# Patient Record
Sex: FEMALE | Race: BLACK OR AFRICAN AMERICAN | NOT HISPANIC OR LATINO | Employment: OTHER | URBAN - METROPOLITAN AREA
[De-identification: names, ages, dates, MRNs, and addresses within clinical notes are randomized per-mention and may not be internally consistent; named-entity substitution may affect disease eponyms.]

---

## 2017-01-09 ENCOUNTER — TELEPHONE (OUTPATIENT)
Dept: OBSTETRICS AND GYNECOLOGY | Facility: CLINIC | Age: 27
End: 2017-01-09

## 2017-01-09 NOTE — TELEPHONE ENCOUNTER
GAUTAM for Pt to call back. It looks like she was scheduled for tomorrow but it was canceled since her last visit. Apparently another appointment wasn't made. Risk assessment was already done. New ob appt made. KRISTOPHER Bhatia

## 2017-01-09 NOTE — TELEPHONE ENCOUNTER
----- Message from Kanwal Turcios sent at 1/9/2017  9:26 AM CST -----  Contact: pt   Pt very up set because she because she has a high risk pregnancy,,,pt wants to know when her next appt,,, pt wants to talk with someone,, please call pt back [phone]

## 2017-01-10 NOTE — TELEPHONE ENCOUNTER
----- Message from Breanne Soliman sent at 1/10/2017  1:34 PM CST -----  Contact: Pt  Pt states she is returning nurse call, please contact pt at [phone]

## 2017-01-17 ENCOUNTER — PROCEDURE VISIT (OUTPATIENT)
Dept: OBSTETRICS AND GYNECOLOGY | Facility: CLINIC | Age: 27
End: 2017-01-17
Payer: MEDICAID

## 2017-01-17 ENCOUNTER — INITIAL PRENATAL (OUTPATIENT)
Dept: OBSTETRICS AND GYNECOLOGY | Facility: CLINIC | Age: 27
End: 2017-01-17
Payer: MEDICAID

## 2017-01-17 VITALS
BODY MASS INDEX: 40.85 KG/M2 | DIASTOLIC BLOOD PRESSURE: 82 MMHG | SYSTOLIC BLOOD PRESSURE: 118 MMHG | WEIGHT: 253.06 LBS

## 2017-01-17 DIAGNOSIS — O36.80X1 PREGNANCY WITH UNCERTAIN FETAL VIABILITY, FETUS 1: ICD-10-CM

## 2017-01-17 DIAGNOSIS — N18.6 ESRF (END STAGE RENAL FAILURE): ICD-10-CM

## 2017-01-17 DIAGNOSIS — N05.1 FSGS (FOCAL SEGMENTAL GLOMERULOSCLEROSIS): Primary | ICD-10-CM

## 2017-01-17 PROCEDURE — 76801 OB US < 14 WKS SINGLE FETUS: CPT | Mod: PBBFAC,PN | Performed by: OBSTETRICS & GYNECOLOGY

## 2017-01-17 PROCEDURE — 76801 OB US < 14 WKS SINGLE FETUS: CPT | Mod: 26,S$PBB,, | Performed by: OBSTETRICS & GYNECOLOGY

## 2017-01-17 PROCEDURE — 99999 PR PBB SHADOW E&M-EST. PATIENT-LVL III: CPT | Mod: PBBFAC,,, | Performed by: OBSTETRICS & GYNECOLOGY

## 2017-01-17 PROCEDURE — 99213 OFFICE O/P EST LOW 20 MIN: CPT | Mod: TH,S$PBB,, | Performed by: OBSTETRICS & GYNECOLOGY

## 2017-01-17 RX ORDER — ALBUTEROL SULFATE 90 UG/1
2 AEROSOL, METERED RESPIRATORY (INHALATION)
COMMUNITY
Start: 2016-02-19 | End: 2018-06-29

## 2017-01-17 NOTE — PROGRESS NOTES
Dating sono today  Pt very ambivalent about continuing pregnacy  No records from nephrologist  Per pt, nephology planning 5x/w dialysis  +fht  Reviewed prenatal labs  cmp not done; will get records from dr dailey (nephrology)

## 2017-01-23 ENCOUNTER — TELEPHONE (OUTPATIENT)
Dept: OBSTETRICS AND GYNECOLOGY | Facility: CLINIC | Age: 27
End: 2017-01-23

## 2017-01-23 NOTE — TELEPHONE ENCOUNTER
----- Message from Shahida Vargas sent at 1/23/2017 11:32 AM CST -----  Contact: [facility] [place] [place] [facility]   Call caller regarding what the doctor need from the dialysis clinic on pt.   [phone]

## 2017-01-23 NOTE — TELEPHONE ENCOUNTER
They are sending records and plan of care recommendations for patient to our fax number.  Fax number given.

## 2017-02-01 ENCOUNTER — OFFICE VISIT (OUTPATIENT)
Dept: OBSTETRICS AND GYNECOLOGY | Facility: CLINIC | Age: 27
End: 2017-02-01
Payer: MEDICARE

## 2017-02-01 DIAGNOSIS — O36.80X1 PREGNANCY WITH UNCERTAIN FETAL VIABILITY, FETUS 1: ICD-10-CM

## 2017-02-01 DIAGNOSIS — N18.6 ESRF (END STAGE RENAL FAILURE): ICD-10-CM

## 2017-02-01 DIAGNOSIS — N05.1 FSGS (FOCAL SEGMENTAL GLOMERULOSCLEROSIS): ICD-10-CM

## 2017-02-01 PROBLEM — N19: Status: ACTIVE | Noted: 2017-02-01

## 2017-02-01 PROBLEM — O26.831: Status: ACTIVE | Noted: 2017-02-01

## 2017-02-01 PROCEDURE — 99211 OFF/OP EST MAY X REQ PHY/QHP: CPT | Mod: PBBFAC,PO,25

## 2017-02-01 PROCEDURE — 99204 OFFICE O/P NEW MOD 45 MIN: CPT | Mod: 25,S$PBB,, | Performed by: OBSTETRICS & GYNECOLOGY

## 2017-02-01 PROCEDURE — 76801 OB US < 14 WKS SINGLE FETUS: CPT | Mod: 26,S$PBB,, | Performed by: OBSTETRICS & GYNECOLOGY

## 2017-02-01 PROCEDURE — 76801 OB US < 14 WKS SINGLE FETUS: CPT | Mod: PBBFAC,PO | Performed by: OBSTETRICS & GYNECOLOGY

## 2017-02-01 PROCEDURE — 99999 PR PBB SHADOW E&M-EST. PATIENT-LVL I: CPT | Mod: PBBFAC,,,
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
## 2017-02-09 ENCOUNTER — TELEPHONE (OUTPATIENT)
Dept: OBSTETRICS AND GYNECOLOGY | Facility: CLINIC | Age: 27
End: 2017-02-09

## 2017-02-09 NOTE — TELEPHONE ENCOUNTER
----- Message from Breanne Soliman sent at 2/9/2017  1:26 PM CST -----  Contact: Pt  Pt request call from nurse regarding her apt on 02-16-17, pt declined to give any further details so I do not know if I could assist her further or not, please contact pt at [phone]

## 2017-02-13 ENCOUNTER — TELEPHONE (OUTPATIENT)
Dept: OBSTETRICS AND GYNECOLOGY | Facility: CLINIC | Age: 27
End: 2017-02-13

## 2017-02-13 NOTE — TELEPHONE ENCOUNTER
A letter has been created the Pt has been advised. She is asking for an  clinic that will accept her insurance to pay. I informed her that we can not give out that information and that she has to find one if that is her decision. She states she will come in as scheduled on the  to discuss it with Dr. Higgins because she feels we can do more since this is making her health worse. KRISTOPHER Bhatia

## 2017-02-13 NOTE — TELEPHONE ENCOUNTER
----- Message from Virginia Higgins MD sent at 2017  9:04 AM CST -----   Please write; pt is under our care at [facility].  She is followed by nephrology.  She has a renal condition that will exacerbate during the pregnancy  ----- Message -----     From: FILIBERTO Mosqueda MD     Sent: 2/10/2017  11:13 AM       To: Virginia Higgins MD    I would only write a letter stating that she has a renal condition that will probably exacerbate with the pregnancy...  Do not state that the  is medically necessary.  That should suffice   Juan Francisco  ----- Message -----     From: Virginia Higgins MD     Sent: 2/10/2017   9:15 AM       To: FILIBERTO Mosqueda MD     Really do not feel comfortable writing this letter--what are your feelings.  I agree with her right to choose.  Do we have a standard letter for this issue  ----- Message -----     From: Josephine Bhatia MA     Sent: 2017   2:47 PM       To: Virginia Higgins MD    This Pt states her kidney doctor, Dr. Cardenas, and our [facility] provider states she is at a greater health risk if she continues with this pregnancy. She states her medicare is willing to pay for an  if you write a letter stating you approve of it. Can you be of assistance with this matter?

## 2017-11-02 ENCOUNTER — TELEPHONE (OUTPATIENT)
Dept: TRANSPLANT | Facility: CLINIC | Age: 27
End: 2017-11-02

## 2017-11-02 NOTE — TELEPHONE ENCOUNTER
----- Message from Chanda Kelley sent at 11/2/2017  2:03 PM CDT -----  Contact: Monse with [facility],     Ritesh would like to discuss pts BMI     Contact number [phone] ask for Monse Stewart

## 2017-11-13 ENCOUNTER — TELEPHONE (OUTPATIENT)
Dept: TRANSPLANT | Facility: CLINIC | Age: 27
End: 2017-11-13

## 2018-03-29 ENCOUNTER — TELEPHONE (OUTPATIENT)
Dept: OBSTETRICS AND GYNECOLOGY | Facility: CLINIC | Age: 28
End: 2018-03-29

## 2018-03-29 NOTE — TELEPHONE ENCOUNTER
----- Message from Josephine Bhatia MA sent at 3/28/2018  4:26 PM CDT -----  Contact: pt      ----- Message -----  From: Bell Daly  Sent: 3/28/2018  10:56 AM  To: Ronaldo [facility] Staff    The pt states she maybe pregnant and is on dialysis and want to be worked in this week, the pt can be reached at [phone]///[id number]

## 2018-04-05 ENCOUNTER — LAB VISIT (OUTPATIENT)
Dept: LAB | Facility: HOSPITAL | Age: 28
End: 2018-04-05
Attending: OBSTETRICS & GYNECOLOGY
Payer: MEDICARE

## 2018-04-05 ENCOUNTER — OFFICE VISIT (OUTPATIENT)
Dept: OBSTETRICS AND GYNECOLOGY | Facility: CLINIC | Age: 28
End: 2018-04-05
Payer: MEDICARE

## 2018-04-05 VITALS
DIASTOLIC BLOOD PRESSURE: 91 MMHG | HEIGHT: 66 IN | BODY MASS INDEX: 40.74 KG/M2 | SYSTOLIC BLOOD PRESSURE: 128 MMHG | WEIGHT: 253.5 LBS

## 2018-04-05 DIAGNOSIS — Z11.3 SCREENING FOR GONORRHEA: ICD-10-CM

## 2018-04-05 DIAGNOSIS — N19 RENAL FAILURE AFFECTING PREGNANCY IN FIRST TRIMESTER: ICD-10-CM

## 2018-04-05 DIAGNOSIS — O09.91 SUPERVISION OF HIGH RISK PREGNANCY IN FIRST TRIMESTER: ICD-10-CM

## 2018-04-05 DIAGNOSIS — O26.831 RENAL FAILURE AFFECTING PREGNANCY IN FIRST TRIMESTER: ICD-10-CM

## 2018-04-05 DIAGNOSIS — Z12.4 SCREENING FOR CERVICAL CANCER: ICD-10-CM

## 2018-04-05 DIAGNOSIS — Z32.01 POSITIVE PREGNANCY TEST: ICD-10-CM

## 2018-04-05 DIAGNOSIS — Z32.01 POSITIVE PREGNANCY TEST: Primary | ICD-10-CM

## 2018-04-05 DIAGNOSIS — N76.0 BACTERIAL VAGINOSIS: ICD-10-CM

## 2018-04-05 DIAGNOSIS — B96.89 BACTERIAL VAGINOSIS: ICD-10-CM

## 2018-04-05 DIAGNOSIS — N18.6 END STAGE RENAL DISEASE: ICD-10-CM

## 2018-04-05 DIAGNOSIS — N94.89 OTHER SPECIFIED CONDITIONS ASSOCIATED WITH FEMALE GENITAL ORGANS AND MENSTRUAL CYCLE: ICD-10-CM

## 2018-04-05 LAB
ABO + RH BLD: NORMAL
ANION GAP SERPL CALC-SCNC: 8 MMOL/L
BASOPHILS # BLD AUTO: 0.05 K/UL
BASOPHILS NFR BLD: 0.5 %
BLD GP AB SCN CELLS X3 SERPL QL: NORMAL
BUN SERPL-MCNC: 25 MG/DL
CALCIUM SERPL-MCNC: 9.7 MG/DL
CHLORIDE SERPL-SCNC: 106 MMOL/L
CO2 SERPL-SCNC: 24 MMOL/L
CREAT SERPL-MCNC: 5.9 MG/DL
DIFFERENTIAL METHOD: ABNORMAL
EOSINOPHIL # BLD AUTO: 0 K/UL
EOSINOPHIL NFR BLD: 0.4 %
ERYTHROCYTE [DISTWIDTH] IN BLOOD BY AUTOMATED COUNT: 13 %
EST. GFR  (AFRICAN AMERICAN): 10.3 ML/MIN/1.73 M^2
EST. GFR  (NON AFRICAN AMERICAN): 8.9 ML/MIN/1.73 M^2
GLUCOSE SERPL-MCNC: 69 MG/DL
HCT VFR BLD AUTO: 30.7 %
HGB BLD-MCNC: 9.9 G/DL
IMM GRANULOCYTES # BLD AUTO: 0.03 K/UL
IMM GRANULOCYTES NFR BLD AUTO: 0.3 %
LYMPHOCYTES # BLD AUTO: 1.8 K/UL
LYMPHOCYTES NFR BLD: 19.1 %
MCH RBC QN AUTO: 31 PG
MCHC RBC AUTO-ENTMCNC: 32.2 G/DL
MCV RBC AUTO: 96 FL
MONOCYTES # BLD AUTO: 0.7 K/UL
MONOCYTES NFR BLD: 7 %
NEUTROPHILS # BLD AUTO: 6.8 K/UL
NEUTROPHILS NFR BLD: 72.7 %
NRBC BLD-RTO: 0 /100 WBC
PLATELET # BLD AUTO: 167 K/UL
PMV BLD AUTO: 11.6 FL
POTASSIUM SERPL-SCNC: 4.3 MMOL/L
RBC # BLD AUTO: 3.19 M/UL
SODIUM SERPL-SCNC: 138 MMOL/L
WBC # BLD AUTO: 9.4 K/UL

## 2018-04-05 PROCEDURE — 88175 CYTOPATH C/V AUTO FLUID REDO: CPT

## 2018-04-05 PROCEDURE — 99213 OFFICE O/P EST LOW 20 MIN: CPT | Mod: S$PBB,,, | Performed by: OBSTETRICS & GYNECOLOGY

## 2018-04-05 PROCEDURE — 87510 GARDNER VAG DNA DIR PROBE: CPT

## 2018-04-05 PROCEDURE — 86592 SYPHILIS TEST NON-TREP QUAL: CPT

## 2018-04-05 PROCEDURE — 99213 OFFICE O/P EST LOW 20 MIN: CPT | Mod: PBBFAC,PN,25 | Performed by: OBSTETRICS & GYNECOLOGY

## 2018-04-05 PROCEDURE — 85025 COMPLETE CBC W/AUTO DIFF WBC: CPT

## 2018-04-05 PROCEDURE — 80048 BASIC METABOLIC PNL TOTAL CA: CPT

## 2018-04-05 PROCEDURE — 87491 CHLMYD TRACH DNA AMP PROBE: CPT

## 2018-04-05 PROCEDURE — 87340 HEPATITIS B SURFACE AG IA: CPT

## 2018-04-05 PROCEDURE — 86901 BLOOD TYPING SEROLOGIC RH(D): CPT

## 2018-04-05 PROCEDURE — 87086 URINE CULTURE/COLONY COUNT: CPT

## 2018-04-05 PROCEDURE — 83020 HEMOGLOBIN ELECTROPHORESIS: CPT

## 2018-04-05 PROCEDURE — 87480 CANDIDA DNA DIR PROBE: CPT

## 2018-04-05 PROCEDURE — 86703 HIV-1/HIV-2 1 RESULT ANTBDY: CPT

## 2018-04-05 PROCEDURE — 86762 RUBELLA ANTIBODY: CPT

## 2018-04-05 PROCEDURE — 99999 PR PBB SHADOW E&M-EST. PATIENT-LVL III: CPT | Mod: PBBFAC,,, | Performed by: OBSTETRICS & GYNECOLOGY

## 2018-04-05 RX ORDER — PREDNISONE 20 MG/1
TABLET ORAL
COMMUNITY
Start: 2017-05-30 | End: 2018-04-05

## 2018-04-05 RX ORDER — METOPROLOL SUCCINATE 25 MG/1
50 TABLET, EXTENDED RELEASE ORAL NIGHTLY
COMMUNITY
Start: 2018-04-01 | End: 2019-11-12

## 2018-04-05 RX ORDER — BUSPIRONE HYDROCHLORIDE 5 MG/1
TABLET ORAL
COMMUNITY
Start: 2018-03-03 | End: 2018-06-29

## 2018-04-05 RX ORDER — PANTOPRAZOLE SODIUM 40 MG/1
40 TABLET, DELAYED RELEASE ORAL NIGHTLY
COMMUNITY
Start: 2018-04-01 | End: 2019-01-21 | Stop reason: SDUPTHER

## 2018-04-05 NOTE — PROGRESS NOTES
Subjective:       Patient ID: Lisseth Schwartz is a 27 y.o. female.    Chief Complaint:  Initial Prenatal Visit      History of Present Illness  HPI  Missed Menses/ Possible Pregnancy  Patient complains of amenorrhea. She believes she could be pregnant. Patient is ambivalent about pregnancy. but plans to continue pregnancy; Sexual Activity: single partner, contraception: condoms. Current symptoms also include: morning sickness, nausea and positive home pregnancy test, breast tenderness; reports cramping but denies vaginal bleeding;  Last period was normal.     Patient's last menstrual period was 2018 (approximate).     Denies use of prenatal vitamin    Dialysis patient, 3x/wk        GYN & OB History  Patient's last menstrual period was 2018 (approximate).   Date of Last Pap: No result found    OB History    Para Term  AB Living   5 3 3   1 3   SAB TAB Ectopic Multiple Live Births     1            # Outcome Date GA Lbr Erik/2nd Weight Sex Delivery Anes PTL Lv   5             4 TAB            3 Term    4.99 kg (11 lb)  Vag-Spont      2 Term    4.082 kg (9 lb)  Vag-Spont      1 Term    3.175 kg (7 lb)  Vag-Spont             Review of Systems  Review of Systems   Constitutional: Negative for activity change, appetite change, chills, diaphoresis, fatigue, fever and unexpected weight change.   HENT: Negative for mouth sores and tinnitus.    Eyes: Negative for discharge and visual disturbance.   Respiratory: Negative for cough, shortness of breath and wheezing.    Cardiovascular: Negative for chest pain, palpitations and leg swelling.   Gastrointestinal: Negative for abdominal pain, bloating, blood in stool, constipation, diarrhea, nausea and vomiting.   Endocrine: Negative for diabetes, hair loss, hot flashes, hyperthyroidism and hypothyroidism.   Genitourinary: Negative for decreased libido, dyspareunia, dysuria, flank pain, frequency, genital sores, hematuria, menorrhagia, menstrual  problem (+upt), pelvic pain, urgency, vaginal bleeding, vaginal discharge, vaginal pain, dysmenorrhea, urinary incontinence, postcoital bleeding, postmenopausal bleeding and vaginal odor.   Musculoskeletal: Negative for back pain and myalgias.   Skin:  Negative for rash, no acne and hair changes.   Neurological: Negative for seizures, syncope, numbness and headaches.   Hematological: Negative for adenopathy. Does not bruise/bleed easily.   Psychiatric/Behavioral: Negative for depression and sleep disturbance. The patient is not nervous/anxious.    Breast: Negative for breast mass, breast pain, nipple discharge and skin changes          Objective:    Physical Exam:   Constitutional: She appears well-developed.     Eyes: Conjunctivae and EOM are normal. Pupils are equal, round, and reactive to light.    Neck: Normal range of motion. Neck supple.     Pulmonary/Chest: Effort normal. Right breast exhibits no mass, no nipple discharge, no skin change, no tenderness and presence. Left breast exhibits no mass, no nipple discharge, no skin change, no tenderness and presence. Breasts are symmetrical.        Abdominal: Soft.     Genitourinary: Vagina normal. Pelvic exam was performed with patient supine. Uterus is enlarged. Cervix is normal. Right adnexum displays no mass. Left adnexum displays no mass. Additional cervical findings: pap smear done       Uterus Size: 8 cm   Musculoskeletal: Normal range of motion.       Neurological: She is alert.    Skin: Skin is warm.    Psychiatric: She has a normal mood and affect.          Assessment:     Encounter Diagnoses   Name Primary?    Positive pregnancy test Yes    Screening for gonorrhea     Bacterial vaginosis     Screening for cervical cancer     Renal failure affecting pregnancy in first trimester     Supervision of high risk pregnancy in first trimester      End stage renal disease      Other specified conditions associated with female genital organs and menstrual  cycle                 Plan:      Risk assessment  Ob sono ordered--dating  Ob labs ordered, including early glucola  Needs f/u with MFM  rx sent for folic acid   new ob appt in 2 wks  Continue metoprolol

## 2018-04-06 LAB
BACTERIA UR CULT: NO GROWTH
C TRACH DNA SPEC QL NAA+PROBE: NOT DETECTED
CANDIDA RRNA VAG QL PROBE: NEGATIVE
G VAGINALIS RRNA GENITAL QL PROBE: POSITIVE
HBV SURFACE AG SERPL QL IA: NEGATIVE
HGB A2 MFR BLD HPLC: 3.5 %
HGB FRACT BLD ELPH-IMP: ABNORMAL
HGB FRACT BLD ELPH-IMP: NORMAL
HIV 1+2 AB+HIV1 P24 AG SERPL QL IA: NEGATIVE
N GONORRHOEA DNA SPEC QL NAA+PROBE: NOT DETECTED
RPR SER QL: NORMAL
RUBV IGG SER-ACNC: 26.3 IU/ML
RUBV IGG SER-IMP: REACTIVE
T VAGINALIS RRNA GENITAL QL PROBE: NEGATIVE

## 2018-04-07 ENCOUNTER — TELEPHONE (OUTPATIENT)
Dept: OBSTETRICS AND GYNECOLOGY | Facility: HOSPITAL | Age: 28
End: 2018-04-07

## 2018-04-07 DIAGNOSIS — N76.0 BACTERIAL VAGINOSIS: Primary | ICD-10-CM

## 2018-04-07 DIAGNOSIS — B96.89 BACTERIAL VAGINOSIS: Primary | ICD-10-CM

## 2018-04-07 RX ORDER — METRONIDAZOLE 7.5 MG/G
1 GEL VAGINAL DAILY
Qty: 5 APPLICATOR | Refills: 0 | Status: SHIPPED | OUTPATIENT
Start: 2018-04-07 | End: 2018-04-12

## 2018-04-09 ENCOUNTER — TELEPHONE (OUTPATIENT)
Dept: OBSTETRICS AND GYNECOLOGY | Facility: CLINIC | Age: 28
End: 2018-04-09

## 2018-04-09 ENCOUNTER — LAB VISIT (OUTPATIENT)
Dept: LAB | Facility: HOSPITAL | Age: 28
End: 2018-04-09
Attending: OBSTETRICS & GYNECOLOGY
Payer: MEDICARE

## 2018-04-09 ENCOUNTER — PROCEDURE VISIT (OUTPATIENT)
Dept: OBSTETRICS AND GYNECOLOGY | Facility: CLINIC | Age: 28
End: 2018-04-09
Payer: MEDICARE

## 2018-04-09 DIAGNOSIS — O26.831 RENAL FAILURE AFFECTING PREGNANCY IN FIRST TRIMESTER: ICD-10-CM

## 2018-04-09 DIAGNOSIS — Z32.01 POSITIVE PREGNANCY TEST: ICD-10-CM

## 2018-04-09 DIAGNOSIS — N19 RENAL FAILURE AFFECTING PREGNANCY IN FIRST TRIMESTER: ICD-10-CM

## 2018-04-09 DIAGNOSIS — O26.831 RENAL FAILURE AFFECTING PREGNANCY IN FIRST TRIMESTER: Primary | ICD-10-CM

## 2018-04-09 DIAGNOSIS — O09.91 HIGH-RISK PREGNANCY IN FIRST TRIMESTER: ICD-10-CM

## 2018-04-09 DIAGNOSIS — N18.6 END STAGE RENAL DISEASE: ICD-10-CM

## 2018-04-09 DIAGNOSIS — N05.1 FSGS (FOCAL SEGMENTAL GLOMERULOSCLEROSIS): ICD-10-CM

## 2018-04-09 DIAGNOSIS — N19 RENAL FAILURE AFFECTING PREGNANCY IN FIRST TRIMESTER: Primary | ICD-10-CM

## 2018-04-09 LAB — GLUCOSE SERPL-MCNC: 183 MG/DL

## 2018-04-09 PROCEDURE — 76801 OB US < 14 WKS SINGLE FETUS: CPT | Mod: PBBFAC,PN | Performed by: OBSTETRICS & GYNECOLOGY

## 2018-04-09 PROCEDURE — 36415 COLL VENOUS BLD VENIPUNCTURE: CPT | Mod: PO

## 2018-04-09 PROCEDURE — 82950 GLUCOSE TEST: CPT

## 2018-04-09 PROCEDURE — 76801 OB US < 14 WKS SINGLE FETUS: CPT | Mod: 26,S$PBB,, | Performed by: OBSTETRICS & GYNECOLOGY

## 2018-04-09 NOTE — TELEPHONE ENCOUNTER
Spoke to the pt. Informed her that her vaginal culture is positive for bacterial vaginosis.  This is overgrowth of your normal bacteria.  rx sent for  vaginal gel for treatment   Pt. Acknowledged understanding. navid Michelle

## 2018-04-10 DIAGNOSIS — O99.810 ABNORMAL GLUCOSE AFFECTING PREGNANCY: Primary | ICD-10-CM

## 2018-04-16 ENCOUNTER — OFFICE VISIT (OUTPATIENT)
Dept: OBSTETRICS AND GYNECOLOGY | Facility: CLINIC | Age: 28
End: 2018-04-16
Payer: MEDICARE

## 2018-04-16 DIAGNOSIS — O26.831 RENAL FAILURE AFFECTING PREGNANCY IN FIRST TRIMESTER: ICD-10-CM

## 2018-04-16 DIAGNOSIS — O09.91 HIGH-RISK PREGNANCY IN FIRST TRIMESTER: ICD-10-CM

## 2018-04-16 DIAGNOSIS — N19 RENAL FAILURE AFFECTING PREGNANCY IN FIRST TRIMESTER: ICD-10-CM

## 2018-04-16 DIAGNOSIS — N05.1 FSGS (FOCAL SEGMENTAL GLOMERULOSCLEROSIS): ICD-10-CM

## 2018-04-16 PROCEDURE — 76801 OB US < 14 WKS SINGLE FETUS: CPT | Mod: 26,S$PBB,, | Performed by: OBSTETRICS & GYNECOLOGY

## 2018-04-16 PROCEDURE — 99213 OFFICE O/P EST LOW 20 MIN: CPT | Mod: S$PBB,25,, | Performed by: OBSTETRICS & GYNECOLOGY

## 2018-04-16 PROCEDURE — 76801 OB US < 14 WKS SINGLE FETUS: CPT | Mod: PBBFAC | Performed by: OBSTETRICS & GYNECOLOGY

## 2018-04-16 NOTE — PROGRESS NOTES
Lisseth Cesar is a 27y.o. female  presents for risk assessment. LMP unsure.     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 ()  Patient is currently on HD five times/week.    She is currently on Metoprolol QHS    Serum Cr 5.9 this month; H&H 9.9 and 30.7; Glucose wnl;      Counseling  The patient is referred to [facility] because of her multiple medical problems. Foremost amongst these is the fact that she is now on dialysis  The outcome of pregnancy amongst dialysis patients is poor. Delivery of a viable infant occurs in 50-70% of these women. The viable  pregnancies frequently deliver prematurely due to preeclampsia and IUGR. The average gestational age of delivery in dialysis patients is 30  weeks.    More intensive dialysis with the blood urea nitrogen (BUN) being maintained at under 50 mg/dL or even under 45 mg/dL. In clinical practice, this  is usually achieved by increasing the frequency of dialysis (eg, increase to four to six sessions per week) or switching to long nightly dialysis  Ameliorating the uremic milieu can avoid polyhydramnios, help control hypertension, increase birth weight and gestational age, and improve  maternal nutrition. Careful uterine and fetal monitoring during hemodialysis, such as assessment of the fetal heart rate (particularly during the  last portion of a session), combined with measures aimed at preventing dialysis-induced hypotension should be performed. Maternal  hemodynamic instability may compromise the uteroplacental circulation and may be associated with the induction of uterine contractions. The  normal tendency to increase filtration to pull off fluid when swelling is evident should be tempered in pregnant patients due to the impact it  could have on placental  perfusion.    Pregnant women often require higher doses of erythropoietin to maintain an adequate red cell mass (hemoglobin of 10 to 11 g/dL) since the  physiologic changes and demands of pregnancy may result in worsening of anemia. In addition, metabolic acidosis and hypocalcemia should  be corrected.    Patients should be followed weekly from the early 2nd trimester and  testing should begin at viability.    I spoke with the patient and her partner at length. ESRD on dialysis is certainly an indication for termination of pregnancy but it remains the  patient's choice.      Impression  =========  Chronic Hypertension with ESRD on Dialysis  Single viable intrauterine pregnancy consistent with 10w5d  Embryo grossly WNL with normal cardiac activity.  Uterus, cervix and adnexae as noted above.  No fluid seen in cul-de-sac.    Recommendation  ==============  The patient is considering termination of pregnancy which MFM would support  Please re-consult if the clinical circumstances change.

## 2018-04-17 ENCOUNTER — LAB VISIT (OUTPATIENT)
Dept: LAB | Facility: HOSPITAL | Age: 28
End: 2018-04-17
Attending: OBSTETRICS & GYNECOLOGY
Payer: MEDICARE

## 2018-04-17 DIAGNOSIS — O99.810 ABNORMAL GLUCOSE AFFECTING PREGNANCY: ICD-10-CM

## 2018-04-17 LAB
GLUCOSE SERPL-MCNC: 101 MG/DL
GLUCOSE SERPL-MCNC: 105 MG/DL
GLUCOSE SERPL-MCNC: 133 MG/DL
GLUCOSE SERPL-MCNC: 71 MG/DL

## 2018-04-17 PROCEDURE — 82951 GLUCOSE TOLERANCE TEST (GTT): CPT

## 2018-04-17 PROCEDURE — 82952 GTT-ADDED SAMPLES: CPT

## 2018-04-17 PROCEDURE — 36415 COLL VENOUS BLD VENIPUNCTURE: CPT | Mod: PO

## 2018-04-26 ENCOUNTER — INITIAL PRENATAL (OUTPATIENT)
Dept: OBSTETRICS AND GYNECOLOGY | Facility: CLINIC | Age: 28
End: 2018-04-26
Payer: MEDICARE

## 2018-04-26 VITALS — SYSTOLIC BLOOD PRESSURE: 148 MMHG | WEIGHT: 255 LBS | BODY MASS INDEX: 41.16 KG/M2 | DIASTOLIC BLOOD PRESSURE: 78 MMHG

## 2018-04-26 DIAGNOSIS — N19 RENAL FAILURE AFFECTING PREGNANCY IN FIRST TRIMESTER: ICD-10-CM

## 2018-04-26 DIAGNOSIS — O26.831 RENAL FAILURE AFFECTING PREGNANCY IN FIRST TRIMESTER: ICD-10-CM

## 2018-04-26 DIAGNOSIS — N05.1 FSGS (FOCAL SEGMENTAL GLOMERULOSCLEROSIS): Primary | ICD-10-CM

## 2018-04-26 DIAGNOSIS — O09.90 SUPERVISION OF HIGH RISK PREGNANCY, ANTEPARTUM: ICD-10-CM

## 2018-04-26 PROCEDURE — 99999 PR PBB SHADOW E&M-EST. PATIENT-LVL II: CPT | Mod: PBBFAC,,, | Performed by: ADVANCED PRACTICE MIDWIFE

## 2018-04-26 PROCEDURE — 99213 OFFICE O/P EST LOW 20 MIN: CPT | Mod: S$PBB,,, | Performed by: ADVANCED PRACTICE MIDWIFE

## 2018-04-26 PROCEDURE — 99212 OFFICE O/P EST SF 10 MIN: CPT | Mod: PBBFAC,PN | Performed by: ADVANCED PRACTICE MIDWIFE

## 2018-04-26 RX ORDER — METOCLOPRAMIDE 10 MG/1
10 TABLET ORAL
Qty: 30 TABLET | Refills: 3 | Status: SHIPPED | OUTPATIENT
Start: 2018-04-26 | End: 2018-06-29

## 2018-04-26 NOTE — PROGRESS NOTES
NOB s=d oriented to practice  Patient requesting [id number]  See MFM consult  Many questions Patient concerned about pregnancy, dialysis and effect on pregnancy  reglan for nausea

## 2018-05-01 ENCOUNTER — TELEPHONE (OUTPATIENT)
Dept: OBSTETRICS AND GYNECOLOGY | Facility: CLINIC | Age: 28
End: 2018-05-01

## 2018-05-01 NOTE — TELEPHONE ENCOUNTER
----- Message from Cosme Jacinto sent at 5/1/2018  3:51 PM CDT -----  [facility] ( [facility] ) is requesting a diagnoses code .          Please call Elsi ( "[employer] lab [phone] opt 2 opt 2

## 2018-05-06 ENCOUNTER — TELEPHONE (OUTPATIENT)
Dept: OBSTETRICS AND GYNECOLOGY | Facility: HOSPITAL | Age: 28
End: 2018-05-06

## 2018-05-07 ENCOUNTER — TELEPHONE (OUTPATIENT)
Dept: OBSTETRICS AND GYNECOLOGY | Facility: CLINIC | Age: 28
End: 2018-05-07

## 2018-05-07 NOTE — TELEPHONE ENCOUNTER
----- Message from Kandi Sexton MD sent at 4/11/2018  1:13 PM CDT -----  Regarding: hi risk  MD Kandi Salinas MD  Caller: Unspecified (Yesterday,  6:11 PM)         Please Add to high risk list     Patient on dialysis   Will be seeing [facility]

## 2018-05-24 ENCOUNTER — ROUTINE PRENATAL (OUTPATIENT)
Dept: OBSTETRICS AND GYNECOLOGY | Facility: CLINIC | Age: 28
End: 2018-05-24
Payer: MEDICARE

## 2018-05-24 VITALS
DIASTOLIC BLOOD PRESSURE: 79 MMHG | WEIGHT: 252.88 LBS | BODY MASS INDEX: 40.81 KG/M2 | SYSTOLIC BLOOD PRESSURE: 132 MMHG

## 2018-05-24 DIAGNOSIS — O09.92 SUPERVISION OF HIGH RISK PREGNANCY IN SECOND TRIMESTER: Primary | ICD-10-CM

## 2018-05-24 DIAGNOSIS — O26.832: ICD-10-CM

## 2018-05-24 DIAGNOSIS — N05.1 FSGS (FOCAL SEGMENTAL GLOMERULOSCLEROSIS): ICD-10-CM

## 2018-05-24 DIAGNOSIS — N19: ICD-10-CM

## 2018-05-24 PROCEDURE — 99999 PR PBB SHADOW E&M-EST. PATIENT-LVL II: CPT | Mod: PBBFAC,,, | Performed by: ADVANCED PRACTICE MIDWIFE

## 2018-05-24 PROCEDURE — 99212 OFFICE O/P EST SF 10 MIN: CPT | Mod: PBBFAC,PN | Performed by: ADVANCED PRACTICE MIDWIFE

## 2018-05-24 PROCEDURE — 0502F SUBSEQUENT PRENATAL CARE: CPT | Mod: S$PBB,,, | Performed by: ADVANCED PRACTICE MIDWIFE

## 2018-05-24 RX ORDER — PROMETHAZINE HYDROCHLORIDE 25 MG/1
25 TABLET ORAL EVERY 4 HOURS
Qty: 30 TABLET | Refills: 1 | Status: SHIPPED | OUTPATIENT
Start: 2018-05-24 | End: 2018-08-01

## 2018-05-24 NOTE — PROGRESS NOTES
Dialysis going well BP stable  Reports some nausea and cramping  Anatomy scan next visit  Declines quad screen  Coffective counseling sheet Fall In Love discussed with mother. Reinforced immediate skin to skin, the magic first hour, importance of the first feeding and delaying routine procedures. Encouraged mother to download Coffective mobile irene if she has not already done so. Mother verbalizes understanding.

## 2018-05-31 ENCOUNTER — ROUTINE PRENATAL (OUTPATIENT)
Dept: OBSTETRICS AND GYNECOLOGY | Facility: CLINIC | Age: 28
End: 2018-05-31
Payer: MEDICARE

## 2018-05-31 VITALS — WEIGHT: 253.5 LBS | BODY MASS INDEX: 40.92 KG/M2 | DIASTOLIC BLOOD PRESSURE: 84 MMHG | SYSTOLIC BLOOD PRESSURE: 122 MMHG

## 2018-05-31 DIAGNOSIS — O09.92 SUPERVISION OF HIGH RISK PREGNANCY IN SECOND TRIMESTER: Primary | ICD-10-CM

## 2018-05-31 DIAGNOSIS — O26.832: ICD-10-CM

## 2018-05-31 DIAGNOSIS — N05.1 FSGS (FOCAL SEGMENTAL GLOMERULOSCLEROSIS): ICD-10-CM

## 2018-05-31 DIAGNOSIS — N19: ICD-10-CM

## 2018-05-31 PROCEDURE — 0502F SUBSEQUENT PRENATAL CARE: CPT | Mod: S$PBB,,, | Performed by: OBSTETRICS & GYNECOLOGY

## 2018-05-31 PROCEDURE — 99212 OFFICE O/P EST SF 10 MIN: CPT | Mod: PBBFAC,PN | Performed by: OBSTETRICS & GYNECOLOGY

## 2018-05-31 PROCEDURE — 99999 PR PBB SHADOW E&M-EST. PATIENT-LVL II: CPT | Mod: PBBFAC,,, | Performed by: OBSTETRICS & GYNECOLOGY

## 2018-05-31 NOTE — PROGRESS NOTES
Admits to not going to dialysis as scheduled some weeks; usually makes it 3-4 times/wk  Stressed impt with compliance  Denies shortness of breath  Phenergan helping with nausea  Taking metoprolol as directed  Continue weekly visits  mfm appt at 24w (anatomy scan)

## 2018-06-07 ENCOUNTER — ROUTINE PRENATAL (OUTPATIENT)
Dept: OBSTETRICS AND GYNECOLOGY | Facility: CLINIC | Age: 28
End: 2018-06-07
Payer: MEDICARE

## 2018-06-07 VITALS
WEIGHT: 257.94 LBS | SYSTOLIC BLOOD PRESSURE: 142 MMHG | BODY MASS INDEX: 41.63 KG/M2 | DIASTOLIC BLOOD PRESSURE: 74 MMHG

## 2018-06-07 DIAGNOSIS — O26.832: Primary | ICD-10-CM

## 2018-06-07 DIAGNOSIS — N19: Primary | ICD-10-CM

## 2018-06-07 DIAGNOSIS — O09.92 SUPERVISION OF HIGH RISK PREGNANCY IN SECOND TRIMESTER: ICD-10-CM

## 2018-06-07 DIAGNOSIS — D50.9 IRON DEFICIENCY ANEMIA, UNSPECIFIED IRON DEFICIENCY ANEMIA TYPE: ICD-10-CM

## 2018-06-07 PROCEDURE — 99999 PR PBB SHADOW E&M-EST. PATIENT-LVL III: CPT | Mod: PBBFAC,,, | Performed by: ADVANCED PRACTICE MIDWIFE

## 2018-06-07 PROCEDURE — 0502F SUBSEQUENT PRENATAL CARE: CPT | Mod: S$PBB,,, | Performed by: ADVANCED PRACTICE MIDWIFE

## 2018-06-07 PROCEDURE — 99213 OFFICE O/P EST LOW 20 MIN: CPT | Mod: PBBFAC,PN | Performed by: ADVANCED PRACTICE MIDWIFE

## 2018-06-07 NOTE — PROGRESS NOTES
Reports going to dialysis appointments  Reports having some financial stressors  Having transportation issues  Anatomy in 2 weeks  Does not want to see M  Coffective counseling sheet Fall In Love discussed with mother. Reinforced immediate skin to skin, the magic first hour, importance of the first feeding and delaying routine procedures. Encouraged mother to download Coffective mobile irene if she has not already done so. Mother verbalizes understanding.    JAY MELVIN

## 2018-06-11 ENCOUNTER — TELEPHONE (OUTPATIENT)
Dept: OBSTETRICS AND GYNECOLOGY | Facility: CLINIC | Age: 28
End: 2018-06-11

## 2018-06-11 NOTE — TELEPHONE ENCOUNTER
Per mfm ; pt should be seen weekly;   Please offer appt for this week    Per shasha Bhatia; appt sched for 6/12/18--3514

## 2018-06-11 NOTE — TELEPHONE ENCOUNTER
----- Message from Monica Piña sent at 6/11/2018 12:47 PM CDT -----  Contact: patient  Patient called and stated she is at Dialysis and her pressure has been running high. She already takes Metoprolol 25 mg and they are concerned and wants to know what the doctor can advise them to give her that is safe for the baby. She can be contacted at [phone].    Thanks,  Monica

## 2018-06-12 ENCOUNTER — PROCEDURE VISIT (OUTPATIENT)
Dept: OBSTETRICS AND GYNECOLOGY | Facility: CLINIC | Age: 28
End: 2018-06-12
Payer: MEDICARE

## 2018-06-12 ENCOUNTER — ROUTINE PRENATAL (OUTPATIENT)
Dept: OBSTETRICS AND GYNECOLOGY | Facility: CLINIC | Age: 28
End: 2018-06-12
Payer: MEDICARE

## 2018-06-12 VITALS — SYSTOLIC BLOOD PRESSURE: 136 MMHG | BODY MASS INDEX: 41.08 KG/M2 | DIASTOLIC BLOOD PRESSURE: 78 MMHG | WEIGHT: 254.5 LBS

## 2018-06-12 DIAGNOSIS — O26.832 RENAL FAILURE AFFECTING PREGNANCY IN SECOND TRIMESTER: ICD-10-CM

## 2018-06-12 DIAGNOSIS — N19 RENAL FAILURE AFFECTING PREGNANCY IN SECOND TRIMESTER: ICD-10-CM

## 2018-06-12 DIAGNOSIS — O09.92 HIGH-RISK PREGNANCY IN SECOND TRIMESTER: Primary | ICD-10-CM

## 2018-06-12 DIAGNOSIS — N05.1 FSGS (FOCAL SEGMENTAL GLOMERULOSCLEROSIS): ICD-10-CM

## 2018-06-12 DIAGNOSIS — Z36.3 ENCOUNTER FOR ANTENATAL SCREENING FOR MALFORMATION USING ULTRASOUND: ICD-10-CM

## 2018-06-12 DIAGNOSIS — O09.92 SUPERVISION OF HIGH RISK PREGNANCY IN SECOND TRIMESTER: ICD-10-CM

## 2018-06-12 PROCEDURE — 0502F SUBSEQUENT PRENATAL CARE: CPT | Mod: S$PBB,,, | Performed by: ADVANCED PRACTICE MIDWIFE

## 2018-06-12 PROCEDURE — 99213 OFFICE O/P EST LOW 20 MIN: CPT | Mod: PBBFAC,PN | Performed by: ADVANCED PRACTICE MIDWIFE

## 2018-06-12 PROCEDURE — 76805 OB US >/= 14 WKS SNGL FETUS: CPT | Mod: PBBFAC,PN | Performed by: OBSTETRICS & GYNECOLOGY

## 2018-06-12 PROCEDURE — 99999 PR PBB SHADOW E&M-EST. PATIENT-LVL III: CPT | Mod: PBBFAC,,, | Performed by: ADVANCED PRACTICE MIDWIFE

## 2018-06-12 PROCEDURE — 76805 OB US >/= 14 WKS SNGL FETUS: CPT | Mod: 26,S$PBB,, | Performed by: OBSTETRICS & GYNECOLOGY

## 2018-06-12 NOTE — PROGRESS NOTES
Pt is very nervous about possible pregnancy outcome.  Reviewed MFM note from 4/5.  Advised BP here has been stable and that today there was no need to introduce a second BP medication.  Pt reports round ligament pain.  Declined SVE. Comfort measures discussed.  Continues to struggle with transportation issues.    Anatomy US today sub-op heart.  Scheduled with MFM at 24 weeks for completion of anatomy and F/U for recommendations going forward.  Advised patient weekly visits to begin and fetal testing at 24 weeks.  Encouraged to keep up with all visits as close observation needed.  VM

## 2018-06-19 ENCOUNTER — TELEPHONE (OUTPATIENT)
Dept: OBSTETRICS AND GYNECOLOGY | Facility: CLINIC | Age: 28
End: 2018-06-19

## 2018-06-19 NOTE — TELEPHONE ENCOUNTER
----- Message from Liana Goel sent at 6/19/2018  3:47 PM CDT -----  Contact: self/[phone]  Would like to consult with nurse regarding making appt, please call back at [phone]. Thanks/ar

## 2018-06-20 ENCOUNTER — ROUTINE PRENATAL (OUTPATIENT)
Dept: OBSTETRICS AND GYNECOLOGY | Facility: CLINIC | Age: 28
End: 2018-06-20
Payer: MEDICARE

## 2018-06-20 VITALS
DIASTOLIC BLOOD PRESSURE: 66 MMHG | SYSTOLIC BLOOD PRESSURE: 124 MMHG | BODY MASS INDEX: 41.28 KG/M2 | WEIGHT: 255.75 LBS

## 2018-06-20 DIAGNOSIS — O26.832 RENAL FAILURE AFFECTING PREGNANCY IN SECOND TRIMESTER: Primary | ICD-10-CM

## 2018-06-20 DIAGNOSIS — N19 RENAL FAILURE AFFECTING PREGNANCY IN SECOND TRIMESTER: Primary | ICD-10-CM

## 2018-06-20 PROCEDURE — 99999 PR PBB SHADOW E&M-EST. PATIENT-LVL II: CPT | Mod: PBBFAC,,, | Performed by: ADVANCED PRACTICE MIDWIFE

## 2018-06-20 PROCEDURE — 99212 OFFICE O/P EST SF 10 MIN: CPT | Mod: PBBFAC,PN | Performed by: ADVANCED PRACTICE MIDWIFE

## 2018-06-20 PROCEDURE — 0502F SUBSEQUENT PRENATAL CARE: CPT | Mod: S$PBB,,, | Performed by: ADVANCED PRACTICE MIDWIFE

## 2018-06-20 NOTE — PROGRESS NOTES
Pt is very stressed out.   has no car at this time and is depending on other family members to get her to appointments.  States she is getting anxious about possibility of having to increase number of times per week she attends dialysis.  States her BP goes up every time she has dialysis and wants to know if we can up her BP Rx.  Patient signed release of records from the dialysis center she attends.  I once again discussed how important it is for her to follow medical advice and attend sessions recommended due to her high risk status.  She is scheduled to see MFM at the 24 week ant to complete anatomy scan and make recommendations.  Offered patient referral for counseling and she declined.   doesn't like meeting new people all the time. VM

## 2018-06-29 ENCOUNTER — ROUTINE PRENATAL (OUTPATIENT)
Dept: OBSTETRICS AND GYNECOLOGY | Facility: CLINIC | Age: 28
End: 2018-06-29
Payer: MEDICARE

## 2018-06-29 VITALS — SYSTOLIC BLOOD PRESSURE: 124 MMHG | WEIGHT: 253.5 LBS | BODY MASS INDEX: 40.92 KG/M2 | DIASTOLIC BLOOD PRESSURE: 78 MMHG

## 2018-06-29 DIAGNOSIS — N19 RENAL FAILURE AFFECTING PREGNANCY IN SECOND TRIMESTER: Primary | ICD-10-CM

## 2018-06-29 DIAGNOSIS — O26.832 RENAL FAILURE AFFECTING PREGNANCY IN SECOND TRIMESTER: Primary | ICD-10-CM

## 2018-06-29 PROCEDURE — 99999 PR PBB SHADOW E&M-EST. PATIENT-LVL II: CPT | Mod: PBBFAC,,, | Performed by: ADVANCED PRACTICE MIDWIFE

## 2018-06-29 PROCEDURE — 99212 OFFICE O/P EST SF 10 MIN: CPT | Mod: PBBFAC,PN | Performed by: ADVANCED PRACTICE MIDWIFE

## 2018-06-29 PROCEDURE — 0502F SUBSEQUENT PRENATAL CARE: CPT | Mod: TH,S$PBB,, | Performed by: ADVANCED PRACTICE MIDWIFE

## 2018-06-29 RX ORDER — HYDROXYZINE PAMOATE 50 MG/1
50 CAPSULE ORAL EVERY 8 HOURS PRN
Qty: 30 CAPSULE | Refills: 0 | Status: SHIPPED | OUTPATIENT
Start: 2018-06-29 | End: 2018-07-04

## 2018-06-29 NOTE — PROGRESS NOTES
Pt C/O pelvic pressure, cervix LTC.  S/S PTL discussed.  Vistaril sent to pharmacy for anxiety.  Pt encouraged to keep all appointments as scheduled and to keep hydrated.  RTC 1 week. VM

## 2018-07-10 ENCOUNTER — ROUTINE PRENATAL (OUTPATIENT)
Dept: OBSTETRICS AND GYNECOLOGY | Facility: CLINIC | Age: 28
End: 2018-07-10
Payer: MEDICARE

## 2018-07-10 VITALS
DIASTOLIC BLOOD PRESSURE: 68 MMHG | BODY MASS INDEX: 41.63 KG/M2 | WEIGHT: 257.94 LBS | SYSTOLIC BLOOD PRESSURE: 128 MMHG

## 2018-07-10 DIAGNOSIS — O09.92 SUPERVISION OF HIGH RISK PREGNANCY IN SECOND TRIMESTER: ICD-10-CM

## 2018-07-10 DIAGNOSIS — N19 RENAL FAILURE AFFECTING PREGNANCY IN SECOND TRIMESTER: Primary | ICD-10-CM

## 2018-07-10 DIAGNOSIS — O26.832 RENAL FAILURE AFFECTING PREGNANCY IN SECOND TRIMESTER: Primary | ICD-10-CM

## 2018-07-10 PROCEDURE — 99212 OFFICE O/P EST SF 10 MIN: CPT | Mod: PBBFAC,PN | Performed by: ADVANCED PRACTICE MIDWIFE

## 2018-07-10 PROCEDURE — 99999 PR PBB SHADOW E&M-EST. PATIENT-LVL II: CPT | Mod: PBBFAC,,, | Performed by: ADVANCED PRACTICE MIDWIFE

## 2018-07-10 PROCEDURE — 0502F SUBSEQUENT PRENATAL CARE: CPT | Mod: S$PBB,,, | Performed by: ADVANCED PRACTICE MIDWIFE

## 2018-07-10 NOTE — PROGRESS NOTES
Patient very cheerful this am.  Reports good FM, reports pressure but no cramping, cervix remains closed. S/S of PTL reviewed.  Advised to keep MFM appointment on 7/16 and to bring her list of questions with her.  Pt states has been started on Nepro liquid supplement on her dialysis days.  To return to clinic here in 4 weeks with T3 labs and BPP.  MAHESH

## 2018-07-12 ENCOUNTER — TELEPHONE (OUTPATIENT)
Dept: OBSTETRICS AND GYNECOLOGY | Facility: CLINIC | Age: 28
End: 2018-07-12

## 2018-07-12 NOTE — TELEPHONE ENCOUNTER
Pt c/o heart rate being elevated at 104. No other symptoms or concerns. Just wanted to know if this was normal and should she be alarmed. She has been advised that during pregnancy women can experience higher heart rates than normal; along with no other symptoms that she should be fine. Pt verbalizes understanding and she will contact us if she starts to have any issues. DS

## 2018-07-16 ENCOUNTER — OFFICE VISIT (OUTPATIENT)
Dept: OBSTETRICS AND GYNECOLOGY | Facility: CLINIC | Age: 28
End: 2018-07-16
Payer: MEDICARE

## 2018-07-16 DIAGNOSIS — O26.832 RENAL FAILURE AFFECTING PREGNANCY IN SECOND TRIMESTER: ICD-10-CM

## 2018-07-16 DIAGNOSIS — O99.212 MATERNAL OBESITY, ANTEPARTUM, SECOND TRIMESTER: ICD-10-CM

## 2018-07-16 DIAGNOSIS — N19 RENAL FAILURE AFFECTING PREGNANCY IN SECOND TRIMESTER: ICD-10-CM

## 2018-07-16 DIAGNOSIS — N05.1 FSGS (FOCAL SEGMENTAL GLOMERULOSCLEROSIS): ICD-10-CM

## 2018-07-16 DIAGNOSIS — O09.92 HIGH-RISK PREGNANCY IN SECOND TRIMESTER: Primary | ICD-10-CM

## 2018-07-16 DIAGNOSIS — O09.92 HIGH-RISK PREGNANCY IN SECOND TRIMESTER: ICD-10-CM

## 2018-07-16 PROCEDURE — 99211 OFF/OP EST MAY X REQ PHY/QHP: CPT | Mod: PBBFAC,25

## 2018-07-16 PROCEDURE — 99999 PR PBB SHADOW E&M-EST. PATIENT-LVL I: CPT | Mod: PBBFAC,,,

## 2018-07-16 PROCEDURE — 76811 OB US DETAILED SNGL FETUS: CPT | Mod: 26,S$PBB,, | Performed by: OBSTETRICS & GYNECOLOGY

## 2018-07-16 PROCEDURE — 76811 OB US DETAILED SNGL FETUS: CPT | Mod: PBBFAC | Performed by: OBSTETRICS & GYNECOLOGY

## 2018-07-16 PROCEDURE — 99214 OFFICE O/P EST MOD 30 MIN: CPT | Mod: S$PBB,25,, | Performed by: OBSTETRICS & GYNECOLOGY

## 2018-07-16 NOTE — PROGRESS NOTES
Indication  ========    Renal Failure, Target Anatomy Survey.    Maternal Assessment  =================    BP syst 116 mmHg  BP diast 76 mmHg    Method  ======    Transabdominal ultrasound examination. View: Good view.    Pregnancy  =========    Valladares pregnancy. Number of fetuses: 1.    Dating  ======    LMP on: 1/21/2018  GA by LMP 25 w + 1 d  KP by LMP: 10/28/2018  Ultrasound examination on: 7/16/2018  GA by U/S based upon: AC, BPD, Femur, HC  GA by U/S 23 w + 4 d  KP by U/S: 11/8/2018  Assigned: Dating performed on 04/9/2018, based on ultrasound (CRL)  Assigned GA 23 w + 5 d  Assigned KP: 11/7/2018    General Evaluation  ==============    Cardiac activity: present.  bpm.  Fetal movements: visualized.  Presentation: cephalic.  Placenta: Posterior.  Umbilical cord: 3 vessel cord.  Amniotic fluid: normal amount .    Fetal Biometry  ============    Fetal Biometry  BPD 53.8 mm 22w 3d Hadlock  OFD 77.0 mm 25w 2d Roger  .3 mm 23w 1d Hadlock  .3 mm 24w 0d Hadlock  Femur 44.3 mm 24w 4d Hadlock  Cerebellum tr 26.6 mm 25w 0d Gottlieb   g 51% Edward  Calculated by: Hadlock (BPD-HC-AC-FL)  EFW (lb) 1 lb  EFW (oz) 7 oz  Cephalic index 0.70  HC / AC 1.10  FL / BPD 0.82  FL / AC 0.23   bpm    Fetal Anatomy  ===========    Cranium: appears normal  Lateral ventricles: normal  Choroid plexus: normal  Midline falx: normal  Cavum septi pellucidi: normal  Cerebellum: normal  Cisterna magna: normal  Lips: normal  Profile: normal  Nose: normal  4-chamber view: normal  RVOT: normal  LVOT: normal  Situs: normal  Aortic arch: visualized  Ductal arch: suboptimal  SVC: suboptimal  IVC: suboptimal  3-vessel view: visualized  3-vessel-trachea view: suboptimal  Cardiac axis: normal  Cardiac size: normal  Cardiac rhythm: normal  Cardiac function: normal  Rt lung: normal  Lt lung: normal  Diaphragm: normal  Cord  insertion: normal  Stomach: normal  Kidneys: normal  Bladder: normal  Genitals: normal  Cervical spine: normal  Thoracic spine: normal  Lumbar spine: normal  Sacral spine: normal  Arms: normal  Legs: normal  Rt hand: normal  Rt foot: normal  Lt foot: normal  Gender: male  Wants to know gender: yes    Consultation  ==========    Follow-up consultation for ESRD  Doing well. Compliant with dialysis. Normotensive on metoprolol 25 mg XR.  Reviewed ultrasound findings-normal anatomy, AGA.  Reviewed plans for remainder of pregnancy. Uncertain what gestational age we will reach. At high risk for developing preeclampsia. If any  concerns for this, the patient should be evaluated and managed as an inpatient. Preeclampsia carries the risk of worsening her underlying renal  disease. If delivery is required < 34 weeks, a course of  steroids for fetal lung maturity is indicated.  She mentioned that nephrologist wants her delivered at 30 weeks. We discussed the complications of prematurity at periviability, 28 weeks,  and 30-34 weeks. Will need to balance risks of expectant management v. risks of early intervention with delivery. I suspect she will develop  preeclampsia at some point prior to 34 weeks.  Delivery logistics discussed. Discussed will likely be able to go back to HD 3x/week once delivered. Recommend primary OB ensure can have  dialysis while admitted for labor and delivery. Consultation with [facility] before planned delivery is recommended.  No contraindication to vaginal delivery if remains AGA and vertex.  Recommend effective contraception.  May need blood transfusion-no recent CBC for review. If Hg < 7-8, would recommend transfusion peripartum.  Prior consultation recommended for BPPs to start at 24 weeks. Likely to have high false positive rate. Would scan weekly to check AFV and  fetal movement, but can defer full outpatient testing until ~ 28 weeks.  Pregnancy at risk for IUGR, stillbirth,  prematurity, , and preeclampsia.  Time  I overall spent approximately 25 minutes in face to face time with the patient and her family, greater than 50% of which was in counseling and  care coordination.    Impression  =========    A detailed fetal anatomic ultrasound examination was performed for the following high risk indication: maternal BMI > 35. No fetal structural  malformations are identified; however, fetal imaging is incomplete today. A follow-up study will be scheduled to complete the fetal anatomic  survey. Fetal size today is consistent with established gestational age.    Recommendation  ==============    Follow-up weekly with primary OB providers for blood pressure and fetal assessment. False positive rate of BPP at 24 weeks likely to be high.  Would be reassured with active fetal movement and normal amniotic fluid assessments. If no episodes of sustained breathing in an otherwise  active fetus, would not necessarily proceed with NST testing unless other maternal-fetal complications arise.  Full  testing should start at 28 weeks (will need to discuss with patient if schedule will permit twice weekly) given ESRD and risks of  stillbirth.  Repeat MFM consultation and fetal growth assessment in 3 weeks.  Delivery timing to be re-discussed at each MFM visit.  Please check the patient's CBC. If hemoglobin is < 7-8, will need transfusion of prbc prior to delivery.  Recommend primary OB discuss effective contraception if not already addressed.  Please obtain records from the patient's nephrologist. I would advise confirming that the delivery hospital will be able to perform dialysis  postpartum. Consultation with [facility] before planned delivery is recommended.

## 2018-07-24 ENCOUNTER — DOCUMENTATION ONLY (OUTPATIENT)
Dept: NEPHROLOGY | Facility: CLINIC | Age: 28
End: 2018-07-24

## 2018-07-24 ENCOUNTER — ROUTINE PRENATAL (OUTPATIENT)
Dept: OBSTETRICS AND GYNECOLOGY | Facility: CLINIC | Age: 28
End: 2018-07-24
Payer: MEDICARE

## 2018-07-24 ENCOUNTER — PROCEDURE VISIT (OUTPATIENT)
Dept: OBSTETRICS AND GYNECOLOGY | Facility: CLINIC | Age: 28
End: 2018-07-24
Payer: MEDICARE

## 2018-07-24 VITALS
SYSTOLIC BLOOD PRESSURE: 136 MMHG | DIASTOLIC BLOOD PRESSURE: 68 MMHG | BODY MASS INDEX: 41.28 KG/M2 | WEIGHT: 255.75 LBS

## 2018-07-24 DIAGNOSIS — O26.832 RENAL FAILURE AFFECTING PREGNANCY IN SECOND TRIMESTER: ICD-10-CM

## 2018-07-24 DIAGNOSIS — N19 RENAL FAILURE AFFECTING PREGNANCY IN SECOND TRIMESTER: Primary | ICD-10-CM

## 2018-07-24 DIAGNOSIS — O09.92 HIGH-RISK PREGNANCY IN SECOND TRIMESTER: ICD-10-CM

## 2018-07-24 DIAGNOSIS — N19 RENAL FAILURE AFFECTING PREGNANCY IN SECOND TRIMESTER: ICD-10-CM

## 2018-07-24 DIAGNOSIS — N05.1 FSGS (FOCAL SEGMENTAL GLOMERULOSCLEROSIS): ICD-10-CM

## 2018-07-24 DIAGNOSIS — O26.832 RENAL FAILURE AFFECTING PREGNANCY IN SECOND TRIMESTER: Primary | ICD-10-CM

## 2018-07-24 PROCEDURE — 76816 OB US FOLLOW-UP PER FETUS: CPT | Mod: PBBFAC,PN | Performed by: OBSTETRICS & GYNECOLOGY

## 2018-07-24 PROCEDURE — 76819 FETAL BIOPHYS PROFIL W/O NST: CPT | Mod: PBBFAC,PN | Performed by: OBSTETRICS & GYNECOLOGY

## 2018-07-24 PROCEDURE — 99212 OFFICE O/P EST SF 10 MIN: CPT | Mod: PBBFAC,PN | Performed by: ADVANCED PRACTICE MIDWIFE

## 2018-07-24 PROCEDURE — 76819 FETAL BIOPHYS PROFIL W/O NST: CPT | Mod: 26,S$PBB,, | Performed by: OBSTETRICS & GYNECOLOGY

## 2018-07-24 PROCEDURE — 76816 OB US FOLLOW-UP PER FETUS: CPT | Mod: 26,S$PBB,, | Performed by: OBSTETRICS & GYNECOLOGY

## 2018-07-24 PROCEDURE — 0502F SUBSEQUENT PRENATAL CARE: CPT | Mod: S$PBB,,, | Performed by: ADVANCED PRACTICE MIDWIFE

## 2018-07-24 PROCEDURE — 99999 PR PBB SHADOW E&M-EST. PATIENT-LVL II: CPT | Mod: PBBFAC,,, | Performed by: ADVANCED PRACTICE MIDWIFE

## 2018-07-24 NOTE — PROGRESS NOTES
MFM referral from 7/24 reviewed in detail.  Records of most recent labs from [facility] scanned into chart.  Ambulatory referral to Dr Andres Hoyt sent.  Limited BPP today reassuring. RTC 1 week with us.  Has F/U visit with MFM scheduled for 8/6/18.  Patient advised on warning S/S to report to ER or L&D for, verbalized understanding. VM

## 2018-07-24 NOTE — LETTER
July 24, 2018      [facility]  [address]  [place] [address]  Phone: [phone]       Patient: Lisseth Schwartz   YOB: 1990  Date of Visit: 07/24/2018    To Whom It May Concern:    Kassandra Schwartz  was at [facility] on 07/24/2018.  She is ok to receive oxygen per nasal cannula during dialysis as needed.      Sincerely,    Hannah Marrero CNM

## 2018-07-29 ENCOUNTER — TELEPHONE (OUTPATIENT)
Dept: OBSTETRICS AND GYNECOLOGY | Facility: HOSPITAL | Age: 28
End: 2018-07-29

## 2018-07-30 NOTE — TELEPHONE ENCOUNTER
"Pt called Labor and Delivery with complaints of period like cramps that were causing her to have to "lean over on the bed," and clear watery discharge. Reports good fetal movement. States that she is on dialysis with this pregnancy, and is not sure is her symptoms are normal. Would like to know if she should come in or if she can wait until her office visit . Advised pt that with her cramping and leaking of fluid she should come in to Labor and Delivery to be evaluated. Pt states that she lives in [place] and will have to get someone to drive her and doesn't want to make an unnecessary trip. Educated pt on risks of  labor and premature ROM. States that she does not want to come in at this time. Advised pt to continue monitoring leaking vaginal fluid and cramping and to come to labor and delivery if symptoms persist or worsen. Pt verbalized understanding.  "

## 2018-08-01 ENCOUNTER — ROUTINE PRENATAL (OUTPATIENT)
Dept: OBSTETRICS AND GYNECOLOGY | Facility: CLINIC | Age: 28
End: 2018-08-01
Payer: MEDICARE

## 2018-08-01 ENCOUNTER — TELEPHONE (OUTPATIENT)
Dept: NEPHROLOGY | Facility: CLINIC | Age: 28
End: 2018-08-01

## 2018-08-01 ENCOUNTER — PROCEDURE VISIT (OUTPATIENT)
Dept: OBSTETRICS AND GYNECOLOGY | Facility: CLINIC | Age: 28
End: 2018-08-01
Payer: MEDICARE

## 2018-08-01 VITALS — DIASTOLIC BLOOD PRESSURE: 84 MMHG | WEIGHT: 260 LBS | SYSTOLIC BLOOD PRESSURE: 140 MMHG | BODY MASS INDEX: 41.97 KG/M2

## 2018-08-01 DIAGNOSIS — N19 RENAL FAILURE AFFECTING PREGNANCY IN SECOND TRIMESTER: ICD-10-CM

## 2018-08-01 DIAGNOSIS — N05.1 FSGS (FOCAL SEGMENTAL GLOMERULOSCLEROSIS): ICD-10-CM

## 2018-08-01 DIAGNOSIS — O09.92 HIGH-RISK PREGNANCY IN SECOND TRIMESTER: ICD-10-CM

## 2018-08-01 DIAGNOSIS — O09.92 HIGH-RISK PREGNANCY IN SECOND TRIMESTER: Primary | ICD-10-CM

## 2018-08-01 DIAGNOSIS — O26.832 RENAL FAILURE AFFECTING PREGNANCY IN SECOND TRIMESTER: ICD-10-CM

## 2018-08-01 DIAGNOSIS — N89.8 VAGINAL DISCHARGE: ICD-10-CM

## 2018-08-01 LAB — FIBRONECTIN FETAL SPEC QL: NEGATIVE

## 2018-08-01 PROCEDURE — 82731 ASSAY OF FETAL FIBRONECTIN: CPT

## 2018-08-01 PROCEDURE — 87660 TRICHOMONAS VAGIN DIR PROBE: CPT

## 2018-08-01 PROCEDURE — 99213 OFFICE O/P EST LOW 20 MIN: CPT | Mod: PBBFAC,PN | Performed by: OBSTETRICS & GYNECOLOGY

## 2018-08-01 PROCEDURE — 76819 FETAL BIOPHYS PROFIL W/O NST: CPT | Mod: 26,S$PBB,, | Performed by: OBSTETRICS & GYNECOLOGY

## 2018-08-01 PROCEDURE — 76819 FETAL BIOPHYS PROFIL W/O NST: CPT | Mod: PBBFAC,PN | Performed by: OBSTETRICS & GYNECOLOGY

## 2018-08-01 PROCEDURE — 99999 PR PBB SHADOW E&M-EST. PATIENT-LVL III: CPT | Mod: PBBFAC,,, | Performed by: OBSTETRICS & GYNECOLOGY

## 2018-08-01 PROCEDURE — 0502F SUBSEQUENT PRENATAL CARE: CPT | Mod: S$PBB,,, | Performed by: OBSTETRICS & GYNECOLOGY

## 2018-08-01 NOTE — TELEPHONE ENCOUNTER
Incoming call from Dr. Higgins who would like this patient scheduled in the clinic. She is currently a dialysis pt under the care of Dr. Lira at [facility]. She is 26 weeks pregnant they would like her to establish care with a [facility] in the clinic for when she deliver at [facility]. I tried to explain the process and we are not able to schedule dialysis patients in the clinic. Can someone please send a message to Dr. Virginia Higgins if you can see this patient.  Thank you

## 2018-08-02 ENCOUNTER — TELEPHONE (OUTPATIENT)
Dept: OBSTETRICS AND GYNECOLOGY | Facility: CLINIC | Age: 28
End: 2018-08-02

## 2018-08-02 LAB
CANDIDA RRNA VAG QL PROBE: NEGATIVE
G VAGINALIS RRNA GENITAL QL PROBE: NEGATIVE
T VAGINALIS RRNA GENITAL QL PROBE: NEGATIVE

## 2018-08-02 NOTE — TELEPHONE ENCOUNTER
Tried calling the pt. Several times but her voicemail is nt set-up. Ssmith,lpn    Please advise the fetal fibronectin and vaginal cultures of discharge were negative

## 2018-08-06 ENCOUNTER — OFFICE VISIT (OUTPATIENT)
Dept: OBSTETRICS AND GYNECOLOGY | Facility: CLINIC | Age: 28
End: 2018-08-06
Payer: MEDICARE

## 2018-08-06 DIAGNOSIS — N19 RENAL FAILURE AFFECTING PREGNANCY IN SECOND TRIMESTER: ICD-10-CM

## 2018-08-06 DIAGNOSIS — N05.1 FSGS (FOCAL SEGMENTAL GLOMERULOSCLEROSIS): ICD-10-CM

## 2018-08-06 DIAGNOSIS — O09.92 HIGH-RISK PREGNANCY IN SECOND TRIMESTER: ICD-10-CM

## 2018-08-06 DIAGNOSIS — O26.832 RENAL FAILURE AFFECTING PREGNANCY IN SECOND TRIMESTER: ICD-10-CM

## 2018-08-06 PROCEDURE — 76816 OB US FOLLOW-UP PER FETUS: CPT | Mod: PBBFAC | Performed by: OBSTETRICS & GYNECOLOGY

## 2018-08-06 PROCEDURE — 99213 OFFICE O/P EST LOW 20 MIN: CPT | Mod: 25,S$PBB,, | Performed by: OBSTETRICS & GYNECOLOGY

## 2018-08-06 PROCEDURE — 76816 OB US FOLLOW-UP PER FETUS: CPT | Mod: 26,S$PBB,, | Performed by: OBSTETRICS & GYNECOLOGY

## 2018-08-06 NOTE — PROGRESS NOTES
Indication  ========    Suboptimal views, Renal failure .    Method  ======    Transabdominal ultrasound examination.    Pregnancy  =========    Valladares pregnancy. Number of fetuses: 1.    Dating  ======    LMP on: 1/21/2018  GA by LMP 28 w + 1 d  PK by LMP: 10/28/2018  Ultrasound examination on: 8/6/2018  GA by U/S based upon: AC, BPD, Femur, HC  GA by U/S 26 w + 5 d  KP by U/S: 11/7/2018  Assigned: Dating performed on 04/9/2018, based on ultrasound (CRL)  Assigned GA 26 w + 5 d  Assigned KP: 11/7/2018    General Evaluation  ==============    Cardiac activity: present.  bpm.  Fetal movements: visualized.  Presentation: cephalic.  Placenta: Posterior .  Umbilical cord: 3 vessel cord.  Amniotic fluid: normal amount .    Fetal Biometry  ============    Fetal Biometry  BPD 67.8 mm 27w 2d Hadlock  OFD 85.3 mm 27w 4d Roger  .1 mm 26w 4d Hadlock  .3 mm 26w 5d Hadlock  Femur 48.4 mm 26w 2d Hadlock  Cerebellum tr 30.3 mm 27w 4d Gottlieb   g 42% Edward  Calculated by: Hadlock (BPD-HC-AC-FL)  EFW (lb) 2 lb  EFW (oz) 1 oz  Cephalic index 0.79  HC / AC 1.10  FL / BPD 0.71  FL / AC 0.22   bpm    Fetal Anatomy  ===========    Cranium: appears normal  Lateral ventricles: normal  Midline falx: normal  Cavum septi pellucidi: normal  Cerebellum: normal  Cisterna magna: normal  Lips: documented previously  Nose: documented previously  4-chamber view: normal  RVOT: documented previously  LVOT: documented previously  Stomach: normal  Kidneys: normal  Bladder: normal  Cervical spine: documented previously  Thoracic spine: documented previously  Lumbar spine: documented previously  Sacral spine: documented previously  Wants to know gender: yes    Consultation  ==========    /81, not tachycardic. O2 sat 98%  Reviewed ultrasound findings and plans for delivery by 34 weeks.  Patient is concerned regarding pelvic pressure. Discussed maternity belt and precautions for PTL evaluation. At risk for  prematurity due to  ESRD and co-morbidities.  Getting HD 5x/week. Is transitioning to [facility] and will be able to get dialysis in hospital.  Desires BTL.  C/o feeling winded. Discussed pregnancy changes but agree that with her co-morbidities, additional evaluation is recommended. I recommend  primary OB evaluate her heart with an EKG/echocardiogram and lower extremity Dopplers. The patient plans to contact her cardiologist to see  if she can be seen within a week. Her vital signs were stable today but strict cardiac precautions were provided if she were to experience an  episode, she was advised to go to the hospital.  Dr. Higgins and Zainab Marrero messaged regarding the above recommendations.  Time  I overall spent approximately 15 minutes in face to face time with the patient and her family, greater than 50% of which was in counseling and  care coordination.    Impression  =========    Valladares, living IUP in vertex presentation.  Fetal size is AGA at the 42nd percentile.  Normal AFV.  Normal repeat limited anatomy survey.    Recommendation  ==============    Recommend patient see her cardiologist within the week; if this cannot be accomplished, recommend primary OB order EKG and  echocardiogram to evaluate palpitations.  Recommend primary OB check TSH with next lab draw due to maternal symptoms.  Recommend primary OB schedule the patient for lower extremity Dopplers within the week given complaints.  Patient should have a follow-up fetal growth ultrasound in approximately 3 weeks to monitor the fetal growth.  Recommend continued weekly visits for test of fetal well-being. Can increase to twice weekly fetal testing at 28-30 weeks.  Recommend close surveillance for preeclampsia. If concerned at any point and need [facility] assistance, please call the [facility]  to speak to the MFM on call.  If remains undelivered, please schedule the patient with [facility] for a return visit in 5 weeks for repeat  assessment and counseling.

## 2018-08-13 DIAGNOSIS — O09.812 HIGH RISK PREGNANCY DUE TO ASSISTED REPRODUCTIVE TECHNOLOGY IN SECOND TRIMESTER: Primary | ICD-10-CM

## 2018-08-14 ENCOUNTER — ROUTINE PRENATAL (OUTPATIENT)
Dept: OBSTETRICS AND GYNECOLOGY | Facility: CLINIC | Age: 28
End: 2018-08-14
Payer: MEDICARE

## 2018-08-14 ENCOUNTER — LAB VISIT (OUTPATIENT)
Dept: LAB | Facility: HOSPITAL | Age: 28
End: 2018-08-14
Attending: ADVANCED PRACTICE MIDWIFE
Payer: MEDICARE

## 2018-08-14 ENCOUNTER — TELEPHONE (OUTPATIENT)
Dept: OBSTETRICS AND GYNECOLOGY | Facility: CLINIC | Age: 28
End: 2018-08-14

## 2018-08-14 VITALS
DIASTOLIC BLOOD PRESSURE: 86 MMHG | WEIGHT: 259.69 LBS | BODY MASS INDEX: 41.92 KG/M2 | SYSTOLIC BLOOD PRESSURE: 134 MMHG

## 2018-08-14 DIAGNOSIS — R06.02 SHORTNESS OF BREATH: ICD-10-CM

## 2018-08-14 DIAGNOSIS — N19 RENAL FAILURE AFFECTING PREGNANCY IN SECOND TRIMESTER: ICD-10-CM

## 2018-08-14 DIAGNOSIS — O26.832 RENAL FAILURE AFFECTING PREGNANCY IN SECOND TRIMESTER: Primary | ICD-10-CM

## 2018-08-14 DIAGNOSIS — O26.832 RENAL FAILURE AFFECTING PREGNANCY IN SECOND TRIMESTER: ICD-10-CM

## 2018-08-14 DIAGNOSIS — N19 RENAL FAILURE AFFECTING PREGNANCY IN SECOND TRIMESTER: Primary | ICD-10-CM

## 2018-08-14 DIAGNOSIS — N05.1 FSGS (FOCAL SEGMENTAL GLOMERULOSCLEROSIS): ICD-10-CM

## 2018-08-14 DIAGNOSIS — O09.92 SUPERVISION OF HIGH RISK PREGNANCY IN SECOND TRIMESTER: ICD-10-CM

## 2018-08-14 LAB
BASOPHILS # BLD AUTO: 0.05 K/UL
BASOPHILS NFR BLD: 0.5 %
DIFFERENTIAL METHOD: ABNORMAL
EOSINOPHIL # BLD AUTO: 0.1 K/UL
EOSINOPHIL NFR BLD: 0.5 %
ERYTHROCYTE [DISTWIDTH] IN BLOOD BY AUTOMATED COUNT: 15.5 %
GLUCOSE SERPL-MCNC: 166 MG/DL
HCT VFR BLD AUTO: 31.7 %
HGB BLD-MCNC: 9.7 G/DL
IMM GRANULOCYTES # BLD AUTO: 0.04 K/UL
IMM GRANULOCYTES NFR BLD AUTO: 0.4 %
LYMPHOCYTES # BLD AUTO: 1.4 K/UL
LYMPHOCYTES NFR BLD: 13.4 %
MCH RBC QN AUTO: 30.3 PG
MCHC RBC AUTO-ENTMCNC: 30.6 G/DL
MCV RBC AUTO: 99 FL
MONOCYTES # BLD AUTO: 0.5 K/UL
MONOCYTES NFR BLD: 4.3 %
NEUTROPHILS # BLD AUTO: 8.4 K/UL
NEUTROPHILS NFR BLD: 80.9 %
NRBC BLD-RTO: 0 /100 WBC
PLATELET # BLD AUTO: 197 K/UL
PMV BLD AUTO: 11 FL
RBC # BLD AUTO: 3.2 M/UL
WBC # BLD AUTO: 10.37 K/UL

## 2018-08-14 PROCEDURE — 99213 OFFICE O/P EST LOW 20 MIN: CPT | Mod: PBBFAC,PN | Performed by: ADVANCED PRACTICE MIDWIFE

## 2018-08-14 PROCEDURE — 82950 GLUCOSE TEST: CPT

## 2018-08-14 PROCEDURE — 99999 PR PBB SHADOW E&M-EST. PATIENT-LVL III: CPT | Mod: PBBFAC,,, | Performed by: ADVANCED PRACTICE MIDWIFE

## 2018-08-14 PROCEDURE — 86592 SYPHILIS TEST NON-TREP QUAL: CPT

## 2018-08-14 PROCEDURE — 85025 COMPLETE CBC W/AUTO DIFF WBC: CPT

## 2018-08-14 PROCEDURE — 36415 COLL VENOUS BLD VENIPUNCTURE: CPT | Mod: PO

## 2018-08-14 PROCEDURE — 86703 HIV-1/HIV-2 1 RESULT ANTBDY: CPT

## 2018-08-14 PROCEDURE — 0502F SUBSEQUENT PRENATAL CARE: CPT | Mod: S$PBB,,, | Performed by: ADVANCED PRACTICE MIDWIFE

## 2018-08-14 NOTE — PROGRESS NOTES
Pt reports feeling cramps.  FFN discarder cervix closed.  Pt advised to go to L&D for eval of cramps but stated no ride.  Advised of PTL S/S and to remain on pelvic rest.   Verbalized understanding.  Missed US appointment yesterday stated was at dialysis yesterday and has no ride to get to [facility] today.  Reports good FM.  Pt given AVS which has all appointment times for Doppler studies lower ext. , cardiac echo, and EKG verbalized importance of being compliant with treatment.  Pt states she really like her new Kidney doctor and feels he treats her very well.   RTC Wednesday with US and visit with Dr Higgins.  VM

## 2018-08-15 LAB
HIV 1+2 AB+HIV1 P24 AG SERPL QL IA: NEGATIVE
RPR SER QL: NORMAL

## 2018-08-15 NOTE — TELEPHONE ENCOUNTER
----- Message from Virginia Higgins MD sent at 8/14/2018  7:08 PM CDT -----  Orders placed for ekg, please chase

## 2018-08-15 NOTE — TELEPHONE ENCOUNTER
Pt has been scheduled and made aware. She states she is getting transportation together for that day and will not miss these appointments. JANE

## 2018-08-20 ENCOUNTER — CLINICAL SUPPORT (OUTPATIENT)
Dept: CARDIOLOGY | Facility: CLINIC | Age: 28
End: 2018-08-20
Payer: MEDICARE

## 2018-08-20 ENCOUNTER — TELEPHONE (OUTPATIENT)
Dept: OBSTETRICS AND GYNECOLOGY | Facility: CLINIC | Age: 28
End: 2018-08-20

## 2018-08-20 ENCOUNTER — HOSPITAL ENCOUNTER (OUTPATIENT)
Dept: RADIOLOGY | Facility: HOSPITAL | Age: 28
Discharge: HOME | End: 2018-08-20
Attending: ADVANCED PRACTICE MIDWIFE
Payer: MEDICARE

## 2018-08-20 ENCOUNTER — CLINICAL SUPPORT (OUTPATIENT)
Dept: CARDIOLOGY | Facility: CLINIC | Age: 28
End: 2018-08-20
Attending: ADVANCED PRACTICE MIDWIFE
Payer: MEDICARE

## 2018-08-20 DIAGNOSIS — I51.7 CARDIOMEGALY: ICD-10-CM

## 2018-08-20 DIAGNOSIS — O09.812 HIGH RISK PREGNANCY DUE TO ASSISTED REPRODUCTIVE TECHNOLOGY IN SECOND TRIMESTER: ICD-10-CM

## 2018-08-20 DIAGNOSIS — R06.02 SHORTNESS OF BREATH: ICD-10-CM

## 2018-08-20 DIAGNOSIS — N05.1 FSGS (FOCAL SEGMENTAL GLOMERULOSCLEROSIS): ICD-10-CM

## 2018-08-20 DIAGNOSIS — N19 RENAL FAILURE AFFECTING PREGNANCY IN SECOND TRIMESTER: ICD-10-CM

## 2018-08-20 DIAGNOSIS — O99.810 ABNORMAL GLUCOSE AFFECTING PREGNANCY: Primary | ICD-10-CM

## 2018-08-20 DIAGNOSIS — O26.832 RENAL FAILURE AFFECTING PREGNANCY IN SECOND TRIMESTER: ICD-10-CM

## 2018-08-20 LAB
DIASTOLIC DYSFUNCTION: NO
ESTIMATED PA SYSTOLIC PRESSURE: 28.81
RETIRED EF AND QEF - SEE NOTES: 60 (ref 55–65)

## 2018-08-20 PROCEDURE — 93010 ELECTROCARDIOGRAM REPORT: CPT | Mod: S$PBB,,, | Performed by: NUCLEAR MEDICINE

## 2018-08-20 PROCEDURE — 93306 TTE W/DOPPLER COMPLETE: CPT | Mod: PO

## 2018-08-20 PROCEDURE — 93925 LOWER EXTREMITY STUDY: CPT | Mod: 26,,, | Performed by: RADIOLOGY

## 2018-08-20 PROCEDURE — 93005 ELECTROCARDIOGRAM TRACING: CPT | Mod: PBBFAC,PO | Performed by: NUCLEAR MEDICINE

## 2018-08-20 PROCEDURE — 93306 TTE W/DOPPLER COMPLETE: CPT | Mod: 26,,, | Performed by: NUCLEAR MEDICINE

## 2018-08-20 PROCEDURE — 93925 LOWER EXTREMITY STUDY: CPT | Mod: TC,PO

## 2018-08-20 NOTE — TELEPHONE ENCOUNTER
Unable to leave a vm because it is not set-up. Ssnavid marley    Patient needs to be scheduled for 3 hr GTT.  I will put order in.  Thank You

## 2018-08-20 NOTE — TELEPHONE ENCOUNTER
----- Message from Jody Bhatia LPN sent at 8/20/2018  2:25 PM CDT -----      ----- Message -----  From: Hannah Marrero CNM  Sent: 8/20/2018   9:45 AM  To: Jody Bhatia LPN    Patient needs to be scheduled for 3 hr GTT.  I will put order in.  Thank You

## 2018-08-20 NOTE — TELEPHONE ENCOUNTER
"Pt declined her 3 hour gtt. She states "man I'm not doing that". I informed Pt that it is important that we follow through after an abnormal 1 hour. Pt will think about it and contact us. Pt voiced understanding. DS  "

## 2018-08-22 ENCOUNTER — PROCEDURE VISIT (OUTPATIENT)
Dept: OBSTETRICS AND GYNECOLOGY | Facility: CLINIC | Age: 28
End: 2018-08-22
Payer: MEDICARE

## 2018-08-22 ENCOUNTER — ROUTINE PRENATAL (OUTPATIENT)
Dept: OBSTETRICS AND GYNECOLOGY | Facility: CLINIC | Age: 28
End: 2018-08-22
Payer: MEDICARE

## 2018-08-22 ENCOUNTER — LAB VISIT (OUTPATIENT)
Dept: LAB | Facility: HOSPITAL | Age: 28
End: 2018-08-22
Attending: ADVANCED PRACTICE MIDWIFE
Payer: MEDICARE

## 2018-08-22 VITALS
BODY MASS INDEX: 41.21 KG/M2 | WEIGHT: 255.31 LBS | SYSTOLIC BLOOD PRESSURE: 104 MMHG | DIASTOLIC BLOOD PRESSURE: 70 MMHG

## 2018-08-22 DIAGNOSIS — O26.832 RENAL FAILURE AFFECTING PREGNANCY IN SECOND TRIMESTER: ICD-10-CM

## 2018-08-22 DIAGNOSIS — O26.23 HIGH RISK PREGNANCY DUE TO RECURRENT PREGNANCY LOSS IN THIRD TRIMESTER: ICD-10-CM

## 2018-08-22 DIAGNOSIS — O26.833 RENAL FAILURE AFFECTING PREGNANCY IN THIRD TRIMESTER: ICD-10-CM

## 2018-08-22 DIAGNOSIS — O09.93 HIGH-RISK PREGNANCY IN THIRD TRIMESTER: Primary | ICD-10-CM

## 2018-08-22 DIAGNOSIS — O09.92 HIGH-RISK PREGNANCY IN SECOND TRIMESTER: ICD-10-CM

## 2018-08-22 DIAGNOSIS — N19 RENAL FAILURE AFFECTING PREGNANCY IN THIRD TRIMESTER: ICD-10-CM

## 2018-08-22 DIAGNOSIS — N05.1 FSGS (FOCAL SEGMENTAL GLOMERULOSCLEROSIS): ICD-10-CM

## 2018-08-22 DIAGNOSIS — N19 RENAL FAILURE AFFECTING PREGNANCY IN SECOND TRIMESTER: ICD-10-CM

## 2018-08-22 DIAGNOSIS — Z30.8 ENCOUNTER FOR TUBAL LIGATION COUNSELING: ICD-10-CM

## 2018-08-22 DIAGNOSIS — O99.810 ABNORMAL GLUCOSE AFFECTING PREGNANCY: ICD-10-CM

## 2018-08-22 LAB
GLUCOSE SERPL-MCNC: 144 MG/DL
GLUCOSE SERPL-MCNC: 161 MG/DL
GLUCOSE SERPL-MCNC: 58 MG/DL
GLUCOSE SERPL-MCNC: 98 MG/DL

## 2018-08-22 PROCEDURE — 76819 FETAL BIOPHYS PROFIL W/O NST: CPT | Mod: PBBFAC,PN | Performed by: OBSTETRICS & GYNECOLOGY

## 2018-08-22 PROCEDURE — 99213 OFFICE O/P EST LOW 20 MIN: CPT | Mod: PBBFAC,PN | Performed by: OBSTETRICS & GYNECOLOGY

## 2018-08-22 PROCEDURE — 36415 COLL VENOUS BLD VENIPUNCTURE: CPT | Mod: PO

## 2018-08-22 PROCEDURE — 76819 FETAL BIOPHYS PROFIL W/O NST: CPT | Mod: 26,S$PBB,, | Performed by: OBSTETRICS & GYNECOLOGY

## 2018-08-22 PROCEDURE — 82951 GLUCOSE TOLERANCE TEST (GTT): CPT

## 2018-08-22 PROCEDURE — 99999 PR PBB SHADOW E&M-EST. PATIENT-LVL III: CPT | Mod: PBBFAC,,, | Performed by: OBSTETRICS & GYNECOLOGY

## 2018-08-22 PROCEDURE — 99213 OFFICE O/P EST LOW 20 MIN: CPT | Mod: 25,S$PBB,, | Performed by: OBSTETRICS & GYNECOLOGY

## 2018-08-22 PROCEDURE — 82952 GTT-ADDED SAMPLES: CPT

## 2018-08-22 NOTE — PROGRESS NOTES
+fetal movement, no srom ,no vaginal bleeding  Reviewed kick counts/ptl  Still with occasional pelvic cramping  C/o diarrhea; advised BRAT diet; pepto santo Blank tl  Reviewed laparoscopic tubal ligation procedure in detail--with use of either fallope rings or vicryl loop endo ties.  Reviewed risks including but not limited to infection, bleeding, damage to bowel/bladder, cva;htn. Pt aware procedure is permanent and cannot be reversed.  Pt aware risk of failure 3-5/1000; slightly increased risk for pregnancy in tube; pt aware needs pregnancy test if skips menses.  may see a change in menses--heavier, irregular,  All questions answered to the best of my ability.  Alternatives reviewed --condoms, ocp, mirena, depo, nuva ring, nexplanon, abstinence.  Consents signed and witnessed  3 hr gtt in process  Has met DR LEBLANC --nephrologist from [facility]  Continue weekly bpp; aware of mfm appt 9/17/18

## 2018-08-29 ENCOUNTER — ROUTINE PRENATAL (OUTPATIENT)
Dept: OBSTETRICS AND GYNECOLOGY | Facility: CLINIC | Age: 28
End: 2018-08-29
Payer: MEDICARE

## 2018-08-29 ENCOUNTER — PROCEDURE VISIT (OUTPATIENT)
Dept: OBSTETRICS AND GYNECOLOGY | Facility: CLINIC | Age: 28
End: 2018-08-29
Payer: MEDICARE

## 2018-08-29 VITALS
BODY MASS INDEX: 41.28 KG/M2 | DIASTOLIC BLOOD PRESSURE: 86 MMHG | SYSTOLIC BLOOD PRESSURE: 138 MMHG | WEIGHT: 255.75 LBS

## 2018-08-29 DIAGNOSIS — N19 RENAL FAILURE, UNSPECIFIED CHRONICITY: Primary | ICD-10-CM

## 2018-08-29 DIAGNOSIS — O26.832 RENAL FAILURE AFFECTING PREGNANCY IN SECOND TRIMESTER: ICD-10-CM

## 2018-08-29 DIAGNOSIS — N05.1 FSGS (FOCAL SEGMENTAL GLOMERULOSCLEROSIS): ICD-10-CM

## 2018-08-29 DIAGNOSIS — O09.92 HIGH-RISK PREGNANCY IN SECOND TRIMESTER: ICD-10-CM

## 2018-08-29 DIAGNOSIS — N19 RENAL FAILURE AFFECTING PREGNANCY IN SECOND TRIMESTER: ICD-10-CM

## 2018-08-29 DIAGNOSIS — O09.93 HIGH-RISK PREGNANCY IN THIRD TRIMESTER: ICD-10-CM

## 2018-08-29 PROCEDURE — 76819 FETAL BIOPHYS PROFIL W/O NST: CPT | Mod: 26,S$PBB,, | Performed by: OBSTETRICS & GYNECOLOGY

## 2018-08-29 PROCEDURE — 76819 FETAL BIOPHYS PROFIL W/O NST: CPT | Mod: PBBFAC,PN | Performed by: OBSTETRICS & GYNECOLOGY

## 2018-08-29 PROCEDURE — 99212 OFFICE O/P EST SF 10 MIN: CPT | Mod: PBBFAC,PN,25 | Performed by: ADVANCED PRACTICE MIDWIFE

## 2018-08-29 PROCEDURE — 0502F SUBSEQUENT PRENATAL CARE: CPT | Mod: S$PBB,,, | Performed by: ADVANCED PRACTICE MIDWIFE

## 2018-08-29 PROCEDURE — 76816 OB US FOLLOW-UP PER FETUS: CPT | Mod: 26,S$PBB,, | Performed by: OBSTETRICS & GYNECOLOGY

## 2018-08-29 PROCEDURE — 99999 PR PBB SHADOW E&M-EST. PATIENT-LVL II: CPT | Mod: PBBFAC,,, | Performed by: ADVANCED PRACTICE MIDWIFE

## 2018-08-29 PROCEDURE — 76816 OB US FOLLOW-UP PER FETUS: CPT | Mod: PBBFAC,PN | Performed by: OBSTETRICS & GYNECOLOGY

## 2018-08-29 NOTE — PROGRESS NOTES
Reports good FM.  Dialysis 5 d per wk.  Advised 3 hr GTT normal.  Advised BTL 4-6 wks post delivery.  Wants to know induction date, advised MFM will recommend timing of delivery on 9/17 if there are no fetal or maternal changes warranting delivery prior to the 17th.  FKC's discussed and advised to keep up good work of making appts and attending dialysis as recommended.  RTC 1 week with BPP.

## 2018-09-05 ENCOUNTER — ROUTINE PRENATAL (OUTPATIENT)
Dept: OBSTETRICS AND GYNECOLOGY | Facility: CLINIC | Age: 28
End: 2018-09-05
Payer: MEDICARE

## 2018-09-05 ENCOUNTER — HOSPITAL ENCOUNTER (OUTPATIENT)
Facility: HOSPITAL | Age: 28
Discharge: HOME | End: 2018-09-05
Attending: OBSTETRICS & GYNECOLOGY | Admitting: OBSTETRICS & GYNECOLOGY
Payer: MEDICARE

## 2018-09-05 ENCOUNTER — PROCEDURE VISIT (OUTPATIENT)
Dept: OBSTETRICS AND GYNECOLOGY | Facility: CLINIC | Age: 28
End: 2018-09-05
Payer: MEDICARE

## 2018-09-05 VITALS
BODY MASS INDEX: 42.34 KG/M2 | SYSTOLIC BLOOD PRESSURE: 154 MMHG | WEIGHT: 262.38 LBS | DIASTOLIC BLOOD PRESSURE: 98 MMHG

## 2018-09-05 VITALS
SYSTOLIC BLOOD PRESSURE: 143 MMHG | WEIGHT: 262.38 LBS | RESPIRATION RATE: 18 BRPM | OXYGEN SATURATION: 100 % | DIASTOLIC BLOOD PRESSURE: 77 MMHG | HEIGHT: 66 IN | TEMPERATURE: 99 F | BODY MASS INDEX: 42.17 KG/M2 | HEART RATE: 85 BPM

## 2018-09-05 DIAGNOSIS — O09.93 HIGH-RISK PREGNANCY IN THIRD TRIMESTER: ICD-10-CM

## 2018-09-05 DIAGNOSIS — O26.832 RENAL FAILURE AFFECTING PREGNANCY IN SECOND TRIMESTER: Primary | ICD-10-CM

## 2018-09-05 DIAGNOSIS — R03.0 ELEVATED BLOOD PRESSURE READING: Primary | ICD-10-CM

## 2018-09-05 DIAGNOSIS — N05.1 FSGS (FOCAL SEGMENTAL GLOMERULOSCLEROSIS): ICD-10-CM

## 2018-09-05 DIAGNOSIS — N19 RENAL FAILURE AFFECTING PREGNANCY IN THIRD TRIMESTER: ICD-10-CM

## 2018-09-05 DIAGNOSIS — O26.832 RENAL FAILURE AFFECTING PREGNANCY IN SECOND TRIMESTER: ICD-10-CM

## 2018-09-05 DIAGNOSIS — O26.833 RENAL FAILURE AFFECTING PREGNANCY IN THIRD TRIMESTER: ICD-10-CM

## 2018-09-05 DIAGNOSIS — O09.92 HIGH-RISK PREGNANCY IN SECOND TRIMESTER: ICD-10-CM

## 2018-09-05 DIAGNOSIS — N19 RENAL FAILURE AFFECTING PREGNANCY IN SECOND TRIMESTER: Primary | ICD-10-CM

## 2018-09-05 DIAGNOSIS — R03.0 SINGLE EPISODE OF ELEVATED BLOOD PRESSURE: ICD-10-CM

## 2018-09-05 DIAGNOSIS — N19 RENAL FAILURE AFFECTING PREGNANCY IN SECOND TRIMESTER: ICD-10-CM

## 2018-09-05 PROBLEM — O13.3 PREGNANCY INDUCED HYPERTENSION, THIRD TRIMESTER: Status: ACTIVE | Noted: 2018-09-05

## 2018-09-05 LAB
ALBUMIN SERPL BCP-MCNC: 2.7 G/DL
ALP SERPL-CCNC: 64 U/L
ALT SERPL W/O P-5'-P-CCNC: 9 U/L
ANION GAP SERPL CALC-SCNC: 13 MMOL/L
AST SERPL-CCNC: 9 U/L
BASOPHILS # BLD AUTO: 0.02 K/UL
BASOPHILS NFR BLD: 0.2 %
BILIRUB SERPL-MCNC: 0.4 MG/DL
BUN SERPL-MCNC: 25 MG/DL
CALCIUM SERPL-MCNC: 9.1 MG/DL
CHLORIDE SERPL-SCNC: 104 MMOL/L
CO2 SERPL-SCNC: 24 MMOL/L
CREAT SERPL-MCNC: 7.1 MG/DL
CREAT UR-MCNC: 76.1 MG/DL
DIFFERENTIAL METHOD: ABNORMAL
EOSINOPHIL # BLD AUTO: 0.1 K/UL
EOSINOPHIL NFR BLD: 1.6 %
ERYTHROCYTE [DISTWIDTH] IN BLOOD BY AUTOMATED COUNT: 15.1 %
EST. GFR  (AFRICAN AMERICAN): 8 ML/MIN/1.73 M^2
EST. GFR  (NON AFRICAN AMERICAN): 7 ML/MIN/1.73 M^2
GLUCOSE SERPL-MCNC: 69 MG/DL
HCT VFR BLD AUTO: 31.4 %
HGB BLD-MCNC: 10.3 G/DL
LYMPHOCYTES # BLD AUTO: 1.7 K/UL
LYMPHOCYTES NFR BLD: 18.9 %
MCH RBC QN AUTO: 30.4 PG
MCHC RBC AUTO-ENTMCNC: 32.8 G/DL
MCV RBC AUTO: 93 FL
MONOCYTES # BLD AUTO: 0.4 K/UL
MONOCYTES NFR BLD: 4.9 %
NEUTROPHILS # BLD AUTO: 6.5 K/UL
NEUTROPHILS NFR BLD: 74.4 %
PLATELET # BLD AUTO: 146 K/UL
PMV BLD AUTO: 10.6 FL
POTASSIUM SERPL-SCNC: 3.5 MMOL/L
PROT SERPL-MCNC: 6.7 G/DL
PROT UR-MCNC: 611 MG/DL
PROT/CREAT UR: 8.03 MG/G{CREAT}
RBC # BLD AUTO: 3.39 M/UL
SODIUM SERPL-SCNC: 141 MMOL/L
WBC # BLD AUTO: 8.72 K/UL

## 2018-09-05 PROCEDURE — 80053 COMPREHEN METABOLIC PANEL: CPT

## 2018-09-05 PROCEDURE — 0502F SUBSEQUENT PRENATAL CARE: CPT | Mod: ,,, | Performed by: OBSTETRICS & GYNECOLOGY

## 2018-09-05 PROCEDURE — 0502F SUBSEQUENT PRENATAL CARE: CPT | Mod: ,,, | Performed by: ADVANCED PRACTICE MIDWIFE

## 2018-09-05 PROCEDURE — 99999 PR PBB SHADOW E&M-EST. PATIENT-LVL II: CPT | Mod: PBBFAC,,, | Performed by: ADVANCED PRACTICE MIDWIFE

## 2018-09-05 PROCEDURE — 85025 COMPLETE CBC W/AUTO DIFF WBC: CPT

## 2018-09-05 PROCEDURE — 99211 OFF/OP EST MAY X REQ PHY/QHP: CPT | Mod: 25,27

## 2018-09-05 PROCEDURE — 76819 FETAL BIOPHYS PROFIL W/O NST: CPT | Mod: 26,S$PBB,, | Performed by: OBSTETRICS & GYNECOLOGY

## 2018-09-05 PROCEDURE — 84156 ASSAY OF PROTEIN URINE: CPT

## 2018-09-05 PROCEDURE — 99212 OFFICE O/P EST SF 10 MIN: CPT | Mod: PBBFAC,PN,25 | Performed by: ADVANCED PRACTICE MIDWIFE

## 2018-09-05 PROCEDURE — 59025 FETAL NON-STRESS TEST: CPT | Mod: 26,,, | Performed by: ADVANCED PRACTICE MIDWIFE

## 2018-09-05 PROCEDURE — 59025 FETAL NON-STRESS TEST: CPT

## 2018-09-05 PROCEDURE — G0378 HOSPITAL OBSERVATION PER HR: HCPCS

## 2018-09-05 PROCEDURE — 76819 FETAL BIOPHYS PROFIL W/O NST: CPT | Mod: PBBFAC,PN | Performed by: OBSTETRICS & GYNECOLOGY

## 2018-09-05 RX ORDER — ONDANSETRON 8 MG/1
8 TABLET, ORALLY DISINTEGRATING ORAL EVERY 8 HOURS PRN
Status: DISCONTINUED | OUTPATIENT
Start: 2018-09-05 | End: 2018-09-05 | Stop reason: HOSPADM

## 2018-09-05 RX ORDER — ACETAMINOPHEN 500 MG
500 TABLET ORAL EVERY 6 HOURS PRN
Status: DISCONTINUED | OUTPATIENT
Start: 2018-09-05 | End: 2018-09-05 | Stop reason: HOSPADM

## 2018-09-05 NOTE — SUBJECTIVE & OBJECTIVE
"Obstetric HPI:  Patient sent from clinic with elevated b/p 158/98 , sob, dizziness, and 7 #weight gain in 1 week, denies contractions, active fetal movement, No vaginal bleeding , No loss of fluid. Pt has not had dialysis today d/t "multiple appts", also was seen at [facility] today to evaluate her shunt that is "swollen" with recommendations per pt that it is okay to continue its use until delivery.     This pregnancy has been complicated by FSGS, on dialysis 5 days a week.     Obstetric History       T3      L3     SAB0   TAB2   Ectopic0   Multiple0   Live Births3       # Outcome Date GA Lbr Erik/2nd Weight Sex Delivery Anes PTL Lv   6 Current            5 TAB 2017           4 Term 09   4.082 kg (9 lb) F Vag-Spont   SATNAM   3 Term 09/15/08   4.99 kg (11 lb) M Vag-Spont   SATNAM   2 TAB 2007           1 Term 07   3.175 kg (7 lb) M Vag-Spont   SATNAM        Past Medical History:   Diagnosis Date    Anxiety     Disorder of kidney and ureter     Dialysis 5x week    Hypertension      Past Surgical History:   Procedure Laterality Date    Arm surgery      x 2    cath in chest      RENAL BIOPSY      tumor removed      from face       PTA Medications   Medication Sig    metoprolol succinate (TOPROL-XL) 25 MG 24 hr tablet     pantoprazole (PROTONIX) 40 MG tablet     prenatal vit-iron fum-folic ac 65 mg iron- 1 mg Tab Take 1 tablet by mouth once daily. Any generic may be used       Review of patient's allergies indicates:   Allergen Reactions    Lisinopril Other (See Comments)     Severe cough    Furosemide Other (See Comments)     Almost gave her right sided heartfailure        Family History     None        Tobacco Use    Smoking status: Former Smoker    Smokeless tobacco: Never Used   Substance and Sexual Activity    Alcohol use: No    Drug use: No    Sexual activity: Yes     Partners: Male     Review of Systems   Eyes: Negative for visual disturbance.   Respiratory: Positive for shortness " of breath.    Gastrointestinal: Negative for nausea and vomiting.   Genitourinary: Negative for vaginal bleeding.   Neurological: Negative for headaches.   All other systems reviewed and are negative.     Objective:     Vital Signs (Most Recent):  Temp: 98.7 °F (37.1 °C) (09/05/18 1545)  Resp: 20 (09/05/18 1545)  BP: (!) 157/88 (09/05/18 1600) Vital Signs (24h Range):  Temp:  [98.7 °F (37.1 °C)] 98.7 °F (37.1 °C)  Resp:  [20] 20  BP: (152-178)/(88-99) 157/88     Weight: 119 kg (262 lb 5.6 oz)  Body mass index is 42.34 kg/m².    FHT: 130Cat 1 (reassuring)  TOCO: No ctx     Physical Exam:   Constitutional: She is oriented to person, place, and time. She appears well-developed and well-nourished.    HENT:   Head: Normocephalic.    Eyes: Pupils are equal, round, and reactive to light.    Neck: Normal range of motion.    Cardiovascular: Exam reveals edema.    + ble    Pulmonary/Chest: Effort normal and breath sounds normal.   o2 sat 96% ra         Abdominal: Soft.     Genitourinary: Vagina normal and uterus normal.           Musculoskeletal: Normal range of motion.       Neurological: She is alert and oriented to person, place, and time. She has normal reflexes.    Skin: Skin is warm and dry.    Psychiatric: She has a normal mood and affect. Her behavior is normal. Judgment and thought content normal.       Cervix: Deferred   Dilation:    Effacement:    Station:   Presentation:      Significant Labs:  Lab Results   Component Value Date    GROUPTR B POS 04/05/2018    HEPBSAG Negative 04/05/2018       I have personallly reviewed all pertinent lab results from the last 24 hours.   Spoke and discussed pt history and assessments today with Dr. Suarez, aware of BP and has recommended to do a pre e workup and serial B/Ps.

## 2018-09-05 NOTE — DISCHARGE INSTRUCTIONS
Discharge Instructions    Diet:  · Eat from the five basic food groups  · Fruits and proteins are good choices  · Limit fast foods and added salt/sugar  · Moderate carbonated and caffeine drinks    Hydration:  · Drink at least 8 large glasses of water a day    Kick Counts:  · After a meal, rest on your side and note the baby's movements until you have 8-10 movements in a 2 hour counting period.    · If you do not feel your baby move 8-10 times within 2 hours or you sense a change in the type or character of the baby's movement, you should come in to the hospital at once.  · Remember; your baby can sleep for 20-40 minutes at a time.      When to notify your provider:   · Vaginal bleeding like a period;  You may spot if we examined your cervix.  · If your water breaks, come to the birth center.  Note time, color and odor.  · Abdominal tenderness or pain that does not go away  · Contractions every 3 to 5 minutes for 1 to 2 hours.  True contractions move from front to back, are regular; usually get longer, stronger and closer together and do not stop if you change your position or activity.  · Any burning, urgency or frequency in relation to emptying your bladder.  · Any temperature greater than 100.4 degrees, chills, flu-like symptoms      Return To the Hospital for further Evaluation:  · Headache not relieved by tylenol   · Blurry vision, double vision, seeing spots, or flashing lights  · Feeling faint or passing out  · Right epigastric pain  · Difficulty breathing  · Swelling in hands, face, or feet  · Any of these symptoms accompanied by nausea/vomiting  · Gaining more than 5 pounds in one week  · Seizures  These symptoms could be an indication of elevated blood pressure.       If you have any questions that need to be answered immediately please call the Labor & Delivery Unit at [phone] and ask to speak to a nurse.

## 2018-09-05 NOTE — DISCHARGE SUMMARY
[facility] -   Obstetrics  Discharge Summary      Patient Name: Lisseth Schwartz  MRN: [id number]  Admission Date: 9/5/2018  Hospital Length of Stay: 0 days  Discharge Date and Time:  09/05/2018 6:51 PM  Attending Physician: No att. providers found   Discharging Provider: Remigio Moreno MD  Primary Care Provider: Primary Doctor No    HPI: Presents from clinic with elevated B/P, 7 # weight gain in 1 week, sob and dizziness today    * No surgery found *     Hospital Course:   PRE e workup, NST, Serial B/P's as well as reviewed with Dr Suarez         Final Active Diagnoses:    Diagnosis Date Noted POA    PRINCIPAL PROBLEM:  Single episode of elevated blood pressure [R03.0] 09/05/2018 Unknown    Pregnancy induced hypertension, third trimester [O13.3] 09/05/2018 Yes      Problems Resolved During this Admission:        Labs: All labs within the past 24 hours have been reviewed    Feeding Method: N/A    Immunizations     None          This patient has no babies on file.  Pending Diagnostic Studies:     None          Discharged Condition: stable    Disposition: Home or Self Care    Follow Up:  Follow-up Information     Virginia Higgins MD In 2 days.    Specialty:  Obstetrics and Gynecology  Contact information:  [address] [place] [address] [phone]                 Patient Instructions:      Diet renal     Other restrictions (specify):   Order Comments: Light activity     Notify your health care provider if you experience any of the following:  increased confusion or weakness     Notify your health care provider if you experience any of the following:  persistent dizziness, light-headedness, or visual disturbances     Notify your health care provider if you experience any of the following:  worsening rash     Notify your health care provider if you experience any of the following:  severe persistent headache     Notify your health care provider if you experience any of the following:  difficulty  breathing or increased cough     Notify your health care provider if you experience any of the following:  severe uncontrolled pain     Notify your health care provider if you experience any of the following:  persistent nausea and vomiting or diarrhea     Notify your health care provider if you experience any of the following:  temperature >100.4     Medications:  Discharge Medication List as of 9/5/2018  6:39 PM      CONTINUE these medications which have NOT CHANGED    Details   metoprolol succinate (TOPROL-XL) 25 MG 24 hr tablet Starting Sun 4/1/2018, Historical Med      pantoprazole (PROTONIX) 40 MG tablet Starting Sun 4/1/2018, Historical Med      prenatal vit-iron fum-folic ac 65 mg iron- 1 mg Tab Take 1 tablet by mouth once daily. Any generic may be used, Starting Thu 5/24/2018, Until Fri 5/24/2019, Normal             Remigio Moreno MD  Obstetrics  [facility] -

## 2018-09-05 NOTE — ASSESSMENT & PLAN NOTE
Arrival BP was 178/98, however, all other BP have been in mild range (143-157/70-88).  Review of records reveals prior visits with similar BP elevations.  Labs are stable with expected elevations in BUN/Cr and P:C ratio.  1+ edema.  Given overall picture, must consider possibility of pre-eclampsia, however, it is difficult to monitor this due to ESRD.  Pt is stable for discharge with follow up in clinic in 2 days for another BP check.  Advised pt to make appointment tomorrow for dialysis.  Pt was counseled on pre-eclampsia, including warning signs and indications for reporting back to L+D.  Pt voiced understanding.

## 2018-09-05 NOTE — PROGRESS NOTES
Pt seen and CNM note reviewed.  Pt was sent from clinic secondary to elevated BP there (152/99 and 154/98).  Pt with known history of ESRD on dialysis (pt was not able to have dialysis today).  Pt denies headaches, vision changes, RUQ pain, or vaginal bleeding.  Arrival BP was 178/98, however, all other BP have been in mild range (143-157/70-88).  Review of records reveals prior visits with similar BP elevations.  Fetal status reassuring with NST Cat 1 and BPP earlier today 8/8.  Labs are stable with expected elevations in BUN/Cr and P:C ratio.  1+ edema.  Given overall picture, must consider possibility of pre-eclampsia, however, it is difficult to monitor this due to ESRD.  Pt is stable for discharge with follow up in clinic in 2 days for another BP check.  Advised pt to make appointment tomorrow for dialysis.  Pt was counseled on pre-eclampsia, including warning signs and indications for reporting back to L+D.  Pt voiced understanding.

## 2018-09-05 NOTE — H&P
"[facility] -   Obstetrics  History & Physical    Patient Name: Lisseth Schwartz  MRN: [id number]  Admission Date: 2018  Primary Care Provider: Primary Doctor No    Subjective:     Principal Problem:Single episode of elevated blood pressure    History of Present Illness:  Presents from clinic with elevated B/P, 7 # weight gain in 1 week, sob and dizziness today    Obstetric HPI:  Patient sent from clinic with elevated b/p 158/98 , sob, dizziness, and 7 #weight gain in 1 week, denies contractions, active fetal movement, No vaginal bleeding , No loss of fluid. Pt has not had dialysis today d/t "multiple appts", also was seen at [facility] today to evaluate her shunt that is "swollen" with recommendations per pt that it is okay to continue its use until delivery.     This pregnancy has been complicated by FSGS, on dialysis 5 days a week.     Obstetric History       T3      L3     SAB0   TAB2   Ectopic0   Multiple0   Live Births3       # Outcome Date GA Lbr Erik/2nd Weight Sex Delivery Anes PTL Lv   6 Current            5 TAB            4 Term 09   4.082 kg (9 lb) F Vag-Spont   SATNAM   3 Term 09/15/08   4.99 kg (11 lb) M Vag-Spont   SATNAM   2 TAB 2007           1 Term 07   3.175 kg (7 lb) M Vag-Spont   SATNAM        Past Medical History:   Diagnosis Date    Anxiety     Disorder of kidney and ureter     Dialysis 5x week    Hypertension      Past Surgical History:   Procedure Laterality Date    Arm surgery      x 2    cath in chest      RENAL BIOPSY      tumor removed      from face       PTA Medications   Medication Sig    metoprolol succinate (TOPROL-XL) 25 MG 24 hr tablet     pantoprazole (PROTONIX) 40 MG tablet     prenatal vit-iron fum-folic ac 65 mg iron- 1 mg Tab Take 1 tablet by mouth once daily. Any generic may be used       Review of patient's allergies indicates:   Allergen Reactions    Lisinopril Other (See Comments)     Severe cough    Furosemide Other (See " Comments)     Almost gave her right sided heartfailure        Family History     None        Tobacco Use    Smoking status: Former Smoker    Smokeless tobacco: Never Used   Substance and Sexual Activity    Alcohol use: No    Drug use: No    Sexual activity: Yes     Partners: Male     Review of Systems   Eyes: Negative for visual disturbance.   Respiratory: Positive for shortness of breath.    Gastrointestinal: Negative for nausea and vomiting.   Genitourinary: Negative for vaginal bleeding.   Neurological: Negative for headaches.   All other systems reviewed and are negative.     Objective:     Vital Signs (Most Recent):  Temp: 98.7 °F (37.1 °C) (09/05/18 1545)  Resp: 20 (09/05/18 1545)  BP: (!) 157/88 (09/05/18 1600) Vital Signs (24h Range):  Temp:  [98.7 °F (37.1 °C)] 98.7 °F (37.1 °C)  Resp:  [20] 20  BP: (152-178)/(88-99) 157/88     Weight: 119 kg (262 lb 5.6 oz)  Body mass index is 42.34 kg/m².    FHT: 130Cat 1 (reassuring)  TOCO: No ctx     Physical Exam:   Constitutional: She is oriented to person, place, and time. She appears well-developed and well-nourished.    HENT:   Head: Normocephalic.    Eyes: Pupils are equal, round, and reactive to light.    Neck: Normal range of motion.    Cardiovascular: Exam reveals edema.    + ble    Pulmonary/Chest: Effort normal and breath sounds normal.   o2 sat 96% ra         Abdominal: Soft.     Genitourinary: Vagina normal and uterus normal.           Musculoskeletal: Normal range of motion.       Neurological: She is alert and oriented to person, place, and time. She has normal reflexes.    Skin: Skin is warm and dry.    Psychiatric: She has a normal mood and affect. Her behavior is normal. Judgment and thought content normal.       Cervix: Deferred   Dilation:    Effacement:    Station:   Presentation:      Significant Labs:  Lab Results   Component Value Date    GROUPTRH B POS 04/05/2018    HEPBSAG Negative 04/05/2018       I have personallly reviewed all pertinent  lab results from the last 24 hours.   Spoke and discussed pt history and assessments today with Dr. Suarez, aware of BP and has recommended to do a pre e workup and serial B/Ps.     Assessment/Plan:     27 y.o. female  at 31w0d for:    * Single episode of elevated blood pressure    Pre e workup, serial B/Ps, NST            India Brown, BEATRICE  Obstetrics  [facility]

## 2018-09-05 NOTE — PROGRESS NOTES
Reports good FM, BPP 8 of 8, VTX, AFI17.4   C/O SOB and dizziness.  BP elevated and 7# weight gain in 1 week.  Patient saw vascular ant DAHLIA today due to swelling right anterior shoulder area R/T her shunt.   States she was told OK to use the shunt until baby delivered and that they would do surgery then.  Sent to [facility] for BP series and labs.

## 2018-09-06 ENCOUNTER — TELEPHONE (OUTPATIENT)
Dept: OBSTETRICS AND GYNECOLOGY | Facility: CLINIC | Age: 28
End: 2018-09-06

## 2018-09-06 NOTE — TELEPHONE ENCOUNTER
Spoke with pt and offered her appointment with gus today or tomorrow at [facility] location pt stated she was not coming to [place] and would just wait until her appointment. Advised pt to call us if she needs anything  Pt verbalized understanding.

## 2018-09-06 NOTE — TELEPHONE ENCOUNTER
----- Message from Kary Jackson sent at 9/6/2018  1:11 PM CDT -----  pls work pt in before 9/11, had 9/5 ER visit for mild preeclampsia...[phone] (available after 12:30p)

## 2018-09-06 NOTE — PROCEDURES
"Lisseth Schwartz is a 27 y.o. female patient.    Temp: 98.7 °F (37.1 °C) (09/05/18 1545)  Pulse: 85 (09/05/18 1805)  Resp: 18 (09/05/18 1805)  BP: (!) 143/77 (09/05/18 1805)  SpO2: 100 % (09/05/18 1715)  Weight: 119 kg (262 lb 5.6 oz) (09/05/18 1545)  Height: 5' 6" (167.6 cm) (09/05/18 1545)       Obtain Fetal nonstress test (NST)  Date/Time: 9/5/2018 7:04 PM  Performed by: India Brown CNM  Authorized by: India Brown CNM     Nonstress Test:     Variability:  6-25 BPM    Decelerations:  Variable (variable noted as pt was sitting up in bed spontaneous, cat 1 remained throughout )    Accelerations:  15 bpm    Acoustic Stimulator: No      Uterine Irritability: No      Contractions:  Not present  Biophysical Profile:     Nonstress Test Interpretation: reactive      Overall Impression:  Reassuring  Post-procedure:     Patient tolerance:  Patient tolerated the procedure well with no immediate complications        India Brown  9/5/2018  "

## 2018-09-11 ENCOUNTER — PROCEDURE VISIT (OUTPATIENT)
Dept: OBSTETRICS AND GYNECOLOGY | Facility: CLINIC | Age: 28
End: 2018-09-11
Payer: MEDICARE

## 2018-09-11 ENCOUNTER — ROUTINE PRENATAL (OUTPATIENT)
Dept: OBSTETRICS AND GYNECOLOGY | Facility: CLINIC | Age: 28
End: 2018-09-11
Payer: MEDICARE

## 2018-09-11 VITALS — BODY MASS INDEX: 42.13 KG/M2 | WEIGHT: 261 LBS | SYSTOLIC BLOOD PRESSURE: 160 MMHG | DIASTOLIC BLOOD PRESSURE: 88 MMHG

## 2018-09-11 DIAGNOSIS — N05.1 FSGS (FOCAL SEGMENTAL GLOMERULOSCLEROSIS): ICD-10-CM

## 2018-09-11 DIAGNOSIS — O26.832 RENAL FAILURE AFFECTING PREGNANCY IN SECOND TRIMESTER: ICD-10-CM

## 2018-09-11 DIAGNOSIS — N19 RENAL FAILURE AFFECTING PREGNANCY IN SECOND TRIMESTER: ICD-10-CM

## 2018-09-11 DIAGNOSIS — Z3A.31 PREGNANCY WITH 31 COMPLETED WEEKS GESTATION: ICD-10-CM

## 2018-09-11 DIAGNOSIS — O09.92 HIGH-RISK PREGNANCY IN SECOND TRIMESTER: ICD-10-CM

## 2018-09-11 DIAGNOSIS — O09.93 SUPERVISION OF HIGH RISK PREGNANCY IN THIRD TRIMESTER: Primary | ICD-10-CM

## 2018-09-11 DIAGNOSIS — O13.3 PREGNANCY INDUCED HYPERTENSION, THIRD TRIMESTER: ICD-10-CM

## 2018-09-11 PROCEDURE — 76819 FETAL BIOPHYS PROFIL W/O NST: CPT | Mod: PBBFAC,PO | Performed by: OBSTETRICS & GYNECOLOGY

## 2018-09-11 PROCEDURE — 99212 OFFICE O/P EST SF 10 MIN: CPT | Mod: PBBFAC,PO | Performed by: ADVANCED PRACTICE MIDWIFE

## 2018-09-11 PROCEDURE — 76819 FETAL BIOPHYS PROFIL W/O NST: CPT | Mod: 26,S$PBB,, | Performed by: OBSTETRICS & GYNECOLOGY

## 2018-09-11 PROCEDURE — 99999 PR PBB SHADOW E&M-EST. PATIENT-LVL II: CPT | Mod: PBBFAC,,, | Performed by: ADVANCED PRACTICE MIDWIFE

## 2018-09-11 PROCEDURE — 99213 OFFICE O/P EST LOW 20 MIN: CPT | Mod: S$PBB,,, | Performed by: ADVANCED PRACTICE MIDWIFE

## 2018-09-11 RX ORDER — BETAMETHASONE SODIUM PHOSPHATE AND BETAMETHASONE ACETATE 3; 3 MG/ML; MG/ML
12 INJECTION, SUSPENSION INTRA-ARTICULAR; INTRALESIONAL; INTRAMUSCULAR; SOFT TISSUE
Status: DISCONTINUED | OUTPATIENT
Start: 2018-09-11 | End: 2018-09-11

## 2018-09-11 NOTE — PROGRESS NOTES
Doing ok but concerned about the timing of delivery. Reports that she is feeling chest pain with dialysis. Also having weakness (fell yesterday), headaches, metallic taste in her mouth and pain in her eyes with the headache.   Missed dialysis today because appointment were changed for her ultrasound at the last minute (midwife out sick). Has dialysis tomorrow.   Good fetal movement, BPP 8/8 with MVP 3.6  Discussed case with Dr. Higgins, recommends to continue dialysis and see MFM on Monday 9/17.

## 2018-09-17 ENCOUNTER — ANESTHESIA EVENT (OUTPATIENT)
Dept: OBSTETRICS AND GYNECOLOGY | Facility: HOSPITAL | Age: 28
End: 2018-09-17
Payer: MEDICARE

## 2018-09-17 ENCOUNTER — OFFICE VISIT (OUTPATIENT)
Dept: OBSTETRICS AND GYNECOLOGY | Facility: CLINIC | Age: 28
End: 2018-09-17
Payer: MEDICARE

## 2018-09-17 ENCOUNTER — ANESTHESIA (OUTPATIENT)
Dept: OBSTETRICS AND GYNECOLOGY | Facility: HOSPITAL | Age: 28
End: 2018-09-17
Payer: MEDICARE

## 2018-09-17 ENCOUNTER — HOSPITAL ENCOUNTER (INPATIENT)
Facility: HOSPITAL | Age: 28
LOS: 4 days | Discharge: HOME | End: 2018-09-21
Attending: OBSTETRICS & GYNECOLOGY | Admitting: OBSTETRICS & GYNECOLOGY
Payer: MEDICARE

## 2018-09-17 ENCOUNTER — TELEPHONE (OUTPATIENT)
Dept: OBSTETRICS AND GYNECOLOGY | Facility: HOSPITAL | Age: 28
End: 2018-09-17

## 2018-09-17 DIAGNOSIS — O14.10 SEVERE PREECLAMPSIA: ICD-10-CM

## 2018-09-17 DIAGNOSIS — O26.833 RENAL FAILURE AFFECTING PREGNANCY IN THIRD TRIMESTER: Primary | ICD-10-CM

## 2018-09-17 DIAGNOSIS — O26.833 RENAL FAILURE AFFECTING PREGNANCY IN THIRD TRIMESTER: ICD-10-CM

## 2018-09-17 DIAGNOSIS — Z99.2 END STAGE RENAL DISEASE ON DIALYSIS: ICD-10-CM

## 2018-09-17 DIAGNOSIS — N19 RENAL FAILURE AFFECTING PREGNANCY IN THIRD TRIMESTER: Primary | ICD-10-CM

## 2018-09-17 DIAGNOSIS — N19 RENAL FAILURE AFFECTING PREGNANCY IN THIRD TRIMESTER: ICD-10-CM

## 2018-09-17 DIAGNOSIS — N18.6 END STAGE RENAL DISEASE ON DIALYSIS: ICD-10-CM

## 2018-09-17 LAB
ABO + RH BLD: NORMAL
ALBUMIN SERPL BCP-MCNC: 2.8 G/DL
ALP SERPL-CCNC: 80 U/L
ALT SERPL W/O P-5'-P-CCNC: 13 U/L
ANION GAP SERPL CALC-SCNC: 17 MMOL/L
AST SERPL-CCNC: 10 U/L
BASOPHILS # BLD AUTO: 0.03 K/UL
BASOPHILS NFR BLD: 0.4 %
BILIRUB SERPL-MCNC: 0.5 MG/DL
BLD GP AB SCN CELLS X3 SERPL QL: NORMAL
BUN SERPL-MCNC: 39 MG/DL
CALCIUM SERPL-MCNC: 9.6 MG/DL
CHLORIDE SERPL-SCNC: 103 MMOL/L
CO2 SERPL-SCNC: 19 MMOL/L
CREAT SERPL-MCNC: 9.6 MG/DL
DIFFERENTIAL METHOD: ABNORMAL
EOSINOPHIL # BLD AUTO: 0.1 K/UL
EOSINOPHIL NFR BLD: 1.3 %
ERYTHROCYTE [DISTWIDTH] IN BLOOD BY AUTOMATED COUNT: 15.9 %
EST. GFR  (AFRICAN AMERICAN): 6 ML/MIN/1.73 M^2
EST. GFR  (NON AFRICAN AMERICAN): 5 ML/MIN/1.73 M^2
GLUCOSE SERPL-MCNC: 72 MG/DL
HCT VFR BLD AUTO: 32.9 %
HGB BLD-MCNC: 10.8 G/DL
LYMPHOCYTES # BLD AUTO: 1.4 K/UL
LYMPHOCYTES NFR BLD: 17.8 %
MAGNESIUM SERPL-MCNC: 1.9 MG/DL
MCH RBC QN AUTO: 30.4 PG
MCHC RBC AUTO-ENTMCNC: 32.8 G/DL
MCV RBC AUTO: 93 FL
MONOCYTES # BLD AUTO: 0.4 K/UL
MONOCYTES NFR BLD: 5.2 %
NEUTROPHILS # BLD AUTO: 6 K/UL
NEUTROPHILS NFR BLD: 75.3 %
PHOSPHATE SERPL-MCNC: 6.4 MG/DL
PLATELET # BLD AUTO: 172 K/UL
PMV BLD AUTO: 10 FL
POTASSIUM SERPL-SCNC: 3.7 MMOL/L
PROT SERPL-MCNC: 7.4 G/DL
RBC # BLD AUTO: 3.55 M/UL
SODIUM SERPL-SCNC: 139 MMOL/L
WBC # BLD AUTO: 7.94 K/UL

## 2018-09-17 PROCEDURE — 80074 ACUTE HEPATITIS PANEL: CPT

## 2018-09-17 PROCEDURE — 99213 OFFICE O/P EST LOW 20 MIN: CPT | Mod: S$PBB,25,, | Performed by: OBSTETRICS & GYNECOLOGY

## 2018-09-17 PROCEDURE — 11000001 HC ACUTE MED/SURG PRIVATE ROOM

## 2018-09-17 PROCEDURE — 72100003 HC LABOR CARE, EA. ADDL. 8 HRS

## 2018-09-17 PROCEDURE — 80100016 HC MAINTENANCE HEMODIALYSIS

## 2018-09-17 PROCEDURE — 76816 OB US FOLLOW-UP PER FETUS: CPT | Mod: 26,S$PBB,, | Performed by: OBSTETRICS & GYNECOLOGY

## 2018-09-17 PROCEDURE — 85025 COMPLETE CBC W/AUTO DIFF WBC: CPT

## 2018-09-17 PROCEDURE — 5A1D70Z PERFORMANCE OF URINARY FILTRATION, INTERMITTENT, LESS THAN 6 HOURS PER DAY: ICD-10-PCS | Performed by: INTERNAL MEDICINE

## 2018-09-17 PROCEDURE — 76819 FETAL BIOPHYS PROFIL W/O NST: CPT | Mod: 26,S$PBB,, | Performed by: OBSTETRICS & GYNECOLOGY

## 2018-09-17 PROCEDURE — 63600175 PHARM REV CODE 636 W HCPCS: Performed by: OBSTETRICS & GYNECOLOGY

## 2018-09-17 PROCEDURE — 86901 BLOOD TYPING SEROLOGIC RH(D): CPT

## 2018-09-17 PROCEDURE — 90935 HEMODIALYSIS ONE EVALUATION: CPT | Mod: ,,, | Performed by: INTERNAL MEDICINE

## 2018-09-17 PROCEDURE — 99223 1ST HOSP IP/OBS HIGH 75: CPT | Mod: AI,,, | Performed by: OBSTETRICS & GYNECOLOGY

## 2018-09-17 PROCEDURE — 83735 ASSAY OF MAGNESIUM: CPT

## 2018-09-17 PROCEDURE — 72100002 HC LABOR CARE, 1ST 8 HOURS

## 2018-09-17 PROCEDURE — 99222 1ST HOSP IP/OBS MODERATE 55: CPT | Mod: 25,,, | Performed by: INTERNAL MEDICINE

## 2018-09-17 PROCEDURE — 25000003 PHARM REV CODE 250: Performed by: OBSTETRICS & GYNECOLOGY

## 2018-09-17 PROCEDURE — 84100 ASSAY OF PHOSPHORUS: CPT

## 2018-09-17 PROCEDURE — 36415 COLL VENOUS BLD VENIPUNCTURE: CPT

## 2018-09-17 PROCEDURE — 63600175 PHARM REV CODE 636 W HCPCS: Performed by: INTERNAL MEDICINE

## 2018-09-17 PROCEDURE — 76816 OB US FOLLOW-UP PER FETUS: CPT | Mod: PBBFAC | Performed by: OBSTETRICS & GYNECOLOGY

## 2018-09-17 PROCEDURE — 86706 HEP B SURFACE ANTIBODY: CPT

## 2018-09-17 PROCEDURE — 76819 FETAL BIOPHYS PROFIL W/O NST: CPT | Mod: PBBFAC | Performed by: OBSTETRICS & GYNECOLOGY

## 2018-09-17 PROCEDURE — 80053 COMPREHEN METABOLIC PANEL: CPT

## 2018-09-17 RX ORDER — SODIUM CHLORIDE, SODIUM LACTATE, POTASSIUM CHLORIDE, CALCIUM CHLORIDE 600; 310; 30; 20 MG/100ML; MG/100ML; MG/100ML; MG/100ML
INJECTION, SOLUTION INTRAVENOUS CONTINUOUS
Status: DISCONTINUED | OUTPATIENT
Start: 2018-09-17 | End: 2018-09-19

## 2018-09-17 RX ORDER — SODIUM CHLORIDE 9 MG/ML
INJECTION, SOLUTION INTRAVENOUS
Status: DISCONTINUED | OUTPATIENT
Start: 2018-09-17 | End: 2018-09-19

## 2018-09-17 RX ORDER — ONDANSETRON 8 MG/1
8 TABLET, ORALLY DISINTEGRATING ORAL EVERY 8 HOURS PRN
Status: DISCONTINUED | OUTPATIENT
Start: 2018-09-17 | End: 2018-09-19

## 2018-09-17 RX ORDER — TERBUTALINE SULFATE 1 MG/ML
0.25 INJECTION SUBCUTANEOUS
Status: DISCONTINUED | OUTPATIENT
Start: 2018-09-17 | End: 2018-09-19

## 2018-09-17 RX ORDER — CYCLOBENZAPRINE HCL 10 MG
10 TABLET ORAL 3 TIMES DAILY PRN
Status: DISCONTINUED | OUTPATIENT
Start: 2018-09-17 | End: 2018-09-19

## 2018-09-17 RX ORDER — MISOPROSTOL 100 MCG
25 TABLET ORAL EVERY 4 HOURS
Status: ACTIVE | OUTPATIENT
Start: 2018-09-17 | End: 2018-09-18

## 2018-09-17 RX ORDER — BETAMETHASONE SODIUM PHOSPHATE AND BETAMETHASONE ACETATE 3; 3 MG/ML; MG/ML
12 INJECTION, SUSPENSION INTRA-ARTICULAR; INTRALESIONAL; INTRAMUSCULAR; SOFT TISSUE EVERY 24 HOURS
Status: COMPLETED | OUTPATIENT
Start: 2018-09-17 | End: 2018-09-18

## 2018-09-17 RX ADMIN — Medication 25 MCG: at 11:09

## 2018-09-17 RX ADMIN — CYCLOBENZAPRINE HYDROCHLORIDE 10 MG: 10 TABLET, FILM COATED ORAL at 09:09

## 2018-09-17 RX ADMIN — BETAMETHASONE SODIUM PHOSPHATE AND BETAMETHASONE ACETATE 12 MG: 3; 3 INJECTION, SUSPENSION INTRA-ARTICULAR; INTRALESIONAL; INTRAMUSCULAR at 12:09

## 2018-09-17 RX ADMIN — ERYTHROPOIETIN 10000 UNITS: 10000 INJECTION, SOLUTION INTRAVENOUS; SUBCUTANEOUS at 02:09

## 2018-09-17 NOTE — CONSULTS
[facility] -   Nephrology  Consult Note        Patient Name: Lisseth Schwartz  MRN: [id number]  Admission Date: 9/17/2018  Hospital Length of Stay: 0 days  Attending Provider: Kandi Sexton MD   Primary Care Physician: Kandi Sexton MD  Principal Problem:<principal problem not specified>    Consults  Subjective:     HPI: Patient is a 27-year-old female with ESRD on hemodialysis under care of Dr. Andres Lambert in the [facility].  Normally she dialyzes on Monday Wednesday and Friday.  During pregnancy she has been dialyzing 5-6 days per week.  Patient is being admitted today for elective induction and labor.  Case discussed in detail with Dr. Sexton and the patient's family along with the patient.  Patient will be requiring daily dialysis starting today.  Patient will be induced at midnight tonight.  Will check magnesium and laboratory data as discussed with Dr. Sexton in detail.  Will give erythropoietin for anemia.  Patient seen and examined on the bedside and then later on re-examine at the time of dialysis on the bedside as well    Patient is 32 weeks and 5 days pregnant at this time.  Rest of the HPI please refer to the history and physical by Dr. Sexton    Past Medical History:   Diagnosis Date    Anxiety     Disorder of kidney and ureter     Dialysis 5x week    Hypertension        Past Surgical History:   Procedure Laterality Date    Arm surgery      x 2    cath in chest      RENAL BIOPSY      tumor removed      from face       Review of patient's allergies indicates:   Allergen Reactions    Lisinopril Other (See Comments)     Severe cough    Furosemide Other (See Comments)     Almost gave her right sided heartfailure     Current Facility-Administered Medications   Medication Frequency    0.9%  NaCl infusion PRN    betamethasone acetate-betamethasone sodium phosphate injection 12 mg Daily    lactated ringers bolus 1,000 mL Once    lactated ringers infusion  Continuous    miSOPROStol split tablet 25 mcg Q4H    ondansetron disintegrating tablet 8 mg Q8H PRN    promethazine (PHENERGAN) 12.5 mg in dextrose 5 % 50 mL IVPB Q6H PRN    terbutaline injection 0.25 mg PRN     Family History     None        Tobacco Use    Smoking status: Former Smoker    Smokeless tobacco: Never Used   Substance and Sexual Activity    Alcohol use: No    Drug use: No    Sexual activity: Yes     Partners: Male     Review of Systems   Constitutional: Negative for activity change, appetite change, chills, diaphoresis, fatigue, fever and unexpected weight change.   HENT: Negative for congestion, dental problem, drooling, postnasal drip, rhinorrhea and voice change.    Eyes: Negative for discharge.   Respiratory: Negative for apnea, cough, choking, chest tightness, shortness of breath, wheezing and stridor.    Cardiovascular: Negative for chest pain, palpitations and leg swelling.   Gastrointestinal: Negative for abdominal distention, blood in stool, constipation, diarrhea, nausea, rectal pain and vomiting.   Endocrine: Negative for cold intolerance, heat intolerance, polydipsia and polyuria.   Genitourinary: Negative for decreased urine volume, difficulty urinating, dysuria, enuresis, flank pain, frequency, hematuria and urgency.   Musculoskeletal: Negative for arthralgias, back pain, gait problem and joint swelling.   Skin: Negative for rash.   Allergic/Immunologic: Negative for food allergies and immunocompromised state.   Neurological: Negative for dizziness, tremors, syncope, numbness and headaches.   Hematological: Does not bruise/bleed easily.   Psychiatric/Behavioral: Negative for agitation, behavioral problems and self-injury. The patient is not nervous/anxious and is not hyperactive.    All other systems reviewed and are negative.    Objective:     Vital Signs (Most Recent):  Temp: 98.6 °F (37 °C) (09/17/18 1045)  Pulse: 84 (09/17/18 1700)  Resp: 18 (09/17/18 1045)  BP: (!) 142/63  (09/17/18 1700) Vital Signs (24h Range):  Temp:  [98.6 °F (37 °C)] 98.6 °F (37 °C)  Pulse:  [81-97] 84  Resp:  [18] 18  BP: (129-149)/(58-93) 142/63     Weight: 116 kg (255 lb 11.7 oz) (09/17/18 1100)  Body mass index is 41.28 kg/m².  Body surface area is 2.32 meters squared.    No intake/output data recorded.    Physical Exam   Constitutional: She is oriented to person, place, and time. No distress.   HENT:   Head: Normocephalic and atraumatic.   Nose: Nose normal.   Eyes: Conjunctivae and EOM are normal. Pupils are equal, round, and reactive to light.   Neck: Normal range of motion. No JVD present. No tracheal deviation present. No thyromegaly present.   Cardiovascular: Normal rate, regular rhythm, normal heart sounds and intact distal pulses. Exam reveals no gallop and no friction rub.   No murmur heard.  Pulmonary/Chest: Effort normal and breath sounds normal. No respiratory distress. She has no wheezes. She has no rales. She exhibits no tenderness.   Abdominal: Soft. Bowel sounds are normal. She exhibits no distension and no mass. There is no tenderness. No hernia.   Uterus findings discussed with the patient.  Patient &  fetal heart sounds being monitored   Musculoskeletal: Normal range of motion. She exhibits no edema, tenderness or deformity.   Neurological: She is alert and oriented to person, place, and time. She has normal reflexes. She displays normal reflexes. No cranial nerve deficit. She exhibits normal muscle tone. Coordination normal.   Skin: Skin is warm. She is not diaphoretic. No erythema. There is pallor.   Psychiatric: She has a normal mood and affect. Her behavior is normal. Judgment and thought content normal.   Nursing note and vitals reviewed.        Significant Labs:  All labs within the past 24 hours have been reviewed.    Significant Imaging:  Labs: Reviewed    Assessment/Plan:     End stage renal disease on dialysis    Patient has ESRD and is pregnant at 32 weeks and 5 days.  Patient  will be induced tonight as per Maternal-Fetal management recommendations.  Case discussed in detail with Dr. Sexton.  Will provide dialysis this evening in order to prepare her for labor tomorrow.  Will daily assess for dialysis needs.  Will also give her erythropoietin with dialysis for anemia.  Her potassium was 3.7 will use 4 potassium bath with dialysis.  Patient will be getting IV magnesium bolus and we will check magnesium q.8 hours.            Thank you for your consult.     Sebastian Gaitan MD   Nephrology  [facility]

## 2018-09-17 NOTE — PLAN OF CARE
Problem: Patient Care Overview  Goal: Plan of Care Review  Outcome: Ongoing (interventions implemented as appropriate)  Pt sent over from clinic for induction d/t pre eclampsia. Dialyzed today. VSS, FHT's reassuring. Plan for cytotec induction at 0000.

## 2018-09-17 NOTE — PROGRESS NOTES
Indication  ========    Renal Failure, Target Anatomy Survey.    History  ======    Risk Factors  Details: maternal renal failure    Method  ======    Transabdominal ultrasound examination. View: Sufficient.    Pregnancy  =========    Valladares pregnancy. Number of fetuses: 1.    Dating  ======    LMP on: 1/21/2018  GA by LMP 34 w + 1 d  KP by LMP: 10/28/2018  Ultrasound examination on: 9/17/2018  GA by U/S based upon: AC, BPD, Femur, HC  GA by U/S 32 w + 2 d  KP by U/S: 11/10/2018  Assigned: Dating performed on 04/9/2018, based on ultrasound ([facility])  Assigned GA 32 w + 5 d  Assigned KP: 11/7/2018    General Evaluation  ==============    Cardiac activity: present.  bpm.  Fetal movements: visualized.  Presentation: cephalic.  Placenta: posterior.  Amniotic fluid: MVP 4.4 cm. BRICE 12.3 cm. Q1 2.7 cm, Q2 2.1 cm, Q3 4.4 cm, Q4 3.1 cm.    Fetal Biometry  ============    Fetal Biometry  BPD 81.2 mm 32w 4d Hadlock  .7 mm 35w 2d Roger  .9 mm 33w 1d Hadlock  .5 mm 30w 6d Hadlock  Femur 63.4 mm 32w 5d Hadlock  EFW 1,846 g 17% Edward  Calculated by: Hadlock (BPD-HC-AC-FL)  EFW (lb) 4 lb  EFW (oz) 1 oz  Cephalic index 0.76  HC / AC 1.12  FL / BPD 0.78  FL / AC 0.24  MVP 4.4 cm  BRICE 12.3 cm   bpm    Fetal Anatomy  ===========    4-chamber view: suboptimal  Stomach: normal  Kidneys: normal  Bladder: normal  Wants to know gender: yes    Consultation  ==========    F/u visit-  ESRD on HD  Feeling poorly today. CP, increasing SOB. Headaches. Is having issues at HD with fistula as well.  Recent prenatal labs and blood pressures reviewed. With BP lability increasing (severe range on 9/11) and current symptoms (plt 146K  recently), most likely diagnosis is preeclampsia with severe features.  Advise delivery. Would admit patient, give betamethasone, perform continuous fetal monitoring, close observation of blood pressures (treat as  needed), and start induction of labor this evening/early am unless  needed to start immediately for maternal-fetal compromise.  If patient requires , would recommend VTE prophylaxis while admitted postpartum.    Impression  =========    Valladares, living IUP in vertex presentation.  EFW plots at the 17th percentile, AC measures 2 weeks behind.  AFV is normal by MVP but subjectively appears low.  BPP 8/8.    Recommendation  ==============    Recommend delivery.    The approximate physician face to face time was 15 minutes. The majority of time (greater than 50%) was spent on counseling of the patient or coordination of care.

## 2018-09-17 NOTE — PLAN OF CARE
Patient received hd as ordered. Net removal 1867mls. No access issues. Tolerated well. Dr. Gaitan visited during hd. Adm.epogen with hd as ordered. Tx. Ended 25 minutes early kidney clotted. Dr. Gaitan notified.

## 2018-09-17 NOTE — HPI
Patient is a 27-year-old female with ESRD on hemodialysis under care of Dr. Andres Hoyt in the [facility].  Normally she dialyzes on Monday Wednesday and Friday.  During pregnancy she has been dialyzing 5-6 days per week.  Patient is being admitted today for elective induction and labor.  Case discussed in detail with Dr. Sexton and the patient's family along with the patient.  Patient will be requiring daily dialysis starting today.  Patient will be induced at midnight tonight.  Will check magnesium and laboratory data as discussed with Dr. Sexton in detail.  Will give erythropoietin for anemia.  Patient seen and examined on the bedside and then later on re-examine at the time of dialysis on the bedside as well    Patient is 32 weeks and 5 days pregnant at this time.  Rest of the HPI please refer to the history and physical by Dr. Sexton

## 2018-09-17 NOTE — SUBJECTIVE & OBJECTIVE
Past Medical History:   Diagnosis Date    Anxiety     Disorder of kidney and ureter     Dialysis 5x week    Hypertension        Past Surgical History:   Procedure Laterality Date    Arm surgery      x 2    cath in chest      RENAL BIOPSY      tumor removed      from face       Review of patient's allergies indicates:   Allergen Reactions    Lisinopril Other (See Comments)     Severe cough    Furosemide Other (See Comments)     Almost gave her right sided heartfailure     Current Facility-Administered Medications   Medication Frequency    0.9%  NaCl infusion PRN    betamethasone acetate-betamethasone sodium phosphate injection 12 mg Daily    lactated ringers bolus 1,000 mL Once    lactated ringers infusion Continuous    miSOPROStol split tablet 25 mcg Q4H    ondansetron disintegrating tablet 8 mg Q8H PRN    promethazine (PHENERGAN) 12.5 mg in dextrose 5 % 50 mL IVPB Q6H PRN    terbutaline injection 0.25 mg PRN     Family History     None        Tobacco Use    Smoking status: Former Smoker    Smokeless tobacco: Never Used   Substance and Sexual Activity    Alcohol use: No    Drug use: No    Sexual activity: Yes     Partners: Male     Review of Systems   Constitutional: Negative for activity change, appetite change, chills, diaphoresis, fatigue, fever and unexpected weight change.   HENT: Negative for congestion, dental problem, drooling, postnasal drip, rhinorrhea and voice change.    Eyes: Negative for discharge.   Respiratory: Negative for apnea, cough, choking, chest tightness, shortness of breath, wheezing and stridor.    Cardiovascular: Negative for chest pain, palpitations and leg swelling.   Gastrointestinal: Negative for abdominal distention, blood in stool, constipation, diarrhea, nausea, rectal pain and vomiting.   Endocrine: Negative for cold intolerance, heat intolerance, polydipsia and polyuria.   Genitourinary: Negative for decreased urine volume, difficulty urinating, dysuria,  enuresis, flank pain, frequency, hematuria and urgency.   Musculoskeletal: Negative for arthralgias, back pain, gait problem and joint swelling.   Skin: Negative for rash.   Allergic/Immunologic: Negative for food allergies and immunocompromised state.   Neurological: Negative for dizziness, tremors, syncope, numbness and headaches.   Hematological: Does not bruise/bleed easily.   Psychiatric/Behavioral: Negative for agitation, behavioral problems and self-injury. The patient is not nervous/anxious and is not hyperactive.    All other systems reviewed and are negative.    Objective:     Vital Signs (Most Recent):  Temp: 98.6 °F (37 °C) (09/17/18 1045)  Pulse: 84 (09/17/18 1700)  Resp: 18 (09/17/18 1045)  BP: (!) 142/63 (09/17/18 1700) Vital Signs (24h Range):  Temp:  [98.6 °F (37 °C)] 98.6 °F (37 °C)  Pulse:  [81-97] 84  Resp:  [18] 18  BP: (129-149)/(58-93) 142/63     Weight: 116 kg (255 lb 11.7 oz) (09/17/18 1100)  Body mass index is 41.28 kg/m².  Body surface area is 2.32 meters squared.    No intake/output data recorded.    Physical Exam   Constitutional: She is oriented to person, place, and time. No distress.   HENT:   Head: Normocephalic and atraumatic.   Nose: Nose normal.   Eyes: Conjunctivae and EOM are normal. Pupils are equal, round, and reactive to light.   Neck: Normal range of motion. No JVD present. No tracheal deviation present. No thyromegaly present.   Cardiovascular: Normal rate, regular rhythm, normal heart sounds and intact distal pulses. Exam reveals no gallop and no friction rub.   No murmur heard.  Pulmonary/Chest: Effort normal and breath sounds normal. No respiratory distress. She has no wheezes. She has no rales. She exhibits no tenderness.   Abdominal: Soft. Bowel sounds are normal. She exhibits no distension and no mass. There is no tenderness. No hernia.   Uterus findings discussed with the patient.  Patient &  fetal heart sounds being monitored   Musculoskeletal: Normal range of  motion. She exhibits no edema, tenderness or deformity.   Neurological: She is alert and oriented to person, place, and time. She has normal reflexes. She displays normal reflexes. No cranial nerve deficit. She exhibits normal muscle tone. Coordination normal.   Skin: Skin is warm. She is not diaphoretic. No erythema. There is pallor.   Psychiatric: She has a normal mood and affect. Her behavior is normal. Judgment and thought content normal.   Nursing note and vitals reviewed.        Significant Labs:  All labs within the past 24 hours have been reviewed.    Significant Imaging:  Labs: Reviewed

## 2018-09-17 NOTE — NURSING
"Discussed feeding choice with mother.  Reviewed benefits of breastfeeding and risks of formula feeding. Patient given "What to Expect in the First 48 Hours" handout. Mother states her intention is Breast and bottle feeding.    When asked why she desired to do both, pt stated "I want to make sure my baby is happy when I am away from him at dialysis."    Coffective counseling sheet Fall in Love discussed with mother. Reinforced immediate skin to skin, the magic first hour, importance of the first feeding and delaying routine procedures. Encouraged mother to download Coffective mobile irene if she has not already done so. Mother verbalies understanding.    Formula Feeding Handout given and reviewed. Discussed proper hand washing, expiration time of formula, position of nipple and bottle while feeding, baby led feeding and fullness cues. Patient verbalized understanding and verbalized appropriate recall.      "

## 2018-09-17 NOTE — PROCEDURES
"Lisseth Schwartz is a 27 y.o. female patient.    Temp: 98.6 °F (37 °C) (09/17/18 1045)  Pulse: 84 (09/17/18 1700)  Resp: 18 (09/17/18 1045)  BP: (!) 142/63 (09/17/18 1700)  Weight: 116 kg (255 lb 11.7 oz) (09/17/18 1100)  Height: 5' 6" (167.6 cm) (09/17/18 1100)       Prepare patient for dialysis  Date/Time: 9/17/2018 5:36 PM  Performed by: Sebastian Gaitan MD  Authorized by: Sebastian Gaitan MD     Hemodialysis inpatient If "per protocol" is selected for one or more ingredients (K+, Ca++, Na+, Bicarb) for the dialysate bath solution, select the hyperlink for the protocol instructions.  Date/Time: 9/17/2018 5:36 PM  Performed by: Sebastian Gaitan MD  Authorized by: Sebastian Gaitan MD       Patient Seen on HD ; HD is for ESRD ; HD orders written and reviewed with HD nurse and nursing staff ;   Access is functioning well ; UF Goal is 3.3 litres as tolerated ; Will Give Epogen for anemia ; F-180 dialyzer ;  DFR is 500 ; patient is tolerating HD well ; next HD will be scheduled based on daily rounding and patient's clinical situation     Patient will have induction for labor tonight.  Will reassess the situation in the morning for repeat dialysis on a daily basis    Parameters for Hypotension outlined in orders and communications with nurses       Sebastian Gaitan  9/17/2018  "

## 2018-09-17 NOTE — ASSESSMENT & PLAN NOTE
Patient has ESRD and is pregnant at 32 weeks and 5 days.  Patient will be induced tonight as per Maternal-Fetal management recommendations.  Case discussed in detail with Dr. Sexton.  Will provide dialysis this evening in order to prepare her for labor tomorrow.  Will daily assess for dialysis needs.  Will also give her erythropoietin with dialysis for anemia.  Her potassium was 3.7 will use 4 potassium bath with dialysis.  Patient will be getting IV magnesium bolus and we will check magnesium q.8 hours.

## 2018-09-18 LAB
ALBUMIN SERPL BCP-MCNC: 2.4 G/DL
ANION GAP SERPL CALC-SCNC: 12 MMOL/L
APTT BLDCRRT: 27.8 SEC
BASOPHILS # BLD AUTO: 0.01 K/UL
BASOPHILS NFR BLD: 0.1 %
BUN SERPL-MCNC: 17 MG/DL
CALCIUM SERPL-MCNC: 8.9 MG/DL
CHLORIDE SERPL-SCNC: 107 MMOL/L
CO2 SERPL-SCNC: 20 MMOL/L
CREAT SERPL-MCNC: 4.9 MG/DL
DIFFERENTIAL METHOD: ABNORMAL
EOSINOPHIL # BLD AUTO: 0 K/UL
EOSINOPHIL NFR BLD: 0.1 %
ERYTHROCYTE [DISTWIDTH] IN BLOOD BY AUTOMATED COUNT: 15.7 %
EST. GFR  (AFRICAN AMERICAN): 13 ML/MIN/1.73 M^2
EST. GFR  (NON AFRICAN AMERICAN): 11 ML/MIN/1.73 M^2
GLUCOSE SERPL-MCNC: 97 MG/DL
HAV IGM SERPL QL IA: NEGATIVE
HBV CORE IGM SERPL QL IA: NEGATIVE
HBV SURFACE AG SERPL QL IA: NEGATIVE
HCT VFR BLD AUTO: 31.2 %
HCV AB SERPL QL IA: NEGATIVE
HGB BLD-MCNC: 10.2 G/DL
INR PPP: 1
LYMPHOCYTES # BLD AUTO: 1.4 K/UL
LYMPHOCYTES NFR BLD: 11.7 %
MCH RBC QN AUTO: 30.2 PG
MCHC RBC AUTO-ENTMCNC: 32.7 G/DL
MCV RBC AUTO: 92 FL
MONOCYTES # BLD AUTO: 0.8 K/UL
MONOCYTES NFR BLD: 6.5 %
NEUTROPHILS # BLD AUTO: 9.7 K/UL
NEUTROPHILS NFR BLD: 81.6 %
PHOSPHATE SERPL-MCNC: 2.5 MG/DL
PLATELET # BLD AUTO: 141 K/UL
PMV BLD AUTO: 9.6 FL
POTASSIUM SERPL-SCNC: 3.6 MMOL/L
PROTHROMBIN TIME: 10 SEC
RBC # BLD AUTO: 3.38 M/UL
SODIUM SERPL-SCNC: 139 MMOL/L
WBC # BLD AUTO: 11.91 K/UL

## 2018-09-18 PROCEDURE — 36415 COLL VENOUS BLD VENIPUNCTURE: CPT

## 2018-09-18 PROCEDURE — 72100003 HC LABOR CARE, EA. ADDL. 8 HRS

## 2018-09-18 PROCEDURE — 63600175 PHARM REV CODE 636 W HCPCS: Performed by: INTERNAL MEDICINE

## 2018-09-18 PROCEDURE — C1726 CATH, BAL DIL, NON-VASCULAR: HCPCS

## 2018-09-18 PROCEDURE — 80069 RENAL FUNCTION PANEL: CPT

## 2018-09-18 PROCEDURE — 80100016 HC MAINTENANCE HEMODIALYSIS

## 2018-09-18 PROCEDURE — 25000003 PHARM REV CODE 250: Performed by: OBSTETRICS & GYNECOLOGY

## 2018-09-18 PROCEDURE — 87081 CULTURE SCREEN ONLY: CPT

## 2018-09-18 PROCEDURE — 5A1D70Z PERFORMANCE OF URINARY FILTRATION, INTERMITTENT, LESS THAN 6 HOURS PER DAY: ICD-10-PCS | Performed by: INTERNAL MEDICINE

## 2018-09-18 PROCEDURE — 25000003 PHARM REV CODE 250: Performed by: MIDWIFE

## 2018-09-18 PROCEDURE — 11000001 HC ACUTE MED/SURG PRIVATE ROOM

## 2018-09-18 PROCEDURE — 85730 THROMBOPLASTIN TIME PARTIAL: CPT

## 2018-09-18 PROCEDURE — 85610 PROTHROMBIN TIME: CPT

## 2018-09-18 PROCEDURE — 63600175 PHARM REV CODE 636 W HCPCS: Performed by: OBSTETRICS & GYNECOLOGY

## 2018-09-18 PROCEDURE — 90935 HEMODIALYSIS ONE EVALUATION: CPT | Mod: ,,, | Performed by: INTERNAL MEDICINE

## 2018-09-18 PROCEDURE — 85025 COMPLETE CBC W/AUTO DIFF WBC: CPT

## 2018-09-18 RX ORDER — MISOPROSTOL 100 MCG
25 TABLET ORAL EVERY 4 HOURS PRN
Status: DISCONTINUED | OUTPATIENT
Start: 2018-09-18 | End: 2018-09-19

## 2018-09-18 RX ORDER — OXYCODONE AND ACETAMINOPHEN 10; 325 MG/1; MG/1
1 TABLET ORAL EVERY 6 HOURS PRN
Status: DISCONTINUED | OUTPATIENT
Start: 2018-09-18 | End: 2018-09-19

## 2018-09-18 RX ORDER — PANTOPRAZOLE SODIUM 40 MG/1
40 TABLET, DELAYED RELEASE ORAL DAILY
Status: DISCONTINUED | OUTPATIENT
Start: 2018-09-18 | End: 2018-09-19

## 2018-09-18 RX ORDER — ACETAMINOPHEN 325 MG/1
650 TABLET ORAL EVERY 8 HOURS PRN
Status: DISCONTINUED | OUTPATIENT
Start: 2018-09-18 | End: 2018-09-19

## 2018-09-18 RX ORDER — METOPROLOL SUCCINATE 25 MG/1
25 TABLET, EXTENDED RELEASE ORAL DAILY
Status: DISCONTINUED | OUTPATIENT
Start: 2018-09-18 | End: 2018-09-21 | Stop reason: HOSPADM

## 2018-09-18 RX ORDER — HEPARIN SODIUM 1000 [USP'U]/ML
1000 INJECTION, SOLUTION INTRAVENOUS; SUBCUTANEOUS
Status: DISCONTINUED | OUTPATIENT
Start: 2018-09-18 | End: 2018-09-21 | Stop reason: HOSPADM

## 2018-09-18 RX ADMIN — PANTOPRAZOLE SODIUM 40 MG: 40 TABLET, DELAYED RELEASE ORAL at 07:09

## 2018-09-18 RX ADMIN — ERYTHROPOIETIN 10000 UNITS: 10000 INJECTION, SOLUTION INTRAVENOUS; SUBCUTANEOUS at 01:09

## 2018-09-18 RX ADMIN — OXYCODONE HYDROCHLORIDE AND ACETAMINOPHEN 1 TABLET: 10; 325 TABLET ORAL at 10:09

## 2018-09-18 RX ADMIN — ACETAMINOPHEN 650 MG: 325 TABLET ORAL at 11:09

## 2018-09-18 RX ADMIN — METOPROLOL SUCCINATE 25 MG: 25 TABLET, EXTENDED RELEASE ORAL at 07:09

## 2018-09-18 RX ADMIN — Medication 25 MCG: at 05:09

## 2018-09-18 RX ADMIN — Medication 25 MCG: at 07:09

## 2018-09-18 RX ADMIN — BETAMETHASONE SODIUM PHOSPHATE AND BETAMETHASONE ACETATE 12 MG: 3; 3 INJECTION, SUSPENSION INTRA-ARTICULAR; INTRALESIONAL; INTRAMUSCULAR at 02:09

## 2018-09-18 RX ADMIN — Medication 25 MCG: at 02:09

## 2018-09-18 NOTE — HOSPITAL COURSE
2018 Admitted. Pt received her first dose of BMZ.  Dr Adalgisa Gaitan consulted. He immediately coordinated pt's dialysis. Plan per MFM is to induce with vaginal cytotec this evening, while still administering the second dose of BMZ as scheduled.   Magnesium sulfate to be administered as needed based on clinical picture (if preeclampsia worsens), only in bolus form since pt cannot continuously clear an infusion. Recommendation on administration per MFM: 4gm bolus, then follow pt clinically to assess if needs more, as well as check serum Magnesium level (if subtherapeutic at <4mg/dL) then administer further doses at 2gm boluses. If levels rise too high, she may be dialyzed.     18 IOL managed by CNM. Patient has received 2 vaginal cytotec doses so far. Plan to continue with cytotec until able to use cook cath. Other medical care managed by MDs.   Cook cath placed. 1.5/70/-2 soft, anterior    18: Patient had an uncomplicated .  Nephrology has been following the patient with dialysis based on their recommendations.  Home dose of Toprol continued.  18 Dialysis complete patient may be discharged per Dr Adame and Nephrologist
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
## 2018-09-18 NOTE — PROCEDURES
"Lisseth Schwartz is a 27 y.o. female patient.    Temp: 98.7 °F (37.1 °C) (09/18/18 0702)  Pulse: 97 (09/18/18 1330)  Resp: (P) 20 (09/18/18 1007)  BP: 139/82 (09/18/18 1330)  SpO2: 100 % (09/17/18 2004)  Weight: 116 kg (255 lb 11.7 oz) (09/17/18 1100)  Height: 5' 6" (167.6 cm) (09/17/18 1100)       Prepare patient for dialysis  Date/Time: 9/18/2018 2:36 PM  Performed by: Sebastian Gaitan MD  Authorized by: Sebastian Gaitan MD     Hemodialysis inpatient If "per protocol" is selected for one or more ingredients (K+, Ca++, Na+, Bicarb) for the dialysate bath solution, select the hyperlink for the protocol instructions.  Date/Time: 9/18/2018 2:36 PM  Performed by: Sebastian Gaitan MD  Authorized by: Sebastian Gaitan MD        Patient seen bedside on dialysis.  Tolerating dialysis.  Ultrafiltration goal is 0 today.  No heparin today.  Next dialysis will be tomorrow.  Patient has been induced and monitored for possible delivery later tonight.    Erythropoietin for anemia    Case discussed with Dr. Sexton and with anesthesiologist Dr.Sana Sebastian Gaitan  9/18/2018    "

## 2018-09-18 NOTE — SUBJECTIVE & OBJECTIVE
Obstetric History       T3      L3     SAB0   TAB2   Ectopic0   Multiple0   Live Births3       # Outcome Date GA Lbr Erik/2nd Weight Sex Delivery Anes PTL Lv   6 Current            5 TAB            4 Term 09   4.082 kg (9 lb) F Vag-Spont   SATNAM   3 Term 09/15/08   4.99 kg (11 lb) M Vag-Spont   SATNAM   2 TAB 2007           1 Term 07   3.175 kg (7 lb) M Vag-Spont   SATNAM        Past Medical History:   Diagnosis Date    Anxiety     Disorder of kidney and ureter     Dialysis 5x week    Hypertension      Past Surgical History:   Procedure Laterality Date    Arm surgery      x 2    cath in chest      RENAL BIOPSY      tumor removed      from face       PTA Medications   Medication Sig    metoprolol succinate (TOPROL-XL) 25 MG 24 hr tablet     pantoprazole (PROTONIX) 40 MG tablet     prenatal vit-iron fum-folic ac 65 mg iron- 1 mg Tab Take 1 tablet by mouth once daily. Any generic may be used       Review of patient's allergies indicates:   Allergen Reactions    Lisinopril Other (See Comments)     Severe cough    Furosemide Other (See Comments)     Almost gave her right sided heartfailure        Family History     None        Tobacco Use    Smoking status: Former Smoker    Smokeless tobacco: Never Used   Substance and Sexual Activity    Alcohol use: No    Drug use: No    Sexual activity: Yes     Partners: Male     Review of Systems   Objective:     Vital Signs (Most Recent):  Temp: 99.1 °F (37.3 °C) (18)  Pulse: 107 (18)  Resp: 20 (18)  BP: 138/66 (18)  SpO2: 100 % (18) Vital Signs (24h Range):  Temp:  [98.6 °F (37 °C)-99.1 °F (37.3 °C)] 99.1 °F (37.3 °C)  Pulse:  [] 107  Resp:  [18-20] 20  SpO2:  [98 %-100 %] 100 %  BP: (129-160)/(58-93) 138/66     Weight: 116 kg (255 lb 11.7 oz)  Body mass index is 41.28 kg/m².       Physical Exam:   Constitutional: She is oriented to person, place, and time. She appears  well-developed and well-nourished.      Neck: Normal range of motion.    Cardiovascular: Normal rate, regular rhythm and normal heart sounds.     Pulmonary/Chest: Effort normal and breath sounds normal.        Abdominal: Soft. She exhibits no distension. There is no tenderness.   Fundus soft NT             Musculoskeletal: Normal range of motion and moves all extremeties.   No clonus and DTRs 1+ bilaterally.   No Homans or CT       Neurological: She is alert and oriented to person, place, and time.    Skin: Skin is dry.    Psychiatric: She has a normal mood and affect.             Significant Labs:  Lab Results   Component Value Date    [facility] 09/17/2018    HEPBSAG Negative 04/05/2018       I have personallly reviewed all pertinent lab results from the last 24 hours.

## 2018-09-18 NOTE — PLAN OF CARE
Problem: Patient Care Overview  Goal: Plan of Care Review  Outcome: Ongoing (interventions implemented as appropriate)  Pt alert and oriented, rested during shift. 2 doses cytotec administered. Pt showered with Hibiclens and linen changed. GBS collected. SCD's in place. VSS, BP monitored. Will continue to monitor.

## 2018-09-18 NOTE — SUBJECTIVE & OBJECTIVE
Physical Exam:   Constitutional: She is oriented to person, place, and time. She appears well-developed and well-nourished.      Neck: Normal range of motion.     Pulmonary/Chest: Effort normal.        Abdominal: Soft.             Musculoskeletal: Normal range of motion.       Neurological: She is alert and oriented to person, place, and time.    Skin: Skin is warm and dry.    Psychiatric: She has a normal mood and affect. Her behavior is normal. Judgment and thought content normal.     OLEGARIO Abrams

## 2018-09-18 NOTE — PROGRESS NOTES
Pt completed 3.5 hours of HD tx with 0 L net UF.  Pt tolerated tx well. No access issues. During tx, pt received epo. Dr. Gaitan rounded on pt at bedside during tx.  Post tx, blood rinsed back, needles removed, and hemostasis achieved. Report to nurse attending.
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
## 2018-09-18 NOTE — ASSESSMENT & PLAN NOTE
Admission for betamethasone series for prematurity, however magnesium sulfate to be administered as clinically necessary for preeclampsia (see recommendations in hospital course).
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
## 2018-09-19 VITALS — OXYGEN SATURATION: 99 % | SYSTOLIC BLOOD PRESSURE: 139 MMHG | DIASTOLIC BLOOD PRESSURE: 66 MMHG

## 2018-09-19 PROBLEM — Z37.9: Status: ACTIVE | Noted: 2018-09-19

## 2018-09-19 LAB
ALBUMIN SERPL BCP-MCNC: 2.6 G/DL
ALP SERPL-CCNC: 74 U/L
ALT SERPL W/O P-5'-P-CCNC: 19 U/L
ANION GAP SERPL CALC-SCNC: 14 MMOL/L
AST SERPL-CCNC: 10 U/L
BASOPHILS # BLD AUTO: 0.01 K/UL
BASOPHILS NFR BLD: 0.1 %
BILIRUB SERPL-MCNC: 0.6 MG/DL
BUN SERPL-MCNC: 21 MG/DL
CALCIUM SERPL-MCNC: 9.6 MG/DL
CHLORIDE SERPL-SCNC: 105 MMOL/L
CO2 SERPL-SCNC: 20 MMOL/L
CREAT SERPL-MCNC: 6.6 MG/DL
DIFFERENTIAL METHOD: ABNORMAL
EOSINOPHIL # BLD AUTO: 0 K/UL
EOSINOPHIL NFR BLD: 0.1 %
ERYTHROCYTE [DISTWIDTH] IN BLOOD BY AUTOMATED COUNT: 16.1 %
EST. GFR  (AFRICAN AMERICAN): 9 ML/MIN/1.73 M^2
EST. GFR  (NON AFRICAN AMERICAN): 8 ML/MIN/1.73 M^2
GLUCOSE SERPL-MCNC: 113 MG/DL
HCT VFR BLD AUTO: 32.3 %
HEP. B SURF AB, QUAL: POSITIVE
HEP. B SURF AB, QUANT.: 64 MIU/ML
HGB BLD-MCNC: 10.7 G/DL
LYMPHOCYTES # BLD AUTO: 1.6 K/UL
LYMPHOCYTES NFR BLD: 11.9 %
MCH RBC QN AUTO: 31.1 PG
MCHC RBC AUTO-ENTMCNC: 33.1 G/DL
MCV RBC AUTO: 94 FL
MONOCYTES # BLD AUTO: 0.5 K/UL
MONOCYTES NFR BLD: 3.8 %
NEUTROPHILS # BLD AUTO: 11.4 K/UL
NEUTROPHILS NFR BLD: 84.1 %
PLATELET # BLD AUTO: 124 K/UL
PMV BLD AUTO: 10 FL
POTASSIUM SERPL-SCNC: 4.2 MMOL/L
PROT SERPL-MCNC: 6.7 G/DL
RBC # BLD AUTO: 3.44 M/UL
SODIUM SERPL-SCNC: 139 MMOL/L
WBC # BLD AUTO: 13.51 K/UL

## 2018-09-19 PROCEDURE — 72200005 HC VAGINAL DELIVERY LEVEL II

## 2018-09-19 PROCEDURE — 25000003 PHARM REV CODE 250: Performed by: OBSTETRICS & GYNECOLOGY

## 2018-09-19 PROCEDURE — 62326 NJX INTERLAMINAR LMBR/SAC: CPT | Performed by: ANESTHESIOLOGY

## 2018-09-19 PROCEDURE — 59400 OBSTETRICAL CARE: CPT | Mod: ,,, | Performed by: ADVANCED PRACTICE MIDWIFE

## 2018-09-19 PROCEDURE — 80053 COMPREHEN METABOLIC PANEL: CPT

## 2018-09-19 PROCEDURE — 85025 COMPLETE CBC W/AUTO DIFF WBC: CPT

## 2018-09-19 PROCEDURE — 63600175 PHARM REV CODE 636 W HCPCS: Performed by: OBSTETRICS & GYNECOLOGY

## 2018-09-19 PROCEDURE — 51701 INSERT BLADDER CATHETER: CPT

## 2018-09-19 PROCEDURE — 11000001 HC ACUTE MED/SURG PRIVATE ROOM

## 2018-09-19 PROCEDURE — P9045 ALBUMIN (HUMAN), 5%, 250 ML: HCPCS | Mod: JG | Performed by: ANESTHESIOLOGY

## 2018-09-19 PROCEDURE — 63600175 PHARM REV CODE 636 W HCPCS: Performed by: ADVANCED PRACTICE MIDWIFE

## 2018-09-19 PROCEDURE — 99232 SBSQ HOSP IP/OBS MODERATE 35: CPT | Mod: ,,, | Performed by: INTERNAL MEDICINE

## 2018-09-19 PROCEDURE — 3E033VJ INTRODUCTION OF OTHER HORMONE INTO PERIPHERAL VEIN, PERCUTANEOUS APPROACH: ICD-10-PCS | Performed by: OBSTETRICS & GYNECOLOGY

## 2018-09-19 PROCEDURE — 25000003 PHARM REV CODE 250: Performed by: ADVANCED PRACTICE MIDWIFE

## 2018-09-19 PROCEDURE — 63600175 PHARM REV CODE 636 W HCPCS: Performed by: NURSE ANESTHETIST, CERTIFIED REGISTERED

## 2018-09-19 PROCEDURE — 10907ZC DRAINAGE OF AMNIOTIC FLUID, THERAPEUTIC FROM PRODUCTS OF CONCEPTION, VIA NATURAL OR ARTIFICIAL OPENING: ICD-10-PCS | Performed by: OBSTETRICS & GYNECOLOGY

## 2018-09-19 PROCEDURE — 27000181 HC CABLE, IUPC

## 2018-09-19 PROCEDURE — 36415 COLL VENOUS BLD VENIPUNCTURE: CPT

## 2018-09-19 PROCEDURE — 63600175 PHARM REV CODE 636 W HCPCS: Mod: JG | Performed by: ANESTHESIOLOGY

## 2018-09-19 PROCEDURE — 27800517 HC TRAY,EPIDURAL-CONTINUOUS: Performed by: NURSE ANESTHETIST, CERTIFIED REGISTERED

## 2018-09-19 PROCEDURE — 3E0P7VZ INTRODUCTION OF HORMONE INTO FEMALE REPRODUCTIVE, VIA NATURAL OR ARTIFICIAL OPENING: ICD-10-PCS | Performed by: OBSTETRICS & GYNECOLOGY

## 2018-09-19 PROCEDURE — 25000003 PHARM REV CODE 250: Performed by: MIDWIFE

## 2018-09-19 PROCEDURE — 25000003 PHARM REV CODE 250: Performed by: ANESTHESIOLOGY

## 2018-09-19 PROCEDURE — 72100003 HC LABOR CARE, EA. ADDL. 8 HRS

## 2018-09-19 RX ORDER — AMMONIA 15 % (W/V)
0.3 AMPUL (EA) INHALATION CONTINUOUS PRN
Status: DISCONTINUED | OUTPATIENT
Start: 2018-09-19 | End: 2018-09-21 | Stop reason: HOSPADM

## 2018-09-19 RX ORDER — ACETAMINOPHEN 325 MG/1
650 TABLET ORAL EVERY 6 HOURS PRN
Status: DISCONTINUED | OUTPATIENT
Start: 2018-09-19 | End: 2018-09-21 | Stop reason: HOSPADM

## 2018-09-19 RX ORDER — ONDANSETRON 8 MG/1
8 TABLET, ORALLY DISINTEGRATING ORAL EVERY 8 HOURS PRN
Status: DISCONTINUED | OUTPATIENT
Start: 2018-09-19 | End: 2018-09-21 | Stop reason: HOSPADM

## 2018-09-19 RX ORDER — DIPHENHYDRAMINE HCL 25 MG
25 CAPSULE ORAL EVERY 4 HOURS PRN
Status: DISCONTINUED | OUTPATIENT
Start: 2018-09-19 | End: 2018-09-21 | Stop reason: HOSPADM

## 2018-09-19 RX ORDER — ROPIVACAINE HYDROCHLORIDE 2 MG/ML
INJECTION, SOLUTION EPIDURAL; INFILTRATION; PERINEURAL
Status: DISCONTINUED | OUTPATIENT
Start: 2018-09-19 | End: 2018-09-19

## 2018-09-19 RX ORDER — MISOPROSTOL 200 UG/1
600 TABLET ORAL ONCE
Status: COMPLETED | OUTPATIENT
Start: 2018-09-19 | End: 2018-09-19

## 2018-09-19 RX ORDER — HYDROCORTISONE 25 MG/G
CREAM TOPICAL 3 TIMES DAILY PRN
Status: DISCONTINUED | OUTPATIENT
Start: 2018-09-19 | End: 2018-09-21 | Stop reason: HOSPADM

## 2018-09-19 RX ORDER — DOCUSATE SODIUM 100 MG/1
100 CAPSULE, LIQUID FILLED ORAL DAILY
Status: DISCONTINUED | OUTPATIENT
Start: 2018-09-20 | End: 2018-09-21 | Stop reason: HOSPADM

## 2018-09-19 RX ORDER — SODIUM CHLORIDE 9 MG/ML
INJECTION, SOLUTION INTRAVENOUS CONTINUOUS
Status: DISCONTINUED | OUTPATIENT
Start: 2018-09-19 | End: 2018-09-19

## 2018-09-19 RX ORDER — ALBUMIN HUMAN 50 G/1000ML
25 SOLUTION INTRAVENOUS ONCE
Status: COMPLETED | OUTPATIENT
Start: 2018-09-19 | End: 2018-09-19

## 2018-09-19 RX ORDER — HYDROCODONE BITARTRATE AND ACETAMINOPHEN 5; 325 MG/1; MG/1
1 TABLET ORAL EVERY 4 HOURS PRN
Status: DISCONTINUED | OUTPATIENT
Start: 2018-09-19 | End: 2018-09-21 | Stop reason: HOSPADM

## 2018-09-19 RX ORDER — ROPIVACAINE HYDROCHLORIDE 2 MG/ML
INJECTION, SOLUTION EPIDURAL; INFILTRATION; PERINEURAL CONTINUOUS PRN
Status: DISCONTINUED | OUTPATIENT
Start: 2018-09-19 | End: 2018-09-19

## 2018-09-19 RX ORDER — FENTANYL CITRATE 50 UG/ML
INJECTION, SOLUTION INTRAMUSCULAR; INTRAVENOUS
Status: DISCONTINUED | OUTPATIENT
Start: 2018-09-19 | End: 2018-09-19

## 2018-09-19 RX ADMIN — FENTANYL CITRATE 100 MCG: 50 INJECTION, SOLUTION INTRAMUSCULAR; INTRAVENOUS at 05:09

## 2018-09-19 RX ADMIN — OXYTOCIN 1 ML: 10 INJECTION, SOLUTION INTRAMUSCULAR; INTRAVENOUS at 01:09

## 2018-09-19 RX ADMIN — Medication 25 MCG: at 04:09

## 2018-09-19 RX ADMIN — Medication 25 MCG: at 12:09

## 2018-09-19 RX ADMIN — DIPHENHYDRAMINE HYDROCHLORIDE 25 MG: 25 CAPSULE ORAL at 07:09

## 2018-09-19 RX ADMIN — ALBUMIN (HUMAN) 25 G: 12.5 SOLUTION INTRAVENOUS at 09:09

## 2018-09-19 RX ADMIN — ROPIVACAINE HYDROCHLORIDE 6 ML: 2 INJECTION, SOLUTION EPIDURAL; INFILTRATION at 11:09

## 2018-09-19 RX ADMIN — SODIUM CHLORIDE: 0.9 INJECTION, SOLUTION INTRAVENOUS at 01:09

## 2018-09-19 RX ADMIN — DEXTROSE 5 MILLION UNITS: 50 INJECTION, SOLUTION INTRAVENOUS at 07:09

## 2018-09-19 RX ADMIN — ROPIVACAINE HYDROCHLORIDE: 2 INJECTION, SOLUTION EPIDURAL; INFILTRATION at 05:09

## 2018-09-19 RX ADMIN — HYDROCODONE BITARTRATE AND ACETAMINOPHEN 1 TABLET: 5; 325 TABLET ORAL at 11:09

## 2018-09-19 RX ADMIN — METOPROLOL SUCCINATE 25 MG: 25 TABLET, EXTENDED RELEASE ORAL at 09:09

## 2018-09-19 RX ADMIN — OXYCODONE HYDROCHLORIDE AND ACETAMINOPHEN 1 TABLET: 10; 325 TABLET ORAL at 04:09

## 2018-09-19 RX ADMIN — SODIUM CHLORIDE 75 ML: 0.9 INJECTION, SOLUTION INTRAVENOUS at 07:09

## 2018-09-19 RX ADMIN — ROPIVACAINE HYDROCHLORIDE 14 ML/HR: 2 INJECTION, SOLUTION EPIDURAL; INFILTRATION at 11:09

## 2018-09-19 RX ADMIN — MISOPROSTOL 600 MCG: 200 TABLET ORAL at 06:09

## 2018-09-19 RX ADMIN — LIDOCAINE HYDROCHLORIDE AND EPINEPHRINE 3 ML: 15; 5 INJECTION, SOLUTION EPIDURAL at 11:09

## 2018-09-19 RX ADMIN — DEXTROSE 2.5 MILLION UNITS: 50 INJECTION, SOLUTION INTRAVENOUS at 05:09

## 2018-09-19 RX ADMIN — DEXTROSE 2.5 MILLION UNITS: 50 INJECTION, SOLUTION INTRAVENOUS at 12:09

## 2018-09-19 NOTE — PLAN OF CARE
Problem: Patient Care Overview  Goal: Plan of Care Review  Outcome: Ongoing (interventions implemented as appropriate)  Pt tolerating contractions, but states they are getting more intense, states she is not ready for the epidural yet. Pt VSS. No needs at this time. Reports given to HUNG Lopes.

## 2018-09-19 NOTE — ASSESSMENT & PLAN NOTE
9/17/2018 Admitted. Pt received her first dose of BMZ.  Dr Adalgisa Gaitan consulted. He immediately coordinated pt's dialysis. Plan per MFM is to induce with vaginal cytotec this evening, while still administering the second dose of BMZ as scheduled.   Magnesium sulfate to be administered as needed based on clinical picture (if preeclampsia worsens), only in bolus form since pt cannot continuously clear an infusion. Recommendation on administration per MFM: 4gm bolus, then follow pt clinically to assess if needs more, as well as check serum Magnesium level (if subtherapeutic at <4mg/dL) then administer further doses at 2gm boluses. If levels rise too high, she may be dialyzed.     9/18/18 IOL managed by CNM. Patient has received 2 vaginal cytotec doses so far. Plan to continue with cytotec until able to use cook cath. Other medical care managed by MDs.  1945 Cook cath placed. 1.5/70/-2 soft, anterior

## 2018-09-19 NOTE — PROGRESS NOTES
S: feeling ctx on left side, declines reinjection at this time    O: VSS/AF  FHT: 135 Cat 1 reassuring  McCarr/Palpation: q 2-5 minutes  SVE 5/75/-1, IUPC placed, pt in supine position, tolerated well    A: IOL for Pre-eclampsia    P: Continue to monitor maternal and fetal status    OLEGARIO Jerry

## 2018-09-19 NOTE — PHYSICIAN QUERY
"PT Name: Lisseth Schwartz  MR #: [id number]     Physician Query Form - Documentation Clarification      CDS/: OUSMANE Coleman,[email or web address]           Contact information:[email or web address]    This form is a permanent document in the medical record.     Query Date: September 19, 2018    By submitting this query, we are merely seeking further clarification of documentation. Please utilize your independent clinical judgment when addressing the question(s) below.    The Medical record reflects the following:    Supporting Clinical Findings Location in Medical Record   BMI=41.4  Ht=5' 6" (1.676 m)  Cc=668 kg (255 lb 11.7 oz)    32w5d known well to the practice, was recommended by [facility] today for betamethasone series and induction of labor due to preeclampsia. She has a history significant for HTN, then ESRD due to focal segmental glomerulosclerosis, on hemodialysis 5-6 times a week this pregnancy. Anthropometrics 9/17        H&P 9/17                                                                                      Doctor, Please specify diagnosis or diagnoses associated with above clinical findings.    Provider Use Only      [X] Morbid obesity complicating childbirth  [  ] Other, please specify:__________________________________                                                                                                                   [  ] Clinically undetermined            "

## 2018-09-19 NOTE — PLAN OF CARE
Problem: Patient Care Overview  Goal: Plan of Care Review  Outcome: Ongoing (interventions implemented as appropriate)  BP's 130's-140's/80's-90's throughout shift. HD completed today. Afebrile. Last SVE 1.5/60/-1. Will continue to monitor.

## 2018-09-19 NOTE — PROGRESS NOTES
[facility] -   Nephrology  Progress Note    Patient Name: Lisseth Schwartz  MRN: [id number]  Admission Date: 9/17/2018  Hospital Length of Stay: 2 days  Attending Provider: Kandi Sexton MD   Primary Care Physician: Kandi Sexton MD  Principal Problem:Severe preeclampsia    Subjective:     HPI: Patient is a 27-year-old female with ESRD on hemodialysis under care of Dr. Andres Hoyt in the [facility].  Normally she dialyzes on Monday Wednesday and Friday.  During pregnancy she has been dialyzing 5-6 days per week.  Patient is being admitted today for elective induction and labor.  Case discussed in detail with Dr. Sexton and the patient's family along with the patient.  Patient will be requiring daily dialysis starting today.  Patient will be induced at midnight tonight.  Will check magnesium and laboratory data as discussed with Dr. Sexton in detail.  Will give erythropoietin for anemia.  Patient seen and examined on the bedside and then later on re-examine at the time of dialysis on the bedside as well    Patient is 32 weeks and 5 days pregnant at this time.  Rest of the HPI please refer to the history and physical by Dr. Sexton    Interval History:  Patient in active labor at this time.  Water broke.  Cervical dilatation 5 cm.  Hold dialysis for now.    Review of patient's allergies indicates:   Allergen Reactions    Lisinopril Other (See Comments)     Severe cough    Furosemide Other (See Comments)     Almost gave her right sided heartfailure     Current Facility-Administered Medications   Medication Frequency    0.9%  NaCl infusion PRN    0.9%  NaCl infusion Continuous    acetaminophen tablet 650 mg Q8H PRN    cyclobenzaprine tablet 10 mg TID PRN    heparin (porcine) injection 1,000 Units PRN    lactated ringers bolus 1,000 mL Once    lactated ringers infusion Continuous    metoprolol succinate (TOPROL-XL) 24 hr tablet 25 mg Daily    miSOPROStol split tablet 25  mcg Q4H PRN    ondansetron disintegrating tablet 8 mg Q8H PRN    oxyCODONE-acetaminophen  mg per tablet 1 tablet Q6H PRN    pantoprazole EC tablet 40 mg Daily    penicillin G potassium 2.5 Million Units in dextrose 5 % 50 mL IVPB Q4H    promethazine (PHENERGAN) 12.5 mg in dextrose 5 % 50 mL IVPB Q6H PRN    sodium chloride 0.9% 500 mL with oxytocin 30 Units infusion Continuous    terbutaline injection 0.25 mg PRN     Facility-Administered Medications Ordered in Other Encounters   Medication Frequency    ropivacaine (PF) 2 mg/ml (0.2%) infusion PRN    ropivacaine (PF) 2 mg/ml (0.2%) infusion Continuous PRN       Objective:     Vital Signs (Most Recent):  Temp: 97.5 °F (36.4 °C) (09/19/18 1403)  Pulse: 106 (09/19/18 1518)  Resp: 18 (09/19/18 0602)  BP: (!) 141/73 (09/19/18 1518)  SpO2: 100 % (09/19/18 1056)  O2 Device (Oxygen Therapy): (P) room air (09/18/18 1007) Vital Signs (24h Range):  Temp:  [97.5 °F (36.4 °C)-98.9 °F (37.2 °C)] 97.5 °F (36.4 °C)  Pulse:  [] 106  Resp:  [16-20] 18  SpO2:  [98 %-100 %] 99 %  BP: (127-163)/() 141/73     Weight: 116 kg (255 lb 11.7 oz) (09/17/18 1100)  Body mass index is 41.28 kg/m².  Body surface area is 2.32 meters squared.    I/O last 3 completed shifts:  In: 500 [Other:500]  Out: 800 [Urine:800]    Physical Exam   Constitutional: She is oriented to person, place, and time. She appears well-developed and well-nourished. No distress.   Patient having uterine cramps with active labor   HENT:   Head: Normocephalic and atraumatic.   Nose: Nose normal.   Eyes: Conjunctivae and EOM are normal. Pupils are equal, round, and reactive to light.   Neck: Normal range of motion. No JVD present. No tracheal deviation present. No thyromegaly present.   Cardiovascular: Normal rate, regular rhythm, normal heart sounds and intact distal pulses. Exam reveals no gallop and no friction rub.   No murmur heard.  Pulmonary/Chest: Effort normal and breath sounds normal. No  respiratory distress. She has no wheezes. She has no rales. She exhibits no tenderness.   Abdominal: Soft. Bowel sounds are normal. She exhibits no distension and no mass. There is no tenderness. No hernia.   Uterus findings discussed with the patient.  Patient &  fetal heart sounds being monitored   Musculoskeletal: Normal range of motion. She exhibits deformity. She exhibits no edema or tenderness.   Right upper arm fistula with a positive bruit and thrill   Neurological: She is alert and oriented to person, place, and time. She has normal reflexes. She displays normal reflexes. No cranial nerve deficit. She exhibits normal muscle tone. Coordination normal.   Skin: Skin is warm. Capillary refill takes less than 2 seconds. She is not diaphoretic. No erythema. There is pallor.   Psychiatric: She has a normal mood and affect. Her behavior is normal. Judgment and thought content normal.   Nursing note and vitals reviewed.      Significant Labs:  All labs within the past 24 hours have been reviewed.     Significant Imaging:  Labs: Reviewed  OBGYN and labor data    Assessment/Plan:     End stage renal disease on dialysis    Hold dialysis for now.  Allowing active labor to proceed.  Discussed with Dr. Moreno              Thank you for your consult.     Sebastian Gaitan MD  Nephrology  [facility] -

## 2018-09-19 NOTE — PROGRESS NOTES
S: Doing well, comfortable with CHEMA    O: VSS/AF   Cat 1 reassuring, intermittent variable decels  [place]: q 10 minutes  VE 5-6/70/-1    A: IOL     P: Discussed R/B of Pitocin augmentation with pt and , Pitocin 500mL bag to be mixed by pharmacy for lower volume concentration. Pt agree with POC.  Continue to monitor maternal and fetal status  Anticipate progression of labor and NVD    [place]IO Jerry

## 2018-09-19 NOTE — PLAN OF CARE
Problem: Patient Care Overview  Goal: Plan of Care Review  Outcome: Outcome(s) achieved Date Met: 09/19/18  AROM at 0829 clear. Epiduralized. Pitocin initiated at 1320, currently at 6mu/min. IUPC inserted. Dialysis held today per Dr. Waters and will resume after delivery. Bp's remained 130's-140's/90's, when in pain BP elevated to 151/98. Peanut ball initiated. FOB at bedside. Will continue to monitor.

## 2018-09-19 NOTE — PROGRESS NOTES
S: Doing well    O: VSS/AF   Cat 1 reassuring  [place] q 4-10 minutes  SVE 4-5/70/-2 AROM with amnihook, clear fluid, pt tolerated well    A: IOL for pre-eclamapsia     P: GBS prophylaxis  Continue to monitor maternal and fetal status   CHEMA per pt request  Anticipate progression of labor and     OLEGARIO Jerry

## 2018-09-19 NOTE — PROGRESS NOTES
[facility] -   Obstetrics  Labor Progress Note    Patient Name: Lisseth Schwartz  MRN: [id number]  Admission Date: 2018  Hospital Length of Stay: 2 days  Attending Physician: Kandi Sexton MD  Primary Care Provider: Kandi Sexton MD    Subjective:     Principal Problem:Severe preeclampsia    Hospital Course:  2018 Admitted. Pt received her first dose of BMZ.  Dr Adalgisa Gaitan consulted. He immediately coordinated pt's dialysis. Plan per [facility] is to induce with vaginal cytotec this evening, while still administering the second dose of BMZ as scheduled.   Magnesium sulfate to be administered as needed based on clinical picture (if preeclampsia worsens), only in bolus form since pt cannot continuously clear an infusion. Recommendation on administration per M: 4gm bolus, then follow pt clinically to assess if needs more, as well as check serum Magnesium level (if subtherapeutic at <4mg/dL) then administer further doses at 2gm boluses. If levels rise too high, she may be dialyzed.     18 IOL managed by CNM. Patient has received 2 vaginal cytotec doses so far. Plan to continue with cytotec until able to use cook cath. Other medical care managed by MDs.  194 Cook cath placed. 1.5/-2 soft, anterior    Interval History:  Lisseth is a 27 y.o.  at 33w0d. She is doing well.     Objective:     Vital Signs (Most Recent):  Temp: 98.5 °F (36.9 °C) (18 0302)  Pulse: 83 (18 0602)  Resp: 18 (18 06)  BP: (!) 145/68 (18 0602)  SpO2: 100 % (18) Vital Signs (24h Range):  Temp:  [97.5 °F (36.4 °C)-98.6 °F (37 °C)] 98.5 °F (36.9 °C)  Pulse:  [] 83  Resp:  [16-20] 18  BP: ()/() 145/68     Weight: 116 kg (255 lb 11.7 oz)  Body mass index is 41.28 kg/m².    FHT: 130 Cat 1 (reassuring)  TOCO:  Q 2-4 minutes    Cervical Exam:  Dilation:  4-5  Effacement:  70  Station: -2  Presentation: Vertex     Significant Labs:  Lab Results   Component  Value Date    GROUPTRH B POS 2018    HEPBSAG Negative 2018       I have personallly reviewed all pertinent lab results from the last 24 hours.    Physical Exam:   Constitutional: She is oriented to person, place, and time. Vital signs are normal. She appears well-developed and well-nourished. She is cooperative.    HENT:   Head: Normocephalic.     Neck: Normal range of motion. Neck supple.     Pulmonary/Chest: Effort normal.        Abdominal: Soft.   Gravid, non-tender     Genitourinary: Vagina normal and uterus normal. Pelvic exam was performed with patient supine. Cervix is normal. Labial bartholins normal.          Musculoskeletal: Normal range of motion and moves all extremeties.       Neurological: She is alert and oriented to person, place, and time. She has normal strength.    Skin: Skin is warm, dry and intact. Capillary refill takes less than 2 seconds.    Psychiatric: She has a normal mood and affect. Her speech is normal and behavior is normal. Judgment and thought content normal. Cognition and memory are normal.       Assessment/Plan:     27 y.o. female  at 33w0d for:    * Severe preeclampsia    2018 Admitted. Pt received her first dose of BMZ.  Dr Adalgisa Gaitan consulted. He immediately coordinated pt's dialysis. Plan per MFM is to induce with vaginal cytotec this evening, while still administering the second dose of BMZ as scheduled.   Magnesium sulfate to be administered as needed based on clinical picture (if preeclampsia worsens), only in bolus form since pt cannot continuously clear an infusion. Recommendation on administration per MFM: 4gm bolus, then follow pt clinically to assess if needs more, as well as check serum Magnesium level (if subtherapeutic at <4mg/dL) then administer further doses at 2gm boluses. If levels rise too high, she may be dialyzed.     18 IOL managed by CNM. Patient has received 2 vaginal cytotec doses so far. Plan to continue with cytotec until  able to use cook cath. Other medical care managed by MDs.  1945 Cook cath placed. 1.5/70/-2 soft, anterior  9/19/18- Cook catheter out, SVE 4-5/70/-2. Discussed AROM vs Pitocin. Pt would like to discuss with  and will let us know.         Essential hypertension    Currently stable on bedrest, no meds        End stage renal disease on dialysis    Appreciate Apryl and nephrology team for coordinating dialysis daily while in hospital.         Encounter for tubal ligation counseling     Confirms desire for permanent sterilization        FSGS (focal segmental glomerulosclerosis)     Renal consult        Renal failure affecting pregnancy in third trimester    Renal consult              David Frederick CNM  Obstetrics  [facility] - [place]

## 2018-09-19 NOTE — SUBJECTIVE & OBJECTIVE
Interval History:  Patient in active labor at this time.  Water broke.  Cervical dilatation 5 cm.  Hold dialysis for now.    Review of patient's allergies indicates:   Allergen Reactions    Lisinopril Other (See Comments)     Severe cough    Furosemide Other (See Comments)     Almost gave her right sided heartfailure     Current Facility-Administered Medications   Medication Frequency    0.9%  NaCl infusion PRN    0.9%  NaCl infusion Continuous    acetaminophen tablet 650 mg Q8H PRN    cyclobenzaprine tablet 10 mg TID PRN    heparin (porcine) injection 1,000 Units PRN    lactated ringers bolus 1,000 mL Once    lactated ringers infusion Continuous    metoprolol succinate (TOPROL-XL) 24 hr tablet 25 mg Daily    miSOPROStol split tablet 25 mcg Q4H PRN    ondansetron disintegrating tablet 8 mg Q8H PRN    oxyCODONE-acetaminophen  mg per tablet 1 tablet Q6H PRN    pantoprazole EC tablet 40 mg Daily    penicillin G potassium 2.5 Million Units in dextrose 5 % 50 mL IVPB Q4H    promethazine (PHENERGAN) 12.5 mg in dextrose 5 % 50 mL IVPB Q6H PRN    sodium chloride 0.9% 500 mL with oxytocin 30 Units infusion Continuous    terbutaline injection 0.25 mg PRN     Facility-Administered Medications Ordered in Other Encounters   Medication Frequency    ropivacaine (PF) 2 mg/ml (0.2%) infusion PRN    ropivacaine (PF) 2 mg/ml (0.2%) infusion Continuous PRN       Objective:     Vital Signs (Most Recent):  Temp: 97.5 °F (36.4 °C) (09/19/18 1403)  Pulse: 106 (09/19/18 1518)  Resp: 18 (09/19/18 0602)  BP: (!) 141/73 (09/19/18 1518)  SpO2: 100 % (09/19/18 1056)  O2 Device (Oxygen Therapy): (P) room air (09/18/18 1007) Vital Signs (24h Range):  Temp:  [97.5 °F (36.4 °C)-98.9 °F (37.2 °C)] 97.5 °F (36.4 °C)  Pulse:  [] 106  Resp:  [16-20] 18  SpO2:  [98 %-100 %] 99 %  BP: (127-163)/() 141/73     Weight: 116 kg (255 lb 11.7 oz) (09/17/18 1100)  Body mass index is 41.28 kg/m².  Body surface area is 2.32  meters squared.    I/O last 3 completed shifts:  In: 500 [Other:500]  Out: 800 [Urine:800]    Physical Exam   Constitutional: She is oriented to person, place, and time. She appears well-developed and well-nourished. No distress.   Patient having uterine cramps with active labor   HENT:   Head: Normocephalic and atraumatic.   Nose: Nose normal.   Eyes: Conjunctivae and EOM are normal. Pupils are equal, round, and reactive to light.   Neck: Normal range of motion. No JVD present. No tracheal deviation present. No thyromegaly present.   Cardiovascular: Normal rate, regular rhythm, normal heart sounds and intact distal pulses. Exam reveals no gallop and no friction rub.   No murmur heard.  Pulmonary/Chest: Effort normal and breath sounds normal. No respiratory distress. She has no wheezes. She has no rales. She exhibits no tenderness.   Abdominal: Soft. Bowel sounds are normal. She exhibits no distension and no mass. There is no tenderness. No hernia.   Uterus findings discussed with the patient.  Patient &  fetal heart sounds being monitored   Musculoskeletal: Normal range of motion. She exhibits deformity. She exhibits no edema or tenderness.   Right upper arm fistula with a positive bruit and thrill   Neurological: She is alert and oriented to person, place, and time. She has normal reflexes. She displays normal reflexes. No cranial nerve deficit. She exhibits normal muscle tone. Coordination normal.   Skin: Skin is warm. Capillary refill takes less than 2 seconds. She is not diaphoretic. No erythema. There is pallor.   Psychiatric: She has a normal mood and affect. Her behavior is normal. Judgment and thought content normal.   Nursing note and vitals reviewed.      Significant Labs:  All labs within the past 24 hours have been reviewed.     Significant Imaging:  Labs: Reviewed  OBGYN and labor data

## 2018-09-19 NOTE — ANESTHESIA PREPROCEDURE EVALUATION
09/19/2018  Lisseth Schwartz is a 27 y.o., female.    Anesthesia Evaluation    I have reviewed the Patient Summary Reports.    I have reviewed the Nursing Notes.   I have reviewed the Medications.     Review of Systems  Anesthesia Hx:  No problems with previous Anesthesia    Social:  Non-Smoker, No Alcohol Use    Hematology/Oncology:     Oncology Normal    -- Anemia:   EENT/Dental:EENT/Dental Normal   Cardiovascular:   Hypertension, poorly controlled    Pulmonary:   Sleep Apnea    Renal/:   Chronic Renal Disease, ESRD, Dialysis Last dialysis 09/18/2018 Kidney Function/Disease    Hepatic/GI:   GERD, well controlled  Fistula   Musculoskeletal:  Spine Disorders: lumbar Chronic Pain    Neurological:  Neurology Normal    Endocrine:  Endocrine Normal    Dermatological:  Skin Normal    Psych:   anxiety          Physical Exam  General:  Well nourished    Airway/Jaw/Neck:  Airway Findings: Mouth Opening: Normal Tongue: Normal  General Airway Assessment: Adult  Mallampati: III  TM Distance: Normal, at least 6 cm  Jaw/Neck Findings:  Neck ROM: Normal ROM      Dental:  Dental Findings: In tact   Chest/Lungs:  Chest/Lungs Findings: Clear to auscultation, Normal Respiratory Rate     Heart/Vascular:  Heart Findings: Rate: Normal  Rhythm: Regular Rhythm  Sounds: Normal     Abdomen:  Abdomen Findings:  Normal       Mental Status:  Mental Status Findings:  Alert and Oriented, Cooperative         Anesthesia Plan  Type of Anesthesia, risks & benefits discussed:  Anesthesia Type:  epidural, general  Patient's Preference:   Intra-op Monitoring Plan: standard ASA monitors  Intra-op Monitoring Plan Comments:   Post Op Pain Control Plan:   Post Op Pain Control Plan Comments:   Induction:   IV  Beta Blocker:  Patient is on a Beta-Blocker and has received one dose within the past 24 hours (No further documentation required).        Informed Consent: Patient understands risks and agrees with Anesthesia plan.  Questions answered. Anesthesia consent signed with patient.  ASA Score: 4     Day of Surgery Review of History & Physical: I have interviewed and examined the patient. I have reviewed the patient's H&P dated:  There are no significant changes.          Ready For Surgery From Anesthesia Perspective.

## 2018-09-19 NOTE — ASSESSMENT & PLAN NOTE
9/17/2018 Admitted. Pt received her first dose of BMZ.  Dr Adalgisa Gaitan consulted. He immediately coordinated pt's dialysis. Plan per MFM is to induce with vaginal cytotec this evening, while still administering the second dose of BMZ as scheduled.   Magnesium sulfate to be administered as needed based on clinical picture (if preeclampsia worsens), only in bolus form since pt cannot continuously clear an infusion. Recommendation on administration per MFM: 4gm bolus, then follow pt clinically to assess if needs more, as well as check serum Magnesium level (if subtherapeutic at <4mg/dL) then administer further doses at 2gm boluses. If levels rise too high, she may be dialyzed.     9/18/18 IOL managed by CNM. Patient has received 2 vaginal cytotec doses so far. Plan to continue with cytotec until able to use cook cath. Other medical care managed by MDs.  1945 Cook cath placed. 1.5/70/-2 soft, anterior  9/19/18- Cook catheter out, SVE 4-5/70/-2. Discussed AROM vs Pitocin. Pt would like to discuss with  and will let us know.

## 2018-09-19 NOTE — ANESTHESIA PROCEDURE NOTES
Epidural    Patient location during procedure: OB   Reason for block: primary anesthetic   Diagnosis: IUP   Start time: 9/19/2018 11:17 AM  Timeout: 9/19/2018 11:14 AM  End time: 9/19/2018 11:28 AM  Staffing  Anesthesiologist: Amrit Anguiano MD  Performed: anesthesiologist   Preanesthetic Checklist  Completed: patient identified, surgical consent, pre-op evaluation, timeout performed, IV checked, risks and benefits discussed, monitors and equipment checked, anesthesia consent given, hand hygiene performed and patient being monitored  Preparation  Patient position: sitting  Prep: Betadine  Patient monitoring: Pulse Ox and Blood Pressure  Epidural  Skin Anesthetic: lidocaine 1%  Skin Wheal: 3 mL  Administration type: continuous  Approach: midline  Interspace: L4-5  Injection technique: SURINDER saline  Needle and Epidural Catheter  Needle type: Tuohy   Needle gauge: 17  Needle length: 3.5 inches  Needle insertion depth: 8 cm  Catheter type: Rainbow  Catheter size: 19 G  Catheter at skin depth: 14 cm  Test dose: 3 mL of lidocaine 1.5% with Epi 1-to-200,000  Additional Documentation: incremental injection, negative aspiration for heme and CSF, no signs/symptoms of IV or SA injection, no paresthesia on injection, no significant pain on injection and no significant complaints from patient  Needle localization: anatomical landmarks  Assessment  Ease of block: easy  Patient's tolerance of the procedure: comfortable throughout block and no complaints  Post dural Puncture Headache?: No

## 2018-09-19 NOTE — PROGRESS NOTES
[facility] -   Obstetrics  Labor Progress Note    Patient Name: Lisseth Schwartz  MRN: [id number]  Admission Date: 2018  Hospital Length of Stay: 1 days  Attending Physician: Kandi Sexton MD  Primary Care Provider: Kandi Sexton MD    Subjective:     Principal Problem:Severe preeclampsia    Hospital Course:  2018 Admitted. Pt received her first dose of BMZ.  Dr Adalgisa Gaitan consulted. He immediately coordinated pt's dialysis. Plan per [facility] is to induce with vaginal cytotec this evening, while still administering the second dose of BMZ as scheduled.   Magnesium sulfate to be administered as needed based on clinical picture (if preeclampsia worsens), only in bolus form since pt cannot continuously clear an infusion. Recommendation on administration per [facility]: 4gm bolus, then follow pt clinically to assess if needs more, as well as check serum Magnesium level (if subtherapeutic at <4mg/dL) then administer further doses at 2gm boluses. If levels rise too high, she may be dialyzed.     18 IOL managed by CNM. Patient has received 2 vaginal cytotec doses so far. Plan to continue with cytotec until able to use cook cath. Other medical care managed by MDs.  194 Cook cath placed. 1.5/70/-2 soft, anterior        Physical Exam:   Constitutional: She is oriented to person, place, and time. She appears well-developed and well-nourished.      Neck: Normal range of motion.     Pulmonary/Chest: Effort normal.        Abdominal: Soft.             Musculoskeletal: Normal range of motion.       Neurological: She is alert and oriented to person, place, and time.    Skin: Skin is warm and dry.    Psychiatric: She has a normal mood and affect. Her behavior is normal. Judgment and thought content normal.     OLEGARIO Abrams    Assessment/Plan:     27 y.o. female  at 32w6d for:    * Severe preeclampsia    2018 Admitted. Pt received her first dose of BMZ.  Dr Adalgisa Gaitan consulted. He  immediately coordinated pt's dialysis. Plan per MFM is to induce with vaginal cytotec this evening, while still administering the second dose of BMZ as scheduled.   Magnesium sulfate to be administered as needed based on clinical picture (if preeclampsia worsens), only in bolus form since pt cannot continuously clear an infusion. Recommendation on administration per MFM: 4gm bolus, then follow pt clinically to assess if needs more, as well as check serum Magnesium level (if subtherapeutic at <4mg/dL) then administer further doses at 2gm boluses. If levels rise too high, she may be dialyzed.     9/18/18 IOL managed by CNM. Patient has received 2 vaginal cytotec doses so far. Plan to continue with cytotec until able to use cook cath. Other medical care managed by MDs.  1945 Cook cath placed. 1.5/70/-2 soft, anterior        Essential hypertension    Currently stable on bedrest, no meds        End stage renal disease on dialysis    Appreciate Apryl and nephrology team for coordinating dialysis daily while in hospital.         Encounter for tubal ligation counseling     Confirms desire for permanent sterilization        FSGS (focal segmental glomerulosclerosis)                   Nathanael Payton CNM  Obstetrics  [facility] [place]

## 2018-09-19 NOTE — SUBJECTIVE & OBJECTIVE
Interval History:  Lisseth is a 27 y.o.  at 33w0d. She is doing well.     Objective:     Vital Signs (Most Recent):  Temp: 98.5 °F (36.9 °C) (18 0302)  Pulse: 83 (18 0602)  Resp: 18 (18)  BP: (!) 145/68 (18)  SpO2: 100 % (18) Vital Signs (24h Range):  Temp:  [97.5 °F (36.4 °C)-98.6 °F (37 °C)] 98.5 °F (36.9 °C)  Pulse:  [] 83  Resp:  [16-20] 18  BP: ()/() 145/68     Weight: 116 kg (255 lb 11.7 oz)  Body mass index is 41.28 kg/m².    FHT: 130 Cat 1 (reassuring)  TOCO:  Q 2-4 minutes    Cervical Exam:  Dilation:  4-5  Effacement:  70  Station: -2  Presentation: Vertex     Significant Labs:  Lab Results   Component Value Date    GROUPTRH B POS 2018    HEPBSAG Negative 2018       I have personallly reviewed all pertinent lab results from the last 24 hours.    Physical Exam:   Constitutional: She is oriented to person, place, and time. Vital signs are normal. She appears well-developed and well-nourished. She is cooperative.    HENT:   Head: Normocephalic.     Neck: Normal range of motion. Neck supple.     Pulmonary/Chest: Effort normal.        Abdominal: Soft.   Gravid, non-tender     Genitourinary: Vagina normal and uterus normal. Pelvic exam was performed with patient supine. Cervix is normal. Labial bartholins normal.          Musculoskeletal: Normal range of motion and moves all extremeties.       Neurological: She is alert and oriented to person, place, and time. She has normal strength.    Skin: Skin is warm, dry and intact. Capillary refill takes less than 2 seconds.    Psychiatric: She has a normal mood and affect. Her speech is normal and behavior is normal. Judgment and thought content normal. Cognition and memory are normal.

## 2018-09-20 LAB
ALBUMIN SERPL BCP-MCNC: 2.5 G/DL
ANION GAP SERPL CALC-SCNC: 12 MMOL/L
BACTERIA SPEC AEROBE CULT: NORMAL
BASOPHILS # BLD AUTO: 0.03 K/UL
BASOPHILS NFR BLD: 0.2 %
BUN SERPL-MCNC: 35 MG/DL
CALCIUM SERPL-MCNC: 9.1 MG/DL
CHLORIDE SERPL-SCNC: 105 MMOL/L
CO2 SERPL-SCNC: 20 MMOL/L
CREAT SERPL-MCNC: 8.2 MG/DL
DIFFERENTIAL METHOD: ABNORMAL
EOSINOPHIL # BLD AUTO: 0.1 K/UL
EOSINOPHIL NFR BLD: 0.5 %
ERYTHROCYTE [DISTWIDTH] IN BLOOD BY AUTOMATED COUNT: 16.2 %
EST. GFR  (AFRICAN AMERICAN): 7 ML/MIN/1.73 M^2
EST. GFR  (NON AFRICAN AMERICAN): 6 ML/MIN/1.73 M^2
GLUCOSE SERPL-MCNC: 93 MG/DL
HCT VFR BLD AUTO: 31 %
HGB BLD-MCNC: 10.1 G/DL
LYMPHOCYTES # BLD AUTO: 2.4 K/UL
LYMPHOCYTES NFR BLD: 14.4 %
MCH RBC QN AUTO: 30.6 PG
MCHC RBC AUTO-ENTMCNC: 32.6 G/DL
MCV RBC AUTO: 94 FL
MONOCYTES # BLD AUTO: 0.6 K/UL
MONOCYTES NFR BLD: 3.8 %
NEUTROPHILS # BLD AUTO: 13.3 K/UL
NEUTROPHILS NFR BLD: 81.1 %
PHOSPHATE SERPL-MCNC: 5.1 MG/DL
PLATELET # BLD AUTO: 157 K/UL
PMV BLD AUTO: 10.2 FL
POTASSIUM SERPL-SCNC: 4.6 MMOL/L
RBC # BLD AUTO: 3.3 M/UL
SODIUM SERPL-SCNC: 137 MMOL/L
WBC # BLD AUTO: 16.44 K/UL

## 2018-09-20 PROCEDURE — 36415 COLL VENOUS BLD VENIPUNCTURE: CPT

## 2018-09-20 PROCEDURE — 25000003 PHARM REV CODE 250: Performed by: MIDWIFE

## 2018-09-20 PROCEDURE — 85025 COMPLETE CBC W/AUTO DIFF WBC: CPT

## 2018-09-20 PROCEDURE — 99232 SBSQ HOSP IP/OBS MODERATE 35: CPT | Mod: ,,, | Performed by: INTERNAL MEDICINE

## 2018-09-20 PROCEDURE — 25000003 PHARM REV CODE 250: Performed by: ADVANCED PRACTICE MIDWIFE

## 2018-09-20 PROCEDURE — 99232 SBSQ HOSP IP/OBS MODERATE 35: CPT | Mod: ,,, | Performed by: OBSTETRICS & GYNECOLOGY

## 2018-09-20 PROCEDURE — 11000001 HC ACUTE MED/SURG PRIVATE ROOM

## 2018-09-20 PROCEDURE — 80069 RENAL FUNCTION PANEL: CPT

## 2018-09-20 RX ORDER — PANTOPRAZOLE SODIUM 40 MG/1
40 TABLET, DELAYED RELEASE ORAL DAILY
Status: DISCONTINUED | OUTPATIENT
Start: 2018-09-20 | End: 2018-09-21 | Stop reason: HOSPADM

## 2018-09-20 RX ORDER — HYDROCODONE BITARTRATE AND ACETAMINOPHEN 10; 325 MG/1; MG/1
1 TABLET ORAL EVERY 4 HOURS PRN
Status: DISCONTINUED | OUTPATIENT
Start: 2018-09-20 | End: 2018-09-21 | Stop reason: HOSPADM

## 2018-09-20 RX ADMIN — ONDANSETRON 8 MG: 8 TABLET, ORALLY DISINTEGRATING ORAL at 08:09

## 2018-09-20 RX ADMIN — HYDROCODONE BITARTRATE AND ACETAMINOPHEN 1 TABLET: 10; 325 TABLET ORAL at 03:09

## 2018-09-20 RX ADMIN — HYDROCODONE BITARTRATE AND ACETAMINOPHEN 1 TABLET: 10; 325 TABLET ORAL at 10:09

## 2018-09-20 RX ADMIN — HYDROCODONE BITARTRATE AND ACETAMINOPHEN 1 TABLET: 10; 325 TABLET ORAL at 08:09

## 2018-09-20 RX ADMIN — METOPROLOL SUCCINATE 25 MG: 25 TABLET, EXTENDED RELEASE ORAL at 08:09

## 2018-09-20 RX ADMIN — DOCUSATE SODIUM 100 MG: 100 CAPSULE, LIQUID FILLED ORAL at 08:09

## 2018-09-20 RX ADMIN — PANTOPRAZOLE SODIUM 40 MG: 40 TABLET, DELAYED RELEASE ORAL at 08:09

## 2018-09-20 NOTE — PROGRESS NOTES
[facility] -   Nephrology  Progress Note    Patient Name: Lisseth Schwartz  MRN: [id number]  Admission Date: 2018  Hospital Length of Stay: 3 days  Attending Provider: Kandi Sexton MD   Primary Care Physician: Kandi Sexton MD  Principal Problem: (spontaneous vaginal delivery)    Subjective:     HPI: Patient is a 27-year-old female with ESRD on hemodialysis under care of Dr. Andres Hoyt in the [facility].  Normally she dialyzes on  and Friday.  During pregnancy she has been dialyzing 5-6 days per week.  Patient is being admitted today for elective induction and labor.  Case discussed in detail with Dr. Sexton and the patient's family along with the patient.  Patient will be requiring daily dialysis starting today.  Patient will be induced at midnight tonight.  Will check magnesium and laboratory data as discussed with Dr. Sexton in detail.  Will give erythropoietin for anemia.  Patient seen and examined on the bedside and then later on re-examine at the time of dialysis on the bedside as well    Patient is 32 weeks and 5 days pregnant at this time.  Rest of the HPI please refer to the history and physical by Dr. Sexton    Interval History: baby delivered ; post delivery pain ; HD in am ; labs reviewed     Review of patient's allergies indicates:   Allergen Reactions    Lisinopril Other (See Comments)     Severe cough    Furosemide Other (See Comments)     Almost gave her right sided heartfailure     Current Facility-Administered Medications   Medication Frequency    acetaminophen tablet 650 mg Q6H PRN    ammonia 15 % (w/v) inhaler 0.3 mL Continuous PRN    benzocaine-lanolin (DERMOPLAST) topical spray Continuous PRN    diphenhydrAMINE capsule 25 mg Q4H PRN    docusate sodium capsule 100 mg Daily    epoetin nia injection 10,000 Units Once    heparin (porcine) injection 1,000 Units PRN    HYDROcodone-acetaminophen  mg per tablet 1  tablet Q4H PRN    HYDROcodone-acetaminophen 5-325 mg per tablet 1 tablet Q4H PRN    hydrocortisone 2.5 % rectal cream TID PRN    lanolin cream PRN    measles, mumps and rubella vaccine 1,000-12,500 TCID50/0.5 mL injection 0.5 mL vaccine x 1 dose    metoprolol succinate (TOPROL-XL) 24 hr tablet 25 mg Daily    ondansetron disintegrating tablet 8 mg Q8H PRN    pantoprazole EC tablet 40 mg Daily    promethazine (PHENERGAN) 12.5 mg in dextrose 5 % 50 mL IVPB Q6H PRN    Tdap vaccine injection 0.5 mL vaccine x 1 dose       Objective:     Vital Signs (Most Recent):  Temp: 97.5 °F (36.4 °C) (09/20/18 1119)  Pulse: 89 (09/20/18 1119)  Resp: 18 (09/20/18 1119)  BP: (!) 145/93 (09/20/18 1119)  SpO2: 100 % (09/19/18 1909)  O2 Device (Oxygen Therapy): (P) room air (09/18/18 1007) Vital Signs (24h Range):  Temp:  [97.5 °F (36.4 °C)-99 °F (37.2 °C)] 97.5 °F (36.4 °C)  Pulse:  [] 89  Resp:  [18-20] 18  SpO2:  [84 %-100 %] 100 %  BP: (105-164)/() 145/93     Weight: 116 kg (255 lb 11.7 oz) (09/17/18 1100)  Body mass index is 41.28 kg/m².  Body surface area is 2.32 meters squared.    I/O last 3 completed shifts:  In: -   Out: 1400 [Urine:1250; Blood:150]    Physical Exam   Constitutional: She is oriented to person, place, and time. No distress.   HENT:   Head: Normocephalic and atraumatic.   Nose: Nose normal.   Eyes: Conjunctivae and EOM are normal. Pupils are equal, round, and reactive to light.   Neck: Normal range of motion. No JVD present. No tracheal deviation present. No thyromegaly present.   Cardiovascular: Normal rate, regular rhythm, normal heart sounds and intact distal pulses. Exam reveals no gallop and no friction rub.   No murmur heard.  Pulmonary/Chest: Effort normal and breath sounds normal. No respiratory distress. She has no wheezes. She has no rales. She exhibits no tenderness.   Abdominal: Soft. Bowel sounds are normal. She exhibits no distension and no mass. There is no tenderness. No hernia.    Musculoskeletal: Normal range of motion. She exhibits no edema, tenderness or deformity.   RUAF + B/T    Neurological: She is alert and oriented to person, place, and time. She has normal reflexes. She displays normal reflexes. No cranial nerve deficit. She exhibits normal muscle tone. Coordination normal.   Skin: Skin is warm. Capillary refill takes less than 2 seconds. She is not diaphoretic. No erythema. There is pallor.   Psychiatric: She has a normal mood and affect. Her behavior is normal. Judgment and thought content normal.   Nursing note and vitals reviewed.      Significant Labs:  All labs within the past 24 hours have been reviewed.     Significant Imaging:  Labs: Reviewed    Assessment/Plan:     End stage renal disease on dialysis    Hold dialysis for today     Plan HD in am     Post Partum care     Baby in nicu               Thank you for your consult.     Sebastian Gaitan MD  Nephrology  [facility]

## 2018-09-20 NOTE — SUBJECTIVE & OBJECTIVE
Interval History: baby delivered ; post delivery pain ; HD in am ; labs reviewed     Review of patient's allergies indicates:   Allergen Reactions    Lisinopril Other (See Comments)     Severe cough    Furosemide Other (See Comments)     Almost gave her right sided heartfailure     Current Facility-Administered Medications   Medication Frequency    acetaminophen tablet 650 mg Q6H PRN    ammonia 15 % (w/v) inhaler 0.3 mL Continuous PRN    benzocaine-lanolin (DERMOPLAST) topical spray Continuous PRN    diphenhydrAMINE capsule 25 mg Q4H PRN    docusate sodium capsule 100 mg Daily    epoetin nia injection 10,000 Units Once    heparin (porcine) injection 1,000 Units PRN    HYDROcodone-acetaminophen  mg per tablet 1 tablet Q4H PRN    HYDROcodone-acetaminophen 5-325 mg per tablet 1 tablet Q4H PRN    hydrocortisone 2.5 % rectal cream TID PRN    lanolin cream PRN    measles, mumps and rubella vaccine 1,000-12,500 TCID50/0.5 mL injection 0.5 mL vaccine x 1 dose    metoprolol succinate (TOPROL-XL) 24 hr tablet 25 mg Daily    ondansetron disintegrating tablet 8 mg Q8H PRN    pantoprazole EC tablet 40 mg Daily    promethazine (PHENERGAN) 12.5 mg in dextrose 5 % 50 mL IVPB Q6H PRN    Tdap vaccine injection 0.5 mL vaccine x 1 dose       Objective:     Vital Signs (Most Recent):  Temp: 97.5 °F (36.4 °C) (09/20/18 1119)  Pulse: 89 (09/20/18 1119)  Resp: 18 (09/20/18 1119)  BP: (!) 145/93 (09/20/18 1119)  SpO2: 100 % (09/19/18 1909)  O2 Device (Oxygen Therapy): (P) room air (09/18/18 1007) Vital Signs (24h Range):  Temp:  [97.5 °F (36.4 °C)-99 °F (37.2 °C)] 97.5 °F (36.4 °C)  Pulse:  [] 89  Resp:  [18-20] 18  SpO2:  [84 %-100 %] 100 %  BP: (105-164)/() 145/93     Weight: 116 kg (255 lb 11.7 oz) (09/17/18 1100)  Body mass index is 41.28 kg/m².  Body surface area is 2.32 meters squared.    I/O last 3 completed shifts:  In: -   Out: 1400 [Urine:1250; Blood:150]    Physical Exam   Constitutional: She  is oriented to person, place, and time. No distress.   HENT:   Head: Normocephalic and atraumatic.   Nose: Nose normal.   Eyes: Conjunctivae and EOM are normal. Pupils are equal, round, and reactive to light.   Neck: Normal range of motion. No JVD present. No tracheal deviation present. No thyromegaly present.   Cardiovascular: Normal rate, regular rhythm, normal heart sounds and intact distal pulses. Exam reveals no gallop and no friction rub.   No murmur heard.  Pulmonary/Chest: Effort normal and breath sounds normal. No respiratory distress. She has no wheezes. She has no rales. She exhibits no tenderness.   Abdominal: Soft. Bowel sounds are normal. She exhibits no distension and no mass. There is no tenderness. No hernia.   Musculoskeletal: Normal range of motion. She exhibits no edema, tenderness or deformity.   RUAF + B/T    Neurological: She is alert and oriented to person, place, and time. She has normal reflexes. She displays normal reflexes. No cranial nerve deficit. She exhibits normal muscle tone. Coordination normal.   Skin: Skin is warm. Capillary refill takes less than 2 seconds. She is not diaphoretic. No erythema. There is pallor.   Psychiatric: She has a normal mood and affect. Her behavior is normal. Judgment and thought content normal.   Nursing note and vitals reviewed.      Significant Labs:  All labs within the past 24 hours have been reviewed.     Significant Imaging:  Labs: Reviewed

## 2018-09-20 NOTE — PROGRESS NOTES
stickappshony pump, tubing, and collections containers at bedside. Discussed use of INITIATE setting.  Instructed on proper usage, use of and to adjust suction according to comfort level. Verified appropriate flange fit- 24. Educated mother on frequency and duration of pumping in order to promote and maintain full milk supply. Hands on pumping technique reviewed. Encouraged hand expression after. Instructed mother on cleaning of breast pump parts. Reviewed proper milk handling, collection, storage, and transportation. Voices understanding.

## 2018-09-20 NOTE — SUBJECTIVE & OBJECTIVE
Hospital course: 2018 Admitted. Pt received her first dose of BMZ.  Dr Adalgisa Gaitan consulted. He immediately coordinated pt's dialysis. Plan per MFM is to induce with vaginal cytotec this evening, while still administering the second dose of BMZ as scheduled.   Magnesium sulfate to be administered as needed based on clinical picture (if preeclampsia worsens), only in bolus form since pt cannot continuously clear an infusion. Recommendation on administration per MFM: 4gm bolus, then follow pt clinically to assess if needs more, as well as check serum Magnesium level (if subtherapeutic at <4mg/dL) then administer further doses at 2gm boluses. If levels rise too high, she may be dialyzed.     18 IOL managed by CNM. Patient has received 2 vaginal cytotec doses so far. Plan to continue with cytotec until able to use cook cath. Other medical care managed by MDs.   Cook cath placed. 1.5//-2 soft, anterior    18: Patient had an uncomplicated .  Nephrology has been following the patient with dialysis based on their recommendations.  Home dose of Toprol continued.    Interval History:     She is doing well this morning. States her entire body just feels tired.  She is tolerating a regular diet without nausea or vomiting. She is ambulating. She has passed flatus, and has not a BM. Vaginal bleeding is mild. She denies fever or chills. Abdominal pain is mild and controlled with oral medications. She is breastfeeding (hand expressing milk for her infant in the NICU).     Objective:     Vital Signs (Most Recent):  Temp: 98.6 °F (37 °C) (18 0714)  Pulse: 90 (18 0828)  Resp: 18 (18 0714)  BP: 139/78 (18 0828)  SpO2: 100 % (18 1909) Vital Signs (24h Range):  Temp:  [97.5 °F (36.4 °C)-99 °F (37.2 °C)] 98.6 °F (37 °C)  Pulse:  [] 90  Resp:  [18-20] 18  SpO2:  [84 %-100 %] 100 %  BP: (105-164)/() 139/78     Weight: 116 kg (255 lb 11.7 oz)  Body mass index is 41.28  kg/m².      Intake/Output Summary (Last 24 hours) at 9/20/2018 0920  Last data filed at 9/20/2018 0715  Gross per 24 hour   Intake --   Output 1150 ml   Net -1150 ml       Significant Labs:  Lab Results   Component Value Date    GROUPTRH B POS 09/17/2018    HEPBSAG Negative 09/17/2018    STREPBCULT Normal cervicovaginal kody present 09/18/2018     Recent Labs   Lab  09/20/18   0709   HGB  10.1*   HCT  31.0*       CBC:   Recent Labs   Lab  09/20/18   0709   WBC  16.44*   RBC  3.30*   HGB  10.1*   HCT  31.0*   PLT  157   MCV  94   MCH  30.6   MCHC  32.6     CMP:   Recent Labs   Lab  09/19/18   1005  09/20/18   0709   GLU  113*  93   CALCIUM  9.6  9.1   ALBUMIN  2.6*  2.5*   PROT  6.7   --    NA  139  137   K  4.2  4.6   CO2  20*  20*   CL  105  105   BUN  21*  35*   CREATININE  6.6*  8.2*   ALKPHOS  74   --    ALT  19   --    AST  10   --    BILITOT  0.6   --        Physical Exam:   Constitutional: She is oriented to person, place, and time. She appears well-developed and well-nourished. No distress.    HENT:   Head: Normocephalic and atraumatic.     Neck: Neck supple. No thyromegaly present.    Cardiovascular: Normal rate and regular rhythm.  Exam reveals no clubbing, no cyanosis and no edema.     Pulmonary/Chest: Effort normal. No respiratory distress. She has no wheezes. She has no rales.        Abdominal: Soft. She exhibits no distension and no mass. There is no tenderness. There is no rebound and no guarding.   Uterus: firm, non-tender             Musculoskeletal: She exhibits no edema.       Neurological: She is alert and oriented to person, place, and time. She has normal reflexes. She displays normal reflexes (DTR's 2+ bilaterally, not brisk).    Skin: No rash noted. No cyanosis. Nails show no clubbing.    Psychiatric: She has a normal mood and affect. Her behavior is normal. Judgment and thought content normal.

## 2018-09-20 NOTE — CONSULTS
Met with patient and significant other to complete NICU discharge planning assessment. Patient was easily engaged.  Explained MSW's role in NICU and provided MSW's contact information.    Patient states Dr. Ramirez will be pediatrician.  Patient did not utilize [facility]IC but plans to sign  up.    Provided patient with list of resources and encouraged her to contact MSW for any needs or questions. MSW will continue to follow while infant is in NICU.

## 2018-09-20 NOTE — LACTATION NOTE
Visited mother at bedside. Mother is hand expression colostrum for her baby: doesn't pump at this time. Recommended mother that it is our professional opinion, to ensure adequate milk supply for her son in the NICU, to add a pumping regiment to hand expression. Mother is reluctant towards pumping and will probably not pump today: availability given should she wishes our guidance.   Baby's primary nurse updated on the situation. Dionne PERSAUD was also in the room during the consultation.

## 2018-09-20 NOTE — NURSING
Pt complaining of swollen legs, bilaterally; pt cardio, lungs, WDL. +3 edema lower legs. Pt given Norco 10 for cramping and lower back pain. Will continue to monitor. Pt care clustered to help promote rest and sleep.

## 2018-09-20 NOTE — PROGRESS NOTES
Pt assisted to stand with 2 person assist; pivoted to wheelchair and assisted to bathroom via wheelchair. Pt assisted to toilet with 2 person assist again. Pt voided 100, perineum cleansed; gown changed. Pt educated on perineal care; verbalized understanding. Pt assisted back to wheelchair with 2 person assist. Escorted by primary RN to visit baby in NICU.

## 2018-09-20 NOTE — ANESTHESIA POSTPROCEDURE EVALUATION
"Anesthesia Post Evaluation    Patient: Lisseth Schwartz    Procedure(s) Performed: * No procedures listed *    Final Anesthesia Type: epidural  Patient location during evaluation: labor & delivery  Patient participation: Yes- Able to Participate  Level of consciousness: awake and alert and oriented  Post-procedure vital signs: reviewed and stable  Pain management: adequate  Airway patency: patent  PONV status at discharge: No PONV  Anesthetic complications: no      Cardiovascular status: hemodynamically stable  Respiratory status: unassisted, room air and spontaneous ventilation  Hydration status: euvolemic  Follow-up not needed.        Visit Vitals  BP (!) 152/77   Pulse 94   Temp 37.2 °C (99 °F) (Oral)   Resp 18   Ht 5' 6" (1.676 m)   Wt 116 kg (255 lb 11.7 oz)   LMP 01/21/2018 (Approximate)   SpO2 100%   Breastfeeding? No   BMI 41.28 kg/m²       Pain/Asmita Score: Pain Rating Prior to Med Admin: 7 (9/19/2018  4:00 AM)  Pain Rating Post Med Admin: 3 (9/19/2018  5:00 AM)        "

## 2018-09-20 NOTE — PROGRESS NOTES
Woke pt to pump, but pt states that it hurts too much to pump at this time. Pt encouraged to try or to hand express, but pt states she is too tired at this time. Reinforced teaching, and offered to bring pt pain medication when available. Pt stated she will try to pump once medication is on board.

## 2018-09-20 NOTE — L&D DELIVERY NOTE
[facility]  Vaginal Delivery   Operative Note    SUMMARY     Normal spontaneous vaginal delivery of live infant, skin to skin was unable to be performed due to  at 33 weeks. NICU team present  Infant delivered position LUCILA over intact perineum.  Nuchal cord: No.    Spontaneous delivery of placenta and IV pitocin given noting good uterine tone.  No lacerations noted.  Patient tolerated delivery well. Sponge needle and lap counted correctly x2.    Indications:  (spontaneous vaginal delivery)  Pregnancy complicated by:   Patient Active Problem List   Diagnosis    Renal failure affecting pregnancy in third trimester    FSGS (focal segmental glomerulosclerosis)    Encounter for tubal ligation counseling    Single episode of elevated blood pressure    Pregnancy induced hypertension, third trimester    End stage renal disease on dialysis    Obesity    Essential hypertension    FSGS (focal segmental glomerulosclerosis) with nephrosis    Severe preeclampsia     (spontaneous vaginal delivery)    Delivery outcome of pierre infant     Admitting GA: 33w0d    Delivery Information for  Jeovany Schwartz    Birth information:  YOB: 2020   Time of birth: 6:03 PM   Sex: male   Head Delivery Date/Time: 2018  6:03 PM   Delivery type: Vaginal, Spontaneous Delivery   Gestational Age: 33w0d    Delivery Providers    Delivering clinician:  Mireille Diez CNM   Provider Role    Emi Huitron RN Registered Nurse    Marianne Mabry, RN Registered Nurse    Esme Garber, KAVITHAP Nurse Practitioner    Anjelica Hoffman, RN Registered Nurse    Karley Arredondo, CRT Surgical Tech            Measurements    Weight:  1840 g  Length:  44.5 cm  Head circumference:  30 cm  Chest circumference:  27.5 cm         Apgars    Living status:  Living  Apgars:   1 min.:   5 min.:   10 min.:   15 min.:   20 min.:     Skin color:   0  1       Heart rate:   2  2       Reflex  irritability:   2  2       Muscle tone:   2  2       Respiratory effort:   1  1       Total:   7  8       Apgars assigned by:  ANDREI COLLIER         Operative Delivery    Forceps attempted?:  No  Vacuum extractor attempted?:  No         Shoulder Dystocia    Shoulder dystocia present?:  No           Presentation    Presentation:  Vertex  Position:  Middle Occiput Anterior           Interventions/Resuscitation    Method:  Bulb Suctioning, Tactile Stimulation, Deep Suctioning, NICU Attended, CPAP       Cord    Vessels:  3 vessels  Complications:  None  Delayed Cord Clamping?:  No  Cord Clamped Date/Time:  2018  6:03 PM  Cord Blood Disposition:  Lab       Placenta    Placenta delivery date/time:  2018  Placenta removal:  Spontaneous  Placenta appearance:  Intact  Placenta disposition:  discarded           Labor Events:       labor: Yes     Labor Onset Date/Time:         Dilation Complete Date/Time:         Start Pushing Date/Time:       Rupture Date/Time:              Rupture type:           Fluid Amount:        Fluid Color:        Fluid Odor:        Membrane Status (PeriCalm): ARM (Artificial Rupture)      Rupture Date/Time (PeriCalm): 2018 08:29:00      Fluid Amount (PeriCalm): Moderate      Fluid Color (PeriCalm): Clear       steroids: Full Course     Antibiotics given for GBS: Yes     Induction: misoprostol;oxytocin     Indications for induction:        Augmentation:       Indications for augmentation: Hypertension;Preeclampsia     Labor complications: None     Additional complications:          Cervical ripening:                     Delivery:      Episiotomy: None     Indication for Episiotomy:       Perineal Lacerations: None Repaired:      Periurethral Laceration: none Repaired:     Labial Laceration: none Repaired:     Sulcus Laceration: none Repaired:     Vaginal Laceration: No Repaired:     Cervical Laceration: No Repaired:     Repair suture: None     Repair # of  packets:       Vaginal delivery QBL (mL): 150      QBL (mL): 0     Combined Blood Loss (mL): 150     Vaginal Sweep Performed: No     Surgicount Correct:         Other providers:       Anesthesia    Method:  Epidural          Details (if applicable):  Trial of Labor      Categorization:      Priority:     Indications for :     Incision Type:       Additional  information:  Forceps:    Vacuum:    Breech:    Observed anomalies    Other (Comments):

## 2018-09-20 NOTE — PROGRESS NOTES
[facility] -   Obstetrics  Postpartum Progress Note    Patient Name: Lisseth Schwartz  MRN: [id number]  Admission Date: 2018  Hospital Length of Stay: 3 days  Attending Physician: Kandi Sexton MD  Primary Care Provider: Kandi Sexton MD    Subjective:     Principal Problem: (spontaneous vaginal delivery)    Hospital course: 2018 Admitted. Pt received her first dose of BMZ.  Dr Adalgisa Gaitan consulted. He immediately coordinated pt's dialysis. Plan per [facility] is to induce with vaginal cytotec this evening, while still administering the second dose of BMZ as scheduled.   Magnesium sulfate to be administered as needed based on clinical picture (if preeclampsia worsens), only in bolus form since pt cannot continuously clear an infusion. Recommendation on administration per [facility]: 4gm bolus, then follow pt clinically to assess if needs more, as well as check serum Magnesium level (if subtherapeutic at <4mg/dL) then administer further doses at 2gm boluses. If levels rise too high, she may be dialyzed.     18 IOL managed by CNM. Patient has received 2 vaginal cytotec doses so far. Plan to continue with cytotec until able to use cook cath. Other medical care managed by MDs.   Cook cath placed. 1.570/-2 soft, anterior    18: Patient had an uncomplicated .  Nephrology has been following the patient with dialysis based on their recommendations.  Home dose of Toprol continued.    Interval History:     She is doing well this morning. States her entire body just feels tired.  She is tolerating a regular diet without nausea or vomiting. She is ambulating. She has passed flatus, and has not a BM. Vaginal bleeding is mild. She denies fever or chills. Abdominal pain is mild and controlled with oral medications. She is breastfeeding (hand expressing milk for her infant in the NICU).     Objective:     Vital Signs (Most Recent):  Temp: 98.6 °F (37 °C) (18 0714)  Pulse: 90  (09/20/18 0828)  Resp: 18 (09/20/18 0714)  BP: 139/78 (09/20/18 0828)  SpO2: 100 % (09/19/18 1909) Vital Signs (24h Range):  Temp:  [97.5 °F (36.4 °C)-99 °F (37.2 °C)] 98.6 °F (37 °C)  Pulse:  [] 90  Resp:  [18-20] 18  SpO2:  [84 %-100 %] 100 %  BP: (105-164)/() 139/78     Weight: 116 kg (255 lb 11.7 oz)  Body mass index is 41.28 kg/m².      Intake/Output Summary (Last 24 hours) at 9/20/2018 0920  Last data filed at 9/20/2018 0715  Gross per 24 hour   Intake --   Output 1150 ml   Net -1150 ml       Significant Labs:  Lab Results   Component Value Date    GROUPTRH B POS 09/17/2018    HEPBSAG Negative 09/17/2018    STREPBCULT Normal cervicovaginal okdy present 09/18/2018     Recent Labs   Lab  09/20/18   0709   HGB  10.1*   HCT  31.0*       CBC:   Recent Labs   Lab  09/20/18   0709   WBC  16.44*   RBC  3.30*   HGB  10.1*   HCT  31.0*   PLT  157   MCV  94   MCH  30.6   MCHC  32.6     CMP:   Recent Labs   Lab  09/19/18   1005  09/20/18   0709   GLU  113*  93   CALCIUM  9.6  9.1   ALBUMIN  2.6*  2.5*   PROT  6.7   --    NA  139  137   K  4.2  4.6   CO2  20*  20*   CL  105  105   BUN  21*  35*   CREATININE  6.6*  8.2*   ALKPHOS  74   --    ALT  19   --    AST  10   --    BILITOT  0.6   --        Physical Exam:   Constitutional: She is oriented to person, place, and time. She appears well-developed and well-nourished. No distress.    HENT:   Head: Normocephalic and atraumatic.     Neck: Neck supple. No thyromegaly present.    Cardiovascular: Normal rate and regular rhythm.  Exam reveals no clubbing, no cyanosis and no edema.     Pulmonary/Chest: Effort normal. No respiratory distress. She has no wheezes. She has no rales.        Abdominal: Soft. She exhibits no distension and no mass. There is no tenderness. There is no rebound and no guarding.   Uterus: firm, non-tender             Musculoskeletal: She exhibits no edema.       Neurological: She is alert and oriented to person, place, and time. She has normal  reflexes. She displays normal reflexes (DTR's 2+ bilaterally, not brisk).    Skin: No rash noted. No cyanosis. Nails show no clubbing.    Psychiatric: She has a normal mood and affect. Her behavior is normal. Judgment and thought content normal.       Assessment/Plan:     27 y.o. female  for:    *  (spontaneous vaginal delivery)    Routine pp care.  Avoid NSAID's in this patient.          Delivery outcome of pierre infant    Baby in care of NICU         Severe preeclampsia    2018 Admitted. Pt received her first dose of BMZ.  Dr Adalgisa Gaitan consulted. He immediately coordinated pt's dialysis. Plan per MFM is to induce with vaginal cytotec this evening, while still administering the second dose of BMZ as scheduled.   Magnesium sulfate to be administered as needed based on clinical picture (if preeclampsia worsens), only in bolus form since pt cannot continuously clear an infusion. Recommendation on administration per MFM: 4gm bolus, then follow pt clinically to assess if needs more, as well as check serum Magnesium level (if subtherapeutic at <4mg/dL) then administer further doses at 2gm boluses. If levels rise too high, she may be dialyzed.     18:  Patient is currently asymptomatic postpartum with no severe elevations in her blood pressure.  No CNS symptoms.  DTR's are not brisk.  No need for magnesium sulfate seizure prophylaxis at this time.  Continue close surveillance.        Essential hypertension    BP currently stable on toprol XL 25mg        End stage renal disease on dialysis    Appreciate Apryl and nephrology team for coordinating dialysis daily while in hospital.         Encounter for tubal ligation counseling     Confirms desire for permanent sterilization        FSGS (focal segmental glomerulosclerosis)     Renal consult        Renal failure affecting pregnancy in third trimester    Renal consult            Disposition: As patient meets milestones, will plan to discharge in 1-2  days.    Virginia Mosqueda MD  Obstetrics  [facility] [place]

## 2018-09-20 NOTE — ASSESSMENT & PLAN NOTE
9/17/2018 Admitted. Pt received her first dose of BMZ.  Dr Adalgisa Gaitan consulted. He immediately coordinated pt's dialysis. Plan per MFM is to induce with vaginal cytotec this evening, while still administering the second dose of BMZ as scheduled.   Magnesium sulfate to be administered as needed based on clinical picture (if preeclampsia worsens), only in bolus form since pt cannot continuously clear an infusion. Recommendation on administration per MFM: 4gm bolus, then follow pt clinically to assess if needs more, as well as check serum Magnesium level (if subtherapeutic at <4mg/dL) then administer further doses at 2gm boluses. If levels rise too high, she may be dialyzed.     9/20/18:  Patient is currently asymptomatic postpartum with no severe elevations in her blood pressure.  No CNS symptoms.  DTR's are not brisk.  No need for magnesium sulfate seizure prophylaxis at this time.  Continue close surveillance.

## 2018-09-20 NOTE — NURSING
"Pt verbalized trying not to get frustrated or overwhelmed with everything going on. Pt verbalized hand expressing due to breasts hurting and increased contractions while pumping. Pt stated it is difficult to hand express due to hand tremors she gets. Pt complaining mostly about left breast pain during pumping; pt verbalized being exhausted. Pt verbalized "just trying to go with the flow." Pt updated on pain management, rest, seeing infant in NICU. Pt told RN will gladly help with pumping process, hand expression. Pt denies needs at this time.   "

## 2018-09-20 NOTE — PROGRESS NOTES
Pt seen and examined.  Her nurse contacted me about 3+ BL LE edema.  Patient states throughout the day, her legs have felt progressively more swollen and tight.  She does feel more comfortable in the bed with SCD's on.  She denies any current shortness of breath, but notes some mild SOB with ambulation.  Exam:  VSS, AF; bp 140's/80's  GEN: NAD, resting comfortably in bed  CV: RRR, no M,R,G  RESP: CTA BL  ABD: soft, non-tender  EXT: 3+ BL LE edema; SCD's present on BL LE's    A/P: as in AM note.  1. Dependant edema: likely secondary from autotransfusion from the utero/placento/fetal unit that occurs postpartum, and will be more pronounced in this patient with ESRD and preeclampsia.  Discussed findings with Dr. Gaitan with nephrology.  He plans to dialyze her first thing in the morning, but requests that we contact him if she develops worsening shortness of breath.  Will do daily weights, and check pulse ox with her vital signs.    2. Continue all current care.

## 2018-09-21 VITALS
RESPIRATION RATE: 18 BRPM | OXYGEN SATURATION: 100 % | SYSTOLIC BLOOD PRESSURE: 158 MMHG | HEIGHT: 66 IN | DIASTOLIC BLOOD PRESSURE: 94 MMHG | BODY MASS INDEX: 42.25 KG/M2 | HEART RATE: 91 BPM | WEIGHT: 262.88 LBS | TEMPERATURE: 99 F

## 2018-09-21 PROBLEM — E83.39 HYPERPHOSPHATEMIA: Status: ACTIVE | Noted: 2018-09-21

## 2018-09-21 PROBLEM — E87.20 ACIDOSIS, METABOLIC: Status: ACTIVE | Noted: 2018-09-21

## 2018-09-21 LAB
ALBUMIN SERPL BCP-MCNC: 2.4 G/DL
ANION GAP SERPL CALC-SCNC: 13 MMOL/L
BUN SERPL-MCNC: 48 MG/DL
CALCIUM SERPL-MCNC: 8.9 MG/DL
CHLORIDE SERPL-SCNC: 107 MMOL/L
CO2 SERPL-SCNC: 18 MMOL/L
CREAT SERPL-MCNC: 9.8 MG/DL
EST. GFR  (AFRICAN AMERICAN): 6 ML/MIN/1.73 M^2
EST. GFR  (NON AFRICAN AMERICAN): 5 ML/MIN/1.73 M^2
GLUCOSE SERPL-MCNC: 75 MG/DL
PHOSPHATE SERPL-MCNC: 6.9 MG/DL
POTASSIUM SERPL-SCNC: 4.9 MMOL/L
SODIUM SERPL-SCNC: 138 MMOL/L

## 2018-09-21 PROCEDURE — 36415 COLL VENOUS BLD VENIPUNCTURE: CPT

## 2018-09-21 PROCEDURE — 63600175 PHARM REV CODE 636 W HCPCS: Performed by: INTERNAL MEDICINE

## 2018-09-21 PROCEDURE — 25000003 PHARM REV CODE 250: Performed by: ADVANCED PRACTICE MIDWIFE

## 2018-09-21 PROCEDURE — 80100016 HC MAINTENANCE HEMODIALYSIS

## 2018-09-21 PROCEDURE — 25000003 PHARM REV CODE 250: Performed by: INTERNAL MEDICINE

## 2018-09-21 PROCEDURE — 99233 SBSQ HOSP IP/OBS HIGH 50: CPT | Mod: ,,, | Performed by: INTERNAL MEDICINE

## 2018-09-21 PROCEDURE — 25000003 PHARM REV CODE 250: Performed by: MIDWIFE

## 2018-09-21 PROCEDURE — 80069 RENAL FUNCTION PANEL: CPT

## 2018-09-21 PROCEDURE — 99024 POSTOP FOLLOW-UP VISIT: CPT | Mod: ,,, | Performed by: ADVANCED PRACTICE MIDWIFE

## 2018-09-21 RX ORDER — HYDROCODONE BITARTRATE AND ACETAMINOPHEN 5; 325 MG/1; MG/1
1 TABLET ORAL EVERY 4 HOURS PRN
Qty: 12 TABLET | Refills: 0 | Status: SHIPPED | OUTPATIENT
Start: 2018-09-21 | End: 2018-11-13

## 2018-09-21 RX ORDER — CALCIUM CARBONATE 500(1250)
500 TABLET ORAL
Status: DISCONTINUED | OUTPATIENT
Start: 2018-09-21 | End: 2018-09-21 | Stop reason: HOSPADM

## 2018-09-21 RX ORDER — SODIUM CHLORIDE 9 MG/ML
INJECTION, SOLUTION INTRAVENOUS ONCE
Status: DISCONTINUED | OUTPATIENT
Start: 2018-09-21 | End: 2018-09-21 | Stop reason: HOSPADM

## 2018-09-21 RX ORDER — HEPARIN SODIUM 1000 [USP'U]/ML
2000 INJECTION, SOLUTION INTRAVENOUS; SUBCUTANEOUS ONCE
Status: DISCONTINUED | OUTPATIENT
Start: 2018-09-21 | End: 2018-09-21 | Stop reason: HOSPADM

## 2018-09-21 RX ADMIN — HYDROCODONE BITARTRATE AND ACETAMINOPHEN 1 TABLET: 10; 325 TABLET ORAL at 07:09

## 2018-09-21 RX ADMIN — PANTOPRAZOLE SODIUM 40 MG: 40 TABLET, DELAYED RELEASE ORAL at 01:09

## 2018-09-21 RX ADMIN — ERYTHROPOIETIN 10000 UNITS: 10000 INJECTION, SOLUTION INTRAVENOUS; SUBCUTANEOUS at 10:09

## 2018-09-21 RX ADMIN — DOCUSATE SODIUM 100 MG: 100 CAPSULE, LIQUID FILLED ORAL at 01:09

## 2018-09-21 RX ADMIN — METOPROLOL SUCCINATE 25 MG: 25 TABLET, EXTENDED RELEASE ORAL at 01:09

## 2018-09-21 RX ADMIN — CALCIUM 500 MG: 500 TABLET ORAL at 01:09

## 2018-09-21 RX ADMIN — HYDROCODONE BITARTRATE AND ACETAMINOPHEN 1 TABLET: 10; 325 TABLET ORAL at 01:09
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
## 2018-09-21 NOTE — SUBJECTIVE & OBJECTIVE
Interval History:     9/21/18: patient is currently on hemodialysis. She is tolerating the procedure well. No complications. Patient reports intermittent abdominal cramping and mild LE edema.         Review of patient's allergies indicates:   Allergen Reactions    Lisinopril Other (See Comments)     Severe cough    Furosemide Other (See Comments)     Almost gave her right sided heartfailure     Current Facility-Administered Medications   Medication Frequency    0.9%  NaCl infusion Once    acetaminophen tablet 650 mg Q6H PRN    ammonia 15 % (w/v) inhaler 0.3 mL Continuous PRN    benzocaine-lanolin (DERMOPLAST) topical spray Continuous PRN    diphenhydrAMINE capsule 25 mg Q4H PRN    docusate sodium capsule 100 mg Daily    epoetin nia injection 10,000 Units Once    epoetin nia injection 10,000 Units Once    heparin (porcine) injection 1,000 Units PRN    heparin (porcine) injection 2,000 Units Once    HYDROcodone-acetaminophen  mg per tablet 1 tablet Q4H PRN    HYDROcodone-acetaminophen 5-325 mg per tablet 1 tablet Q4H PRN    hydrocortisone 2.5 % rectal cream TID PRN    lanolin cream PRN    measles, mumps and rubella vaccine 1,000-12,500 TCID50/0.5 mL injection 0.5 mL vaccine x 1 dose    metoprolol succinate (TOPROL-XL) 24 hr tablet 25 mg Daily    ondansetron disintegrating tablet 8 mg Q8H PRN    pantoprazole EC tablet 40 mg Daily    promethazine (PHENERGAN) 12.5 mg in dextrose 5 % 50 mL IVPB Q6H PRN    Tdap vaccine injection 0.5 mL vaccine x 1 dose       Objective:     Vital Signs (Most Recent):  Temp: 98.8 °F (37.1 °C) (09/20/18 2242)  Pulse: 82 (09/21/18 0721)  Resp: 20 (09/21/18 0724)  BP: (!) 153/86 (09/21/18 0724)  SpO2: 100 % (09/19/18 1909)  O2 Device (Oxygen Therapy): room air (09/20/18 1500) Vital Signs (24h Range):  Temp:  [97.5 °F (36.4 °C)-98.8 °F (37.1 °C)] 98.8 °F (37.1 °C)  Pulse:  [80-89] 82  Resp:  [17-20] 20  BP: (137-153)/(79-93) 153/86     Weight: 119.3 kg (262 lb 14.4  oz) (09/21/18 0724)  Body mass index is 42.43 kg/m².  Body surface area is 2.36 meters squared.    I/O last 3 completed shifts:  In: -   Out: 1050 [Urine:1050]    Physical Exam   Constitutional: She is oriented to person, place, and time. She appears well-developed and well-nourished.   HENT:   Head: Normocephalic.   Eyes: Pupils are equal, round, and reactive to light.   Neck: No thyromegaly present.   Cardiovascular: Normal rate and regular rhythm. Exam reveals no friction rub.   Pulmonary/Chest: Effort normal and breath sounds normal. She has no wheezes. She exhibits no tenderness.   Abdominal: Soft. Bowel sounds are normal. She exhibits no distension. There is no tenderness.   Musculoskeletal: She exhibits edema.   Lymphadenopathy:     She has no cervical adenopathy.   Neurological: She is alert and oriented to person, place, and time.   Skin: Skin is warm and dry. No rash noted.   Psychiatric: She has a normal mood and affect.       Significant Labs:  Lab Results   Component Value Date    CREATININE 9.8 (H) 09/21/2018    BUN 48 (H) 09/21/2018     09/21/2018    K 4.9 09/21/2018     09/21/2018    CO2 18 (L) 09/21/2018     Lab Results   Component Value Date    ALBUMIN 2.4 (L) 09/21/2018     Lab Results   Component Value Date    CALCIUM 8.9 09/21/2018    PHOS 6.9 (H) 09/21/2018     Lab Results   Component Value Date    WBC 16.44 (H) 09/20/2018    HGB 10.1 (L) 09/20/2018    HCT 31.0 (L) 09/20/2018    MCV 94 09/20/2018     09/20/2018     Recent Labs   Lab  09/17/18   1054   MG  1.9         Significant Imaging:

## 2018-09-21 NOTE — PLAN OF CARE
09/21/18 1410   Medicare Message   Important Message from Medicare regarding Discharge Appeal Rights Given to patient/caregiver;Explained to patient/caregiver;Signed/date by patient/caregiver   Date IMM was signed 09/21/18   Time IMM was signed 1410

## 2018-09-21 NOTE — DISCHARGE INSTRUCTIONS

## 2018-09-21 NOTE — PROGRESS NOTES
[facility] -   Nephrology  Progress Note    Patient Name: Lisseth Schwartz  MRN: [id number]  Admission Date: 2018  Hospital Length of Stay: 4 days  Attending Provider: Kandi Sexton MD   Primary Care Physician: Kandi Sexton MD  Principal Problem: (spontaneous vaginal delivery)    Subjective:     HPI: Patient is a 27-year-old female with ESRD on hemodialysis under care of Dr. Andres Hoyt in the [facility].  Normally she dialyzes on  and Friday.  During pregnancy she has been dialyzing 5-6 days per week.  Patient is being admitted today for elective induction and labor.  Case discussed in detail with Dr. Sexton and the patient's family along with the patient.  Patient will be requiring daily dialysis starting today.  Patient will be induced at midnight tonight.  Will check magnesium and laboratory data as discussed with Dr. Sexton in detail.  Will give erythropoietin for anemia.  Patient seen and examined on the bedside and then later on re-examine at the time of dialysis on the bedside as well    Patient is 32 weeks and 5 days pregnant at this time.  Rest of the HPI please refer to the history and physical by Dr. Sexton    Interval History:     18: patient is currently on hemodialysis. She is tolerating the procedure well. No complications. Patient reports intermittent abdominal cramping and mild LE edema.         Review of patient's allergies indicates:   Allergen Reactions    Lisinopril Other (See Comments)     Severe cough    Furosemide Other (See Comments)     Almost gave her right sided heartfailure     Current Facility-Administered Medications   Medication Frequency    0.9%  NaCl infusion Once    acetaminophen tablet 650 mg Q6H PRN    ammonia 15 % (w/v) inhaler 0.3 mL Continuous PRN    benzocaine-lanolin (DERMOPLAST) topical spray Continuous PRN    diphenhydrAMINE capsule 25 mg Q4H PRN    docusate sodium capsule 100 mg Daily     epoetin nia injection 10,000 Units Once    epoetin nia injection 10,000 Units Once    heparin (porcine) injection 1,000 Units PRN    heparin (porcine) injection 2,000 Units Once    HYDROcodone-acetaminophen  mg per tablet 1 tablet Q4H PRN    HYDROcodone-acetaminophen 5-325 mg per tablet 1 tablet Q4H PRN    hydrocortisone 2.5 % rectal cream TID PRN    lanolin cream PRN    measles, mumps and rubella vaccine 1,000-12,500 TCID50/0.5 mL injection 0.5 mL vaccine x 1 dose    metoprolol succinate (TOPROL-XL) 24 hr tablet 25 mg Daily    ondansetron disintegrating tablet 8 mg Q8H PRN    pantoprazole EC tablet 40 mg Daily    promethazine (PHENERGAN) 12.5 mg in dextrose 5 % 50 mL IVPB Q6H PRN    Tdap vaccine injection 0.5 mL vaccine x 1 dose       Objective:     Vital Signs (Most Recent):  Temp: 98.8 °F (37.1 °C) (09/20/18 2242)  Pulse: 82 (09/21/18 0721)  Resp: 20 (09/21/18 0724)  BP: (!) 153/86 (09/21/18 0724)  SpO2: 100 % (09/19/18 1909)  O2 Device (Oxygen Therapy): room air (09/20/18 1500) Vital Signs (24h Range):  Temp:  [97.5 °F (36.4 °C)-98.8 °F (37.1 °C)] 98.8 °F (37.1 °C)  Pulse:  [80-89] 82  Resp:  [17-20] 20  BP: (137-153)/(79-93) 153/86     Weight: 119.3 kg (262 lb 14.4 oz) (09/21/18 0724)  Body mass index is 42.43 kg/m².  Body surface area is 2.36 meters squared.    I/O last 3 completed shifts:  In: -   Out: 1050 [Urine:1050]    Physical Exam   Constitutional: She is oriented to person, place, and time. She appears well-developed and well-nourished.   HENT:   Head: Normocephalic.   Eyes: Pupils are equal, round, and reactive to light.   Neck: No thyromegaly present.   Cardiovascular: Normal rate and regular rhythm. Exam reveals no friction rub.   Pulmonary/Chest: Effort normal and breath sounds normal. She has no wheezes. She exhibits no tenderness.   Abdominal: Soft. Bowel sounds are normal. She exhibits no distension. There is no tenderness.   Musculoskeletal: She exhibits edema.    Lymphadenopathy:     She has no cervical adenopathy.   Neurological: She is alert and oriented to person, place, and time.   Skin: Skin is warm and dry. No rash noted.   Psychiatric: She has a normal mood and affect.       Significant Labs:  Lab Results   Component Value Date    CREATININE 9.8 (H) 09/21/2018    BUN 48 (H) 09/21/2018     09/21/2018    K 4.9 09/21/2018     09/21/2018    CO2 18 (L) 09/21/2018     Lab Results   Component Value Date    ALBUMIN 2.4 (L) 09/21/2018     Lab Results   Component Value Date    CALCIUM 8.9 09/21/2018    PHOS 6.9 (H) 09/21/2018     Lab Results   Component Value Date    WBC 16.44 (H) 09/20/2018    HGB 10.1 (L) 09/20/2018    HCT 31.0 (L) 09/20/2018    MCV 94 09/20/2018     09/20/2018     Recent Labs   Lab  09/17/18   1054   MG  1.9         Significant Imaging:          Assessment/Plan:     Hyperphosphatemia    Will start CaCO3 with meals.         Acidosis, metabolic    Will improve with HD.         End stage renal disease on dialysis    Secondary to FSGS.    Patient now back on MWF schedule following delivery.    HD today. Next scheduled HD on Monday 9/24/18.    Access: right arm AVF. Working well.               Thank you for your consult. I will follow-up with patient. Please contact us if you have any additional questions.    Andres Hoyt MD  Nephrology  [facility]

## 2018-09-21 NOTE — NURSING
Patient instructed to take Tums at home 500mg TID with meals as per instructed by Dr. Hoyt.  Patient voices understanding.  Dr. Adame notified that patient wants pain medication for home.  RX sent to her pharmacy.

## 2018-09-21 NOTE — ASSESSMENT & PLAN NOTE
Secondary to FSGS.    Patient now back on MWF schedule following delivery.    HD today. Next scheduled HD on Monday 9/24/18.    Access: right arm AVF. Working well.

## 2018-09-21 NOTE — LACTATION NOTE
Lactation Rounds:     Visited parents at baby's bedside. Mother stated that she will be discharged from the hospital today. She was hand expressing colostrum yesterday and has been using the [employer] pump today. Colostrum brought to NICU nurse. Mother expressed a desire to continue pumping her milk, and she stated that it is difficult for her to do so manually due to discomfort brought on by her disease process. She wishes to obtain a electric breast pump from [facility] but does not yet have an appointment. Discussed how to obtain a pump through [facility] and encouraged mother to call to make an appointment today or tomorrow. Yovany pump brought to bedside, and parents filled out paperwork. Discussed use of pump. Mother verbalized her understanding. Lactation phone number provided with encouragement to call with any questions or needs.

## 2018-09-21 NOTE — PROGRESS NOTES
Pt begin to clot and completed 3.75 of 4 hours of HD tx with 3.5 L net UF.  Heparin withheld due to post partum.  Pt tolerated tx well. No access issues. During tx, pt received epo. Dr. Hoyt rounded on pt at bedside during tx.  Post tx, blood rinsed back, needles removed, and hemostasis achieved. Report to nurse attending and to HUNG Peng at [facility].  Notes forwarded to [facility] via fax.

## 2018-09-26 ENCOUNTER — TELEPHONE (OUTPATIENT)
Dept: OBSTETRICS AND GYNECOLOGY | Facility: CLINIC | Age: 28
End: 2018-09-26

## 2018-09-26 NOTE — TELEPHONE ENCOUNTER
----- Message from Kassy Bear sent at 9/26/2018  8:46 AM CDT -----  Contact: self [phone]  States that since she gave birth she has not been feeling well. States that her blood pressure has been high, she has been having headaches, nausea, blurred vision. States that she is now at dialysis. States that she wants to know if she may need to be admitted to hosp. Please call back at [phone]//thank you acc

## 2018-09-26 NOTE — TELEPHONE ENCOUNTER
Spoke to pt. And told her, per Hannah, to go to the ER if she still feels the same after dialysis. She voiced understanding.

## 2018-10-10 ENCOUNTER — TELEPHONE (OUTPATIENT)
Dept: NEPHROLOGY | Facility: CLINIC | Age: 28
End: 2018-10-10

## 2018-10-10 DIAGNOSIS — R25.2 SPASM: Primary | ICD-10-CM

## 2018-10-31 ENCOUNTER — DOCUMENTATION ONLY (OUTPATIENT)
Dept: NEPHROLOGY | Facility: CLINIC | Age: 28
End: 2018-10-31

## 2018-10-31 NOTE — H&P
History & Physical      Chief Complaint:  H&P    HPI:        27 y.o AAF Patient with ESRD on HD MWF at [facility]. Pt is 6 week post vaginally delivery. Pt is currently breastfeeding and reports that is going well.           ROS:        Constitutional: Negative for fever, chills, weight loss, malaise/fatigue and diaphoresis.   HENT: Negative for hearing loss, ear pain, nosebleeds, congestion, sore throat, neck pain, tinnitus and ear discharge.    Eyes: Negative for blurred vision, double vision, photophobia, pain, discharge and redness.   Respiratory: Negative for cough, hemoptysis, sputum production, shortness of breath, wheezing and stridor.    Cardiovascular: Negative for chest pain, palpitations, orthopnea, claudication, leg swelling and PND.   Gastrointestinal: Negative for heartburn, nausea, vomiting, abdominal pain, diarrhea, constipation, blood in stool and melena.   Genitourinary: Negative for dysuria, urgency, frequency, hematuria and flank pain.   Musculoskeletal: Negative for myalgias, back pain, joint pain and falls.   Skin: Negative for itching and rash.   Neurological: Negative for dizziness, tingling, tremors, sensory change, speech change, focal weakness, seizures, loss of consciousness, weakness and headaches.   Endo/Heme/Allergies: Negative for environmental allergies and polydipsia. Does not bruise/bleed easily.   Psychiatric/Behavioral: Negative for depression, suicidal ideas, hallucinations, memory loss and substance abuse. The patient is not nervous/anxious and does not have insomnia.    All 14 systems reviewed and negative except as noted above.      PMHx:      Past Medical History:   Diagnosis Date    A-fib     On Xarelto 20 qd since 3/2017    Anxiety     CHF (congestive heart failure) 03/24/2018    Dx'd at [facility]    ESRD (end stage renal disease) on dialysis     HCV antibody positive 04/13/2018    Hypertension     Kidney disease     Pancytopenia      Recurrent pleural effusion on right     Started following catheter placement c/b pneumothorax        PMSx:      Past Surgical History:   Procedure Laterality Date    ARTERIAL BYPASS SURGRY      bilateral breast cyst excisions      bilateral kidney cancer      s/p bilateral nephrectomy    CHOLECYSTECTOMY      COLONOSCOPY W/ POLYPECTOMY  02/04/2016    DR. JENNIFER VALDIVIA / T[facility]. TUBALR ADENOMA MID TRANSVERSE AND DISTAL SIGMOID COLON, EARLY HYPERPLASTIC SPLENIC FLEXURE . REPEAT 3 YRS    left shoulder repair      NEPHRECTOMY Bilateral 2005    PARATHYROIDECTOMY          Social Hx:      Social History     Social History    Marital status: Single     Spouse name: N/A    Number of children: N/A    Years of education: N/A     Occupational History    Not on file.     Social History Main Topics    Smoking status: Light Tobacco Smoker    Smokeless tobacco: Never Used    Alcohol use No    Drug use: Unknown    Sexual activity: Not on file     Other Topics Concern    Not on file     Social History Narrative    No narrative on file        Family Hx:      No family history on file.     VITALS:          Physical Exam   Nursing Notes and Vital Signs Reviewed.     Constitutional: Well developed, well nourished. AAOx3, NAD, speech/ comprehension clear   Head: Atraumatic. Normocephalic.   Eyes: PERRL. EOMI. Conjunctivae are not pale. No scleral icterus.   ENT: Mucous membranes are dry. No tongue tremors. Throat clear.  Neck: Supple. No JVD or LN or Carotid Bruits noted B.  Cardiovascular: S1S2 RRR, no murmurs, rubs, or gallops. Distal pulses are 2+ and symmetric.   Pulmonary/Chest: No evidence of respiratory distress. Clear to auscultation bilaterally. No wheezing, rales or rhonchi. No chest wall TTP.   Abdominal: Soft and non-distended. There is no tenderness. No rebound, guarding, or rigidity. No organomegaly. No mass or viscera palpable  Musculoskeletal: FROM in all extremities. No deformities, no TTP, no edema. No midline  spinal TTP. No step-offs. Pelvis is stable to compression. No cyanosis. Moves all four extremities.   Skin: Skin is warm and dry. Brusing multiple areas on right wrist, left knee, left foot.   Neurological: No gross neurological deficits, Strength 5/5 B, is equal in the upper and lower extremities bilaterally. No sensory deficits to light touch. No pronator drift.  DTRs are 2+ and equal throughout.   Psychiatric: Good eye contact. Normal Affect.      Laboratory Data:  Reviewed and noted in plan where applicable- Please see chart for full laboratory data.         Lab Results   Component Value Date    INR 1.1 01/02/2013    INR 1.1 11/06/2012       Lab Results   Component Value Date    WBC 3.83 (L) 04/11/2018    HGB 10.8 (L) 04/11/2018    HCT 32.8 (L) 04/11/2018     (H) 04/11/2018    PLT 90 (L) 04/11/2018       BMP  @[id number](GLU,NA,K,Cl,CO2,BUN,Creatinine,Calcium,MG)@      Radiology:  Reviewed and noted in plan where applicable- Please see chart for full radiology data.    Medications:  Current Outpatient Prescriptions   Medication Sig    atenolol (TENORMIN) 100 MG tablet take 1 tablet by mouth once daily    AURYXIA 210 mg iron Tab TAKE (2) TABLETS THREE TIMES DAILY WITH MEALS.    calcitRIOL (ROCALTROL) 0.5 MCG Cap TAKE  (1)  CAPSULE  TWICE DAILY.    clobetasol 0.05% (TEMOVATE) 0.05 % Oint Apply topically 2 (two) times daily.    cloNIDine (CATAPRES) 0.1 MG tablet TAKE ONE TABLET BY MOUTH THREE TIMES DAILY    hydrALAZINE (APRESOLINE) 100 MG tablet TAKE ONE TABLET BY MOUTH TWICE DAILY    hydrOXYzine pamoate (VISTARIL) 50 MG Cap Take 1 capsule (50 mg total) by mouth nightly as needed (insomnia, anxiety, itchiness).    morphine (MSIR) 15 MG tablet Take 15 mg by mouth 2 (two) times daily.    NIFEDIAC CC 90 mg TbSR TAKE ONE TABLET BY MOUTH TWICE DAILY    ondansetron (ZOFRAN) 4 MG tablet Take 1 tablet (4 mg total) by mouth every 8 (eight) hours as needed for Nausea.    oxymorphone (OPANA) 10 MG tablet  Take 10 mg by mouth 4 (four) times daily.    RENVELA 800 mg Tab TAKE 4 TABLETS THREE TIMES DAILY WITH MEALS AND 3 WITH SNACKS    XARELTO 20 mg Tab      No current facility-administered medications for this visit.          ASSESSMENT/PLAN:     ACTIVE PROBLEMS:    Patient Active Problem List   Diagnosis    Hypertension    Anemia in ESRD (end-stage renal disease)    Chronic hepatitis C without hepatic coma    Thrombocytopenia    History of colon polyps           PLAN:      Assessment and plan:    1.  ESRD: Doing very well on  dialysis    2.  Anemia continue Epogen and iron per protocol    3.  Hypertension much better controlled    4.  Hyperparathyroidism treat with vitamin D therapy.      Sol Cruz, FNP-C

## 2018-11-13 ENCOUNTER — TELEPHONE (OUTPATIENT)
Dept: OBSTETRICS AND GYNECOLOGY | Facility: CLINIC | Age: 28
End: 2018-11-13

## 2018-11-13 ENCOUNTER — POSTPARTUM VISIT (OUTPATIENT)
Dept: OBSTETRICS AND GYNECOLOGY | Facility: CLINIC | Age: 28
End: 2018-11-13
Payer: MEDICARE

## 2018-11-13 VITALS
HEIGHT: 66 IN | DIASTOLIC BLOOD PRESSURE: 84 MMHG | SYSTOLIC BLOOD PRESSURE: 122 MMHG | WEIGHT: 245.56 LBS | BODY MASS INDEX: 39.46 KG/M2

## 2018-11-13 DIAGNOSIS — R46.81 OBSESSIVE BEHAVIOR: Primary | ICD-10-CM

## 2018-11-13 DIAGNOSIS — Z30.2 ENCOUNTER FOR FEMALE STERILIZATION PROCEDURE: Primary | ICD-10-CM

## 2018-11-13 PROCEDURE — 99213 OFFICE O/P EST LOW 20 MIN: CPT | Mod: PBBFAC,PN | Performed by: ADVANCED PRACTICE MIDWIFE

## 2018-11-13 PROCEDURE — 99213 OFFICE O/P EST LOW 20 MIN: CPT | Mod: S$PBB,,, | Performed by: ADVANCED PRACTICE MIDWIFE

## 2018-11-13 PROCEDURE — 99999 PR PBB SHADOW E&M-EST. PATIENT-LVL III: CPT | Mod: PBBFAC,,, | Performed by: ADVANCED PRACTICE MIDWIFE

## 2018-11-13 NOTE — PROGRESS NOTES
Subjective:       Patient ID: Lisseth Schwartz is a 27 y.o. female.    Chief Complaint: Postpartum Care    Here today for routine PP visit and to get BTL scheduled  Has been sexually active, using condoms      Review of Systems   Constitutional: Positive for fatigue.   Gastrointestinal: Positive for abdominal pain.        States on and off when she gets hungry   Skin:        C/O night sweats   Psychiatric/Behavioral: The patient is nervous/anxious.         Patient states that she worries a lot and is anxious over eating and losing weight.   Has to loss 30# to be put back on kidney transplant list.  + breastfeeding  States takes a lot of baths because she feel dirty       Objective:      Physical Exam   Constitutional: She is oriented to person, place, and time. She appears well-developed and well-nourished.   Pulmonary/Chest: Effort normal.   Abdominal: Soft.   Genitourinary: Vagina normal and uterus normal.   Genitourinary Comments: No tenderness noted with bi-manual   Musculoskeletal: Normal range of motion.   Neurological: She is alert and oriented to person, place, and time.   Skin: Skin is warm and dry.   Psychiatric: She has a normal mood and affect. Her behavior is normal.   Vitals reviewed.      Assessment:       1. Obsessive behavior    2. Normal postpartum course        Plan:       Info sent to get BTL scheduled  Ambulatory referral pit in for Psych.  Patient states she may just go to [facility] as she has been before.

## 2018-11-29 ENCOUNTER — OFFICE VISIT (OUTPATIENT)
Dept: OBSTETRICS AND GYNECOLOGY | Facility: CLINIC | Age: 28
End: 2018-11-29
Payer: MEDICARE

## 2018-11-29 ENCOUNTER — LAB VISIT (OUTPATIENT)
Dept: LAB | Facility: HOSPITAL | Age: 28
End: 2018-11-29
Attending: OBSTETRICS & GYNECOLOGY
Payer: MEDICARE

## 2018-11-29 ENCOUNTER — OFFICE VISIT (OUTPATIENT)
Dept: NEUROLOGY | Facility: CLINIC | Age: 28
End: 2018-11-29
Payer: MEDICARE

## 2018-11-29 VITALS
BODY MASS INDEX: 39.22 KG/M2 | HEART RATE: 66 BPM | SYSTOLIC BLOOD PRESSURE: 132 MMHG | WEIGHT: 244.06 LBS | HEIGHT: 66 IN | DIASTOLIC BLOOD PRESSURE: 88 MMHG

## 2018-11-29 VITALS
WEIGHT: 247.69 LBS | HEIGHT: 66 IN | SYSTOLIC BLOOD PRESSURE: 144 MMHG | DIASTOLIC BLOOD PRESSURE: 84 MMHG | BODY MASS INDEX: 39.81 KG/M2

## 2018-11-29 DIAGNOSIS — G89.29 CHRONIC BILATERAL LOW BACK PAIN WITHOUT SCIATICA: ICD-10-CM

## 2018-11-29 DIAGNOSIS — Z30.2 ENCOUNTER FOR STERILIZATION: Primary | ICD-10-CM

## 2018-11-29 DIAGNOSIS — Z30.2 ENCOUNTER FOR FEMALE STERILIZATION PROCEDURE: ICD-10-CM

## 2018-11-29 DIAGNOSIS — G44.89 CHRONIC MIXED HEADACHE SYNDROME: ICD-10-CM

## 2018-11-29 DIAGNOSIS — G44.52 NEW DAILY PERSISTENT HEADACHE: ICD-10-CM

## 2018-11-29 DIAGNOSIS — R51.9 NEW ONSET HEADACHE: ICD-10-CM

## 2018-11-29 DIAGNOSIS — H47.10 PAPILLEDEMA: ICD-10-CM

## 2018-11-29 DIAGNOSIS — M54.50 CHRONIC BILATERAL LOW BACK PAIN WITHOUT SCIATICA: ICD-10-CM

## 2018-11-29 DIAGNOSIS — Z99.2 END STAGE RENAL DISEASE ON DIALYSIS: Primary | ICD-10-CM

## 2018-11-29 DIAGNOSIS — N18.6 END STAGE RENAL DISEASE ON DIALYSIS: Primary | ICD-10-CM

## 2018-11-29 LAB
ANION GAP SERPL CALC-SCNC: 10 MMOL/L
BASOPHILS # BLD AUTO: 0.04 K/UL
BASOPHILS NFR BLD: 0.6 %
BUN SERPL-MCNC: 32 MG/DL
CALCIUM SERPL-MCNC: 10.6 MG/DL
CHLORIDE SERPL-SCNC: 100 MMOL/L
CO2 SERPL-SCNC: 28 MMOL/L
CREAT SERPL-MCNC: 10.8 MG/DL
DIFFERENTIAL METHOD: ABNORMAL
EOSINOPHIL # BLD AUTO: 0.1 K/UL
EOSINOPHIL NFR BLD: 1 %
ERYTHROCYTE [DISTWIDTH] IN BLOOD BY AUTOMATED COUNT: 15.3 %
EST. GFR  (AFRICAN AMERICAN): 5 ML/MIN/1.73 M^2
EST. GFR  (NON AFRICAN AMERICAN): 4.3 ML/MIN/1.73 M^2
GLUCOSE SERPL-MCNC: 81 MG/DL
HCT VFR BLD AUTO: 34.4 %
HGB BLD-MCNC: 10.7 G/DL
IMM GRANULOCYTES # BLD AUTO: 0.02 K/UL
IMM GRANULOCYTES NFR BLD AUTO: 0.3 %
LYMPHOCYTES # BLD AUTO: 2.3 K/UL
LYMPHOCYTES NFR BLD: 32.7 %
MCH RBC QN AUTO: 29.5 PG
MCHC RBC AUTO-ENTMCNC: 31.1 G/DL
MCV RBC AUTO: 95 FL
MONOCYTES # BLD AUTO: 0.5 K/UL
MONOCYTES NFR BLD: 7.6 %
NEUTROPHILS # BLD AUTO: 4.1 K/UL
NEUTROPHILS NFR BLD: 57.8 %
NRBC BLD-RTO: 0 /100 WBC
PLATELET # BLD AUTO: 219 K/UL
PMV BLD AUTO: 11.4 FL
POTASSIUM SERPL-SCNC: 4.4 MMOL/L
RBC # BLD AUTO: 3.63 M/UL
SODIUM SERPL-SCNC: 138 MMOL/L
WBC # BLD AUTO: 7.06 K/UL

## 2018-11-29 PROCEDURE — 80048 BASIC METABOLIC PNL TOTAL CA: CPT

## 2018-11-29 PROCEDURE — 99204 OFFICE O/P NEW MOD 45 MIN: CPT | Mod: S$PBB,,, | Performed by: PSYCHIATRY & NEUROLOGY

## 2018-11-29 PROCEDURE — 99212 OFFICE O/P EST SF 10 MIN: CPT | Mod: S$PBB,,, | Performed by: OBSTETRICS & GYNECOLOGY

## 2018-11-29 PROCEDURE — 99999 PR PBB SHADOW E&M-EST. PATIENT-LVL III: CPT | Mod: PBBFAC,,, | Performed by: PSYCHIATRY & NEUROLOGY

## 2018-11-29 PROCEDURE — 85025 COMPLETE CBC W/AUTO DIFF WBC: CPT

## 2018-11-29 PROCEDURE — 99999 PR PBB SHADOW E&M-EST. PATIENT-LVL III: CPT | Mod: PBBFAC,,, | Performed by: OBSTETRICS & GYNECOLOGY

## 2018-11-29 PROCEDURE — 99213 OFFICE O/P EST LOW 20 MIN: CPT | Mod: PBBFAC,27,PO | Performed by: OBSTETRICS & GYNECOLOGY

## 2018-11-29 PROCEDURE — 99213 OFFICE O/P EST LOW 20 MIN: CPT | Mod: PBBFAC | Performed by: PSYCHIATRY & NEUROLOGY

## 2018-11-29 PROCEDURE — 36415 COLL VENOUS BLD VENIPUNCTURE: CPT

## 2018-11-29 RX ORDER — SEVELAMER CARBONATE 800 MG/1
800 TABLET, FILM COATED ORAL
COMMUNITY
End: 2020-01-17

## 2018-11-29 NOTE — PRE-PROCEDURE INSTRUCTIONS
Pre op instructions reviewed with patient per phone:    To confirm, Your surgeon has instructed you:  Surgery is scheduled 12/4/18 at 0915.    Please report to [facility] 1st floor main lobby by 0745  Pre admit office will call afternoon prior to surgery for final arrival time.      INSTRUCTIONS IMPORTANT!!!  ¨ No smoking after 12 midnight, the night before surgery.  ¨ No solid food after 12 midnight, but you may have clear liquids up until 3 hours prior to surgery.  This includes: grape, cranberry, and apple juice (not orange, and no coffee.)   ¨ OK to brush teeth, but no gum, candy or mints!    ¨ Take only these medicines with a small swallow of water-morning of surgery.  None  ____  Do not wear makeup, including mascara.  ____  No powder, lotions or creams to surgical area.  ____  Please remove all jewelry, including piercings and leave at home.  ____  No money or valuables needed. Please leave at home.  ____  Please bring identification and insurance information to hospital.  ____  If going home the same day, arrange for a ride home. You will not be able to   drive if Anesthesia was used.  ____  Children, under 12 years old, must remain in the waiting room with an adult.  They are not allowed in patient areas.  ____  Wear loose fitting clothing. Allow for dressings, bandages.  ____  Stop Aspirin, Ibuprofen, Motrin and Aleve at least 5-7 days before surgery, unless otherwise instructed by your doctor, or the nurse.   You MAY use Tylenol/acetaminophen until day of surgery.  ____  If you take diabetic medication, do not take am of surgery unless instructed by   Doctor.  ____ Stop taking any Fish Oil supplement or any Vitamins that contain Vitamin E at least 5 days prior to surgery.          Bathing Instructions-- The night before surgery and the morning prior to coming to the hospital:   -Do not shave the surgical area.   -Shower and wash your hair and body as usual with your regular soap  and shampoo.   -Rinse your hair and body completely.   -Use one packet of hibiclens to wash the surgical site (using your hand) gently for 5 minutes.  Do not scrub you skin too hard.   -Do not use hibiclens on your head, face, or genitals.   -Do not wash with regular soap after you use the hibiclens.   -Rinse your body thoroughly.   -Dry with clean, soft towel.  Do not use lotion, cream, deodorant, or powders on   the surgical site.    Use antibacterial soap in place of hibiclens if your surgery is on the head, face or genitals.         Surgical Site Infection    Prevention of surgical site infections:     -Keep incisions clean and dry.   -Do not soak/submerge incisions in water until completely healed.   -Do not apply lotions, powders, creams, or deodorants to site.   -Always make sure hands are cleaned with antibacterial soap/ alcohol-based   prior to touching the surgical site.  (This includes doctors, nurses, staff, and yourself.)    Signs and symptoms:   -Redness and pain around the area where you had surgery   -Drainage of cloudy fluid from your surgical wound   -Fever over 100.4  I have read or had read and explained to me, and understand the above information.

## 2018-11-29 NOTE — LETTER
November 29, 2018      Andres Hoyt MD  [address] [facility] [address]           [facility]  [id number] [place] [id number]  Phone: [phone]  Fax: [phone]          Patient: Lisseth Schwartz   MR Number: [id number]   YOB: 1990   Date of Visit: 11/29/2018       Dear Dr. Andres Hoyt:    Thank you for referring Lisseth Schwartz to me for evaluation. Attached you will find relevant portions of my assessment and plan of care.    If you have questions, please do not hesitate to call me. I look forward to following Lisseth Schwartz along with you.    Sincerely,    Cam Jimenez MD    Enclosure  CC:  No Recipients    If you would like to receive this communication electronically, please contact [email or web address] or [phone] to request more information on [facility] access.    For providers and/or their staff who would like to refer a patient to [facility], please contact us through our one-stop-shop provider referral line, [facility], at [phone].    If you feel you have received this communication in error or would no longer like to receive these types of communications, please e-mail [email or web address]
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
## 2018-12-03 ENCOUNTER — ANESTHESIA EVENT (OUTPATIENT)
Dept: SURGERY | Facility: HOSPITAL | Age: 28
End: 2018-12-03
Payer: MEDICARE

## 2018-12-03 NOTE — PRE ADMISSION SCREENING
Notified Quality and informed Josephine that the patient has been consented for Bilateral Salpingectomy but her Medicaid  Consent for Sterilization states Bilateral tubal ligation. Josephine said she ventura not have an answer and to notify Dr. Red and let her decide if she still wants to proceed with the Surgery.    I then called Dr. Red's office and spoke to her nurse and her nurse will ask Dr. Red and let us know.

## 2018-12-03 NOTE — PRE ADMISSION SCREENING
Spoke with Dr. Red's nurse Darling and she said, Dr. Red is ok with the Medicaid consent for sterilization and  Ok to proceed with the scheduled surgery for tomorrow

## 2018-12-04 ENCOUNTER — TELEPHONE (OUTPATIENT)
Dept: OBSTETRICS AND GYNECOLOGY | Facility: CLINIC | Age: 28
End: 2018-12-04

## 2018-12-04 ENCOUNTER — HOSPITAL ENCOUNTER (OUTPATIENT)
Facility: HOSPITAL | Age: 28
Discharge: HOME | End: 2018-12-05
Attending: OBSTETRICS & GYNECOLOGY | Admitting: OBSTETRICS & GYNECOLOGY
Payer: MEDICARE

## 2018-12-04 ENCOUNTER — ANESTHESIA (OUTPATIENT)
Dept: SURGERY | Facility: HOSPITAL | Age: 28
End: 2018-12-04
Payer: MEDICARE

## 2018-12-04 DIAGNOSIS — Z30.2 ENCOUNTER FOR STERILIZATION: ICD-10-CM

## 2018-12-04 DIAGNOSIS — N18.6 END STAGE RENAL DISEASE ON DIALYSIS: Primary | ICD-10-CM

## 2018-12-04 DIAGNOSIS — Z99.2 END STAGE RENAL DISEASE ON DIALYSIS: Primary | ICD-10-CM

## 2018-12-04 PROBLEM — N99.71 UTERINE PERFORATION BY UTERINE SOUND: Status: ACTIVE | Noted: 2018-12-04

## 2018-12-04 PROBLEM — O14.10 SEVERE PREECLAMPSIA: Status: RESOLVED | Noted: 2018-09-17 | Resolved: 2018-12-04

## 2018-12-04 PROBLEM — R03.0 SINGLE EPISODE OF ELEVATED BLOOD PRESSURE: Status: RESOLVED | Noted: 2018-09-05 | Resolved: 2018-12-04

## 2018-12-04 PROBLEM — O13.3 PREGNANCY INDUCED HYPERTENSION, THIRD TRIMESTER: Status: RESOLVED | Noted: 2018-09-05 | Resolved: 2018-12-04

## 2018-12-04 PROBLEM — Z37.9: Status: RESOLVED | Noted: 2018-09-19 | Resolved: 2018-12-04

## 2018-12-04 PROBLEM — Z30.8 ENCOUNTER FOR TUBAL LIGATION COUNSELING: Status: RESOLVED | Noted: 2018-08-22 | Resolved: 2018-12-04

## 2018-12-04 PROBLEM — N92.0 MENORRHAGIA WITH REGULAR CYCLE: Status: ACTIVE | Noted: 2018-12-04

## 2018-12-04 LAB
B-HCG UR QL: NEGATIVE
CTP QC/QA: YES
POTASSIUM SERPL-SCNC: 4.3 MMOL/L

## 2018-12-04 PROCEDURE — 58563 HYSTEROSCOPY ABLATION: CPT | Mod: 51,52,, | Performed by: OBSTETRICS & GYNECOLOGY

## 2018-12-04 PROCEDURE — 84132 ASSAY OF SERUM POTASSIUM: CPT

## 2018-12-04 PROCEDURE — 71000039 HC RECOVERY, EACH ADD'L HOUR: Performed by: OBSTETRICS & GYNECOLOGY

## 2018-12-04 PROCEDURE — 88302 TISSUE EXAM BY PATHOLOGIST: CPT | Performed by: PATHOLOGY

## 2018-12-04 PROCEDURE — 88305 TISSUE EXAM BY PATHOLOGIST: CPT | Mod: 26,,, | Performed by: PATHOLOGY

## 2018-12-04 PROCEDURE — 25000003 PHARM REV CODE 250: Performed by: OBSTETRICS & GYNECOLOGY

## 2018-12-04 PROCEDURE — 88305 TISSUE EXAM BY PATHOLOGIST: CPT | Performed by: PATHOLOGY

## 2018-12-04 PROCEDURE — 25000003 PHARM REV CODE 250: Performed by: NURSE ANESTHETIST, CERTIFIED REGISTERED

## 2018-12-04 PROCEDURE — 27201423 OPTIME MED/SURG SUP & DEVICES STERILE SUPPLY: Performed by: OBSTETRICS & GYNECOLOGY

## 2018-12-04 PROCEDURE — 63600175 PHARM REV CODE 636 W HCPCS: Performed by: NURSE ANESTHETIST, CERTIFIED REGISTERED

## 2018-12-04 PROCEDURE — 25000003 PHARM REV CODE 250: Performed by: ANESTHESIOLOGY

## 2018-12-04 PROCEDURE — 71000016 HC POSTOP RECOV ADDL HR: Performed by: OBSTETRICS & GYNECOLOGY

## 2018-12-04 PROCEDURE — 81025 URINE PREGNANCY TEST: CPT | Performed by: ANESTHESIOLOGY

## 2018-12-04 PROCEDURE — 58661 LAPAROSCOPY REMOVE ADNEXA: CPT | Mod: 50,,, | Performed by: OBSTETRICS & GYNECOLOGY

## 2018-12-04 PROCEDURE — 36000708 HC OR TIME LEV III 1ST 15 MIN: Performed by: OBSTETRICS & GYNECOLOGY

## 2018-12-04 PROCEDURE — 37000008 HC ANESTHESIA 1ST 15 MINUTES: Performed by: OBSTETRICS & GYNECOLOGY

## 2018-12-04 PROCEDURE — 88302 TISSUE EXAM BY PATHOLOGIST: CPT | Mod: 26,,, | Performed by: PATHOLOGY

## 2018-12-04 PROCEDURE — 37000009 HC ANESTHESIA EA ADD 15 MINS: Performed by: OBSTETRICS & GYNECOLOGY

## 2018-12-04 PROCEDURE — 63600175 PHARM REV CODE 636 W HCPCS: Performed by: ANESTHESIOLOGY

## 2018-12-04 PROCEDURE — 71000033 HC RECOVERY, INTIAL HOUR: Performed by: OBSTETRICS & GYNECOLOGY

## 2018-12-04 PROCEDURE — 71000015 HC POSTOP RECOV 1ST HR: Performed by: OBSTETRICS & GYNECOLOGY

## 2018-12-04 PROCEDURE — 36000709 HC OR TIME LEV III EA ADD 15 MIN: Performed by: OBSTETRICS & GYNECOLOGY

## 2018-12-04 RX ORDER — ONDANSETRON 8 MG/1
8 TABLET, ORALLY DISINTEGRATING ORAL EVERY 8 HOURS PRN
Status: DISCONTINUED | OUTPATIENT
Start: 2018-12-04 | End: 2018-12-05 | Stop reason: HOSPADM

## 2018-12-04 RX ORDER — PROPOFOL 10 MG/ML
VIAL (ML) INTRAVENOUS
Status: DISCONTINUED | OUTPATIENT
Start: 2018-12-04 | End: 2018-12-04

## 2018-12-04 RX ORDER — ROCURONIUM BROMIDE 10 MG/ML
INJECTION, SOLUTION INTRAVENOUS
Status: DISCONTINUED | OUTPATIENT
Start: 2018-12-04 | End: 2018-12-04

## 2018-12-04 RX ORDER — FENTANYL CITRATE 50 UG/ML
INJECTION, SOLUTION INTRAMUSCULAR; INTRAVENOUS
Status: DISCONTINUED | OUTPATIENT
Start: 2018-12-04 | End: 2018-12-04

## 2018-12-04 RX ORDER — ONDANSETRON 2 MG/ML
INJECTION INTRAMUSCULAR; INTRAVENOUS
Status: DISCONTINUED | OUTPATIENT
Start: 2018-12-04 | End: 2018-12-04

## 2018-12-04 RX ORDER — HYDROCODONE BITARTRATE AND ACETAMINOPHEN 5; 325 MG/1; MG/1
1 TABLET ORAL EVERY 6 HOURS PRN
Qty: 15 TABLET | Refills: 0 | Status: SHIPPED | OUTPATIENT
Start: 2018-12-04 | End: 2020-01-16

## 2018-12-04 RX ORDER — SODIUM CHLORIDE 0.9 % (FLUSH) 0.9 %
3 SYRINGE (ML) INJECTION EVERY 8 HOURS
Status: DISCONTINUED | OUTPATIENT
Start: 2018-12-04 | End: 2018-12-04 | Stop reason: HOSPADM

## 2018-12-04 RX ORDER — SODIUM CHLORIDE 0.9 % (FLUSH) 0.9 %
3 SYRINGE (ML) INJECTION
Status: DISCONTINUED | OUTPATIENT
Start: 2018-12-04 | End: 2018-12-04 | Stop reason: HOSPADM

## 2018-12-04 RX ORDER — SODIUM CHLORIDE, SODIUM LACTATE, POTASSIUM CHLORIDE, CALCIUM CHLORIDE 600; 310; 30; 20 MG/100ML; MG/100ML; MG/100ML; MG/100ML
INJECTION, SOLUTION INTRAVENOUS CONTINUOUS
Status: DISCONTINUED | OUTPATIENT
Start: 2018-12-04 | End: 2020-11-16

## 2018-12-04 RX ORDER — SUCCINYLCHOLINE CHLORIDE 20 MG/ML
INJECTION INTRAMUSCULAR; INTRAVENOUS
Status: DISCONTINUED | OUTPATIENT
Start: 2018-12-04 | End: 2018-12-04

## 2018-12-04 RX ORDER — HYDROCODONE BITARTRATE AND ACETAMINOPHEN 10; 325 MG/1; MG/1
1 TABLET ORAL EVERY 4 HOURS PRN
Status: DISCONTINUED | OUTPATIENT
Start: 2018-12-04 | End: 2018-12-05 | Stop reason: HOSPADM

## 2018-12-04 RX ORDER — DEXAMETHASONE SODIUM PHOSPHATE 4 MG/ML
INJECTION, SOLUTION INTRA-ARTICULAR; INTRALESIONAL; INTRAMUSCULAR; INTRAVENOUS; SOFT TISSUE
Status: DISCONTINUED | OUTPATIENT
Start: 2018-12-04 | End: 2018-12-04

## 2018-12-04 RX ORDER — MEPERIDINE HYDROCHLORIDE 50 MG/ML
12.5 INJECTION INTRAMUSCULAR; INTRAVENOUS; SUBCUTANEOUS ONCE AS NEEDED
Status: DISCONTINUED | OUTPATIENT
Start: 2018-12-04 | End: 2018-12-04 | Stop reason: HOSPADM

## 2018-12-04 RX ORDER — HYDROCODONE BITARTRATE AND ACETAMINOPHEN 5; 325 MG/1; MG/1
1 TABLET ORAL EVERY 4 HOURS PRN
Status: DISCONTINUED | OUTPATIENT
Start: 2018-12-04 | End: 2018-12-05 | Stop reason: HOSPADM

## 2018-12-04 RX ORDER — HYDROMORPHONE HYDROCHLORIDE 2 MG/ML
0.2 INJECTION, SOLUTION INTRAMUSCULAR; INTRAVENOUS; SUBCUTANEOUS EVERY 5 MIN PRN
Status: DISCONTINUED | OUTPATIENT
Start: 2018-12-04 | End: 2018-12-04 | Stop reason: HOSPADM

## 2018-12-04 RX ORDER — SODIUM CHLORIDE 9 MG/ML
INJECTION, SOLUTION INTRAVENOUS CONTINUOUS PRN
Status: DISCONTINUED | OUTPATIENT
Start: 2018-12-04 | End: 2018-12-04

## 2018-12-04 RX ORDER — ACETAMINOPHEN 10 MG/ML
1000 INJECTION, SOLUTION INTRAVENOUS ONCE
Status: COMPLETED | OUTPATIENT
Start: 2018-12-04 | End: 2018-12-04

## 2018-12-04 RX ORDER — LIDOCAINE HCL/PF 100 MG/5ML
SYRINGE (ML) INTRAVENOUS
Status: DISCONTINUED | OUTPATIENT
Start: 2018-12-04 | End: 2018-12-04

## 2018-12-04 RX ADMIN — HYDROMORPHONE HYDROCHLORIDE 0.2 MG: 2 INJECTION INTRAMUSCULAR; INTRAVENOUS; SUBCUTANEOUS at 12:12

## 2018-12-04 RX ADMIN — HYDROCODONE BITARTRATE AND ACETAMINOPHEN 1 TABLET: 10; 325 TABLET ORAL at 09:12

## 2018-12-04 RX ADMIN — ROCURONIUM BROMIDE 5 MG: 10 INJECTION, SOLUTION INTRAVENOUS at 09:12

## 2018-12-04 RX ADMIN — DEXAMETHASONE SODIUM PHOSPHATE 4 MG: 4 INJECTION, SOLUTION INTRA-ARTICULAR; INTRALESIONAL; INTRAMUSCULAR; INTRAVENOUS; SOFT TISSUE at 09:12

## 2018-12-04 RX ADMIN — PROPOFOL 160 MG: 10 INJECTION, EMULSION INTRAVENOUS at 09:12

## 2018-12-04 RX ADMIN — ONDANSETRON 4 MG: 2 INJECTION, SOLUTION INTRAMUSCULAR; INTRAVENOUS at 09:12

## 2018-12-04 RX ADMIN — SUCCINYLCHOLINE CHLORIDE 100 MG: 20 INJECTION, SOLUTION INTRAMUSCULAR; INTRAVENOUS at 09:12

## 2018-12-04 RX ADMIN — SODIUM CHLORIDE, SODIUM LACTATE, POTASSIUM CHLORIDE, AND CALCIUM CHLORIDE: .6; .31; .03; .02 INJECTION, SOLUTION INTRAVENOUS at 02:12

## 2018-12-04 RX ADMIN — ACETAMINOPHEN 1000 MG: 10 INJECTION, SOLUTION INTRAVENOUS at 12:12

## 2018-12-04 RX ADMIN — SODIUM CHLORIDE: 0.9 INJECTION, SOLUTION INTRAVENOUS at 08:12

## 2018-12-04 RX ADMIN — ONDANSETRON 8 MG: 8 TABLET, ORALLY DISINTEGRATING ORAL at 01:12

## 2018-12-04 RX ADMIN — FENTANYL CITRATE 50 MCG: 50 INJECTION, SOLUTION INTRAMUSCULAR; INTRAVENOUS at 11:12

## 2018-12-04 RX ADMIN — LIDOCAINE HYDROCHLORIDE 60 MG: 20 INJECTION, SOLUTION INTRAVENOUS at 09:12

## 2018-12-04 RX ADMIN — FENTANYL CITRATE 50 MCG: 50 INJECTION, SOLUTION INTRAMUSCULAR; INTRAVENOUS at 08:12

## 2018-12-04 NOTE — SUBJECTIVE & OBJECTIVE
"    Obstetric History       T3      L4     SAB0   TAB2   Ectopic0   Multiple0   Live Births4       # Outcome Date GA Lbr Erik/2nd Weight Sex Delivery Anes PTL Lv   6  18 33w0d  1.84 kg (4 lb 0.9 oz) M Vag-Spont EPI Y SATNAM      Name: NALINI SOTO      Apgar1:  7                Apgar5: 8   5 TAB            4 Term 09   4.082 kg (9 lb) F Vag-Spont   SATNAM   3 Term 09/15/08   4.99 kg (11 lb) M Vag-Spont   SATNAM   2 TAB 2007           1 Term 07   3.175 kg (7 lb) M Vag-Spont   SATNAM        Past Medical History:   Diagnosis Date    Anemia     Anxiety     Brittle bones     Dialysis patient     Disorder of kidney and ureter     Dialysis 5x week    General anesthetics causing adverse effect in therapeutic use     pt states "im a light weight"    Hypertension     Seizures     Years ago     Past Surgical History:   Procedure Laterality Date    Arm surgery Right     x 2    cath in chest      RENAL BIOPSY      tumor removed      from face       PTA Medications   Medication Sig    metoprolol succinate (TOPROL-XL) 25 MG 24 hr tablet Take 50 mg by mouth every evening.     pantoprazole (PROTONIX) 40 MG tablet Take 40 mg by mouth every evening.     sevelamer carbonate (RENVELA) 800 mg Tab Take 800 mg by mouth 3 (three) times daily with meals.       Review of patient's allergies indicates:   Allergen Reactions    Lisinopril Other (See Comments)     Severe cough    Furosemide Other (See Comments)     Almost gave her right sided heartfailure        Family History     Problem Relation (Age of Onset)    Breast cancer Paternal Grandmother    Diabetes Maternal Grandmother    Heart attack Maternal Grandmother    Hypertension Father    Lung cancer Maternal Grandfather    Prostate cancer Maternal Grandfather        Tobacco Use    Smoking status: Never Smoker    Smokeless tobacco: Never Used   Substance and Sexual Activity    Alcohol use: No    Drug use: No    Sexual activity: " Yes     Partners: Male     Review of Systems   All other systems reviewed and are negative.     Objective:     Vital Signs (Most Recent):  Temp: 98.1 °F (36.7 °C) (12/04/18 0736)  Pulse: 75 (12/04/18 0736)  Resp: 18 (12/04/18 0736)  BP: (!) 172/91 (12/04/18 0736)  SpO2: 99 % (12/04/18 0736) Vital Signs (24h Range):  Temp:  [98.1 °F (36.7 °C)] 98.1 °F (36.7 °C)  Pulse:  [75] 75  Resp:  [18] 18  SpO2:  [99 %] 99 %  BP: (172)/(91) 172/91     Weight: 111.3 kg (245 lb 6 oz)  Body mass index is 39.6 kg/m².    No LMP recorded.    Physical Exam:   Constitutional: She is oriented to person, place, and time. She appears well-developed and well-nourished.       Cardiovascular: Normal rate and regular rhythm.     Pulmonary/Chest: Effort normal and breath sounds normal.        Abdominal: Soft. She exhibits no distension. There is no tenderness.     Genitourinary: Vagina normal and uterus normal.           Musculoskeletal: Normal range of motion.       Neurological: She is alert and oriented to person, place, and time.    Skin: Skin is warm and dry.    Psychiatric: She has a normal mood and affect. Her behavior is normal.       Laboratory:  I have personallly reviewed all pertinent lab results from the last 24 hours.    Diagnostic Results:  Labs: Reviewed

## 2018-12-04 NOTE — TRANSFER OF CARE
"Anesthesia Transfer of Care Note    Patient: Lisseth Schwartz    Procedure(s) Performed: Procedure(s) (LRB):  SALPINGECTOMY, LAPAROSCOPIC (Bilateral)  HYSTEROSCOPY, WITH DILATION AND CURETTAGE OF UTERUS (N/A)    Patient location: PACU    Anesthesia Type: general    Transport from OR: Transported from OR on room air with adequate spontaneous ventilation    Post pain: adequate analgesia    Post assessment: no apparent anesthetic complications    Post vital signs: stable    Level of consciousness: responds to stimulation    Nausea/Vomiting: no nausea/vomiting    Complications: none    Transfer of care protocol was followed      Last vitals:   Visit Vitals  BP (!) 172/91   Pulse 75   Temp 36.7 °C (98.1 °F) (Temporal)   Resp 18   Ht 5' 6" (1.676 m)   Wt 111.3 kg (245 lb 6 oz)   SpO2 99%   Breastfeeding? Yes   BMI 39.60 kg/m²     "

## 2018-12-04 NOTE — PLAN OF CARE
Problem: Patient Care Overview  Goal: Plan of Care Review  Outcome: Ongoing (interventions implemented as appropriate)  Received patient from PACU, pt groggy and arouses by voice. IV fluids per order. Pt due to void. Will continue to monitor. 12 hour chart check.

## 2018-12-04 NOTE — ANESTHESIA RELEASE NOTE
"Anesthesia Release from PACU Note    Patient: Lisseth Schwartz    Procedure(s) Performed: Procedure(s) (LRB):  SALPINGECTOMY, LAPAROSCOPIC (Bilateral)  HYSTEROSCOPY, WITH DILATION AND CURETTAGE OF UTERUS (N/A)    Anesthesia type: general    Post pain: Adequate analgesia    Post assessment: no apparent anesthetic complications, tolerated procedure well and no evidence of recall    Last Vitals:   Visit Vitals  BP (!) 172/91   Pulse 75   Temp 36.7 °C (98.1 °F) (Temporal)   Resp 18   Ht 5' 6" (1.676 m)   Wt 111.3 kg (245 lb 6 oz)   SpO2 99%   Breastfeeding? Yes   BMI 39.60 kg/m²       Post vital signs: stable    Level of consciousness: responds to stimulation    Nausea/Vomiting: no nausea/no vomiting    Complications: none    Airway Patency: patent    Respiratory: unassisted    Cardiovascular: stable and blood pressure at baseline    Hydration: euvolemic     "

## 2018-12-04 NOTE — TELEPHONE ENCOUNTER
----- Message from Tiara Red MD sent at 12/4/2018 11:14 AM CST -----  1/14 @ 930a post op lap salpingectomy, attempted ablation but uterine perforation

## 2018-12-04 NOTE — DISCHARGE SUMMARY
[facility] -   Obstetrics & Gynecology  Discharge Summary    Patient Name: Lisseth Schwartz  MRN: [id number]  Admission Date: 2018  Hospital Length of Stay: 0 days  Discharge Date and Time: 18  Attending Physician: Tiara Red MD   Discharging Provider: Tiara Red MD  Primary Care Provider: Primary Doctor No    HPI:  Lisseth Schwartz 27 y.o.  with undesired fertility as well as hypermenorrhea. Desires conservative surgical treatment    Hospital Course:  Pt presents for laparoscopic bilateral salpingectomy with hysteroscopy/Novasure endometrial ablation     Pt underwent laparoscopic salpingectomy. Attempted endometrial ablation resulted in uterine perforation during uterine sounding process therefore no ablation was performed. Laparoscope re-inserted & fundal perforation sutured closed w/ placement of surgicel    Procedure(s) (LRB):  SALPINGECTOMY, LAPAROSCOPIC (Bilateral)  HYSTEROSCOPY, WITH DILATION AND CURETTAGE OF UTERUS (N/A)         Significant Diagnostic Studies: Labs: All labs within the past 24 hours have been reviewed    Pending Diagnostic Studies:     None        Final Active Diagnoses:    Diagnosis Date Noted POA    PRINCIPAL PROBLEM:  Encounter for sterilization [Z30.2] 2018 Not Applicable    Menorrhagia with regular cycle [N92.0] 2018 Yes    Uterine perforation by uterine sound [S37.69XA] 2018 No      Problems Resolved During this Admission:        Discharged Condition: good    Disposition: home    Follow Up: 19 @ 930am    Patient Instructions:   Pelvic rest x 2 weeks    Medications:  Reconciled Home Medications:      Medication List      START taking these medications    HYDROcodone-acetaminophen 5-325 mg per tablet  Commonly known as:  NORCO  Take 1 tablet by mouth every 6 (six) hours as needed for Pain.        CONTINUE taking these medications    metoprolol succinate 25 MG 24 hr tablet  Commonly known as:  TOPROL-XL  Take 50 mg  by mouth every evening.     pantoprazole 40 MG tablet  Commonly known as:  PROTONIX  Take 40 mg by mouth every evening.     sevelamer carbonate 800 mg Tab  Commonly known as:  RENVELA  Take 800 mg by mouth 3 (three) times daily with meals.            Tiara Red MD  Obstetrics & Gynecology  [facility] -

## 2018-12-04 NOTE — OP NOTE
DATE OF PROCEDURE: 12/4/2018                                                                                              PREOPERATIVE DIAGNOSES:      1. Undesired fertility     2. Hypermenorrhea    3. Hypertension    4. Focal segmental glomerulosclerosis                                                                                                          POSTOPERATIVE DIAGNOSES:  Undesired fertility. Hypermenorrhea                                                                                                           PROCEDURE:  Laparoscopic bilateral salpingectomy; attempted hysteroscopy/endometrial ablation                                                                                 SURGEON:  Tiara Red M.D.                                                                                                                               ASSISTANT:  SGT Eldon      ANESTHESIA: general      IVF: 1000mL      EBL: 100mL      UOP: <15 mL (pt on dialysis)       COMPLICATIONS:  Fundal uterine perforation                                                                                                                               SPECIMENS:  Bilateral fallopian tubes                                                                                                                                    ANESTHESIA:  General endotracheal anesthesia.                                                                                                             OPERATIVE FINDINGS:  Normal appearing uterus, sounded to 12cm, and bilateral adnexa, no adhesive disease, normal liver edge.                                                                                       DETAILS OF PROCEDURE:  After informed consent, the patient, Lisseth Schwartz, was   brought to the Operating Room. A time out was performed and the correct patient and   procedure were confirmed. She was given general anesthesia without             difficulty, prepped and draped in sterile fashion in dorsal lithotomy        position.  We placed a weighted speculum in the vagina.  Anterior lip of     the cervix was grasped with single-tooth tenaculum.  The uterus was          sounded appropriately.  Hegar dilators were used to dilate the cervix and a ZUMI     uterine manipulator was inserted.  We tented the umbilicus, placed a         Veress needle directly into the umbilical site.  Entry into the peritoneal          cavity was confirmed with a water-filled syringe.  We insufflated the abdomen       with CO2 gas to obtain a pneumoperitoneum.  We then removed Veress needle,        made a 5-mm incision periumbilically, and placed a 5-mm trocar and sleeve.  We    had good release of gas.  The laparoscope was inserted.  Survey of the       abdomen revealed the above findings.  Bilateral fallopian tubes were         identified and followed to the fimbria.  We placed      8-mm trocar and sleeve under direct visualization in right lower quadrant, 5mm in left lower quadrant.  Bilateral fallopian tubes were grasped & resected w/ Harmonic ACE. Tubes removed. The CO2 gas was released & the skin trocar incisions were closed appropriately.  Flores catheter was removed. Weighted speculum inserted in vagina & anterior lip of cervix grasped with single tooth tenaculum. Cervix dilated, uterus sounded initially to 10cm. Hysteroscopy showed shaggy endometrial lining but visibility limited. Integrity of cavity was tested after re-sounding her uterus to 12cm. Cavity integrity failed after multiple attempts. Hysteroscopy repeated & showed perforation. Laparoscope re-inserted & pneumoperitoneum obtained; fundal perforation closed w/ 2 vicryl sutures. Surgicel placed over incision. All sites noted to be hemostatic. Instruments removed. All counts correct.

## 2018-12-04 NOTE — ANESTHESIA PREPROCEDURE EVALUATION
12/04/2018  Lisseth Schwartz is a 27 y.o., female.    Anesthesia Evaluation    I have reviewed the Patient Summary Reports.    I have reviewed the Nursing Notes.      Review of Systems  Anesthesia Hx:  Hx of Anesthetic complications Slow to wake up.  Denies Family Hx of Anesthesia complications.  Personal Hx of Anesthesia complications Slow To Awaken/Delayed Emergence and mild, somewhat sensitive to sedation / narcotics   Social:  No Alcohol Use, Non-Smoker    Hematology/Oncology:     Oncology Normal    -- Anemia:   EENT/Dental:   Papilledema and headaches, being worked up by neurologist.   Cardiovascular:   Hypertension    Pulmonary:  Pulmonary Normal    Renal/:   Chronic Renal Disease, ESRD, Dialysis Dialysis yesterday.     Hepatic/GI:  Hepatic/GI Normal    Musculoskeletal:  Musculoskeletal Normal    OB/GYN/PEDS:  Hx preeclampsia.     Neurological:   Headaches Seizures    Endocrine:  Endocrine Normal    Dermatological:  Skin Normal    Psych:   anxiety          Physical Exam  General:  Obesity    Airway/Jaw/Neck:  Airway Findings: General Airway Assessment: Adult Mallampati: III  Improves to II with phonation.  TM Distance: Normal, at least 6 cm      Dental:  Dental Findings: In tact    Chest/Lungs:  Chest/Lungs Findings: Clear to auscultation, Normal Respiratory Rate     Heart/Vascular:  Heart Findings: Rate: Normal  Rhythm: Regular Rhythm        Mental Status:  Mental Status Findings:  Cooperative, Alert and Oriented         Anesthesia Plan  Type of Anesthesia, risks & benefits discussed:  Anesthesia Type:  general  Patient's Preference:   Intra-op Monitoring Plan: standard ASA monitors  Intra-op Monitoring Plan Comments:   Post Op Pain Control Plan: IV/PO Opioids PRN  Post Op Pain Control Plan Comments:   Induction:   IV  Beta Blocker:         Informed Consent: Patient understands risks and agrees  with Anesthesia plan.  Questions answered. Anesthesia consent signed with patient.  ASA Score: 3     Day of Surgery Review of History & Physical: I have interviewed and examined the patient. I have reviewed the patient's H&P dated:            Ready For Surgery From Anesthesia Perspective.

## 2018-12-04 NOTE — HOSPITAL COURSE
Pt presents for laparoscopic bilateral salpingectomy with hysteroscopy/Novasure endometrial ablation. Pt underwent laparoscopic salpingectomy. Attempted endometrial ablation resulted in uterine perforation during uterine sounding process therefore no ablation was performed. Laparoscope re-inserted & fundal perforation sutured closed w/ placement of surgicel.  Post-operative course was unremarkable and pt recovered well.  Pt was discharged on POD # 1 upon meeting all discharge criteria.  Pt was counseled on post-operative care and warning sign instructions prior to her discharge.

## 2018-12-04 NOTE — DISCHARGE INSTRUCTIONS
What to expect during recovery    Pain  · You will experience some level of pain after surgery.  Pain medication should help with the pain, but may not be able to eliminate it entirely.  Pain will decrease with time, and most pain will be gone by 4 to 6 weeks after surgery.  · Ice packs may help with pain and can reduce swelling.  · Your prescription pain medication may contain acetaminophen (Tylenol).  If so, you should not take additional acetaminophen (Tylenol) at the same time as your pain medication.   · Do not drive, operate machinery or power tools, or sign legal papers for 24 hours or as long as you are on your postoperative pain medication.   · Prescription pain medication should be taken only as directed.  We are not able to replace pain medication that has been lost or stolen.    Nausea/vomiting  · You may experience nausea or vomiting as a result of anesthesia or pain medication.  · If you experience severe nausea or you are unable to keep fluids down, contact your doctor.    Bleeding  · A small amount of clear or reddish drainage from the incision is normal after surgery.  · For mild bleeding from the incision, apply pressure for five minutes.  · If bleeding is severe or does not stop with pressure, contact your doctor.    Signs of infection  · Notify your doctor if you have the following signs of infection:  · Fever over 101 degrees  · Worsening redness around incsion  · Thick drainage from incision  · Foul smell from incision  · You may experience low fever/chills, this is normal after surgery.    Other post-operative symptoms  · It is safe to take over-the-counter medications for constipation, heartburn, sleep, or itching if needed.    · You may experience light-headedness, dizziness, and sleepiness following surgery. Please do not stay alone. A responsible adult should be with you for this 24 hour period.     Activity  · Try to rest and avoid strenuous activity, but also get out of bed regularly  unless your doctor has ordered strict bedrest.  · Several times every hour while you are awake, pump and flex your feet 5-6 times and do foot circles. This will help prevent blood clots.  · Several times every hour while you are awake, take 2 to 3 deep breaths and cough. If you had stomach surgery, hold a pillow or rolled towel firmly against your stomach before you cough. This will help with any pain the cough might cause.  · Do not smoke after surgery, it decreases your ability to heal and increases the risk of infection and pneumonia.    Nozin: Nasal   · Nozin reduces nasal germs to help decrease the risk of infection after surgery.  · Continue Nozin provided at discharge twice daily for 7 days or until the incision is healed.    · Place 4 drops to cotton swab tip and swab both nostril rims 6 times in each direction.  · See pamphlet for more information.     Diet  · Drink lots of fluids after surgery, unless otherwise instructed.  · You might not have much appetite at first.  Progress slowly to a normal diet unless given other specific diet instructions by your doctor.  Begin with liquids, then soup and crackers, working up to solid foods.  · Do not drink alcoholic beverages including beer for 24 hours or as long as you are on post-operative pain medication.    Follow-up after surgery  · You can contact your doctor through the patient portal using the [employer] [email or web address] or at [email or web address].  · You can also contact your doctor at any time by calling [phone] for the [facility] on [facility], or [phone] for the [facility] on [facility].  · A nurse will be calling you sometime after surgery. Do not be alarmed. This is our way of finding out how you are doing.

## 2018-12-04 NOTE — HPI
Lisseth Schwartz 27 y.o.  with undesired fertility as well as hypermenorrhea. Desires conservative surgical treatment

## 2018-12-04 NOTE — H&P
"[facility] -   Obstetrics & Gynecology  History & Physical    Patient Name: Lisseth Schwartz  MRN: [id number]  Admission Date: 2018  Primary Care Provider: Primary Doctor No    Subjective:     Chief Complaint/Reason for Admission: undesired fertility, menstrual disorder    History of Present Illness:  Lisseth Schwartz 27 y.o.  with undesired fertility as well as hypermenorrhea. Desires conservative surgical treatment        Obstetric History       T3      L4     SAB0   TAB2   Ectopic0   Multiple0   Live Births4       # Outcome Date GA Lbr Erik/2nd Weight Sex Delivery Anes PTL Lv   6  18 33w0d  1.84 kg (4 lb 0.9 oz) M Vag-Spont EPI Y SATNAM      Name: NALINI SCHWARTZ      Apgar1:  7                Apgar5: 8   5 TAB            4 Term 09   4.082 kg (9 lb) F Vag-Spont   SATNAM   3 Term 09/15/08   4.99 kg (11 lb) M Vag-Spont   SATNAM   2 TAB 2007           1 Term 07   3.175 kg (7 lb) M Vag-Spont   SATNAM        Past Medical History:   Diagnosis Date    Anemia     Anxiety     Brittle bones     Dialysis patient     Disorder of kidney and ureter     Dialysis 5x week    General anesthetics causing adverse effect in therapeutic use     pt states "im a light weight"    Hypertension     Seizures     Years ago     Past Surgical History:   Procedure Laterality Date    Arm surgery Right     x 2    cath in chest      RENAL BIOPSY      tumor removed      from face       PTA Medications   Medication Sig    metoprolol succinate (TOPROL-XL) 25 MG 24 hr tablet Take 50 mg by mouth every evening.     pantoprazole (PROTONIX) 40 MG tablet Take 40 mg by mouth every evening.     sevelamer carbonate (RENVELA) 800 mg Tab Take 800 mg by mouth 3 (three) times daily with meals.       Review of patient's allergies indicates:   Allergen Reactions    Lisinopril Other (See Comments)     Severe cough    Furosemide Other (See Comments)     Almost gave her right " sided heartfailure        Family History     Problem Relation (Age of Onset)    Breast cancer Paternal Grandmother    Diabetes Maternal Grandmother    Heart attack Maternal Grandmother    Hypertension Father    Lung cancer Maternal Grandfather    Prostate cancer Maternal Grandfather        Tobacco Use    Smoking status: Never Smoker    Smokeless tobacco: Never Used   Substance and Sexual Activity    Alcohol use: No    Drug use: No    Sexual activity: Yes     Partners: Male     Review of Systems   All other systems reviewed and are negative.     Objective:     Vital Signs (Most Recent):  Temp: 98.1 °F (36.7 °C) (12/04/18 0736)  Pulse: 75 (12/04/18 0736)  Resp: 18 (12/04/18 0736)  BP: (!) 172/91 (12/04/18 0736)  SpO2: 99 % (12/04/18 0736) Vital Signs (24h Range):  Temp:  [98.1 °F (36.7 °C)] 98.1 °F (36.7 °C)  Pulse:  [75] 75  Resp:  [18] 18  SpO2:  [99 %] 99 %  BP: (172)/(91) 172/91     Weight: 111.3 kg (245 lb 6 oz)  Body mass index is 39.6 kg/m².    No LMP recorded.    Physical Exam:   Constitutional: She is oriented to person, place, and time. She appears well-developed and well-nourished.       Cardiovascular: Normal rate and regular rhythm.     Pulmonary/Chest: Effort normal and breath sounds normal.        Abdominal: Soft. She exhibits no distension. There is no tenderness.     Genitourinary: Vagina normal and uterus normal.           Musculoskeletal: Normal range of motion.       Neurological: She is alert and oriented to person, place, and time.    Skin: Skin is warm and dry.    Psychiatric: She has a normal mood and affect. Her behavior is normal.       Laboratory:  I have personallly reviewed all pertinent lab results from the last 24 hours.    Diagnostic Results:  Labs: Reviewed    Assessment/Plan:     1. Undesired fertility  2. Hypermenorrhea  3. Focal segmental glomareulosclerosis-on dialysis  4. Chronic HTN  5. To OR for lap bilateral salpingectomy & hystersocopy/novasure endometrial  ablation    Tiara Red MD  Obstetrics & Gynecology  [facility] [place]

## 2018-12-05 VITALS
DIASTOLIC BLOOD PRESSURE: 89 MMHG | WEIGHT: 245.38 LBS | BODY MASS INDEX: 39.43 KG/M2 | HEIGHT: 66 IN | SYSTOLIC BLOOD PRESSURE: 151 MMHG | OXYGEN SATURATION: 98 % | RESPIRATION RATE: 18 BRPM | HEART RATE: 74 BPM | TEMPERATURE: 98 F

## 2018-12-05 PROBLEM — N92.0 MENORRHAGIA WITH REGULAR CYCLE: Status: RESOLVED | Noted: 2018-12-04 | Resolved: 2018-12-05

## 2018-12-05 PROCEDURE — 94761 N-INVAS EAR/PLS OXIMETRY MLT: CPT

## 2018-12-05 PROCEDURE — 25000003 PHARM REV CODE 250: Performed by: OBSTETRICS & GYNECOLOGY

## 2018-12-05 RX ADMIN — HYDROCODONE BITARTRATE AND ACETAMINOPHEN 1 TABLET: 10; 325 TABLET ORAL at 10:12

## 2018-12-05 RX ADMIN — HYDROCODONE BITARTRATE AND ACETAMINOPHEN 1 TABLET: 10; 325 TABLET ORAL at 04:12

## 2018-12-05 NOTE — DISCHARGE SUMMARY
[facility] -   Obstetrics & Gynecology  Discharge Summary    Patient Name: Lisseth Schwartz  MRN: [id number]  Admission Date: 2018  Hospital Length of Stay: 0 days  Discharge Date and Time:  2018 1:15 PM  Attending Physician: No att. providers found   Discharging Provider: Remigio Moreno MD  Primary Care Provider: Primary Doctor Calli    HPI:  Lisseth Schwartz 27 y.o.  with undesired fertility as well as hypermenorrhea. Desires conservative surgical treatment    Hospital Course:  Pt presents for laparoscopic bilateral salpingectomy with hysteroscopy/Novasure endometrial ablation. Pt underwent laparoscopic salpingectomy. Attempted endometrial ablation resulted in uterine perforation during uterine sounding process therefore no ablation was performed. Laparoscope re-inserted & fundal perforation sutured closed w/ placement of surgicel.  Post-operative course was unremarkable and pt recovered well.  Pt was discharged on POD # 1 upon meeting all discharge criteria.  Pt was counseled on post-operative care and warning sign instructions prior to her discharge.    Procedure(s) (LRB):  SALPINGECTOMY, LAPAROSCOPIC (Bilateral)  HYSTEROSCOPY, WITH DILATION AND CURETTAGE OF UTERUS AND HYDROTHERMAL ENDOMETRIAL ABLATION (N/A)         Significant Diagnostic Studies: Labs: All labs within the past 24 hours have been reviewed    Pending Diagnostic Studies:     None        Final Active Diagnoses:    Diagnosis Date Noted POA    PRINCIPAL PROBLEM:  Encounter for sterilization [Z30.2] 2018 Not Applicable    Uterine perforation by uterine sound [S37.69XA] 2018 No    End stage renal disease on dialysis [N18.6, Z99.2] 2017 Not Applicable      Problems Resolved During this Admission:    Diagnosis Date Noted Date Resolved POA    Menorrhagia with regular cycle [N92.0] 2018 Yes        Discharged Condition: stable    Disposition: Home or Self Care    Follow  Up:  Follow-up Information     Tiara Red MD On 1/14/2019.    Specialties:  Obstetrics, Obstetrics and Gynecology  Why:  [facility], post op  Contact information:  [address]  [place] [address] [phone]                 Patient Instructions:   No discharge procedures on file.  Medications:  Reconciled Home Medications:      Medication List      START taking these medications    HYDROcodone-acetaminophen 5-325 mg per tablet  Commonly known as:  NORCO  Take 1 tablet by mouth every 6 (six) hours as needed for Pain.        CONTINUE taking these medications    metoprolol succinate 25 MG 24 hr tablet  Commonly known as:  TOPROL-XL  Take 50 mg by mouth every evening.     pantoprazole 40 MG tablet  Commonly known as:  PROTONIX  Take 40 mg by mouth every evening.     sevelamer carbonate 800 mg Tab  Commonly known as:  RENVELA  Take 800 mg by mouth 3 (three) times daily with meals.            Remigio Moreno MD  Obstetrics & Gynecology  [facility] -

## 2018-12-05 NOTE — PROGRESS NOTES
[facility] -   Obstetrics & Gynecology  Progress Note    Patient Name: Lisseth Schwartz  MRN: [id number]  Admission Date: 2018  Primary Care Provider: Primary Doctor No  Principal Problem: Encounter for sterilization    Subjective:     HPI:  Lisseth Schwartz 27 y.o.  with undesired fertility as well as hypermenorrhea. Desires conservative surgical treatment    Interval History:   Pt reports feeling well this AM.  Pain is present but well controlled.  Is tolerating regular diet well.  Denies nausea or vomiting.  Is voiding and ambulating without difficulty.  Has not passed flatus or BM.  Requests to discuss details of her surgery.    Scheduled Meds:  Continuous Infusions:   lactated ringers 10 mL/hr at 18 1428     PRN Meds:HYDROcodone-acetaminophen, HYDROcodone-acetaminophen, ondansetron, promethazine (PHENERGAN) IVPB    Review of patient's allergies indicates:   Allergen Reactions    Lisinopril Other (See Comments)     Severe cough    Furosemide Other (See Comments)     Almost gave her right sided heartfailure       Objective:     Vital Signs (Most Recent):  Temp: 98.4 °F (36.9 °C) (18 0754)  Pulse: 74 (18 0754)  Resp: 18 (18 0754)  BP: (!) 151/89 (18 0754)  SpO2: 98 % (18 0754) Vital Signs (24h Range):  Temp:  [97.9 °F (36.6 °C)-98.6 °F (37 °C)] 98.4 °F (36.9 °C)  Pulse:  [59-97] 74  Resp:  [14-20] 18  SpO2:  [97 %-100 %] 98 %  BP: (136-162)/() 151/89     Weight: 111.3 kg (245 lb 6 oz)  Body mass index is 39.6 kg/m².  No LMP recorded.    I&O (Last 24H):    Intake/Output Summary (Last 24 hours) at 2018 1309  Last data filed at 2018 0452  Gross per 24 hour   Intake 604 ml   Output 200 ml   Net 404 ml       Physical Exam:   Constitutional: She is oriented to person, place, and time. She appears well-developed and well-nourished. No distress.       Cardiovascular: Normal rate, regular rhythm and normal heart sounds.      Pulmonary/Chest: Effort normal and breath sounds normal.        Abdominal: Soft. Bowel sounds are normal. She exhibits abdominal incision (clean and dry). She exhibits no distension and no mass. There is tenderness (appropriate). There is no rebound and no guarding. No hernia.             Musculoskeletal: Normal range of motion and moves all extremeties. She exhibits no edema or tenderness.       Neurological: She is alert and oriented to person, place, and time.    Skin: Skin is warm and dry.    Psychiatric: She has a normal mood and affect. Her behavior is normal. Thought content normal.       Laboratory:  I have personallly reviewed all pertinent lab results from the last 24 hours.    Diagnostic Results:  Labs: Reviewed    Assessment/Plan:     * Encounter for sterilization    POD # 1 s/p Laparoscopic bilateral salpingectomy; attempted hysteroscopy/endometrial ablation/repair of uterine perforation.  Pt doing well this AM.  Pt was advised that I was not present during her surgery and cannot comment on specific details of her surgery.  Advised pt on operative report findings and advised pt to call Dr. Red's office if she has any further questions.  Pt stable for discharge to home.  Pt counseled on post-operative care and warning sign instructions.         Remigio Moreno MD  Obstetrics & Gynecology  [facility] -

## 2018-12-05 NOTE — PLAN OF CARE
Problem: Patient Care Overview  Goal: Plan of Care Review  Goals adequately met for discharge.

## 2018-12-05 NOTE — SUBJECTIVE & OBJECTIVE
Interval History:   Pt reports feeling well this AM.  Pain is present but well controlled.  Is tolerating regular diet well.  Denies nausea or vomiting.  Is voiding and ambulating without difficulty.  Has not passed flatus or BM.  Requests to discuss details of her surgery.    Scheduled Meds:  Continuous Infusions:   lactated ringers 10 mL/hr at 12/04/18 1428     PRN Meds:HYDROcodone-acetaminophen, HYDROcodone-acetaminophen, ondansetron, promethazine (PHENERGAN) IVPB    Review of patient's allergies indicates:   Allergen Reactions    Lisinopril Other (See Comments)     Severe cough    Furosemide Other (See Comments)     Almost gave her right sided heartfailure       Objective:     Vital Signs (Most Recent):  Temp: 98.4 °F (36.9 °C) (12/05/18 0754)  Pulse: 74 (12/05/18 0754)  Resp: 18 (12/05/18 0754)  BP: (!) 151/89 (12/05/18 0754)  SpO2: 98 % (12/05/18 0754) Vital Signs (24h Range):  Temp:  [97.9 °F (36.6 °C)-98.6 °F (37 °C)] 98.4 °F (36.9 °C)  Pulse:  [59-97] 74  Resp:  [14-20] 18  SpO2:  [97 %-100 %] 98 %  BP: (136-162)/() 151/89     Weight: 111.3 kg (245 lb 6 oz)  Body mass index is 39.6 kg/m².  No LMP recorded.    I&O (Last 24H):    Intake/Output Summary (Last 24 hours) at 12/5/2018 1309  Last data filed at 12/5/2018 0452  Gross per 24 hour   Intake 604 ml   Output 200 ml   Net 404 ml       Physical Exam:   Constitutional: She is oriented to person, place, and time. She appears well-developed and well-nourished. No distress.       Cardiovascular: Normal rate, regular rhythm and normal heart sounds.     Pulmonary/Chest: Effort normal and breath sounds normal.        Abdominal: Soft. Bowel sounds are normal. She exhibits abdominal incision (clean and dry). She exhibits no distension and no mass. There is tenderness (appropriate). There is no rebound and no guarding. No hernia.             Musculoskeletal: Normal range of motion and moves all extremeties. She exhibits no edema or tenderness.        Neurological: She is alert and oriented to person, place, and time.    Skin: Skin is warm and dry.    Psychiatric: She has a normal mood and affect. Her behavior is normal. Thought content normal.       Laboratory:  I have personallly reviewed all pertinent lab results from the last 24 hours.    Diagnostic Results:  Labs: Reviewed

## 2018-12-05 NOTE — ASSESSMENT & PLAN NOTE
POD # 1 s/p Laparoscopic bilateral salpingectomy; attempted hysteroscopy/endometrial ablation/repair of uterine perforation.  Pt doing well this AM.  Pt was advised that I was not present during her surgery and cannot comment on specific details of her surgery.  Advised pt on operative report findings and advised pt to call Dr. Red's office if she has any further questions.  Pt stable for discharge to home.  Pt counseled on post-operative care and warning sign instructions.

## 2018-12-05 NOTE — PLAN OF CARE
Problem: Patient Care Overview  Goal: Plan of Care Review  Outcome: Ongoing (interventions implemented as appropriate)  Pt had no adverse events during shift. Pt free of falls. Call light in reach. Side rails x 2. Pain well controlled w/ prn meds. IVF administered as ordered. Sx sites CDI. Pt denies nausea. VSS. Chart reviewed, will continue to monitor.

## 2018-12-05 NOTE — ANESTHESIA POSTPROCEDURE EVALUATION
"Anesthesia Post Evaluation    Patient: Lisseth Schwartz    Procedure(s) Performed: Procedure(s) (LRB):  SALPINGECTOMY, LAPAROSCOPIC (Bilateral)  HYSTEROSCOPY, WITH DILATION AND CURETTAGE OF UTERUS (N/A)    Final Anesthesia Type: general  Patient location during evaluation: PACU  Patient participation: Yes- Able to Participate  Level of consciousness: awake and alert  Post-procedure vital signs: reviewed and stable  Pain management: adequate  Airway patency: patent  PONV status at discharge: No PONV  Anesthetic complications: no      Cardiovascular status: hemodynamically stable  Respiratory status: unassisted, room air and spontaneous ventilation  Hydration status: euvolemic  Follow-up not needed.        Visit Vitals  BP (!) 162/87 (BP Location: Left arm, Patient Position: Lying)   Pulse 66   Temp 36.6 °C (97.9 °F) (Oral)   Resp 18   Ht 5' 6" (1.676 m)   Wt 111.3 kg (245 lb 6 oz)   SpO2 100%   Breastfeeding? Yes   BMI 39.60 kg/m²       Pain/Asmita Score: Pain Assessment Performed: Yes (12/4/2018  5:07 PM)  Presence of Pain: denies (12/4/2018  5:07 PM)  Pain Rating Prior to Med Admin: 7 (12/4/2018 12:34 PM)  Asmita Score: 8 (12/4/2018  2:00 PM)        "

## 2018-12-05 NOTE — NURSING
Verbalized understanding of discharge paperwork, follow up care, prescriptions, etc.  Denies having any questions.  Will continue to monitor until discharge.

## 2018-12-18 ENCOUNTER — TELEPHONE (OUTPATIENT)
Dept: OBSTETRICS AND GYNECOLOGY | Facility: CLINIC | Age: 28
End: 2018-12-18

## 2018-12-18 NOTE — TELEPHONE ENCOUNTER
Spoke to patient.  Patient did not seem to be in any distress/pain.  Patient stated that she is experiencing stomach pains (rated pain at 5-6). Patient stated that she was told after surgery that her uterus was punctured.  Patient also stated that she has had a vaginal odor since the surgery.  She states that her discharge is clear.  Patient stated that the hardest thing she is dealing with is nausea and vomiting.  She states that she has no appetite. Patient also stated that when she urinates that she feels a strange sensation.  She denied any burning when urinating. She states that it is more of a slight tingle/ache when she urinates.  Informed patient that I would send a message to the on call doctor.  Patient verbalized understanding.

## 2018-12-18 NOTE — TELEPHONE ENCOUNTER
----- Message from Kary Jackson sent at 12/18/2018 10:57 AM CST -----  states that she's still having extreme nausea & stomach pains from surgery, is this normal..[phone] (home)

## 2018-12-19 ENCOUNTER — TELEPHONE (OUTPATIENT)
Dept: OBSTETRICS AND GYNECOLOGY | Facility: CLINIC | Age: 28
End: 2018-12-19

## 2018-12-19 NOTE — TELEPHONE ENCOUNTER
When was last bowel movement    (The uterine puncture was repaired at surgery--regardless--this would not cause nausea)  She is also a dialysis pt--are her electrolytes ok?      Sounds like she needs a post op appt

## 2018-12-19 NOTE — TELEPHONE ENCOUNTER
Pt states she had a bowel movement Monday 12/17 and she has been advised to use a daily stool softener such as colace, and says she is at dialysis where they state her electrolytes are fine. I scheduled her a post op appt and she verbalizes understanding. JANE

## 2018-12-24 ENCOUNTER — TELEPHONE (OUTPATIENT)
Dept: OBSTETRICS AND GYNECOLOGY | Facility: CLINIC | Age: 28
End: 2018-12-24

## 2018-12-24 NOTE — TELEPHONE ENCOUNTER
----- Message from Serene Albarado sent at 12/24/2018  9:17 AM CST -----  Contact: pt  She's calling stating that she is unable to come in today for appointment but would like nausea medication refilled if possible, doesn't know the name, sent formerly [facility] in [place], please advise [phone] (home)

## 2018-12-24 NOTE — TELEPHONE ENCOUNTER
Pt states she has been nauseous since the day of her surgery(12/04/2018), wasn't able to make Post-op appt today with Dr. Higgins (due to no ) has post-op scheduled 01/14/2018 but wants to know if a nausea medicine can be called in until her appt. Please advise.

## 2018-12-24 NOTE — TELEPHONE ENCOUNTER
Pt states she isn't able to make today's appt due to not having a , but wants to know if she can get nausea medication sent to the pharmacy until her visit. Please advise.

## 2018-12-27 RX ORDER — PROMETHAZINE HYDROCHLORIDE 25 MG/1
25 TABLET ORAL EVERY 6 HOURS PRN
Qty: 20 TABLET | Refills: 0 | Status: SHIPPED | OUTPATIENT
Start: 2018-12-27 | End: 2020-09-24

## 2018-12-27 NOTE — TELEPHONE ENCOUNTER
Spoke to pt, sounds well. Reports nausea & vomiting after surgery, now only nausea off/on. Decreased appetite but tolerating but having some diarrhea. No fever/chills. Normal urinary function. Recent cycle not heavy. Vaginal odor. Not able to come in for evaluation. Pt was offered appt for Thursday next week but states she will call back. Has dialysis M/W/F so those are not good days for her. Phenergan escripted

## 2019-01-21 DIAGNOSIS — I10 HYPERTENSION, UNSPECIFIED TYPE: ICD-10-CM

## 2019-01-21 DIAGNOSIS — N18.6 ESRD (END STAGE RENAL DISEASE): Primary | ICD-10-CM

## 2019-01-21 DIAGNOSIS — Z76.0 MEDICATION REFILL: ICD-10-CM

## 2019-01-21 RX ORDER — METOPROLOL SUCCINATE 50 MG/1
50 TABLET, EXTENDED RELEASE ORAL DAILY
Qty: 30 TABLET | Refills: 3 | Status: SHIPPED | OUTPATIENT
Start: 2019-01-21 | End: 2019-11-12

## 2019-01-21 RX ORDER — PANTOPRAZOLE SODIUM 40 MG/1
40 TABLET, DELAYED RELEASE ORAL NIGHTLY
Qty: 30 TABLET | Refills: 3 | Status: SHIPPED | OUTPATIENT
Start: 2019-01-21 | End: 2020-01-06 | Stop reason: SDUPTHER

## 2019-02-21 DIAGNOSIS — I10 HYPERTENSION, UNSPECIFIED TYPE: ICD-10-CM

## 2019-02-21 DIAGNOSIS — Z76.0 MEDICATION REFILL: ICD-10-CM

## 2019-02-21 DIAGNOSIS — N18.6 ESRD (END STAGE RENAL DISEASE): ICD-10-CM

## 2019-02-21 RX ORDER — METOPROLOL SUCCINATE 100 MG/1
100 TABLET, EXTENDED RELEASE ORAL DAILY
Qty: 30 TABLET | Refills: 2 | Status: SHIPPED | OUTPATIENT
Start: 2019-02-21 | End: 2019-11-01 | Stop reason: SDUPTHER

## 2019-02-25 DIAGNOSIS — Z99.2 END STAGE RENAL DISEASE ON DIALYSIS: Primary | ICD-10-CM

## 2019-02-25 DIAGNOSIS — N18.6 END STAGE RENAL DISEASE ON DIALYSIS: Primary | ICD-10-CM

## 2019-02-25 DIAGNOSIS — E66.9 OBESITY, UNSPECIFIED CLASSIFICATION, UNSPECIFIED OBESITY TYPE, UNSPECIFIED WHETHER SERIOUS COMORBIDITY PRESENT: ICD-10-CM

## 2019-02-25 DIAGNOSIS — N18.6 ESRD (END STAGE RENAL DISEASE): ICD-10-CM

## 2019-03-06 ENCOUNTER — TELEPHONE (OUTPATIENT)
Dept: NEUROLOGY | Facility: CLINIC | Age: 29
End: 2019-03-06

## 2019-03-06 NOTE — TELEPHONE ENCOUNTER
----- Message from Sol Cruz DNP sent at 3/6/2019  8:36 AM CST -----  Regarding: Bariatric surgery   Can we please make Ms. Cesar an apt with bariatric surgery. Her cell is [phone]. Thank you. I already placed the referral.

## 2019-03-06 NOTE — TELEPHONE ENCOUNTER
----- Message from Sol Cruz DNP sent at 3/6/2019  8:36 AM CST -----  Regarding: Bariatric surgery   Can we please make Ms. Cesar an apt with bariatric surgery. Her cell is [phone]. Thank you. I already placed the referral.

## 2019-05-03 DIAGNOSIS — Z76.89 ENCOUNTER TO ESTABLISH CARE: Primary | ICD-10-CM

## 2019-08-12 DIAGNOSIS — N18.6 ESRD (END STAGE RENAL DISEASE): ICD-10-CM

## 2019-08-12 DIAGNOSIS — I10 HYPERTENSION, UNSPECIFIED TYPE: ICD-10-CM

## 2019-08-12 DIAGNOSIS — Z76.0 MEDICATION REFILL: ICD-10-CM

## 2019-08-28 NOTE — PROGRESS NOTES
"Has mfm appt on 8/6/18  Advised by Ana in nephrology dept that patients already in dialysis are usually not seen prior to admission to hospital; they will  consult on pt while pt in hospital ; Per gilmar a consult while admitted in the  hospital is required.  She will have one of the nephrologist look at the referral that was placed and respond to us;    +fetal movement, no vaginal bleeding  C/o increased "wetness" ; sse --nitrazine negative  Continue weekly bpp  Glucola/cbc/hiv labs ordered for wk of 8/13/18  Affirm today  Also c/o estrellita leonard contractions throughout the day; ffn done  Continue weekly bpp  Sign tl consents next visit    "
PAST SURGICAL HISTORY:  H/O coronary angioplasty Stents x 2  Heart Surgery ?? CABG ?? Patient not sure ? valve surgery

## 2019-10-30 RX ORDER — METOPROLOL SUCCINATE 100 MG/1
100 TABLET, EXTENDED RELEASE ORAL DAILY
Qty: 30 TABLET | Refills: 2 | OUTPATIENT
Start: 2019-10-30 | End: 2020-10-29

## 2019-11-01 DIAGNOSIS — N18.6 ESRD (END STAGE RENAL DISEASE): ICD-10-CM

## 2019-11-01 DIAGNOSIS — Z76.0 MEDICATION REFILL: ICD-10-CM

## 2019-11-01 DIAGNOSIS — I10 HYPERTENSION, UNSPECIFIED TYPE: ICD-10-CM

## 2019-11-12 DIAGNOSIS — I10 HYPERTENSION, UNSPECIFIED TYPE: ICD-10-CM

## 2019-11-12 DIAGNOSIS — N18.6 ESRD (END STAGE RENAL DISEASE): ICD-10-CM

## 2019-11-12 DIAGNOSIS — Z76.0 MEDICATION REFILL: ICD-10-CM

## 2019-11-12 RX ORDER — METOPROLOL SUCCINATE 100 MG/1
TABLET, EXTENDED RELEASE ORAL
Qty: 30 TABLET | Refills: 2 | Status: SHIPPED | OUTPATIENT
Start: 2019-11-12 | End: 2020-01-14 | Stop reason: SDUPTHER

## 2019-11-19 ENCOUNTER — LAB VISIT (OUTPATIENT)
Dept: LAB | Facility: HOSPITAL | Age: 29
End: 2019-11-19
Attending: INTERNAL MEDICINE
Payer: MEDICARE

## 2019-11-19 ENCOUNTER — OFFICE VISIT (OUTPATIENT)
Dept: INTERNAL MEDICINE | Facility: CLINIC | Age: 29
End: 2019-11-19
Payer: MEDICARE

## 2019-11-19 ENCOUNTER — PATIENT OUTREACH (OUTPATIENT)
Dept: ADMINISTRATIVE | Facility: HOSPITAL | Age: 29
End: 2019-11-19

## 2019-11-19 VITALS
BODY MASS INDEX: 35.96 KG/M2 | SYSTOLIC BLOOD PRESSURE: 190 MMHG | TEMPERATURE: 99 F | HEART RATE: 92 BPM | OXYGEN SATURATION: 99 % | WEIGHT: 223.75 LBS | DIASTOLIC BLOOD PRESSURE: 100 MMHG | HEIGHT: 66 IN

## 2019-11-19 DIAGNOSIS — D64.9 ANEMIA, UNSPECIFIED TYPE: ICD-10-CM

## 2019-11-19 DIAGNOSIS — Z76.89 ENCOUNTER TO ESTABLISH CARE: ICD-10-CM

## 2019-11-19 DIAGNOSIS — N28.9 RENAL DISEASE: ICD-10-CM

## 2019-11-19 DIAGNOSIS — Z76.89 ENCOUNTER TO ESTABLISH CARE: Primary | ICD-10-CM

## 2019-11-19 DIAGNOSIS — J06.9 VIRAL UPPER RESPIRATORY TRACT INFECTION: ICD-10-CM

## 2019-11-19 DIAGNOSIS — E55.9 VITAMIN D DEFICIENCY: ICD-10-CM

## 2019-11-19 DIAGNOSIS — I16.0 HYPERTENSIVE URGENCY: ICD-10-CM

## 2019-11-19 LAB
ALBUMIN SERPL BCP-MCNC: 4 G/DL (ref 3.5–5.2)
ALP SERPL-CCNC: 62 U/L (ref 55–135)
ALT SERPL W/O P-5'-P-CCNC: 11 U/L (ref 10–44)
ANION GAP SERPL CALC-SCNC: 14 MMOL/L (ref 8–16)
AST SERPL-CCNC: 20 U/L (ref 10–40)
BASOPHILS # BLD AUTO: 0.06 K/UL (ref 0–0.2)
BASOPHILS NFR BLD: 0.8 % (ref 0–1.9)
BILIRUB SERPL-MCNC: 0.7 MG/DL (ref 0.1–1)
BUN SERPL-MCNC: 36 MG/DL (ref 6–20)
CALCIUM SERPL-MCNC: 9.7 MG/DL (ref 8.7–10.5)
CHLORIDE SERPL-SCNC: 99 MMOL/L (ref 95–110)
CO2 SERPL-SCNC: 28 MMOL/L (ref 23–29)
CREAT SERPL-MCNC: 14.1 MG/DL (ref 0.5–1.4)
DIFFERENTIAL METHOD: ABNORMAL
EOSINOPHIL # BLD AUTO: 0 K/UL (ref 0–0.5)
EOSINOPHIL NFR BLD: 0.1 % (ref 0–8)
ERYTHROCYTE [DISTWIDTH] IN BLOOD BY AUTOMATED COUNT: 15.4 % (ref 11.5–14.5)
EST. GFR  (AFRICAN AMERICAN): 3.6 ML/MIN/1.73 M^2
EST. GFR  (NON AFRICAN AMERICAN): 3.1 ML/MIN/1.73 M^2
GLUCOSE SERPL-MCNC: 64 MG/DL (ref 70–110)
HCT VFR BLD AUTO: 32.8 % (ref 37–48.5)
HGB BLD-MCNC: 9.7 G/DL (ref 12–16)
IMM GRANULOCYTES # BLD AUTO: 0.03 K/UL (ref 0–0.04)
IMM GRANULOCYTES NFR BLD AUTO: 0.4 % (ref 0–0.5)
IRON SERPL-MCNC: 60 UG/DL (ref 30–160)
LYMPHOCYTES # BLD AUTO: 1.6 K/UL (ref 1–4.8)
LYMPHOCYTES NFR BLD: 20.7 % (ref 18–48)
MAGNESIUM SERPL-MCNC: 2.1 MG/DL (ref 1.6–2.6)
MCH RBC QN AUTO: 31.2 PG (ref 27–31)
MCHC RBC AUTO-ENTMCNC: 29.6 G/DL (ref 32–36)
MCV RBC AUTO: 106 FL (ref 82–98)
MONOCYTES # BLD AUTO: 0.9 K/UL (ref 0.3–1)
MONOCYTES NFR BLD: 11.4 % (ref 4–15)
NEUTROPHILS # BLD AUTO: 5.1 K/UL (ref 1.8–7.7)
NEUTROPHILS NFR BLD: 66.6 % (ref 38–73)
NRBC BLD-RTO: 0 /100 WBC
PHOSPHATE SERPL-MCNC: 5.5 MG/DL (ref 2.7–4.5)
PLATELET # BLD AUTO: 198 K/UL (ref 150–350)
PMV BLD AUTO: 12.6 FL (ref 9.2–12.9)
POTASSIUM SERPL-SCNC: 4.4 MMOL/L (ref 3.5–5.1)
PROT SERPL-MCNC: 8.3 G/DL (ref 6–8.4)
RBC # BLD AUTO: 3.11 M/UL (ref 4–5.4)
SATURATED IRON: 30 % (ref 20–50)
SODIUM SERPL-SCNC: 141 MMOL/L (ref 136–145)
TOTAL IRON BINDING CAPACITY: 200 UG/DL (ref 250–450)
TRANSFERRIN SERPL-MCNC: 135 MG/DL (ref 200–375)
WBC # BLD AUTO: 7.6 K/UL (ref 3.9–12.7)

## 2019-11-19 PROCEDURE — 36415 COLL VENOUS BLD VENIPUNCTURE: CPT | Mod: PO

## 2019-11-19 PROCEDURE — 99205 PR OFFICE/OUTPT VISIT, NEW, LEVL V, 60-74 MIN: ICD-10-PCS | Mod: S$PBB,,, | Performed by: INTERNAL MEDICINE

## 2019-11-19 PROCEDURE — 80053 COMPREHEN METABOLIC PANEL: CPT

## 2019-11-19 PROCEDURE — 99213 OFFICE O/P EST LOW 20 MIN: CPT | Mod: PBBFAC,PN | Performed by: INTERNAL MEDICINE

## 2019-11-19 PROCEDURE — 83735 ASSAY OF MAGNESIUM: CPT

## 2019-11-19 PROCEDURE — 84100 ASSAY OF PHOSPHORUS: CPT

## 2019-11-19 PROCEDURE — 85025 COMPLETE CBC W/AUTO DIFF WBC: CPT

## 2019-11-19 PROCEDURE — 99999 PR PBB SHADOW E&M-EST. PATIENT-LVL III: ICD-10-PCS | Mod: PBBFAC,,, | Performed by: INTERNAL MEDICINE

## 2019-11-19 PROCEDURE — 99205 OFFICE O/P NEW HI 60 MIN: CPT | Mod: S$PBB,,, | Performed by: INTERNAL MEDICINE

## 2019-11-19 PROCEDURE — 82728 ASSAY OF FERRITIN: CPT

## 2019-11-19 PROCEDURE — 82306 VITAMIN D 25 HYDROXY: CPT

## 2019-11-19 PROCEDURE — 99999 PR PBB SHADOW E&M-EST. PATIENT-LVL III: CPT | Mod: PBBFAC,,, | Performed by: INTERNAL MEDICINE

## 2019-11-19 PROCEDURE — 83540 ASSAY OF IRON: CPT

## 2019-11-19 RX ORDER — HYDRALAZINE HYDROCHLORIDE 10 MG/1
10 TABLET, FILM COATED ORAL 3 TIMES DAILY
Qty: 90 TABLET | Refills: 11 | Status: SHIPPED | OUTPATIENT
Start: 2019-11-19 | End: 2019-11-25

## 2019-11-19 NOTE — PROGRESS NOTES
"Subjective:      Patient ID: Lisseth Schwartz is a 28 y.o. female.    Chief Complaint: No chief complaint on file.      Ms. Lisseth Schwartz is a patient of Joe Sinclair MD, who presents to [facility] primary care.    HPI     She reports having a low grade temp of 100.7 F yesterday associated with fatigue, diarrhea, nausea. She reports her sx completely resolved today. No sore throat. +mild cough. +post-nasal drip. No emesis. She notes having the flu shot this year.     She reports her Hgb was in the 7.5 range recently according to labs drawn at HD, which is 3x/wk ever since her last pregnancy (6x/wk during preg). No menses since her endometrial ablation and bilateral salpingectomy.     She reports also having intermittent LBP, lasting 2-3 minutes, 0-3 x/wk over the past 3 years, 10/10 intensity, which is paralyzing. She doesn't take any medication for this pain due to resolving quickly. She notes it usually occurs at rest while sitting and is located in the mid-low back region, but can radiate upward. She was referred to neurology regarding this pain, who conducted a neurological exam and recommended PT, which she didn't feel would be helpful. She reports having muscles "locking up" since 2015 when she started HD. She notes it is not due to her potassium, which has been normal.     No chest pain or shortness of breath or dizziness or headache or blurry vision or abdominal pain or nausea or diaphoresis.      Past Medical History:   Diagnosis Date    Anemia     Anxiety     Brittle bones     Dialysis patient     Disorder of kidney and ureter     Dialysis 6x week during pregnancy, but 3x/wk as of 11/19/19    General anesthetics causing adverse effect in therapeutic use     pt states "im a light weight"    Hypertension     Seizures     Years ago     Past Surgical History:   Procedure Laterality Date    Arm surgery Right     x 2    cath in chest      HYSTEROSCOPY WITH HYDROTHERMAL ABLATION OF " ENDOMETRIUM WITH DILATION AND CURETTAGE N/A 12/4/2018    Procedure: HYSTEROSCOPY, WITH DILATION AND CURETTAGE OF UTERUS AND HYDROTHERMAL ENDOMETRIAL ABLATION;  Surgeon: Tiara Red MD;  Location: [facility] OR;  Service: OB/GYN;  Laterality: N/A;  ATTEMPTED     LAPAROSCOPIC SALPINGECTOMY Bilateral 12/4/2018    Procedure: SALPINGECTOMY, LAPAROSCOPIC;  Surgeon: Tiara Red MD;  Location: [facility] OR;  Service: OB/GYN;  Laterality: Bilateral;    RENAL BIOPSY      tumor removed      from face     Social History     Socioeconomic History    Marital status: Legally      Spouse name: Not on file    Number of children: Not on file    Years of education: Not on file    Highest education level: Not on file   Occupational History    Not on file   Social Needs    Financial resource strain: Not on file    Food insecurity:     Worry: Not on file     Inability: Not on file    Transportation needs:     Medical: Not on file     Non-medical: Not on file   Tobacco Use    Smoking status: Never Smoker    Smokeless tobacco: Never Used   Substance and Sexual Activity    Alcohol use: No    Drug use: No    Sexual activity: Yes     Partners: Male   Lifestyle    Physical activity:     Days per week: Not on file     Minutes per session: Not on file    Stress: Not on file   Relationships    Social connections:     Talks on phone: Not on file     Gets together: Not on file     Attends Synagogue service: Not on file     Active member of club or organization: Not on file     Attends meetings of clubs or organizations: Not on file     Relationship status: Not on file   Other Topics Concern    Not on file   Social History Narrative    Not on file     Goals    None       Family History   Problem Relation Age of Onset    Hypertension Father     Breast cancer Paternal Grandmother     Diabetes Maternal Grandmother     Heart attack Maternal Grandmother     Lung cancer Maternal Grandfather     Prostate cancer Maternal  "Grandfather        Current Outpatient Medications:     metoprolol succinate (TOPROL-XL) 100 MG 24 hr tablet, TAKE ONE TABLET BY MOUTH EVERY DAY, Disp: 30 tablet, Rfl: 2    pantoprazole (PROTONIX) 40 MG tablet, Take 1 tablet (40 mg total) by mouth every evening., Disp: 30 tablet, Rfl: 3    sucroferric oxyhydroxide (VELPHORO) 500 mg Chew, Take 500 mg by mouth., Disp: , Rfl:     hydrALAZINE (APRESOLINE) 10 MG tablet, Take 1 tablet (10 mg total) by mouth 3 (three) times daily., Disp: 90 tablet, Rfl: 11    HYDROcodone-acetaminophen (NORCO) 5-325 mg per tablet, Take 1 tablet by mouth every 6 (six) hours as needed for Pain. (Patient not taking: Reported on 11/19/2019), Disp: 15 tablet, Rfl: 0    promethazine (PHENERGAN) 25 MG tablet, Take 1 tablet (25 mg total) by mouth every 6 (six) hours as needed for Nausea. (Patient not taking: Reported on 11/19/2019), Disp: 20 tablet, Rfl: 0    sevelamer carbonate (RENVELA) 800 mg Tab, Take 800 mg by mouth 3 (three) times daily with meals., Disp: , Rfl:   No current facility-administered medications for this visit.     Facility-Administered Medications Ordered in Other Visits:     lactated ringers infusion, , Intravenous, Continuous, Haley Badillo MD, Last Rate: 10 mL/hr at 12/04/18 1428    Review of patient's allergies indicates:   Allergen Reactions    Lisinopril Other (See Comments)     Severe cough    Adhesive tape-silicones Itching    Furosemide Other (See Comments)     Almost gave her right sided heartfailure       Review of Systems   All remaining systems negative    Objective:     BP (!) 190/100   Pulse 92   Temp 98.5 °F (36.9 °C)   Ht 5' 6" (1.676 m)   Wt 101.5 kg (223 lb 12.3 oz)   SpO2 99%   BMI 36.12 kg/m²     Physical Exam  GEN: A&O fully, NAD  PSYC: Normal affect  HEENT: OP: Clear, no LAD, no thyroid masses, auditory canals and TMs WNL  CV: RRR, no M/G/R  PULM: CTA bilaterally, no wheezes, rales, crackles   GI: S/NT/ND, normal bowel " sounds  EXT: No C/C/E, normal DP pulses bilaterally  NEURO: CN II-XII intact, 5/5 strength globally, no sensory losses, normal gait, 2+ DTRs globally      LABS:  Lab Results   Component Value Date    WBC 7.06 11/29/2018    HGB 10.7 (L) 11/29/2018    HCT 34.4 (L) 11/29/2018     11/29/2018    ALT 19 09/19/2018    AST 10 09/19/2018     11/29/2018    K 4.3 12/04/2018     11/29/2018    CREATININE 10.8 (H) 11/29/2018    BUN 32 (H) 11/29/2018    CO2 28 11/29/2018    INR 1.0 09/18/2018    ESTGFRAFRICA 5.0 (A) 11/29/2018    EGFRNONAA 4.3 (A) 11/29/2018    CALCIUM 10.6 (H) 11/29/2018       Assessment:      1. Encounter to establish care    2. Hypertensive urgency: Risks and benefits discussed and patient chose to move forward with hydralazine 10 mg 1 po qd.     3. Viral upper respiratory tract infection: Likely viral vs. allergic etiology. I recommend sipping on ginger/lemon/honey Juice:      Ginger/lemon/honey Juice:    Ingredients (preferably organic & local)    3-5 Ginger roots   3-5 salvatore   1 bottle honey      Preparation:    Ginger root   - Wash   - Chop   - Add to  with an equal proportion of water   - Blend   - Strain   - Pour to fill ¾ of any size container (i.e. glass bottle)    Salvatore   - Squeeze salvatore    - Pour juice to fill ¼ of glass bottle    Add honey to glass bottle to taste      Storage & Application    Refrigerate   Enjoy   - Shake & sip as needed for cough, congestion, sore throat   - Avoid drinking >3 oz in one sitting, which can lead to gastrointestinal irritation            Plan:   Encounter to establish care  -     CBC auto differential; Future; Expected date: 11/19/2019  -     Comprehensive metabolic panel; Future; Expected date: 11/19/2019  -     Vitamin D; Future; Expected date: 11/19/2019    Hypertensive urgency  -     hydrALAZINE (APRESOLINE) 10 MG tablet; Take 1 tablet (10 mg total) by mouth 3 (three) times daily.  Dispense: 90 tablet; Refill: 11    Viral upper  respiratory tract infection    Anemia, unspecified type  -     CBC auto differential; Future; Expected date: 11/19/2019  -     Iron and TIBC; Future; Expected date: 11/19/2019  -     Ferritin; Future; Expected date: 11/19/2019    Vitamin D deficiency  -     Vitamin D; Future; Expected date: 11/19/2019    Renal disease  -     Magnesium; Future; Expected date: 11/19/2019  -     Phosphorus; Future; Expected date: 11/19/2019      Follow up in about 1 week (around 11/26/2019), or if symptoms worsen or fail to improve, for FU on hypertensive urgency.      I spent >65 minutes of time with patient 50% or more of which was discussing labs and plans of care.

## 2019-11-20 ENCOUNTER — HOSPITAL ENCOUNTER (EMERGENCY)
Facility: HOSPITAL | Age: 29
Discharge: HOME | End: 2019-11-20
Attending: EMERGENCY MEDICINE
Payer: MEDICARE

## 2019-11-20 VITALS
BODY MASS INDEX: 35.7 KG/M2 | OXYGEN SATURATION: 100 % | HEART RATE: 86 BPM | RESPIRATION RATE: 21 BRPM | SYSTOLIC BLOOD PRESSURE: 187 MMHG | TEMPERATURE: 99 F | WEIGHT: 221.19 LBS | DIASTOLIC BLOOD PRESSURE: 93 MMHG

## 2019-11-20 DIAGNOSIS — R50.9 FEVER, UNSPECIFIED FEVER CAUSE: ICD-10-CM

## 2019-11-20 DIAGNOSIS — J10.1 INFLUENZA B: Primary | ICD-10-CM

## 2019-11-20 DIAGNOSIS — R06.02 SOB (SHORTNESS OF BREATH): ICD-10-CM

## 2019-11-20 LAB
25(OH)D3+25(OH)D2 SERPL-MCNC: 11 NG/ML (ref 30–96)
ALBUMIN SERPL BCP-MCNC: 4.2 G/DL (ref 3.5–5.2)
ALP SERPL-CCNC: 62 U/L (ref 55–135)
ALT SERPL W/O P-5'-P-CCNC: 13 U/L (ref 10–44)
ANION GAP SERPL CALC-SCNC: 13 MMOL/L (ref 8–16)
AST SERPL-CCNC: 25 U/L (ref 10–40)
BASOPHILS # BLD AUTO: 0.03 K/UL (ref 0–0.2)
BASOPHILS NFR BLD: 0.6 % (ref 0–1.9)
BILIRUB SERPL-MCNC: 0.7 MG/DL (ref 0.1–1)
BNP SERPL-MCNC: 151 PG/ML (ref 0–99)
BUN SERPL-MCNC: 18 MG/DL (ref 6–20)
CALCIUM SERPL-MCNC: 9.8 MG/DL (ref 8.7–10.5)
CHLORIDE SERPL-SCNC: 97 MMOL/L (ref 95–110)
CK SERPL-CCNC: 81 U/L (ref 20–180)
CO2 SERPL-SCNC: 29 MMOL/L (ref 23–29)
CREAT SERPL-MCNC: 10 MG/DL (ref 0.5–1.4)
DIFFERENTIAL METHOD: ABNORMAL
EOSINOPHIL # BLD AUTO: 0.1 K/UL (ref 0–0.5)
EOSINOPHIL NFR BLD: 2.3 % (ref 0–8)
ERYTHROCYTE [DISTWIDTH] IN BLOOD BY AUTOMATED COUNT: 15.8 % (ref 11.5–14.5)
EST. GFR  (AFRICAN AMERICAN): 5 ML/MIN/1.73 M^2
EST. GFR  (NON AFRICAN AMERICAN): 5 ML/MIN/1.73 M^2
FERRITIN SERPL-MCNC: 846 NG/ML (ref 20–300)
GLUCOSE SERPL-MCNC: 77 MG/DL (ref 70–110)
HCT VFR BLD AUTO: 31.2 % (ref 37–48.5)
HGB BLD-MCNC: 9.8 G/DL (ref 12–16)
IMM GRANULOCYTES # BLD AUTO: 0.02 K/UL (ref 0–0.04)
IMM GRANULOCYTES NFR BLD AUTO: 0.4 % (ref 0–0.5)
INFLUENZA A, MOLECULAR: NEGATIVE
INFLUENZA B, MOLECULAR: POSITIVE
LYMPHOCYTES # BLD AUTO: 1.2 K/UL (ref 1–4.8)
LYMPHOCYTES NFR BLD: 24 % (ref 18–48)
MCH RBC QN AUTO: 31.9 PG (ref 27–31)
MCHC RBC AUTO-ENTMCNC: 31.4 G/DL (ref 32–36)
MCV RBC AUTO: 102 FL (ref 82–98)
MONOCYTES # BLD AUTO: 0.7 K/UL (ref 0.3–1)
MONOCYTES NFR BLD: 14.1 % (ref 4–15)
NEUTROPHILS # BLD AUTO: 3 K/UL (ref 1.8–7.7)
NEUTROPHILS NFR BLD: 58.6 % (ref 38–73)
NRBC BLD-RTO: 0 /100 WBC
PLATELET # BLD AUTO: 155 K/UL (ref 150–350)
PMV BLD AUTO: 11.7 FL (ref 9.2–12.9)
POTASSIUM SERPL-SCNC: 4 MMOL/L (ref 3.5–5.1)
PROT SERPL-MCNC: 8.7 G/DL (ref 6–8.4)
RBC # BLD AUTO: 3.07 M/UL (ref 4–5.4)
SODIUM SERPL-SCNC: 139 MMOL/L (ref 136–145)
SPECIMEN SOURCE: ABNORMAL
TROPONIN I SERPL DL<=0.01 NG/ML-MCNC: 0.01 NG/ML (ref 0–0.03)
WBC # BLD AUTO: 5.17 K/UL (ref 3.9–12.7)

## 2019-11-20 PROCEDURE — 84484 ASSAY OF TROPONIN QUANT: CPT

## 2019-11-20 PROCEDURE — 99285 EMERGENCY DEPT VISIT HI MDM: CPT | Mod: 25

## 2019-11-20 PROCEDURE — 85025 COMPLETE CBC W/AUTO DIFF WBC: CPT

## 2019-11-20 PROCEDURE — 93010 ELECTROCARDIOGRAM REPORT: CPT | Mod: ,,, | Performed by: INTERNAL MEDICINE

## 2019-11-20 PROCEDURE — 87502 INFLUENZA DNA AMP PROBE: CPT

## 2019-11-20 PROCEDURE — 93010 EKG 12-LEAD: ICD-10-PCS | Mod: ,,, | Performed by: INTERNAL MEDICINE

## 2019-11-20 PROCEDURE — 82550 ASSAY OF CK (CPK): CPT

## 2019-11-20 PROCEDURE — 83880 ASSAY OF NATRIURETIC PEPTIDE: CPT

## 2019-11-20 PROCEDURE — 93005 ELECTROCARDIOGRAM TRACING: CPT

## 2019-11-20 PROCEDURE — 80053 COMPREHEN METABOLIC PANEL: CPT

## 2019-11-20 RX ORDER — OSELTAMIVIR PHOSPHATE 75 MG/1
75 CAPSULE ORAL 2 TIMES DAILY
Qty: 10 CAPSULE | Refills: 0 | Status: SHIPPED | OUTPATIENT
Start: 2019-11-20 | End: 2019-11-25

## 2019-11-20 NOTE — ED PROVIDER NOTES
"SCRIBE #1 NOTE: I, Naseem Lonnie, am scribing for, and in the presence of, Adi Elam MD. I have scribed the entire note.       History     Chief Complaint   Patient presents with    Shortness of Breath     shortness of breath, nausea, diarrhea.  pt verbalizes fever 2 days ago     Review of patient's allergies indicates:   Allergen Reactions    Lisinopril Other (See Comments)     Severe cough    Adhesive tape-silicones Itching    Furosemide Other (See Comments)     Almost gave her right sided heartfailure         History of Present Illness     HPI    11/20/2019, 4:40 PM  History obtained from the patient      History of Present Illness: Lisseth Schwartz is a 28 y.o. female patient who presents to the Emergency Department for evaluation of SOB which onset gradually several hours ago earlier today. Symptoms are constant and moderate in severity. No mitigating or exacerbating factors reported. Associated sxs include cough, n/d and fever onset 2 days ago. Patient denies any leg swelling, CP, diaphoresis, chills, dizziness, weakness, and all other sxs at this time. No prior Tx reported. No further complaints or concerns at this time. Pt is currently in dialysis and was last dialyzed earlier today.      Arrival mode: Personal transportation    PCP: Joe Sinclair MD        Past Medical History:  Past Medical History:   Diagnosis Date    Anemia     Anxiety     Brittle bones     Dialysis patient     Disorder of kidney and ureter     Dialysis 6x week during pregnancy, but 3x/wk as of 11/19/19    General anesthetics causing adverse effect in therapeutic use     pt states "im a light weight"    Hypertension     Seizures     Years ago       Past Surgical History:  Past Surgical History:   Procedure Laterality Date    Arm surgery Right     x 2    cath in chest      HYSTEROSCOPY WITH HYDROTHERMAL ABLATION OF ENDOMETRIUM WITH DILATION AND CURETTAGE N/A 12/4/2018    Procedure: HYSTEROSCOPY, WITH " DILATION AND CURETTAGE OF UTERUS AND HYDROTHERMAL ENDOMETRIAL ABLATION;  Surgeon: Tiara Red MD;  Location: [facility] OR;  Service: OB/GYN;  Laterality: N/A;  ATTEMPTED     LAPAROSCOPIC SALPINGECTOMY Bilateral 12/4/2018    Procedure: SALPINGECTOMY, LAPAROSCOPIC;  Surgeon: Tiara Red MD;  Location: [facility] OR;  Service: OB/GYN;  Laterality: Bilateral;    RENAL BIOPSY      tumor removed      from face         Family History:  Family History   Problem Relation Age of Onset    Hypertension Father     Breast cancer Paternal Grandmother     Diabetes Maternal Grandmother     Heart attack Maternal Grandmother     Lung cancer Maternal Grandfather     Prostate cancer Maternal Grandfather        Social History:  Social History     Tobacco Use    Smoking status: Never Smoker    Smokeless tobacco: Never Used   Substance and Sexual Activity    Alcohol use: No    Drug use: No    Sexual activity: Yes     Partners: Male        Review of Systems     Review of Systems   Constitutional: Positive for fever. Negative for chills and diaphoresis.   HENT: Negative for sore throat.    Respiratory: Positive for cough and shortness of breath.    Cardiovascular: Negative for chest pain and leg swelling.   Gastrointestinal: Positive for diarrhea and nausea.   Genitourinary: Negative for dysuria.   Musculoskeletal: Negative for back pain.   Skin: Negative for rash.   Neurological: Negative for dizziness and weakness.   Hematological: Does not bruise/bleed easily.   All other systems reviewed and are negative.       Physical Exam     Initial Vitals   BP Pulse Resp Temp SpO2   11/20/19 1648 11/20/19 1647 -- 11/20/19 1648 11/20/19 1648   (!) 172/96 94  98.7 °F (37.1 °C) 98 %      MAP       --                 Physical Exam  Nursing Notes and Vital Signs Reviewed.  Constitutional: Well-developed and well-nourished. NAD.  Head: Atraumatic. Normocephalic.  Eyes: PERRL. EOM intact. Conjunctivae are not pale. No scleral icterus.  ENT:  Mucous membranes are moist. Oropharynx is clear and symmetric.    Neck: Supple. Full ROM. No lymphadenopathy.  Cardiovascular: Regular rate. Regular rhythm. No murmurs, rubs, or gallops. Distal pulses are 2+ and symmetric.  Pulmonary/Chest: No respiratory distress. Clear to auscultation bilaterally. No wheezing or rales.  Abdominal: Soft and non-distended.  There is no tenderness.  No rebound, guarding, or rigidity. Good bowel sounds.  Genitourinary: No CVA tenderness  Musculoskeletal: Moves all extremities. No obvious deformities. No calf tenderness.  Skin: Warm and dry.  Neurological:  Alert, awake, and appropriate.  Normal speech.  No acute focal neurological deficits are appreciated.  Psychiatric: Normal affect. Good eye contact. Appropriate in content.     ED Course   Procedures  ED Vital Signs:  Vitals:    11/20/19 1647 11/20/19 1648 11/20/19 1656 11/20/19 1659   BP:  (!) 172/96  (!) 172/96   Pulse: 94 93  93   Temp:  98.7 °F (37.1 °C)     TempSrc:  Oral     SpO2:  98%  100%   Weight:   100.3 kg (221 lb 3.2 oz)        Abnormal Lab Results:  Labs Reviewed   INFLUENZA A & B BY MOLECULAR - Abnormal; Notable for the following components:       Result Value    Influenza B, Molecular Positive (*)     All other components within normal limits   CBC W/ AUTO DIFFERENTIAL - Abnormal; Notable for the following components:    RBC 3.07 (*)     Hemoglobin 9.8 (*)     Hematocrit 31.2 (*)     Mean Corpuscular Volume 102 (*)     Mean Corpuscular Hemoglobin 31.9 (*)     Mean Corpuscular Hemoglobin Conc 31.4 (*)     RDW 15.8 (*)     All other components within normal limits   COMPREHENSIVE METABOLIC PANEL - Abnormal; Notable for the following components:    Creatinine 10.0 (*)     Total Protein 8.7 (*)     eGFR if  5 (*)     eGFR if non  5 (*)     All other components within normal limits   CK   TROPONIN I   B-TYPE NATRIURETIC PEPTIDE        All Lab Results:  Results for orders placed or performed  during the hospital encounter of 11/20/19   Influenza A & B by Molecular   Result Value Ref Range    Influenza A, Molecular Negative Negative    Influenza B, Molecular Positive (A) Negative    Flu A & B Source Nasal swab    CBC auto differential   Result Value Ref Range    WBC 5.17 3.90 - 12.70 K/uL    RBC 3.07 (L) 4.00 - 5.40 M/uL    Hemoglobin 9.8 (L) 12.0 - 16.0 g/dL    Hematocrit 31.2 (L) 37.0 - 48.5 %    Mean Corpuscular Volume 102 (H) 82 - 98 fL    Mean Corpuscular Hemoglobin 31.9 (H) 27.0 - 31.0 pg    Mean Corpuscular Hemoglobin Conc 31.4 (L) 32.0 - 36.0 g/dL    RDW 15.8 (H) 11.5 - 14.5 %    Platelets 155 150 - 350 K/uL    MPV 11.7 9.2 - 12.9 fL    Immature Granulocytes 0.4 0.0 - 0.5 %    Gran # (ANC) 3.0 1.8 - 7.7 K/uL    Immature Grans (Abs) 0.02 0.00 - 0.04 K/uL    Lymph # 1.2 1.0 - 4.8 K/uL    Mono # 0.7 0.3 - 1.0 K/uL    Eos # 0.1 0.0 - 0.5 K/uL    Baso # 0.03 0.00 - 0.20 K/uL    nRBC 0 0 /100 WBC    Gran% 58.6 38.0 - 73.0 %    Lymph% 24.0 18.0 - 48.0 %    Mono% 14.1 4.0 - 15.0 %    Eosinophil% 2.3 0.0 - 8.0 %    Basophil% 0.6 0.0 - 1.9 %    Differential Method Automated    Comprehensive metabolic panel   Result Value Ref Range    Sodium 139 136 - 145 mmol/L    Potassium 4.0 3.5 - 5.1 mmol/L    Chloride 97 95 - 110 mmol/L    CO2 29 23 - 29 mmol/L    Glucose 77 70 - 110 mg/dL    BUN, Bld 18 6 - 20 mg/dL    Creatinine 10.0 (H) 0.5 - 1.4 mg/dL    Calcium 9.8 8.7 - 10.5 mg/dL    Total Protein 8.7 (H) 6.0 - 8.4 g/dL    Albumin 4.2 3.5 - 5.2 g/dL    Total Bilirubin 0.7 0.1 - 1.0 mg/dL    Alkaline Phosphatase 62 55 - 135 U/L    AST 25 10 - 40 U/L    ALT 13 10 - 44 U/L    Anion Gap 13 8 - 16 mmol/L    eGFR if African American 5 (A) >60 mL/min/1.73 m^2    eGFR if non African American 5 (A) >60 mL/min/1.73 m^2   CK   Result Value Ref Range    CPK 81 20 - 180 U/L   Troponin I   Result Value Ref Range    Troponin I 0.014 0.000 - 0.026 ng/mL         Imaging Results:  Imaging Results          X-Ray Chest PA And  Lateral (Final result)  Result time 11/20/19 17:14:11    Final result by Art Grullon MD (Timothy) (11/20/19 17:14:11)                 Impression:      No acute findings.      Electronically signed by: Art Grullon MD  Date:    11/20/2019  Time:    17:14             Narrative:    EXAMINATION:  XR CHEST PA AND LATERAL    CLINICAL HISTORY  Shortness of breath, sob;    COMPARISON:  None    FINDINGS:  The heart size is normal.  The lung fields are clear.  No acute cardiopulmonary infiltrative.                                 The EKG was ordered, reviewed, and independently interpreted by the ED provider.  Interpretation time: 1652  Rate: 93 BPM  Rhythm: normal sinus rhythm  Interpretation: No acute ST changes. No STEMI.      The Emergency Provider reviewed the vital signs and test results, which are outlined above.     ED Discussion       5:37 PM: Reassessed pt at this time.  Pt states her condition has improved at this time. Discussed with pt all pertinent ED information and results. Discussed pt dx and plan of tx. Gave pt all f/u and return to the ED instructions. All questions and concerns were addressed at this time. Pt expresses understanding of information and instructions, and is comfortable with plan to discharge. Pt is stable for discharge.    I discussed with patient and/or family/caretaker that evaluation in the ED does not suggest any emergent or life threatening medical conditions requiring immediate intervention beyond what was provided in the ED, and I believe patient is safe for discharge.  Regardless, an unremarkable evaluation in the ED does not preclude the development or presence of a serious of life threatening condition. As such, patient was instructed to return immediately for any worsening or change in current symptoms.       MDM        Medical Decision Making:   Clinical Tests:   Lab Tests: Ordered and Reviewed  Radiological Study: Reviewed and Ordered  Medical Tests: Reviewed and  Ordered           ED Medication(s):  Medications - No data to display    New Prescriptions    OSELTAMIVIR (TAMIFLU) 75 MG CAPSULE    Take 1 capsule (75 mg total) by mouth 2 (two) times daily. for 5 days        Medication List      START taking these medications    oseltamivir 75 MG capsule  Commonly known as:  TAMIFLU  Take 1 capsule (75 mg total) by mouth 2 (two) times daily. for 5 days        ASK your doctor about these medications    hydrALAZINE 10 MG tablet  Commonly known as:  APRESOLINE  Take 1 tablet (10 mg total) by mouth 3 (three) times daily.     HYDROcodone-acetaminophen 5-325 mg per tablet  Commonly known as:  NORCO  Take 1 tablet by mouth every 6 (six) hours as needed for Pain.     metoprolol succinate 100 MG 24 hr tablet  Commonly known as:  TOPROL-XL  TAKE ONE TABLET BY MOUTH EVERY DAY     pantoprazole 40 MG tablet  Commonly known as:  PROTONIX  Take 1 tablet (40 mg total) by mouth every evening.     promethazine 25 MG tablet  Commonly known as:  PHENERGAN  Take 1 tablet (25 mg total) by mouth every 6 (six) hours as needed for Nausea.     sevelamer carbonate 800 mg Tab  Commonly known as:  RENVELA     sucroferric oxyhydroxide 500 mg Chew  Commonly known as:  VELPHORO           Where to Get Your Medications      You can get these medications from any pharmacy    Bring a paper prescription for each of these medications  · oseltamivir 75 MG capsule         Follow-up Information     Joe Sinclair MD.    Specialty:  Internal Medicine  Contact information:  [address] [place] [address] [id number] [phone]                       Scribe Attestation:   Scribe #1: I performed the above scribed service and the documentation accurately describes the services I performed. I attest to the accuracy of the note.     Attending:   Physician Attestation Statement for Scribe #1: I, Adi Elam MD, personally performed the services described in this documentation, as scribed by Naseem Fleming, in my presence,  and it is both accurate and complete.           Clinical Impression       ICD-10-CM ICD-9-CM   1. Influenza B J10.1 487.1   2. SOB (shortness of breath) R06.02 786.05   3. Fever, unspecified fever cause R50.9 780.60       Disposition:   Disposition: Discharged  Condition: Stable         Adi Elam MD  11/20/19 1548

## 2019-11-25 ENCOUNTER — OFFICE VISIT (OUTPATIENT)
Dept: INTERNAL MEDICINE | Facility: CLINIC | Age: 29
End: 2019-11-25
Payer: MEDICARE

## 2019-11-25 ENCOUNTER — APPOINTMENT (OUTPATIENT)
Dept: RADIOLOGY | Facility: HOSPITAL | Age: 29
End: 2019-11-25
Attending: INTERNAL MEDICINE
Payer: MEDICARE

## 2019-11-25 VITALS
HEIGHT: 66 IN | OXYGEN SATURATION: 98 % | DIASTOLIC BLOOD PRESSURE: 90 MMHG | HEART RATE: 72 BPM | WEIGHT: 217.69 LBS | SYSTOLIC BLOOD PRESSURE: 146 MMHG | TEMPERATURE: 98 F | BODY MASS INDEX: 34.98 KG/M2

## 2019-11-25 DIAGNOSIS — M54.32 BILATERAL SCIATICA: ICD-10-CM

## 2019-11-25 DIAGNOSIS — N18.6 ESRD (END STAGE RENAL DISEASE): ICD-10-CM

## 2019-11-25 DIAGNOSIS — M54.31 BILATERAL SCIATICA: ICD-10-CM

## 2019-11-25 DIAGNOSIS — I10 HYPERTENSION, UNSPECIFIED TYPE: Primary | ICD-10-CM

## 2019-11-25 PROCEDURE — 99215 PR OFFICE/OUTPT VISIT, EST, LEVL V, 40-54 MIN: ICD-10-PCS | Mod: S$PBB,,, | Performed by: INTERNAL MEDICINE

## 2019-11-25 PROCEDURE — 99215 OFFICE O/P EST HI 40 MIN: CPT | Mod: S$PBB,,, | Performed by: INTERNAL MEDICINE

## 2019-11-25 PROCEDURE — 72100 X-RAY EXAM L-S SPINE 2/3 VWS: CPT | Mod: TC,PO

## 2019-11-25 PROCEDURE — 72100 XR LUMBAR SPINE AP AND LATERAL: ICD-10-PCS | Mod: 26,,, | Performed by: RADIOLOGY

## 2019-11-25 PROCEDURE — 99999 PR PBB SHADOW E&M-EST. PATIENT-LVL III: CPT | Mod: PBBFAC,,, | Performed by: INTERNAL MEDICINE

## 2019-11-25 PROCEDURE — 99999 PR PBB SHADOW E&M-EST. PATIENT-LVL III: ICD-10-PCS | Mod: PBBFAC,,, | Performed by: INTERNAL MEDICINE

## 2019-11-25 PROCEDURE — 72100 X-RAY EXAM L-S SPINE 2/3 VWS: CPT | Mod: 26,,, | Performed by: RADIOLOGY

## 2019-11-25 PROCEDURE — 99213 OFFICE O/P EST LOW 20 MIN: CPT | Mod: PBBFAC,PN,25 | Performed by: INTERNAL MEDICINE

## 2019-11-25 RX ORDER — CYCLOBENZAPRINE HCL 5 MG
5 TABLET ORAL NIGHTLY PRN
Qty: 10 TABLET | Refills: 0 | Status: SHIPPED | OUTPATIENT
Start: 2019-11-25 | End: 2019-12-05

## 2019-11-25 RX ORDER — HYDRALAZINE HYDROCHLORIDE 10 MG/1
20 TABLET, FILM COATED ORAL 3 TIMES DAILY
Qty: 180 TABLET | Refills: 11 | Status: SHIPPED | OUTPATIENT
Start: 2019-11-25 | End: 2020-01-16

## 2019-11-25 RX ORDER — METOPROLOL SUCCINATE 100 MG/1
100 TABLET, EXTENDED RELEASE ORAL DAILY
Qty: 30 TABLET | Refills: 2 | Status: SHIPPED | OUTPATIENT
Start: 2019-11-25 | End: 2020-01-16 | Stop reason: SDUPTHER

## 2019-11-25 NOTE — PROGRESS NOTES
"Subjective:      Patient ID: Lisseth Schwartz is a 28 y.o. female.    Chief Complaint: Follow-up      HPI     MsSylvia Schwartz is a patient of Joe Sinclair MD, who presents for Follow-up  on HTN.    She reports having lower mid-back pain, which radiates to her hips. This pain is intermittent and keeps her from sleeping, particularly with sitting or laying down.     Of note she was seen in the ER on on 11/20/19 for flu sx, which was found to be 2/2 FLU B, for which she was started on oseltamivir 75 mg bid.     VS, labs & imaging reviewed and discussed with patient, including eGFR 5, , HCT 31.2, P 5.5, o/w WNL including Ca 9.8 & Trop <0.01 on 11/20/19; vitD 11 on 11/19/19.    Of note, EPIC indicates due preventive measures, including FLP & TDAP.       Past Medical History:   Diagnosis Date    Anemia     Anxiety     Brittle bones     Dialysis patient     Disorder of kidney and ureter     Dialysis 6x week during pregnancy, but 3x/wk as of 11/19/19    General anesthetics causing adverse effect in therapeutic use     pt states "im a light weight"    Hypertension     Seizures     Years ago     Past Surgical History:   Procedure Laterality Date    Arm surgery Right     x 2    cath in chest      HYSTEROSCOPY WITH HYDROTHERMAL ABLATION OF ENDOMETRIUM WITH DILATION AND CURETTAGE N/A 12/4/2018    Procedure: HYSTEROSCOPY, WITH DILATION AND CURETTAGE OF UTERUS AND HYDROTHERMAL ENDOMETRIAL ABLATION;  Surgeon: Tiara Red MD;  Location: [facility];  Service: OB/GYN;  Laterality: N/A;  ATTEMPTED     LAPAROSCOPIC SALPINGECTOMY Bilateral 12/4/2018    Procedure: SALPINGECTOMY, LAPAROSCOPIC;  Surgeon: Tiara Red MD;  Location: [facility] OR;  Service: OB/GYN;  Laterality: Bilateral;    RENAL BIOPSY      tumor removed      from face     Social History     Socioeconomic History    Marital status: Legally      Spouse name: Not on file    Number of children: Not on file    Years of " education: Not on file    Highest education level: Not on file   Occupational History    Not on file   Social Needs    Financial resource strain: Not on file    Food insecurity:     Worry: Not on file     Inability: Not on file    Transportation needs:     Medical: Not on file     Non-medical: Not on file   Tobacco Use    Smoking status: Never Smoker    Smokeless tobacco: Never Used   Substance and Sexual Activity    Alcohol use: No    Drug use: No    Sexual activity: Yes     Partners: Male   Lifestyle    Physical activity:     Days per week: Not on file     Minutes per session: Not on file    Stress: Not on file   Relationships    Social connections:     Talks on phone: Not on file     Gets together: Not on file     Attends Latter day service: Not on file     Active member of club or organization: Not on file     Attends meetings of clubs or organizations: Not on file     Relationship status: Not on file   Other Topics Concern    Not on file   Social History Narrative    Not on file     Family History   Problem Relation Age of Onset    Hypertension Father     Breast cancer Paternal Grandmother     Diabetes Maternal Grandmother     Heart attack Maternal Grandmother     Lung cancer Maternal Grandfather     Prostate cancer Maternal Grandfather        Current Outpatient Medications:     hydrALAZINE (APRESOLINE) 10 MG tablet, Take 2 tablets (20 mg total) by mouth 3 (three) times daily., Disp: 180 tablet, Rfl: 11    HYDROcodone-acetaminophen (NORCO) 5-325 mg per tablet, Take 1 tablet by mouth every 6 (six) hours as needed for Pain., Disp: 15 tablet, Rfl: 0    metoprolol succinate (TOPROL-XL) 100 MG 24 hr tablet, Take 1 tablet (100 mg total) by mouth once daily., Disp: 30 tablet, Rfl: 2    pantoprazole (PROTONIX) 40 MG tablet, Take 1 tablet (40 mg total) by mouth every evening., Disp: 30 tablet, Rfl: 3    promethazine (PHENERGAN) 25 MG tablet, Take 1 tablet (25 mg total) by mouth every 6 (six)  "hours as needed for Nausea., Disp: 20 tablet, Rfl: 0    sevelamer carbonate (RENVELA) 800 mg Tab, Take 800 mg by mouth 3 (three) times daily with meals., Disp: , Rfl:     sucroferric oxyhydroxide (VELPHORO) 500 mg Chew, Take 500 mg by mouth., Disp: , Rfl:     cyclobenzaprine (FLEXERIL) 5 MG tablet, Take 1 tablet (5 mg total) by mouth nightly as needed for Muscle spasms., Disp: 10 tablet, Rfl: 0    oseltamivir (TAMIFLU) 75 MG capsule, Take 1 capsule (75 mg total) by mouth 2 (two) times daily. for 5 days (Patient not taking: Reported on 11/25/2019), Disp: 10 capsule, Rfl: 0  No current facility-administered medications for this visit.     Facility-Administered Medications Ordered in Other Visits:     lactated ringers infusion, , Intravenous, Continuous, Haley Badillo MD, Last Rate: 10 mL/hr at 12/04/18 1428    Review of patient's allergies indicates:   Allergen Reactions    Lisinopril Other (See Comments)     Severe cough    Adhesive tape-silicones Itching    Furosemide Other (See Comments)     Almost gave her right sided heartfailure        Review of Systems   All remaining systems negative    Objective:     BP (!) 146/90 (BP Location: Left arm, Patient Position: Sitting, BP Method: Large (Manual))   Pulse 72   Temp 98.1 °F (36.7 °C) (Tympanic)   Ht 5' 6" (1.676 m)   Wt 98.8 kg (217 lb 11.3 oz)   SpO2 98%   BMI 35.14 kg/m²     Physical Exam  GEN: A&O fully, NAD  PSYC: Normal affect  MSK: +stright leg raise on left; negative on right; no TTP of spine      Lab Results   Component Value Date    WBC 5.17 11/20/2019    HGB 9.8 (L) 11/20/2019    HCT 31.2 (L) 11/20/2019     11/20/2019    ALT 13 11/20/2019    AST 25 11/20/2019     11/20/2019    K 4.0 11/20/2019    CL 97 11/20/2019    CREATININE 10.0 (H) 11/20/2019    BUN 18 11/20/2019    CO2 29 11/20/2019    CALCIUM 9.8 11/20/2019    INR 1.0 09/18/2018       Assessment:      1. Hypertension, unspecified type:  Not at goal on manual " repeat. Risks and benefits discussed and patient chose to move forward with increasing her hydralazine from 10 to 20 mg TID in addition to her metoprolol 100 mg QHS.    2. ESRD (end stage renal disease): Stable.    3.      Influenza B: Improving with oseltamavir 75 mg BID.    Plan:   Hypertension, unspecified type  -     metoprolol succinate (TOPROL-XL) 100 MG 24 hr tablet; Take 1 tablet (100 mg total) by mouth once daily.  Dispense: 30 tablet; Refill: 2  -     hydrALAZINE (APRESOLINE) 10 MG tablet; Take 2 tablets (20 mg total) by mouth 3 (three) times daily.  Dispense: 180 tablet; Refill: 11    ESRD (end stage renal disease)  -     metoprolol succinate (TOPROL-XL) 100 MG 24 hr tablet; Take 1 tablet (100 mg total) by mouth once daily.  Dispense: 30 tablet; Refill: 2    Bilateral sciatica  -     cyclobenzaprine (FLEXERIL) 5 MG tablet; Take 1 tablet (5 mg total) by mouth nightly as needed for Muscle spasms.  Dispense: 10 tablet; Refill: 0  -     Ambulatory consult to Physical Therapy  -     X-Ray Lumbar Spine Ap And Lateral; Future; Expected date: 11/25/2019        Follow up in about 2 weeks (around 12/9/2019), or if symptoms worsen or fail to improve, for FU on HTN, LBP w/ B sciatica.    I spent >25 minutes of time with patient 50% or more of which was discussing labs and plans of care.

## 2019-12-02 ENCOUNTER — TELEPHONE (OUTPATIENT)
Dept: INTERNAL MEDICINE | Facility: CLINIC | Age: 29
End: 2019-12-02

## 2019-12-02 DIAGNOSIS — M54.42 MIDLINE LOW BACK PAIN WITH BILATERAL SCIATICA, UNSPECIFIED CHRONICITY: Primary | ICD-10-CM

## 2019-12-02 DIAGNOSIS — M54.41 MIDLINE LOW BACK PAIN WITH BILATERAL SCIATICA, UNSPECIFIED CHRONICITY: Primary | ICD-10-CM

## 2019-12-02 DIAGNOSIS — M54.40 ACUTE MIDLINE LOW BACK PAIN WITH SCIATICA, SCIATICA LATERALITY UNSPECIFIED: ICD-10-CM

## 2019-12-02 RX ORDER — GABAPENTIN 100 MG/1
100 CAPSULE ORAL NIGHTLY
Qty: 30 CAPSULE | Refills: 11 | Status: SHIPPED | OUTPATIENT
Start: 2019-12-02 | End: 2020-01-16

## 2019-12-02 NOTE — TELEPHONE ENCOUNTER
I spoke with pt I gave pt your message pt states that she thinks its some nerve damage and would like to have an MRI done. Pt states that her pain feels like a nerve problem. Please advise

## 2019-12-02 NOTE — TELEPHONE ENCOUNTER
----- Message from Joe Sinclair MD sent at 12/2/2019  3:29 PM CST -----  Contact: Patient   Dear Ms. Calle,    Please let our patient know I sent her the following letter on 11/25/19:    Dear Ms. Lisseth Schwartz:     Below are the results from your recent visit:     Resulted Orders  X-Ray Lumbar Spine Ap And Lateral    Narrative    EXAMINATION:  XR LUMBAR SPINE AP AND LATERAL     CLINICAL HISTORY:  Low back pain, >6wks conservative tx, persistent-progressive sx, surgical candidate;Sciatica, right side     TECHNIQUE:  AP, lateral and spot images were performed of the lumbar spine.     COMPARISON:  None     FINDINGS:  Vertebral body heights and normal alignment is maintained.  Intervertebral disc spaces are within normal limits.  No fracture.  No listhesis.       Impression    As above        Electronically signed by:         Sarthak Patterson MD  Date:                                                    11/25/2019  Time:                                                    14:59    Your results are normal.       I recommend over-the-counter turmeric 500 mg 1 capsule by mouth daily (with a meal) and/or tylenol 650 mg by mouth three times daily as needed. I recommend avoiding chronic (>2 weeks) oral NSAIDs (nonsteroidal inflammatory drugs; i.e. Ibuprofen, Motrin, Advil, Aleve, Naprosyn, naproxen, Aspirin, Goodies, Stanback, BC's powder, oral diclofenac, Mobic, meloxicam, etc.) to protect your stomach from bleeding and to your kidneys from dysfunction. If these measures are not helpful, I recommend consideration of physical therapy.        Please don't hesitate to call our office if you have any questions or concerns.        Sincerely,           Joe Sinclair MD      ----- Message -----  From: Alejandra Calle MA  Sent: 12/2/2019   8:58 AM CST  To: Joe Sinclair MD    Patient called wanting to know her results from her xray please advise     ----- Message -----  From: Mariposa Worthington  Sent: 12/2/2019   8:43 AM  CST  To: Dottie Diaz Staff    .Type:  Test Results    Who Called: Patient   Name of Test (Lab/Mammo/Etc): xray on back   Date of Test:   Ordering Provider: oDttie Diaz   Where the test was performed: [facility]   Would the patient rather a call back or a response via [facility]? call  Best Call Back Number: [phone]  Additional Information:  n/a

## 2019-12-03 ENCOUNTER — TELEPHONE (OUTPATIENT)
Dept: INTERNAL MEDICINE | Facility: CLINIC | Age: 29
End: 2019-12-03

## 2019-12-03 NOTE — TELEPHONE ENCOUNTER
----- Message from Susie Arguello sent at 12/3/2019  1:29 PM CST -----  Contact: pt  Type:  Patient Returning Call    Who Called:Patient  Who Left Message for Patient:nurse  Does the patient know what this is regarding? MRI  Would the patient rather a call back or a response via [facility]? Call back  Best Call Back Number:[phone]  Additional Information: na

## 2019-12-03 NOTE — TELEPHONE ENCOUNTER
Spoke with pt in regards to MRI. Scheduled MRI for 12/17 at 8:30am at the [facility] location. Pt verbalized understanding.

## 2019-12-16 ENCOUNTER — TELEPHONE (OUTPATIENT)
Dept: RADIOLOGY | Facility: HOSPITAL | Age: 29
End: 2019-12-16

## 2020-01-03 DIAGNOSIS — Z76.0 MEDICATION REFILL: ICD-10-CM

## 2020-01-06 RX ORDER — PANTOPRAZOLE SODIUM 40 MG/1
TABLET, DELAYED RELEASE ORAL
Qty: 30 TABLET | Refills: 3 | Status: SHIPPED | OUTPATIENT
Start: 2020-01-06 | End: 2020-01-16 | Stop reason: SINTOL

## 2020-01-14 RX ORDER — METOPROLOL SUCCINATE 100 MG/1
TABLET, EXTENDED RELEASE ORAL
Qty: 30 TABLET | Refills: 2 | Status: SHIPPED | OUTPATIENT
Start: 2020-01-14 | End: 2020-08-23 | Stop reason: SDUPTHER

## 2020-01-15 ENCOUNTER — TELEPHONE (OUTPATIENT)
Dept: INTERNAL MEDICINE | Facility: CLINIC | Age: 30
End: 2020-01-15

## 2020-01-15 NOTE — TELEPHONE ENCOUNTER
----- Message from Kary Jackson sent at 1/15/2020  8:52 AM CST -----  Contact: self  please work patient in today if possible for 1/14 LUIS DUKES (scheduled for tomorrow)....[phone] (home)

## 2020-01-16 ENCOUNTER — OFFICE VISIT (OUTPATIENT)
Dept: INTERNAL MEDICINE | Facility: CLINIC | Age: 30
End: 2020-01-16
Payer: MEDICARE

## 2020-01-16 VITALS
HEIGHT: 66 IN | TEMPERATURE: 99 F | OXYGEN SATURATION: 99 % | BODY MASS INDEX: 36.09 KG/M2 | WEIGHT: 224.56 LBS | HEART RATE: 81 BPM | DIASTOLIC BLOOD PRESSURE: 80 MMHG | SYSTOLIC BLOOD PRESSURE: 170 MMHG

## 2020-01-16 DIAGNOSIS — R56.9 SEIZURE-LIKE ACTIVITY: ICD-10-CM

## 2020-01-16 DIAGNOSIS — G47.33 OSA (OBSTRUCTIVE SLEEP APNEA): Primary | ICD-10-CM

## 2020-01-16 DIAGNOSIS — M54.31 BILATERAL SCIATICA: ICD-10-CM

## 2020-01-16 DIAGNOSIS — K21.9 GASTROESOPHAGEAL REFLUX DISEASE, ESOPHAGITIS PRESENCE NOT SPECIFIED: ICD-10-CM

## 2020-01-16 DIAGNOSIS — N18.6 ESRD (END STAGE RENAL DISEASE): ICD-10-CM

## 2020-01-16 DIAGNOSIS — G45.9 TIA (TRANSIENT ISCHEMIC ATTACK): ICD-10-CM

## 2020-01-16 DIAGNOSIS — M62.838 MUSCLE SPASM: ICD-10-CM

## 2020-01-16 DIAGNOSIS — M54.32 BILATERAL SCIATICA: ICD-10-CM

## 2020-01-16 DIAGNOSIS — I10 HYPERTENSION, UNSPECIFIED TYPE: ICD-10-CM

## 2020-01-16 PROCEDURE — 99215 PR OFFICE/OUTPT VISIT, EST, LEVL V, 40-54 MIN: ICD-10-PCS | Mod: S$PBB,,, | Performed by: INTERNAL MEDICINE

## 2020-01-16 PROCEDURE — 99215 OFFICE O/P EST HI 40 MIN: CPT | Mod: S$PBB,,, | Performed by: INTERNAL MEDICINE

## 2020-01-16 PROCEDURE — 99213 OFFICE O/P EST LOW 20 MIN: CPT | Mod: PBBFAC,PN | Performed by: INTERNAL MEDICINE

## 2020-01-16 PROCEDURE — 99999 PR PBB SHADOW E&M-EST. PATIENT-LVL III: CPT | Mod: PBBFAC,,, | Performed by: INTERNAL MEDICINE

## 2020-01-16 PROCEDURE — 99999 PR PBB SHADOW E&M-EST. PATIENT-LVL III: ICD-10-PCS | Mod: PBBFAC,,, | Performed by: INTERNAL MEDICINE

## 2020-01-16 RX ORDER — HYDRALAZINE HYDROCHLORIDE 10 MG/1
20 TABLET, FILM COATED ORAL EVERY 12 HOURS
Qty: 120 TABLET | Refills: 11
Start: 2020-01-16 | End: 2020-01-16

## 2020-01-16 RX ORDER — FAMOTIDINE 40 MG/1
40 TABLET, FILM COATED ORAL 2 TIMES DAILY PRN
Qty: 60 TABLET | Refills: 11 | Status: SHIPPED | OUTPATIENT
Start: 2020-01-16 | End: 2020-04-13

## 2020-01-16 RX ORDER — HYDRALAZINE HYDROCHLORIDE 25 MG/1
25 TABLET, FILM COATED ORAL EVERY 12 HOURS
Qty: 60 TABLET | Refills: 11 | Status: SHIPPED | OUTPATIENT
Start: 2020-01-16 | End: 2020-04-13

## 2020-01-16 NOTE — PROGRESS NOTES
Subjective:      Patient ID: Lisseth Schwartz is a 29 y.o. female.    Chief Complaint: Follow-up (Following up from Urgent Care)      HPI     Ms. Lisseth Schwartz is a patient of Joe Sinclair MD, who presents for Follow-up (Following up from Urgent Care)    She was seen at the ER on [facility] due to waking up in a state of full body pins & needles, which she attributes to sleeping on her right side for a change since her HD access sight is on her right UE, but she didn't want to sleep on her right side having her back to her 15 month old. She was evaluated with EKG, labs, head CT, all of which was WNL. She was DC'd home with instructions to f/u with PCP for further evaluation for possible TIA or sz.     She reports being out of metoprolol yesterday, which she refilled & took this AM. She reports only taking 10 mg of hydralazine BID as opposed to 20 mg TID as was written.    VS, labs & imaging reviewed and discussed with patient, including +7 lb wt gain from 11/25/19-1/16/20; eGFR 5, , HCT 31.2, P 5.5, o/w WNL including Ca 9.8 & Trop <0.01 on 11/20/19; vitD 11 on 11/19/19.     Of note, EPIC indicates due preventive measures, including FLP & TDAP.     Gonzales Sleepiness Scale:      0 = would NEVER doze  1 = SLIGHT chance of dozing  2 = MOD chance of dozing  3 = HIGH chance of dozing    1. Sitting & reading 0  2. Watching TV 2  3. Sitting inactive in a public place 0  4. Lying down to rest in the afternoon when circumstances permit 3  5. Sitting and talking to someone 0  6. Sitting quietly after a lunch without alcohol 2  7. In a car, while stopped for a few minutes in traffic 0    Total:         STOP - BANG Questionnaire:    1. Snoring : Do you snore loudly?  Y  2. Tired : Do you often feel tired, fatigued, or sleepy during daytime? Y  3. Observed: Has anyone observed you stop breathing during your sleep?  Y  4. Blood pressure : Do you have or are you being treated for high blood pressure? Y  5.  "BMI :BMI more than 35 kg/m2? Y  6. Age : Age over 50 yr old? N  7. Neck circumference: Neck circumference greater than 40 cm? N  8. Gender: Gender male? N       SCORE:      High risk of CHRYSTAL: Yes 5 - 8  Intermediate risk of CHRYSTAL: Yes 3 - 4  Low risk of CHRYSTAL: Yes 0 - 2      Past Medical History:   Diagnosis Date    Anemia     Anxiety     Brittle bones     Dialysis patient     Disorder of kidney and ureter     Dialysis 6x week during pregnancy, but 3x/wk as of 11/19/19    General anesthetics causing adverse effect in therapeutic use     pt states "im a light weight"    Hypertension     Seizures     Years ago     Past Surgical History:   Procedure Laterality Date    Arm surgery Right     x 2    cath in chest      HYSTEROSCOPY WITH HYDROTHERMAL ABLATION OF ENDOMETRIUM WITH DILATION AND CURETTAGE N/A 12/4/2018    Procedure: HYSTEROSCOPY, WITH DILATION AND CURETTAGE OF UTERUS AND HYDROTHERMAL ENDOMETRIAL ABLATION;  Surgeon: Tiara Red MD;  Location: [facility] OR;  Service: OB/GYN;  Laterality: N/A;  ATTEMPTED     LAPAROSCOPIC SALPINGECTOMY Bilateral 12/4/2018    Procedure: SALPINGECTOMY, LAPAROSCOPIC;  Surgeon: Tiara Red MD;  Location: [facility] OR;  Service: OB/GYN;  Laterality: Bilateral;    RENAL BIOPSY      tumor removed      from face     Social History     Socioeconomic History    Marital status: Legally      Spouse name: Not on file    Number of children: Not on file    Years of education: Not on file    Highest education level: Not on file   Occupational History    Not on file   Social Needs    Financial resource strain: Not on file    Food insecurity:     Worry: Not on file     Inability: Not on file    Transportation needs:     Medical: Not on file     Non-medical: Not on file   Tobacco Use    Smoking status: Never Smoker    Smokeless tobacco: Never Used   Substance and Sexual Activity    Alcohol use: No    Drug use: No    Sexual activity: Yes     Partners: Male   Lifestyle    " Physical activity:     Days per week: Not on file     Minutes per session: Not on file    Stress: Not on file   Relationships    Social connections:     Talks on phone: Not on file     Gets together: Not on file     Attends Orthodoxy service: Not on file     Active member of club or organization: Not on file     Attends meetings of clubs or organizations: Not on file     Relationship status: Not on file   Other Topics Concern    Not on file   Social History Narrative    Not on file     Family History   Problem Relation Age of Onset    Hypertension Father     Breast cancer Paternal Grandmother     Diabetes Maternal Grandmother     Heart attack Maternal Grandmother     Lung cancer Maternal Grandfather     Prostate cancer Maternal Grandfather        Current Outpatient Medications:     hydrALAZINE (APRESOLINE) 25 MG tablet, Take 1 tablet (25 mg total) by mouth every 12 (twelve) hours., Disp: 60 tablet, Rfl: 11    metoprolol succinate (TOPROL-XL) 100 MG 24 hr tablet, TAKE ONE TABLET BY MOUTH EVERY DAY, Disp: 30 tablet, Rfl: 2    promethazine (PHENERGAN) 25 MG tablet, Take 1 tablet (25 mg total) by mouth every 6 (six) hours as needed for Nausea., Disp: 20 tablet, Rfl: 0    sucroferric oxyhydroxide (VELPHORO) 500 mg Chew, Take 500 mg by mouth., Disp: , Rfl:     famotidine (PEPCID) 40 MG tablet, Take 1 tablet (40 mg total) by mouth 2 (two) times daily as needed for Heartburn., Disp: 60 tablet, Rfl: 11    sevelamer carbonate (RENVELA) 800 mg Tab, Take 800 mg by mouth 3 (three) times daily with meals., Disp: , Rfl:   No current facility-administered medications for this visit.     Facility-Administered Medications Ordered in Other Visits:     lactated ringers infusion, , Intravenous, Continuous, Haley Badillo MD, Last Rate: 10 mL/hr at 12/04/18 4872    Review of patient's allergies indicates:   Allergen Reactions    Lisinopril Other (See Comments)     Severe cough    Adhesive tape-silicones  "Itching    Furosemide Other (See Comments)     Almost gave her right sided heartfailure        Review of Systems   All remaining systems negative    Objective:     BP (!) 170/80   Pulse 81   Temp 98.8 °F (37.1 °C) (Temporal)   Ht 5' 6" (1.676 m)   Wt 101.9 kg (224 lb 8.6 oz)   SpO2 99%   BMI 36.24 kg/m²     Physical Exam  GEN: A&O fully, NAD  PSYC: Normal affect  CV: RRR, no M/G/R  PULM: CTA bilaterally, no wheezes, rales, crackles   EXT: No C/C/E, normal DP pulses bilaterally  NEURO: CN II-XII intact, 5/5 strength globally, no sensory losses, normal tandem gait, normal Romberg, 2+ DTRs globally      Lab Results   Component Value Date    WBC 5.17 11/20/2019    HGB 9.8 (L) 11/20/2019    HCT 31.2 (L) 11/20/2019     11/20/2019    ALT 13 11/20/2019    AST 25 11/20/2019     11/20/2019    K 4.0 11/20/2019    CL 97 11/20/2019    CREATININE 10.0 (H) 11/20/2019    BUN 18 11/20/2019    CO2 29 11/20/2019    CALCIUM 9.8 11/20/2019    INR 1.0 09/18/2018       Assessment:      1. CHRYSTAL (obstructive sleep apnea): High risk per BANG 5 (ESS 7). Will check PSG and have CPAP titration.     2.      Muscle spasm: Will refer to neurology for possible non-sedating options as well as full w/u for ?TIA.  3.      BMI >35: I encouraged the following permanent lifestyle modifications, particularly gradually and            systematically increasing physical activity (5-90 min/episode, 3-5 times/week), water (1/2 of body            weight in ounces) and avoiding potatoes, sugar sweetened beverages, red/processed meats (including             chicken), fast food, grains (rice/bread/pasta); and increasing yogurt, whole fruits, unsalted nuts to             3-5 servings/day, and daily weight logging; non-starchy vegetables, cooked beans, and un-fried            seafood have weak effects on weight.  4.      HTN: Not at goal on manual repeat. Risks and benefits discussed and patient chose to move forward            with holding on " increased hydralazine dose from 10 to 25 mg BID at this time.   5.      Paresthesias: Resolved. Likely 2/2 cutting off blood flow from sleeping on her right side. Encouraged            to sleep on her left side or supine.   6.      GERD: Risks and benefits discussed and patient chose to move forward with exchanging her            PPI with famotidine 40 mg po BID.    Plan:   CHRYSTAL (obstructive sleep apnea)  -     Polysomnography 4 or more parameters with CPAP; Future    Muscle spasm  -     Ambulatory consult to Neurology    ESRD (end stage renal disease)    Bilateral sciatica    Hypertension, unspecified type  -     Discontinue: hydrALAZINE (APRESOLINE) 10 MG tablet; Take 2 tablets (20 mg total) by mouth every 12 (twelve) hours.  Dispense: 120 tablet; Refill: 11  -     hydrALAZINE (APRESOLINE) 25 MG tablet; Take 1 tablet (25 mg total) by mouth every 12 (twelve) hours.  Dispense: 60 tablet; Refill: 11    Seizure-like activity  -     EEG; Future    Gastroesophageal reflux disease, esophagitis presence not specified  -     famotidine (PEPCID) 40 MG tablet; Take 1 tablet (40 mg total) by mouth 2 (two) times daily as needed for Heartburn.  Dispense: 60 tablet; Refill: 11      Follow up in about 2 weeks (around 1/30/2020), or if symptoms worsen or fail to improve, for FU on HTN, CHRYSTAL.    I spent >45 minutes of time with patient 50% or more of which was discussing labs and plans of care.

## 2020-01-17 ENCOUNTER — TELEPHONE (OUTPATIENT)
Dept: INTERNAL MEDICINE | Facility: CLINIC | Age: 30
End: 2020-01-17

## 2020-01-17 ENCOUNTER — HOSPITAL ENCOUNTER (OUTPATIENT)
Facility: HOSPITAL | Age: 30
Discharge: HOME | End: 2020-01-19
Attending: EMERGENCY MEDICINE | Admitting: INTERNAL MEDICINE
Payer: MEDICARE

## 2020-01-17 DIAGNOSIS — Z99.2 END STAGE RENAL DISEASE ON DIALYSIS: ICD-10-CM

## 2020-01-17 DIAGNOSIS — N18.6 END STAGE RENAL DISEASE ON DIALYSIS: ICD-10-CM

## 2020-01-17 DIAGNOSIS — R51.9 NEW ONSET HEADACHE: ICD-10-CM

## 2020-01-17 DIAGNOSIS — R20.0 NUMBNESS AND TINGLING: ICD-10-CM

## 2020-01-17 DIAGNOSIS — Z30.2 ENCOUNTER FOR STERILIZATION: ICD-10-CM

## 2020-01-17 DIAGNOSIS — M54.50 LOW BACK PAIN, UNSPECIFIED BACK PAIN LATERALITY, UNSPECIFIED CHRONICITY, UNSPECIFIED WHETHER SCIATICA PRESENT: ICD-10-CM

## 2020-01-17 DIAGNOSIS — E83.39 HYPERPHOSPHATEMIA: ICD-10-CM

## 2020-01-17 DIAGNOSIS — N18.6 ESRD (END STAGE RENAL DISEASE) ON DIALYSIS: ICD-10-CM

## 2020-01-17 DIAGNOSIS — E87.20 ACIDOSIS, METABOLIC: ICD-10-CM

## 2020-01-17 DIAGNOSIS — N04.1 FSGS (FOCAL SEGMENTAL GLOMERULOSCLEROSIS) WITH NEPHROSIS: ICD-10-CM

## 2020-01-17 DIAGNOSIS — R53.1 WEAKNESS: ICD-10-CM

## 2020-01-17 DIAGNOSIS — I63.9 CVA (CEREBRAL VASCULAR ACCIDENT): ICD-10-CM

## 2020-01-17 DIAGNOSIS — N99.71: ICD-10-CM

## 2020-01-17 DIAGNOSIS — K21.9 GASTROESOPHAGEAL REFLUX DISEASE, ESOPHAGITIS PRESENCE NOT SPECIFIED: ICD-10-CM

## 2020-01-17 DIAGNOSIS — E87.5 HYPERKALEMIA: Primary | ICD-10-CM

## 2020-01-17 DIAGNOSIS — H47.10 PAPILLEDEMA: ICD-10-CM

## 2020-01-17 DIAGNOSIS — I10 HYPERTENSION, UNSPECIFIED TYPE: ICD-10-CM

## 2020-01-17 DIAGNOSIS — Z99.2 ESRD (END STAGE RENAL DISEASE) ON DIALYSIS: ICD-10-CM

## 2020-01-17 DIAGNOSIS — N05.1 FSGS (FOCAL SEGMENTAL GLOMERULOSCLEROSIS): ICD-10-CM

## 2020-01-17 DIAGNOSIS — E66.9 OBESITY, UNSPECIFIED CLASSIFICATION, UNSPECIFIED OBESITY TYPE, UNSPECIFIED WHETHER SERIOUS COMORBIDITY PRESENT: ICD-10-CM

## 2020-01-17 DIAGNOSIS — N18.6 ESRD (END STAGE RENAL DISEASE): ICD-10-CM

## 2020-01-17 DIAGNOSIS — R20.2 NUMBNESS AND TINGLING: ICD-10-CM

## 2020-01-17 LAB
ALBUMIN SERPL BCP-MCNC: 3.9 G/DL (ref 3.5–5.2)
ALP SERPL-CCNC: 60 U/L (ref 55–135)
ALT SERPL W/O P-5'-P-CCNC: 13 U/L (ref 10–44)
ANION GAP SERPL CALC-SCNC: 15 MMOL/L (ref 8–16)
AST SERPL-CCNC: 13 U/L (ref 10–40)
BACTERIA #/AREA URNS HPF: NORMAL /HPF
BASOPHILS # BLD AUTO: 0.05 K/UL (ref 0–0.2)
BASOPHILS NFR BLD: 0.7 % (ref 0–1.9)
BILIRUB SERPL-MCNC: 0.7 MG/DL (ref 0.1–1)
BILIRUB UR QL STRIP: NEGATIVE
BNP SERPL-MCNC: 147 PG/ML (ref 0–99)
BUN SERPL-MCNC: 52 MG/DL (ref 6–20)
CALCIUM SERPL-MCNC: 8.8 MG/DL (ref 8.7–10.5)
CHLORIDE SERPL-SCNC: 99 MMOL/L (ref 95–110)
CK SERPL-CCNC: 147 U/L (ref 20–180)
CLARITY UR: CLEAR
CO2 SERPL-SCNC: 24 MMOL/L (ref 23–29)
COLOR UR: YELLOW
CREAT SERPL-MCNC: 16.3 MG/DL (ref 0.5–1.4)
DIFFERENTIAL METHOD: ABNORMAL
EOSINOPHIL # BLD AUTO: 0.1 K/UL (ref 0–0.5)
EOSINOPHIL NFR BLD: 1.9 % (ref 0–8)
ERYTHROCYTE [DISTWIDTH] IN BLOOD BY AUTOMATED COUNT: 13.7 % (ref 11.5–14.5)
EST. GFR  (AFRICAN AMERICAN): 3 ML/MIN/1.73 M^2
EST. GFR  (NON AFRICAN AMERICAN): 3 ML/MIN/1.73 M^2
GLUCOSE SERPL-MCNC: 66 MG/DL (ref 70–110)
GLUCOSE UR QL STRIP: ABNORMAL
HCT VFR BLD AUTO: 36.9 % (ref 37–48.5)
HGB BLD-MCNC: 11.7 G/DL (ref 12–16)
HGB UR QL STRIP: ABNORMAL
HYALINE CASTS #/AREA URNS LPF: 0 /LPF
IMM GRANULOCYTES # BLD AUTO: 0.02 K/UL (ref 0–0.04)
IMM GRANULOCYTES NFR BLD AUTO: 0.3 % (ref 0–0.5)
KETONES UR QL STRIP: NEGATIVE
LEUKOCYTE ESTERASE UR QL STRIP: NEGATIVE
LYMPHOCYTES # BLD AUTO: 1.8 K/UL (ref 1–4.8)
LYMPHOCYTES NFR BLD: 24.9 % (ref 18–48)
MCH RBC QN AUTO: 31.7 PG (ref 27–31)
MCHC RBC AUTO-ENTMCNC: 31.7 G/DL (ref 32–36)
MCV RBC AUTO: 100 FL (ref 82–98)
MICROSCOPIC COMMENT: NORMAL
MONOCYTES # BLD AUTO: 0.6 K/UL (ref 0.3–1)
MONOCYTES NFR BLD: 7.7 % (ref 4–15)
NEUTROPHILS # BLD AUTO: 4.8 K/UL (ref 1.8–7.7)
NEUTROPHILS NFR BLD: 64.5 % (ref 38–73)
NITRITE UR QL STRIP: NEGATIVE
NRBC BLD-RTO: 0 /100 WBC
PH UR STRIP: >8 [PH] (ref 5–8)
PLATELET # BLD AUTO: 154 K/UL (ref 150–350)
PMV BLD AUTO: 12.2 FL (ref 9.2–12.9)
POCT GLUCOSE: 210 MG/DL (ref 70–110)
POCT GLUCOSE: 77 MG/DL (ref 70–110)
POTASSIUM SERPL-SCNC: 6.7 MMOL/L (ref 3.5–5.1)
PROT SERPL-MCNC: 8 G/DL (ref 6–8.4)
PROT UR QL STRIP: ABNORMAL
RBC # BLD AUTO: 3.69 M/UL (ref 4–5.4)
RBC #/AREA URNS HPF: 1 /HPF (ref 0–4)
SODIUM SERPL-SCNC: 138 MMOL/L (ref 136–145)
SP GR UR STRIP: 1.01 (ref 1–1.03)
TROPONIN I SERPL DL<=0.01 NG/ML-MCNC: 0.01 NG/ML (ref 0–0.03)
URN SPEC COLLECT METH UR: ABNORMAL
UROBILINOGEN UR STRIP-ACNC: NEGATIVE EU/DL
WBC # BLD AUTO: 7.38 K/UL (ref 3.9–12.7)
WBC #/AREA URNS HPF: 2 /HPF (ref 0–5)

## 2020-01-17 PROCEDURE — 85025 COMPLETE CBC W/AUTO DIFF WBC: CPT

## 2020-01-17 PROCEDURE — 99215 PR OFFICE/OUTPT VISIT, EST, LEVL V, 40-54 MIN: ICD-10-PCS | Mod: ,,, | Performed by: INTERNAL MEDICINE

## 2020-01-17 PROCEDURE — 96365 THER/PROPH/DIAG IV INF INIT: CPT

## 2020-01-17 PROCEDURE — 82550 ASSAY OF CK (CPK): CPT

## 2020-01-17 PROCEDURE — 63600175 PHARM REV CODE 636 W HCPCS: Performed by: EMERGENCY MEDICINE

## 2020-01-17 PROCEDURE — 93005 ELECTROCARDIOGRAM TRACING: CPT

## 2020-01-17 PROCEDURE — G0378 HOSPITAL OBSERVATION PER HR: HCPCS

## 2020-01-17 PROCEDURE — 99285 EMERGENCY DEPT VISIT HI MDM: CPT | Mod: 25

## 2020-01-17 PROCEDURE — 81000 URINALYSIS NONAUTO W/SCOPE: CPT

## 2020-01-17 PROCEDURE — 93010 EKG 12-LEAD: ICD-10-PCS | Mod: ,,, | Performed by: INTERNAL MEDICINE

## 2020-01-17 PROCEDURE — 83880 ASSAY OF NATRIURETIC PEPTIDE: CPT

## 2020-01-17 PROCEDURE — 84484 ASSAY OF TROPONIN QUANT: CPT

## 2020-01-17 PROCEDURE — 63600175 PHARM REV CODE 636 W HCPCS: Performed by: PHYSICIAN ASSISTANT

## 2020-01-17 PROCEDURE — 25000003 PHARM REV CODE 250: Performed by: EMERGENCY MEDICINE

## 2020-01-17 PROCEDURE — 93010 ELECTROCARDIOGRAM REPORT: CPT | Mod: ,,, | Performed by: INTERNAL MEDICINE

## 2020-01-17 PROCEDURE — 99215 OFFICE O/P EST HI 40 MIN: CPT | Mod: ,,, | Performed by: PSYCHIATRY & NEUROLOGY

## 2020-01-17 PROCEDURE — 80100016 HC MAINTENANCE HEMODIALYSIS

## 2020-01-17 PROCEDURE — 99215 OFFICE O/P EST HI 40 MIN: CPT | Mod: ,,, | Performed by: INTERNAL MEDICINE

## 2020-01-17 PROCEDURE — 96375 TX/PRO/DX INJ NEW DRUG ADDON: CPT

## 2020-01-17 PROCEDURE — 99215 PR OFFICE/OUTPT VISIT, EST, LEVL V, 40-54 MIN: ICD-10-PCS | Mod: ,,, | Performed by: PSYCHIATRY & NEUROLOGY

## 2020-01-17 PROCEDURE — 80053 COMPREHEN METABOLIC PANEL: CPT

## 2020-01-17 PROCEDURE — 25000003 PHARM REV CODE 250: Performed by: PHYSICIAN ASSISTANT

## 2020-01-17 PROCEDURE — 63600175 PHARM REV CODE 636 W HCPCS: Performed by: INTERNAL MEDICINE

## 2020-01-17 PROCEDURE — G0257 UNSCHED DIALYSIS ESRD PT HOS: HCPCS

## 2020-01-17 RX ORDER — DEXTROSE MONOHYDRATE 100 MG/ML
25 INJECTION, SOLUTION INTRAVENOUS
Status: DISCONTINUED | OUTPATIENT
Start: 2020-01-17 | End: 2020-01-17

## 2020-01-17 RX ORDER — HEPARIN SODIUM 1000 [USP'U]/ML
1000 INJECTION, SOLUTION INTRAVENOUS; SUBCUTANEOUS ONCE
Status: COMPLETED | OUTPATIENT
Start: 2020-01-17 | End: 2020-01-17

## 2020-01-17 RX ORDER — DEXTROSE MONOHYDRATE 100 MG/ML
25 INJECTION, SOLUTION INTRAVENOUS
Status: COMPLETED | OUTPATIENT
Start: 2020-01-17 | End: 2020-01-17

## 2020-01-17 RX ORDER — HYDRALAZINE HYDROCHLORIDE 20 MG/ML
10 INJECTION INTRAMUSCULAR; INTRAVENOUS EVERY 6 HOURS PRN
Status: DISCONTINUED | OUTPATIENT
Start: 2020-01-17 | End: 2020-01-19 | Stop reason: HOSPADM

## 2020-01-17 RX ORDER — SODIUM CHLORIDE 9 MG/ML
INJECTION, SOLUTION INTRAVENOUS ONCE
Status: DISCONTINUED | OUTPATIENT
Start: 2020-01-17 | End: 2020-01-19 | Stop reason: HOSPADM

## 2020-01-17 RX ORDER — SODIUM CHLORIDE 9 MG/ML
INJECTION, SOLUTION INTRAVENOUS
Status: DISCONTINUED | OUTPATIENT
Start: 2020-01-17 | End: 2020-01-19 | Stop reason: HOSPADM

## 2020-01-17 RX ORDER — FAMOTIDINE 20 MG/1
20 TABLET, FILM COATED ORAL DAILY
Status: DISCONTINUED | OUTPATIENT
Start: 2020-01-18 | End: 2020-01-19 | Stop reason: HOSPADM

## 2020-01-17 RX ORDER — ONDANSETRON 8 MG/1
8 TABLET, ORALLY DISINTEGRATING ORAL EVERY 8 HOURS PRN
Status: DISCONTINUED | OUTPATIENT
Start: 2020-01-17 | End: 2020-01-19 | Stop reason: HOSPADM

## 2020-01-17 RX ORDER — METOPROLOL SUCCINATE 50 MG/1
100 TABLET, EXTENDED RELEASE ORAL DAILY
Status: DISCONTINUED | OUTPATIENT
Start: 2020-01-18 | End: 2020-01-19 | Stop reason: HOSPADM

## 2020-01-17 RX ORDER — ACETAMINOPHEN 325 MG/1
650 TABLET ORAL EVERY 6 HOURS PRN
Status: DISCONTINUED | OUTPATIENT
Start: 2020-01-17 | End: 2020-01-19 | Stop reason: HOSPADM

## 2020-01-17 RX ADMIN — LORAZEPAM 0.5 MG: 2 INJECTION INTRAMUSCULAR; INTRAVENOUS at 09:01

## 2020-01-17 RX ADMIN — DEXTROSE 25 G: 10 SOLUTION INTRAVENOUS at 01:01

## 2020-01-17 RX ADMIN — ACETAMINOPHEN 650 MG: 325 TABLET ORAL at 09:01

## 2020-01-17 RX ADMIN — HEPARIN SODIUM 1000 UNITS: 1000 INJECTION, SOLUTION INTRAVENOUS; SUBCUTANEOUS at 01:01

## 2020-01-17 RX ADMIN — INSULIN HUMAN 10 UNITS: 100 INJECTION, SOLUTION PARENTERAL at 01:01

## 2020-01-17 RX ADMIN — CALCIUM GLUCONATE 1 G: 98 INJECTION, SOLUTION INTRAVENOUS at 12:01

## 2020-01-17 NOTE — SUBJECTIVE & OBJECTIVE
"Past Medical History:   Diagnosis Date    Anemia     Anxiety     Brittle bones     Dialysis patient     Disorder of kidney and ureter     Dialysis 6x week during pregnancy, but 3x/wk as of 11/19/19    General anesthetics causing adverse effect in therapeutic use     pt states "im a light weight"    Hypertension     Seizures     Years ago       Past Surgical History:   Procedure Laterality Date    Arm surgery Right     x 2    cath in chest      HYSTEROSCOPY WITH HYDROTHERMAL ABLATION OF ENDOMETRIUM WITH DILATION AND CURETTAGE N/A 12/4/2018    Procedure: HYSTEROSCOPY, WITH DILATION AND CURETTAGE OF UTERUS AND HYDROTHERMAL ENDOMETRIAL ABLATION;  Surgeon: Tiara Red MD;  Location: [facility] OR;  Service: OB/GYN;  Laterality: N/A;  ATTEMPTED     LAPAROSCOPIC SALPINGECTOMY Bilateral 12/4/2018    Procedure: SALPINGECTOMY, LAPAROSCOPIC;  Surgeon: Tiara Red MD;  Location: [facility] OR;  Service: OB/GYN;  Laterality: Bilateral;    RENAL BIOPSY      tumor removed      from face       Review of patient's allergies indicates:   Allergen Reactions    Lisinopril Other (See Comments)     Severe cough    Adhesive tape-silicones Itching    Furosemide Other (See Comments)     Almost gave her right sided heartfailure     Current Facility-Administered Medications   Medication Frequency    calcium gluconate 1g in dextrose 5% 100mL (ready to mix system) ED 1 Time    dextrose 10 % infusion ED 1 Time    insulin regular injection 10 Units ED 1 Time    sodium polystyrene 15 gram/60 mL suspension 15 g ED 1 Time     Current Outpatient Medications   Medication    hydrALAZINE (APRESOLINE) 25 MG tablet    metoprolol succinate (TOPROL-XL) 100 MG 24 hr tablet    promethazine (PHENERGAN) 25 MG tablet    sucroferric oxyhydroxide (VELPHORO) 500 mg Chew    famotidine (PEPCID) 40 MG tablet    sevelamer carbonate (RENVELA) 800 mg Tab     Facility-Administered Medications Ordered in Other Encounters   Medication Frequency    " lactated ringers infusion Continuous     Family History     Problem Relation (Age of Onset)    Breast cancer Paternal Grandmother    Diabetes Maternal Grandmother    Heart attack Maternal Grandmother    Hypertension Father    Lung cancer Maternal Grandfather    Prostate cancer Maternal Grandfather        Tobacco Use    Smoking status: Never Smoker    Smokeless tobacco: Never Used   Substance and Sexual Activity    Alcohol use: No    Drug use: No    Sexual activity: Yes     Partners: Male     Review of Systems   Constitutional: Negative for fatigue and fever.   HENT: Negative for congestion.    Eyes: Negative for visual disturbance.   Respiratory: Negative for cough, shortness of breath and wheezing.    Cardiovascular: Negative for chest pain and palpitations.   Gastrointestinal: Negative for abdominal pain, diarrhea, nausea and vomiting.   Genitourinary: Negative for difficulty urinating and dysuria.   Musculoskeletal: Negative for joint swelling.   Skin: Negative for rash.   Neurological: Positive for weakness. Negative for headaches.        She reports bilateral LE weakness and right arm weakness. Also with intermittent tingling sensation and intermittent eye flickering.      Objective:     Vital Signs (Most Recent):  Temp: 98.1 °F (36.7 °C) (01/17/20 0928)  Pulse: 73 (01/17/20 1119)  Resp: 16 (01/17/20 1119)  BP: (!) 181/94 (01/17/20 1119)  SpO2: 100 % (01/17/20 1119)  O2 Device (Oxygen Therapy): room air (01/17/20 0928) Vital Signs (24h Range):  Temp:  [98.1 °F (36.7 °C)-98.8 °F (37.1 °C)] 98.1 °F (36.7 °C)  Pulse:  [73-81] 73  Resp:  [16-20] 16  SpO2:  [99 %-100 %] 100 %  BP: (144-202)/() 181/94     Weight: 101.9 kg (224 lb 10.4 oz) (01/17/20 0928)  Body mass index is 36.26 kg/m².  Body surface area is 2.18 meters squared.    No intake/output data recorded.    Physical Exam   Constitutional: She is oriented to person, place, and time. She appears well-developed and well-nourished.   HENT:   Head:  Normocephalic.   Eyes: Pupils are equal, round, and reactive to light.   Neck: No thyromegaly present.   Cardiovascular: Normal rate and regular rhythm. Exam reveals no friction rub.   Pulmonary/Chest: Effort normal and breath sounds normal. She has no wheezes. She exhibits no tenderness.   Abdominal: Soft. Bowel sounds are normal. She exhibits no distension. There is no tenderness.   Musculoskeletal: She exhibits no edema.   Lymphadenopathy:     She has no cervical adenopathy.   Neurological: She is alert and oriented to person, place, and time.   Bilateral LE weakness. Mild right arm weakness. Visible flickering of eyes upon closure.    Skin: Skin is warm and dry. No rash noted.   Psychiatric: She has a normal mood and affect.       Significant Labs:  Lab Results   Component Value Date    CREATININE 16.3 (H) 01/17/2020    BUN 52 (H) 01/17/2020     01/17/2020    K 6.7 (HH) 01/17/2020    CL 99 01/17/2020    CO2 24 01/17/2020     Lab Results   Component Value Date    CALCIUM 8.8 01/17/2020    PHOS 5.5 (H) 11/19/2019     Lab Results   Component Value Date    ALBUMIN 3.9 01/17/2020     Lab Results   Component Value Date    WBC 7.38 01/17/2020    HGB 11.7 (L) 01/17/2020    HCT 36.9 (L) 01/17/2020     (H) 01/17/2020     01/17/2020       No results for input(s): MG in the last 168 hours.      Significant Imaging:  Imaging Results          X-Ray Chest AP Portable (Final result)  Result time 01/17/20 11:08:40    Final result by Melquiades Ramirez MD (01/17/20 11:08:40)                 Impression:      No acute radiographic abnormality in the chest      Electronically signed by: Melquiades Ramirez  Date:    01/17/2020  Time:    11:08             Narrative:    EXAMINATION:  XR CHEST AP PORTABLE    CLINICAL HISTORY:  weakness;    TECHNIQUE:  Single frontal view of the chest was performed.    COMPARISON:  Chest radiograph 11/20/2019    FINDINGS:  Cardiac leads project over the chest.  Cardiomediastinal silhouette  enlarged by AP technique.  Trachea is midline.  Lungs appear symmetrically expanded.  No acute or new consolidative opacity or effusion.  No pneumothorax.  Osseous structures appear intact.                               CT Head Without Contrast (Final result)  Result time 01/17/20 10:35:55    Final result by Melquiades Ramirez MD (01/17/20 10:35:55)                 Impression:      No CT evidence of acute intracranial abnormality.    All CT scans at this facility use dose modulation, iterative reconstruction, and/or weight base dosing when appropriate to reduce radiation dose to as low as reasonably achievable.      Electronically signed by: Melquiades Ramirez  Date:    01/17/2020  Time:    10:35             Narrative:    EXAMINATION:  CT HEAD WITHOUT CONTRAST    CLINICAL HISTORY:  Stroke;    TECHNIQUE:  Contiguous axial images were obtained from the skull base through the vertex without intravenous contrast.    COMPARISON:  None    FINDINGS:  No intracranial hemorrhage. No mass effect or midline shift. Normal parenchymal attenuation. The ventricles and sulci are normal in size and configuration. The paranasal sinuses and mastoid air cells are clear.  No concerning osseous findings.

## 2020-01-17 NOTE — HPI
"29 year old female with ESRD, anemia, HTN, seizures presents to [facility] with right sided weakness. She reports that she has experienced at least 3 episodes of "tingling" that went over her whole body during the past 3-4 days. Tingling sensation started abruptly and lasted several minutes (up to 30 minutes). She also reports bilateral LE weakness and mild right arm weakness. Patient also reports that her eyes to "flicker" upon closure. Patient went to Urgent Care 2-3 days ago and work-up (including CT scan ) has been negative. Patient's record lists seizures but patient as not aware of this and denies the use of seizure medications. She dialyzes on MWF schedule and was last dialyzed on Wednesday (1/1/15/20).     Nephrology was consulted to help with patient's renal care while she is admitted at [facility]. I saw and examined patient in her hospital room. Patient    Patient dialyzes on MWF schedule at [facility] under my care. Access is right arm AVF.   "

## 2020-01-17 NOTE — ED PROVIDER NOTES
SCRIBE #1 NOTE: I, Farida García, am scribing for, and in the presence of, Adi Elam MD. I have scribed the entire note.       History     Chief Complaint   Patient presents with    Extremity Weakness     R sided weakness started Tues pt reports tingling and heaviness     Review of patient's allergies indicates:   Allergen Reactions    Lisinopril Other (See Comments)     Severe cough    Adhesive tape-silicones Itching    Furosemide Other (See Comments)     Almost gave her right sided heartfailure         History of Present Illness     HPI    1/17/2020, 9:39 AM  History obtained from the patient      History of Present Illness: Lisseth Schwartz is a 29 y.o. female patient with a PMHx of anemia, HTN, seizures, and current dialysis  who presents to the Emergency Department for evaluation of right-sided weakness of the RUE and RLE which onset suddenly early this morning at approximately 2 AM. Pt reports that initially 3 days ago (Tuesday morning) she woke-up with a numbness sensation of her right facial region. Pt states that she then went back to bed and woke-up and her mouth was numb. Following, pt went to urgent care on Tuesday afternoon who advised the pt to go to the ED following a CT head. Pt woke-up this morning at 2 AM with right-sided weakness and then came to the ED for evaluation. Symptoms are constant and moderate in severity. No mitigating or exacerbating factors reported. Associated sxs include numbness (face/ mouth) Patient denies any fever/ chills, SOB, cough, CP, palpations, HA, dizziness, rash, wound, abdominal pain, n/v/d, back/ neck pain, sore throat, congestion, dysuria, hematuria, easily bruising, and all other sxs at this time. Prior Tx includes CT head and urgent care. No further complaints or concerns at this time.     Arrival mode: Personal vehicle     PCP: Joe Sinclair MD        Past Medical History:  Past Medical History:   Diagnosis Date    Anemia     Anxiety      "Brittle bones     Dialysis patient     Disorder of kidney and ureter     Dialysis 6x week during pregnancy, but 3x/wk as of 11/19/19    General anesthetics causing adverse effect in therapeutic use     pt states "im a light weight"    Hypertension     Seizures     Years ago       Past Surgical History:  Past Surgical History:   Procedure Laterality Date    Arm surgery Right     x 2    cath in chest      HYSTEROSCOPY WITH HYDROTHERMAL ABLATION OF ENDOMETRIUM WITH DILATION AND CURETTAGE N/A 12/4/2018    Procedure: HYSTEROSCOPY, WITH DILATION AND CURETTAGE OF UTERUS AND HYDROTHERMAL ENDOMETRIAL ABLATION;  Surgeon: Tiara Red MD;  Location: [facility] OR;  Service: OB/GYN;  Laterality: N/A;  ATTEMPTED     LAPAROSCOPIC SALPINGECTOMY Bilateral 12/4/2018    Procedure: SALPINGECTOMY, LAPAROSCOPIC;  Surgeon: Tiara Red MD;  Location: [facility] OR;  Service: OB/GYN;  Laterality: Bilateral;    RENAL BIOPSY      tumor removed      from face         Family History:  Family History   Problem Relation Age of Onset    Hypertension Father     Breast cancer Paternal Grandmother     Diabetes Maternal Grandmother     Heart attack Maternal Grandmother     Lung cancer Maternal Grandfather     Prostate cancer Maternal Grandfather        Social History:  Social History     Tobacco Use    Smoking status: Never Smoker    Smokeless tobacco: Never Used   Substance and Sexual Activity    Alcohol use: No    Drug use: No    Sexual activity: Yes     Partners: Male        Review of Systems     Review of Systems   Constitutional: Negative for chills and fever.   HENT: Negative for congestion and sore throat.    Respiratory: Negative for cough and shortness of breath.    Cardiovascular: Negative for chest pain and palpitations.   Gastrointestinal: Negative for abdominal pain, diarrhea, nausea and vomiting.   Genitourinary: Negative for dysuria and hematuria.   Musculoskeletal: Negative for back pain and neck pain.   Skin: " Negative for rash and wound.   Neurological: Positive for weakness (right sided) and numbness (face/ mouth). Negative for dizziness, speech difficulty and headaches.   Hematological: Does not bruise/bleed easily.   All other systems reviewed and are negative.     Physical Exam     Initial Vitals [01/17/20 0928]   BP Pulse Resp Temp SpO2   (!) 202/103 74 20 98.1 °F (36.7 °C) 100 %      MAP       --          Physical Exam  Nursing Notes and Vital Signs Reviewed.  Constitutional: Patient is in no acute distress. Well-developed and well-nourished.  Head: Atraumatic. Normocephalic.  Eyes: PERRL. EOM intact. Conjunctivae are not pale. No scleral icterus.  ENT: Mucous membranes are moist. Oropharynx is clear and symmetric.    Neck: Supple. Full ROM. No lymphadenopathy.  Cardiovascular: Regular rate. Regular rhythm. No murmurs, rubs, or gallops. Distal pulses are 2+ and symmetric.  Pulmonary/Chest: No respiratory distress. Clear to auscultation bilaterally. No wheezing or rales.  Abdominal: Soft and non-distended.  There is no tenderness.  No rebound, guarding, or rigidity. Good bowel sounds.  Genitourinary: No CVA tenderness  Musculoskeletal: Moves all extremities. No obvious deformities. No edema. No calf tenderness.  Skin: Warm and dry.  Neurological: Patient is alert and oriented to person, place and time. Pupils ERRL and EOM normal. Cranial nerves II-XII are intact. Strength is 3/5 in the RUE and RLE extremities. Strength is 5/5 of the LUE and LLE. There is no pronator drift of outstretched arms. Light touch sense is intact. Speech is clear and normal. No acute focal neurological deficits noted.  Psychiatric: Normal affect. Good eye contact. Appropriate in content.     ED Course   Procedures  ED Vital Signs:  Vitals:    01/17/20 0928 01/17/20 1038 01/17/20 1040 01/17/20 1119   BP: (!) 202/103 (!) 170/103  (!) 181/94   Pulse: 74 77 75 73   Resp: 20   16   Temp: 98.1 °F (36.7 °C)      TempSrc: Oral      SpO2: 100% 100%   "100%   Weight: 101.9 kg (224 lb 10.4 oz)      Height: 5' 6" (1.676 m)       01/17/20 1301   BP: (!) 183/99   Pulse: 68   Resp: (!) 21   Temp:    TempSrc:    SpO2: 100%   Weight:    Height:        Abnormal Lab Results:  Labs Reviewed   CBC W/ AUTO DIFFERENTIAL - Abnormal; Notable for the following components:       Result Value    RBC 3.69 (*)     Hemoglobin 11.7 (*)     Hematocrit 36.9 (*)     Mean Corpuscular Volume 100 (*)     Mean Corpuscular Hemoglobin 31.7 (*)     Mean Corpuscular Hemoglobin Conc 31.7 (*)     All other components within normal limits   URINALYSIS, REFLEX TO URINE CULTURE - Abnormal; Notable for the following components:    pH, UA >8.0 (*)     Protein, UA 2+ (*)     Glucose, UA Trace (*)     Occult Blood UA Trace (*)     All other components within normal limits    Narrative:     Preferred Collection Type->Urine, Clean Catch   B-TYPE NATRIURETIC PEPTIDE - Abnormal; Notable for the following components:     (*)     All other components within normal limits   COMPREHENSIVE METABOLIC PANEL - Abnormal; Notable for the following components:    Potassium 6.7 (*)     Glucose 66 (*)     BUN, Bld 52 (*)     Creatinine 16.3 (*)     eGFR if  3 (*)     eGFR if non  3 (*)     All other components within normal limits    Narrative:        POTASSIUM critical result(s) called and verbal readback obtained   from JURGEN MCGHEE RN by [facility] 01/17/2020 12:26   POCT GLUCOSE - Abnormal; Notable for the following components:    POCT Glucose 210 (*)     All other components within normal limits   URINALYSIS MICROSCOPIC    Narrative:     Preferred Collection Type->Urine, Clean Catch   CK   TROPONIN I   HEPATITIS B SURFACE ANTIGEN   HEPATITIS B SURFACE ANTIBODY, QUAL/QUANT        All Lab Results:  Results for orders placed or performed during the hospital encounter of 01/17/20   CBC auto differential   Result Value Ref Range    WBC 7.38 3.90 - 12.70 K/uL    RBC 3.69 (L) 4.00 - 5.40 " M/uL    Hemoglobin 11.7 (L) 12.0 - 16.0 g/dL    Hematocrit 36.9 (L) 37.0 - 48.5 %    Mean Corpuscular Volume 100 (H) 82 - 98 fL    Mean Corpuscular Hemoglobin 31.7 (H) 27.0 - 31.0 pg    Mean Corpuscular Hemoglobin Conc 31.7 (L) 32.0 - 36.0 g/dL    RDW 13.7 11.5 - 14.5 %    Platelets 154 150 - 350 K/uL    MPV 12.2 9.2 - 12.9 fL    Immature Granulocytes 0.3 0.0 - 0.5 %    Gran # (ANC) 4.8 1.8 - 7.7 K/uL    Immature Grans (Abs) 0.02 0.00 - 0.04 K/uL    Lymph # 1.8 1.0 - 4.8 K/uL    Mono # 0.6 0.3 - 1.0 K/uL    Eos # 0.1 0.0 - 0.5 K/uL    Baso # 0.05 0.00 - 0.20 K/uL    nRBC 0 0 /100 WBC    Gran% 64.5 38.0 - 73.0 %    Lymph% 24.9 18.0 - 48.0 %    Mono% 7.7 4.0 - 15.0 %    Eosinophil% 1.9 0.0 - 8.0 %    Basophil% 0.7 0.0 - 1.9 %    Differential Method Automated    Urinalysis, Reflex to Urine Culture Urine, Clean Catch   Result Value Ref Range    Specimen UA Urine, Clean Catch     Color, UA Yellow Yellow, Straw, Carmen    Appearance, UA Clear Clear    pH, UA >8.0 (A) 5.0 - 8.0    Specific Gravity, UA 1.010 1.005 - 1.030    Protein, UA 2+ (A) Negative    Glucose, UA Trace (A) Negative    Ketones, UA Negative Negative    Bilirubin (UA) Negative Negative    Occult Blood UA Trace (A) Negative    Nitrite, UA Negative Negative    Urobilinogen, UA Negative <2.0 EU/dL    Leukocytes, UA Negative Negative   Brain natriuretic peptide   Result Value Ref Range     (H) 0 - 99 pg/mL   Urinalysis Microscopic   Result Value Ref Range    RBC, UA 1 0 - 4 /hpf    WBC, UA 2 0 - 5 /hpf    Bacteria None None-Occ /hpf    Hyaline Casts, UA 0 0-1/lpf /lpf    Microscopic Comment SEE COMMENT    Comprehensive metabolic panel   Result Value Ref Range    Sodium 138 136 - 145 mmol/L    Potassium 6.7 (HH) 3.5 - 5.1 mmol/L    Chloride 99 95 - 110 mmol/L    CO2 24 23 - 29 mmol/L    Glucose 66 (L) 70 - 110 mg/dL    BUN, Bld 52 (H) 6 - 20 mg/dL    Creatinine 16.3 (H) 0.5 - 1.4 mg/dL    Calcium 8.8 8.7 - 10.5 mg/dL    Total Protein 8.0 6.0 - 8.4 g/dL     Albumin 3.9 3.5 - 5.2 g/dL    Total Bilirubin 0.7 0.1 - 1.0 mg/dL    Alkaline Phosphatase 60 55 - 135 U/L    AST 13 10 - 40 U/L    ALT 13 10 - 44 U/L    Anion Gap 15 8 - 16 mmol/L    eGFR if African American 3 (A) >60 mL/min/1.73 m^2    eGFR if non African American 3 (A) >60 mL/min/1.73 m^2   CK   Result Value Ref Range     20 - 180 U/L   Troponin I   Result Value Ref Range    Troponin I 0.015 0.000 - 0.026 ng/mL   POCT glucose   Result Value Ref Range    POCT Glucose 210 (H) 70 - 110 mg/dL         Imaging Results:  Imaging Results          X-Ray Chest AP Portable (Final result)  Result time 01/17/20 11:08:40    Final result by Melquiades Ramirez MD (01/17/20 11:08:40)                 Impression:      No acute radiographic abnormality in the chest      Electronically signed by: Melquiades Ramirez  Date:    01/17/2020  Time:    11:08             Narrative:    EXAMINATION:  XR CHEST AP PORTABLE    CLINICAL HISTORY:  weakness;    TECHNIQUE:  Single frontal view of the chest was performed.    COMPARISON:  Chest radiograph 11/20/2019    FINDINGS:  Cardiac leads project over the chest.  Cardiomediastinal silhouette enlarged by AP technique.  Trachea is midline.  Lungs appear symmetrically expanded.  No acute or new consolidative opacity or effusion.  No pneumothorax.  Osseous structures appear intact.                               CT Head Without Contrast (Final result)  Result time 01/17/20 10:35:55    Final result by Melquiades Ramirez MD (01/17/20 10:35:55)                 Impression:      No CT evidence of acute intracranial abnormality.    All CT scans at this facility use dose modulation, iterative reconstruction, and/or weight base dosing when appropriate to reduce radiation dose to as low as reasonably achievable.      Electronically signed by: Melquiades Ramirez  Date:    01/17/2020  Time:    10:35             Narrative:    EXAMINATION:  CT HEAD WITHOUT CONTRAST    CLINICAL  HISTORY:  Stroke;    TECHNIQUE:  Contiguous axial images were obtained from the skull base through the vertex without intravenous contrast.    COMPARISON:  None    FINDINGS:  No intracranial hemorrhage. No mass effect or midline shift. Normal parenchymal attenuation. The ventricles and sulci are normal in size and configuration. The paranasal sinuses and mastoid air cells are clear.  No concerning osseous findings.                                 The EKG was ordered, reviewed, and independently interpreted by the ED provider.  Interpretation time: 9:58  Rate: 70 BPM  Rhythm: normal sinus rhythm  Interpretation: Possible left atrial enlargement. Borderline ECG. No STEMI.           The Emergency Provider reviewed the vital signs and test results, which are outlined above.     ED Discussion     12:39 PM: Discussed pt's case with Dr. Andres Hoyt (Nephrology) who is aware of the pt.     12:50 PM: Re-evaluated pt. I have discussed test results, shared treatment plan, and the need for admission with patient and family at bedside. Pt and family express understanding at this time and agree with all information. All questions answered. Pt and family have no further questions or concerns at this time. Pt is ready for admit.    12:50 PM: Discussed case with VIKKI Eddy, (Hospital Medicine). VIKKI Eddy, agrees with current care and management of pt and accepts admission.   Admitting Service: Hospital Medicine  Admitting Physician: Dr. Field  Admit to: Observation/ Tele       Medical Decision Making:   Clinical Tests:   Lab Tests: Ordered and Reviewed  Radiological Study: Ordered and Reviewed  Medical Tests: Ordered and Reviewed           ED Medication(s):  Medications   sodium polystyrene 15 gram/60 mL suspension 15 g (15 g Oral Not Given 1/17/20 1245)   nozaseptin (NOZIN) nasal  (has no administration in time range)   0.9%  NaCl infusion (has no administration in time range)   0.9%  NaCl infusion  (has no administration in time range)   heparin (porcine) injection 1,000 Units (has no administration in time range)   calcium gluconate 1g in dextrose 5% 100mL (ready to mix system) (0 g Intravenous Stopped 1/17/20 1311)   dextrose 10 % infusion (25 g Intravenous New Bag 1/17/20 1308)   insulin regular injection 10 Units (10 Units Intravenous Given 1/17/20 1335)   dextrose 10 % infusion (25 g Intravenous New Bag 1/17/20 1314)       New Prescriptions    No medications on file               Scribe Attestation:   Scribe #1: I performed the above scribed service and the documentation accurately describes the services I performed. I attest to the accuracy of the note.     Attending:   Physician Attestation Statement for Scribe #1: I, Adi Elam MD, personally performed the services described in this documentation, as scribed by Farida García, in my presence, and it is both accurate and complete.           Clinical Impression       ICD-10-CM ICD-9-CM   1. Hyperkalemia E87.5 276.7   2. Weakness R53.1 780.79   3. ESRD (end stage renal disease) on dialysis N18.6 585.6    Z99.2 V45.11       Disposition:   Disposition: Placed in Observation  Condition: Fair         Adi Elam MD  01/17/20 0120

## 2020-01-17 NOTE — TELEPHONE ENCOUNTER
----- Message from Joe Sinclair MD sent at 1/17/2020  8:23 AM CST -----  Contact: pt   Dear KRYSTA Pierce, I spoke with Ms. Schwartz and her mother is driving her to the ER for evaluation for possible stroke and she understands how to schedule her urgent f/u that I had ordered with neurology.    Joe Sinclair MD    ----- Message -----  From: Stan Liu LPN  Sent: 1/17/2020   8:13 AM CST  To: Joe Sinclair MD     urgent  ----- Message -----  From: Tiki Pierce  Sent: 1/17/2020   7:21 AM CST  To: Dottie Diaz Staff    Type:  Needs Medical Advice    Who Called: APOORVA SCHWARTZJANE   Symptoms (please be specific):   How long has patient had these symptoms:   Pharmacy name and phone #:    Would the patient rather a call back or a response via [facility]?  Call   Best Call Back Number: [phone] (home)    Additional Information: Pt is requesting a call back from the nurse in regards to the pt having a TIA stroke mid morning this morning the pt states that her left side is heavy I put the pt in contact with the on call line

## 2020-01-17 NOTE — ED NOTES
Dr. Elam aware of patient's blood pressure. States to continue monitoring this. No new orders at this time.

## 2020-01-17 NOTE — CONSULTS
"[facility] -   Nephrology  Consult Note          Patient Name: Lisseth Schwartz  MRN: [id number]  Admission Date: 1/17/2020  Hospital Length of Stay: 0 days  Attending Provider: Adi Ealm MD   Primary Care Physician: Joe Sinclair MD  Principal Problem:<principal problem not specified>    Consults  Subjective:     HPI: 29 year old female with ESRD, anemia, HTN, seizures [place] [facility] [place] with right sided weakness. She reports that she has experienced at least 3 episodes of "tingling" that went over her whole body during the past 3-4 days. Tingling sensation started abruptly and lasted several minutes (up to 30 minutes). She also reports bilateral LE weakness and mild right arm weakness. Patient also reports that her eyes to "flicker" upon closure. Patient went to Urgent Care 2-3 days ago and work-up (including CT scan ) has been negative. Patient's record lists seizures but patient as not aware of this and denies the use of seizure medications. She dialyzes on MWF schedule and was last dialyzed on Wednesday (1/1/15/20).     Nephrology was consulted to help with patient's renal care while she is admitted at [facility] [place] [facility]. I saw and examined patient in her hospital room. Patient    Patient dial[place] on [place] [facility] [place] my care. Access is right arm AVF.     Past Medical History:   Diagnosis Date    Anemia     Anxiety     Brittle bones     Dialysis patient     Disorder of kidney and ureter     Dialysis 6x week during pregnancy, but 3x/wk as of 11/19/19    General anesthetics causing adverse effect in therapeutic use     pt states "im a light weight"    Hypertension     Seizures     Years ago       Past Surgical History:   Procedure Laterality Date    Arm surgery Right     x 2    cath in chest      HYSTEROSCOPY WITH HYDROTHERMAL ABLATION OF ENDOMETRIUM WITH DILATION AND CURETTAGE N/A 12/4/2018    Procedure: HYSTEROSCOPY, WITH DILATION AND CURETTAGE OF UTERUS AND HYDROTHERMAL " ENDOMETRIAL ABLATION;  Surgeon: Tiara Red MD;  Location: [facility] OR;  Service: OB/GYN;  Laterality: N/A;  ATTEMPTED     LAPAROSCOPIC SALPINGECTOMY Bilateral 12/4/2018    Procedure: SALPINGECTOMY, LAPAROSCOPIC;  Surgeon: Tiraa Red MD;  Location: [facility] OR;  Service: OB/GYN;  Laterality: Bilateral;    RENAL BIOPSY      tumor removed      from face       Review of patient's allergies indicates:   Allergen Reactions    Lisinopril Other (See Comments)     Severe cough    Adhesive tape-silicones Itching    Furosemide Other (See Comments)     Almost gave her right sided heartfailure     Current Facility-Administered Medications   Medication Frequency    calcium gluconate 1g in dextrose 5% 100mL (ready to mix system) ED 1 Time    dextrose 10 % infusion ED 1 Time    insulin regular injection 10 Units ED 1 Time    sodium polystyrene 15 gram/60 mL suspension 15 g ED 1 Time     Current Outpatient Medications   Medication    hydrALAZINE (APRESOLINE) 25 MG tablet    metoprolol succinate (TOPROL-XL) 100 MG 24 hr tablet    promethazine (PHENERGAN) 25 MG tablet    sucroferric oxyhydroxide (VELPHORO) 500 mg Chew    famotidine (PEPCID) 40 MG tablet    sevelamer carbonate (RENVELA) 800 mg Tab     Facility-Administered Medications Ordered in Other Encounters   Medication Frequency    lactated ringers infusion Continuous     Family History     Problem Relation (Age of Onset)    Breast cancer Paternal Grandmother    Diabetes Maternal Grandmother    Heart attack Maternal Grandmother    Hypertension Father    Lung cancer Maternal Grandfather    Prostate cancer Maternal Grandfather        Tobacco Use    Smoking status: Never Smoker    Smokeless tobacco: Never Used   Substance and Sexual Activity    Alcohol use: No    Drug use: No    Sexual activity: Yes     Partners: Male     Review of Systems   Constitutional: Negative for fatigue and fever.   HENT: Negative for congestion.    Eyes: Negative for visual  disturbance.   Respiratory: Negative for cough, shortness of breath and wheezing.    Cardiovascular: Negative for chest pain and palpitations.   Gastrointestinal: Negative for abdominal pain, diarrhea, nausea and vomiting.   Genitourinary: Negative for difficulty urinating and dysuria.   Musculoskeletal: Negative for joint swelling.   Skin: Negative for rash.   Neurological: Positive for weakness. Negative for headaches.        She reports bilateral LE weakness and right arm weakness. Also with intermittent tingling sensation and intermittent eye flickering.      Objective:     Vital Signs (Most Recent):  Temp: 98.1 °F (36.7 °C) (01/17/20 0928)  Pulse: 73 (01/17/20 1119)  Resp: 16 (01/17/20 1119)  BP: (!) 181/94 (01/17/20 1119)  SpO2: 100 % (01/17/20 1119)  O2 Device (Oxygen Therapy): room air (01/17/20 0928) Vital Signs (24h Range):  Temp:  [98.1 °F (36.7 °C)-98.8 °F (37.1 °C)] 98.1 °F (36.7 °C)  Pulse:  [73-81] 73  Resp:  [16-20] 16  SpO2:  [99 %-100 %] 100 %  BP: (144-202)/() 181/94     Weight: 101.9 kg (224 lb 10.4 oz) (01/17/20 0928)  Body mass index is 36.26 kg/m².  Body surface area is 2.18 meters squared.    No intake/output data recorded.    Physical Exam   Constitutional: She is oriented to person, place, and time. She appears well-developed and well-nourished.   HENT:   Head: Normocephalic.   Eyes: Pupils are equal, round, and reactive to light.   Neck: No thyromegaly present.   Cardiovascular: Normal rate and regular rhythm. Exam reveals no friction rub.   Pulmonary/Chest: Effort normal and breath sounds normal. She has no wheezes. She exhibits no tenderness.   Abdominal: Soft. Bowel sounds are normal. She exhibits no distension. There is no tenderness.   Musculoskeletal: She exhibits no edema.   Lymphadenopathy:     She has no cervical adenopathy.   Neurological: She is alert and oriented to person, place, and time.   Bilateral LE weakness. Mild right arm weakness. Visible flickering of eyes upon  closure.    Skin: Skin is warm and dry. No rash noted.   Psychiatric: She has a normal mood and affect.       Significant Labs:  Lab Results   Component Value Date    CREATININE 16.3 (H) 01/17/2020    BUN 52 (H) 01/17/2020     01/17/2020    K 6.7 (HH) 01/17/2020    CL 99 01/17/2020    CO2 24 01/17/2020     Lab Results   Component Value Date    CALCIUM 8.8 01/17/2020    PHOS 5.5 (H) 11/19/2019     Lab Results   Component Value Date    ALBUMIN 3.9 01/17/2020     Lab Results   Component Value Date    WBC 7.38 01/17/2020    HGB 11.7 (L) 01/17/2020    HCT 36.9 (L) 01/17/2020     (H) 01/17/2020     01/17/2020       No results for input(s): MG in the last 168 hours.      Significant Imaging:  Imaging Results          X-Ray Chest AP Portable (Final result)  Result time 01/17/20 11:08:40    Final result by Melquiades Ramirez MD (01/17/20 11:08:40)                 Impression:      No acute radiographic abnormality in the chest      Electronically signed by: Melquiades Ramirez  Date:    01/17/2020  Time:    11:08             Narrative:    EXAMINATION:  XR CHEST AP PORTABLE    CLINICAL HISTORY:  weakness;    TECHNIQUE:  Single frontal view of the chest was performed.    COMPARISON:  Chest radiograph 11/20/2019    FINDINGS:  Cardiac leads project over the chest.  Cardiomediastinal silhouette enlarged by AP technique.  Trachea is midline.  Lungs appear symmetrically expanded.  No acute or new consolidative opacity or effusion.  No pneumothorax.  Osseous structures appear intact.                               CT Head Without Contrast (Final result)  Result time 01/17/20 10:35:55    Final result by Melquiades Ramirez MD (01/17/20 10:35:55)                 Impression:      No CT evidence of acute intracranial abnormality.    All CT scans at this facility use dose modulation, iterative reconstruction, and/or weight base dosing when appropriate to reduce radiation dose to as low as reasonably  achievable.      Electronically signed by: Melquiades Ramirez  Date:    01/17/2020  Time:    10:35             Narrative:    EXAMINATION:  CT HEAD WITHOUT CONTRAST    CLINICAL HISTORY:  Stroke;    TECHNIQUE:  Contiguous axial images were obtained from the skull base through the vertex without intravenous contrast.    COMPARISON:  None    FINDINGS:  No intracranial hemorrhage. No mass effect or midline shift. Normal parenchymal attenuation. The ventricles and sulci are normal in size and configuration. The paranasal sinuses and mastoid air cells are clear.  No concerning osseous findings.                                  Assessment/Plan:     Weakness  As per hospitalist. Consider Neurology referral.     Hyperkalemia  Will use 1K/2K bath during dialysis.     ESRD (end stage renal disease)  Patient dialyzes on MWF schedule at [facility].    Next HD today.    Access: right arm AVF.         Thank you for your consult. I will follow-up with patient. Please contact us if you have any additional questions.    Andres Hoyt MD   Nephrology  [facility] -

## 2020-01-17 NOTE — SUBJECTIVE & OBJECTIVE
"Past Medical History:   Diagnosis Date    Anemia     Anxiety     Brittle bones     Dialysis patient     Disorder of kidney and ureter     Dialysis 6x week during pregnancy, but 3x/wk as of 11/19/19    General anesthetics causing adverse effect in therapeutic use     pt states "im a light weight"    Hypertension     Seizures     Years ago       Past Surgical History:   Procedure Laterality Date    Arm surgery Right     x 2    cath in chest      HYSTEROSCOPY WITH HYDROTHERMAL ABLATION OF ENDOMETRIUM WITH DILATION AND CURETTAGE N/A 12/4/2018    Procedure: HYSTEROSCOPY, WITH DILATION AND CURETTAGE OF UTERUS AND HYDROTHERMAL ENDOMETRIAL ABLATION;  Surgeon: Tiara Red MD;  Location: [facility] OR;  Service: OB/GYN;  Laterality: N/A;  ATTEMPTED     LAPAROSCOPIC SALPINGECTOMY Bilateral 12/4/2018    Procedure: SALPINGECTOMY, LAPAROSCOPIC;  Surgeon: Tiara Red MD;  Location: [facility] OR;  Service: OB/GYN;  Laterality: Bilateral;    RENAL BIOPSY      tumor removed      from face       Review of patient's allergies indicates:   Allergen Reactions    Lisinopril Other (See Comments)     Severe cough    Adhesive tape-silicones Itching    Furosemide Other (See Comments)     Almost gave her right sided heartfailure       Current Facility-Administered Medications on File Prior to Encounter   Medication    lactated ringers infusion     Current Outpatient Medications on File Prior to Encounter   Medication Sig    hydrALAZINE (APRESOLINE) 25 MG tablet Take 1 tablet (25 mg total) by mouth every 12 (twelve) hours.    metoprolol succinate (TOPROL-XL) 100 MG 24 hr tablet TAKE ONE TABLET BY MOUTH EVERY DAY    promethazine (PHENERGAN) 25 MG tablet Take 1 tablet (25 mg total) by mouth every 6 (six) hours as needed for Nausea.    sucroferric oxyhydroxide (VELPHORO) 500 mg Chew Take 500 mg by mouth 3 (three) times daily with meals.     famotidine (PEPCID) 40 MG tablet Take 1 tablet (40 mg total) by mouth 2 (two) times " daily as needed for Heartburn.    sevelamer carbonate (RENVELA) 800 mg Tab Take 800 mg by mouth 3 (three) times daily with meals.     Family History     Problem Relation (Age of Onset)    Breast cancer Paternal Grandmother    Diabetes Maternal Grandmother    Heart attack Maternal Grandmother    Hypertension Father    Lung cancer Maternal Grandfather    Prostate cancer Maternal Grandfather        Tobacco Use    Smoking status: Never Smoker    Smokeless tobacco: Never Used   Substance and Sexual Activity    Alcohol use: No    Drug use: No    Sexual activity: Yes     Partners: Male     Review of Systems   Constitutional: Negative for appetite change, chills, diaphoresis, fatigue and fever.   HENT: Positive for trouble swallowing. Negative for congestion, ear pain, mouth sores and sore throat.    Eyes: Negative for pain and visual disturbance.   Respiratory: Negative for cough, chest tightness and shortness of breath.    Cardiovascular: Negative for chest pain, palpitations and leg swelling.   Gastrointestinal: Negative for abdominal pain, constipation and nausea.   Endocrine: Negative for cold intolerance, heat intolerance, polydipsia and polyuria.   Genitourinary: Negative for dysuria, frequency and hematuria.   Musculoskeletal: Negative for arthralgias, back pain, myalgias and neck pain.   Skin: Negative for pallor, rash and wound.   Allergic/Immunologic: Negative for environmental allergies and immunocompromised state.   Neurological: Positive for weakness and numbness. Negative for dizziness, seizures, syncope and headaches.   Hematological: Negative for adenopathy. Does not bruise/bleed easily.   Psychiatric/Behavioral: Negative for agitation, confusion and sleep disturbance.     Objective:     Vital Signs (Most Recent):  Temp: 98.3 °F (36.8 °C) (01/17/20 1350)  Pulse: 80 (01/17/20 1540)  Resp: 18 (01/17/20 1350)  BP: (!) 138/94 (01/17/20 1540)  SpO2: 100 % (01/17/20 1301) Vital Signs (24h Range):  Temp:   [98.1 °F (36.7 °C)-98.4 °F (36.9 °C)] 98.3 °F (36.8 °C)  Pulse:  [64-85] 80  Resp:  [16-21] 18  SpO2:  [100 %] 100 %  BP: (138-202)/() 138/94     Weight: 101.9 kg (224 lb 10.4 oz)  Body mass index is 36.26 kg/m².    Physical Exam   Constitutional: She is oriented to person, place, and time. She appears well-developed and well-nourished. No distress.   HENT:   Head: Normocephalic and atraumatic.   Eyes: Conjunctivae are normal.   PERRL   Neck: Neck supple. No JVD present.   Cardiovascular: Normal rate, regular rhythm and normal heart sounds.   Pulmonary/Chest: Effort normal and breath sounds normal.   Abdominal: Soft. Bowel sounds are normal. She exhibits no distension. There is no tenderness.   Musculoskeletal: Normal range of motion.   Dialysis access right upper extremity   Neurological: She is alert and oriented to person, place, and time.   Diffuse weakness of all extremitiesl. Intermittent, voluntary closing of eyes with fluttering lasting seconds.    Skin: Skin is warm and dry.   Psychiatric: She has a normal mood and affect. Her behavior is normal. Thought content normal.   Nursing note and vitals reviewed.          Significant Labs:   CBC:   Recent Labs   Lab 01/17/20  1007   WBC 7.38   HGB 11.7*   HCT 36.9*        CMP:   Recent Labs   Lab 01/17/20  1126      K 6.7*   CL 99   CO2 24   GLU 66*   BUN 52*   CREATININE 16.3*   CALCIUM 8.8   PROT 8.0   ALBUMIN 3.9   BILITOT 0.7   ALKPHOS 60   AST 13   ALT 13   ANIONGAP 15   EGFRNONAA 3*     All pertinent labs within the past 24 hours have been reviewed.    Significant Imaging: I have reviewed all pertinent imaging results/findings within the past 24 hours.   Imaging Results          X-Ray Chest AP Portable (Final result)  Result time 01/17/20 11:08:40    Final result by Melquiades Ramirez MD (01/17/20 11:08:40)                 Impression:      No acute radiographic abnormality in the chest      Electronically signed by: Melquiades  James  Date:    01/17/2020  Time:    11:08             Narrative:    EXAMINATION:  XR CHEST AP PORTABLE    CLINICAL HISTORY:  weakness;    TECHNIQUE:  Single frontal view of the chest was performed.    COMPARISON:  Chest radiograph 11/20/2019    FINDINGS:  Cardiac leads project over the chest.  Cardiomediastinal silhouette enlarged by AP technique.  Trachea is midline.  Lungs appear symmetrically expanded.  No acute or new consolidative opacity or effusion.  No pneumothorax.  Osseous structures appear intact.                               CT Head Without Contrast (Final result)  Result time 01/17/20 10:35:55    Final result by Melquiades Ramirez MD (01/17/20 10:35:55)                 Impression:      No CT evidence of acute intracranial abnormality.    All CT scans at this facility use dose modulation, iterative reconstruction, and/or weight base dosing when appropriate to reduce radiation dose to as low as reasonably achievable.      Electronically signed by: Melquiades Ramirez  Date:    01/17/2020  Time:    10:35             Narrative:    EXAMINATION:  CT HEAD WITHOUT CONTRAST    CLINICAL HISTORY:  Stroke;    TECHNIQUE:  Contiguous axial images were obtained from the skull base through the vertex without intravenous contrast.    COMPARISON:  None    FINDINGS:  No intracranial hemorrhage. No mass effect or midline shift. Normal parenchymal attenuation. The ventricles and sulci are normal in size and configuration. The paranasal sinuses and mastoid air cells are clear.  No concerning osseous findings.

## 2020-01-17 NOTE — CONSULTS
Subjective:       Patient ID: Lisseth Schwartz is a 29 y.o. female.    Chief Complaint: Extremity Weakness (R sided weakness started Tues pt reports tingling and heaviness)        HPI     The patient presented to the  Emergency Department on 01- for evaluation of RUE and RLE weakness that started at 02:00 am. She was in her USOH till 3 days PTA (Tuesday morning 01-) she woke-up with a numbness sensation of her RT side of the face. She then went back to bed and woke-up and her mouth was numb. No trauma. No LOC/LUCILA. No seizures.BP in the ER was 202/103. Labs show K 6.7 with BUN/Cr 52/16.3. CTH was unremarkable. The patient, actually, describes longstanding history of multiple neurological symptoms including multifocal spasms, weakness, numbness and visual changes. Brain MRI and L-spine MRI were ordered and pending. The patient is undergoing HD dialysis right now.       Medical comorbidities include HTN, ESRD, ACD, Seizures.      Review of Systems   Constitutional: Positive for fatigue. Negative for appetite change.   HENT: Negative for hearing loss and tinnitus.    Eyes: Positive for visual disturbance. Negative for photophobia.   Respiratory: Negative for apnea and shortness of breath.    Cardiovascular: Negative for chest pain and palpitations.   Gastrointestinal: Negative for nausea and vomiting.   Endocrine: Negative for cold intolerance and heat intolerance.   Genitourinary: Negative for difficulty urinating and urgency.   Musculoskeletal: Negative for arthralgias, back pain, gait problem, joint swelling, myalgias, neck pain and neck stiffness.   Skin: Negative for color change and rash.   Allergic/Immunologic: Negative for environmental allergies and immunocompromised state.   Neurological: Positive for weakness and numbness. Negative for dizziness, tremors, seizures, syncope, facial asymmetry, speech difficulty, light-headedness and headaches.   Hematological: Negative for adenopathy. Does  not bruise/bleed easily.   Psychiatric/Behavioral: Positive for dysphoric mood. Negative for agitation, behavioral problems, confusion, decreased concentration, hallucinations, self-injury, sleep disturbance and suicidal ideas. The patient is nervous/anxious. The patient is not hyperactive.                  Current Facility-Administered Medications:     0.9%  NaCl infusion, , Intravenous, PRN, Andres Hoyt MD    0.9%  NaCl infusion, , Intravenous, Once, Andres Hoyt MD    lorazepam (ATIVAN) injection 0.5 mg, 0.5 mg, Intravenous, Once PRN, VIKKI Eddy    nozaseptin (NOZIN) nasal , , Each Nostril, BID, Andres Hoyt MD    sodium polystyrene 15 gram/60 mL suspension 15 g, 15 g, Oral, ED 1 Time, Adi Elam MD    Current Outpatient Medications:     hydrALAZINE (APRESOLINE) 25 MG tablet, Take 1 tablet (25 mg total) by mouth every 12 (twelve) hours., Disp: 60 tablet, Rfl: 11    metoprolol succinate (TOPROL-XL) 100 MG 24 hr tablet, TAKE ONE TABLET BY MOUTH EVERY DAY, Disp: 30 tablet, Rfl: 2    promethazine (PHENERGAN) 25 MG tablet, Take 1 tablet (25 mg total) by mouth every 6 (six) hours as needed for Nausea., Disp: 20 tablet, Rfl: 0    sucroferric oxyhydroxide (VELPHORO) 500 mg Chew, Take 500 mg by mouth 3 (three) times daily with meals. , Disp: , Rfl:     famotidine (PEPCID) 40 MG tablet, Take 1 tablet (40 mg total) by mouth 2 (two) times daily as needed for Heartburn., Disp: 60 tablet, Rfl: 11    sevelamer carbonate (RENVELA) 800 mg Tab, Take 800 mg by mouth 3 (three) times daily with meals., Disp: , Rfl:     Facility-Administered Medications Ordered in Other Encounters:     lactated ringers infusion, , Intravenous, Continuous, Haley Badillo MD, Last Rate: 10 mL/hr at 12/04/18 1421  Past Medical History:   Diagnosis Date    Anemia     Anxiety     Brittle bones     ESRD (end stage renal disease)     M/W/F    General anesthetics causing adverse effect in  "therapeutic use     pt states "im a light weight"    Hypertension     Seizures     Years ago     Past Surgical History:   Procedure Laterality Date    Arm surgery Right     x 2    cath in chest      HYSTEROSCOPY WITH HYDROTHERMAL ABLATION OF ENDOMETRIUM WITH DILATION AND CURETTAGE N/A 12/4/2018    Procedure: HYSTEROSCOPY, WITH DILATION AND CURETTAGE OF UTERUS AND HYDROTHERMAL ENDOMETRIAL ABLATION;  Surgeon: Tiara Red MD;  Location: [facility];  Service: OB/GYN;  Laterality: N/A;  ATTEMPTED     LAPAROSCOPIC SALPINGECTOMY Bilateral 12/4/2018    Procedure: SALPINGECTOMY, LAPAROSCOPIC;  Surgeon: Tiara Red MD;  Location: [facility];  Service: OB/GYN;  Laterality: Bilateral;    RENAL BIOPSY      tumor removed      from face     Social History     Socioeconomic History    Marital status: Legally      Spouse name: Not on file    Number of children: Not on file    Years of education: Not on file    Highest education level: Not on file   Occupational History    Not on file   Social Needs    Financial resource strain: Not on file    Food insecurity:     Worry: Not on file     Inability: Not on file    Transportation needs:     Medical: Not on file     Non-medical: Not on file   Tobacco Use    Smoking status: Never Smoker    Smokeless tobacco: Never Used   Substance and Sexual Activity    Alcohol use: No    Drug use: No    Sexual activity: Yes     Partners: Male   Lifestyle    Physical activity:     Days per week: Not on file     Minutes per session: Not on file    Stress: Not on file   Relationships    Social connections:     Talks on phone: Not on file     Gets together: Not on file     Attends [employer] service: Not on file     Active member of club or organization: Not on file     Attends meetings of clubs or organizations: Not on file     Relationship status: Not on file   Other Topics Concern    Not on file   Social History Narrative    Not on file             Past/Current " Medical/Surgical History, Past/Current Social History, Past/Current Family History and Past/Current Medications were reviewed in detail.        Objective:           VITAL SIGNS WERE REVIEWED      GENERAL APPEARANCE:     The patient looks comfortable but anxious and nervous.     Body habitus is obese.     No signs of respiratory distress.    Normal breathing pattern.    No dysmorphic features    Normal eye contact.     GENERAL MEDICAL EXAM:    HEENT:  Head is atraumatic normocephalic.   No tender temporal arteries. Fundoscopic (Ophthalmoscopic) exam showed no disc edema.      Neck and Axillae: No JVD. No visible lesions.  No carotid bruits. No thyromegaly. No lymphadenopathy.    Cardiopulmonary: No cyanosis. No tachypnea. Normal respiratory effort.  Clear breath sounds. S1, S2 with regular rhythm . No murmurs.     Gastrointestinal/Urogenital:  No jaundice. No stomas or lesions. No visible hernias. No catheters.   Abdomen is soft non-tender. No masses or organomegaly.    Skin, Hair and Nails: No pathognonomic skin rash. No neurofibromatosis. No visible lesions.No stigmata of autoimmune disease. No clubbing.  Skin is warm and moist. No palpable masses.    Limbs: No varicose veins. No visible swelling. RT AVF.   No palpable edema. Pulses are symmetric. Pedal pulses are palpable.      Muskoskeletal: No visible deformities.No visible lesions.  Lower spine tenderness. No signs of longstanding neuropathy. No dislocations or fractures.            Neurologic Exam     Mental Status   Oriented to person, place, and time.   Registration: recalls 3 of 3 objects. Recall at 5 minutes: recalls 3 of 3 objects. Follows 3 step commands.   Attention: normal. Concentration: normal.   Speech: speech is normal   Level of consciousness: alert  Knowledge: good and consistent with education. Able to perform simple calculations.   Able to name object. Able to read. Able to repeat. Able to write. Normal comprehension.     Cranial Nerves     CN  II   Visual fields full to confrontation.   Visual acuity: normal  Right visual field deficit: none  Left visual field deficit: none     CN III, IV, VI   Pupils are equal, round, and reactive to light.  Extraocular motions are normal.   Right pupil: Size: 2 mm. Shape: regular. Reactivity: brisk. Consensual response: intact. Accommodation: intact.   Left pupil: Size: 2 mm. Shape: regular. Reactivity: brisk. Consensual response: intact. Accommodation: intact.   CN III: no CN III palsy  CN VI: no CN VI palsy  Nystagmus: none   Diplopia: none  Ophthalmoparesis: none  Upgaze: normal  Downgaze: normal  Conjugate gaze: present  Vestibulo-ocular reflex: present    CN V   Facial sensation intact.   Right facial sensation deficit: none  Left facial sensation deficit: none  Right corneal reflex: normal  Left corneal reflex: normal    CN VII   Right facial weakness: none  Left facial weakness: none  Right taste: normal  Left taste: normal    CN VIII   CN VIII normal.   Hearing: intact  Right Rinne: AC > BC  Left Rinne: AC > BC  Garber: does not lateralize     CN IX, X   CN IX normal.   CN X normal.   Palate: symmetric  Right gag reflex: normal  Left gag reflex: normal    CN XI   CN XI normal.   Right sternocleidomastoid strength: normal  Left sternocleidomastoid strength: normal  Right trapezius strength: normal  Left trapezius strength: normal    CN XII   CN XII normal.   Tongue: not atrophic  Fasciculations: absent  Tongue deviation: none    Motor Exam   Muscle bulk: normal  Overall muscle tone: normal  Right arm tone: normal  Left arm tone: normal  Right arm pronator drift: absent  Left arm pronator drift: absent  Right leg tone: normal  Left leg tone: normal    Strength   Strength 5/5 throughout.   Right neck flexion: 5/5  Left neck flexion: 5/5  Right neck extension: 5/5  Left neck extension: 5/5  Right deltoid: 5/5  Left deltoid: 5/5  Right biceps: 5/5  Left biceps: 5/5  Right triceps: 5/5  Left triceps: 5/5  Right wrist  flexion: 5/5  Left wrist flexion: 5/5  Right wrist extension: 5/5  Left wrist extension: 5/5  Right interossei: 5/5  Left interossei: 5/5  Right abdominals: 5/5  Left abdominals: 5/5  Right iliopsoas: 5/5  Left iliopsoas: 5/5  Right quadriceps: 5/5  Left quadriceps: 5/5  Right hamstrin/5  Left hamstrin/5  Right glutei: 5/5  Left glutei: 5/5  Right anterior tibial: 5/5  Left anterior tibial: 5/5  Right posterior tibial: 5/5  Left posterior tibial: 5/5  Right peroneal: 5/5  Left peroneal: 5/5  Right gastroc: 5/5  Left gastroc: 55    Sensory Exam   Light touch normal.   Right arm light touch: normal  Left arm light touch: normal  Right leg light touch: normal  Left leg light touch: normal  Vibration normal.   Right arm vibration: normal  Left arm vibration: normal  Right leg vibration: normal  Left leg vibration: normal  Proprioception normal.   Right arm proprioception: normal  Left arm proprioception: normal  Right leg proprioception: normal  Left leg proprioception: normal  Pinprick normal.   Right arm pinprick: normal  Left arm pinprick: normal  Right leg pinprick: normal  Left leg pinprick: normal  Graphesthesia: normal  Stereognosis: normal    Gait, Coordination, and Reflexes     Gait  Gait: (Deferred )    Coordination   Romberg: negative  Finger to nose coordination: normal  Heel to shin coordination: normal  Tandem walking coordination: normal    Tremor   Resting tremor: absent  Intention tremor: absent  Action tremor: absent    Reflexes   Right brachioradialis: 2+  Left brachioradialis: 2+  Right biceps: 2+  Left biceps: 2+  Right triceps: 2+  Left triceps: 2+  Right patellar: 2+  Left patellar: 2+  Right achilles: 1+  Left achilles: 1+  Right plantar: normal  Left plantar: normal  Right Goldstein: absent  Left Goldstein: absent  Right ankle clonus: absent  Left ankle clonus: absent  Right pendular knee jerk: absent  Left pendular knee jerk: absent      Lab Results   Component Value Date    WBC 7.38  01/17/2020    HGB 11.7 (L) 01/17/2020    HCT 36.9 (L) 01/17/2020     (H) 01/17/2020     01/17/2020     Sodium   Date Value Ref Range Status   01/17/2020 138 136 - 145 mmol/L Final     Potassium   Date Value Ref Range Status   01/17/2020 6.7 (HH) 3.5 - 5.1 mmol/L Final     Comment:     No visible hemolysis  POTASSIUM critical result(s) called and verbal readback obtained   from JURGEN MCGHEE RN by [facility] 01/17/2020 12:26       Chloride   Date Value Ref Range Status   01/17/2020 99 95 - 110 mmol/L Final     CO2   Date Value Ref Range Status   01/17/2020 24 23 - 29 mmol/L Final     Glucose   Date Value Ref Range Status   01/17/2020 66 (L) 70 - 110 mg/dL Final     BUN, Bld   Date Value Ref Range Status   01/17/2020 52 (H) 6 - 20 mg/dL Final     Creatinine   Date Value Ref Range Status   01/17/2020 16.3 (H) 0.5 - 1.4 mg/dL Final     Calcium   Date Value Ref Range Status   01/17/2020 8.8 8.7 - 10.5 mg/dL Final     Total Protein   Date Value Ref Range Status   01/17/2020 8.0 6.0 - 8.4 g/dL Final     Albumin   Date Value Ref Range Status   01/17/2020 3.9 3.5 - 5.2 g/dL Final     Total Bilirubin   Date Value Ref Range Status   01/17/2020 0.7 0.1 - 1.0 mg/dL Final     Comment:     For infants and newborns, interpretation of results should be based  on gestational age, weight and in agreement with clinical  observations.  Premature Infant recommended reference ranges:  Up to 24 hours.............<8.0 mg/dL  Up to 48 hours............<12.0 mg/dL  3-5 days..................<15.0 mg/dL  6-29 days.................<15.0 mg/dL       Alkaline Phosphatase   Date Value Ref Range Status   01/17/2020 60 55 - 135 U/L Final     AST   Date Value Ref Range Status   01/17/2020 13 10 - 40 U/L Final     ALT   Date Value Ref Range Status   01/17/2020 13 10 - 44 U/L Final     Anion Gap   Date Value Ref Range Status   01/17/2020 15 8 - 16 mmol/L Final     eGFR if    Date Value Ref Range Status   01/17/2020 3 (A) >60  mL/min/1.73 m^2 Final     eGFR if non    Date Value Ref Range Status   01/17/2020 3 (A) >60 mL/min/1.73 m^2 Final     Comment:     Calculation used to obtain the estimated glomerular filtration  rate (eGFR) is the CKD-EPI equation.          01-    BP in the ER was 202/103.     Labs show K 6.7 with BUN/Cr 52/16.3.     CTH was unremarkable.      Reviewed the neuroimaging independently       Assessment:       1. Hyperkalemia    2. Weakness    3. ESRD (end stage renal disease) on dialysis    4. CVA (cerebral vascular accident)        Plan:           MULTIPLE LONGSTANDING NEUROLOGICAL SYMPTOMS    POSSIBLE HYPERTENSIVE-UREMIC ENCEPHALOPATHY AND IRVIN     Will review Brain MRI and L-spine MRI once completed.    If the Brain MRI and L-spine MRI do not explain the symptoms, will add C and T spine MRIs for comprehensive evaluation              MEDICAL/SURGICAL COMORBIDITIES     All relevant medical comorbidities noted and managed by primary care physician and medical care team.                I spent 90 minutes on the hospital unit and at the bedside rendering services for the patient including reviewing the chart, interviewing the patient, examining the patient, writing my notes and communicating my findings/recommendations with the patient/patient's family and primary team.          Cheryle Laurent MD, FAAN    Attending Neurologist/Epileptologist         Diplomate, American Board of Psychiatry and Neurology    Diplomate, American Board of Clinical Neurophysiology     Fellow, American Academy of Neurology

## 2020-01-17 NOTE — HPI
Lisseth Galvin is a 29 year old female who is post-partum 15 months and still breast feeding. She has ESRD on dialysis Mondays, Wednesdays and Fridays. On 1/14/20, she began having episodic numbness and heaviness in bilateral upper and lower extremities. She also notes numbness in her throat and an inability to tolerate liquids. The initial onset of symptoms was noted upon waking from a nap. Symptoms lasted several minutes and resolved spontaneously. She was referred to the hospital for further evaluation, but did not go. On 1/16/20, she was seen by her PCP who planned an outpatient work up including a sleep study and Neurology evaluation. She notes that since the onset of symptoms, she has experienced worsening lower extremity weakness. On the day of presentation, she notes similar symptoms primarily involving her right upper extremity, but notes heaviness and abnormal sensation in all extremities. The patient also reports an abnormal fluttering of her eyes when she closes them that began at the time of presentation. She reports chronic low back pain radiating into her right hip, but not down her leg as well as intermittent muscle spasms involving her extremities and face since the onset of her renal disease. She denies focal weakness, loss of bowel or bladder continence and difficulty with speech. The patient reports compliance with medications, but denies compliance with a renal diet. She states that she eats whatever she wants in moderation. In the ED, labs were significant for a potassium of 6.7. CT scan of the head was negative.

## 2020-01-17 NOTE — ASSESSMENT & PLAN NOTE
-Status post calcium, insulin and glucose.   -Further management with dialysis.   -Discussed the importance of following a renal diet.

## 2020-01-18 LAB
ALBUMIN SERPL BCP-MCNC: 3.8 G/DL (ref 3.5–5.2)
ALP SERPL-CCNC: 67 U/L (ref 55–135)
ALT SERPL W/O P-5'-P-CCNC: 13 U/L (ref 10–44)
ANION GAP SERPL CALC-SCNC: 13 MMOL/L (ref 8–16)
ANION GAP SERPL CALC-SCNC: 15 MMOL/L (ref 8–16)
AST SERPL-CCNC: 10 U/L (ref 10–40)
BASOPHILS # BLD AUTO: 0.05 K/UL (ref 0–0.2)
BASOPHILS # BLD AUTO: 0.06 K/UL (ref 0–0.2)
BASOPHILS NFR BLD: 0.7 % (ref 0–1.9)
BASOPHILS NFR BLD: 0.7 % (ref 0–1.9)
BILIRUB SERPL-MCNC: 0.5 MG/DL (ref 0.1–1)
BUN SERPL-MCNC: 35 MG/DL (ref 6–20)
BUN SERPL-MCNC: 38 MG/DL (ref 6–20)
CALCIUM SERPL-MCNC: 7.3 MG/DL (ref 8.7–10.5)
CALCIUM SERPL-MCNC: 9.1 MG/DL (ref 8.7–10.5)
CHLORIDE SERPL-SCNC: 100 MMOL/L (ref 95–110)
CHLORIDE SERPL-SCNC: 103 MMOL/L (ref 95–110)
CHOLEST SERPL-MCNC: 116 MG/DL (ref 120–199)
CHOLEST/HDLC SERPL: 4.5 {RATIO} (ref 2–5)
CO2 SERPL-SCNC: 23 MMOL/L (ref 23–29)
CO2 SERPL-SCNC: 23 MMOL/L (ref 23–29)
CREAT SERPL-MCNC: 11.2 MG/DL (ref 0.5–1.4)
CREAT SERPL-MCNC: 12.4 MG/DL (ref 0.5–1.4)
DIFFERENTIAL METHOD: ABNORMAL
DIFFERENTIAL METHOD: ABNORMAL
EOSINOPHIL # BLD AUTO: 0.2 K/UL (ref 0–0.5)
EOSINOPHIL # BLD AUTO: 0.4 K/UL (ref 0–0.5)
EOSINOPHIL NFR BLD: 2.9 % (ref 0–8)
EOSINOPHIL NFR BLD: 4 % (ref 0–8)
ERYTHROCYTE [DISTWIDTH] IN BLOOD BY AUTOMATED COUNT: 13.7 % (ref 11.5–14.5)
ERYTHROCYTE [DISTWIDTH] IN BLOOD BY AUTOMATED COUNT: 13.8 % (ref 11.5–14.5)
EST. GFR  (AFRICAN AMERICAN): 4 ML/MIN/1.73 M^2
EST. GFR  (AFRICAN AMERICAN): 5 ML/MIN/1.73 M^2
EST. GFR  (NON AFRICAN AMERICAN): 4 ML/MIN/1.73 M^2
EST. GFR  (NON AFRICAN AMERICAN): 4 ML/MIN/1.73 M^2
ESTIMATED AVG GLUCOSE: 88 MG/DL (ref 68–131)
ESTIMATED PA SYSTOLIC PRESSURE: 29.21
GLUCOSE SERPL-MCNC: 77 MG/DL (ref 70–110)
GLUCOSE SERPL-MCNC: 95 MG/DL (ref 70–110)
HBA1C MFR BLD HPLC: 4.7 % (ref 4–5.6)
HCT VFR BLD AUTO: 34.2 % (ref 37–48.5)
HCT VFR BLD AUTO: 36.8 % (ref 37–48.5)
HDLC SERPL-MCNC: 26 MG/DL (ref 40–75)
HDLC SERPL: 22.4 % (ref 20–50)
HGB BLD-MCNC: 11.3 G/DL (ref 12–16)
HGB BLD-MCNC: 11.5 G/DL (ref 12–16)
IMM GRANULOCYTES # BLD AUTO: 0.02 K/UL (ref 0–0.04)
IMM GRANULOCYTES # BLD AUTO: 0.02 K/UL (ref 0–0.04)
IMM GRANULOCYTES NFR BLD AUTO: 0.2 % (ref 0–0.5)
IMM GRANULOCYTES NFR BLD AUTO: 0.3 % (ref 0–0.5)
LDLC SERPL CALC-MCNC: 62.4 MG/DL (ref 63–159)
LYMPHOCYTES # BLD AUTO: 1.8 K/UL (ref 1–4.8)
LYMPHOCYTES # BLD AUTO: 1.8 K/UL (ref 1–4.8)
LYMPHOCYTES NFR BLD: 20.2 % (ref 18–48)
LYMPHOCYTES NFR BLD: 24.6 % (ref 18–48)
MAGNESIUM SERPL-MCNC: 2.1 MG/DL (ref 1.6–2.6)
MCH RBC QN AUTO: 32.3 PG (ref 27–31)
MCH RBC QN AUTO: 32.7 PG (ref 27–31)
MCHC RBC AUTO-ENTMCNC: 31.3 G/DL (ref 32–36)
MCHC RBC AUTO-ENTMCNC: 33 G/DL (ref 32–36)
MCV RBC AUTO: 103 FL (ref 82–98)
MCV RBC AUTO: 99 FL (ref 82–98)
MONOCYTES # BLD AUTO: 0.5 K/UL (ref 0.3–1)
MONOCYTES # BLD AUTO: 0.6 K/UL (ref 0.3–1)
MONOCYTES NFR BLD: 6.3 % (ref 4–15)
MONOCYTES NFR BLD: 7.4 % (ref 4–15)
NEUTROPHILS # BLD AUTO: 4.6 K/UL (ref 1.8–7.7)
NEUTROPHILS # BLD AUTO: 6.3 K/UL (ref 1.8–7.7)
NEUTROPHILS NFR BLD: 64.1 % (ref 38–73)
NEUTROPHILS NFR BLD: 68.6 % (ref 38–73)
NONHDLC SERPL-MCNC: 90 MG/DL
NRBC BLD-RTO: 0 /100 WBC
NRBC BLD-RTO: 0 /100 WBC
PHOSPHATE SERPL-MCNC: 8.5 MG/DL (ref 2.7–4.5)
PLATELET # BLD AUTO: 141 K/UL (ref 150–350)
PLATELET # BLD AUTO: 156 K/UL (ref 150–350)
PLATELET BLD QL SMEAR: ABNORMAL
PMV BLD AUTO: 12 FL (ref 9.2–12.9)
PMV BLD AUTO: 12.2 FL (ref 9.2–12.9)
POTASSIUM SERPL-SCNC: 4.6 MMOL/L (ref 3.5–5.1)
POTASSIUM SERPL-SCNC: 5.6 MMOL/L (ref 3.5–5.1)
PROT SERPL-MCNC: 8.1 G/DL (ref 6–8.4)
RBC # BLD AUTO: 3.46 M/UL (ref 4–5.4)
RBC # BLD AUTO: 3.56 M/UL (ref 4–5.4)
RETIRED EF AND QEF - SEE NOTES: 60 (ref 55–65)
SODIUM SERPL-SCNC: 138 MMOL/L (ref 136–145)
SODIUM SERPL-SCNC: 139 MMOL/L (ref 136–145)
STOMATOCYTES BLD QL SMEAR: PRESENT
TRICUSPID VALVE REGURGITATION: NORMAL
TRIGL SERPL-MCNC: 138 MG/DL (ref 30–150)
WBC # BLD AUTO: 7.14 K/UL (ref 3.9–12.7)
WBC # BLD AUTO: 9.11 K/UL (ref 3.9–12.7)

## 2020-01-18 PROCEDURE — 84100 ASSAY OF PHOSPHORUS: CPT

## 2020-01-18 PROCEDURE — 99214 OFFICE O/P EST MOD 30 MIN: CPT | Mod: ,,, | Performed by: INTERNAL MEDICINE

## 2020-01-18 PROCEDURE — G0378 HOSPITAL OBSERVATION PER HR: HCPCS

## 2020-01-18 PROCEDURE — 93306 TTE W/DOPPLER COMPLETE: CPT

## 2020-01-18 PROCEDURE — 87340 HEPATITIS B SURFACE AG IA: CPT

## 2020-01-18 PROCEDURE — 97167 OT EVAL HIGH COMPLEX 60 MIN: CPT

## 2020-01-18 PROCEDURE — 93306 TTE W/DOPPLER COMPLETE: CPT | Mod: 26,,, | Performed by: INTERNAL MEDICINE

## 2020-01-18 PROCEDURE — 85025 COMPLETE CBC W/AUTO DIFF WBC: CPT

## 2020-01-18 PROCEDURE — 80061 LIPID PANEL: CPT

## 2020-01-18 PROCEDURE — 80048 BASIC METABOLIC PNL TOTAL CA: CPT

## 2020-01-18 PROCEDURE — 92610 EVALUATE SWALLOWING FUNCTION: CPT

## 2020-01-18 PROCEDURE — 25000003 PHARM REV CODE 250: Performed by: PHYSICIAN ASSISTANT

## 2020-01-18 PROCEDURE — 97116 GAIT TRAINING THERAPY: CPT

## 2020-01-18 PROCEDURE — 25000003 PHARM REV CODE 250: Performed by: EMERGENCY MEDICINE

## 2020-01-18 PROCEDURE — 97802 MEDICAL NUTRITION INDIV IN: CPT | Mod: 59

## 2020-01-18 PROCEDURE — 83735 ASSAY OF MAGNESIUM: CPT

## 2020-01-18 PROCEDURE — 86706 HEP B SURFACE ANTIBODY: CPT

## 2020-01-18 PROCEDURE — 25000003 PHARM REV CODE 250: Performed by: INTERNAL MEDICINE

## 2020-01-18 PROCEDURE — 36415 COLL VENOUS BLD VENIPUNCTURE: CPT

## 2020-01-18 PROCEDURE — 83036 HEMOGLOBIN GLYCOSYLATED A1C: CPT

## 2020-01-18 PROCEDURE — 80053 COMPREHEN METABOLIC PANEL: CPT

## 2020-01-18 PROCEDURE — 93306 2D ECHO WITH COLOR FLOW DOPPLER: ICD-10-PCS | Mod: 26,,, | Performed by: INTERNAL MEDICINE

## 2020-01-18 PROCEDURE — 90935 HEMODIALYSIS ONE EVALUATION: CPT

## 2020-01-18 PROCEDURE — 97163 PT EVAL HIGH COMPLEX 45 MIN: CPT

## 2020-01-18 PROCEDURE — 99214 PR OFFICE/OUTPT VISIT, EST, LEVL IV, 30-39 MIN: ICD-10-PCS | Mod: ,,, | Performed by: INTERNAL MEDICINE

## 2020-01-18 PROCEDURE — 97530 THERAPEUTIC ACTIVITIES: CPT

## 2020-01-18 RX ORDER — MUPIROCIN 20 MG/G
OINTMENT TOPICAL 2 TIMES DAILY
Status: DISCONTINUED | OUTPATIENT
Start: 2020-01-18 | End: 2020-01-19 | Stop reason: HOSPADM

## 2020-01-18 RX ORDER — ALPRAZOLAM 0.5 MG/1
0.5 TABLET ORAL 2 TIMES DAILY PRN
Status: DISCONTINUED | OUTPATIENT
Start: 2020-01-18 | End: 2020-01-18

## 2020-01-18 RX ORDER — SODIUM CHLORIDE 9 MG/ML
INJECTION, SOLUTION INTRAVENOUS
Status: DISCONTINUED | OUTPATIENT
Start: 2020-01-18 | End: 2020-01-19 | Stop reason: HOSPADM

## 2020-01-18 RX ORDER — ALPRAZOLAM 0.25 MG/1
0.25 TABLET ORAL 2 TIMES DAILY PRN
Status: DISCONTINUED | OUTPATIENT
Start: 2020-01-18 | End: 2020-01-19 | Stop reason: HOSPADM

## 2020-01-18 RX ORDER — SODIUM CHLORIDE 9 MG/ML
INJECTION, SOLUTION INTRAVENOUS ONCE
Status: DISCONTINUED | OUTPATIENT
Start: 2020-01-18 | End: 2020-01-19 | Stop reason: HOSPADM

## 2020-01-18 RX ADMIN — ALPRAZOLAM 0.25 MG: 0.25 TABLET ORAL at 02:01

## 2020-01-18 RX ADMIN — SUCROFERRIC OXYHYDROXIDE 500 MG: 500 TABLET, CHEWABLE ORAL at 11:01

## 2020-01-18 RX ADMIN — MUPIROCIN: 20 OINTMENT TOPICAL at 09:01

## 2020-01-18 RX ADMIN — SUCROFERRIC OXYHYDROXIDE 500 MG: 500 TABLET, CHEWABLE ORAL at 04:01

## 2020-01-18 RX ADMIN — FAMOTIDINE 20 MG: 20 TABLET ORAL at 11:01

## 2020-01-18 RX ADMIN — METOPROLOL SUCCINATE 100 MG: 50 TABLET, EXTENDED RELEASE ORAL at 11:01

## 2020-01-18 NOTE — PLAN OF CARE
S.T. Evaluation completed; pt recommended for a regular consistency diet and thin liquids with swallowing precautions which were reviewed with the patient.

## 2020-01-18 NOTE — PT/OT/SLP EVAL
Physical Therapy Evaluation    Patient Name:  Lisseth Schwartz   MRN:  [id number]    Recommendations:     Discharge Recommendations:  home health PT   Discharge Equipment Recommendations: walker, rolling   Barriers to discharge: None    Assessment:     Lisseth Schwartz is a 29 y.o. female admitted with a medical diagnosis of Hyperkalemia.  She presents with the following impairments/functional limitations:  weakness, impaired endurance, gait instability, impaired functional mobilty, decreased lower extremity function, impaired balance, pain Will benefit from PT.    Rehab Prognosis: Good; patient would benefit from acute skilled PT services to address these deficits and reach maximum level of function.    Recent Surgery: * No surgery found *      Plan:     During this hospitalization, patient to be seen 5 x/week to address the identified rehab impairments via gait training, therapeutic activities, therapeutic exercises and progress toward the following goals:    · Plan of Care Expires:  01/25/20    Subjective     Chief Complaint: weakness, cramping  Patient/Family Comments/goals: decrease pain  Pain/Comfort:  · Pain Rating 1: 8/10  · Location - Side 1: Bilateral  · Location 1: hip  · Pain Addressed 1: Reposition  · Pain Rating Post-Intervention 1: 8/10    Patients cultural, spiritual, Gnosticism conflicts given the current situation:      Living Environment:  PT on dialysis.  Lives in one story house with fiance and 4 children. Not working  Prior to admission, patients level of function was IND.  Equipment used at home: wound care supplies.  DME owned (not currently used): none.  Upon discharge, patient will have assistance from family.    Objective:     Communicated with nurse Eugene prior to session.  Patient found supine with    upon PT entry to room.    General Precautions: Standard,     Orthopedic Precautions:N/A   Braces:       Exams:  · RLE ROM: WFL  · RLE Strength: Deficits: 4  · LLE ROM: WFL  · LLE  "Strength: Deficits: 4/5    Functional Mobility:  · Bed Mobility:     · Supine to Sit: stand by assistance  · Transfers:     · Sit to Stand:  stand by assistance with rolling walker  · Gait: 30 ft with RW SBA      Therapeutic Activities and Exercises:   Pt demo very slow gait pattern with decreased step length. Gait distance limited by c/o of LE pain.  Gait training for use of RW    AM-PAC 6 CLICK MOBILITY  Total Score:18     Patient left up in chair with all lines intact, call button in reach and nursing notified.    GOALS:   Multidisciplinary Problems     Physical Therapy Goals        Problem: Physical Therapy Goal    Goal Priority Disciplines Outcome Goal Variances Interventions   Physical Therapy Goal     PT, PT/OT      Description:  PT eval complete.  The following goals should be met in 7 days  1. Pt will be IND with bed mobility  2. Pt will be IND for transfer  3. Pt will ambulate 125 ft without AD                    History:     Past Medical History:   Diagnosis Date    Anemia     Anxiety     Brittle bones     ESRD (end stage renal disease)     M/W/F    General anesthetics causing adverse effect in therapeutic use     pt states "im a light weight"    Hypertension     Seizures     Years ago       Past Surgical History:   Procedure Laterality Date    Arm surgery Right     x 2    cath in chest      HYSTEROSCOPY WITH HYDROTHERMAL ABLATION OF ENDOMETRIUM WITH DILATION AND CURETTAGE N/A 12/4/2018    Procedure: HYSTEROSCOPY, WITH DILATION AND CURETTAGE OF UTERUS AND HYDROTHERMAL ENDOMETRIAL ABLATION;  Surgeon: Tiara Red MD;  Location: [facility];  Service: OB/GYN;  Laterality: N/A;  ATTEMPTED     LAPAROSCOPIC SALPINGECTOMY Bilateral 12/4/2018    Procedure: SALPINGECTOMY, LAPAROSCOPIC;  Surgeon: Tiara Red MD;  Location: [facility] OR;  Service: OB/GYN;  Laterality: Bilateral;    RENAL BIOPSY      tumor removed      from face       Time Tracking:     PT Received On: 01/18/20  PT Start Time: 0845     PT " Stop Time: 0910  PT Total Time (min): 25 min     Billable Minutes: Evaluation 15 and Gait Training 10      Jorge Bazzi, PT  01/18/2020

## 2020-01-18 NOTE — ASSESSMENT & PLAN NOTE
-Permissive hypertension for 24 hours or until MRI rules out acute CVA.   -IV metoprolol as needed.     No s/s of any CVA, hypertensive encephalopathy  Initial high BP likely sec to panic or anxiety-- improved with Xanax

## 2020-01-18 NOTE — H&P
[facility] Hospital Medicine  History & Physical    Patient Name: Lisseth Schwartz  MRN: [id number]  Admission Date: 1/17/2020  Attending Physician: Orville Field MD   Primary Care Provider: Joe Sinclair MD         Patient information was obtained from patient, past medical records and ER records.     Subjective:     Principal Problem:Hyperkalemia    Chief Complaint:   Chief Complaint   Patient presents with    Extremity Weakness     R sided weakness started Tues pt reports tingling and heaviness        HPI: Lisseth Galvin is a 29 year old female who is post-partum 15 months and still breast feeding. She has ESRD on dialysis Mondays, Wednesdays and Fridays. On 1/14/20, she began having episodic numbness and heaviness in bilateral upper and lower extremities. She also notes numbness in her throat and an inability to tolerate liquids. The initial onset of symptoms was noted upon waking from a nap. Symptoms lasted several minutes and resolved spontaneously. She was referred to the hospital for further evaluation, but did not go. On 1/16/20, she was seen by her PCP who planned an outpatient work up including a sleep study and Neurology evaluation. She notes that since the onset of symptoms, she has experienced worsening lower extremity weakness. On the day of presentation, she notes similar symptoms primarily involving her right upper extremity, but notes heaviness and abnormal sensation in all extremities. The patient also reports an abnormal fluttering of her eyes when she closes them that began at the time of presentation. She reports chronic low back pain radiating into her right hip, but not down her leg as well as intermittent muscle spasms involving her extremities and face since the onset of her renal disease. She denies focal weakness, loss of bowel or bladder continence and difficulty with speech. The patient reports compliance with medications, but denies compliance with a renal  "diet. She states that she eats whatever she wants in moderation. In the ED, labs were significant for a potassium of 6.7. CT scan of the head was negative.     Past Medical History:   Diagnosis Date    Anemia     Anxiety     Brittle bones     Dialysis patient     Disorder of kidney and ureter     Dialysis 6x week during pregnancy, but 3x/wk as of 11/19/19    General anesthetics causing adverse effect in therapeutic use     pt states "im a light weight"    Hypertension     Seizures     Years ago       Past Surgical History:   Procedure Laterality Date    Arm surgery Right     x 2    cath in chest      HYSTEROSCOPY WITH HYDROTHERMAL ABLATION OF ENDOMETRIUM WITH DILATION AND CURETTAGE N/A 12/4/2018    Procedure: HYSTEROSCOPY, WITH DILATION AND CURETTAGE OF UTERUS AND HYDROTHERMAL ENDOMETRIAL ABLATION;  Surgeon: Tiara Red MD;  Location: [facility] OR;  Service: OB/GYN;  Laterality: N/A;  ATTEMPTED     LAPAROSCOPIC SALPINGECTOMY Bilateral 12/4/2018    Procedure: SALPINGECTOMY, LAPAROSCOPIC;  Surgeon: Tiara Red MD;  Location: [facility] OR;  Service: OB/GYN;  Laterality: Bilateral;    RENAL BIOPSY      tumor removed      from face       Review of patient's allergies indicates:   Allergen Reactions    Lisinopril Other (See Comments)     Severe cough    Adhesive tape-silicones Itching    Furosemide Other (See Comments)     Almost gave her right sided heartfailure       Current Facility-Administered Medications on File Prior to Encounter   Medication    lactated ringers infusion     Current Outpatient Medications on File Prior to Encounter   Medication Sig    hydrALAZINE (APRESOLINE) 25 MG tablet Take 1 tablet (25 mg total) by mouth every 12 (twelve) hours.    metoprolol succinate (TOPROL-XL) 100 MG 24 hr tablet TAKE ONE TABLET BY MOUTH EVERY DAY    promethazine (PHENERGAN) 25 MG tablet Take 1 tablet (25 mg total) by mouth every 6 (six) hours as needed for Nausea.    sucroferric oxyhydroxide (VELPHORO) " 500 mg Chew Take 500 mg by mouth 3 (three) times daily with meals.     famotidine (PEPCID) 40 MG tablet Take 1 tablet (40 mg total) by mouth 2 (two) times daily as needed for Heartburn.    sevelamer carbonate (RENVELA) 800 mg Tab Take 800 mg by mouth 3 (three) times daily with meals.     Family History     Problem Relation (Age of Onset)    Breast cancer Paternal Grandmother    Diabetes Maternal Grandmother    Heart attack Maternal Grandmother    Hypertension Father    Lung cancer Maternal Grandfather    Prostate cancer Maternal Grandfather        Tobacco Use    Smoking status: Never Smoker    Smokeless tobacco: Never Used   Substance and Sexual Activity    Alcohol use: No    Drug use: No    Sexual activity: Yes     Partners: Male     Review of Systems   Constitutional: Negative for appetite change, chills, diaphoresis, fatigue and fever.   HENT: Positive for trouble swallowing. Negative for congestion, ear pain, mouth sores and sore throat.    Eyes: Negative for pain and visual disturbance.   Respiratory: Negative for cough, chest tightness and shortness of breath.    Cardiovascular: Negative for chest pain, palpitations and leg swelling.   Gastrointestinal: Negative for abdominal pain, constipation and nausea.   Endocrine: Negative for cold intolerance, heat intolerance, polydipsia and polyuria.   Genitourinary: Negative for dysuria, frequency and hematuria.   Musculoskeletal: Negative for arthralgias, back pain, myalgias and neck pain.   Skin: Negative for pallor, rash and wound.   Allergic/Immunologic: Negative for environmental allergies and immunocompromised state.   Neurological: Positive for weakness and numbness. Negative for dizziness, seizures, syncope and headaches.   Hematological: Negative for adenopathy. Does not bruise/bleed easily.   Psychiatric/Behavioral: Negative for agitation, confusion and sleep disturbance.     Objective:     Vital Signs (Most Recent):  Temp: 98.3 °F (36.8 °C) (01/17/20  1350)  Pulse: 80 (01/17/20 1540)  Resp: 18 (01/17/20 1350)  BP: (!) 138/94 (01/17/20 1540)  SpO2: 100 % (01/17/20 1301) Vital Signs (24h Range):  Temp:  [98.1 °F (36.7 °C)-98.4 °F (36.9 °C)] 98.3 °F (36.8 °C)  Pulse:  [64-85] 80  Resp:  [16-21] 18  SpO2:  [100 %] 100 %  BP: (138-202)/() 138/94     Weight: 101.9 kg (224 lb 10.4 oz)  Body mass index is 36.26 kg/m².    Physical Exam   Constitutional: She is oriented to person, place, and time. She appears well-developed and well-nourished. No distress.   HENT:   Head: Normocephalic and atraumatic.   Eyes: Conjunctivae are normal.   PERRL   Neck: Neck supple. No JVD present.   Cardiovascular: Normal rate, regular rhythm and normal heart sounds.   Pulmonary/Chest: Effort normal and breath sounds normal.   Abdominal: Soft. Bowel sounds are normal. She exhibits no distension. There is no tenderness.   Musculoskeletal: Normal range of motion.   Dialysis access right upper extremity   Neurological: She is alert and oriented to person, place, and time.   Diffuse weakness of all extremitiesl. Intermittent, voluntary closing of eyes with fluttering lasting seconds.    Skin: Skin is warm and dry.   Psychiatric: She has a normal mood and affect. Her behavior is normal. Thought content normal.   Nursing note and vitals reviewed.          Significant Labs:   CBC:   Recent Labs   Lab 01/17/20  1007   WBC 7.38   HGB 11.7*   HCT 36.9*        CMP:   Recent Labs   Lab 01/17/20  1126      K 6.7*   CL 99   CO2 24   GLU 66*   BUN 52*   CREATININE 16.3*   CALCIUM 8.8   PROT 8.0   ALBUMIN 3.9   BILITOT 0.7   ALKPHOS 60   AST 13   ALT 13   ANIONGAP 15   EGFRNONAA 3*     All pertinent labs within the past 24 hours have been reviewed.    Significant Imaging: I have reviewed all pertinent imaging results/findings within the past 24 hours.   Imaging Results          X-Ray Chest AP Portable (Final result)  Result time 01/17/20 11:08:40    Final result by Melquiades Ramirez MD  (01/17/20 11:08:40)                 Impression:      No acute radiographic abnormality in the chest      Electronically signed by: Melquiades Ramirez  Date:    01/17/2020  Time:    11:08             Narrative:    EXAMINATION:  XR CHEST AP PORTABLE    CLINICAL HISTORY:  weakness;    TECHNIQUE:  Single frontal view of the chest was performed.    COMPARISON:  Chest radiograph 11/20/2019    FINDINGS:  Cardiac leads project over the chest.  Cardiomediastinal silhouette enlarged by AP technique.  Trachea is midline.  Lungs appear symmetrically expanded.  No acute or new consolidative opacity or effusion.  No pneumothorax.  Osseous structures appear intact.                               CT Head Without Contrast (Final result)  Result time 01/17/20 10:35:55    Final result by Melquiades Ramirez MD (01/17/20 10:35:55)                 Impression:      No CT evidence of acute intracranial abnormality.    All CT scans at this facility use dose modulation, iterative reconstruction, and/or weight base dosing when appropriate to reduce radiation dose to as low as reasonably achievable.      Electronically signed by: Melquiades Ramirez  Date:    01/17/2020  Time:    10:35             Narrative:    EXAMINATION:  CT HEAD WITHOUT CONTRAST    CLINICAL HISTORY:  Stroke;    TECHNIQUE:  Contiguous axial images were obtained from the skull base through the vertex without intravenous contrast.    COMPARISON:  None    FINDINGS:  No intracranial hemorrhage. No mass effect or midline shift. Normal parenchymal attenuation. The ventricles and sulci are normal in size and configuration. The paranasal sinuses and mastoid air cells are clear.  No concerning osseous findings.                                  Assessment/Plan:     * Hyperkalemia  -Status post calcium, insulin and glucose.   -Further management with dialysis.   -Discussed the importance of following a renal diet.       Numbness and tingling, global weakness and dysphagia  -Etiology unclear,  but symptoms seem too global for an acute CVA or TIA. Possibly due to electrolyte abnormalities associated with renal disease, uremia or hypertensive encephalopathy.   -MRI brain to rule out CVA. Check lipid panel, hemoglobin A1C, carotid US and echocardiogram to risk assess for cerebrovascular disease.  -PT, OT and ST evaluations.   -Hold ASA and statin due to low risk for cerebrovascular disease and patient's breastfeeding status.    -Neuro checks.  -Neurology consult.        Low back pain  -MRI lumbar spine to further assess.   -PT evaluation.       Breast feeding status of mother  Avoid contraindicated medications.       ESRD (end stage renal disease)  -Dialysis per Nephrology.   -Continue Velphoro.       Hypertension  -Permissive hypertension for 24 hours or until MRI rules out acute CVA.   -IV metoprolol as needed.       VTE Risk Mitigation (From admission, onward)         Ordered     Place sequential compression device  Until discontinued      01/17/20 1400                   VIKKI Eddy  Department of Hospital Medicine   [facility]

## 2020-01-18 NOTE — SUBJECTIVE & OBJECTIVE
Interval History: In the ED, labs were significant for a potassium of 6.7. CT scan of the head was negative. She was treated in the ER with Ca Gluconate and Insulin plus DW and Kayexalate and admitted and underwent immediate HD. She feels a little better, but K is still 5.6 this am. Numbness/ tingling have resolved but leg spasm and weakness have persisted. Neurology was consulted, MRI Brain Neg but MRI L/S shows Osteosclerosis and myelofibrosis- hence Dr. Laurent ordered MRI T/S and C/S which have just been completed but results pending and she is undergoing another HD for her hyperkalemia of 5,6. She now recalls drinking OJ and eating bananas in the last few days. She was counseled again.        Review of Systems   Constitutional: Positive for activity change. Negative for appetite change, chills, diaphoresis, fatigue and fever.   HENT: Positive for trouble swallowing. Negative for congestion, ear pain, mouth sores and sore throat.    Eyes: Negative for pain and visual disturbance.   Respiratory: Negative for cough, chest tightness and shortness of breath.    Cardiovascular: Negative for chest pain, palpitations and leg swelling.   Gastrointestinal: Negative for abdominal pain, constipation and nausea.   Endocrine: Negative for cold intolerance, heat intolerance, polydipsia and polyuria.   Genitourinary: Negative for dysuria, frequency and hematuria.   Musculoskeletal: Negative for arthralgias, back pain, myalgias and neck pain.   Skin: Negative for pallor, rash and wound.   Allergic/Immunologic: Negative for environmental allergies and immunocompromised state.   Neurological: Positive for weakness and numbness. Negative for dizziness, seizures, syncope and headaches.   Hematological: Negative for adenopathy. Does not bruise/bleed easily.   Psychiatric/Behavioral: Negative for agitation, confusion and sleep disturbance.     Objective:     Vital Signs (Most Recent):  Temp: 97.7 °F (36.5 °C) (01/18/20 1110)  Pulse: 75  (01/18/20 1301)  Resp: 18 (01/18/20 1110)  BP: (!) 179/95 (01/18/20 1110)  SpO2: 100 % (01/18/20 1110) Vital Signs (24h Range):  Temp:  [97.7 °F (36.5 °C)-98.6 °F (37 °C)] 97.7 °F (36.5 °C)  Pulse:  [60-85] 75  Resp:  [18-20] 18  SpO2:  [94 %-100 %] 100 %  BP: (118-179)/(69-95) 179/95     Weight: 105.9 kg (233 lb 7.5 oz)  Body mass index is 37.68 kg/m².    Intake/Output Summary (Last 24 hours) at 1/18/2020 1654  Last data filed at 1/17/2020 1900  Gross per 24 hour   Intake 600 ml   Output 3470 ml   Net -2870 ml      Physical Exam   Constitutional: She is oriented to person, place, and time. She appears well-developed and well-nourished. No distress.   HENT:   Head: Normocephalic and atraumatic.   Eyes: Conjunctivae are normal.   PERRL   Neck: Neck supple. No JVD present.   Cardiovascular: Normal rate, regular rhythm and normal heart sounds.   Pulmonary/Chest: Effort normal and breath sounds normal.   Abdominal: Soft. Bowel sounds are normal. She exhibits no distension. There is no tenderness.   Musculoskeletal: Normal range of motion.   Dialysis access right upper extremity   Neurological: She is alert and oriented to person, place, and time.   Diffuse weakness of all extremitiesl. Intermittent, voluntary closing of eyes with fluttering lasting seconds.    Skin: Skin is warm and dry.   Psychiatric: She has a normal mood and affect. Her behavior is normal. Thought content normal.   Nursing note and vitals reviewed.      Significant Labs:   BMP:   Recent Labs   Lab 01/18/20  0531   GLU 77      K 5.6*      CO2 23   BUN 38*   CREATININE 12.4*   CALCIUM 9.1   MG 2.1     CBC:   Recent Labs   Lab 01/17/20  1007 01/18/20  0532   WBC 7.38 7.14   HGB 11.7* 11.3*   HCT 36.9* 34.2*    141*     Lactic Acid:   Magnesium:   Recent Labs   Lab 01/18/20  0531   MG 2.1     POCT Glucose:   Recent Labs   Lab 01/17/20  1334 01/17/20  1614   POCTGLUCOSE 210* 77     Troponin:   Recent Labs   Lab 01/17/20  1126    TROPONINI 0.015     All pertinent labs within the past 24 hours have been reviewed.    Significant Imaging: I have reviewed all pertinent imaging results/findings within the past 24 hours.

## 2020-01-18 NOTE — HOSPITAL COURSE
Lisseth Galvin is a 29 year old female, ESRD on dialysis MWF, she post-partum 15 months and still breast feeding. Admitted with episodic numbness and heaviness in BUE and BLE associated with numbness in her throat and an inability to tolerate liquids x 3-4 days. Sx last several minutes and resolve spontaneously. She now has experienced worsening lower extremity weakness. The patient also reports an abnormal fluttering of her eyes when she closes them that began at the time of presentation. She reports chronic low back pain radiating into her right hip, but not down her leg as well as intermittent muscle spasms involving her extremities and face since the onset of her renal disease. No focal weakness, loss of bowel or bladder continence or difficulty with speech. The patient reports compliance with medications, but denies compliance with a renal diet. She states that she eats whatever she wants in moderation. In the ED, labs were significant for a potassium of 6.7. CT scan of the head was negative. She was treated in the ER with Ca Gluconate and Insulin plus DW and Kayexalate and admitted and underwent immediate HD. She feels a little better, but K is still 5.6 this am. Numbness/ tingling have resolved but leg spasm and weakness have persisted. Neurology was consulted, MRI Brain Neg but MRI L/S shows Osteosclerosis and myelofibrosis- hence Dr. Laurent ordered MRI T/S and C/S which have just been completed but results pending and she is undergoing another HD for her hyperkalemia of 5,6. She now recalls drinking OJ and eating bananas in the last few days. She was counseled again.   1/19- looks lot better, stronger, eating drinking well, walking around well. No recurrence of her Sx, weakness lot better. K is normal after repeat HD yesterday. As per Dr. Laurent -The complete neuraxis (Brain, C-Spine, T-spine, L-spine) MRIs are unremarkable. The multiple neurological symptoms are most likely secondary to underlying metabolic  disturbances.   Neurology will sign off. No need for inpatient/outpatient neurology follow up. Dr. Hoty also cleared her for discharge. She was seen and examined and deemed stable for discharge home today.

## 2020-01-18 NOTE — PLAN OF CARE
PATIENT WOULD BENEFIT FROM SKILLED OT INTERVENTION TO INCREASE SAFETY AND (I) WITH ALL LEVELS OF SELF CARE.

## 2020-01-18 NOTE — PT/OT/SLP EVAL
"Speech Language Pathology Evaluation  Bedside Swallow    Patient Name:  Lisseth Schwartz   MRN:  [id number]  Admitting Diagnosis: Hyperkalemia    Recommendations:                 General Recommendations:  Speech Therapy;   Aspiration precautions  Diet recommendations:  Regular, Thin   Aspiration Precautions: In place  General Precautions: Standard, aspiration  Communication strategies:  Verbal    History:   Pt is a 29 year old female admitted with generalized malaise, muscle spasms.  Pt reported that she has had occasional symptoms of tingling all over her body, pain in legs, dry mouth for several years but has not been diagnosed with anything at this point.  She reports that these symptoms have worsened in the past several months and this Tuesday started with episodes of muscle spasms, fatigue, pain in legs, dry mouth. She reports that symptoms subside over a period of time.  Pt is currently NPO until S.T. Swallowing evaluation.     Past Medical History:   Diagnosis Date    Anemia     Anxiety     Brittle bones     ESRD (end stage renal disease)     M/W/F    General anesthetics causing adverse effect in therapeutic use     pt states "im a light weight"    Hypertension     Seizures     Years ago       Past Surgical History:   Procedure Laterality Date    Arm surgery Right     x 2    cath in chest      HYSTEROSCOPY WITH HYDROTHERMAL ABLATION OF ENDOMETRIUM WITH DILATION AND CURETTAGE N/A 12/4/2018    Procedure: HYSTEROSCOPY, WITH DILATION AND CURETTAGE OF UTERUS AND HYDROTHERMAL ENDOMETRIAL ABLATION;  Surgeon: Tiara Red MD;  Location: [facility] OR;  Service: OB/GYN;  Laterality: N/A;  ATTEMPTED     LAPAROSCOPIC SALPINGECTOMY Bilateral 12/4/2018    Procedure: SALPINGECTOMY, LAPAROSCOPIC;  Surgeon: Tiara Red MD;  Location: [facility] OR;  Service: OB/GYN;  Laterality: Bilateral;    RENAL BIOPSY      tumor removed      from face       Social History: Patient lives with her boyfriend and 3 children " (youngest is 15 months).  Prior Intubation HX:  No intubation reported    Modified Barium Swallow:     Chest X-Rays: see chart    Prior diet: Pt has been on a regular consistency diet and thin liquids    Occupation/hobbies/homemaking: Pt reported that she is active with her 3 children but is retired since starting Dialysis in October, 2016.    Subjective     Pt sitting up in chair;  Willing to work with S.T.  Patient goals: to feel better to go home    Pain/Comfort:  · Pain Rating 1: 5/10  · Location 1: back  · Pain Rating Post-Intervention 1: 5/10    Objective:     Oral Musculature Evaluation  · Oral Musculature: general weakness  · Dentition: present and adequate  Pt presented with effortful movement with lingual, labial and jaw movement.   ROM and strength were decreased in isolation movements but appeared functional when eating/drinking.    Bedside Swallow Eval:   Consistencies Assessed:  · Thin liquids, thick liquids, pureed, cracker    Oral Phase:   · Pt with no oral phase difficulties with liquids, pureed or cracker.  She chewed cracker and cleared without noted problems.    Pharyngeal Phase:   · Pt with no observable coughing, wet vocal quality or delays in swallowing.  Swallows were not delayed with any consistency.    Compensatory Strategies  · Basic swallowing precautions reviewed with the patient and a swallowing sign was placed in room with pt's approval.  Pt recommended for following precautions:  Sit upright to eat/drink, small bites/sips, 2-3 swallows with each bite/sip, alternate liquids and solids, clear oral cavity before proceeding to next bite/sip.      Treatment: Pt was oriented appropriately but did look at the board for exact date.  She followed commands and answered yes/no questions appropriately.  Pt did present with occasional word-finding difficulties at conversational level and she reported this as well.  She talked about her family and able to say their names and ages.    Speech  intelligible but quality of speech was altered- minimally dysarthric.     Assessment:     Lisseth Schwartz is a 29 y.o. female with an SLP diagnosis of Dysarthria and decreased word-finding skills.   She is recommended for a regular consistency diet and thin liquids with swallowing precautions.    She is also recommended for S.T to address dysarthria and decreased word-finding skills.   Education was provided on results/recommendations from this evaluation.  Swallowing precautions also reviewed.      Goals:   Multidisciplinary Problems     SLP Goals        Problem: SLP Goal    Goal Priority Disciplines Outcome   SLP Goal     SLP    Description:  LT. Pt will tolerate least restrictive diet consistency safely and efficiently.  2. Pt will communicate needs/ideas effectively.    ST. Oral motor tasks x20 with min cues  2. BSA with full meal with further goals as appropriate.  3. Higher level word-finding tasks with 90%.  4. Imitate 6-8 syllable sentences with 90%.  Goal to be updated by: 20                    Plan:     · Patient to be seen:  5 x/week   · Plan of Care expires:     · Plan of Care reviewed with:  patient   · SLP Follow-Up:  Yes       Discharge recommendations:    determine closer to d/c  Barriers to Discharge:  n/a    Time Tracking:     SLP Treatment Date:   20  Speech Start Time:  0950  Speech Stop Time:  1026     Speech Total Time (min):  36 min    Billable Minutes: 36 minutes    Susan Roe CCC-YOGI  2020

## 2020-01-18 NOTE — PLAN OF CARE
No acute changes during shift  Pt AAOx4  Plan of care reviewed. Verbalized understanding  Pt remained free from falls. All fall and safety precautions in place  NSR on tele monitor  PIV, CDI   VSS  Call bell and personal items within reach.  Hourly rounding complete. Instructed to call for assistance  Pt denies needs and has no c/o at this time  Continue current plan of care  Passed nursing bedside swallow study. Tolerated food last night. Ordered regular diet. Ok per Dr. Laurent with neurology  MRI completed last night    Problem: Fall Injury Risk  Goal: Absence of Fall and Fall-Related Injury  Outcome: Ongoing, Progressing     Problem: Adult Inpatient Plan of Care  Goal: Plan of Care Review  Outcome: Ongoing, Progressing  Flowsheets (Taken 1/18/2020 0418)  Plan of Care Reviewed With: patient  Goal: Patient-Specific Goal (Individualization)  Outcome: Ongoing, Progressing  Goal: Absence of Hospital-Acquired Illness or Injury  Outcome: Ongoing, Progressing  Goal: Optimal Comfort and Wellbeing  Outcome: Ongoing, Progressing  Goal: Readiness for Transition of Care  Outcome: Ongoing, Progressing  Goal: Rounds/Family Conference  Outcome: Ongoing, Progressing     Problem: Device-Related Complication Risk (Hemodialysis)  Goal: Safe, Effective Therapy Delivery  Outcome: Ongoing, Progressing     Problem: Hemodynamic Instability (Hemodialysis)  Goal: Vital Signs Remain in Desired Range  Outcome: Ongoing, Progressing     Problem: Infection (Hemodialysis)  Goal: Absence of Infection Signs/Symptoms  Outcome: Ongoing, Progressing

## 2020-01-18 NOTE — ASSESSMENT & PLAN NOTE
-Etiology unclear, but symptoms seem too global for an acute CVA or TIA. Possibly due to electrolyte abnormalities associated with renal disease, uremia or hypertensive encephalopathy.   -MRI brain to rule out CVA. Check lipid panel, hemoglobin A1C, carotid US and echocardiogram to risk assess for cerebrovascular disease.  -PT, OT and ST evaluations.   -Hold ASA and statin due to low risk for cerebrovascular disease and patient's breastfeeding status.    -Neuro checks.  -Neurology consult.      MRI Brain Neg but MRI L/S shows Osteosclerosis and myelofibrosis  Await MRI C/S and T/S - ordered by Dr. Laurent to r/o Demyelinating disease etc  No s/s any CVA

## 2020-01-18 NOTE — SUBJECTIVE & OBJECTIVE
Interval History:     1/18/20: she reports episode of extremity tingling in am. This has now resolved.         Review of patient's allergies indicates:   Allergen Reactions    Lisinopril Other (See Comments)     Severe cough    Adhesive tape-silicones Itching    Furosemide Other (See Comments)     Almost gave her right sided heartfailure     Current Facility-Administered Medications   Medication Frequency    0.9%  NaCl infusion PRN    0.9%  NaCl infusion Once    acetaminophen tablet 650 mg Q6H PRN    ALPRAZolam tablet 0.25 mg BID PRN    famotidine tablet 20 mg Daily    hydrALAZINE injection 10 mg Q6H PRN    metoprolol succinate (TOPROL-XL) 24 hr tablet 100 mg Daily    nozaseptin (NOZIN) nasal  BID    ondansetron disintegrating tablet 8 mg Q8H PRN    sucroferric oxyhydroxide Chew 500 mg TID WM     Facility-Administered Medications Ordered in Other Encounters   Medication Frequency    lactated ringers infusion Continuous       Objective:     Vital Signs (Most Recent):  Temp: 97.7 °F (36.5 °C) (01/18/20 1110)  Pulse: 69 (01/18/20 1110)  Resp: 18 (01/18/20 1110)  BP: (!) 179/95 (01/18/20 1110)  SpO2: 100 % (01/18/20 1110)  O2 Device (Oxygen Therapy): room air (01/17/20 1928) Vital Signs (24h Range):  Temp:  [97.7 °F (36.5 °C)-98.6 °F (37 °C)] 97.7 °F (36.5 °C)  Pulse:  [60-91] 69  Resp:  [18-21] 18  SpO2:  [94 %-100 %] 100 %  BP: (118-188)/() 179/95     Weight: 105.9 kg (233 lb 7.5 oz) (01/18/20 0406)  Body mass index is 37.68 kg/m².  Body surface area is 2.22 meters squared.    I/O last 3 completed shifts:  In: 600 [Other:600]  Out: 3470 [Urine:200; Other:3270]    Physical Exam   Constitutional: She is oriented to person, place, and time. She appears well-developed and well-nourished.   HENT:   Head: Normocephalic.   Eyes: Pupils are equal, round, and reactive to light.   Neck: No thyromegaly present.   Cardiovascular: Normal rate and regular rhythm. Exam reveals no friction rub.    Pulmonary/Chest: Effort normal and breath sounds normal. She has no wheezes. She exhibits no tenderness.   Abdominal: Soft. Bowel sounds are normal. She exhibits no distension. There is no tenderness.   Musculoskeletal: She exhibits no edema.   Lymphadenopathy:     She has no cervical adenopathy.   Neurological: She is alert and oriented to person, place, and time.   Bilateral LE weakness. Mild right arm weakness. Visible flickering of eyes upon closure.    Skin: Skin is warm and dry. No rash noted.   Psychiatric: She has a normal mood and affect.       Significant Labs:  Lab Results   Component Value Date    CREATININE 12.4 (H) 01/18/2020    BUN 38 (H) 01/18/2020     01/18/2020    K 5.6 (H) 01/18/2020     01/18/2020    CO2 23 01/18/2020     Lab Results   Component Value Date    CALCIUM 9.1 01/18/2020    PHOS 8.5 (H) 01/18/2020     Lab Results   Component Value Date    ALBUMIN 3.9 01/17/2020     Lab Results   Component Value Date    WBC 7.14 01/18/2020    HGB 11.3 (L) 01/18/2020    HCT 34.2 (L) 01/18/2020    MCV 99 (H) 01/18/2020     (L) 01/18/2020       Recent Labs   Lab 01/18/20  0531   MG 2.1         Significant Imaging:  Imaging Results          MRI Lumbar Spine Without Contrast (Final result)  Result time 01/18/20 00:04:44    Final result by Armond Howard MD (01/18/20 00:04:44)                 Impression:      Diffusely abnormal bone marrow signal with decreased T1, T2, and STIR signal, suggestive of marrow placement process, with differential including osteosclerosis with end-stage renal disease and myelofibrosis.      Electronically signed by: Armond Howard MD  Date:    01/18/2020  Time:    00:04             Narrative:    EXAMINATION:  MRI LUMBAR SPINE WITHOUT CONTRAST    CLINICAL HISTORY:  Low back pain, risk factors (osteoporosis or chronic steroid use or elderly);    TECHNIQUE:  MR lumbar spine performed multiplanar T1, T2, and STIR weighted  sequences.    COMPARISON:  None.    FINDINGS:  There is a pattern of diffuse marked decreased signal intensity of the bone marrow on T1, T2, and STIR weighted sequences suggesting a marrow replacing process.    There is no compression fracture or malalignment.    Conus medullaris terminates normally at L1.  There is no paraspinal soft tissue abnormality.    The intervertebral discs are well hydrated.  The disc spaces are well preserved.  There is no disc herniation.  Spinal canal and neural foramina are widely patent.                               MRI Brain Without Contrast (Final result)  Result time 01/17/20 22:39:12    Final result by Armond Howard MD (01/17/20 22:39:12)                 Impression:      Normal MR brain.      Electronically signed by: Armond Howard MD  Date:    01/17/2020  Time:    22:39             Narrative:    EXAMINATION:  MRI BRAIN WITHOUT CONTRAST    CLINICAL HISTORY:  Stroke;    TECHNIQUE:  MR brain performed multiplanar, multisequence imaging without contrast.    COMPARISON:  Head CT 01/17/2020    FINDINGS:  There is no restricted diffusion.  No acute infarct.  There is no abnormal signal intensity within the brain parenchyma.  Normal ventricles.  Gray-white differentiation preserved.  No intracranial mass.  The midline structures are well-formed.  Incidental note of partially empty sella with flattening of the pituitary gland along the floor of the sella turcica.  The T2 flow voids are preserved within the major intracranial arterial and dural venous structures.  The orbits are unremarkable.  The paranasal sinuses and mastoids are well aerated.                               US Carotid Bilateral (Final result)  Result time 01/17/20 19:23:34    Final result by Jose Juan Johnson MD (01/17/20 19:23:34)                 Impression:      Impression:     No sonographic evidence of a significant stenosis is identified in either carotid system.    Stenosis of 0%-validated velocity measurements with  angiographic measurements, velocity criteria are extrapolated from diameter data as defined by the Society of Radiologists in Ultrasound Consensus Conference Radiology 2003; 229;340-346.      Electronically signed by: Jose Juan Johnson MD  Date:    01/17/2020  Time:    19:23             Narrative:    EXAMINATION:  US CAROTID BILATERAL    CLINICAL HISTORY:  TIA.    COMPARISON:  None    FINDINGS:  Sonographic evaluation of the carotid systems was performed.    No significant atherosclerotic plaque is appreciative in either carotid system.    The peak systolic velocity in the right internal carotid artery was approximately 89 cm/sec.  The right ICA to CCA peak systolic velocity ratio is 1.0.    The peak systolic velocity in the left internal carotid artery was approximately 119 cm/sec.  The left ICA to CCA peak systolic velocity ratio is 1.1.    Antegrade flow noted in both vertebral arteries.                               X-Ray Chest AP Portable (Final result)  Result time 01/17/20 11:08:40    Final result by Melquiades Ramirez MD (01/17/20 11:08:40)                 Impression:      No acute radiographic abnormality in the chest      Electronically signed by: Melquiades Ramirez  Date:    01/17/2020  Time:    11:08             Narrative:    EXAMINATION:  XR CHEST AP PORTABLE    CLINICAL HISTORY:  weakness;    TECHNIQUE:  Single frontal view of the chest was performed.    COMPARISON:  Chest radiograph 11/20/2019    FINDINGS:  Cardiac leads project over the chest.  Cardiomediastinal silhouette enlarged by AP technique.  Trachea is midline.  Lungs appear symmetrically expanded.  No acute or new consolidative opacity or effusion.  No pneumothorax.  Osseous structures appear intact.                               CT Head Without Contrast (Final result)  Result time 01/17/20 10:35:55    Final result by Melquiades Ramirez MD (01/17/20 10:35:55)                 Impression:      No CT evidence of acute intracranial abnormality.    All CT  scans at this facility use dose modulation, iterative reconstruction, and/or weight base dosing when appropriate to reduce radiation dose to as low as reasonably achievable.      Electronically signed by: Melquiades Ramirez  Date:    01/17/2020  Time:    10:35             Narrative:    EXAMINATION:  CT HEAD WITHOUT CONTRAST    CLINICAL HISTORY:  Stroke;    TECHNIQUE:  Contiguous axial images were obtained from the skull base through the vertex without intravenous contrast.    COMPARISON:  None    FINDINGS:  No intracranial hemorrhage. No mass effect or midline shift. Normal parenchymal attenuation. The ventricles and sulci are normal in size and configuration. The paranasal sinuses and mastoid air cells are clear.  No concerning osseous findings.

## 2020-01-18 NOTE — PROGRESS NOTES
HD completed.  Treatment time: 3.5 hr.   Net UF: 2670 cc.  Heparin 1000 units was given IV at start of HD.  Rt arm AVF was cannulated and used without difficulties.  Pt tolerated well.

## 2020-01-18 NOTE — ASSESSMENT & PLAN NOTE
Patient dialyzes on MWF schedule at [facility].    Next HD today because of hyperkalemia.     Access: right arm AVF.

## 2020-01-18 NOTE — NURSING
Called to room, patient reports feeling heaviness in extremities, numbness in their mouth of tongue and throat, and generalized malaise. Nori NP notified and assessed patient at bedside.     Per Nori RUIZ, will reach out to Anastasiia Ch with Neurology

## 2020-01-18 NOTE — ED NOTES
"Patient states she does not want to explain what has been going on because "it's too much to tell again." Pt states only "tingling in my mouth.". Call light in reach. NAD.   "

## 2020-01-18 NOTE — ASSESSMENT & PLAN NOTE
-Permissive hypertension for 24 hours or until MRI rules out acute CVA.   -IV metoprolol as needed.

## 2020-01-18 NOTE — PROGRESS NOTES
[facility] Hospital Medicine  Progress Note    Patient Name: Lisseth Schwartz  MRN: [id number]  Patient Class: OP- Observation   Admission Date: 1/17/2020  Length of Stay: 0 days  Attending Physician: Rocio Garcia MD  Primary Care Provider: Joe Sinclair MD        Subjective:     Principal Problem:Hyperkalemia        HPI:  Lisseth Galvin is a 29 year old female who is post-partum 15 months and still breast feeding. She has ESRD on dialysis Mondays, Wednesdays and Fridays. On 1/14/20, she began having episodic numbness and heaviness in bilateral upper and lower extremities. She also notes numbness in her throat and an inability to tolerate liquids. The initial onset of symptoms was noted upon waking from a nap. Symptoms lasted several minutes and resolved spontaneously. She was referred to the hospital for further evaluation, but did not go. On 1/16/20, she was seen by her PCP who planned an outpatient work up including a sleep study and Neurology evaluation. She notes that since the onset of symptoms, she has experienced worsening lower extremity weakness. On the day of presentation, she notes similar symptoms primarily involving her right upper extremity, but notes heaviness and abnormal sensation in all extremities. The patient also reports an abnormal fluttering of her eyes when she closes them that began at the time of presentation. She reports chronic low back pain radiating into her right hip, but not down her leg as well as intermittent muscle spasms involving her extremities and face since the onset of her renal disease. She denies focal weakness, loss of bowel or bladder continence and difficulty with speech. The patient reports compliance with medications, but denies compliance with a renal diet. She states that she eats whatever she wants in moderation. In the ED, labs were significant for a potassium of 6.7. CT scan of the head was negative.     Overview/Hospital Course:  Lisseth  Kamar is a 29 year old female, ESRD on dialysis MWF, she post-partum 15 months and still breast feeding. Admitted with episodic numbness and heaviness in BUE and BLE associated with numbness in her throat and an inability to tolerate liquids x 3-4 days. Sx last several minutes and resolve spontaneously. She now has experienced worsening lower extremity weakness. The patient also reports an abnormal fluttering of her eyes when she closes them that began at the time of presentation. She reports chronic low back pain radiating into her right hip, but not down her leg as well as intermittent muscle spasms involving her extremities and face since the onset of her renal disease. No focal weakness, loss of bowel or bladder continence or difficulty with speech. The patient reports compliance with medications, but denies compliance with a renal diet. She states that she eats whatever she wants in moderation. In the ED, labs were significant for a potassium of 6.7. CT scan of the head was negative. She was treated in the ER with Ca Gluconate and Insulin plus DW and Kayexalate and admitted and underwent immediate HD. She feels a little better, but K is still 5.6 this am. Numbness/ tingling have resolved but leg spasm and weakness have persisted. Neurology was consulted, MRI Brain Neg but MRI L/S shows Osteosclerosis and myelofibrosis- hence Dr. Laurent ordered MRI T/S and C/S which have just been completed but results pending and she is undergoing another HD for her hyperkalemia of 5,6. She now recalls drinking OJ and eating bananas in the last few days. She was counseled again.     Interval History: In the ED, labs were significant for a potassium of 6.7. CT scan of the head was negative. She was treated in the ER with Ca Gluconate and Insulin plus DW and Kayexalate and admitted and underwent immediate HD. She feels a little better, but K is still 5.6 this am. Numbness/ tingling have resolved but leg spasm and weakness have  persisted. Neurology was consulted, MRI Brain Neg but MRI L/S shows Osteosclerosis and myelofibrosis- hence Dr. Laurent ordered MRI T/S and C/S which have just been completed but results pending and she is undergoing another HD for her hyperkalemia of 5,6. She now recalls drinking OJ and eating bananas in the last few days. She was counseled again.        Review of Systems   Constitutional: Positive for activity change. Negative for appetite change, chills, diaphoresis, fatigue and fever.   HENT: Positive for trouble swallowing. Negative for congestion, ear pain, mouth sores and sore throat.    Eyes: Negative for pain and visual disturbance.   Respiratory: Negative for cough, chest tightness and shortness of breath.    Cardiovascular: Negative for chest pain, palpitations and leg swelling.   Gastrointestinal: Negative for abdominal pain, constipation and nausea.   Endocrine: Negative for cold intolerance, heat intolerance, polydipsia and polyuria.   Genitourinary: Negative for dysuria, frequency and hematuria.   Musculoskeletal: Negative for arthralgias, back pain, myalgias and neck pain.   Skin: Negative for pallor, rash and wound.   Allergic/Immunologic: Negative for environmental allergies and immunocompromised state.   Neurological: Positive for weakness and numbness. Negative for dizziness, seizures, syncope and headaches.   Hematological: Negative for adenopathy. Does not bruise/bleed easily.   Psychiatric/Behavioral: Negative for agitation, confusion and sleep disturbance.     Objective:     Vital Signs (Most Recent):  Temp: 97.7 °F (36.5 °C) (01/18/20 1110)  Pulse: 75 (01/18/20 1301)  Resp: 18 (01/18/20 1110)  BP: (!) 179/95 (01/18/20 1110)  SpO2: 100 % (01/18/20 1110) Vital Signs (24h Range):  Temp:  [97.7 °F (36.5 °C)-98.6 °F (37 °C)] 97.7 °F (36.5 °C)  Pulse:  [60-85] 75  Resp:  [18-20] 18  SpO2:  [94 %-100 %] 100 %  BP: (118-179)/(69-95) 179/95     Weight: 105.9 kg (233 lb 7.5 oz)  Body mass index is 37.68  kg/m².    Intake/Output Summary (Last 24 hours) at 1/18/2020 1654  Last data filed at 1/17/2020 1900  Gross per 24 hour   Intake 600 ml   Output 3470 ml   Net -2870 ml      Physical Exam   Constitutional: She is oriented to person, place, and time. She appears well-developed and well-nourished. No distress.   HENT:   Head: Normocephalic and atraumatic.   Eyes: Conjunctivae are normal.   PERRL   Neck: Neck supple. No JVD present.   Cardiovascular: Normal rate, regular rhythm and normal heart sounds.   Pulmonary/Chest: Effort normal and breath sounds normal.   Abdominal: Soft. Bowel sounds are normal. She exhibits no distension. There is no tenderness.   Musculoskeletal: Normal range of motion.   Dialysis access right upper extremity   Neurological: She is alert and oriented to person, place, and time.   Diffuse weakness of all extremitiesl. Intermittent, voluntary closing of eyes with fluttering lasting seconds.    Skin: Skin is warm and dry.   Psychiatric: She has a normal mood and affect. Her behavior is normal. Thought content normal.   Nursing note and vitals reviewed.      Significant Labs:   BMP:   Recent Labs   Lab 01/18/20  0531   GLU 77      K 5.6*      CO2 23   BUN 38*   CREATININE 12.4*   CALCIUM 9.1   MG 2.1     CBC:   Recent Labs   Lab 01/17/20  1007 01/18/20  0532   WBC 7.38 7.14   HGB 11.7* 11.3*   HCT 36.9* 34.2*    141*     Lactic Acid:   Magnesium:   Recent Labs   Lab 01/18/20  0531   MG 2.1     POCT Glucose:   Recent Labs   Lab 01/17/20  1334 01/17/20  1614   POCTGLUCOSE 210* 77     Troponin:   Recent Labs   Lab 01/17/20  1126   TROPONINI 0.015     All pertinent labs within the past 24 hours have been reviewed.    Significant Imaging: I have reviewed all pertinent imaging results/findings within the past 24 hours.      Assessment/Plan:      * Hyperkalemia  -Status post calcium, insulin and glucose.   -Further management with dialysis.   -Discussed the importance of following a  renal diet.     Needs another round of HD to correct Hyperkalemia  Pt educated/ counseled about diet- stepan bananas, Oranges and Tomatoes- she understands and accepts      Numbness and tingling, global weakness and dysphagia  -Etiology unclear, but symptoms seem too global for an acute CVA or TIA. Possibly due to electrolyte abnormalities associated with renal disease, uremia or hypertensive encephalopathy.   -MRI brain to rule out CVA. Check lipid panel, hemoglobin A1C, carotid US and echocardiogram to risk assess for cerebrovascular disease.  -PT, OT and ST evaluations.   -Hold ASA and statin due to low risk for cerebrovascular disease and patient's breastfeeding status.    -Neuro checks.  -Neurology consult.      MRI Brain Neg but MRI L/S shows Osteosclerosis and myelofibrosis  Await MRI C/S and T/S - ordered by Dr. Laurent to r/o Demyelinating disease etc  No s/s any CVA      ESRD (end stage renal disease)  -Dialysis per Nephrology.   -Continue Velphoro.     Continue HD today      Hyperphosphatemia  Continue phoslo      Hypertension  -Permissive hypertension for 24 hours or until MRI rules out acute CVA.   -IV metoprolol as needed.     No s/s of any CVA, hypertensive encephalopathy  Initial high BP likely sec to panic or anxiety-- improved with Xanax    Low back pain  -MRI lumbar spine to further assess.   -PT evaluation.     Await final MRI Spine results      Breast feeding status of mother  Avoid contraindicated medications.         VTE Risk Mitigation (From admission, onward)         Ordered     Place sequential compression device  Until discontinued      01/17/20 3840                      Rocio Garcia MD  Department of Hospital Medicine   [facility]

## 2020-01-18 NOTE — ED NOTES
Dr. Elam aware of patient's glucose. States to give 50g of dextrose instead of 25g. Pharmacy aware.

## 2020-01-18 NOTE — PT/OT/SLP EVAL
Occupational Therapy   Evaluation    Name: Lisseth Schwartz  MRN: [id number]  Admitting Diagnosis:  Hyperkalemia      Recommendations:     Discharge Recommendations: home health OT  Discharge Equipment Recommendations:  walker, rolling  Barriers to discharge:  (TBD)    Assessment:     Lisseth Schwartz is a 29 y.o. female with a medical diagnosis of Hyperkalemia.  She presents with SELF CARE DEBILITY. Performance deficits affecting function: weakness, impaired endurance, impaired self care skills, impaired functional mobilty, gait instability, impaired balance, pain, decreased ROM.      Rehab Prognosis: Good; patient would benefit from acute skilled OT services to address these deficits and reach maximum level of function.       Plan:     Patient to be seen 3 x/week to address the above listed problems via self-care/home management, therapeutic activities, therapeutic exercises  · Plan of Care Expires: 01/25/20  · Plan of Care Reviewed with: patient    Subjective     Chief Complaint: BUE MX SORENESS, BLE/BUE TENSE & TIGHT, C/O JAWS LOCKING AT TIMES WHEN SHE SMILES.  Patient/Family Comments/goals: TO BE ABLE TO MOVE WITHOUT PAIN AND SWALLOW BETTER.    Occupational Profile:  Living Environment: PATIENT LIVES WITH FIANCE AND KIDS:  13, 11, & 10 Y.O. AS WELL AS WITH AT 15 MONTH OLD BABY.  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  Roles and Routines: PATIENT STATED SHE CANNOT PERFORM HOUSEKEEPING DUE TO INVOLUNTARY SPASMS.  Equipment Used at Home:     Assistance upon Discharge: NONE    Pain/Comfort:  · Pain Rating 1: 8/10  · Location - Side 1: Bilateral  · Location 1: hip  · Pain Addressed 1: Distraction, Reposition, Cessation of Activity    Patients cultural, spiritual, Yazidism conflicts given the current situation: no    Objective:     Communicated with: NURSE prior to session.  Patient found supine with peripheral IV, telemetry upon OT  entry to room.    General Precautions: Standard, aspiration, fall   Orthopedic Precautions:N/A   Braces: N/A     Occupational Performance:    Bed Mobility:    · Patient completed Rolling/Turning to Left with  stand by assistance  · Patient completed Scooting/Bridging with stand by assistance  · Patient completed Supine to Sit with stand by assistance    Functional Mobility/Transfers:  · Patient completed Sit <> Stand Transfer with contact guard assistance  with  rolling walker   · Patient completed Bed <> Chair Transfer using Stand Pivot technique with contact guard assistance with rolling walker  · Patient completed Toilet Transfer Stand Pivot technique with minimum assistance with  rolling walker  · Functional Mobility: CGA/MIN (A) WITH RW    Activities of Daily Living:  · Upper Body Dressing: moderate assistance      · Lower Body Dressing: maximal assistance        Cognitive/Visual Perceptual:  Cognitive/Psychosocial Skills:     -       Oriented to: Person, Place, Time and Situation   -       Follows Commands/attention:Follows multistep  commands  -       Communication: clear/fluent  -       Memory: No Deficits noted  -       Safety awareness/insight to disability: intact   -       Mood/Affect/Coping skills/emotional control: Appropriate to situation    Physical Exam:  Balance:    -       CGA/MIN (A) WITH RW  Postural examination/scapula alignment:    -       No postural abnormalities identified  Upper Extremity Range of Motion:     -       Right Upper Extremity: Deficits: SHLD FLEX TO APPROX 20 DEGREES DUE TO C/O PAIN WITH INCREASED AROM  -       Left Upper Extremity: SHLD FLEX TO APPROX 20 DEGREES DUE TO C/O PAIN WITH INCREASED AROM  Fine Motor Coordination:    -       Impaired  (B) HAND/DIGIT IMPAIRMENT DUE TO C/O PAIN WITH MOVEMENT.  Gross motor coordination:   BUE AROM  IMPAIRMENT DUE TO C/O PAIN WITH MOVEMENT.    AMPAC 6 Click ADL:  AMPAC Total Score: 16    Treatment & Education:  OT STEFFEN COMPLETED TODAY.   "BUE AROM ATTEMPTED WITHIN PAIN TOLERANCE.  Education:    Patient left up in chair with all lines intact, call button in reach and CNA notified    GOALS:   Multidisciplinary Problems     Occupational Therapy Goals        Problem: Occupational Therapy Goal    Goal Priority Disciplines Outcome Interventions   Occupational Therapy Goal     OT, PT/OT Ongoing, Progressing    Description:  1.  PATIENT WILL PERFORM X10-X15 REPS OF BUE ACTIVE STRETCH ALL PLANES TO DECREASE C/O MX SORENESS/DISCOMFORT.  2.  PATIENT WILL STAND AT SINK 5-10 MIN TO COMPLETE SIMPLE ADL.  3.  PATIENT WILL DON/DOFF LB CLOTHING WITH MIN (A).  4.  PATIENT WILL BE MIN (A) WITH TOILET T/F.                    History:     Past Medical History:   Diagnosis Date    Anemia     Anxiety     Brittle bones     ESRD (end stage renal disease)     M/W/F    General anesthetics causing adverse effect in therapeutic use     pt states "im a light weight"    Hypertension     Seizures     Years ago       Past Surgical History:   Procedure Laterality Date    Arm surgery Right     x 2    cath in chest      HYSTEROSCOPY WITH HYDROTHERMAL ABLATION OF ENDOMETRIUM WITH DILATION AND CURETTAGE N/A 12/4/2018    Procedure: HYSTEROSCOPY, WITH DILATION AND CURETTAGE OF UTERUS AND HYDROTHERMAL ENDOMETRIAL ABLATION;  Surgeon: Tiara Red MD;  Location: [facility] OR;  Service: OB/GYN;  Laterality: N/A;  ATTEMPTED     LAPAROSCOPIC SALPINGECTOMY Bilateral 12/4/2018    Procedure: SALPINGECTOMY, LAPAROSCOPIC;  Surgeon: Tiara Red MD;  Location: [facility] OR;  Service: OB/GYN;  Laterality: Bilateral;    RENAL BIOPSY      tumor removed      from face       Time Tracking:     OT Date of Treatment: 01/18/20  OT Start Time: 0917  OT Stop Time: 0937  OT Total Time (min): 20 min    Billable Minutes:Evaluation 10  Therapeutic Activity 10    Maxine Caraballo OT  1/18/2020    "

## 2020-01-18 NOTE — PROGRESS NOTES
"[facility] -   Nephrology  Progress Note    Patient Name: Lisseth Schwartz  MRN: [id number]  Admission Date: 1/17/2020  Hospital Length of Stay: 0 days  Attending Provider: Rocio Garcia MD   Primary Care Physician: Joe Sinclair MD  Principal Problem:Hyperkalemia    Subjective:     HPI: 29 year old female with ESRD, anemia, HTN, seizures presents to [facility] with right sided weakness. She reports that she has experienced at least 3 episodes of "tingling" that went over her whole body during the past 3-4 days. Tingling sensation started abruptly and lasted several minutes (up to 30 minutes). She also reports bilateral LE weakness and mild right arm weakness. Patient also reports that her eyes to "flicker" upon closure. Patient went to Urgent Care 2-3 days ago and work-up (including CT scan ) has been negative. Patient's record lists seizures but patient as not aware of this and denies the use of seizure medications. She dialyzes on MWF schedule and was last dialyzed on Wednesday (1/1/15/20).     Nephrology was consulted to help with patient's renal care while she is admitted at [facility]. I saw and examined patient in her hospital room. Patient    Patient dialyzes on MWF schedule at [facility] under my care. Access is right arm AVF.     Interval History:     1/18/20: she reports episode of extremity tingling in am. This has now resolved.         Review of patient's allergies indicates:   Allergen Reactions    Lisinopril Other (See Comments)     Severe cough    Adhesive tape-silicones Itching    Furosemide Other (See Comments)     Almost gave her right sided heartfailure     Current Facility-Administered Medications   Medication Frequency    0.9%  NaCl infusion PRN    0.9%  NaCl infusion Once    acetaminophen tablet 650 mg Q6H PRN    ALPRAZolam tablet 0.25 mg BID PRN    famotidine tablet 20 mg Daily    hydrALAZINE injection 10 mg Q6H PRN    metoprolol succinate (TOPROL-XL) 24 hr tablet 100 mg Daily "    nozaseptin (NOZIN) nasal  BID    ondansetron disintegrating tablet 8 mg Q8H PRN    sucroferric oxyhydroxide Chew 500 mg TID WM     Facility-Administered Medications Ordered in Other Encounters   Medication Frequency    lactated ringers infusion Continuous       Objective:     Vital Signs (Most Recent):  Temp: 97.7 °F (36.5 °C) (01/18/20 1110)  Pulse: 69 (01/18/20 1110)  Resp: 18 (01/18/20 1110)  BP: (!) 179/95 (01/18/20 1110)  SpO2: 100 % (01/18/20 1110)  O2 Device (Oxygen Therapy): room air (01/17/20 1928) Vital Signs (24h Range):  Temp:  [97.7 °F (36.5 °C)-98.6 °F (37 °C)] 97.7 °F (36.5 °C)  Pulse:  [60-91] 69  Resp:  [18-21] 18  SpO2:  [94 %-100 %] 100 %  BP: (118-188)/() 179/95     Weight: 105.9 kg (233 lb 7.5 oz) (01/18/20 0406)  Body mass index is 37.68 kg/m².  Body surface area is 2.22 meters squared.    I/O last 3 completed shifts:  In: 600 [Other:600]  Out: 3470 [Urine:200; Other:3270]    Physical Exam   Constitutional: She is oriented to person, place, and time. She appears well-developed and well-nourished.   HENT:   Head: Normocephalic.   Eyes: Pupils are equal, round, and reactive to light.   Neck: No thyromegaly present.   Cardiovascular: Normal rate and regular rhythm. Exam reveals no friction rub.   Pulmonary/Chest: Effort normal and breath sounds normal. She has no wheezes. She exhibits no tenderness.   Abdominal: Soft. Bowel sounds are normal. She exhibits no distension. There is no tenderness.   Musculoskeletal: She exhibits no edema.   Lymphadenopathy:     She has no cervical adenopathy.   Neurological: She is alert and oriented to person, place, and time.   Bilateral LE weakness. Mild right arm weakness. Visible flickering of eyes upon closure.    Skin: Skin is warm and dry. No rash noted.   Psychiatric: She has a normal mood and affect.       Significant Labs:  Lab Results   Component Value Date    CREATININE 12.4 (H) 01/18/2020    BUN 38 (H) 01/18/2020      01/18/2020    K 5.6 (H) 01/18/2020     01/18/2020    CO2 23 01/18/2020     Lab Results   Component Value Date    CALCIUM 9.1 01/18/2020    PHOS 8.5 (H) 01/18/2020     Lab Results   Component Value Date    ALBUMIN 3.9 01/17/2020     Lab Results   Component Value Date    WBC 7.14 01/18/2020    HGB 11.3 (L) 01/18/2020    HCT 34.2 (L) 01/18/2020    MCV 99 (H) 01/18/2020     (L) 01/18/2020       Recent Labs   Lab 01/18/20  0531   MG 2.1         Significant Imaging:  Imaging Results          MRI Lumbar Spine Without Contrast (Final result)  Result time 01/18/20 00:04:44    Final result by Armond Howard MD (01/18/20 00:04:44)                 Impression:      Diffusely abnormal bone marrow signal with decreased T1, T2, and STIR signal, suggestive of marrow placement process, with differential including osteosclerosis with end-stage renal disease and myelofibrosis.      Electronically signed by: Armond Howard MD  Date:    01/18/2020  Time:    00:04             Narrative:    EXAMINATION:  MRI LUMBAR SPINE WITHOUT CONTRAST    CLINICAL HISTORY:  Low back pain, risk factors (osteoporosis or chronic steroid use or elderly);    TECHNIQUE:  MR lumbar spine performed multiplanar T1, T2, and STIR weighted sequences.    COMPARISON:  None.    FINDINGS:  There is a pattern of diffuse marked decreased signal intensity of the bone marrow on T1, T2, and STIR weighted sequences suggesting a marrow replacing process.    There is no compression fracture or malalignment.    Conus medullaris terminates normally at L1.  There is no paraspinal soft tissue abnormality.    The intervertebral discs are well hydrated.  The disc spaces are well preserved.  There is no disc herniation.  Spinal canal and neural foramina are widely patent.                               MRI Brain Without Contrast (Final result)  Result time 01/17/20 22:39:12    Final result by Armond Howard MD (01/17/20 22:39:12)                 Impression:      Normal MR  brain.      Electronically signed by: Armond Howard MD  Date:    01/17/2020  Time:    22:39             Narrative:    EXAMINATION:  MRI BRAIN WITHOUT CONTRAST    CLINICAL HISTORY:  Stroke;    TECHNIQUE:  MR brain performed multiplanar, multisequence imaging without contrast.    COMPARISON:  Head CT 01/17/2020    FINDINGS:  There is no restricted diffusion.  No acute infarct.  There is no abnormal signal intensity within the brain parenchyma.  Normal ventricles.  Gray-white differentiation preserved.  No intracranial mass.  The midline structures are well-formed.  Incidental note of partially empty sella with flattening of the pituitary gland along the floor of the sella turcica.  The T2 flow voids are preserved within the major intracranial arterial and dural venous structures.  The orbits are unremarkable.  The paranasal sinuses and mastoids are well aerated.                               US Carotid Bilateral (Final result)  Result time 01/17/20 19:23:34    Final result by Jose Juan Johnson MD (01/17/20 19:23:34)                 Impression:      Impression:     No sonographic evidence of a significant stenosis is identified in either carotid system.    Stenosis of 0%-validated velocity measurements with angiographic measurements, velocity criteria are extrapolated from diameter data as defined by the Society of Radiologists in Ultrasound Consensus Conference Radiology 2003; 229;340-346.      Electronically signed by: Jose Juan Johnson MD  Date:    01/17/2020  Time:    19:23             Narrative:    EXAMINATION:  US CAROTID BILATERAL    CLINICAL HISTORY:  TIA.    COMPARISON:  None    FINDINGS:  Sonographic evaluation of the carotid systems was performed.    No significant atherosclerotic plaque is appreciative in either carotid system.    The peak systolic velocity in the right internal carotid artery was approximately 89 cm/sec.  The right ICA to CCA peak systolic velocity ratio is 1.0.    The peak systolic velocity in the  left internal carotid artery was approximately 119 cm/sec.  The left ICA to CCA peak systolic velocity ratio is 1.1.    Antegrade flow noted in both vertebral arteries.                               X-Ray Chest AP Portable (Final result)  Result time 01/17/20 11:08:40    Final result by Melquiades Ramirez MD (01/17/20 11:08:40)                 Impression:      No acute radiographic abnormality in the chest      Electronically signed by: Melquiades Ramirez  Date:    01/17/2020  Time:    11:08             Narrative:    EXAMINATION:  XR CHEST AP PORTABLE    CLINICAL HISTORY:  weakness;    TECHNIQUE:  Single frontal view of the chest was performed.    COMPARISON:  Chest radiograph 11/20/2019    FINDINGS:  Cardiac leads project over the chest.  Cardiomediastinal silhouette enlarged by AP technique.  Trachea is midline.  Lungs appear symmetrically expanded.  No acute or new consolidative opacity or effusion.  No pneumothorax.  Osseous structures appear intact.                               CT Head Without Contrast (Final result)  Result time 01/17/20 10:35:55    Final result by Melquiades Ramirez MD (01/17/20 10:35:55)                 Impression:      No CT evidence of acute intracranial abnormality.    All CT scans at this facility use dose modulation, iterative reconstruction, and/or weight base dosing when appropriate to reduce radiation dose to as low as reasonably achievable.      Electronically signed by: Melquiades Ramirez  Date:    01/17/2020  Time:    10:35             Narrative:    EXAMINATION:  CT HEAD WITHOUT CONTRAST    CLINICAL HISTORY:  Stroke;    TECHNIQUE:  Contiguous axial images were obtained from the skull base through the vertex without intravenous contrast.    COMPARISON:  None    FINDINGS:  No intracranial hemorrhage. No mass effect or midline shift. Normal parenchymal attenuation. The ventricles and sulci are normal in size and configuration. The paranasal sinuses and mastoid air cells are clear.  No  concerning osseous findings.                                  Assessment/Plan:     * Hyperkalemia  Will use 1K bath during dialysis.     Numbness and tingling, global weakness and dysphagia  MRI showed abnormal bone marrow signal in lumbar region. As per Neurology.     Hyperphosphatemia  Continue Velphoro.     ESRD (end stage renal disease)  Patient dialyzes on MWF schedule at [facility].    Next HD today because of hyperkalemia.     Access: right arm AVF.         Thank you for your consult. I will follow-up with patient. Please contact us if you have any additional questions.    Andres Hoyt MD  Nephrology  [facility] -

## 2020-01-18 NOTE — ASSESSMENT & PLAN NOTE
-Status post calcium, insulin and glucose.   -Further management with dialysis.   -Discussed the importance of following a renal diet.     Needs another round of HD to correct Hyperkalemia  Pt educated/ counseled about diet- stepan bananas, Oranges and Tomatoes- she understands and accepts

## 2020-01-19 VITALS
WEIGHT: 234.81 LBS | BODY MASS INDEX: 37.74 KG/M2 | RESPIRATION RATE: 18 BRPM | SYSTOLIC BLOOD PRESSURE: 147 MMHG | DIASTOLIC BLOOD PRESSURE: 80 MMHG | HEART RATE: 71 BPM | HEIGHT: 66 IN | TEMPERATURE: 97 F | OXYGEN SATURATION: 99 %

## 2020-01-19 PROBLEM — R20.0 NUMBNESS AND TINGLING: Status: RESOLVED | Noted: 2020-01-17 | Resolved: 2020-01-19

## 2020-01-19 PROBLEM — R20.2 NUMBNESS AND TINGLING: Status: RESOLVED | Noted: 2020-01-17 | Resolved: 2020-01-19

## 2020-01-19 PROBLEM — E87.5 HYPERKALEMIA: Status: RESOLVED | Noted: 2020-01-17 | Resolved: 2020-01-19

## 2020-01-19 PROCEDURE — G0378 HOSPITAL OBSERVATION PER HR: HCPCS

## 2020-01-19 PROCEDURE — 92526 ORAL FUNCTION THERAPY: CPT

## 2020-01-19 PROCEDURE — 97116 GAIT TRAINING THERAPY: CPT

## 2020-01-19 PROCEDURE — 25000003 PHARM REV CODE 250: Performed by: PHYSICIAN ASSISTANT

## 2020-01-19 PROCEDURE — 97110 THERAPEUTIC EXERCISES: CPT | Mod: 59

## 2020-01-19 RX ORDER — SEVELAMER CARBONATE 800 MG/1
1600 TABLET, FILM COATED ORAL
Qty: 90 TABLET | Refills: 2 | Status: SHIPPED | OUTPATIENT
Start: 2020-01-19 | End: 2020-09-24

## 2020-01-19 RX ORDER — SEVELAMER CARBONATE 800 MG/1
800 TABLET, FILM COATED ORAL
Qty: 90 TABLET | Refills: 2 | Status: SHIPPED | OUTPATIENT
Start: 2020-01-19 | End: 2020-01-19

## 2020-01-19 RX ORDER — ALPRAZOLAM 0.25 MG/1
0.25 TABLET ORAL 2 TIMES DAILY PRN
Qty: 15 TABLET | Refills: 0 | Status: SHIPPED | OUTPATIENT
Start: 2020-01-19 | End: 2020-09-24 | Stop reason: ALTCHOICE

## 2020-01-19 RX ORDER — ALPRAZOLAM 0.25 MG/1
0.25 TABLET ORAL 2 TIMES DAILY PRN
Qty: 15 TABLET | Refills: 0 | Status: SHIPPED | OUTPATIENT
Start: 2020-01-19 | End: 2020-01-19

## 2020-01-19 RX ORDER — CYANOCOBALAMIN 1000 UG/ML
1000 INJECTION, SOLUTION INTRAMUSCULAR; SUBCUTANEOUS ONCE
Status: DISCONTINUED | OUTPATIENT
Start: 2020-01-19 | End: 2020-01-19 | Stop reason: HOSPADM

## 2020-01-19 RX ADMIN — METOPROLOL SUCCINATE 100 MG: 50 TABLET, EXTENDED RELEASE ORAL at 09:01

## 2020-01-19 RX ADMIN — FAMOTIDINE 20 MG: 20 TABLET ORAL at 09:01

## 2020-01-19 RX ADMIN — MUPIROCIN: 20 OINTMENT TOPICAL at 09:01

## 2020-01-19 NOTE — PLAN OF CARE
Recommendation:   1. Encourage PO intake at all meals.   2. Addition of Novasource Renal ONS-BID to meet protein needs.      Goals:   1. Pt PO intake >/= 75% EEN/EPN daily.

## 2020-01-19 NOTE — DISCHARGE SUMMARY
[facility] Hospital Medicine  Discharge Summary      Patient Name: Lisseth Schwartz  MRN: [id number]  Admission Date: 1/17/2020  Hospital Length of Stay: 0 days  Discharge Date and Time:  01/19/2020 10:23 AM  Attending Physician: Rocio Garcia MD   Discharging Provider: Rocio Garcia MD  Primary Care Provider: Joe Sinclair MD      HPI:   Lisseth Galvin is a 29 year old female who is post-partum 15 months and still breast feeding. She has ESRD on dialysis Mondays, Wednesdays and Fridays. On 1/14/20, she began having episodic numbness and heaviness in bilateral upper and lower extremities. She also notes numbness in her throat and an inability to tolerate liquids. The initial onset of symptoms was noted upon waking from a nap. Symptoms lasted several minutes and resolved spontaneously. She was referred to the hospital for further evaluation, but did not go. On 1/16/20, she was seen by her PCP who planned an outpatient work up including a sleep study and Neurology evaluation. She notes that since the onset of symptoms, she has experienced worsening lower extremity weakness. On the day of presentation, she notes similar symptoms primarily involving her right upper extremity, but notes heaviness and abnormal sensation in all extremities. The patient also reports an abnormal fluttering of her eyes when she closes them that began at the time of presentation. She reports chronic low back pain radiating into her right hip, but not down her leg as well as intermittent muscle spasms involving her extremities and face since the onset of her renal disease. She denies focal weakness, loss of bowel or bladder continence and difficulty with speech. The patient reports compliance with medications, but denies compliance with a renal diet. She states that she eats whatever she wants in moderation. In the ED, labs were significant for a potassium of 6.7. CT scan of the head was negative.     * No surgery found  *      Hospital Course:   Lisseth Galvin is a 29 year old female, ESRD on dialysis MWF, she post-partum 15 months and still breast feeding. Admitted with episodic numbness and heaviness in BUE and BLE associated with numbness in her throat and an inability to tolerate liquids x 3-4 days. Sx last several minutes and resolve spontaneously. She now has experienced worsening lower extremity weakness. The patient also reports an abnormal fluttering of her eyes when she closes them that began at the time of presentation. She reports chronic low back pain radiating into her right hip, but not down her leg as well as intermittent muscle spasms involving her extremities and face since the onset of her renal disease. No focal weakness, loss of bowel or bladder continence or difficulty with speech. The patient reports compliance with medications, but denies compliance with a renal diet. She states that she eats whatever she wants in moderation. In the ED, labs were significant for a potassium of 6.7. CT scan of the head was negative. She was treated in the ER with Ca Gluconate and Insulin plus DW and Kayexalate and admitted and underwent immediate HD. She feels a little better, but K is still 5.6 this am. Numbness/ tingling have resolved but leg spasm and weakness have persisted. Neurology was consulted, MRI Brain Neg but MRI L/S shows Osteosclerosis and myelofibrosis- hence Dr. Laurent ordered MRI T/S and C/S which have just been completed but results pending and she is undergoing another HD for her hyperkalemia of 5,6. She now recalls drinking OJ and eating bananas in the last few days. She was counseled again.   1/19- looks lot better, stronger, eating drinking well, walking around well. No recurrence of her Sx, weakness lot better. K is normal after repeat HD yesterday. As per Dr. Laurent -The complete neuraxis (Brain, C-Spine, T-spine, L-spine) MRIs are unremarkable. The multiple neurological symptoms are most likely secondary  to underlying metabolic disturbances.   Neurology will sign off. No need for inpatient/outpatient neurology follow up. Dr. oHyt also cleared her for discharge. She was seen and examined and deemed stable for discharge home today.      Consults:   Consults (From admission, onward)        Status Ordering Provider     Inpatient consult to Neurology  Once     Provider:  Cheryle Laurent MD    Completed MAURICIO REY     Inpatient consult to Registered Dietitian/Nutritionist  Once     Provider:  (Not yet assigned)    AUBRIE Benton new Assessment & Plan notes have been filed under this hospital service since the last note was generated.  Service: Hospital Medicine    Final Active Diagnoses:    Diagnosis Date Noted POA    ESRD (end stage renal disease) [N18.6] 02/01/2017 Yes    Hyperphosphatemia [E83.39] 09/21/2018 Yes    Hypertension [I10] 12/10/2015 Yes    Low back pain [M54.5] 12/02/2019 Yes    Breast feeding status of mother [Z39.1] 01/17/2020 Not Applicable      Problems Resolved During this Admission:    Diagnosis Date Noted Date Resolved POA    PRINCIPAL PROBLEM:  Hyperkalemia [E87.5] 01/17/2020 01/19/2020 Yes    Numbness and tingling, global weakness and dysphagia [R20.0, R20.2] 01/17/2020 01/19/2020 Yes       Discharged Condition: stable    Disposition: Home or Self Care    Follow Up:  Follow-up Information     Joe Sinclair MD. Schedule an appointment as soon as possible for a visit in 3 days.    Specialty:  Internal Medicine  Why:  Hospital follow up  Contact information:  [address] [place] [address] [id number] [phone]                 Patient Instructions:      Diet renal     Activity as tolerated       Significant Diagnostic Studies: Labs:   BMP:   Recent Labs   Lab 01/17/20  1126 01/18/20  0531 01/18/20  2051   GLU 66* 77 95    138 139   K 6.7* 5.6* 4.6   CL 99 100 103   CO2 24 23 23   BUN 52* 38* 35*   CREATININE 16.3* 12.4* 11.2*   CALCIUM 8.8 9.1 7.3*    MG  --  2.1  --    , CMP   Recent Labs   Lab 01/17/20  1126 01/18/20  0531 01/18/20 2051    138 139   K 6.7* 5.6* 4.6   CL 99 100 103   CO2 24 23 23   GLU 66* 77 95   BUN 52* 38* 35*   CREATININE 16.3* 12.4* 11.2*   CALCIUM 8.8 9.1 7.3*   PROT 8.0  --  8.1   ALBUMIN 3.9  --  3.8   BILITOT 0.7  --  0.5   ALKPHOS 60  --  67   AST 13  --  10   ALT 13  --  13   ANIONGAP 15 15 13   ESTGFRAFRICA 3* 4* 5*   EGFRNONAA 3* 4* 4*   , CBC   Recent Labs   Lab 01/18/20  0532 01/18/20 2051   WBC 7.14 9.11   HGB 11.3* 11.5*   HCT 34.2* 36.8*   * 156    and All labs within the past 24 hours have been reviewed  Imaging Results          MRI Lumbar Spine Without Contrast (Final result)  Result time 01/18/20 00:04:44    Final result by Armond Howard MD (01/18/20 00:04:44)                 Impression:      Diffusely abnormal bone marrow signal with decreased T1, T2, and STIR signal, suggestive of marrow placement process, with differential including osteosclerosis with end-stage renal disease and myelofibrosis.      Electronically signed by: Armond Howard MD  Date:    01/18/2020  Time:    00:04             Narrative:    EXAMINATION:  MRI LUMBAR SPINE WITHOUT CONTRAST    CLINICAL HISTORY:  Low back pain, risk factors (osteoporosis or chronic steroid use or elderly);    TECHNIQUE:  MR lumbar spine performed multiplanar T1, T2, and STIR weighted sequences.    COMPARISON:  None.    FINDINGS:  There is a pattern of diffuse marked decreased signal intensity of the bone marrow on T1, T2, and STIR weighted sequences suggesting a marrow replacing process.    There is no compression fracture or malalignment.    Conus medullaris terminates normally at L1.  There is no paraspinal soft tissue abnormality.    The intervertebral discs are well hydrated.  The disc spaces are well preserved.  There is no disc herniation.  Spinal canal and neural foramina are widely patent.                               MRI Brain Without Contrast (Final  result)  Result time 01/17/20 22:39:12    Final result by Armond Howard MD (01/17/20 22:39:12)                 Impression:      Normal MR brain.      Electronically signed by: Armond Howard MD  Date:    01/17/2020  Time:    22:39             Narrative:    EXAMINATION:  MRI BRAIN WITHOUT CONTRAST    CLINICAL HISTORY:  Stroke;    TECHNIQUE:  MR brain performed multiplanar, multisequence imaging without contrast.    COMPARISON:  Head CT 01/17/2020    FINDINGS:  There is no restricted diffusion.  No acute infarct.  There is no abnormal signal intensity within the brain parenchyma.  Normal ventricles.  Gray-white differentiation preserved.  No intracranial mass.  The midline structures are well-formed.  Incidental note of partially empty sella with flattening of the pituitary gland along the floor of the sella turcica.  The T2 flow voids are preserved within the major intracranial arterial and dural venous structures.  The orbits are unremarkable.  The paranasal sinuses and mastoids are well aerated.                               US Carotid Bilateral (Final result)  Result time 01/17/20 19:23:34    Final result by Jose Juan Johnson MD (01/17/20 19:23:34)                 Impression:      Impression:     No sonographic evidence of a significant stenosis is identified in either carotid system.    Stenosis of 0%-validated velocity measurements with angiographic measurements, velocity criteria are extrapolated from diameter data as defined by the Society of Radiologists in Ultrasound Consensus Conference Radiology 2003; 229;340-346.      Electronically signed by: Jose Juan Johnson MD  Date:    01/17/2020  Time:    19:23             Narrative:    EXAMINATION:  US CAROTID BILATERAL    CLINICAL HISTORY:  TIA.    COMPARISON:  None    FINDINGS:  Sonographic evaluation of the carotid systems was performed.    No significant atherosclerotic plaque is appreciative in either carotid system.    The peak systolic velocity in the right internal  carotid artery was approximately 89 cm/sec.  The right ICA to CCA peak systolic velocity ratio is 1.0.    The peak systolic velocity in the left internal carotid artery was approximately 119 cm/sec.  The left ICA to CCA peak systolic velocity ratio is 1.1.    Antegrade flow noted in both vertebral arteries.                               X-Ray Chest AP Portable (Final result)  Result time 01/17/20 11:08:40    Final result by Melquiades Ramirez MD (01/17/20 11:08:40)                 Impression:      No acute radiographic abnormality in the chest      Electronically signed by: Melquiades Ramirez  Date:    01/17/2020  Time:    11:08             Narrative:    EXAMINATION:  XR CHEST AP PORTABLE    CLINICAL HISTORY:  weakness;    TECHNIQUE:  Single frontal view of the chest was performed.    COMPARISON:  Chest radiograph 11/20/2019    FINDINGS:  Cardiac leads project over the chest.  Cardiomediastinal silhouette enlarged by AP technique.  Trachea is midline.  Lungs appear symmetrically expanded.  No acute or new consolidative opacity or effusion.  No pneumothorax.  Osseous structures appear intact.                               CT Head Without Contrast (Final result)  Result time 01/17/20 10:35:55    Final result by Melquiades Ramirez MD (01/17/20 10:35:55)                 Impression:      No CT evidence of acute intracranial abnormality.    All CT scans at this facility use dose modulation, iterative reconstruction, and/or weight base dosing when appropriate to reduce radiation dose to as low as reasonably achievable.      Electronically signed by: Melquiades Ramirez  Date:    01/17/2020  Time:    10:35             Narrative:    EXAMINATION:  CT HEAD WITHOUT CONTRAST    CLINICAL HISTORY:  Stroke;    TECHNIQUE:  Contiguous axial images were obtained from the skull base through the vertex without intravenous contrast.    COMPARISON:  None    FINDINGS:  No intracranial hemorrhage. No mass effect or midline shift. Normal parenchymal  attenuation. The ventricles and sulci are normal in size and configuration. The paranasal sinuses and mastoid air cells are clear.  No concerning osseous findings.                              Pending Diagnostic Studies:     Procedure Component Value Units Date/Time    Basic metabolic panel [[id number]]     Order Status:  Sent Lab Status:  No result     Specimen:  Blood     CBC auto differential [[id number]]     Order Status:  Sent Lab Status:  No result     Specimen:  Blood     Hepatitis B Surface Antibody, Qual/Quant [[id number]] Collected:  01/18/20 0531    Order Status:  Sent Lab Status:  In process Updated:  01/18/20 0914    Specimen:  Blood     Hepatitis B surface antigen [[id number]] Collected:  01/18/20 0531    Order Status:  Sent Lab Status:  In process Updated:  01/18/20 0918    Specimen:  Blood     Magnesium [[id number]]     Order Status:  Sent Lab Status:  No result     Specimen:  Blood     Phosphorus [[id number]]     Order Status:  Sent Lab Status:  No result     Specimen:  Blood          Medications:  Reconciled Home Medications:      Medication List      START taking these medications    ALPRAZolam 0.25 MG tablet  Commonly known as:  XANAX  Take 1 tablet (0.25 mg total) by mouth 2 (two) times daily as needed for Anxiety.     nozaseptin  nasal   Commonly known as:  NOZIN  1 each by Each Nostril route 2 (two) times daily.        CHANGE how you take these medications    sevelamer carbonate 800 mg Tab  Commonly known as:  RENVELA  Take 2 tablets (1,600 mg total) by mouth 3 (three) times daily with meals.  What changed:  how much to take        CONTINUE taking these medications    famotidine 40 MG tablet  Commonly known as:  PEPCID  Take 1 tablet (40 mg total) by mouth 2 (two) times daily as needed for Heartburn.     hydrALAZINE 25 MG tablet  Commonly known as:  APRESOLINE  Take 1 tablet (25 mg total) by mouth every 12 (twelve) hours.     metoprolol succinate 100 MG 24 hr tablet  Commonly known as:   TOPROL-XL  TAKE ONE TABLET BY MOUTH EVERY DAY     promethazine 25 MG tablet  Commonly known as:  PHENERGAN  Take 1 tablet (25 mg total) by mouth every 6 (six) hours as needed for Nausea.     sucroferric oxyhydroxide 500 mg Chew  Commonly known as:  VELPHORO  Take 500 mg by mouth 3 (three) times daily with meals.            Indwelling Lines/Drains at time of discharge:   Lines/Drains/Airways     Drain                 Hemodialysis AV Fistula Right upper arm -- days                Time spent on the discharge of patient: 43 minutes  Patient was seen and examined on the date of discharge and determined to be suitable for discharge.         Rocio Garcia MD  Department of Hospital Medicine  [facility]

## 2020-01-19 NOTE — PT/OT/SLP PROGRESS
Occupational Therapy  Treatment    Lisseth Collinsfield   MRN: [id number]   Admitting Diagnosis: Hyperkalemia    OT Date of Treatment: 01/19/20   OT Start Time: 0920  OT Stop Time: 0935  OT Total Time (min): 15 min    Billable Minutes:  Therapeutic Exercise 15    General Precautions: Standard, aspiration, fall  Orthopedic Precautions: N/A  Braces: N/A         Subjective:  Communicated with NURSE prior to session.       Pain/Comfort  Pain Rating 1: 4/10  Location - Side 1: Bilateral  Location - Orientation 1: generalized  Location 1: arm  Pain Addressed 1: Reposition, Distraction, Cessation of Activity    Objective:  Patient found with: telemetry     Functional Mobility:  Bed Mobility:       Transfers:        Functional Ambulation: SBA WITH RW    Activities of Daily Living:     Feeding adaptive equipment: NT     UE adaptive equipment: NT     LE adaptive equipment: NT                    Bathing adaptive equipment: NT    Balance:   Static Sit: FAIR+: Able to take MINIMAL challenges from all directions  Dynamic Sit: FAIR: Cannot move trunk without losing balance  Static Stand: FAIR: Maintains without assist but unable to take challenges  Dynamic stand: FAIR: Needs CONTACT GUARD during gait    Therapeutic Activities and Exercises:  PATIENT PERFORMED BUE STRETCH WITH (B) HANDS IN PRAYER HAND POSITION AS PATIENT RAISED (B) HANDS TO CEILING WHILE LOOKING UP TO CEILING TO INCREASE NECK/TRUNK EXTENSION TO DECREASE KYPHOTIC POSTURE NOTED WITH AMBULATION.  PATIENT ALSO HELD BUE'S IN ABDUCTION TO 90 DEGREES AGAINST GRAVITY FOR BUE STRENGTHENING WHILE PERFORMING (B) WRIST AROM FOR  FLEX/EXTEN.  PATIENT ADVISED BOTH THERE EX PERFORMED THIS DATE CAN ALSO BE PERFORMED AS MX RELAXATION TO DECREASE PATIENT C/O MX SORENESS/TIGHTNESS/DISCOMFORT.    AM-PAC 6 CLICK ADL   How much help from another person does this patient currently need?   1 = Unable, Total/Dependent Assistance  2 = A lot, Maximum/Moderate Assistance  3 = A little,  "Minimum/Contact Guard/Supervision  4 = None, Modified Kimble/Independent    Putting on and taking off regular lower body clothing? : 2  Bathing (including washing, rinsing, drying)?: 2  Toileting, which includes using toilet, bedpan, or urinal? : 3  Putting on and taking off regular upper body clothing?: 2  Taking care of personal grooming such as brushing teeth?: 3  Eating meals?: 4  Daily Activity Total Score: 16     AM-PAC Raw Score CMS "G-Code Modifier Level of Impairment Assistance   6 % Total / Unable   7 - 8 CM 80 - 100% Maximal Assist   9-13 CL 60 - 80% Moderate Assist   14 - 19 CK 40 - 60% Moderate Assist   20 - 22 CJ 20 - 40% Minimal Assist   23 CI 1-20% SBA / CGA   24 CH 0% Independent/ Mod I       Patient left up in chair with all lines intact, call button in reach and NURSE notified    ASSESSMENT:  Lisseth Schwartz is a 29 y.o. female with a medical diagnosis of Hyperkalemia and presents with SELF CARE DEBILITY.    Rehab identified problem list/impairments: Rehab identified problem list/impairments: weakness, impaired endurance, impaired self care skills, impaired functional mobilty, gait instability, impaired balance, pain, decreased ROM, decreased upper extremity function    Rehab potential is good.    Activity tolerance: Fair    Discharge recommendations: Discharge Facility/Level of Care Needs: home health OT     Barriers to discharge: Barriers to Discharge: (TBD)    Equipment recommendations: walker, rolling     GOALS:   Multidisciplinary Problems     Occupational Therapy Goals        Problem: Occupational Therapy Goal    Goal Priority Disciplines Outcome Interventions   Occupational Therapy Goal     OT, PT/OT Ongoing, Progressing    Description:  1.  PATIENT WILL PERFORM X10-X15 REPS OF BUE ACTIVE STRETCH ALL PLANES TO DECREASE C/O MX SORENESS/DISCOMFORT.  2.  PATIENT WILL STAND AT SINK 5-10 MIN TO COMPLETE SIMPLE ADL.  3.  PATIENT WILL DON/DOFF LB CLOTHING WITH MIN (A).  4.  " PATIENT WILL BE MIN (A) WITH TOILET T/F.                    Plan:  Patient to be seen 3 x/week to address the above listed problems via self-care/home management, therapeutic activities, therapeutic exercises  Plan of Care expires: 01/25/20  Plan of Care reviewed with: patient    OT G-codes  Functional Assessment Tool Used: [employer]  Score: 16  Functional Limitation: Self care    Maxine Caraballo OT  01/19/2020

## 2020-01-19 NOTE — PT/OT/SLP PROGRESS
Physical Therapy Treatment    Patient Name:  Lisseth Schwartz   MRN:  [id number]    Recommendations:     Discharge Recommendations:  home health PT   Discharge Equipment Recommendations: none   Barriers to discharge: Decreased caregiver support    Assessment:     Continues to limited by c/o of cramping in BLE    Rehab Prognosis: Good; patient would benefit from acute skilled PT services to address these deficits and reach maximum level of function.    Recent Surgery: * No surgery found *      Plan:     During this hospitalization, patient to be seen 5 x/week to address the identified rehab impairments via gait training, therapeutic activities, therapeutic exercises and progress toward the following goals:    · Plan of Care Expires:  01/25/20    Subjective     Chief Complaint: cramping  Patient/Family Comments/goals: decrease cramping  Pain/Comfort:  · Pain Rating 1: 4/10  · Location - Side 1: Bilateral  · Location 1: calf  · Pain Addressed 1: Pre-medicate for activity, Reposition, Distraction, Cessation of Activity      Objective:     Communicated with nurse Phan prior to session.  Patient found supine with   upon PT entry to room.     General Precautions: Standard, fall   Orthopedic Precautions:N/A   Braces: N/A     Functional Mobility:  · Bed Mobility:     · Supine to Sit: independence  · Transfers:     · Bed to Chair: modified independence with  rolling walker  using  Stand Pivot  · Gait: 80 feet with RW and SBA      AM-PAC 6 CLICK MOBILITY  Turning over in bed (including adjusting bedclothes, sheets and blankets)?: 4  Sitting down on and standing up from a chair with arms (e.g., wheelchair, bedside commode, etc.): 4  Moving from lying on back to sitting on the side of the bed?: 4  Moving to and from a bed to a chair (including a wheelchair)?: 4  Need to walk in hospital room?: 4  Climbing 3-5 steps with a railing?: 2  Basic Mobility Total Score: 22       Therapeutic Activities and Exercises:   Pt demo knee  and ankle flexed gait with absent push off.  Educated in proper gait.  Educated in BLE ROM exercises however pt very reluctant with ankle ROM due to cramping    Patient left up in chair with all lines intact and call button in reach..    GOALS:   Multidisciplinary Problems     Physical Therapy Goals        Problem: Physical Therapy Goal    Goal Priority Disciplines Outcome Goal Variances Interventions   Physical Therapy Goal     PT, PT/OT      Description:  PT eval complete.  The following goals should be met in 7 days  1. Pt will be IND with bed mobility  2. Pt will be IND for transfer  3. Pt will ambulate 125 ft without AD                    Time Tracking:     PT Received On: 01/19/20  PT Start Time: 1000     PT Stop Time: 1015  PT Total Time (min): 15 min     Billable Minutes: Gait Training 15       PT/PTA: PT           Jorge Bazzi, PT  01/19/2020

## 2020-01-19 NOTE — PT/OT/SLP PROGRESS
Speech Language Pathology Treatment    Patient Name:  Lisseth Schwartz   MRN:  [id number]  Admitting Diagnosis: Hyperkalemia    Recommendations:                 General Recommendations:  basic aspiration precautions  Diet recommendations:  Regular, Liquid Diet Level: Thin   Aspiration Precautions: in place and reviewed with patient  General Precautions: Standard, aspiration  Communication strategies:  verbal    Subjective     Pt being discharged today; pt getting her things ready and was soon leaving  Patient goals: to get home with my kids    Pain/Comfort:  ·  0/10    Objective:     Has the patient been evaluated by SLP for swallowing?   Yes  Keep patient NPO? No   Current Respiratory Status: room air      Pt reported she has been eating her meals and drinking her liquids for past 24hrs with no difficulties.  Pt drank some water for S.T. With no observable difficulties.  Swallow precautions reviewed as well as what to do to assist language for any communication difficulties (word-finding, dysarthria).   Pt reported she understood all information presented.    Assessment:     Lisseth Schwartz is a 29 y.o. female seen for swallowing and speech/language difficulties.  Pt has been recommended to be discharged on a regular consistency diet and thin liquids with review of swallowing precautions.   She has been recommended for S.T. Home Health to follow swallowing, speech and language and pt is aware of these recommendations.    Goals:   Multidisciplinary Problems     SLP Goals        Problem: SLP Goal    Goal Priority Disciplines Outcome   SLP Goal     SLP Ongoing, Progressing   Description:  LT. Pt will tolerate least restrictive diet consistency safely and efficiently.  2. Pt will communicate needs/ideas effectively.    ST. Oral motor tasks x20 with min cues  2. BSA with full meal with further goals as appropriate.  3. Higher level word-finding tasks with 90%.  4. Imitate 6-8 syllable sentences with  90%.  Goal to be updated by: 1/25/20                    Plan:     · Patient to be seen:  5 x/week   · Plan of Care expires:     · Plan of Care reviewed with:  patient   · SLP Follow-Up:  Yes       Discharge recommendations:    S[facility]  Barriers to Discharge:  n/a    Time Tracking:     SLP Treatment Date:   01/19/2020  Speech Start Time:  1030  Speech Stop Time:  1050  Speech Total Time (min):  20 minutes    Billable Minutes: 20 minutes    Susan Roe CCC-SLP  01/19/2020

## 2020-01-19 NOTE — PROGRESS NOTES
The complete neuraxis (Brain, C-Spine, T-spine, L-spine) MRIs are unremarkable.     The multiple neurological symptoms are most likely secondary to underlying metabolic disturbances.    Neurology will sign off. No need for inpatient/outpatient neurology follow up.

## 2020-01-19 NOTE — ASSESSMENT & PLAN NOTE
Contributing Nutrition Diagnosis   Increased nutrient needs, protein    Related to (etiology):   ESRD on HD, breastfeeding    Signs and Symptoms (as evidenced by):   Chart review showing pt is currently breastfeeding, and on HD.     Interventions:  Commercial Beverage  Collaboration with other providers    Nutrition Diagnosis Status:   New

## 2020-01-19 NOTE — PLAN OF CARE
PATIENT WOULD BENEFIT FROM CONT BUE AND TRUNK STRETCHES WITH FOCUS ON (I) WITH HEP AS WELL AS DECREASED C/O BUE MX SORENESS/TIGHTNESS AND DECREASED KYPHOTIC POSTURE WITH AMBULATION.

## 2020-01-19 NOTE — NURSING
Reviewed AVS with patient. Instructed to make follow up appointment with primary care within 3 days if possible, resume renal diet, and take new medications as prescribed. No questions at the time. PIV removed with catheter intact and heart monitor removed. Patient's transportation here at time, wheel chair called and patient to be transported down with PCT.

## 2020-01-19 NOTE — CONSULTS
"  [facility] -   Adult Nutrition  Consult Note    SUMMARY     Recommendations    Recommendation:   1. Encourage PO intake at all meals.   2. Addition of Novasource Renal ONS-BID to meet protein needs.     Goals:   1. Pt PO intake >/= 75% EEN/EPN daily.     Nutrition Goal Status: new  Communication of RD Recs: other (comment)(POC)    Reason for Assessment    Reason For Assessment: consult(Pt states they've lost 30 lbs, unintentially. )  Diagnosis: other (see comments)(Hyperkalemia)  Relevant Medical History: HTN, anxiety, brittle bones, seizures, anemia, ESRD on HD  Interdisciplinary Rounds: did not attend  General Information Comments: RD covering remotely. Consulted for "Patient states they've lost 30 lbs, recently without trying. Renal patient/electrolyte imbalance." Per chart review pt is on a renal diet and recieves HD 3x/week. Per chart review pt weighted 221 lb on 11/20/19-showing a 12.5 lb wt gain. Per chart review pt reports not following renal diet recommendations. Onsite RD will follow up with [facility].   Nutrition Discharge Planning: d/c to be determined    Nutrition Risk Screen    Nutrition Risk Screen: no indicators present    Nutrition/Diet History    Food Preferences: no cultural or spiritual food preferences identified at this time  Spiritual, Cultural Beliefs, Buddhism Practices, Values that Affect Care: no  Food Allergies: NKFA    Anthropometrics    Temp: 97.7 °F (36.5 °C)  Height Method: Stated  Height: 5' 6" (167.6 cm)  Height (inches): 66 in  Weight Method: Bed Scale  Weight: 105.9 kg (233 lb 7.5 oz)  Weight (lb): 233.47 lb  Ideal Body Weight (IBW), Female: 130 lb  % Ideal Body Weight, Female (lb): 179.59 %  BMI (Calculated): 37.7  BMI Grade: 35 - 39.9 - obesity - grade II       Lab/Procedures/Meds    Pertinent Labs Reviewed: reviewed  Pertinent Labs Comments: H/H 11.4/34.2, K 5.6, BUN 38, Cr 12.4, eGFR 4, Phos 8.5, Chol 116, HDL 26, LDL 62.4  Pertinent Medications Reviewed: " reviewed  Pertinent Medications Comments: femotidine, NaCl, TOPROL-XL, nozaseptin, sucroferric oxyhydroxide    Physical Findings/Assessment    Nasir Score: 20     Estimated/Assessed Needs    Weight Used For Calorie Calculations: 105.9 kg (233 lb 7.5 oz)  Energy Calorie Requirements (kcal): 2239(MSJ x 1.25)  Energy Need Method: Petroleum-St Jeor(x 1.25)  Protein Requirements: 138(1.3 gm/kg)  Weight Used For Protein Calculations: 105.9 kg (233 lb 7.5 oz)     Estimated Fluid Requirement Method: other (see comments)(Per MD)  RDA Method (mL): 2239         Nutrition Prescription Ordered    Current Diet Order: Renal    Evaluation of Received Nutrient/Fluid Intake    I/O: 600/3470  Energy Calories Required: other (see comments)(unable assess RD covering remotely)  Protein Required: other (see comments)(unable assess RD covering remotely)  Fluid Required: other (see comments)(unable assess RD covering remotely)  Comments: LBM 1/16/20  Tolerance: tolerating  % Intake of Estimated Energy Needs: Other: unable assess RD covering remotely  % Meal Intake: Other: unable assess RD covering remotely    Nutrition Risk    Level of Risk/Frequency of Follow-up: (2 x/week)     Assessment and Plan    ESRD (end stage renal disease)  Contributing Nutrition Diagnosis   Increased nutrient needs, protein    Related to (etiology):   ESRD on HD, breastfeeding    Signs and Symptoms (as evidenced by):   Chart review showing pt is currently breastfeeding, and on HD.     Interventions:  Commercial Beverage  Collaboration with other providers    Nutrition Diagnosis Status:   New           Monitor and Evaluation    Food and Nutrient Intake: energy intake, food and beverage intake  Food and Nutrient Adminstration: diet order  Knowledge/Beliefs/Attitudes: food and nutrition knowledge/skill  Physical Activity and Function: nutrition-related ADLs and IADLs, breastfeeding  Anthropometric Measurements: height/length, weight, weight change, body mass  index  Biochemical Data, Medical Tests and Procedures: electrolyte and renal panel, gastrointestinal profile, glucose/endocrine profile, inflammatory profile, lipid profile  Nutrition-Focused Physical Findings: (unable assess RD covering remotely)     Malnutrition Assessment     unable assess RD covering remotely      Nutrition Follow-Up    RD Follow-up?: Yes

## 2020-01-19 NOTE — PLAN OF CARE
No acute changes during shift  Pt AAOx4  Plan of care reviewed with patient. Verbalized understanding  Pt remained free from falls. All fall and safety precautions in place  NSR on tele monitor  PIV, CDI   VSS  Call bell and personal items within reach.  Hourly rounding complete. Instructed to call for assistance  Pt denies needs and has no c/o at this time  Continue current plan of care     Problem: Fall Injury Risk  Goal: Absence of Fall and Fall-Related Injury  Outcome: Ongoing, Progressing     Problem: Adult Inpatient Plan of Care  Goal: Plan of Care Review  Outcome: Ongoing, Progressing  Flowsheets (Taken 1/19/2020 0534)  Plan of Care Reviewed With: patient  Goal: Patient-Specific Goal (Individualization)  Outcome: Ongoing, Progressing  Goal: Absence of Hospital-Acquired Illness or Injury  Outcome: Ongoing, Progressing  Goal: Optimal Comfort and Wellbeing  Outcome: Ongoing, Progressing  Goal: Readiness for Transition of Care  Outcome: Ongoing, Progressing  Goal: Rounds/Family Conference  Outcome: Ongoing, Progressing     Problem: Device-Related Complication Risk (Hemodialysis)  Goal: Safe, Effective Therapy Delivery  Outcome: Ongoing, Progressing     Problem: Hemodynamic Instability (Hemodialysis)  Goal: Vital Signs Remain in Desired Range  Outcome: Ongoing, Progressing     Problem: Infection (Hemodialysis)  Goal: Absence of Infection Signs/Symptoms  Outcome: Ongoing, Progressing

## 2020-01-20 LAB
HBV SURFACE AB SER QL IA: POSITIVE
HBV SURFACE AB SERPL IA-ACNC: 59 MIU/ML
HBV SURFACE AG SERPL QL IA: NEGATIVE

## 2020-01-24 ENCOUNTER — HOSPITAL ENCOUNTER (OUTPATIENT)
Dept: SLEEP MEDICINE | Facility: HOSPITAL | Age: 30
Discharge: HOME | End: 2020-01-24
Attending: INTERNAL MEDICINE
Payer: MEDICARE

## 2020-01-24 DIAGNOSIS — G47.61 PERIODIC LIMB MOVEMENT DISORDER (PLMD): Primary | ICD-10-CM

## 2020-01-24 DIAGNOSIS — F51.04 PSYCHOPHYSIOLOGICAL INSOMNIA: ICD-10-CM

## 2020-01-24 DIAGNOSIS — G25.81 RESTLESS LEGS SYNDROME (RLS): ICD-10-CM

## 2020-01-24 DIAGNOSIS — R06.83 PRIMARY SNORING: ICD-10-CM

## 2020-01-24 DIAGNOSIS — Z72.821 INADEQUATE SLEEP HYGIENE: ICD-10-CM

## 2020-01-24 PROCEDURE — 95810 POLYSOM 6/> YRS 4/> PARAM: CPT | Mod: 26,,, | Performed by: PSYCHOLOGIST

## 2020-01-24 PROCEDURE — 95811 POLYSOM 6/>YRS CPAP 4/> PARM: CPT

## 2020-01-24 PROCEDURE — 95810 PR POLYSOMNOGRAPHY, 4 OR MORE: ICD-10-PCS | Mod: 26,,, | Performed by: PSYCHOLOGIST

## 2020-01-24 PROCEDURE — 95810 POLYSOM 6/> YRS 4/> PARAM: CPT | Performed by: PSYCHOLOGIST

## 2020-01-30 NOTE — PROCEDURES
Patient Name: Lisseth Schwartz  Date of Report: 20    Date of PS2020   [facility].: [id number]  : 1990                      Time of PSG:  10:04:46 PM - 5:01:13 AM  Sex:  Female   Age:  29   Weight:  224.0 lbs Height:  5  6            Type of PSG:  Diagnostic     REASONS FOR REFERRAL: Ms Schwartz is a 29 year old female, referred for diagnostic polysomnography by Dr. Joe Sinclair, based on the patients reported loud snoring, observed respiratory pauses in sleep, and daytime hypersomnolence.  Her [place] Sleepiness Scale score was 7, not clinically significant, but her STOP-BANG score was 5, high risk of CHRYSTAL.  Dr. Sinclair is the patients primary care physician.    STUDY PARAMETERS: This diagnostic study involved analysis of the patient's sleep pattern while breathing unassisted. The study was performed with a sleep technologist in attendance for the entire test period, with video monitoring throughout the study, and routine laboratory clinical parameters recorded:  NOTE: The polysomnography electrophysiological record for the patient has been reviewed in its entirety by Dr. Rose.    SUMMARY STATEMENTS  DIAGNOSTIC IMPRESSIONS  G47.61  /  327.51  Severe Periodic Limb Movement (PLM) Disorder   G25.81  /  333.99  Restless Legs Syndrome (RLS)  F51.04   / 307.42  Psychophysiological Insomnia (stress - related and conditioned)    R06.83  /  780.53-1  Primary Snoring (sporadic, mild during polysomnography)  Z72.821 /  V69.4  Inadequate Sleep Hygiene  G47.01  / 327.01  r/o Insomnia due to Medical Condition (pain, discomfort)  G47.21  /   327.31  r/o Delayed Sleep - Wake Phase Disorder  G47.52   /   327.42  r/o REM Sleep Behavior Disorder      TREATMENT RECOMMENDATIONS  Treat, or refer to Sleep Disorders Center.  1. The diagnostic polysomnography revealed no diagnosable obstructive sleep apnea / hypopnea syndrome (A + H Index = 1.6 events / hr asleep with only 0.5 respiratory event - related  arousals / hr asleep for the study, and no RERAs (respiratory effort -  related arousals).  The mean SpO2 value was 93.0 %, moderate, minimum oxygen saturation during sleep was 84.0 %, and waking baseline SpO2 was 100 %.  Sporadic, mild snoring was noted. CPAP titration polysomnography is not recommended      2. If treatment of snoring (sporadic, mild  during the PSG) is desired, consider a reversible treatment such as a dental oral device.  If a permanent procedure such as UPPP is preferred, periodic polysomnography may be needed because signs of worsening apnea could be missed (silent apneas) if CHRYSTAL develops or becomes more severe.  3. Weight loss to the normal range is recommended as it can decrease respiratory events and snoring in overweight patients.  4. The following changes in sleep hygiene / sleep - related behavior are recommended after medical treatments are successful   Regular bedtimes and wake times, including weekends: Total sleep time / night should not be more than one hour more             than usual, and bedtime or wake time should not be more than one hour earlier or later than usual.     Avoid naps; none longer than 20 min or later than mid - afternoon.  5. A severe PLM disorder was observed (PLMS Index = 50.9 / hr asleep, but treatment might not be optimal because the PLMS were not very disruptive of sleep (4.6 arousals / hr asleep);  however,  as there was SDI evidence of restless legs syndrome (which can be treated with the same medications as PLMS), consider treatment of restless legs syndrome if RLS is confirmed  and / or  PLM disorder if PLMS symptoms are sufficiently bothersome to the patient.  Note that benzodiazepine medications sometimes used to treat PLM disorder (e.g., clonazepam) may exacerbate some sleep - related respiratory disorders, and that dopaminergic medications such as Mirapex and Requip can be used in such instances.   6.  If  insomnia persists after RLS has been ruled  out or effectively treated, recommend  follow - up inquiry regarding frequency,   duration and nature of reported sleep loss and delayed sleep onset (r/o  stress - related and / or conditioned psychophysio-  logical insomnia, r/o  delayed sleep - wake phase disorder) and referral for behavioral treatment of insomnia, if indicated.  Please see SDI.  7. Follow - up inquiry regarding sleep disruption secondary to pain or other physical discomfort (please see SDI).  8. Follow - up inquiry regarding possible REM sleep behavior disorder (please see SDI).       See below for a complete interpretation of data from the polysomnography and Sleep Disorders Inventory.     Thank you for referring this patient to the [facility].      Eduard Rose, Ph.D., ABPP; Diplomate, American Board of Sleep Medicine

## 2020-04-13 ENCOUNTER — OFFICE VISIT (OUTPATIENT)
Dept: INTERNAL MEDICINE | Facility: CLINIC | Age: 30
End: 2020-04-13
Payer: MEDICARE

## 2020-04-13 ENCOUNTER — TELEPHONE (OUTPATIENT)
Dept: INTERNAL MEDICINE | Facility: CLINIC | Age: 30
End: 2020-04-13

## 2020-04-13 VITALS — BODY MASS INDEX: 33.41 KG/M2 | WEIGHT: 207 LBS

## 2020-04-13 DIAGNOSIS — K21.9 GASTROESOPHAGEAL REFLUX DISEASE, ESOPHAGITIS PRESENCE NOT SPECIFIED: ICD-10-CM

## 2020-04-13 DIAGNOSIS — N18.6 ESRD (END STAGE RENAL DISEASE): Primary | ICD-10-CM

## 2020-04-13 DIAGNOSIS — I95.9 HYPOTENSION, UNSPECIFIED HYPOTENSION TYPE: ICD-10-CM

## 2020-04-13 PROBLEM — Z30.2 ENCOUNTER FOR STERILIZATION: Status: RESOLVED | Noted: 2018-12-04 | Resolved: 2020-04-13

## 2020-04-13 PROBLEM — R51.9 NEW ONSET HEADACHE: Status: RESOLVED | Noted: 2018-11-29 | Resolved: 2020-04-13

## 2020-04-13 PROBLEM — Z99.2 END STAGE RENAL DISEASE ON DIALYSIS: Status: RESOLVED | Noted: 2017-05-30 | Resolved: 2020-04-13

## 2020-04-13 PROBLEM — E87.20 ACIDOSIS, METABOLIC: Status: RESOLVED | Noted: 2018-09-21 | Resolved: 2020-04-13

## 2020-04-13 PROCEDURE — 99214 PR OFFICE/OUTPT VISIT, EST, LEVL IV, 30-39 MIN: ICD-10-PCS | Mod: 95,,, | Performed by: FAMILY MEDICINE

## 2020-04-13 PROCEDURE — G0463 HOSPITAL OUTPT CLINIC VISIT: HCPCS | Mod: PO

## 2020-04-13 PROCEDURE — 99211 OFF/OP EST MAY X REQ PHY/QHP: CPT | Mod: PO

## 2020-04-13 PROCEDURE — 99214 OFFICE O/P EST MOD 30 MIN: CPT | Mod: 95,,, | Performed by: FAMILY MEDICINE

## 2020-04-13 RX ORDER — PANTOPRAZOLE SODIUM 40 MG/1
40 TABLET, DELAYED RELEASE ORAL DAILY
Qty: 30 TABLET | Refills: 2 | Status: SHIPPED | OUTPATIENT
Start: 2020-04-13 | End: 2020-11-17

## 2020-04-13 NOTE — TELEPHONE ENCOUNTER
----- Message from Farhana Marmolejo sent at 4/10/2020 11:36 AM CDT -----  Contact: pt   Pt needing a call back regarding concerns about having chest pain and shortness of breath and needing to schedule an video visit     Pt contact # [phone]

## 2020-04-13 NOTE — ASSESSMENT & PLAN NOTE
Switching her back to pantoprazole from Pepcid at least for short amount of time to see if this helps

## 2020-04-13 NOTE — ASSESSMENT & PLAN NOTE
Risk factor for hypotension.  She goes Monday Wednesday and Friday.  Decreasing her blood pressure medications see if this makes improvement

## 2020-04-13 NOTE — PROGRESS NOTES
Subjective:       Patient ID: Lisseth Schwartz is a 29 y.o. female.    Chief Complaint: Hypotension    HPI  The patient location is: [place] (in dialysis)  The chief complaint leading to consultation is: chest aching/ low bp  Visit type: Virtual visit with synchronous audio and video  Total time spent with patient: 12 min  Each patient to whom he or she provides medical services by telemedicine is:  (1) informed of the relationship between the physician and patient and the respective role of any other health care provider with respect to management of the patient; and (2) notified that he or she may decline to receive medical services by telemedicine and may withdraw from such care at any time.    Notes:   Discussed symptoms with patient that she has been having over the last week.  She said that during dialysis on Monday she had 4 L pulled off and had subsequent low blood pressure.  On Wednesday she about 3 L pulled off and afterwards was feeling very bad and tired.  Reports that her blood pressure was 112/76.  Number Friday she was feeling aching in her chest during dialysis and was a bit tachycardic.  She felt the sensation that she needs to burp.  She does have a history of reflux issues and was recently switched to Pepcid from pantoprazole.  She feels that Pepcid has not been helping as much.  She currently does not have a cardiologist but his curious whether she should see 1.  Not having any shortness of breath.  In regards to blood pressure, she does not have a cuff at home that she can use.  Has only been taking hydralazine 1 today but does continue to be on metoprolol daily.  Of note she has lost a significant amount of weight over the last few months.      Family History   Problem Relation Age of Onset    Hypertension Father     Breast cancer Paternal Grandmother     Diabetes Maternal Grandmother     Heart attack Maternal Grandmother     Lung cancer Maternal Grandfather     Prostate cancer Maternal  Grandfather        Current Outpatient Medications:     ALPRAZolam (XANAX) 0.25 MG tablet, Take 1 tablet (0.25 mg total) by mouth 2 (two) times daily as needed for Anxiety., Disp: 15 tablet, Rfl: 0    metoprolol succinate (TOPROL-XL) 100 MG 24 hr tablet, TAKE ONE TABLET BY MOUTH EVERY DAY, Disp: 30 tablet, Rfl: 2    nozaseptin (NOZIN) nasal , 1 each by Each Nostril route 2 (two) times daily., Disp: 1 applicator, Rfl: 0    pantoprazole (PROTONIX) 40 MG tablet, Take 1 tablet (40 mg total) by mouth once daily., Disp: 30 tablet, Rfl: 2    promethazine (PHENERGAN) 25 MG tablet, Take 1 tablet (25 mg total) by mouth every 6 (six) hours as needed for Nausea., Disp: 20 tablet, Rfl: 0    sevelamer carbonate (RENVELA) 800 mg Tab, Take 2 tablets (1,600 mg total) by mouth 3 (three) times daily with meals., Disp: 90 tablet, Rfl: 2    sucroferric oxyhydroxide (VELPHORO) 500 mg Chew, Take 500 mg by mouth 3 (three) times daily with meals. , Disp: , Rfl:   No current facility-administered medications for this visit.     Facility-Administered Medications Ordered in Other Visits:     lactated ringers infusion, , Intravenous, Continuous, Haley Badillo MD, Last Rate: 10 mL/hr at 12/04/18 1428    Review of Systems   Constitutional: Positive for fatigue. Negative for chills and fever.   Respiratory: Negative for cough and shortness of breath.    Cardiovascular: Positive for palpitations. Negative for chest pain (chest pressure after dialysis).   Gastrointestinal: Negative for abdominal pain.   Skin: Negative for rash.   Neurological: Negative for dizziness.       Objective:   Wt 93.9 kg (207 lb)   BMI 33.41 kg/m²      Physical Exam   Constitutional: She is oriented to person, place, and time. She appears well-developed and well-nourished. No distress.   HENT:   Head: Normocephalic.   Pulmonary/Chest: Effort normal. No respiratory distress.   Neurological: She is alert and oriented to person, place, and time.    Psychiatric: She has a normal mood and affect. Her behavior is normal.       Assessment & Plan     Problem List Items Addressed This Visit        Cardiac/Vascular    Hypotension    Current Assessment & Plan     Decreasing blood pressure medicine by removing hydralazine.  She was only taking it once a day anyway.  Advised her to get a blood pressure cuff to monitor her readings especially on the nondialysis days.  Advised her to touch base with me later this week to let me know how she is feeling.            Renal/    ESRD (end stage renal disease) - Primary    Current Assessment & Plan     Risk factor for hypotension.  She goes Monday Wednesday and Friday.  Decreasing her blood pressure medications see if this makes improvement            GI    Gastroesophageal reflux disease    Current Assessment & Plan     Switching her back to pantoprazole from Pepcid at least for short amount of time to see if this helps                 No follow-ups on file.    Disclaimer:  This note may have been prepared using voice recognition software, it may have not been extensively proofed, as such there could be errors within the text such as sound alike errors.

## 2020-04-13 NOTE — ASSESSMENT & PLAN NOTE
Decreasing blood pressure medicine by removing hydralazine.  She was only taking it once a day anyway.  Advised her to get a blood pressure cuff to monitor her readings especially on the nondialysis days.  Advised her to touch base with me later this week to let me know how she is feeling.

## 2020-04-13 NOTE — TELEPHONE ENCOUNTER
Spoke with pt, she stated she noticed last week she was aching in her chest on right side. Pt stated she was on dialysis and the last hour was very hard and intense for her. Pt stated she would like to see if she can get a referral to a cardiologist. Pt informed  no longer in clinic & will be addressed by . Pt requested virtual visit first before coming in. Pt verbalized understanding.

## 2020-06-15 ENCOUNTER — OFFICE VISIT (OUTPATIENT)
Dept: INTERNAL MEDICINE | Facility: CLINIC | Age: 30
End: 2020-06-15
Payer: MEDICARE

## 2020-06-15 ENCOUNTER — TELEPHONE (OUTPATIENT)
Dept: PEDIATRICS | Facility: CLINIC | Age: 30
End: 2020-06-15

## 2020-06-15 DIAGNOSIS — M25.561 ACUTE PAIN OF BOTH KNEES: ICD-10-CM

## 2020-06-15 DIAGNOSIS — M25.562 ACUTE PAIN OF BOTH KNEES: ICD-10-CM

## 2020-06-15 DIAGNOSIS — M25.522 PAIN AND SWELLING OF LEFT ELBOW: Primary | ICD-10-CM

## 2020-06-15 DIAGNOSIS — M25.422 PAIN AND SWELLING OF LEFT ELBOW: Primary | ICD-10-CM

## 2020-06-15 PROCEDURE — 99213 OFFICE O/P EST LOW 20 MIN: CPT | Mod: 95,,, | Performed by: NURSE PRACTITIONER

## 2020-06-15 PROCEDURE — 99213 PR OFFICE/OUTPT VISIT, EST, LEVL III, 20-29 MIN: ICD-10-PCS | Mod: 95,,, | Performed by: NURSE PRACTITIONER

## 2020-06-15 RX ORDER — DEXTROMETHORPHAN HYDROBROMIDE, GUAIFENESIN 5; 100 MG/5ML; MG/5ML
650 LIQUID ORAL EVERY 8 HOURS
Refills: 0 | COMMUNITY
Start: 2020-06-15 | End: 2021-03-30

## 2020-06-15 NOTE — TELEPHONE ENCOUNTER
----- Message from Miriam Guillory NP sent at 6/15/2020  2:51 PM CDT -----  Patient would like to come have x-rays today.  Someone will be home after 4 to watch her baby.  Not sure what's the latest she can come.  Please call her when you get a min. Thanks

## 2020-06-15 NOTE — PROGRESS NOTES
Subjective:       Patient ID: Lisseth Schwartz is a 29 y.o. female.    Chief Complaint: No chief complaint on file.    The patient location is: home  The chief complaint leading to consultation is: joint pain     Visit type: audiovisual    Face to Face time with patient: 10 minutes of total time spent on the encounter, which includes face to face time and non-face to face time preparing to see the patient (eg, review of tests), Obtaining and/or reviewing separately obtained history, Documenting clinical information in the electronic or other health record, Independently interpreting results (not separately reported) and communicating results to the patient/family/caregiver, or Care coordination (not separately reported).         Each patient to whom he or she provides medical services by telemedicine is:  (1) informed of the relationship between the physician and patient and the respective role of any other health care provider with respect to management of the patient; and (2) notified that he or she may decline to receive medical services by telemedicine and may withdraw from such care at any time.    Notes:     Patient presents with left elbow swelling and pain and bilateral leg pain.   Denies injury.  She uses her left arm a lot due to her having her dialysis access to the right arm.  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  No swelling to lower extremities.     Review of Systems   Constitutional: Positive for activity change. Negative for unexpected weight change.   HENT: Negative for hearing loss, rhinorrhea and trouble swallowing.    Eyes: Negative for discharge and visual disturbance.   Respiratory: Negative for chest tightness and wheezing.    Cardiovascular: Negative for chest pain and palpitations.   Gastrointestinal: Negative for blood in stool, constipation, diarrhea and vomiting.   Endocrine: Negative for polydipsia and polyuria.   Genitourinary: Negative for difficulty urinating,  dysuria, hematuria and menstrual problem.   Musculoskeletal: Positive for arthralgias, joint swelling and neck pain.   Neurological: Negative for weakness and headaches.   Psychiatric/Behavioral: Negative for confusion and dysphoric mood.       Objective:      Physical Exam  Constitutional:       Appearance: Normal appearance.   Pulmonary:      Effort: Pulmonary effort is normal.      Breath sounds: No stridor.   Musculoskeletal:         General: Swelling (left elbow) present.      Left elbow: She exhibits swelling. She exhibits normal range of motion.      Right knee: She exhibits no swelling.      Left knee: She exhibits no swelling.   Neurological:      Mental Status: She is alert.   Psychiatric:         Mood and Affect: Mood normal.         Assessment:       1. Pain and swelling of left elbow    2. Acute pain of both knees        Plan:           Pain and swelling of left elbow  -     X-Ray Elbow Complete 3 view Left; Future; Expected date: 06/15/2020  -     acetaminophen (TYLENOL) 650 MG TbSR; Take 1 tablet (650 mg total) by mouth every 8 (eight) hours.; Refill: 0    Acute pain of both knees  -     X-ray Knee Ortho Bilateral; Future; Expected date: 06/15/2020  -     acetaminophen (TYLENOL) 650 MG TbSR; Take 1 tablet (650 mg total) by mouth every 8 (eight) hours.; Refill: 0        X-rays.  Recommend to wear brace to elbow and knee.  Also recommended Tylenol Arthritis.

## 2020-06-15 NOTE — Clinical Note
Patient would like to come have x-rays today.  Someone will be home after 4 to watch her baby.  Not sure what's the latest she can come.  Please call her when you get a min. Thanks

## 2020-06-16 ENCOUNTER — APPOINTMENT (OUTPATIENT)
Dept: RADIOLOGY | Facility: HOSPITAL | Age: 30
End: 2020-06-16
Attending: NURSE PRACTITIONER
Payer: MEDICARE

## 2020-06-16 DIAGNOSIS — M25.422 PAIN AND SWELLING OF LEFT ELBOW: ICD-10-CM

## 2020-06-16 DIAGNOSIS — M25.522 PAIN AND SWELLING OF LEFT ELBOW: ICD-10-CM

## 2020-06-16 DIAGNOSIS — M25.562 ACUTE PAIN OF BOTH KNEES: ICD-10-CM

## 2020-06-16 DIAGNOSIS — M25.561 ACUTE PAIN OF BOTH KNEES: ICD-10-CM

## 2020-06-16 PROCEDURE — 73562 X-RAY EXAM OF KNEE 3: CPT | Mod: 26,50,, | Performed by: RADIOLOGY

## 2020-06-16 PROCEDURE — 73080 X-RAY EXAM OF ELBOW: CPT | Mod: TC,PO,LT

## 2020-06-16 PROCEDURE — 73562 XR KNEE ORTHO BILAT: ICD-10-PCS | Mod: 26,50,, | Performed by: RADIOLOGY

## 2020-06-16 PROCEDURE — 73080 X-RAY EXAM OF ELBOW: CPT | Mod: 26,LT,, | Performed by: RADIOLOGY

## 2020-06-16 PROCEDURE — 73562 X-RAY EXAM OF KNEE 3: CPT | Mod: TC,50,PO

## 2020-06-16 PROCEDURE — 73080 XR ELBOW COMPLETE 3 VIEW LEFT: ICD-10-PCS | Mod: 26,LT,, | Performed by: RADIOLOGY

## 2020-06-24 ENCOUNTER — DOCUMENTATION ONLY (OUTPATIENT)
Dept: NEPHROLOGY | Facility: CLINIC | Age: 30
End: 2020-06-24

## 2020-06-24 NOTE — H&P
History & Physical      Chief Complaint:  H&P    HPI:        Patient with ESRD on HD MWF at [facility]  The patient's first date   of hemodialysis was 10/28/2016. Her cause of end-stage renal disease is  HTN. Her current dialysis   prescription is as follows:  Dialyzer:  Optiflux 180 non-reuse.  Dialysate:    Sodium 137, calcium 2.5, potassium 2.0.  Duration of treatment is 3 hours 45 mins three   times a week on Monday, Wednesday, and Friday.  Blood flow rate 450 mL/min,   dialysate flow rate 800 mL/min.  Current hemodialysis access is a right upper  arm hemodialysis fistula, has positive thrill, positive bruit.  Established dry   weight  96  kg.           ROS:        Constitutional: Negative for fever, chills, weight loss, malaise/fatigue and diaphoresis.   HENT: Negative for hearing loss, ear pain, nosebleeds, congestion, sore throat, neck pain, tinnitus and ear discharge.    Eyes: Negative for blurred vision, double vision, photophobia, pain, discharge and redness.   Respiratory: Negative for cough, hemoptysis, sputum production, shortness of breath, wheezing and stridor.    Cardiovascular: Negative for chest pain, palpitations, orthopnea, claudication, leg swelling and PND.   Gastrointestinal: Negative for heartburn, nausea, vomiting, abdominal pain, diarrhea, constipation, blood in stool and melena.   Genitourinary: Negative for dysuria, urgency, frequency, hematuria and flank pain.   Musculoskeletal: Negative for myalgias, back pain, joint pain and falls.   Skin: Negative for itching and rash.   Neurological: Negative for dizziness, tingling, tremors, sensory change, speech change, focal weakness, seizures, loss of consciousness, weakness and headaches.   Endo/Heme/Allergies: Negative for environmental allergies and polydipsia. Does not bruise/bleed easily.   Psychiatric/Behavioral: Negative for depression, suicidal ideas, hallucinations, memory loss and substance abuse. The patient is not  "nervous/anxious and does not have insomnia.    All 14 systems reviewed and negative except as noted above.            Past Medical History:   Diagnosis Date    Anemia     Anxiety     Brittle bones     ESRD (end stage renal disease)     M/W/F    General anesthetics causing adverse effect in therapeutic use     pt states "im a light weight"    Hypertension     Insomnia     PSG revealed no CHRYSTAL, but likely psychogenic etiology (anxiety)    RLS (restless legs syndrome)     Seizures     Years ago       Past Surgical History:   Procedure Laterality Date    Arm surgery Right     x 2    cath in chest      HYSTEROSCOPY WITH HYDROTHERMAL ABLATION OF ENDOMETRIUM WITH DILATION AND CURETTAGE N/A 12/4/2018    Procedure: HYSTEROSCOPY, WITH DILATION AND CURETTAGE OF UTERUS AND HYDROTHERMAL ENDOMETRIAL ABLATION;  Surgeon: Tiara Red MD;  Location: [facility] OR;  Service: OB/GYN;  Laterality: N/A;  ATTEMPTED     LAPAROSCOPIC SALPINGECTOMY Bilateral 12/4/2018    Procedure: SALPINGECTOMY, LAPAROSCOPIC;  Surgeon: Tiara Red MD;  Location: [facility] OR;  Service: OB/GYN;  Laterality: Bilateral;    RENAL BIOPSY      tumor removed      from face       Family History   Problem Relation Age of Onset    Hypertension Father     Breast cancer Paternal Grandmother     Diabetes Maternal Grandmother     Heart attack Maternal Grandmother     Lung cancer Maternal Grandfather     Prostate cancer Maternal Grandfather        Social History     Socioeconomic History    Marital status: Legally      Spouse name: Not on file    Number of children: Not on file    Years of education: Not on file    Highest education level: Not on file   Occupational History    Not on file   Social Needs    Financial resource strain: Not on file    Food insecurity     Worry: Not on file     Inability: Not on file    Transportation needs     Medical: Not on file     Non-medical: Not on file   Tobacco Use    Smoking status: Never Smoker    " Smokeless tobacco: Never Used   Substance and Sexual Activity    Alcohol use: No    Drug use: No    Sexual activity: Yes     Partners: Male   Lifestyle    Physical activity     Days per week: Not on file     Minutes per session: Not on file    Stress: Not on file   Relationships    Social connections     Talks on phone: Not on file     Gets together: Not on file     Attends Restorationist service: Not on file     Active member of club or organization: Not on file     Attends meetings of clubs or organizations: Not on file     Relationship status: Not on file   Other Topics Concern    Not on file   Social History Narrative    Not on file       Current Outpatient Medications   Medication Sig Dispense Refill    acetaminophen (TYLENOL) 650 MG TbSR Take 1 tablet (650 mg total) by mouth every 8 (eight) hours.  0    ALPRAZolam (XANAX) 0.25 MG tablet Take 1 tablet (0.25 mg total) by mouth 2 (two) times daily as needed for Anxiety. 15 tablet 0    metoprolol succinate (TOPROL-XL) 100 MG 24 hr tablet TAKE ONE TABLET BY MOUTH EVERY DAY 30 tablet 2    nozaseptin (NOZIN) nasal  1 each by Each Nostril route 2 (two) times daily. 1 applicator 0    pantoprazole (PROTONIX) 40 MG tablet Take 1 tablet (40 mg total) by mouth once daily. 30 tablet 2    promethazine (PHENERGAN) 25 MG tablet Take 1 tablet (25 mg total) by mouth every 6 (six) hours as needed for Nausea. 20 tablet 0    sevelamer carbonate (RENVELA) 800 mg Tab Take 2 tablets (1,600 mg total) by mouth 3 (three) times daily with meals. 90 tablet 2    sucroferric oxyhydroxide (VELPHORO) 500 mg Chew Take 500 mg by mouth 3 (three) times daily with meals.        No current facility-administered medications for this visit.      Facility-Administered Medications Ordered in Other Visits   Medication Dose Route Frequency Provider Last Rate Last Dose    lactated ringers infusion   Intravenous Continuous Haley Badillo MD 10 mL/hr at 12/04/18 [id number]          Review of patient's allergies indicates:   Allergen Reactions    Lisinopril Other (See Comments)     Severe cough    Adhesive tape-silicones Itching    Furosemide Other (See Comments)     Almost gave her right sided heartfailure         There were no vitals filed for this visit.          Physical Exam   Nursing Notes and Vital Signs Reviewed.     Constitutional: Well developed, well nourished. AAOx3, NAD, speech/ comprehension clear   Head: Atraumatic. Normocephalic.   Eyes: PERRL. EOMI. Conjunctivae are not pale. No scleral icterus.   ENT: Mucous membranes are dry. No tongue tremors. Throat clear.  Neck: Supple. No JVD or LN or Carotid Bruits noted B.  Cardiovascular: S1S2 RRR, no murmurs, rubs, or gallops. Distal pulses are 2+ and symmetric.   Pulmonary/Chest: No evidence of respiratory distress. Clear to auscultation bilaterally. No wheezing, rales or rhonchi. No chest wall TTP.   Abdominal: Soft and non-distended. There is no tenderness. No rebound, guarding, or rigidity. No organomegaly. No mass or viscera palpable  Musculoskeletal: FROM in all extremities. No deformities, no TTP, no edema. No midline spinal TTP. No step-offs. Pelvis is stable to compression. No cyanosis. Moves all four extremities.   Skin: Skin is warm and dry.   Neurological: No gross neurological deficits, Strength 5/5 B, is equal in the upper and lower extremities bilaterally. No sensory deficits to light touch. No pronator drift.  DTRs are 2+ and equal throughout.   Psychiatric: Good eye contact. Normal Affect.      Laboratory Data:  Reviewed and noted in plan where applicable- Please see chart for full laboratory data.       Lab Results   Component Value Date    WBC 9.11 01/18/2020    HGB 11.5 (L) 01/18/2020    HCT 36.8 (L) 01/18/2020     (H) 01/18/2020     01/18/2020     BMP  Lab Results   Component Value Date     01/18/2020    K 4.6 01/18/2020     01/18/2020    CO2 23 01/18/2020    BUN 35 (H)  01/18/2020    CREATININE 11.2 (H) 01/18/2020    CALCIUM 7.3 (L) 01/18/2020    ANIONGAP 13 01/18/2020    ESTGFRAFRICA 5 (A) 01/18/2020    EGFRNONAA 4 (A) 01/18/2020     CMP  Sodium   Date Value Ref Range Status   01/18/2020 139 136 - 145 mmol/L Final     Potassium   Date Value Ref Range Status   01/18/2020 4.6 3.5 - 5.1 mmol/L Final     Chloride   Date Value Ref Range Status   01/18/2020 103 95 - 110 mmol/L Final     CO2   Date Value Ref Range Status   01/18/2020 23 23 - 29 mmol/L Final     Glucose   Date Value Ref Range Status   01/18/2020 95 70 - 110 mg/dL Final     BUN, Bld   Date Value Ref Range Status   01/18/2020 35 (H) 6 - 20 mg/dL Final     Creatinine   Date Value Ref Range Status   01/18/2020 11.2 (H) 0.5 - 1.4 mg/dL Final     Calcium   Date Value Ref Range Status   01/18/2020 7.3 (L) 8.7 - 10.5 mg/dL Final     Total Protein   Date Value Ref Range Status   01/18/2020 8.1 6.0 - 8.4 g/dL Final     Albumin   Date Value Ref Range Status   01/18/2020 3.8 3.5 - 5.2 g/dL Final     Total Bilirubin   Date Value Ref Range Status   01/18/2020 0.5 0.1 - 1.0 mg/dL Final     Comment:     For infants and newborns, interpretation of results should be based  on gestational age, weight and in agreement with clinical  observations.  Premature Infant recommended reference ranges:  Up to 24 hours.............<8.0 mg/dL  Up to 48 hours............<12.0 mg/dL  3-5 days..................<15.0 mg/dL  6-29 days.................<15.0 mg/dL       Alkaline Phosphatase   Date Value Ref Range Status   01/18/2020 67 55 - 135 U/L Final     AST   Date Value Ref Range Status   01/18/2020 10 10 - 40 U/L Final     ALT   Date Value Ref Range Status   01/18/2020 13 10 - 44 U/L Final     Anion Gap   Date Value Ref Range Status   01/18/2020 13 8 - 16 mmol/L Final     eGFR if    Date Value Ref Range Status   01/18/2020 5 (A) >60 mL/min/1.73 m^2 Final     eGFR if non    Date Value Ref Range Status   01/18/2020 4 (A)  >60 mL/min/1.73 m^2 Final     Comment:     Calculation used to obtain the estimated glomerular filtration  rate (eGFR) is the CKD-EPI equation.        Lab Results   Component Value Date    CALCIUM 7.3 (L) 01/18/2020    PHOS 8.5 (H) 01/18/2020     Lab Results   Component Value Date    K 4.6 01/18/2020     Lab Results   Component Value Date    LABPROT 10.0 09/18/2018    ALBUMIN 3.8 01/18/2020     Lab Results   Component Value Date    HGBA1C 4.7 01/18/2020       BMP  @[id number](GLU,NA,K,Cl,CO2,BUN,Creatinine,Calcium,MG)@      Radiology:  Reviewed and noted in plan where applicable- Please see chart for full radiology data.            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  ESRD: Doing very well on  dialysis. We will continue hemodialysis treatments three   times a week, 3 hours 45 mins. each treatment, maintaining a URR of 70% or greater and   a Kt/V of 1.20.  Currently, the patient is stable.    2.  Anemia: .  We will check hemoglobin at least monthly,   target range 10 to 11, transferrin saturation monthly, and ferritin quarterly.    We will dose Epogen and iron according to monthly blood work and according to   protocol.    3 . Hypertension.  The patient's blood pressure generally stabilizes during   Treatment. Currently controlled with current medications, sodium and fluid   restrictions and dialysis prescription. BP is much better controlled    4.  Hyperparathyroidism secondary to renal origin.  We will check intact PTH on   a quarterly basis.   We will dose vitamin D according to blood work and protocol.      Sol Cruz, FNP-C

## 2020-07-08 DIAGNOSIS — Z76.82 ORGAN TRANSPLANT CANDIDATE: Primary | ICD-10-CM

## 2020-07-16 ENCOUNTER — TELEPHONE (OUTPATIENT)
Dept: TRANSPLANT | Facility: CLINIC | Age: 30
End: 2020-07-16

## 2020-08-06 ENCOUNTER — TELEPHONE (OUTPATIENT)
Dept: TRANSPLANT | Facility: CLINIC | Age: 30
End: 2020-08-06

## 2020-08-07 DIAGNOSIS — Z76.82 ORGAN TRANSPLANT CANDIDATE: Primary | ICD-10-CM

## 2020-08-23 DIAGNOSIS — I10 HYPERTENSION, UNSPECIFIED TYPE: ICD-10-CM

## 2020-08-23 DIAGNOSIS — Z76.0 MEDICATION REFILL: ICD-10-CM

## 2020-08-23 DIAGNOSIS — N18.6 ESRD (END STAGE RENAL DISEASE): ICD-10-CM

## 2020-08-23 RX ORDER — METOPROLOL SUCCINATE 100 MG/1
100 TABLET, EXTENDED RELEASE ORAL DAILY
Qty: 30 TABLET | Refills: 3 | Status: SHIPPED | OUTPATIENT
Start: 2020-08-23 | End: 2020-09-14

## 2020-09-14 DIAGNOSIS — R10.9 ABDOMINAL PAIN, UNSPECIFIED ABDOMINAL LOCATION: ICD-10-CM

## 2020-09-14 DIAGNOSIS — I10 HYPERTENSION, UNSPECIFIED TYPE: ICD-10-CM

## 2020-09-14 DIAGNOSIS — N18.6 ESRD (END STAGE RENAL DISEASE): ICD-10-CM

## 2020-09-14 DIAGNOSIS — R11.0 NAUSEA: Primary | ICD-10-CM

## 2020-09-14 DIAGNOSIS — Z76.0 MEDICATION REFILL: ICD-10-CM

## 2020-09-14 RX ORDER — METOPROLOL SUCCINATE 50 MG/1
50 TABLET, EXTENDED RELEASE ORAL DAILY
Qty: 30 TABLET | Refills: 2 | Status: ON HOLD | OUTPATIENT
Start: 2020-09-14 | End: 2023-01-05 | Stop reason: HOSPADM

## 2020-09-21 ENCOUNTER — HOSPITAL ENCOUNTER (OUTPATIENT)
Dept: CARDIOLOGY | Facility: HOSPITAL | Age: 30
Discharge: HOME | End: 2020-09-21
Attending: INTERNAL MEDICINE
Payer: MEDICARE

## 2020-09-21 ENCOUNTER — OFFICE VISIT (OUTPATIENT)
Dept: CARDIOLOGY | Facility: CLINIC | Age: 30
End: 2020-09-21
Payer: MEDICARE

## 2020-09-21 VITALS
WEIGHT: 216 LBS | BODY MASS INDEX: 34.86 KG/M2 | HEART RATE: 78 BPM | OXYGEN SATURATION: 99 % | SYSTOLIC BLOOD PRESSURE: 110 MMHG | DIASTOLIC BLOOD PRESSURE: 68 MMHG

## 2020-09-21 DIAGNOSIS — I51.7 LVH (LEFT VENTRICULAR HYPERTROPHY): ICD-10-CM

## 2020-09-21 DIAGNOSIS — R00.2 PALPITATIONS: ICD-10-CM

## 2020-09-21 DIAGNOSIS — Z76.89 ESTABLISHING CARE WITH NEW DOCTOR, ENCOUNTER FOR: Primary | ICD-10-CM

## 2020-09-21 DIAGNOSIS — I10 HYPERTENSION, UNSPECIFIED TYPE: ICD-10-CM

## 2020-09-21 DIAGNOSIS — N18.6 END STAGE RENAL DISEASE: ICD-10-CM

## 2020-09-21 DIAGNOSIS — Z76.89 ESTABLISHING CARE WITH NEW DOCTOR, ENCOUNTER FOR: ICD-10-CM

## 2020-09-21 DIAGNOSIS — R07.9 CHEST PAIN, UNSPECIFIED TYPE: ICD-10-CM

## 2020-09-21 DIAGNOSIS — R00.2 PALPITATIONS: Primary | ICD-10-CM

## 2020-09-21 DIAGNOSIS — I95.9 HYPOTENSION, UNSPECIFIED HYPOTENSION TYPE: Primary | ICD-10-CM

## 2020-09-21 DIAGNOSIS — R06.09 DYSPNEA ON EXERTION: ICD-10-CM

## 2020-09-21 PROCEDURE — 99215 OFFICE O/P EST HI 40 MIN: CPT | Mod: 25,S$PBB,NTX, | Performed by: INTERNAL MEDICINE

## 2020-09-21 PROCEDURE — 93010 ELECTROCARDIOGRAM REPORT: CPT | Mod: NTX,,, | Performed by: INTERNAL MEDICINE

## 2020-09-21 PROCEDURE — 99999 PR PBB SHADOW E&M-EST. PATIENT-LVL IV: CPT | Mod: PBBFAC,TXP,, | Performed by: INTERNAL MEDICINE

## 2020-09-21 PROCEDURE — 93010 EKG 12-LEAD: ICD-10-PCS | Mod: NTX,,, | Performed by: INTERNAL MEDICINE

## 2020-09-21 PROCEDURE — 99214 OFFICE O/P EST MOD 30 MIN: CPT | Mod: PBBFAC,25,NTX | Performed by: INTERNAL MEDICINE

## 2020-09-21 PROCEDURE — 99215 PR OFFICE/OUTPT VISIT, EST, LEVL V, 40-54 MIN: ICD-10-PCS | Mod: 25,S$PBB,NTX, | Performed by: INTERNAL MEDICINE

## 2020-09-21 PROCEDURE — 99999 PR PBB SHADOW E&M-EST. PATIENT-LVL IV: ICD-10-PCS | Mod: PBBFAC,TXP,, | Performed by: INTERNAL MEDICINE

## 2020-09-21 PROCEDURE — 93005 ELECTROCARDIOGRAM TRACING: CPT | Mod: NTX

## 2020-09-21 NOTE — PROGRESS NOTES
"  Subjective:   Patient ID:  Lisseth Schwartz is a 29 y.o. female who presents for evaluation of Chest Pain (about a month since staring dialysis. pt states it feels like an electric shock. ), Palpitations (about a month), Dizziness (Pt states she feels she is about pass out when exerting. ), and Shortness of Breath (On exertion )      29-year-old female with history of FSGS, hypertension, end-stage renal disease.  Patient started recently hemodialysis.  She has a right arm fistula.  He has been getting dialysis every day since September 1st.  She states that she is being trained on the machine so she can take it home with her.  She comes today complaining that since she started dialysis 3 weeks ago.  She feels tired during dialysis.  And been feeling her heart going fast.  He is being told her blood pressure is running at 90s.  Her metoprolol has been decreased recently from 100 to 50 mg orally.  Her blood pressure was uncontrolled in the past.  She reports atypical left arm shooting pain.  It starts from her hand and goes back to her shoulder and chest.  Only lasts for 1 or 2 seconds.  She denies however having any syncope.  No lower extremity swelling.  The she states that she does not drink water or fluids because of the fear of her kidneys function.  She does not drink excessive amount of caffeine.      Past Medical History:   Diagnosis Date    Anemia     Anxiety     Brittle bones     ESRD (end stage renal disease)     M/W/F    General anesthetics causing adverse effect in therapeutic use     pt states "im a light weight"    Hypertension     Insomnia     PSG revealed no CHRYSTAL, but likely psychogenic etiology (anxiety)    RLS (restless legs syndrome)     Seizures     Years ago       Past Surgical History:   Procedure Laterality Date    Arm surgery Right     x 2    cath in chest      HYSTEROSCOPY WITH HYDROTHERMAL ABLATION OF ENDOMETRIUM WITH DILATION AND CURETTAGE N/A 12/4/2018    Procedure: " HYSTEROSCOPY, WITH DILATION AND CURETTAGE OF UTERUS AND HYDROTHERMAL ENDOMETRIAL ABLATION;  Surgeon: Tiara Red MD;  Location: [facility] OR;  Service: OB/GYN;  Laterality: N/A;  ATTEMPTED     LAPAROSCOPIC SALPINGECTOMY Bilateral 12/4/2018    Procedure: SALPINGECTOMY, LAPAROSCOPIC;  Surgeon: Tiara Red MD;  Location: [facility] OR;  Service: OB/GYN;  Laterality: Bilateral;    RENAL BIOPSY      tumor removed      from face       Social History     Tobacco Use    Smoking status: Never Smoker    Smokeless tobacco: Never Used   Substance Use Topics    Alcohol use: No    Drug use: No       Family History   Problem Relation Age of Onset    Hypertension Father     Breast cancer Paternal Grandmother     Diabetes Maternal Grandmother     Heart attack Maternal Grandmother     Lung cancer Maternal Grandfather     Prostate cancer Maternal Grandfather        Review of Systems   Constitution: Negative for fever and malaise/fatigue.   HENT: Negative for sore throat.    Eyes: Negative for blurred vision.   Cardiovascular: Positive for chest pain, dyspnea on exertion, irregular heartbeat, near-syncope and palpitations. Negative for claudication, cyanosis, leg swelling, orthopnea, paroxysmal nocturnal dyspnea and syncope.   Respiratory: Positive for shortness of breath. Negative for cough and hemoptysis.    Hematologic/Lymphatic: Negative for bleeding problem.   Skin: Negative for poor wound healing and rash.   Musculoskeletal: Negative for back pain and falls.   Gastrointestinal: Negative for abdominal pain.   Genitourinary: Negative for nocturia.   Neurological: Negative for dizziness, focal weakness, headaches, light-headedness and loss of balance.   Psychiatric/Behavioral: Negative for altered mental status and substance abuse.       Current Outpatient Medications on File Prior to Visit   Medication Sig    acetaminophen (TYLENOL) 650 MG TbSR Take 1 tablet (650 mg total) by mouth every 8 (eight) hours.    metoprolol  succinate (TOPROL-XL) 50 MG 24 hr tablet Take 1 tablet (50 mg total) by mouth once daily.    pantoprazole (PROTONIX) 40 MG tablet Take 1 tablet (40 mg total) by mouth once daily.    sucroferric oxyhydroxide (VELPHORO) 500 mg Chew Take 500 mg by mouth 3 (three) times daily with meals.     ALPRAZolam (XANAX) 0.25 MG tablet Take 1 tablet (0.25 mg total) by mouth 2 (two) times daily as needed for Anxiety.    nozaseptin (NOZIN) nasal  1 each by Each Nostril route 2 (two) times daily.    promethazine (PHENERGAN) 25 MG tablet Take 1 tablet (25 mg total) by mouth every 6 (six) hours as needed for Nausea.    sevelamer carbonate (RENVELA) 800 mg Tab Take 2 tablets (1,600 mg total) by mouth 3 (three) times daily with meals.     Current Facility-Administered Medications on File Prior to Visit   Medication    lactated ringers infusion       Objective:   Objective:  Wt Readings from Last 3 Encounters:   09/21/20 98 kg (216 lb)   04/13/20 93.9 kg (207 lb)   01/19/20 106.5 kg (234 lb 12.6 oz)     Temp Readings from Last 3 Encounters:   01/19/20 96.7 °F (35.9 °C) (Oral)   01/16/20 98.8 °F (37.1 °C) (Temporal)   11/25/19 98.1 °F (36.7 °C) (Tympanic)     BP Readings from Last 3 Encounters:   09/21/20 110/68   01/19/20 (!) 147/80   01/16/20 (!) 170/80     Pulse Readings from Last 3 Encounters:   09/21/20 78   01/19/20 71   01/16/20 81       Physical Exam   Constitutional: She is oriented to person, place, and time. She appears well-developed and well-nourished.   HENT:   Head: Normocephalic and atraumatic.   Eyes: Conjunctivae are normal. No scleral icterus.   Neck: Normal range of motion. Neck supple.   Cardiovascular: Normal rate, regular rhythm, normal heart sounds and intact distal pulses.   No murmur heard.  Pulmonary/Chest: No respiratory distress. She has no wheezes. She has no rales. She exhibits no tenderness.   Abdominal: Soft. Bowel sounds are normal. She exhibits no distension. There is no guarding.    Musculoskeletal: Normal range of motion.   Neurological: She is alert and oriented to person, place, and time.   Skin: Skin is warm.   Right AV fistula   Psychiatric: She has a normal mood and affect.   Vitals reviewed.      Lab Results   Component Value Date    CHOL 116 (L) 01/18/2020     Lab Results   Component Value Date    HDL 26 (L) 01/18/2020     Lab Results   Component Value Date    LDLCALC 62.4 (L) 01/18/2020     Lab Results   Component Value Date    TRIG 138 01/18/2020     Lab Results   Component Value Date    CHOLHDL 22.4 01/18/2020       Chemistry        Component Value Date/Time     01/18/2020 2051    K 4.6 01/18/2020 2051     01/18/2020 2051    CO2 23 01/18/2020 2051    BUN 35 (H) 01/18/2020 2051    CREATININE 11.2 (H) 01/18/2020 2051    GLU 95 01/18/2020 2051        Component Value Date/Time    CALCIUM 7.3 (L) 01/18/2020 2051    ALKPHOS 67 01/18/2020 2051    AST 10 01/18/2020 2051    ALT 13 01/18/2020 2051    BILITOT 0.5 01/18/2020 2051    ESTGFRAFRICA 5 (A) 01/18/2020 2051    EGFRNONAA 4 (A) 01/18/2020 2051          No results found for: TSH  Lab Results   Component Value Date    INR 1.0 09/18/2018     Lab Results   Component Value Date    WBC 9.11 01/18/2020    HGB 11.5 (L) 01/18/2020    HCT 36.8 (L) 01/18/2020     (H) 01/18/2020     01/18/2020     BNP  @LABRCNTIP(BNP,BNPTRIAGEBLO)@  CrCl cannot be calculated (Patient's most recent lab result is older than the maximum 7 days allowed.).     Imaging:  ======  2020   1 - Moderate left atrial enlargement.     2 - Concentric hypertrophy.     3 - No wall motion abnormalities.     4 - Normal left ventricular systolic function (EF 60-65%).     5 - Indeterminate LV diastolic function.     6 - Normal right ventricular systolic function .     7 - The estimated PA systolic pressure is 29 mmHg.     8 - Mild tricuspid regurgitation.     No results found for this or any previous visit.  Results for orders placed during the hospital  encounter of 01/17/20   US Carotid Bilateral    Narrative EXAMINATION:  US CAROTID BILATERAL    CLINICAL HISTORY:  TIA.    COMPARISON:  None    FINDINGS:  Sonographic evaluation of the carotid systems was performed.    No significant atherosclerotic plaque is appreciative in either carotid system.    The peak systolic velocity in the right internal carotid artery was approximately 89 cm/sec.  The right ICA to CCA peak systolic velocity ratio is 1.0.    The peak systolic velocity in the left internal carotid artery was approximately 119 cm/sec.  The left ICA to CCA peak systolic velocity ratio is 1.1.    Antegrade flow noted in both vertebral arteries.      Impression Impression:     No sonographic evidence of a significant stenosis is identified in either carotid system.    Stenosis of 0%-validated velocity measurements with angiographic measurements, velocity criteria are extrapolated from diameter data as defined by the Society of Radiologists in Ultrasound Consensus Conference Radiology 2003; 229;340-346.      Electronically signed by: Jose Juan Johnson MD  Date:    01/17/2020  Time:    19:23     Results for orders placed during the hospital encounter of 11/20/19   X-Ray Chest PA And Lateral    Narrative EXAMINATION:  XR CHEST PA AND LATERAL    CLINICAL HISTORY  Shortness of breath, sob;    COMPARISON:  None    FINDINGS:  The heart size is normal.  The lung fields are clear.  No acute cardiopulmonary infiltrative.      Impression No acute findings.      Electronically signed by: Art Grullon MD  Date:    11/20/2019  Time:    17:14     No results found for this or any previous visit.  No procedure found.    Diagnostic Results:  ECG: Reviewed    The ASCVD Risk score ([place] PEPPER Jr., et al., 2013) failed to calculate for the following reasons:    The 2013 ASCVD risk score is only valid for ages 40 to 79    The patient has a prior MI or stroke diagnosis  Her LDL is 67  Assessment and Plan:   Hypotension, unspecified  hypotension type  Comments:  Likely due to excess dialysis.  Every day of the week    Palpitations  Comments:  Likely due to dehydration, anxiety.  Patient told to ask dialysis and not take fluids every session is not needed.  Orders:  -     Ambulatory referral/consult to Cardiology  -     Holter monitor - 24 hour; Future  -     TSH; Future; Expected date: 09/21/2020    Chest pain, unspecified type  Comments:  Atypical, doubt cardiac origin.  Lasts for few seconds only.  Patient is getting a nuclear stress test next month as part of transplant listing workup.    End stage renal disease  Comments:  On daily dialysis.  Part of a training program to take the hemodialysis machine with her at home.    LVH (left ventricular hypertrophy)  Comments:  Secondary to uncontrolled hypertension in past.  Continue with Toprol 50 mg daily.  Blood pressure control    Dyspnea on exertion  Comments:  Likely multifactorial.  Her symptoms continue or worsen we might need perform a right heart catheterization to better assess her volume status.        Follow up in 3 months

## 2020-09-21 NOTE — LETTER
September 21, 2020      Sol Cruz DNP  [address]  [place] [address]           [facility]  [address]  [place] [address]  Phone: [phone]  Fax: [phone]          Patient: Lisseth Schwartz   MR Number: [id number]   YOB: 1990   Date of Visit: 9/21/2020       Dear Sol Cruz:    Thank you for referring Lisseth Schwartz to me for evaluation. Attached you will find relevant portions of my assessment and plan of care.    If you have questions, please do not hesitate to call me. I look forward to following Lisseth Schwartz along with you.    Sincerely,    Tanner Levine MD    Enclosure  CC:  No Recipients    If you would like to receive this communication electronically, please contact [email or web address] or [phone] to request more information on [employer] Link access.    For providers and/or their staff who would like to refer a patient to [facility], please contact us through our one-stop-shop provider referral line, [facility], at [phone].    If you feel you have received this communication in error or would no longer like to receive these types of communications, please e-mail [email or web address]

## 2020-09-22 ENCOUNTER — LAB VISIT (OUTPATIENT)
Dept: LAB | Facility: HOSPITAL | Age: 30
End: 2020-09-22
Attending: INTERNAL MEDICINE
Payer: MEDICARE

## 2020-09-22 DIAGNOSIS — R00.2 PALPITATIONS: ICD-10-CM

## 2020-09-22 PROCEDURE — 84443 ASSAY THYROID STIM HORMONE: CPT | Mod: NTX

## 2020-09-22 PROCEDURE — 36415 COLL VENOUS BLD VENIPUNCTURE: CPT | Mod: PO,NTX

## 2020-09-23 LAB — TSH SERPL DL<=0.005 MIU/L-ACNC: 0.99 UIU/ML (ref 0.4–4)

## 2020-09-23 NOTE — PROGRESS NOTES
Spoke to patient to complete her history for her upcoming appointment her fiance' will come with her to her appointment she is aware that she have to bring a small snack to eat after blood is drawn she do not need a

## 2020-09-24 ENCOUNTER — HOSPITAL ENCOUNTER (OUTPATIENT)
Dept: RADIOLOGY | Facility: HOSPITAL | Age: 30
Discharge: HOME | End: 2020-09-24
Attending: NURSE PRACTITIONER
Payer: MEDICARE

## 2020-09-24 ENCOUNTER — HOSPITAL ENCOUNTER (OUTPATIENT)
Dept: RADIOLOGY | Facility: HOSPITAL | Age: 30
Discharge: HOME | End: 2020-09-24
Attending: NURSE PRACTITIONER
Payer: MEDICAID

## 2020-09-24 ENCOUNTER — OFFICE VISIT (OUTPATIENT)
Dept: TRANSPLANT | Facility: CLINIC | Age: 30
End: 2020-09-24
Payer: MEDICARE

## 2020-09-24 ENCOUNTER — TELEPHONE (OUTPATIENT)
Dept: TRANSPLANT | Facility: CLINIC | Age: 30
End: 2020-09-24

## 2020-09-24 VITALS
RESPIRATION RATE: 18 BRPM | SYSTOLIC BLOOD PRESSURE: 132 MMHG | DIASTOLIC BLOOD PRESSURE: 80 MMHG | OXYGEN SATURATION: 100 % | HEART RATE: 72 BPM | BODY MASS INDEX: 34.29 KG/M2 | TEMPERATURE: 99 F | HEIGHT: 66 IN | WEIGHT: 213.38 LBS

## 2020-09-24 DIAGNOSIS — Z01.818 PRE-TRANSPLANT EVALUATION FOR KIDNEY TRANSPLANT: Primary | ICD-10-CM

## 2020-09-24 DIAGNOSIS — Z76.82 ORGAN TRANSPLANT CANDIDATE: ICD-10-CM

## 2020-09-24 DIAGNOSIS — D63.1 ANEMIA IN ESRD (END-STAGE RENAL DISEASE): ICD-10-CM

## 2020-09-24 DIAGNOSIS — N25.81 SECONDARY HYPERPARATHYROIDISM: ICD-10-CM

## 2020-09-24 DIAGNOSIS — N18.6 ANEMIA IN ESRD (END-STAGE RENAL DISEASE): ICD-10-CM

## 2020-09-24 DIAGNOSIS — N05.1 FSGS (FOCAL SEGMENTAL GLOMERULOSCLEROSIS): ICD-10-CM

## 2020-09-24 DIAGNOSIS — Z99.2 ESRD ON HEMODIALYSIS: ICD-10-CM

## 2020-09-24 DIAGNOSIS — N18.6 ESRD ON HEMODIALYSIS: ICD-10-CM

## 2020-09-24 DIAGNOSIS — I15.0 RENOVASCULAR HYPERTENSION: ICD-10-CM

## 2020-09-24 PROCEDURE — 72170 X-RAY EXAM OF PELVIS: CPT | Mod: TC,TXP

## 2020-09-24 PROCEDURE — 76700 US EXAM ABDOM COMPLETE: CPT | Mod: 26,TXP,, | Performed by: RADIOLOGY

## 2020-09-24 PROCEDURE — 72170 XR PELVIS ROUTINE AP: ICD-10-PCS | Mod: 26,TXP,, | Performed by: RADIOLOGY

## 2020-09-24 PROCEDURE — 71046 X-RAY EXAM CHEST 2 VIEWS: CPT | Mod: TC,TXP

## 2020-09-24 PROCEDURE — 71046 X-RAY EXAM CHEST 2 VIEWS: CPT | Mod: 26,TXP,, | Performed by: RADIOLOGY

## 2020-09-24 PROCEDURE — 93978 VASCULAR STUDY: CPT | Mod: TC,TXP

## 2020-09-24 PROCEDURE — 99205 OFFICE O/P NEW HI 60 MIN: CPT | Mod: S$PBB,TXP,, | Performed by: NURSE PRACTITIONER

## 2020-09-24 PROCEDURE — 71046 XR CHEST PA AND LATERAL: ICD-10-PCS | Mod: 26,TXP,, | Performed by: RADIOLOGY

## 2020-09-24 PROCEDURE — 99999 PR PBB SHADOW E&M-EST. PATIENT-LVL IV: ICD-10-PCS | Mod: PBBFAC,TXP,, | Performed by: NURSE PRACTITIONER

## 2020-09-24 PROCEDURE — 76700 US ABDOMEN COMPLETE: ICD-10-PCS | Mod: 26,TXP,, | Performed by: RADIOLOGY

## 2020-09-24 PROCEDURE — 93978 VASCULAR STUDY: CPT | Mod: 26,TXP,, | Performed by: RADIOLOGY

## 2020-09-24 PROCEDURE — 76770 US EXAM ABDO BACK WALL COMP: CPT | Mod: 26,TXP,, | Performed by: RADIOLOGY

## 2020-09-24 PROCEDURE — 99204 PR OFFICE/OUTPT VISIT, NEW, LEVL IV, 45-59 MIN: ICD-10-PCS | Mod: S$PBB,NTX,, | Performed by: TRANSPLANT SURGERY

## 2020-09-24 PROCEDURE — 93978 US DOPP ILIACS BILATERAL: ICD-10-PCS | Mod: 26,TXP,, | Performed by: RADIOLOGY

## 2020-09-24 PROCEDURE — 99214 OFFICE O/P EST MOD 30 MIN: CPT | Mod: PBBFAC,25,TXP | Performed by: NURSE PRACTITIONER

## 2020-09-24 PROCEDURE — 99999 PR PBB SHADOW E&M-EST. PATIENT-LVL IV: CPT | Mod: PBBFAC,TXP,, | Performed by: NURSE PRACTITIONER

## 2020-09-24 PROCEDURE — 76770 US RETROPERITONEAL COMPLETE: ICD-10-PCS | Mod: 26,TXP,, | Performed by: RADIOLOGY

## 2020-09-24 PROCEDURE — 99204 OFFICE O/P NEW MOD 45 MIN: CPT | Mod: S$PBB,NTX,, | Performed by: TRANSPLANT SURGERY

## 2020-09-24 PROCEDURE — 76700 US EXAM ABDOM COMPLETE: CPT | Mod: TC,TXP

## 2020-09-24 PROCEDURE — 99205 PR OFFICE/OUTPT VISIT, NEW, LEVL V, 60-74 MIN: ICD-10-PCS | Mod: S$PBB,TXP,, | Performed by: NURSE PRACTITIONER

## 2020-09-24 PROCEDURE — 72170 X-RAY EXAM OF PELVIS: CPT | Mod: 26,TXP,, | Performed by: RADIOLOGY

## 2020-09-24 PROCEDURE — 76770 US EXAM ABDO BACK WALL COMP: CPT | Mod: TC,TXP

## 2020-09-24 RX ORDER — LANTHANUM CARBONATE 1000 MG/1
1000 TABLET, CHEWABLE ORAL 2 TIMES DAILY WITH MEALS
COMMUNITY
End: 2020-11-16 | Stop reason: SDUPTHER

## 2020-09-24 NOTE — LETTER
September 25, 2020        Andres Hoyt  [address]  [place] [address]  Phone: [phone]  Fax: [phone]             [facility]  [address]  [facility] [address]  Phone: [phone]   Patient: Lisseth Schwartz   MR Number: [id number]   YOB: 1990   Date of Visit: 9/24/2020       Dear Dr. Andres Hoyt    Thank you for referring Lisseth Schwartz to me for evaluation. Attached you will find relevant portions of my assessment and plan of care.    If you have questions, please do not hesitate to call me. I look forward to following Lisseth Schwartz along with you.    Sincerely,    Patricia Caceres NP    Enclosure    If you would like to receive this communication electronically, please contact [email or web address] or [phone] to request [employer] access.    [employer] is a tool which provides read-only access to select patient information with whom you have a relationship. Its easy to use and provides real time access to review your patients record including encounter summaries, notes, results, and demographic information.    If you feel you have received this communication in error or would no longer like to receive these types of communications, please e-mail [email or web address]

## 2020-09-24 NOTE — TELEPHONE ENCOUNTER
Reviewed pt transplant labs.  Notified dialysis unit dietitian of the following abnormal labs via fax and requested their most recent nutrition note on this pt.  Once this note is received it will be scanned into pt's chart.    Phos 5[employer]

## 2020-09-24 NOTE — PROGRESS NOTES
INITIAL PATIENT EDUCATION NOTE    Ms. Lisseth Schwartz was seen in pre-kidney transplant clinic for evaluation for kidney, kidney/pancreas or pancreas only transplant.  The patient attended a an individual video education session that discussed/reviewed the following aspects of transplantation: evaluation and selection committee process, UNOS waitlist management/multiple listings, types of organs offered (KDPI < 85%, KDPI > 85%, PHS increased risk, DCD, HCV+, HIV+ for HIV+ recipients and enbloc/dual), financial aspects, surgical procedures, dietary instruction pre- and post-transplant, health maintenance pre- and post-transplant, post-transplant hospitalization and outpatient follow-up, potential to participate in a research protocol, and medication management and side effects.  A question and answer session was provided after the presentation.    The patient was seen by all members of the multi-disciplinary team to include: Nephrologist/PA, Surgeon, , Transplant Coordinator, , Pharmacist and Dietician (if applicable).    The patient reviewed and signed all consents for evaluation which were witnessed and sent to scanning into the [facility] chart.    The patient was given an education book and plan for further evaluation based on her individual assessment.      The patient was encouraged to call with any questions or concerns.

## 2020-09-24 NOTE — PROGRESS NOTES
Transplant Nephrology  Kidney Transplant Recipient Evaluation    Referring Physician: Andres Hoyt  Current Nephrologist: Andres Hoyt    Subjective:   CC:  Initial evaluation of kidney transplant candidacy.    HPI:  Ms. Schwartz is a 29 y.o. year old Black or  female who has presented to be evaluated as a potential kidney transplant recipient.  She has ESRD secondary to FSGS.  Patient is currently on hemodialysis started on 10/28/2016. Patient is dialyzing on 5 days/week.  Patient reports that she is tolerating dialysis well.. She has a RUE AV fistula for dialysis access. Currently running at dialysis center, but will be transitioning to home HD soon.  Dry weight is 96 kg. She is running 2.5-3 hours per treatment.  Has been having intermittent issues with hypotension due to adjusting her treatment prescription.     ESRD  First diagnosed August 2015. Was experiencing peripheral edema, and spilling large amounts of protein in urine.  Kidney biopsy ~2015 at [facility]-FSGS.  No family history of kidney disease.      HTN  First diagnosed in 2015 with kidney failure.  Taking only metoprolol, home BP 110s/50s.  Has lost ~ 100 lbs over the past few years, she was able to get off most of her meds.     Recently saw cardiology for symptoms of CP, SOB, palpitations, and dizziness.  Testing was all negative, and cardiology felt that symptoms were related to dehydration and anxiety.  She is scheduled to get a Holter placed next Monday, and has a follow up appointment scheduled 10/29/2020 with Dr. Levine.  Pt reports symptoms have resolved.    Is currently still breastfeeding, discussed cessation prior to transplant.  Bilateral salpingectomy so will not have any more children.    Denies MI, stroke, cancers, DVTs/PEs, wounds/ampuations    Functional Status: Was previously working as a . 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 Remains very active with the children.  Does not  "do any dedicated exercise. Able to climb flight of stairs without CP, SOB, or leg cramping.  Does not appear frail.  Previous Transplant: no  Previous Blood Transfusion: no  Previous Pregnancy: yes  Previous neurogenic bladder/ urine incontinence: no LUTS, making a cup of urine per day  Anticoagulation/ antiplatelet therapy and reason:  no  Potential Donor: yes  High KDPI candidate: no (age and weight)  Meets center eligibility for accepting HCV+ donor offer: yes    Past Medical History:   Diagnosis Date    Allergy     Anemia     Anxiety     Brittle bones     ESRD (end stage renal disease)     M/W/F    General anesthetics causing adverse effect in therapeutic use     pt states "im a light weight"    Hypertension     Insomnia     PSG revealed no CHRYSTAL, but likely psychogenic etiology (anxiety)    RLS (restless legs syndrome)          Past Medical and Surgical History: Ms. Schwartz  has a past medical history of Allergy, Anemia, Anxiety, Brittle bones, ESRD (end stage renal disease), General anesthetics causing adverse effect in therapeutic use, Hypertension, Insomnia, and RLS (restless legs syndrome).  She has a past surgical history that includes Arm surgery (Right); Renal biopsy; tumor removed; Laparoscopic salpingectomy (Bilateral, 12/4/2018); Hysteroscopy with hydrothermal ablation of endometrium with dilation and curettage (N/A, 12/4/2018); and AV fistula placement (Right).    Past Social and Family History: Ms. Schwartz reports that she has never smoked. She has never used smokeless tobacco. She reports that she does not drink alcohol or use drugs. Her family history includes Breast cancer in her paternal grandmother; Diabetes in her maternal grandmother; Heart attack in her maternal grandmother; Hypertension in her father; Lung cancer in her maternal grandfather; Prostate cancer in her maternal grandfather.    Review of Systems   Constitutional: Positive for fatigue. Negative for activity change, " "appetite change and fever.   HENT: Negative for congestion, mouth sores and sore throat.    Eyes: Negative for visual disturbance.   Respiratory: Positive for shortness of breath (with exertion). Negative for cough and chest tightness.    Cardiovascular: Positive for palpitations (d/t dehydration and anxiety) and leg swelling. Negative for chest pain.   Gastrointestinal: Negative for abdominal distention, constipation, diarrhea and nausea.        Indigestion, taking protonix   Genitourinary: Positive for decreased urine volume (making 1 cup daily). Negative for difficulty urinating, frequency and hematuria.   Musculoskeletal: Positive for arthralgias (bilateral knees). Negative for gait problem and myalgias.   Skin: Negative for wound.   Allergic/Immunologic: Negative for environmental allergies, food allergies and immunocompromised state.   Neurological: Negative for dizziness, weakness and numbness.   Hematological: Does not bruise/bleed easily.   Psychiatric/Behavioral: Positive for sleep disturbance. The patient is not nervous/anxious.        Objective:   Blood pressure 132/80, pulse 72, temperature 98.7 °F (37.1 °C), temperature source Oral, resp. rate 18, height 5' 5.95" (1.675 m), weight 96.8 kg (213 lb 6.5 oz), SpO2 100 %, currently breastfeeding.body mass index is 34.5 kg/m².    Physical Exam  Vitals signs and nursing note reviewed.   Constitutional:       Appearance: Normal appearance.   HENT:      Head: Normocephalic.   Eyes:      Pupils: Pupils are equal, round, and reactive to light.   Neck:      Musculoskeletal: Normal range of motion and neck supple.   Cardiovascular:      Rate and Rhythm: Normal rate and regular rhythm.      Heart sounds: Normal heart sounds.   Pulmonary:      Effort: Pulmonary effort is normal.      Breath sounds: Normal breath sounds.   Abdominal:      General: Bowel sounds are normal. There is no distension.      Palpations: Abdomen is soft.      Tenderness: There is no abdominal " tenderness.   Musculoskeletal: Normal range of motion.        Arms:       Right lower leg: Edema (trace) present.      Left lower leg: Edema (trace) present.   Skin:     General: Skin is warm and dry.   Neurological:      General: No focal deficit present.      Mental Status: She is alert and oriented to person, place, and time.   Psychiatric:         Behavior: Behavior normal.         Labs:  Lab Results   Component Value Date    WBC 7.79 09/24/2020    HGB 9.5 (L) 09/24/2020    HCT 29.0 (L) 09/24/2020     09/24/2020    K 4.5 09/24/2020    CL 93 (L) 09/24/2020    CO2 31 (H) 09/24/2020    BUN 63 (H) 09/24/2020    CREATININE 16.0 (H) 09/24/2020    EGFRNONAA 2.6 (A) 09/24/2020    CALCIUM 10.2 09/24/2020    PHOS 5.8 (H) 09/24/2020    MG 2.1 01/18/2020    ALBUMIN 4.3 09/24/2020    AST 17 09/24/2020    ALT 19 09/24/2020    UTPCR 8.03 (H) 09/05/2018    .0 (H) 09/24/2020       Lab Results   Component Value Date    BILIRUBINUA Negative 01/17/2020    PROTEINUA 2+ (A) 01/17/2020    NITRITE Negative 01/17/2020    RBCUA 1 01/17/2020    WBCUA 2 01/17/2020       Labs were reviewed with the patient.    Assessment:     1. Pre-transplant evaluation for kidney transplant    2. ESRD on hemodialysis    3. FSGS (focal segmental glomerulosclerosis)    4. Anemia in ESRD (end-stage renal disease)    5. Secondary hyperparathyroidism    6. Renovascular hypertension    7. BMI 34.0-34.9,adult        Plan:   Get kidney biopsy from [facility] (~2015)      Transplant Candidacy:   Based on available information, Ms. Schwartz is a suitable kidney transplant candidate.   Meets center eligibility for accepting HCV+ donor offer - yes.  Patient educated on HCV+ donors. Lisseth is willing to accept HCV+ donor offer - yes   Patient is a candidate for KDPI > 85 kidney donor offer - no (age and weight).  Final determination of transplant candidacy will be made once workup is complete and reviewed by the selection committee.    Patricia AVENDANO  Caceres, NP       UNOS Patient Status  Functional Status: 80% - Normal activity with effort: some symptoms of disease  Physical Capacity: No Limitations

## 2020-09-24 NOTE — PROGRESS NOTES
Transplant Surgery  Kidney Transplant Recipient Evaluation    Referring Physician: Andres Hoyt  Current Nephrologist: Andres Hoyt    Subjective:     Reason for Visit: evaluate transplant candidacy    History of Present Illness: Lisseth Schwartz is a 29 y.o. year old female undergoing transplant evaluation.    Dialysis History: Lisseth is on hemodialysis.      Transplant History: N/A    Etiology of Renal Disease: Focal Glomerular Sclerosis (Focal Segmental - FSG) (based on medical records from referral).    Review of Systems   Constitutional: Negative for activity change, appetite change, chills and fever.   HENT: Negative for congestion, nosebleeds, sinus pressure, sore throat and voice change.    Eyes: Negative for pain and visual disturbance.   Respiratory: Negative for cough and shortness of breath.    Cardiovascular: Negative for palpitations and leg swelling.   Gastrointestinal: Negative for abdominal distention, abdominal pain, diarrhea, nausea and vomiting.   Endocrine: Negative for cold intolerance and heat intolerance.   Genitourinary: Negative for dysuria, flank pain, frequency and hematuria.   Musculoskeletal: Negative for back pain and gait problem.   Skin: Negative for color change, pallor and wound.   Allergic/Immunologic: Negative for food allergies.   Neurological: Negative for dizziness, seizures, numbness and headaches.   Hematological: Negative for adenopathy.   Psychiatric/Behavioral: Negative for agitation, behavioral problems, hallucinations and sleep disturbance.       Objective:     Physical Exam:  Constitutional:   Vitals reviewed: yes   Well-nourished and well-groomed: yes  Eyes:   Sclerae icteric: no   Extraocular movements intact: yes  GI:    Bowel sounds normal: yes   Tenderness: no    If yes, quadrant/location: not applicable   Palpable masses: no    If yes, quadrant/location: not applicable   Hepatosplenomegaly: no   Ascites: no   Hernia: no    If yes, type/location: not  applicable   Surgical scars: yes    If yes, type/location: laparoscopic port sites  not applicable  Resp:   Effort normal: yes   Breath sounds normal: yes    CV:   Regular rate and rhythm: yes   Heart sounds normal: yes   Femoral pulses normal: yes   Extremities edematous: no  Skin:   Rashes or lesions present: no    If yes, describe:not applicable   Jaundice:: no    Musculoskeletal:   Gait normal: yes   Strength normal: yes  Psych:   Oriented to person, place, and time: yes   Affect and mood normal: yes    Additional comments: not applicable    Counseling: We provided Lisseth Schwartz with a group education session today.  We discussed kidney transplantation at length with her, including risks, potential complications, and alternatives in the management of her renal failure.  The discussion included complications related to anesthesia, bleeding, infection, primary nonfunction, and ATN.  I discussed the typical postoperative course, length of hospitalization, the need for long-term immunosuppression, and the need for long-term routine follow-up.  I discussed living-donor and -donor transplantation and the relative advantages and disadvantages of each.  I also discussed average waiting times for both living donation and  donation.  I discussed national and center-specific survival rates.  I also mentioned the potential benefit of multicenter listing to candidates listed with centers within more than one organ procurement organization.  All questions were answered.    Final determination of transplant candidacy will be made once evaluation is complete and reviewed by the Kidney & Kidney/Pancreas Selection Committee.         Transplant Surgery - Candidacy   Assessment/Plan:   Lisseth Schwartz has end stage renal disease (ESRD) on dialysis. I see no surgical contraindication to placing a kidney transplant. Based on available information, Lisseth Schwartz is a suitable kidney transplant  candidate.     Keyur Henriquez Jr, MD

## 2020-09-24 NOTE — PROGRESS NOTES
PHARM.D. PRE-TRANSPLANT NOTE:    This patient's medication therapy was evaluated as part of her pre-transplant evaluation.      The following general pharmacologic concerns were noted: N/A    The following concerns for post-operative pain management were noted: n/a    The following pharmacologic concerns related to HCV therapy were noted: n/a      This patient's medication profile was reviewed for considerations for DAA Hepatitis C therapy:    [x]  No current inducers of CYP 3A4 or PGP  [x]  No amiodarone on this patient's EMR profile in the last 24 months  [x]  No past or current atrial fibrillation on this patient's EMR profile       Current Outpatient Medications   Medication Sig Dispense Refill    acetaminophen (TYLENOL) 650 MG TbSR Take 1 tablet (650 mg total) by mouth every 8 (eight) hours. (Patient taking differently: Take 500 mg by mouth every 8 (eight) hours. )  0    lanthanum (FOSRENOL) 1000 MG chewable tablet Take 1,000 mg by mouth 2 (two) times daily with meals.      metoprolol succinate (TOPROL-XL) 50 MG 24 hr tablet Take 1 tablet (50 mg total) by mouth once daily. 30 tablet 2    pantoprazole (PROTONIX) 40 MG tablet Take 1 tablet (40 mg total) by mouth once daily. 30 tablet 2    sucroferric oxyhydroxide (VELPHORO) 500 mg Chew Take 500 mg by mouth 3 (three) times daily with meals.        No current facility-administered medications for this visit.      Facility-Administered Medications Ordered in Other Visits   Medication Dose Route Frequency Provider Last Rate Last Dose    lactated ringers infusion   Intravenous Continuous Haley Badillo MD 10 mL/hr at 12/04/18 4586           Currently  Lana is responsible for preparing / administering this patient's medications on a daily basis.  I am available for consultation and can be contacted, as needed by the other members of the Kidney Transplant team.

## 2020-09-28 ENCOUNTER — PATIENT MESSAGE (OUTPATIENT)
Dept: TRANSPLANT | Facility: CLINIC | Age: 30
End: 2020-09-28

## 2020-09-28 ENCOUNTER — HOSPITAL ENCOUNTER (OUTPATIENT)
Dept: CARDIOLOGY | Facility: HOSPITAL | Age: 30
Discharge: HOME | End: 2020-09-28
Attending: INTERNAL MEDICINE
Payer: MEDICARE

## 2020-09-28 DIAGNOSIS — R00.2 PALPITATIONS: ICD-10-CM

## 2020-09-28 PROCEDURE — 93227 XTRNL ECG REC<48 HR R&I: CPT | Mod: NTX,,, | Performed by: INTERNAL MEDICINE

## 2020-09-28 PROCEDURE — 93226 XTRNL ECG REC<48 HR SCAN A/R: CPT | Mod: NTX

## 2020-09-28 PROCEDURE — 93227 HOLTER MONITOR - 24 HOUR (CUPID ONLY): ICD-10-PCS | Mod: NTX,,, | Performed by: INTERNAL MEDICINE

## 2020-09-29 LAB
OHS CV EVENT MONITOR DAY: 1
OHS CV HOLTER LENGTH DECIMAL HOURS: 48
OHS CV HOLTER LENGTH HOURS: 24
OHS CV HOLTER LENGTH MINUTES: 0

## 2020-10-04 DIAGNOSIS — Z76.82 ORGAN TRANSPLANT CANDIDATE: Primary | ICD-10-CM

## 2020-10-05 ENCOUNTER — RESEARCH ENCOUNTER (OUTPATIENT)
Dept: TRANSPLANT | Facility: CLINIC | Age: 30
End: 2020-10-05

## 2020-10-05 DIAGNOSIS — Z76.82 ORGAN TRANSPLANT CANDIDATE: Primary | ICD-10-CM

## 2020-10-07 ENCOUNTER — TELEPHONE (OUTPATIENT)
Dept: TRANSPLANT | Facility: CLINIC | Age: 30
End: 2020-10-07

## 2020-10-16 ENCOUNTER — PATIENT MESSAGE (OUTPATIENT)
Dept: OBSTETRICS AND GYNECOLOGY | Facility: CLINIC | Age: 30
End: 2020-10-16

## 2020-10-20 ENCOUNTER — HOSPITAL ENCOUNTER (OUTPATIENT)
Dept: PULMONOLOGY | Facility: HOSPITAL | Age: 30
Discharge: HOME | End: 2020-10-20
Attending: NURSE PRACTITIONER
Payer: MEDICARE

## 2020-10-20 ENCOUNTER — HOSPITAL ENCOUNTER (OUTPATIENT)
Dept: RADIOLOGY | Facility: HOSPITAL | Age: 30
Discharge: HOME | End: 2020-10-20
Attending: NURSE PRACTITIONER
Payer: MEDICARE

## 2020-10-20 VITALS
HEIGHT: 66 IN | WEIGHT: 213 LBS | SYSTOLIC BLOOD PRESSURE: 133 MMHG | DIASTOLIC BLOOD PRESSURE: 87 MMHG | BODY MASS INDEX: 34.23 KG/M2 | HEART RATE: 64 BPM

## 2020-10-20 DIAGNOSIS — Z76.82 ORGAN TRANSPLANT CANDIDATE: ICD-10-CM

## 2020-10-20 LAB
CV STRESS BASE HR: 64 BPM
DIASTOLIC BLOOD PRESSURE: 87 MMHG
EJECTION FRACTION- HIGH: 65 %
END DIASTOLIC INDEX-HIGH: 158 ML/M2
END DIASTOLIC INDEX-LOW: 94 ML/M2
END SYSTOLIC INDEX-HIGH: 71 ML/M2
END SYSTOLIC INDEX-LOW: 33 ML/M2
NUC REST DIASTOLIC VOLUME INDEX: 175
NUC REST EJECTION FRACTION: 57
NUC REST SYSTOLIC VOLUME INDEX: 74
NUC STRESS DIASTOLIC VOLUME INDEX: 162
NUC STRESS EJECTION FRACTION: 50 %
NUC STRESS SYSTOLIC VOLUME INDEX: 82
OHS CV CPX 85 PERCENT MAX PREDICTED HEART RATE MALE: 152
OHS CV CPX MAX PREDICTED HEART RATE: 179
OHS CV CPX PATIENT IS FEMALE: 1
OHS CV CPX PATIENT IS MALE: 0
OHS CV CPX PEAK DIASTOLIC BLOOD PRESSURE: 79 MMHG
OHS CV CPX PEAK HEAR RATE: 125 BPM
OHS CV CPX PEAK RATE PRESSURE PRODUCT: NORMAL
OHS CV CPX PEAK SYSTOLIC BLOOD PRESSURE: 151 MMHG
OHS CV CPX PERCENT MAX PREDICTED HEART RATE ACHIEVED: 70
OHS CV CPX RATE PRESSURE PRODUCT PRESENTING: 8512
RETIRED EF AND QEF - SEE NOTES: 53 %
SYSTOLIC BLOOD PRESSURE: 133 MMHG

## 2020-10-20 PROCEDURE — A9502 TC99M TETROFOSMIN: HCPCS | Mod: TXP

## 2020-10-20 PROCEDURE — 63600175 PHARM REV CODE 636 W HCPCS: Mod: TXP | Performed by: NURSE PRACTITIONER

## 2020-10-20 RX ORDER — REGADENOSON 0.08 MG/ML
0.4 INJECTION, SOLUTION INTRAVENOUS ONCE
Status: COMPLETED | OUTPATIENT
Start: 2020-10-20 | End: 2020-10-20

## 2020-10-20 RX ADMIN — REGADENOSON 0.4 MG: 0.08 INJECTION, SOLUTION INTRAVENOUS at 11:10

## 2020-10-27 ENCOUNTER — TELEPHONE (OUTPATIENT)
Dept: TRANSPLANT | Facility: CLINIC | Age: 30
End: 2020-10-27

## 2020-10-29 ENCOUNTER — PATIENT OUTREACH (OUTPATIENT)
Dept: ADMINISTRATIVE | Facility: OTHER | Age: 30
End: 2020-10-29

## 2020-11-16 ENCOUNTER — PATIENT MESSAGE (OUTPATIENT)
Dept: TRANSPLANT | Facility: CLINIC | Age: 30
End: 2020-11-16

## 2020-11-16 ENCOUNTER — OFFICE VISIT (OUTPATIENT)
Dept: OBSTETRICS AND GYNECOLOGY | Facility: CLINIC | Age: 30
End: 2020-11-16
Payer: MEDICARE

## 2020-11-16 VITALS
WEIGHT: 215.75 LBS | BODY MASS INDEX: 34.67 KG/M2 | HEIGHT: 66 IN | SYSTOLIC BLOOD PRESSURE: 130 MMHG | DIASTOLIC BLOOD PRESSURE: 78 MMHG

## 2020-11-16 DIAGNOSIS — Z12.4 SCREENING FOR CERVICAL CANCER: ICD-10-CM

## 2020-11-16 DIAGNOSIS — B96.89 BACTERIAL VAGINOSIS: ICD-10-CM

## 2020-11-16 DIAGNOSIS — Z01.419 ENCOUNTER FOR GYNECOLOGICAL EXAMINATION (GENERAL) (ROUTINE) WITHOUT ABNORMAL FINDINGS: Primary | ICD-10-CM

## 2020-11-16 DIAGNOSIS — N76.0 BACTERIAL VAGINOSIS: ICD-10-CM

## 2020-11-16 PROCEDURE — 99999 PR PBB SHADOW E&M-EST. PATIENT-LVL II: CPT | Mod: PBBFAC,,, | Performed by: OBSTETRICS & GYNECOLOGY

## 2020-11-16 PROCEDURE — 99212 OFFICE O/P EST SF 10 MIN: CPT | Mod: PBBFAC,PN | Performed by: OBSTETRICS & GYNECOLOGY

## 2020-11-16 PROCEDURE — 88175 CYTOPATH C/V AUTO FLUID REDO: CPT

## 2020-11-16 PROCEDURE — 99999 PR PBB SHADOW E&M-EST. PATIENT-LVL II: ICD-10-PCS | Mod: PBBFAC,,, | Performed by: OBSTETRICS & GYNECOLOGY

## 2020-11-16 PROCEDURE — G0101 PR CA SCREEN;PELVIC/BREAST EXAM: ICD-10-PCS | Mod: S$PBB,,, | Performed by: OBSTETRICS & GYNECOLOGY

## 2020-11-16 PROCEDURE — 87624 HPV HI-RISK TYP POOLED RSLT: CPT

## 2020-11-16 PROCEDURE — G0101 CA SCREEN;PELVIC/BREAST EXAM: HCPCS | Mod: S$PBB,,, | Performed by: OBSTETRICS & GYNECOLOGY

## 2020-11-16 NOTE — PROGRESS NOTES
Subjective:       Patient ID: Lisseth Schwartz is a 29 y.o. female.    Chief Complaint:  Well Woman      History of Present Illness  HPI  Annual Exam-Premenopausal  Patient presents for annual exam. The patient has no complaints today. The patient is sexually active--tl; c/o vaginal dryness; . GYN screening history: last pap: approximate date  and was normal. The patient wears seatbelts: yes. The patient participates in regular exercise: no.--walks  Has the patient ever been transfused or tattooed?: yes. --tattooes; The patient reports that there is not domestic violence in her life.      Reports random spotting since ablation--last bled in sept--may last 1 days; dysmenorrhea  And acne monthly;     Using dove for body soap;   GYN & OB History  No LMP recorded. Patient has had an ablation.   Date of Last Pap: No result found    OB History    Para Term  AB Living   6 4 3 1 2 4   SAB TAB Ectopic Multiple Live Births     2   0 4      # Outcome Date GA Lbr Erik/2nd Weight Sex Delivery Anes PTL Lv   6  18 33w0d  1.84 kg (4 lb 0.9 oz) M Vag-Spont EPI Y SATNAM   5 TAB            4 Term 09   4.082 kg (9 lb) F Vag-Spont   SATNAM   3 Term 09/15/08   4.99 kg (11 lb) M Vag-Spont   SATNAM   2 TAB 2007           1 Term 07   3.175 kg (7 lb) M Vag-Spont   SATNAM       Review of Systems  Review of Systems   Genitourinary: Positive for vaginal discharge and vaginal odor.   All other systems reviewed and are negative.          Objective:      Physical Exam:   Constitutional: She appears well-developed.     Eyes: Pupils are equal, round, and reactive to light. Conjunctivae and EOM are normal.    Neck: Normal range of motion. Neck supple.     Pulmonary/Chest: Effort normal. Right breast exhibits no mass, no nipple discharge, no skin change and no tenderness. Left breast exhibits no mass, no nipple discharge, no skin change and no tenderness. Breasts are symmetrical.        Abdominal: Soft.      Genitourinary:    Vagina, uterus, right adnexa, left adnexa and rectum normal.      Pelvic exam was performed with patient supine.   Cervix is normal. There is a normal right adnexa and a normal left adnexa. Right adnexum displays no mass and no tenderness. Left adnexum displays no mass and no tenderness. No erythema, bleeding, rectocele, cystocele or unspecified prolapse of vaginal walls in the vagina. Labial bartholins normal.Cervix exhibits no motion tenderness and no friability. negative for vaginal discharge       Uterus Size: 6 cm   Musculoskeletal: Normal range of motion.       Neurological: She is alert.    Skin: Skin is warm.    Psychiatric: She has a normal mood and affect.           Assessment:       Encounter Diagnoses   Name Primary?    Encounter for gynecological examination (general) (routine) without abnormal findings Yes    Screening for cervical cancer     Bacterial vaginosis           Plan:      Continue annual well woman exam.  Pap 2018, due in 2021; Reviewed updated recommendations for pap smears (every 3 years) in low risk patients.   Recommend annual pelvic exams.  Reviewed recommendations for annual CBE.  Pt prefers pap taken--  advsie use of lubricant for vaginal dryness

## 2020-11-17 ENCOUNTER — TELEPHONE (OUTPATIENT)
Dept: OBSTETRICS AND GYNECOLOGY | Facility: CLINIC | Age: 30
End: 2020-11-17

## 2020-11-17 DIAGNOSIS — B96.89 BACTERIAL VAGINOSIS: Primary | ICD-10-CM

## 2020-11-17 DIAGNOSIS — N76.0 BACTERIAL VAGINOSIS: Primary | ICD-10-CM

## 2020-11-17 RX ORDER — METRONIDAZOLE 500 MG/1
500 TABLET ORAL EVERY 12 HOURS
Qty: 14 TABLET | Refills: 0 | Status: SHIPPED | OUTPATIENT
Start: 2020-11-17 | End: 2020-11-24

## 2020-11-17 NOTE — TELEPHONE ENCOUNTER
Pt states she needs a PA, But you can send in flagyl. PRABHAKAR Loya  ----- Message from Liana Goel sent at 11/17/2020 12:45 PM CST -----  Contact: self/[phone]  Would like to consult with nurse regarding her new script for Antibiotics, patient states she need a pre authorization. Please call back at [phone]. Thanks/ar

## 2020-11-23 LAB
HPV HR 12 DNA SPEC QL NAA+PROBE: NEGATIVE
HPV16 AG SPEC QL: NEGATIVE
HPV18 DNA SPEC QL NAA+PROBE: NEGATIVE

## 2020-12-15 ENCOUNTER — OFFICE VISIT (OUTPATIENT)
Dept: CARDIOLOGY | Facility: CLINIC | Age: 30
End: 2020-12-15
Payer: MEDICAID

## 2020-12-15 VITALS
BODY MASS INDEX: 34.37 KG/M2 | DIASTOLIC BLOOD PRESSURE: 80 MMHG | OXYGEN SATURATION: 98 % | WEIGHT: 213.88 LBS | SYSTOLIC BLOOD PRESSURE: 130 MMHG | HEIGHT: 66 IN | HEART RATE: 86 BPM

## 2020-12-15 DIAGNOSIS — Z01.810 PREOP CARDIOVASCULAR EXAM: Primary | ICD-10-CM

## 2020-12-15 PROCEDURE — 99213 PR OFFICE/OUTPT VISIT, EST, LEVL III, 20-29 MIN: ICD-10-PCS | Mod: S$PBB,TXP,, | Performed by: INTERNAL MEDICINE

## 2020-12-15 PROCEDURE — 99213 OFFICE O/P EST LOW 20 MIN: CPT | Mod: S$PBB,TXP,, | Performed by: INTERNAL MEDICINE

## 2020-12-15 PROCEDURE — 99999 PR PBB SHADOW E&M-EST. PATIENT-LVL III: CPT | Mod: PBBFAC,TXP,, | Performed by: INTERNAL MEDICINE

## 2020-12-15 PROCEDURE — 99213 OFFICE O/P EST LOW 20 MIN: CPT | Mod: PBBFAC,TXP | Performed by: INTERNAL MEDICINE

## 2020-12-15 PROCEDURE — 99999 PR PBB SHADOW E&M-EST. PATIENT-LVL III: ICD-10-PCS | Mod: PBBFAC,TXP,, | Performed by: INTERNAL MEDICINE

## 2020-12-15 NOTE — PROGRESS NOTES
Subjective:   Patient ID:  Lisseth Schwartz is a 29 y.o. female who presents for evaluation of Pre-op Exam      FIRST VISIT HPI: 1-year-old female with history of FSGS, hypertension, end-stage renal disease.  Patient started recently hemodialysis.  She has a right arm fistula.  He has been getting dialysis every day since September 1st.  She states that she is being trained on the machine so she can take it home with her.  She comes today complaining that since she started dialysis 3 weeks ago.  She feels tired during dialysis.  And been feeling her heart going fast.  He is being told her blood pressure is running at 90s.  Her metoprolol has been decreased recently from 100 to 50 mg orally.  Her blood pressure was uncontrolled in the past.  She reports atypical left arm shooting pain.  It starts from her hand and goes back to her shoulder and chest.  Only lasts for 1 or 2 seconds.  She denies however having any syncope.  No lower extremity swelling.  The she states that she does not drink water or fluids because of the fear of her kidneys function.  She does not drink excessive amount of caffeine.      Interval hx:   Patient states that she has been doing much better since she is doing 5 days of dialysis at home.  She denies any palpitations, chest pain, dyspnea, lower extremity swelling.  She is doing well.    Holter monitor   Predominant Rhythm Sinus rhythm with heart rates varying between 57 and 140 bpm with an average of 83 bpm.   Monitoring started at 12:32 PM and continued for 23 hr 55 min. The average heart rate was 83 BPM. The minimum heart  rate was 57 BPM, occurring at 5:42:55 AM. The maximum heart rate was 140 BPM, occurring at 1:07:46 PM.  Ventricular ectopic activity consisted of 2 beats, of which, 2 were in single PVCs.  The patient's rhythm included 2 min 55 sec of bradycardia. The slowest single episode of bradycardia occurred at  5:42:51 AM, lasting 20 sec, with minimum heart rate of 57  "BPM.  The patient's rhythm included 3 hr 23 min 48 sec of tachycardia. The fastest single episode of tachycardia occurred at  1:05:38 PM, lasting 3 min 18 sec, with maximum heart rate of 140 BPM.      Nuclear stress test     The study shows normal myocardial perfusion.    The perfusion scan is free of evidence from myocardial ischemia or injury.    Gated perfusion images showed an ejection fraction of 57% at rest and 50% post stress.    The EKG portion of this study is negative for ischemia.         Past Medical History:   Diagnosis Date    Allergy     Anemia     Anxiety     Breast feeding status of mother 1/17/2020    Brittle bones     ESRD (end stage renal disease)     M/W/F    General anesthetics causing adverse effect in therapeutic use     pt states "im a light weight"    Hypertension     Insomnia     PSG revealed no CHRYSTAL, but likely psychogenic etiology (anxiety)    RLS (restless legs syndrome)        Past Surgical History:   Procedure Laterality Date    Arm surgery Right     x 2    AV FISTULA PLACEMENT Right     HYSTEROSCOPY WITH HYDROTHERMAL ABLATION OF ENDOMETRIUM WITH DILATION AND CURETTAGE N/A 12/4/2018    Procedure: HYSTEROSCOPY, WITH DILATION AND CURETTAGE OF UTERUS AND HYDROTHERMAL ENDOMETRIAL ABLATION;  Surgeon: Tiara Red MD;  Location: [facility] OR;  Service: OB/GYN;  Laterality: N/A;  ATTEMPTED     LAPAROSCOPIC SALPINGECTOMY Bilateral 12/4/2018    Procedure: SALPINGECTOMY, LAPAROSCOPIC;  Surgeon: Tiara Red MD;  Location: [facility] OR;  Service: OB/GYN;  Laterality: Bilateral;    RENAL BIOPSY      tumor removed      from face per medical records       Social History     Tobacco Use    Smoking status: Never Smoker    Smokeless tobacco: Never Used   Substance Use Topics    Alcohol use: No    Drug use: No       Family History   Problem Relation Age of Onset    Hypertension Father     Breast cancer Paternal Grandmother     Diabetes Maternal Grandmother     Heart attack " Maternal Grandmother     Lung cancer Maternal Grandfather     Prostate cancer Maternal Grandfather        Review of Systems   Constitution: Negative for fever and malaise/fatigue.   HENT: Negative for sore throat.    Eyes: Negative for blurred vision.   Cardiovascular: Negative for chest pain, claudication, cyanosis, dyspnea on exertion, irregular heartbeat, leg swelling, near-syncope, orthopnea, palpitations, paroxysmal nocturnal dyspnea and syncope.   Respiratory: Negative for cough, hemoptysis and shortness of breath.    Hematologic/Lymphatic: Negative for bleeding problem.   Skin: Negative for poor wound healing and rash.   Musculoskeletal: Negative for back pain and falls.   Gastrointestinal: Negative for abdominal pain.   Genitourinary: Negative for nocturia.   Neurological: Negative for dizziness, focal weakness, headaches, light-headedness and loss of balance.   Psychiatric/Behavioral: Negative for altered mental status and substance abuse.       Current Outpatient Medications on File Prior to Visit   Medication Sig    acetaminophen (TYLENOL) 650 MG TbSR Take 1 tablet (650 mg total) by mouth every 8 (eight) hours. (Patient taking differently: Take 500 mg by mouth every 8 (eight) hours. )    metoprolol succinate (TOPROL-XL) 50 MG 24 hr tablet Take 1 tablet (50 mg total) by mouth once daily.    pantoprazole (PROTONIX) 40 MG tablet TAKE ONE TABLET BY MOUTH ONCE DAILY    sucroferric oxyhydroxide (VELPHORO) 500 mg Chew Take 500 mg by mouth 3 (three) times daily with meals.      No current facility-administered medications on file prior to visit.        Objective:   Objective:  Wt Readings from Last 3 Encounters:   12/15/20 97 kg (213 lb 13.5 oz)   11/16/20 97.8 kg (215 lb 11.5 oz)   10/20/20 96.6 kg (213 lb)     Temp Readings from Last 3 Encounters:   09/24/20 98.7 °F (37.1 °C) (Oral)   01/19/20 96.7 °F (35.9 °C) (Oral)   01/16/20 98.8 °F (37.1 °C) (Temporal)     BP Readings from Last 3 Encounters:    12/15/20 130/80   11/16/20 130/78   10/20/20 133/87     Pulse Readings from Last 3 Encounters:   12/15/20 86   10/20/20 64   09/24/20 72       Physical Exam   Constitutional: She is oriented to person, place, and time. She appears well-developed and well-nourished.   HENT:   Head: Normocephalic and atraumatic.   Eyes: Conjunctivae are normal. No scleral icterus.   Neck: Normal range of motion. Neck supple.   Cardiovascular: Normal rate, regular rhythm, normal heart sounds and intact distal pulses.   No murmur heard.  Pulmonary/Chest: No respiratory distress. She has no wheezes. She has no rales. She exhibits no tenderness.   Abdominal: Soft. Bowel sounds are normal. She exhibits no distension. There is no guarding.   Musculoskeletal: Normal range of motion.   Neurological: She is alert and oriented to person, place, and time.   Skin: Skin is warm.   Right AV fistula   Psychiatric: She has a normal mood and affect.   Vitals reviewed.      Lab Results   Component Value Date    CHOL 130 09/24/2020    CHOL 116 (L) 01/18/2020     Lab Results   Component Value Date    HDL 24 (L) 09/24/2020    HDL 26 (L) 01/18/2020     Lab Results   Component Value Date    LDLCALC 77.0 09/24/2020    LDLCALC 62.4 (L) 01/18/2020     Lab Results   Component Value Date    TRIG 145 09/24/2020    TRIG 138 01/18/2020     Lab Results   Component Value Date    CHOLHDL 18.5 (L) 09/24/2020    CHOLHDL 22.4 01/18/2020       Chemistry        Component Value Date/Time     09/24/2020 0818    K 4.5 09/24/2020 0818    CL 93 (L) 09/24/2020 0818    CO2 31 (H) 09/24/2020 0818    BUN 63 (H) 09/24/2020 0818    CREATININE 16.0 (H) 09/24/2020 0818    GLU 80 09/24/2020 0818        Component Value Date/Time    CALCIUM 10.2 09/24/2020 0818    ALKPHOS 63 09/24/2020 0818    AST 17 09/24/2020 0818    ALT 19 09/24/2020 0818    BILITOT 0.7 09/24/2020 0818    ESTGFRAFRICA 3.0 (A) 09/24/2020 0818    EGFRNONAA 2.6 (A) 09/24/2020 0818          Lab Results   Component  Value Date    TSH 0.993 09/22/2020     Lab Results   Component Value Date    INR 1.0 09/24/2020    INR 1.0 09/18/2018     Lab Results   Component Value Date    WBC 7.79 09/24/2020    HGB 9.5 (L) 09/24/2020    HCT 29.0 (L) 09/24/2020     (H) 09/24/2020     09/24/2020     BNP  @LABRCNTIP(BNP,BNPTRIAGEBLO)@  CrCl cannot be calculated (Patient's most recent lab result is older than the maximum 7 days allowed.).     Imaging:  ======  2020   1 - Moderate left atrial enlargement.     2 - Concentric hypertrophy.     3 - No wall motion abnormalities.     4 - Normal left ventricular systolic function (EF 60-65%).     5 - Indeterminate LV diastolic function.     6 - Normal right ventricular systolic function .     7 - The estimated PA systolic pressure is 29 mmHg.     8 - Mild tricuspid regurgitation.     No results found for this or any previous visit.  Results for orders placed during the hospital encounter of 01/17/20   US Carotid Bilateral    Narrative EXAMINATION:  US CAROTID BILATERAL    CLINICAL HISTORY:  TIA.    COMPARISON:  None    FINDINGS:  Sonographic evaluation of the carotid systems was performed.    No significant atherosclerotic plaque is appreciative in either carotid system.    The peak systolic velocity in the right internal carotid artery was approximately 89 cm/sec.  The right ICA to CCA peak systolic velocity ratio is 1.0.    The peak systolic velocity in the left internal carotid artery was approximately 119 cm/sec.  The left ICA to CCA peak systolic velocity ratio is 1.1.    Antegrade flow noted in both vertebral arteries.      Impression Impression:     No sonographic evidence of a significant stenosis is identified in either carotid system.    Stenosis of 0%-validated velocity measurements with angiographic measurements, velocity criteria are extrapolated from diameter data as defined by the Society of Radiologists in Ultrasound Consensus Conference Radiology 2003;  229;340-346.      Electronically signed by: Jose Juan Johnson MD  Date:    01/17/2020  Time:    19:23     Results for orders placed during the hospital encounter of 11/20/19   X-Ray Chest PA And Lateral    Narrative EXAMINATION:  XR CHEST PA AND LATERAL    CLINICAL HISTORY  Shortness of breath, sob;    COMPARISON:  None    FINDINGS:  The heart size is normal.  The lung fields are clear.  No acute cardiopulmonary infiltrative.      Impression No acute findings.      Electronically signed by: Art Grullon MD  Date:    11/20/2019  Time:    17:14     No results found for this or any previous visit.  No procedure found.    Diagnostic Results:  ECG: Reviewed    The ASCVD Risk score (Oliva EVGENY Jones., et al., 2013) failed to calculate for the following reasons:    The 2013 ASCVD risk score is only valid for ages 40 to 79    The patient has a prior MI or stroke diagnosis  Her LDL is 67  Assessment and Plan:   Preop cardiovascular exam  Comments:  patient is at low risk from cardiac standpoint for her renal transplant surgery   normal stress test , PASP 29mmHg     Hypotension, unspecified hypotension type, resolved  Comments:  Likely due to excess dialysis.  Every day of the week in the past   Now dialysing 5d/w at home doing well      Palpitations  Comments: resolved  Likely due to dehydration, anxiety.tsh normal      Chest pain, unspecified type  Comments:  Atypical, doubt cardiac origin.  Lasts for few seconds only.  normal nuclear stress test ordered by Transplant      End stage renal disease  Comments:  Follows with Nephrology.  Five days of dialysis a week at home.     LVH (left ventricular hypertrophy)  Comments:  Secondary to uncontrolled hypertension in past.  Continue with Toprol 50 mg daily.  Blood pressure control     Dyspnea on exertion  Comments:  resolved           Follow up in 12 months

## 2020-12-30 ENCOUNTER — PATIENT MESSAGE (OUTPATIENT)
Dept: TRANSPLANT | Facility: CLINIC | Age: 30
End: 2020-12-30

## 2020-12-31 LAB
FINAL PATHOLOGIC DIAGNOSIS: NORMAL
Lab: NORMAL

## 2021-01-08 ENCOUNTER — TELEPHONE (OUTPATIENT)
Dept: TRANSPLANT | Facility: CLINIC | Age: 31
End: 2021-01-08

## 2021-01-08 ENCOUNTER — COMMITTEE REVIEW (OUTPATIENT)
Dept: TRANSPLANT | Facility: CLINIC | Age: 31
End: 2021-01-08

## 2021-01-08 ENCOUNTER — PATIENT MESSAGE (OUTPATIENT)
Dept: TRANSPLANT | Facility: CLINIC | Age: 31
End: 2021-01-08

## 2021-01-08 DIAGNOSIS — Z76.82 ORGAN TRANSPLANT CANDIDATE: Primary | ICD-10-CM

## 2021-01-23 NOTE — MR AVS SNAPSHOT
[facility]  [address]  [place] [address]  Phone: [phone]                  Lisseth Cesar   2017 2:00 PM   Initial Prenatal    Description:  Female : 1990   Provider:  Virginia Higgins MD   Department:  [facility]           Reason for Visit     Initial Prenatal Visit           Diagnoses this Visit        Comments    FSGS (focal segmental glomerulosclerosis)    -  Primary     Pregnancy with uncertain fetal viability, fetus 1         ESRF (end stage renal failure)                To Do List           Future Appointments        Provider Department Dept Phone    2017 3:20 PM ULTRASOUND, MATERNAL FETAL MEDICINE [facility]/ -[phone]    2017 1:00 PM Virginia Higgins MD [facility] [phone]      Goals (5 Years of Data)     None      [facility]     [facility] Care [facility] - 24/7 Assistance  Registered nurses in the [facility] provide clinical advisement, health education, appointment booking, and other advisory services.  Call for this free service at [phone].             Medications           Message regarding Medications     Verify the changes and/or additions to your medication regime listed below are the same as discussed with your clinician today.  If any of these changes or additions are incorrect, please notify your healthcare provider.             Verify that the below list of medications is an accurate representation of the medications you are currently taking.  If none reported, the list may be blank. If incorrect, please contact your healthcare provider. Carry this list with you in case of emergency.           Current Medications     albuterol 90 mcg/actuation inhaler Inhale 2 puffs into the lungs.    B,C/FERROUS FUM/FA/D3/ZINC OX (PRORENAL ORAL) Take by mouth.    carvedilol (COREG) 6.25 MG tablet            Clinical Reference Information            Prenatal Vitals     Enc. Date GA Prenatal Vitals Prenatal Pulse Pain Level Urine Albumin/Glucose Edema Presentation Dilation/Effacement/Station    1/17/17 7w5d 118/82 / 114.8 kg (253 lb 1.4 oz)  / uto / Absent  0 Negative / Negative         Vital Signs - Last Recorded  Most recent update: 1/17/2017  2:16 PM by Josephine Bhatia MA    BP Wt LMP BMI       118/82 114.8 kg (253 lb 1.4 oz) 11/24/2016 (Approximate) 40.85 kg/m2       Allergies as of 1/17/2017     Furosemide      Immunizations Administered on Date of Encounter - 1/17/2017     None      Orders Placed During Today's Visit      Normal Orders This Visit    Ambulatory consult to Maternal Fetal Medicine     Future Labs/Procedures Expected by Expires    US MFM Procedure ([facility])  As directed 1/17/2018    US OB/GYN Procedure ([facility])  As directed 1/17/2018       Labs/Imaging Studies/Medications

## 2021-01-26 DIAGNOSIS — Z76.82 ORGAN TRANSPLANT CANDIDATE: Primary | ICD-10-CM

## 2021-02-01 NOTE — ASSESSMENT & PLAN NOTE
-Etiology unclear, but symptoms seem too global for an acute CVA or TIA. Possibly due to electrolyte abnormalities associated with renal disease, uremia or hypertensive encephalopathy.   -MRI brain to rule out CVA. Check lipid panel, hemoglobin A1C, carotid US and echocardiogram to risk assess for cerebrovascular disease.  -PT, OT and ST evaluations.   -Hold ASA and statin due to low risk for cerebrovascular disease and patient's breastfeeding status.    -Neuro checks.  -Neurology consult.       [id number]

## 2021-02-09 ENCOUNTER — OFFICE VISIT (OUTPATIENT)
Dept: INTERNAL MEDICINE | Facility: CLINIC | Age: 31
End: 2021-02-09
Payer: MEDICARE

## 2021-02-09 DIAGNOSIS — L02.91 ABSCESS: Primary | ICD-10-CM

## 2021-02-09 PROCEDURE — 99212 OFFICE O/P EST SF 10 MIN: CPT | Mod: 95,,, | Performed by: NURSE PRACTITIONER

## 2021-02-09 PROCEDURE — 99212 PR OFFICE/OUTPT VISIT, EST, LEVL II, 10-19 MIN: ICD-10-PCS | Mod: 95,,, | Performed by: NURSE PRACTITIONER

## 2021-02-09 RX ORDER — DOXYCYCLINE 100 MG/1
100 CAPSULE ORAL EVERY 12 HOURS
Qty: 20 CAPSULE | Refills: 0 | Status: SHIPPED | OUTPATIENT
Start: 2021-02-09 | End: 2021-02-19

## 2021-02-24 ENCOUNTER — EPISODE CHANGES (OUTPATIENT)
Dept: TRANSPLANT | Facility: CLINIC | Age: 31
End: 2021-02-24

## 2021-02-24 ENCOUNTER — TELEPHONE (OUTPATIENT)
Dept: TRANSPLANT | Facility: CLINIC | Age: 31
End: 2021-02-24

## 2021-02-24 DIAGNOSIS — Z76.82 ORGAN TRANSPLANT CANDIDATE: Primary | ICD-10-CM

## 2021-02-25 ENCOUNTER — TELEPHONE (OUTPATIENT)
Dept: TRANSPLANT | Facility: CLINIC | Age: 31
End: 2021-02-25

## 2021-02-25 DIAGNOSIS — Z76.82 ORGAN TRANSPLANT CANDIDATE: Primary | ICD-10-CM

## 2021-03-25 ENCOUNTER — HOSPITAL ENCOUNTER (EMERGENCY)
Facility: HOSPITAL | Age: 31
Discharge: HOME | End: 2021-03-25
Attending: EMERGENCY MEDICINE
Payer: MEDICARE

## 2021-03-25 VITALS
RESPIRATION RATE: 22 BRPM | TEMPERATURE: 99 F | BODY MASS INDEX: 34.23 KG/M2 | DIASTOLIC BLOOD PRESSURE: 103 MMHG | OXYGEN SATURATION: 100 % | HEART RATE: 81 BPM | SYSTOLIC BLOOD PRESSURE: 184 MMHG | WEIGHT: 213 LBS | HEIGHT: 66 IN
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
## 2021-03-25 DIAGNOSIS — S40.022A CONTUSION OF LEFT UPPER EXTREMITY, INITIAL ENCOUNTER: ICD-10-CM

## 2021-03-25 DIAGNOSIS — S30.1XXA CONTUSION OF ABDOMINAL WALL, INITIAL ENCOUNTER: ICD-10-CM

## 2021-03-25 DIAGNOSIS — I10 HYPERTENSION, UNSPECIFIED TYPE: ICD-10-CM

## 2021-03-25 DIAGNOSIS — S39.012A STRAIN OF LUMBAR REGION, INITIAL ENCOUNTER: Primary | ICD-10-CM

## 2021-03-25 PROCEDURE — 99284 EMERGENCY DEPT VISIT MOD MDM: CPT | Mod: 25,NTX

## 2021-03-25 PROCEDURE — 25000003 PHARM REV CODE 250: Mod: NTX | Performed by: PHYSICIAN ASSISTANT

## 2021-03-25 RX ORDER — HYDROCODONE BITARTRATE AND ACETAMINOPHEN 7.5; 325 MG/1; MG/1
1 TABLET ORAL
Status: COMPLETED | OUTPATIENT
Start: 2021-03-25 | End: 2021-03-25

## 2021-03-25 RX ORDER — HYDROCODONE BITARTRATE AND ACETAMINOPHEN 5; 325 MG/1; MG/1
1 TABLET ORAL EVERY 12 HOURS PRN
Qty: 6 TABLET | Refills: 0 | Status: SHIPPED | OUTPATIENT
Start: 2021-03-25 | End: 2021-03-30 | Stop reason: SDUPTHER

## 2021-03-25 RX ADMIN — HYDROCODONE BITARTRATE AND ACETAMINOPHEN 1 TABLET: 7.5; 325 TABLET ORAL at 09:03

## 2021-03-30 ENCOUNTER — OFFICE VISIT (OUTPATIENT)
Dept: INTERNAL MEDICINE | Facility: CLINIC | Age: 31
End: 2021-03-30
Payer: MEDICARE

## 2021-03-30 ENCOUNTER — TELEPHONE (OUTPATIENT)
Dept: INTERNAL MEDICINE | Facility: CLINIC | Age: 31
End: 2021-03-30

## 2021-03-30 DIAGNOSIS — N18.6 ESRD (END STAGE RENAL DISEASE): ICD-10-CM

## 2021-03-30 DIAGNOSIS — F41.9 ANXIETY: ICD-10-CM

## 2021-03-30 DIAGNOSIS — M25.50 ARTHRALGIA, UNSPECIFIED JOINT: ICD-10-CM

## 2021-03-30 DIAGNOSIS — R11.0 NAUSEA: ICD-10-CM

## 2021-03-30 DIAGNOSIS — R10.9 ABDOMINAL PAIN, UNSPECIFIED ABDOMINAL LOCATION: ICD-10-CM
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
## 2021-03-30 PROCEDURE — 99499 RISK ADDL DX/OHS AUDIT: ICD-10-PCS | Mod: 95,,, | Performed by: NURSE PRACTITIONER

## 2021-03-30 PROCEDURE — 99212 PR OFFICE/OUTPT VISIT, EST, LEVL II, 10-19 MIN: ICD-10-PCS | Mod: 95,,, | Performed by: NURSE PRACTITIONER

## 2021-03-30 PROCEDURE — 99212 OFFICE O/P EST SF 10 MIN: CPT | Mod: 95,,, | Performed by: NURSE PRACTITIONER

## 2021-03-30 PROCEDURE — 99499 UNLISTED E&M SERVICE: CPT | Mod: 95,,, | Performed by: NURSE PRACTITIONER

## 2021-03-30 RX ORDER — HYDROCODONE BITARTRATE AND ACETAMINOPHEN 5; 325 MG/1; MG/1
1 TABLET ORAL EVERY 12 HOURS PRN
Qty: 14 TABLET | Refills: 0 | Status: SHIPPED | OUTPATIENT
Start: 2021-03-30 | End: 2022-08-23

## 2021-03-31 ENCOUNTER — HOSPITAL ENCOUNTER (OUTPATIENT)
Dept: RADIOLOGY | Facility: HOSPITAL | Age: 31
Discharge: HOME | End: 2021-03-31
Attending: NURSE PRACTITIONER
Payer: MEDICARE
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
## 2021-03-31 DIAGNOSIS — R10.9 ABDOMINAL PAIN, UNSPECIFIED ABDOMINAL LOCATION: ICD-10-CM

## 2021-03-31 PROCEDURE — 76705 ECHO EXAM OF ABDOMEN: CPT | Mod: TC,NTX

## 2021-04-06 ENCOUNTER — TELEPHONE (OUTPATIENT)
Dept: TRANSPLANT | Facility: CLINIC | Age: 31
End: 2021-04-06

## 2021-04-08 ENCOUNTER — PATIENT MESSAGE (OUTPATIENT)
Dept: TRANSPLANT | Facility: CLINIC | Age: 31
End: 2021-04-08

## 2021-04-08 ENCOUNTER — PATIENT MESSAGE (OUTPATIENT)
Dept: INTERNAL MEDICINE | Facility: CLINIC | Age: 31
End: 2021-04-08

## 2021-04-14 ENCOUNTER — OFFICE VISIT (OUTPATIENT)
Dept: INTERNAL MEDICINE | Facility: CLINIC | Age: 31
End: 2021-04-14
Payer: MEDICARE

## 2021-04-14 ENCOUNTER — TELEPHONE (OUTPATIENT)
Dept: INTERNAL MEDICINE | Facility: CLINIC | Age: 31
End: 2021-04-14

## 2021-04-14 DIAGNOSIS — M54.9 BACK PAIN, UNSPECIFIED BACK LOCATION, UNSPECIFIED BACK PAIN LATERALITY, UNSPECIFIED CHRONICITY: Primary | ICD-10-CM

## 2021-04-14 DIAGNOSIS — F41.9 ANXIETY: ICD-10-CM

## 2021-04-14 PROCEDURE — 99212 OFFICE O/P EST SF 10 MIN: CPT | Mod: 95,,, | Performed by: NURSE PRACTITIONER

## 2021-04-14 PROCEDURE — 99212 PR OFFICE/OUTPT VISIT, EST, LEVL II, 10-19 MIN: ICD-10-PCS | Mod: 95,,, | Performed by: NURSE PRACTITIONER

## 2021-04-20 ENCOUNTER — PATIENT MESSAGE (OUTPATIENT)
Dept: TRANSPLANT | Facility: CLINIC | Age: 31
End: 2021-04-20

## 2021-04-28 ENCOUNTER — PATIENT MESSAGE (OUTPATIENT)
Dept: INTERNAL MEDICINE | Facility: CLINIC | Age: 31
End: 2021-04-28

## 2021-04-28 ENCOUNTER — HOSPITAL ENCOUNTER (OUTPATIENT)
Dept: RADIOLOGY | Facility: HOSPITAL | Age: 31
Discharge: HOME | End: 2021-04-28
Attending: NURSE PRACTITIONER
Payer: MEDICARE

## 2021-04-28 ENCOUNTER — OFFICE VISIT (OUTPATIENT)
Dept: INTERNAL MEDICINE | Facility: CLINIC | Age: 31
End: 2021-04-28
Payer: MEDICARE

## 2021-04-28 DIAGNOSIS — R05.8 PRODUCTIVE COUGH: ICD-10-CM

## 2021-04-28 DIAGNOSIS — R06.02 SHORTNESS OF BREATH: ICD-10-CM

## 2021-04-28 DIAGNOSIS — N18.6 ESRD (END STAGE RENAL DISEASE): ICD-10-CM

## 2021-04-28 DIAGNOSIS — B34.9 ACUTE VIRAL SYNDROME: Primary | ICD-10-CM

## 2021-04-28 DIAGNOSIS — B34.9 VIRAL SYNDROME: Primary | ICD-10-CM

## 2021-04-28 DIAGNOSIS — B34.9 VIRAL SYNDROME: ICD-10-CM

## 2021-04-28 PROCEDURE — 71046 X-RAY EXAM CHEST 2 VIEWS: CPT | Mod: 26,NTX,, | Performed by: RADIOLOGY

## 2021-04-28 PROCEDURE — 99212 OFFICE O/P EST SF 10 MIN: CPT | Mod: 95,,, | Performed by: NURSE PRACTITIONER

## 2021-04-28 PROCEDURE — 71046 X-RAY EXAM CHEST 2 VIEWS: CPT | Mod: TC,PO,TXP

## 2021-04-28 PROCEDURE — 99212 PR OFFICE/OUTPT VISIT, EST, LEVL II, 10-19 MIN: ICD-10-PCS | Mod: 95,,, | Performed by: NURSE PRACTITIONER

## 2021-04-28 PROCEDURE — 71046 XR CHEST PA AND LATERAL: ICD-10-PCS | Mod: 26,NTX,, | Performed by: RADIOLOGY

## 2021-04-29 ENCOUNTER — PATIENT MESSAGE (OUTPATIENT)
Dept: INTERNAL MEDICINE | Facility: CLINIC | Age: 31
End: 2021-04-29

## 2021-04-29 DIAGNOSIS — R05.9 COUGH: Primary | ICD-10-CM

## 2021-04-29 DIAGNOSIS — R09.89 PULMONARY VASCULAR CONGESTION: ICD-10-CM

## 2021-04-29 RX ORDER — BENZONATATE 100 MG/1
100 CAPSULE ORAL 3 TIMES DAILY PRN
Qty: 21 CAPSULE | Refills: 0 | Status: SHIPPED | OUTPATIENT
Start: 2021-04-29 | End: 2021-05-09

## 2021-04-30 ENCOUNTER — PATIENT MESSAGE (OUTPATIENT)
Dept: INTERNAL MEDICINE | Facility: CLINIC | Age: 31
End: 2021-04-30

## 2021-04-30 ENCOUNTER — TELEPHONE (OUTPATIENT)
Dept: INTERNAL MEDICINE | Facility: CLINIC | Age: 31
End: 2021-04-30

## 2021-04-30 DIAGNOSIS — R05.9 COUGH: Primary | ICD-10-CM

## 2021-04-30 DIAGNOSIS — R09.89 PULMONARY VASCULAR CONGESTION: ICD-10-CM

## 2021-04-30 RX ORDER — ALBUTEROL SULFATE 90 UG/1
1-2 AEROSOL, METERED RESPIRATORY (INHALATION) EVERY 6 HOURS PRN
Qty: 8.5 G | Refills: 1 | Status: SHIPPED | OUTPATIENT
Start: 2021-04-30 | End: 2023-10-10 | Stop reason: SDUPTHER

## 2021-07-07 ENCOUNTER — PATIENT MESSAGE (OUTPATIENT)
Dept: TRANSPLANT | Facility: CLINIC | Age: 31
End: 2021-07-07

## 2021-07-28 ENCOUNTER — PATIENT MESSAGE (OUTPATIENT)
Dept: TRANSPLANT | Facility: CLINIC | Age: 31
End: 2021-07-28

## 2021-07-28 DIAGNOSIS — Z76.82 ORGAN TRANSPLANT CANDIDATE: Primary | ICD-10-CM

## 2021-08-12 ENCOUNTER — TELEPHONE (OUTPATIENT)
Dept: PULMONOLOGY | Facility: CLINIC | Age: 31
End: 2021-08-12

## 2021-08-24 ENCOUNTER — TELEPHONE (OUTPATIENT)
Dept: TRANSPLANT | Facility: CLINIC | Age: 31
End: 2021-08-24

## 2021-09-12 ENCOUNTER — PATIENT MESSAGE (OUTPATIENT)
Dept: TRANSPLANT | Facility: CLINIC | Age: 31
End: 2021-09-12

## 2021-10-18 ENCOUNTER — PATIENT MESSAGE (OUTPATIENT)
Dept: TRANSPLANT | Facility: CLINIC | Age: 31
End: 2021-10-18
Payer: MEDICARE

## 2021-10-18 ENCOUNTER — TELEPHONE (OUTPATIENT)
Dept: TRANSPLANT | Facility: CLINIC | Age: 31
End: 2021-10-18

## 2021-10-29 ENCOUNTER — TELEPHONE (OUTPATIENT)
Dept: TRANSPLANT | Facility: CLINIC | Age: 31
End: 2021-10-29
Payer: MEDICARE

## 2021-10-29 ENCOUNTER — SOCIAL WORK (OUTPATIENT)
Dept: TRANSPLANT | Facility: CLINIC | Age: 31
End: 2021-10-29
Payer: MEDICARE

## 2021-10-29 DIAGNOSIS — Z76.82 AWAITING ORGAN TRANSPLANT STATUS: Primary | ICD-10-CM

## 2021-10-30 ENCOUNTER — TELEPHONE (OUTPATIENT)
Dept: TRANSPLANT | Facility: CLINIC | Age: 31
End: 2021-10-30
Payer: MEDICARE

## 2021-11-04 ENCOUNTER — PATIENT MESSAGE (OUTPATIENT)
Dept: TRANSPLANT | Facility: CLINIC | Age: 31
End: 2021-11-04
Payer: MEDICARE

## 2021-12-14 ENCOUNTER — OFFICE VISIT (OUTPATIENT)
Dept: TRANSPLANT | Facility: CLINIC | Age: 31
End: 2021-12-14
Payer: MEDICARE

## 2021-12-14 ENCOUNTER — HOSPITAL ENCOUNTER (OUTPATIENT)
Dept: RADIOLOGY | Facility: HOSPITAL | Age: 31
Discharge: HOME | End: 2021-12-14
Attending: NURSE PRACTITIONER
Payer: MEDICARE

## 2021-12-14 ENCOUNTER — HOSPITAL ENCOUNTER (OUTPATIENT)
Dept: CARDIOLOGY | Facility: HOSPITAL | Age: 31
Discharge: HOME | End: 2021-12-14
Attending: NURSE PRACTITIONER
Payer: MEDICARE

## 2021-12-14 VITALS
TEMPERATURE: 97 F | BODY MASS INDEX: 34.68 KG/M2 | RESPIRATION RATE: 18 BRPM | WEIGHT: 215.81 LBS | SYSTOLIC BLOOD PRESSURE: 132 MMHG | DIASTOLIC BLOOD PRESSURE: 60 MMHG | HEART RATE: 81 BPM | OXYGEN SATURATION: 98 % | HEIGHT: 66 IN

## 2021-12-14 VITALS
SYSTOLIC BLOOD PRESSURE: 145 MMHG | DIASTOLIC BLOOD PRESSURE: 70 MMHG | WEIGHT: 216 LBS | BODY MASS INDEX: 34.72 KG/M2 | HEIGHT: 66 IN | HEART RATE: 70 BPM

## 2021-12-14 DIAGNOSIS — I15.0 RENOVASCULAR HYPERTENSION: ICD-10-CM

## 2021-12-14 DIAGNOSIS — Z76.82 ORGAN TRANSPLANT CANDIDATE: ICD-10-CM

## 2021-12-14 DIAGNOSIS — N18.6 ESRD ON HEMODIALYSIS: Primary | ICD-10-CM

## 2021-12-14 DIAGNOSIS — Z76.82 PATIENT ON WAITING LIST FOR KIDNEY TRANSPLANT: Chronic | ICD-10-CM

## 2021-12-14 DIAGNOSIS — Z99.2 ESRD ON HEMODIALYSIS: Primary | ICD-10-CM

## 2021-12-14 DIAGNOSIS — N05.1 FSGS (FOCAL SEGMENTAL GLOMERULOSCLEROSIS): ICD-10-CM

## 2021-12-14 DIAGNOSIS — E66.1 CLASS 1 DRUG-INDUCED OBESITY WITH SERIOUS COMORBIDITY AND BODY MASS INDEX (BMI) OF 34.0 TO 34.9 IN ADULT: ICD-10-CM

## 2021-12-14 LAB
ASCENDING AORTA: 3.19 CM
AV INDEX (PROSTH): 0.92
AV MEAN GRADIENT: 4 MMHG
AV PEAK GRADIENT: 9 MMHG
AV VALVE AREA: 3.6 CM2
AV VELOCITY RATIO: 0.81
BSA FOR ECHO PROCEDURE: 2.14 M2
CV ECHO LV RWT: 0.5 CM
DOP CALC AO PEAK VEL: 1.51 M/S
DOP CALC AO VTI: 28.66 CM
DOP CALC LVOT AREA: 3.9 CM2
DOP CALC LVOT DIAMETER: 2.23 CM
DOP CALC LVOT PEAK VEL: 1.23 M/S
DOP CALC LVOT STROKE VOLUME: 103.29 CM3
DOP CALCLVOT PEAK VEL VTI: 26.46 CM
E WAVE DECELERATION TIME: 190.03 MSEC
E/A RATIO: 1.39
E/E' RATIO: 8.35 M/S
ECHO LV POSTERIOR WALL: 1.26 CM (ref 0.6–1.1)
EJECTION FRACTION: 60 %
FRACTIONAL SHORTENING: 32 % (ref 28–44)
INTERVENTRICULAR SEPTUM: 1.02 CM (ref 0.6–1.1)
IVRT: 82.78 MSEC
LA MAJOR: 5.67 CM
LA MINOR: 5.71 CM
LA WIDTH: 5.07 CM
LEFT ATRIUM SIZE: 4.04 CM
LEFT ATRIUM VOLUME INDEX MOD: 52.3 ML/M2
LEFT ATRIUM VOLUME INDEX: 47.9 ML/M2
LEFT ATRIUM VOLUME MOD: 108.29 CM3
LEFT ATRIUM VOLUME: 99.06 CM3
LEFT INTERNAL DIMENSION IN SYSTOLE: 3.4 CM (ref 2.1–4)
LEFT VENTRICLE DIASTOLIC VOLUME INDEX: 57.4 ML/M2
LEFT VENTRICLE DIASTOLIC VOLUME: 118.82 ML
LEFT VENTRICLE MASS INDEX: 105 G/M2
LEFT VENTRICLE SYSTOLIC VOLUME INDEX: 23 ML/M2
LEFT VENTRICLE SYSTOLIC VOLUME: 47.56 ML
LEFT VENTRICULAR INTERNAL DIMENSION IN DIASTOLE: 5.01 CM (ref 3.5–6)
LEFT VENTRICULAR MASS: 218.31 G
LV LATERAL E/E' RATIO: 8 M/S
LV SEPTAL E/E' RATIO: 8.73 M/S
MV A" WAVE DURATION": 17.13 MSEC
MV PEAK A VEL: 0.69 M/S
MV PEAK E VEL: 0.96 M/S
MV STENOSIS PRESSURE HALF TIME: 55.11 MS
MV VALVE AREA P 1/2 METHOD: 3.99 CM2
PISA TR MAX VEL: 2.27 M/S
PULM VEIN S/D RATIO: 0.66
PV PEAK D VEL: 0.53 M/S
PV PEAK S VEL: 0.35 M/S
RA MAJOR: 5.11 CM
RA PRESSURE: 8 MMHG
RA WIDTH: 4.53 CM
RIGHT VENTRICULAR END-DIASTOLIC DIMENSION: 4.02 CM
RV TISSUE DOPPLER FREE WALL SYSTOLIC VELOCITY 1 (APICAL 4 CHAMBER VIEW): 13.29 CM/S
SINUS: 3.28 CM
STJ: 2.82 CM
TDI LATERAL: 0.12 M/S
TDI SEPTAL: 0.11 M/S
TDI: 0.12 M/S
TR MAX PG: 21 MMHG
TRICUSPID ANNULAR PLANE SYSTOLIC EXCURSION: 2.84 CM
TV REST PULMONARY ARTERY PRESSURE: 29 MMHG

## 2021-12-14 PROCEDURE — 99499 RISK ADDL DX/OHS AUDIT: ICD-10-PCS | Mod: HCNC,S$GLB,TXP, | Performed by: NURSE PRACTITIONER

## 2021-12-14 PROCEDURE — 99999 PR PBB SHADOW E&M-EST. PATIENT-LVL III: ICD-10-PCS | Mod: PBBFAC,HCNC,TXP, | Performed by: NURSE PRACTITIONER

## 2021-12-14 PROCEDURE — 76770 US EXAM ABDO BACK WALL COMP: CPT | Mod: TC,HCNC,TXP

## 2021-12-14 PROCEDURE — 99499 UNLISTED E&M SERVICE: CPT | Mod: HCNC,S$GLB,TXP, | Performed by: NURSE PRACTITIONER

## 2021-12-14 PROCEDURE — 71046 X-RAY EXAM CHEST 2 VIEWS: CPT | Mod: 26,HCNC,TXP, | Performed by: RADIOLOGY

## 2021-12-14 PROCEDURE — 76770 US EXAM ABDO BACK WALL COMP: CPT | Mod: 26,HCNC,TXP, | Performed by: STUDENT IN AN ORGANIZED HEALTH CARE EDUCATION/TRAINING PROGRAM

## 2021-12-14 PROCEDURE — 93306 TTE W/DOPPLER COMPLETE: CPT | Mod: 26,HCNC,TXP, | Performed by: INTERNAL MEDICINE

## 2021-12-14 PROCEDURE — 99215 PR OFFICE/OUTPT VISIT, EST, LEVL V, 40-54 MIN: ICD-10-PCS | Mod: HCNC,S$GLB,TXP, | Performed by: NURSE PRACTITIONER

## 2021-12-14 PROCEDURE — 99215 OFFICE O/P EST HI 40 MIN: CPT | Mod: HCNC,S$GLB,TXP, | Performed by: NURSE PRACTITIONER

## 2021-12-14 PROCEDURE — 71046 X-RAY EXAM CHEST 2 VIEWS: CPT | Mod: TC,HCNC,TXP

## 2021-12-14 PROCEDURE — 76770 US RETROPERITONEAL COMPLETE: ICD-10-PCS | Mod: 26,HCNC,TXP, | Performed by: STUDENT IN AN ORGANIZED HEALTH CARE EDUCATION/TRAINING PROGRAM

## 2021-12-14 PROCEDURE — 71046 XR CHEST PA AND LATERAL: ICD-10-PCS | Mod: 26,HCNC,TXP, | Performed by: RADIOLOGY

## 2021-12-14 PROCEDURE — 93306 TTE W/DOPPLER COMPLETE: CPT | Mod: HCNC,TXP

## 2021-12-14 PROCEDURE — 99999 PR PBB SHADOW E&M-EST. PATIENT-LVL III: CPT | Mod: PBBFAC,HCNC,TXP, | Performed by: NURSE PRACTITIONER

## 2021-12-14 PROCEDURE — 93306 ECHO (CUPID ONLY): ICD-10-PCS | Mod: 26,HCNC,TXP, | Performed by: INTERNAL MEDICINE

## 2021-12-14 RX ORDER — CINACALCET 30 MG/1
TABLET, FILM COATED ORAL
Status: ON HOLD | COMMUNITY
Start: 2021-10-14 | End: 2023-01-05 | Stop reason: HOSPADM

## 2021-12-15 ENCOUNTER — TELEPHONE (OUTPATIENT)
Dept: CARDIOLOGY | Facility: CLINIC | Age: 31
End: 2021-12-15
Payer: MEDICARE

## 2021-12-23 ENCOUNTER — NURSE TRIAGE (OUTPATIENT)
Dept: ADMINISTRATIVE | Facility: CLINIC | Age: 31
End: 2021-12-23
Payer: MEDICARE

## 2021-12-26 ENCOUNTER — HOSPITAL ENCOUNTER (EMERGENCY)
Facility: HOSPITAL | Age: 31
Discharge: HOME | End: 2021-12-26
Attending: EMERGENCY MEDICINE
Payer: MEDICARE

## 2021-12-26 VITALS
RESPIRATION RATE: 18 BRPM | TEMPERATURE: 99 F | SYSTOLIC BLOOD PRESSURE: 140 MMHG | DIASTOLIC BLOOD PRESSURE: 80 MMHG | BODY MASS INDEX: 33.7 KG/M2 | HEART RATE: 98 BPM | OXYGEN SATURATION: 100 % | WEIGHT: 208.75 LBS

## 2021-12-26 DIAGNOSIS — B34.9 VIRAL SYNDROME: ICD-10-CM

## 2021-12-26 DIAGNOSIS — R51.9 NONINTRACTABLE HEADACHE, UNSPECIFIED CHRONICITY PATTERN, UNSPECIFIED HEADACHE TYPE: ICD-10-CM

## 2021-12-26 DIAGNOSIS — U07.1 COVID-19 VIRUS INFECTION: Primary | ICD-10-CM

## 2021-12-26 LAB
CTP QC/QA: YES
CTP QC/QA: YES
POC MOLECULAR INFLUENZA A AGN: NEGATIVE
POC MOLECULAR INFLUENZA B AGN: NEGATIVE
SARS-COV-2 RDRP RESP QL NAA+PROBE: POSITIVE

## 2021-12-26 PROCEDURE — U0002 COVID-19 LAB TEST NON-CDC: HCPCS | Mod: HCNC,NTX | Performed by: EMERGENCY MEDICINE

## 2021-12-26 PROCEDURE — 99284 EMERGENCY DEPT VISIT MOD MDM: CPT | Mod: 25,HCNC,NTX

## 2021-12-26 RX ORDER — PROMETHAZINE HYDROCHLORIDE AND DEXTROMETHORPHAN HYDROBROMIDE 6.25; 15 MG/5ML; MG/5ML
5 SYRUP ORAL EVERY 6 HOURS PRN
Qty: 120 ML | Refills: 0 | Status: SHIPPED | OUTPATIENT
Start: 2021-12-26 | End: 2021-12-26 | Stop reason: SDUPTHER

## 2021-12-26 RX ORDER — BUTALBITAL, ACETAMINOPHEN AND CAFFEINE 50; 325; 40 MG/1; MG/1; MG/1
1 TABLET ORAL EVERY 4 HOURS PRN
Qty: 15 TABLET | Refills: 0 | Status: SHIPPED | OUTPATIENT
Start: 2021-12-26 | End: 2022-01-25

## 2021-12-26 RX ORDER — BUTALBITAL, ACETAMINOPHEN AND CAFFEINE 50; 325; 40 MG/1; MG/1; MG/1
1 TABLET ORAL EVERY 4 HOURS PRN
Qty: 15 TABLET | Refills: 0 | Status: SHIPPED | OUTPATIENT
Start: 2021-12-26 | End: 2021-12-26 | Stop reason: SDUPTHER

## 2021-12-26 RX ORDER — PROMETHAZINE HYDROCHLORIDE AND DEXTROMETHORPHAN HYDROBROMIDE 6.25; 15 MG/5ML; MG/5ML
5 SYRUP ORAL EVERY 6 HOURS PRN
Qty: 120 ML | Refills: 0 | Status: SHIPPED | OUTPATIENT
Start: 2021-12-26 | End: 2022-01-05

## 2021-12-26 NOTE — ED PROVIDER NOTES
"SCRIBE #1 NOTE: I, John Sanches, am scribing for, and in the presence of, Adi Elam MD. I have scribed the entire note.      History      Chief Complaint   Patient presents with    COVID-19 Concerns     Pt. C/o possibly haivng covid, pt states she has been feeling bad since her birthday. Pt c/o bodyaches, headaches, chills, fever, and diarrhea        Review of patient's allergies indicates:   Allergen Reactions    Lisinopril Other (See Comments)     Severe cough    Adhesive tape-silicones Itching     Burns    Furosemide Other (See Comments)     Almost gave her right sided heartfailure        HPI   HPI    12/26/2021, 11:15 AM   History obtained from the patient      History of Present Illness: Lisseth Schwartz is a 31 y.o. female patient with a PMHx of ESRD, HTN who presents to the Emergency Department for COVID-19 concerns. Pt dialyzes every M/W/F. Pt reports generalized body aches, headache, fever, chills, and diarrhea for the past 10 days. Symptoms are constant and moderate in severity. No mitigating or exacerbating factors reported. Patient denies any n/v, SOB, CP, weakness, numbness, dizziness, and all other sxs at this time. No prior Tx reported. No further complaints or concerns at this time.     Arrival mode: Personal vehicle    PCP: Jorge Birch DO       Past Medical History:  Past Medical History:   Diagnosis Date    Allergy     Anemia     Anxiety     Breast feeding status of mother 1/17/2020    Brittle bones     ESRD (end stage renal disease)     M/W/F    General anesthetics causing adverse effect in therapeutic use     pt states "im a light weight"    Hypertension     Insomnia     PSG revealed no CHRYSTAL, but likely psychogenic etiology (anxiety)    RLS (restless legs syndrome)        Past Surgical History:  Past Surgical History:   Procedure Laterality Date    Arm surgery Right     x 2    AV FISTULA PLACEMENT Right     HYSTEROSCOPY WITH HYDROTHERMAL ABLATION OF ENDOMETRIUM " WITH DILATION AND CURETTAGE N/A 12/4/2018    Procedure: HYSTEROSCOPY, WITH DILATION AND CURETTAGE OF UTERUS AND HYDROTHERMAL ENDOMETRIAL ABLATION;  Surgeon: Tiara Red MD;  Location: [facility] OR;  Service: OB/GYN;  Laterality: N/A;  ATTEMPTED     LAPAROSCOPIC SALPINGECTOMY Bilateral 12/4/2018    Procedure: SALPINGECTOMY, LAPAROSCOPIC;  Surgeon: Tiara Red MD;  Location: [facility] OR;  Service: OB/GYN;  Laterality: Bilateral;    RENAL BIOPSY      tumor removed      from face per medical records         Family History:  Family History   Problem Relation Age of Onset    Hypertension Father     Breast cancer Paternal Grandmother     Diabetes Maternal Grandmother     Heart attack Maternal Grandmother     Lung cancer Maternal Grandfather     Prostate cancer Maternal Grandfather        Social History:  Social History     Tobacco Use    Smoking status: Never Smoker    Smokeless tobacco: Never Used   Substance and Sexual Activity    Alcohol use: No    Drug use: No    Sexual activity: Yes     Partners: Male       ROS   Review of Systems   Constitutional: Positive for chills and fever.   HENT: Negative for sore throat.    Respiratory: Negative for shortness of breath.    Cardiovascular: Negative for chest pain.   Gastrointestinal: Positive for diarrhea. Negative for nausea and vomiting.   Genitourinary: Negative for dysuria.   Musculoskeletal: Positive for myalgias (generalized body aches). Negative for back pain.   Skin: Negative for rash.   Neurological: Positive for headaches. Negative for dizziness, weakness, light-headedness and numbness.   Hematological: Does not bruise/bleed easily.   All other systems reviewed and are negative.    Physical Exam      Initial Vitals [12/26/21 1113]   BP Pulse Resp Temp SpO2   (!) 140/80 98 18 98.9 °F (37.2 °C) 100 %      MAP       --          Physical Exam  Nursing Notes and Vital Signs Reviewed.  Constitutional: Patient is in no acute distress. Well-developed and  well-nourished.  Head: Atraumatic. Normocephalic.  Eyes: PERRL. EOM intact. Conjunctivae are not pale. No scleral icterus.  ENT: Mucous membranes are moist. Oropharynx is clear and symmetric.    Neck: Supple. Full ROM.  Cardiovascular: Regular rate. Regular rhythm. No murmurs, rubs, or gallops. Distal pulses are 2+ and symmetric.  Pulmonary/Chest: No respiratory distress. Clear to auscultation bilaterally. No wheezing or rales.  Abdominal: Soft and non-distended.  There is no tenderness.  No rebound, guarding, or rigidity.   Musculoskeletal: Moves all extremities. No obvious deformities. No edema.  Skin: Warm and dry.  Neurological:  Alert, awake, and appropriate.  Normal speech.  No acute focal neurological deficits are appreciated.  Psychiatric: Normal affect. Good eye contact. Appropriate in content.    ED Course    Procedures  ED Vital Signs:  Vitals:    12/26/21 1113   BP: (!) 140/80   Pulse: 98   Resp: 18   Temp: 98.9 °F (37.2 °C)   TempSrc: Oral   SpO2: 100%   Weight: 94.7 kg (208 lb 12.4 oz)       Abnormal Lab Results:  Labs Reviewed   SARS-COV-2 RDRP GENE - Abnormal; Notable for the following components:       Result Value    POC Rapid COVID Positive (*)     All other components within normal limits    Narrative:     This test utilizes isothermal nucleic acid amplification   technology to detect the SARS-CoV-2 RdRp nucleic acid segment.   The analytical sensitivity (limit of detection) is 125 genome   equivalents/mL.   A POSITIVE result implies infection with the SARS-CoV-2 virus;   the patient is presumed to be contagious.     A NEGATIVE result means that SARS-CoV-2 nucleic acids are not   present above the limit of detection. A NEGATIVE result should be   treated as presumptive. It does not rule out the possibility of   COVID-19 and should not be the sole basis for treatment decisions.   If COVID-19 is strongly suspected based on clinical and exposure   history, re-testing using an alternate molecular  assay should be   considered.   This test is only for use under the Food and Drug   Administration s Emergency Use Authorization (EUA).   Commercial kits are provided by [employer].   Performance characteristics of the EUA have been independently   verified by [facility] Department of   Pathology and Laboratory Medicine.   _________________________________________________________________   The authorized Fact Sheet for Healthcare Providers and the authorized Fact   Sheet for Patients of the ID NOW COVID-19 are available on the FDA   website:     https://www.fda.gov/media/634118/download  https://www.fda.gov/media/616644/download         POCT INFLUENZA A/B MOLECULAR        All Lab Results:  Results for orders placed or performed during the hospital encounter of 12/26/21   POCT COVID-19 Rapid Screening   Result Value Ref Range    POC Rapid COVID Positive (A) Negative     Acceptable Yes    POCT Influenza A/B Molecular   Result Value Ref Range    POC Molecular Influenza A Ag Negative Negative, Not Reported    POC Molecular Influenza B Ag Negative Negative, Not Reported     Acceptable Yes      Imaging Results:  Imaging Results    None                 The Emergency Provider reviewed the vital signs and test results, which are outlined above.    ED Discussion     11:40 AM: Reassessed pt at this time. Discussed with pt all pertinent ED information and results. Discussed pt dx and plan of tx. Gave pt all f/u and return to the ED instructions. All questions and concerns were addressed at this time. Pt expresses understanding of information and instructions, and is comfortable with plan to discharge. Pt is stable for discharge.    [place] [facility]  Preventing the Spread of Coronavirus Disease 2019 in Homes and Residential Communities      Prevention steps for people with confirmed or suspected COVID-19 (including persons under investigation) who do  not need to be hospitalized and people with confirmed COVID-19 who were hospitalized and determined to be medically stable to go home.    Your healthcare provider and public health staff will evaluate whether you can be cared for at home.   Stay home except to get medical care.   Separate yourself from other people and animals in your home   Call ahead before visiting your doctor.   Wear a facemask.   Cover your coughs and sneezes.   Clean your hands often.   Avoid sharing personal household items.   Clean all high-touch surfaces every day.   Monitor your symptoms. Seek prompt medical attention if your illness is worsening (e.g., difficulty breathing). Before seeking care, call your healthcare provider.   If you have a medical emergency and need to call 911, notify the dispatch personnel that you have, or are being evaluated for COVID-19. If possible, put on a facemask before emergency medical services arrive.   Discontinuing home isolation. Call your provider about guidance to discontinue home isolation.    Recommended precautions for household members, intimate partners, and caregivers in a nonhealthcare setting of a patient with symptomatic laboratory-confirmed COVID-19 or a patient under investigation.  Household members, intimate partners, and caregivers in a nonhealthcare setting may have close contact with a person with symptomatic, laboratory-confirmed COVID-19 or a person under investigation. Close contacts should monitor their health; they should call their healthcare provider right away if they develop symptoms suggestive of COVID-19 (e.g., fever, cough, shortness of breath).    Close contacts should also follow these recommendations:   Make sure that you understand and can help the patient follow their healthcare provider's instructions for medication(s) and care. You should help the patient with basic needs in the home and provide support for getting groceries, prescriptions, and other  personal needs.   Monitor the patient's symptoms. If the patient is getting sicker, call his or her healthcare provider and tell them that the patient has laboratory-confirmed COVID-19. This will help the healthcare provider's office take steps to keep other people in the office or waiting room from getting infected. Ask the healthcare provider to call the local or [facility] health department for additional guidance. If the patient has a medical emergency and you need to call 911, notify the dispatch personnel that the patient has, or is being evaluated for COVID-19.   Household members should stay in another room or be  from the patient as much as possible. Household members should use a separate bedroom and bathroom, if available.   Prohibit visitors who do not have an essential need to be in the home.   Household members should care for any pets in the home. Do not handle pets or other animals while sick.   Make sure that shared spaces in the home have good air flow, such as by an air conditioner or an opened window, weather permitting.   Perform hand hygiene frequently. Wash your hands often with soap and water for at least 20 seconds or use an alcohol-based hand  that contains 60 to 95% alcohol, covering all surfaces of your hands and rubbing them together until they feel dry. Soap and water should be used preferentially if hands are visibly dirty.   Avoid touching your eyes, nose, and mouth with unwashed hands.   The patient should wear a facemask when you are around other people. If the patient is not able to wear a facemask (for example, because it causes trouble breathing), you, as the caregiver should wear a mask when you are in the same room as the patient.   Wear a disposable facemask and gloves when you touch or have contact with the patient's blood, stool, or body fluids, such as saliva, sputum, nasal mucus, vomit, urine.  o Throw out disposable facemasks and gloves after using  them. Do not reuse.  o When removing personal protective equipment, first remove and dispose of gloves. Then, immediately clean your hands with soap and water or alcohol-based hand . Next, remove and dispose of facemask, and immediately clean your hands again with soap and water or alcohol-based hand .   Avoid sharing household items with the patient. You should not share dishes, drinking glasses, cups, eating utensils, towels, bedding, or other items. After the patient uses these items, you should wash them thoroughly (see below Wash laundry thoroughly).   Clean all high-touch surfaces, such as counters, tabletops, doorknobs, bathroom fixtures, toilets, phones, keyboards, tablets, and bedside tables, every day. Also, clean any surfaces that may have blood, stool, or body fluids on them.   Use a household cleaning spray or wipe, according to the label instructions. Labels contain instructions for safe and effective use of the cleaning product including precautions you should take when applying the product, such as wearing gloves and making sure you have good ventilation during use of the product.   Wash laundry thoroughly.  o Immediately remove and wash clothes or bedding that have blood, stool, or body fluids on them.  o Wear disposable gloves while handling soiled items and keep soiled items away from your body. Clean your hands (with soap and water or an alcohol-based hand ) immediately after removing your gloves.  o Read and follow directions on labels of laundry or clothing items and detergent. In general, using a normal laundry detergent according to washing machine instructions and dry thoroughly using the warmest temperatures recommended on the clothing label.   Place all used disposable gloves, facemasks, and other contaminated items in a lined container before disposing of them with other household waste. Clean your hands (with soap and water or an alcohol-based hand  ) immediately after handling these items. Soap and water should be used preferentially if hands are visibly dirty.   Discuss any additional questions with your state or local health department or healthcare provider. Check available hours when contacting your local health department.    For more information see CDC link below.      https://www.cdc.gov/coronavirus/2019-ncov/hcp/guidance-prevent-spread.html#precautions                    ED Medication(s):  Medications - No data to display     Follow-up Information     Jorge Birch DO.    Specialty: Family Medicine  Contact information:  [address] [address]  [place] [address]  [phone]                        Discharge Medication List as of 12/26/2021 11:40 AM      START taking these medications    Details   butalbital-acetaminophen-caffeine -40 mg (FIORICET, ESGIC) -40 mg per tablet Take 1 tablet by mouth every 4 (four) hours as needed for Pain., Starting Sun 12/26/2021, Until Tue 1/25/2022 at 2359, Normal      promethazine-dextromethorphan (PROMETHAZINE-DM) 6.25-15 mg/5 mL Syrp Take 5 mLs by mouth every 6 (six) hours as needed., Starting Sun 12/26/2021, Until Wed 1/5/2022 at 2359, Normal               Medical Decision Making    Medical Decision Making:   Clinical Tests:   Lab Tests: Ordered and Reviewed           Scribe Attestation:   Scribe #1: I performed the above scribed service and the documentation accurately describes the services I performed. I attest to the accuracy of the note.    Attending:   Physician Attestation Statement for Scribe #1: I, Adi Elam MD, personally performed the services described in this documentation, as scribed by John Sanches, in my presence, and it is both accurate and complete.          Clinical Impression       ICD-10-CM ICD-9-CM   1. COVID-19 virus infection  U07.1 079.89   2. Nonintractable headache, unspecified chronicity pattern, unspecified headache type  R51.9 784.0   3. Viral syndrome  B34.9  079.99       Disposition:   Disposition: Discharged  Condition: Stable         Adi Elam MD  12/26/21 1156

## 2022-02-28 DIAGNOSIS — Z76.82 PATIENT ON WAITING LIST FOR KIDNEY TRANSPLANT: Primary | ICD-10-CM

## 2022-03-02 ENCOUNTER — TELEPHONE (OUTPATIENT)
Dept: TRANSPLANT | Facility: CLINIC | Age: 32
End: 2022-03-02
Payer: MEDICARE

## 2022-03-03 ENCOUNTER — LAB VISIT (OUTPATIENT)
Dept: LAB | Facility: HOSPITAL | Age: 32
End: 2022-03-03
Attending: NURSE PRACTITIONER
Payer: MEDICARE

## 2022-03-03 ENCOUNTER — EPISODE CHANGES (OUTPATIENT)
Dept: TRANSPLANT | Facility: CLINIC | Age: 32
End: 2022-03-03

## 2022-03-03 ENCOUNTER — TELEPHONE (OUTPATIENT)
Dept: TRANSPLANT | Facility: CLINIC | Age: 32
End: 2022-03-03
Payer: MEDICARE

## 2022-03-03 DIAGNOSIS — Z76.82 PATIENT ON WAITING LIST FOR KIDNEY TRANSPLANT: ICD-10-CM

## 2022-03-03 PROCEDURE — 86886 COOMBS TEST INDIRECT TITER: CPT | Mod: TXP | Performed by: NURSE PRACTITIONER

## 2022-03-03 PROCEDURE — 36415 COLL VENOUS BLD VENIPUNCTURE: CPT | Mod: TXP | Performed by: NURSE PRACTITIONER

## 2022-03-05 LAB — BLD GP AB SCN TITR SERPL: 32 {TITER}

## 2022-03-28 ENCOUNTER — HOSPITAL ENCOUNTER (EMERGENCY)
Facility: HOSPITAL | Age: 32
Discharge: HOME | End: 2022-03-29
Attending: EMERGENCY MEDICINE
Payer: MEDICARE

## 2022-03-28 VITALS
SYSTOLIC BLOOD PRESSURE: 153 MMHG | RESPIRATION RATE: 14 BRPM | BODY MASS INDEX: 33.89 KG/M2 | OXYGEN SATURATION: 100 % | HEART RATE: 71 BPM | TEMPERATURE: 98 F | WEIGHT: 210 LBS | DIASTOLIC BLOOD PRESSURE: 80 MMHG

## 2022-03-28 DIAGNOSIS — R07.9 CHEST PAIN: ICD-10-CM

## 2022-03-28 DIAGNOSIS — R51.9 NONINTRACTABLE HEADACHE, UNSPECIFIED CHRONICITY PATTERN, UNSPECIFIED HEADACHE TYPE: Primary | ICD-10-CM

## 2022-03-28 LAB
ALBUMIN SERPL BCP-MCNC: 4.3 G/DL (ref 3.5–5.2)
ALP SERPL-CCNC: 74 U/L (ref 55–135)
ALT SERPL W/O P-5'-P-CCNC: 15 U/L (ref 10–44)
ANION GAP SERPL CALC-SCNC: 16 MMOL/L (ref 8–16)
AST SERPL-CCNC: 13 U/L (ref 10–40)
BASOPHILS # BLD AUTO: 0.03 K/UL (ref 0–0.2)
BASOPHILS NFR BLD: 0.4 % (ref 0–1.9)
BILIRUB SERPL-MCNC: 0.7 MG/DL (ref 0.1–1)
BNP SERPL-MCNC: 18 PG/ML (ref 0–99)
BUN SERPL-MCNC: 54 MG/DL (ref 6–20)
CALCIUM SERPL-MCNC: 10.2 MG/DL (ref 8.7–10.5)
CHLORIDE SERPL-SCNC: 92 MMOL/L (ref 95–110)
CO2 SERPL-SCNC: 25 MMOL/L (ref 23–29)
CREAT SERPL-MCNC: 12.8 MG/DL (ref 0.5–1.4)
DIFFERENTIAL METHOD: ABNORMAL
EOSINOPHIL # BLD AUTO: 0.1 K/UL (ref 0–0.5)
EOSINOPHIL NFR BLD: 0.6 % (ref 0–8)
ERYTHROCYTE [DISTWIDTH] IN BLOOD BY AUTOMATED COUNT: 12.8 % (ref 11.5–14.5)
EST. GFR  (AFRICAN AMERICAN): 4 ML/MIN/1.73 M^2
EST. GFR  (NON AFRICAN AMERICAN): 3 ML/MIN/1.73 M^2
GLUCOSE SERPL-MCNC: 130 MG/DL (ref 70–110)
HCT VFR BLD AUTO: 30.3 % (ref 37–48.5)
HGB BLD-MCNC: 10.2 G/DL (ref 12–16)
IMM GRANULOCYTES # BLD AUTO: 0.14 K/UL (ref 0–0.04)
IMM GRANULOCYTES NFR BLD AUTO: 1.8 % (ref 0–0.5)
LYMPHOCYTES # BLD AUTO: 0.9 K/UL (ref 1–4.8)
LYMPHOCYTES NFR BLD: 11 % (ref 18–48)
MAGNESIUM SERPL-MCNC: 1.9 MG/DL (ref 1.6–2.6)
MCH RBC QN AUTO: 32.6 PG (ref 27–31)
MCHC RBC AUTO-ENTMCNC: 33.7 G/DL (ref 32–36)
MCV RBC AUTO: 97 FL (ref 82–98)
MONOCYTES # BLD AUTO: 0.6 K/UL (ref 0.3–1)
MONOCYTES NFR BLD: 7 % (ref 4–15)
NEUTROPHILS # BLD AUTO: 6.3 K/UL (ref 1.8–7.7)
NEUTROPHILS NFR BLD: 79.2 % (ref 38–73)
NRBC BLD-RTO: 0 /100 WBC
PHOSPHATE SERPL-MCNC: 4.3 MG/DL (ref 2.7–4.5)
PLATELET # BLD AUTO: 136 K/UL (ref 150–450)
PLATELET BLD QL SMEAR: ABNORMAL
PMV BLD AUTO: 10.7 FL (ref 9.2–12.9)
POTASSIUM SERPL-SCNC: 4.2 MMOL/L (ref 3.5–5.1)
PROT SERPL-MCNC: 8.8 G/DL (ref 6–8.4)
RBC # BLD AUTO: 3.13 M/UL (ref 4–5.4)
SODIUM SERPL-SCNC: 133 MMOL/L (ref 136–145)
TROPONIN I SERPL DL<=0.01 NG/ML-MCNC: 0.01 NG/ML (ref 0–0.03)
WBC # BLD AUTO: 7.99 K/UL (ref 3.9–12.7)

## 2022-03-28 PROCEDURE — 93010 EKG 12-LEAD: ICD-10-PCS | Mod: NTX,,, | Performed by: INTERNAL MEDICINE

## 2022-03-28 PROCEDURE — 84100 ASSAY OF PHOSPHORUS: CPT | Mod: NTX | Performed by: EMERGENCY MEDICINE

## 2022-03-28 PROCEDURE — 83735 ASSAY OF MAGNESIUM: CPT | Mod: NTX | Performed by: EMERGENCY MEDICINE

## 2022-03-28 PROCEDURE — 80053 COMPREHEN METABOLIC PANEL: CPT | Mod: NTX | Performed by: EMERGENCY MEDICINE

## 2022-03-28 PROCEDURE — 25000003 PHARM REV CODE 250: Mod: NTX | Performed by: EMERGENCY MEDICINE

## 2022-03-28 PROCEDURE — 93010 ELECTROCARDIOGRAM REPORT: CPT | Mod: NTX,,, | Performed by: INTERNAL MEDICINE

## 2022-03-28 PROCEDURE — 85025 COMPLETE CBC W/AUTO DIFF WBC: CPT | Mod: NTX | Performed by: EMERGENCY MEDICINE

## 2022-03-28 PROCEDURE — 99285 EMERGENCY DEPT VISIT HI MDM: CPT | Mod: 25,NTX

## 2022-03-28 PROCEDURE — 84484 ASSAY OF TROPONIN QUANT: CPT | Mod: 91,NTX | Performed by: EMERGENCY MEDICINE

## 2022-03-28 PROCEDURE — 93005 ELECTROCARDIOGRAM TRACING: CPT | Mod: NTX

## 2022-03-28 PROCEDURE — 83880 ASSAY OF NATRIURETIC PEPTIDE: CPT | Mod: NTX | Performed by: EMERGENCY MEDICINE

## 2022-03-28 RX ORDER — ASPIRIN 325 MG
325 TABLET ORAL
Status: COMPLETED | OUTPATIENT
Start: 2022-03-28 | End: 2022-03-28

## 2022-03-28 RX ORDER — LIDOCAINE HYDROCHLORIDE 20 MG/ML
10 SOLUTION OROPHARYNGEAL ONCE
Status: COMPLETED | OUTPATIENT
Start: 2022-03-29 | End: 2022-03-29

## 2022-03-28 RX ORDER — MAG HYDROX/ALUMINUM HYD/SIMETH 200-200-20
30 SUSPENSION, ORAL (FINAL DOSE FORM) ORAL ONCE
Status: COMPLETED | OUTPATIENT
Start: 2022-03-29 | End: 2022-03-29

## 2022-03-28 RX ADMIN — ASPIRIN 325 MG ORAL TABLET 325 MG: 325 PILL ORAL at 09:03

## 2022-03-29 LAB — TROPONIN I SERPL DL<=0.01 NG/ML-MCNC: 0.02 NG/ML (ref 0–0.03)

## 2022-03-29 PROCEDURE — 25000003 PHARM REV CODE 250: Mod: NTX | Performed by: EMERGENCY MEDICINE

## 2022-03-29 RX ADMIN — MAGNESIUM HYDROXIDE/ALUMINUM HYDROXICE/SIMETHICONE 30 ML: 120; 1200; 1200 SUSPENSION ORAL at 12:03

## 2022-03-29 RX ADMIN — LIDOCAINE HYDROCHLORIDE 10 ML: 20 SOLUTION ORAL; TOPICAL at 12:03

## 2022-03-29 NOTE — ED PROVIDER NOTES
"SCRIBE #1 NOTE: I, Anastacia Doss, am scribing for, and in the presence of, Jorge Lugo MD. I have scribed the entire note.      History      Chief Complaint   Patient presents with    Chest Pain     Pt reports severe chest pain after getting air in her shunt. She is a dialysis pt that performed home hemodialysis today.        Review of patient's allergies indicates:   Allergen Reactions    Lisinopril Other (See Comments)     Severe cough    Adhesive tape-silicones Itching     Burns    Furosemide Other (See Comments)     Almost gave her right sided heartfailure        HPI   HPI    3/28/2022, 8:13 PM   History obtained from the patient      History of Present Illness: Lisseth Schwartz is a 31 y.o. female patient with PMHx of ESRD and HTN who presents to the Emergency Department for CP. Pt was dialyzing at home when pressure began to drop. Pt self administered saline and cause some air bubbles from the IV line to be pushed into the fistula. After incident pt states having sudden anterior central CP. Pain is described as a tightness. Symptoms are constant and moderate in severity. No mitigating or exacerbating factors reported. Associated sxs include fatigue, headache, and generalized weakness. Patient denies any fever, chills, cough, congestion, SOB, CP, palpitations, N/V/D, HA, numbness, weakness, and all other sxs at this time. No prior Tx. No further complaints or concerns at this time.        Arrival mode: EMS     PCP: Nga Herrera NP       Past Medical History:  Past Medical History:   Diagnosis Date    Allergy     Anemia     Anxiety     Breast feeding status of mother 1/17/2020    Brittle bones     ESRD (end stage renal disease)     M/W/F    General anesthetics causing adverse effect in therapeutic use     pt states "im a light weight"    Hypertension     Insomnia     PSG revealed no CHRYSTAL, but likely psychogenic etiology (anxiety)    RLS (restless legs syndrome)        Past Surgical " History:  Past Surgical History:   Procedure Laterality Date    Arm surgery Right     x 2    AV FISTULA PLACEMENT Right     HYSTEROSCOPY WITH HYDROTHERMAL ABLATION OF ENDOMETRIUM WITH DILATION AND CURETTAGE N/A 12/4/2018    Procedure: HYSTEROSCOPY, WITH DILATION AND CURETTAGE OF UTERUS AND HYDROTHERMAL ENDOMETRIAL ABLATION;  Surgeon: Tiara Red MD;  Location: [facility] OR;  Service: OB/GYN;  Laterality: N/A;  ATTEMPTED     LAPAROSCOPIC SALPINGECTOMY Bilateral 12/4/2018    Procedure: SALPINGECTOMY, LAPAROSCOPIC;  Surgeon: Tiara Red MD;  Location: [facility] OR;  Service: OB/GYN;  Laterality: Bilateral;    RENAL BIOPSY      tumor removed      from face per medical records         Family History:  Family History   Problem Relation Age of Onset    Hypertension Father     Breast cancer Paternal Grandmother     Diabetes Maternal Grandmother     Heart attack Maternal Grandmother     Lung cancer Maternal Grandfather     Prostate cancer Maternal Grandfather        Social History:  Social History     Tobacco Use    Smoking status: Never Smoker    Smokeless tobacco: Never Used   Substance and Sexual Activity    Alcohol use: No    Drug use: No    Sexual activity: Yes     Partners: Male       ROS   Review of Systems   Constitutional: Positive for fatigue. Negative for chills and fever.   HENT: Negative for congestion and sore throat.    Respiratory: Negative for cough and shortness of breath.    Cardiovascular: Positive for chest pain. Negative for palpitations and leg swelling.   Gastrointestinal: Negative for diarrhea, nausea and vomiting.   Genitourinary: Negative for dysuria.   Musculoskeletal: Negative for back pain.   Skin: Negative for rash.   Neurological: Positive for weakness (generalized) and headaches. Negative for dizziness and numbness.   Hematological: Does not bruise/bleed easily.   All other systems reviewed and are negative.      Physical Exam      Initial Vitals [03/28/22 1819]   BP Pulse Resp  Temp SpO2   (!) 122/95 99 18 97.5 °F (36.4 °C) 100 %      MAP       --          Physical Exam  Nursing Notes and Vital Signs Reviewed.  Constitutional: Patient is in no acute distress. Well-developed and well-nourished.  Head: Atraumatic. Normocephalic.  Eyes: PERRL. EOM intact. Conjunctivae are not pale. No scleral icterus.  ENT: Mucous membranes are moist. Oropharynx is clear and symmetric.    Neck: Supple. Full ROM. No lymphadenopathy.  Cardiovascular: Regular rate. Regular rhythm. No murmurs, rubs, or gallops. Distal pulses are 2+ and symmetric. LUE fistula present with good thrill.  Pulmonary/Chest: No respiratory distress. Clear to auscultation bilaterally. No wheezing or rales.  Abdominal: Soft and non-distended.  There is no tenderness.  No rebound, guarding, or rigidity. Good bowel sounds.  Genitourinary: No CVA tenderness  Musculoskeletal: Moves all extremities. No obvious deformities. No edema. No calf tenderness.  Skin: Warm and dry.  Neurological:  Alert, awake, and appropriate.  Normal speech.  No acute focal neurological deficits are appreciated.  Psychiatric: Normal affect. Good eye contact. Appropriate in content.    ED Course    Procedures  ED Vital Signs:  Vitals:    03/28/22 1819 03/28/22 1922 03/28/22 1923 03/28/22 2120   BP: (!) 122/95 (!) 161/90  (!) 160/87   Pulse: 99 86 89 72   Resp: 18 20     Temp: 97.5 °F (36.4 °C)      TempSrc: Oral      SpO2: 100% 100%  100%   Weight:  95.3 kg (210 lb)      03/28/22 2303   BP: (!) 153/80   Pulse: 71   Resp: 14   Temp:    TempSrc:    SpO2: 100%   Weight:        Abnormal Lab Results:  Labs Reviewed   CBC W/ AUTO DIFFERENTIAL - Abnormal; Notable for the following components:       Result Value    RBC 3.13 (*)     Hemoglobin 10.2 (*)     Hematocrit 30.3 (*)     MCH 32.6 (*)     Platelets 136 (*)     Immature Granulocytes 1.8 (*)     Immature Grans (Abs) 0.14 (*)     Lymph # 0.9 (*)     Gran % 79.2 (*)     Lymph % 11.0 (*)     All other components within  normal limits   COMPREHENSIVE METABOLIC PANEL - Abnormal; Notable for the following components:    Sodium 133 (*)     Chloride 92 (*)     Glucose 130 (*)     BUN 54 (*)     Creatinine 12.8 (*)     Total Protein 8.8 (*)     eGFR if  4 (*)     eGFR if non  3 (*)     All other components within normal limits   TROPONIN I   B-TYPE NATRIURETIC PEPTIDE   MAGNESIUM   PHOSPHORUS   TROPONIN I   TROPONIN I        All Lab Results:  Results for orders placed or performed during the hospital encounter of 03/28/22   CBC auto differential   Result Value Ref Range    WBC 7.99 3.90 - 12.70 K/uL    RBC 3.13 (L) 4.00 - 5.40 M/uL    Hemoglobin 10.2 (L) 12.0 - 16.0 g/dL    Hematocrit 30.3 (L) 37.0 - 48.5 %    MCV 97 82 - 98 fL    MCH 32.6 (H) 27.0 - 31.0 pg    MCHC 33.7 32.0 - 36.0 g/dL    RDW 12.8 11.5 - 14.5 %    Platelets 136 (L) 150 - 450 K/uL    MPV 10.7 9.2 - 12.9 fL    Immature Granulocytes 1.8 (H) 0.0 - 0.5 %    Gran # (ANC) 6.3 1.8 - 7.7 K/uL    Immature Grans (Abs) 0.14 (H) 0.00 - 0.04 K/uL    Lymph # 0.9 (L) 1.0 - 4.8 K/uL    Mono # 0.6 0.3 - 1.0 K/uL    Eos # 0.1 0.0 - 0.5 K/uL    Baso # 0.03 0.00 - 0.20 K/uL    nRBC 0 0 /100 WBC    Gran % 79.2 (H) 38.0 - 73.0 %    Lymph % 11.0 (L) 18.0 - 48.0 %    Mono % 7.0 4.0 - 15.0 %    Eosinophil % 0.6 0.0 - 8.0 %    Basophil % 0.4 0.0 - 1.9 %    Platelet Estimate Appears normal     Differential Method Automated    Comprehensive metabolic panel   Result Value Ref Range    Sodium 133 (L) 136 - 145 mmol/L    Potassium 4.2 3.5 - 5.1 mmol/L    Chloride 92 (L) 95 - 110 mmol/L    CO2 25 23 - 29 mmol/L    Glucose 130 (H) 70 - 110 mg/dL    BUN 54 (H) 6 - 20 mg/dL    Creatinine 12.8 (H) 0.5 - 1.4 mg/dL    Calcium 10.2 8.7 - 10.5 mg/dL    Total Protein 8.8 (H) 6.0 - 8.4 g/dL    Albumin 4.3 3.5 - 5.2 g/dL    Total Bilirubin 0.7 0.1 - 1.0 mg/dL    Alkaline Phosphatase 74 55 - 135 U/L    AST 13 10 - 40 U/L    ALT 15 10 - 44 U/L    Anion Gap 16 8 - 16 mmol/L    eGFR  if African American 4 (A) >60 mL/min/1.73 m^2    eGFR if non African American 3 (A) >60 mL/min/1.73 m^2   Troponin I #1   Result Value Ref Range    Troponin I 0.009 0.000 - 0.026 ng/mL   BNP   Result Value Ref Range    BNP 18 0 - 99 pg/mL   Magnesium   Result Value Ref Range    Magnesium 1.9 1.6 - 2.6 mg/dL   Phosphorus   Result Value Ref Range    Phosphorus 4.3 2.7 - 4.5 mg/dL   Troponin I #2   Result Value Ref Range    Troponin I 0.016 0.000 - 0.026 ng/mL           Imaging Results:  Imaging Results          CT Head Without Contrast (Final result)  Result time 03/28/22 20:38:58    Final result by Gama Escalona MD (03/28/22 20:38:58)                 Impression:      No acute abnormality.    All CT scans   are performed using dose optimization techniques including the following: automated exposure control; adjustment of the mA and/or kV; use of iterative reconstruction technique.  Dose modulation was employed for ALARA by means of: Automated exposure control; adjustment of the mA and/or kV according to patient size (this includes techniques or standardized protocols for targeted exams where dose is matched to indication/reason for exam; i.e. extremities or head); and/or use of iterative reconstructive technique.      Electronically signed by: Pola Malloy  Date:    03/28/2022  Time:    20:38             Narrative:    EXAMINATION:  CT HEAD WITHOUT CONTRAST    CLINICAL HISTORY:  Headache, sudden, severe;    TECHNIQUE:  Low dose axial CT images obtained throughout the head without intravenous contrast. Sagittal and coronal reconstructions were performed.    COMPARISON:  Prior MRI    FINDINGS:  Intracranial compartment:    Ventricles and sulci are normal in size for age without evidence of hydrocephalus. No extra-axial blood or fluid collections.    No parenchymal mass, hemorrhage, edema or major vascular distribution infarct.    Skull/extracranial contents (limited evaluation): No fracture. Mastoid air cells and  paranasal sinuses are essentially clear.                                      The EKG was ordered, reviewed, and independently interpreted by the ED provider.  Interpretation time: 19:30  Rate: 75 BPM  Rhythm: Sinus rhythm with short OH  Interpretation: Junctional ST depression, probably normal. No STEMI.      The Emergency Provider reviewed the vital signs and test results, which are outlined above.    ED Discussion     12:36 AM: Reassessed pt at this time. She is feeling well. Likely not enough air was in the line to cause any significant pathology today. Patient would like to go home. Discussed with pt all pertinent ED information and results. Discussed pt dx and plan of tx. Gave pt all f/u and return to the ED instructions. All questions and concerns were addressed at this time. Pt expresses understanding of information and instructions, and is comfortable with plan to discharge. Pt is stable for discharge.    I discussed with patient and/or family/caretaker that evaluation in the ED does not suggest any emergent or life threatening medical conditions requiring immediate intervention beyond what was provided in the ED, and I believe patient is safe for discharge.  Regardless, an unremarkable evaluation in the ED does not preclude the development or presence of a serious of life threatening condition. As such, patient was instructed to return immediately for any worsening or change in current symptoms.    ED Course as of 03/29/22 0306   Tue Mar 29, 2022   0032 Patient has remained hemodynamically stable for the entirety of our ED observation. She does not show signs of significant air emobolism. She is feeling better at this time, symptoms have resolved.  [BA]      ED Course User Index  [BA] Jorge Lugo MD       ED Medication(s):  Medications   aspirin tablet 325 mg (325 mg Oral Given 3/28/22 2114)   aluminum-magnesium hydroxide-simethicone 200-200-20 mg/5 mL suspension 30 mL (30 mLs Oral Given 3/29/22 0002)      And   LIDOcaine HCl 2% oral solution 10 mL (10 mLs Oral Given 3/29/22 0002)     Discharge Medication List as of 3/29/2022 12:32 AM          Follow-up Information     Nga Herrera NP. Schedule an appointment as soon as possible for a visit in 2 days.    Specialty: Family Medicine  Why: For re-evaluation and further treatment  Contact information:  [address] [address] [address]  [phone]             [facility] - Emergency Dept.. Go today.    Specialty: Emergency Medicine  Why: If symptoms worsen, For re-evaluation and further treatment, As needed  Contact information:  [address] [facility] [address] [phone]                         Medical Decision Making    Medical Decision Making:   Clinical Tests:   Lab Tests: Ordered and Reviewed  Radiological Study: Ordered and Reviewed  Medical Tests: Ordered and Reviewed           Scribe Attestation:   Scribe #1: I performed the above scribed service and the documentation accurately describes the services I performed. I attest to the accuracy of the note.    Attending:   Physician Attestation Statement for Scribe #1: I, Jorge Lugo MD, personally performed the services described in this documentation, as scribed by Anastacia Doss, in my presence, and it is both accurate and complete.          Clinical Impression       ICD-10-CM ICD-9-CM   1. Nonintractable headache, unspecified chronicity pattern, unspecified headache type  R51.9 784.0   2. Chest pain  R07.9 786.50       Disposition:   Disposition: Discharged  Condition: Stable         Jorge Lugo MD  03/29/22 0306

## 2022-05-08 NOTE — TELEPHONE ENCOUNTER
"SW contacted pt's previous dialysis unit to complete an adherence check and was told that pt switched to home hemo.    CELIA spoke with pt's nurse, HUNG Parikh and she reports that pt has been "the fastest patient to ever be trained". Kati reports that pt was able to train in 2 weeks, were most patients take 4 weeks to be trained. She reports that pt has been on home hemo for 6 weeks and reports that pt has not missed a treatment yet. She reports that pt will be coming in tomorrow for her appointment with them tomorrow for labs.         [facility]  [address]  [place]  [phone]()  [phone] (fax)      Psychosocial Suitability: Patient presents as a suitable candidate for kidney transplant at this time. Based on psychosocial risk factors, patient presents as low risk, due to suitable dialysis adherence since changing her modality..    "
No

## 2022-05-19 ENCOUNTER — PES CALL (OUTPATIENT)
Dept: ADMINISTRATIVE | Facility: CLINIC | Age: 32
End: 2022-05-19
Payer: MEDICARE

## 2022-05-23 ENCOUNTER — PES CALL (OUTPATIENT)
Dept: ADMINISTRATIVE | Facility: CLINIC | Age: 32
End: 2022-05-23
Payer: MEDICARE

## 2022-06-08 ENCOUNTER — PATIENT MESSAGE (OUTPATIENT)
Dept: RESEARCH | Facility: HOSPITAL | Age: 32
End: 2022-06-08
Payer: MEDICARE

## 2022-07-26 DIAGNOSIS — Z76.82 ORGAN TRANSPLANT CANDIDATE: Primary | ICD-10-CM

## 2022-07-26 NOTE — PROGRESS NOTES
YEARLY LIST MANAGEMENT NOTE    Lisseth Schwartz's kidney transplant listing status reviewed.  Patient is due for follow-up appointments in October 2022.   Appointments will be scheduled per protocol.  HLA sample is past due at this time with sample last being received on April 8, 2022.

## 2022-07-28 DIAGNOSIS — Z76.82 ORGAN TRANSPLANT CANDIDATE: Primary | ICD-10-CM

## 2022-07-29 ENCOUNTER — PES CALL (OUTPATIENT)
Dept: ADMINISTRATIVE | Facility: CLINIC | Age: 32
End: 2022-07-29
Payer: MEDICARE

## 2022-08-09 ENCOUNTER — TELEPHONE (OUTPATIENT)
Dept: TRANSPLANT | Facility: CLINIC | Age: 32
End: 2022-08-09
Payer: MEDICARE

## 2022-08-11 PROBLEM — D64.9 ANEMIA: Status: ACTIVE | Noted: 2022-08-11

## 2022-08-23 ENCOUNTER — OFFICE VISIT (OUTPATIENT)
Dept: OBSTETRICS AND GYNECOLOGY | Facility: CLINIC | Age: 32
End: 2022-08-23
Payer: MEDICARE

## 2022-08-23 DIAGNOSIS — N92.0 MENORRHAGIA WITH REGULAR CYCLE: ICD-10-CM

## 2022-08-23 DIAGNOSIS — Z11.3 SCREENING EXAMINATION FOR STD (SEXUALLY TRANSMITTED DISEASE): ICD-10-CM

## 2022-08-23 DIAGNOSIS — Z12.4 SCREENING FOR CERVICAL CANCER: ICD-10-CM

## 2022-08-23 DIAGNOSIS — Z01.419 ENCOUNTER FOR GYNECOLOGICAL EXAMINATION (GENERAL) (ROUTINE) WITHOUT ABNORMAL FINDINGS: Primary | ICD-10-CM

## 2022-08-23 PROCEDURE — 87481 CANDIDA DNA AMP PROBE: CPT | Mod: 59 | Performed by: OBSTETRICS & GYNECOLOGY

## 2022-08-23 PROCEDURE — 87801 DETECT AGNT MULT DNA AMPLI: CPT | Performed by: OBSTETRICS & GYNECOLOGY

## 2022-08-23 PROCEDURE — 1159F PR MEDICATION LIST DOCUMENTED IN MEDICAL RECORD: ICD-10-PCS | Mod: CPTII,S$GLB,, | Performed by: OBSTETRICS & GYNECOLOGY

## 2022-08-23 PROCEDURE — 99999 PR PBB SHADOW E&M-EST. PATIENT-LVL II: CPT | Mod: PBBFAC,,, | Performed by: OBSTETRICS & GYNECOLOGY

## 2022-08-23 PROCEDURE — 87591 N.GONORRHOEAE DNA AMP PROB: CPT | Performed by: OBSTETRICS & GYNECOLOGY

## 2022-08-23 PROCEDURE — G0101 PR CA SCREEN;PELVIC/BREAST EXAM: ICD-10-PCS | Mod: S$GLB,,, | Performed by: OBSTETRICS & GYNECOLOGY

## 2022-08-23 PROCEDURE — 1159F MED LIST DOCD IN RCRD: CPT | Mod: CPTII,S$GLB,, | Performed by: OBSTETRICS & GYNECOLOGY

## 2022-08-23 PROCEDURE — 87491 CHLMYD TRACH DNA AMP PROBE: CPT | Performed by: OBSTETRICS & GYNECOLOGY

## 2022-08-23 PROCEDURE — G0101 CA SCREEN;PELVIC/BREAST EXAM: HCPCS | Mod: S$GLB,,, | Performed by: OBSTETRICS & GYNECOLOGY

## 2022-08-23 PROCEDURE — 99999 PR PBB SHADOW E&M-EST. PATIENT-LVL II: ICD-10-PCS | Mod: PBBFAC,,, | Performed by: OBSTETRICS & GYNECOLOGY

## 2022-08-23 RX ORDER — CALCITRIOL 0.5 UG/1
0.25 CAPSULE ORAL DAILY
Status: ON HOLD | COMMUNITY
Start: 2022-07-21 | End: 2023-01-03 | Stop reason: HOSPADM

## 2022-08-23 RX ORDER — METHOXY POLYETHYLENE GLYCOL-EPOETIN BETA 200 UG/.3ML
1 INJECTION, SOLUTION INTRAVENOUS
Status: ON HOLD | COMMUNITY
Start: 2022-02-22 | End: 2023-01-05 | Stop reason: HOSPADM

## 2022-08-23 NOTE — PROGRESS NOTES
Subjective:       Patient ID: Lisseth Schwartz is a 31 y.o. female.    Chief Complaint:  Well Woman      History of Present Illness  HPI  Annual Exam-Premenopausal  Patient presents for annual exam. The patient has no complaints today. The patient is not currently sexually active-tl for contraception-found out fiance was having sex with her 12 y/oo--he is currently in [facility]. GYN screening history: last pap: approximate date 2020 and was normal. The patient wears seatbelts: yes. The patient participates in regular exercise: no. Has the patient ever been transfused or tattooed?: yes. --+tattooes; The patient reports that there is not domestic violence in her life.  Menses irreg, but feels she bleeds most months; flow 5-7 d; tampon-ultra; change q 2-3 hrs; can soak through; denies dysmenorrhea --feels ablation only helped for 3 yrs  Problems sleeping, --both staying and falling asleep  No problems lealking    GYN & OB History  No LMP recorded. Lmp22  Date of Last Pap: 2020    OB History    Para Term  AB Living   6 4 3 1 2 4   SAB IAB Ectopic Multiple Live Births     2   0 4      # Outcome Date GA Lbr Erik/2nd Weight Sex Delivery Anes PTL Lv   6  18 33w0d  1.84 kg (4 lb 0.9 oz) M Vag-Spont EPI Y SATNAM   5 IAB            4 Term 09   4.082 kg (9 lb) F Vag-Spont   SATNAM   3 Term 09/15/08   4.99 kg (11 lb) M Vag-Spont   SATNAM   2 IAB 2007           1 Term 07   3.175 kg (7 lb) M Vag-Spont   SATNAM       Review of Systems  Review of Systems   Genitourinary: Positive for dysmenorrhea, menorrhagia, menstrual problem and pelvic pain.   All other systems reviewed and are negative.          Objective:      Physical Exam:   Constitutional: She is oriented to person, place, and time. She appears well-developed and well-nourished.     Eyes: Pupils are equal, round, and reactive to light. Conjunctivae and EOM are normal.      Pulmonary/Chest: Effort normal. Right breast exhibits no  mass, no nipple discharge, no skin change and no tenderness. Left breast exhibits no mass, no nipple discharge, no skin change and no tenderness. Breasts are symmetrical.        Abdominal: Soft.     Genitourinary:    Vagina, uterus, right adnexa, left adnexa and rectum normal.      Pelvic exam was performed with patient supine.   The external female genitalia was normal.   Labial bartholins normal.Cervix is normal. Right adnexum displays no mass and no tenderness. Left adnexum displays no mass and no tenderness. No erythema,  no vaginal discharge, bleeding, rectocele, cystocele or unspecified prolapse of vaginal walls in the vagina. Vagina was moist.Cervix exhibits no motion tenderness and no friability.    pap smear not completedUerus contour normal  Normal urethral meatus.Urethra findings: no urethral massBladder findings: no bladder distention and no bladder tenderness          Musculoskeletal: Normal range of motion and moves all extremeties.       Neurological: She is alert and oriented to person, place, and time.    Skin: Skin is warm.    Psychiatric: She has a normal mood and affect. Her behavior is normal.           Assessment:     Encounter Diagnoses   Name Primary?    Screening for cervical cancer     Encounter for gynecological examination (general) (routine) without abnormal findings Yes    Screening examination for STD (sexually transmitted disease)     Menorrhagia with regular cycle                Plan:      Continue annual well woman exam.  Pap 2020 due in 2023; Reviewed updated recommendations for pap smears (every 3 years) in low risk patients.   Recommend annual pelvic exams.  Reviewed recommendations for annual CBE.  Gc/ct affirm today  Pelvic sono to eval ut for etiol of irreg bleeding  Continue diet, exercise, weight loss  Pt to self refer for counseling  Aware mammo due age 38

## 2022-08-24 NOTE — PROGRESS NOTES
Patient Lisseth Schwartz, MRN [id number], was dependent on dialysis (ICD10 Z99.2) at the time of this visit on 8/23/22. This addendum is made to the medical record on 08/24/2022.

## 2022-08-25 ENCOUNTER — PES CALL (OUTPATIENT)
Dept: ADMINISTRATIVE | Facility: CLINIC | Age: 32
End: 2022-08-25
Payer: MEDICARE

## 2022-08-25 LAB
C TRACH DNA SPEC QL NAA+PROBE: NOT DETECTED
N GONORRHOEA DNA SPEC QL NAA+PROBE: NOT DETECTED

## 2022-08-26 ENCOUNTER — PATIENT MESSAGE (OUTPATIENT)
Dept: OBSTETRICS AND GYNECOLOGY | Facility: CLINIC | Age: 32
End: 2022-08-26
Payer: MEDICARE

## 2022-08-26 DIAGNOSIS — B37.31 YEAST VAGINITIS: ICD-10-CM

## 2022-08-26 DIAGNOSIS — B96.89 BACTERIAL VAGINOSIS: Primary | ICD-10-CM

## 2022-08-26 DIAGNOSIS — N76.0 BACTERIAL VAGINOSIS: Primary | ICD-10-CM

## 2022-08-26 LAB
BACTERIAL VAGINOSIS DNA: POSITIVE
CANDIDA GLABRATA DNA: NEGATIVE
CANDIDA KRUSEI DNA: NEGATIVE
CANDIDA RRNA VAG QL PROBE: POSITIVE
T VAGINALIS RRNA GENITAL QL PROBE: NEGATIVE

## 2022-08-26 RX ORDER — METRONIDAZOLE 500 MG/1
500 TABLET ORAL EVERY 12 HOURS
Qty: 14 TABLET | Refills: 0 | Status: SHIPPED | OUTPATIENT
Start: 2022-08-26 | End: 2022-09-02

## 2022-08-26 RX ORDER — FLUCONAZOLE 200 MG/1
200 TABLET ORAL DAILY
Qty: 3 TABLET | Refills: 0 | Status: SHIPPED | OUTPATIENT
Start: 2022-08-26 | End: 2022-08-29

## 2022-08-30 ENCOUNTER — TELEPHONE (OUTPATIENT)
Dept: TRANSPLANT | Facility: CLINIC | Age: 32
End: 2022-08-30
Payer: MEDICARE

## 2022-08-30 DIAGNOSIS — Z76.82 AWAITING ORGAN TRANSPLANT STATUS: Primary | ICD-10-CM

## 2022-08-31 ENCOUNTER — HOSPITAL ENCOUNTER (OUTPATIENT)
Dept: RADIOLOGY | Facility: HOSPITAL | Age: 32
Discharge: HOME | End: 2022-08-31
Attending: OBSTETRICS & GYNECOLOGY
Payer: MEDICARE

## 2022-08-31 DIAGNOSIS — N92.0 MENORRHAGIA WITH REGULAR CYCLE: ICD-10-CM

## 2022-08-31 PROCEDURE — 76830 US PELVIS COMP WITH TRANSVAG NON-OB (XPD): ICD-10-PCS | Mod: 26,NTX,, | Performed by: RADIOLOGY

## 2022-08-31 PROCEDURE — 76830 TRANSVAGINAL US NON-OB: CPT | Mod: 26,NTX,, | Performed by: RADIOLOGY

## 2022-08-31 PROCEDURE — 76856 US EXAM PELVIC COMPLETE: CPT | Mod: 26,NTX,, | Performed by: RADIOLOGY

## 2022-08-31 PROCEDURE — 76830 TRANSVAGINAL US NON-OB: CPT | Mod: TC,NTX

## 2022-08-31 PROCEDURE — 76856 US PELVIS COMP WITH TRANSVAG NON-OB (XPD): ICD-10-PCS | Mod: 26,NTX,, | Performed by: RADIOLOGY

## 2022-08-31 NOTE — TELEPHONE ENCOUNTER
ON-CALL NOTE    UNOS# [id number]    Notified by Kal Butt, , that Lisseth Schwartz is eligible for kidney offer.  Spoke with patient and identified no acute medical issues with telephone assessment. Protocol script read to patient regarding N/A, standard donor offer. Patient verbalized understanding, all questions answered, patient accepts organ offer. Notified by Kal Butt that virtual crossmatch has some DSA.  Current sample of blood is available from date 7-28-22 for crossmatch.  Patient reports no sensitizing event since last blood sample for PRA received. Notified Erika in [facility] Lab to perform a prospective  crossmatch per guideline.    Patient was asked if they have had a positive COVID-19 test or if they have any signs or symptoms. Informed patient that they will be tested for COVID-19 upon arrival to the hospital, unless have a previous positive result. If tested and result is positive, the transplant will not be able to occur, they will be inactivated on the wait list for 28 days per protocol and required to quarantine.     Patient accepted offer as a back-up candidate, notified of plan, and states understanding.  Dialysis unit to be notified if patient is admitted.

## 2022-08-31 NOTE — TELEPHONE ENCOUNTER
"On Call Transplant SW Note: (due to possible organ offer and for discharge planning purposes)    I  spoke with pt at length by phone this evening due to pt processing a recent and significant family crisis unrelated to transplant. I provided resources, supportive care and counseling, listening and validation of feelings, empathy, and opportunity for pt to continue pts processing of a crisis situation along with discussion of appropriate legal interventions- already taken, and importance ongoing therapy for pt, family and additional key individuals involved. Pt states pt and family are engaged in therapy and plan to continue.    Pt remains low risk for transplant from a psychosocial perspective due to stable and engaged support system, utilization of resources in a timely manner, and mental health stability past and present. Pt states does "not hold on to anger" and does not take medications unless prescribed. Pt denies SI or HI past or present.    Note: Pt will benefit from additional Transplant SW Team involvement, resources, and support due to pts reported family crisis - message sent to Transplant SW Team this evening, with additional discussion(s) as appropriate.    Caregiver and Transportation:   Pt reports adequate and stable support system and transportation plans in place including pts mother, Tana Schwartz, who [place] [facility] [place] as needed. Pt also states back up caregivers including pts sister, Nahum Orozco (recently ) and additional family as needed.     Lodging:  Per pt choice, pt plans to stay at [facility] with caregiver until able to return home to [place], [place] under care of family. Resources provided.    Childcare:   Sister, 26yo - Nahum Orozco - [phone] - drives, has vehicle, works and is able to care for children.  Niece, 23yo - Amanda Schwartz - [phone] - drives, has vehicle, works and is able to care for children.    Insurance: "[employer] + " "Medicaid" - pt states does not have a copy for current medications.     SSD - $918  Additional Household Income - $0  Pt reports is currently managing with limited finances and receives an additional $2/month (two dollars) for children. Pt reports family is close-knit and highly supportive, engaged, will assist financially as needed, and will help pt to fundraise as needed. Pt will benefit from additional fundraising resources and discussion including regarding possible SS benefits and other benefits available for self/children.     Adherence:  Pt reports is highly adherent to PD dialysis regimen and expects high adherence with all health care team recommendations and treatment planning with pt.     Mental Health and Coping:  No medications for mental health at this time. Denies need for medications or interventions at this time.   Pt reports is coping with strong support from family network, currently engaged in therapy due to significant recent family crisis, and through joseline and prayer. (Sensitive and confidential information for Transplant SW Team/Mental Health Providers: Crisis involved a minor child and an adult who is no longer involved in pts life. Signficant other is currently incarcerated.    Pt and pts family are now in therapy, and pt plans to continue therapy due to impact of crisis events which came to light for pt on 8/10/2022 Wednesday. Therapy is a protective factor for transplant and recovery process. Pt states is looking forward to transplant, is praying for a transplant, and prays to be free of dialysis.     I discussed if pt does or does not receive this organ, resources, and coping strategies for use throughout the waiting for transplant, including presently due to pt and family status and needs. Pt was highly receptive, engaged and motivated to process crisis and stated appreciation for having the opportunity to share information tonight Pt states therapy is helping and will continue with " therapy for self and for family. I strongly encouraged pt to continue therapeutic care, timely communication of concerns/needs, and to utilize resources including law enforcement and our transplant SW team services as needed - pt agreed and stated ability to access resources as needed.    [facility] post-transplant for medications, per pts choice. Alternate resources provided and discussed.    On Call Transplant SW remains available as does transplant team. I provided pt with detailed contact information and discussed how to access team and resources as needed - pt stated clear understanding. Pt denied any additional concerns, needs, or questions at this time and reported will call back as needed.

## 2022-09-01 ENCOUNTER — PATIENT MESSAGE (OUTPATIENT)
Dept: OBSTETRICS AND GYNECOLOGY | Facility: CLINIC | Age: 32
End: 2022-09-01
Payer: MEDICARE

## 2022-09-02 ENCOUNTER — PES CALL (OUTPATIENT)
Dept: ADMINISTRATIVE | Facility: CLINIC | Age: 32
End: 2022-09-02
Payer: MEDICARE

## 2022-09-06 NOTE — PHYSICIAN QUERY
"PT Name: Lisseth Schwartz  MR #: [id number]    Physician Query Form - Hematology Clarification      CDS/: OUSMANE Coleman,RNC-MNN           Contact information:[email or web address]    This form is a permanent document in the medical record.      Query Date: September 19, 2018    By submitting this query, we are merely seeking further clarification of documentation. Please utilize your independent clinical judgment when addressing the question(s) below.    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      [ X ] Anemia of chronic disease ( Specify chronic disease)      [ X ] CKD (specify stage) _______End Stage____________________    [  ] Other Hematological Diagnosis (please specify): _________________________________    [  ] Clinically Undetermined       Please document in your progress notes daily for the duration of treatment, until resolved, and include in your discharge summary.                                                                                                      " No indicators present

## 2022-09-30 ENCOUNTER — TELEPHONE (OUTPATIENT)
Dept: TRANSPLANT | Facility: CLINIC | Age: 32
End: 2022-09-30
Payer: MEDICARE

## 2022-09-30 NOTE — LETTER
Date: 9/30/2022          Listed Patient      To: Dialysis Unit  and Charge RN From: [facility] and      Kidney Transplant Nurse Coordinators    RE: Lisseth Schwartz, 1990, [id number]     At [facility], we conduct adherence checks as an important part of transplant care. Initial and listed patient assessments are not complete without adherence information.        Please complete the following information:                 Data from the last 3 months:  (data from last 3 months preferred):    Number of AMAs with dates, time, and reasons: ____________________________________________________    ______________________________________________________________________________________________    ______________________________________________________________________________________________    Number of No-Shows with dates and reasons: ______________________________________________________      ______________________________________________________________________________________________      Any concerns with Caregivers:  YES / NO    If yes, please explain:  ___________________________________________________________________________    ______________________________________________________________________________________________     Any concerns with Transportation:  YES / NO    If yes, please explain:  ___________________________________________________________________________    ______________________________________________________________________________________________    Any Psychiatric and/or Psychosocial concerns:  YES / NO     If yes, please explain: ___________________________________________________________________________    ______________________________________________________________________________________________      PLEASE RETURN TO: FAX: [phone]     Thank you for collaborating with us in the care of this patient.            [address]  ?  [place] [address]  ?  phone [phone]  ?  fax [phone]  ?  [email or web address]  Confidentiality notice: The accompanying facsimile is intended solely for the use of the recipient designated above. Document(s) transmitted herewith may contain information that is confidential and privileged. Delivery, distribution or dissemination of this communication other than to the intended recipient is strictly prohibited. If you have received this facsimile in error, please notify us immediately by telephone.

## 2022-10-13 ENCOUNTER — HOSPITAL ENCOUNTER (OUTPATIENT)
Dept: RADIOLOGY | Facility: HOSPITAL | Age: 32
Discharge: HOME | End: 2022-10-13
Attending: NURSE PRACTITIONER
Payer: MEDICARE

## 2022-10-14 ENCOUNTER — TELEPHONE (OUTPATIENT)
Dept: TRANSPLANT | Facility: CLINIC | Age: 32
End: 2022-10-14
Payer: MEDICARE

## 2022-10-25 ENCOUNTER — TELEPHONE (OUTPATIENT)
Dept: TRANSPLANT | Facility: CLINIC | Age: 32
End: 2022-10-25
Payer: MEDICARE

## 2022-10-25 NOTE — LETTER
Date: 10/25/2022          Listed Patient      To: Dialysis Unit  and Charge RN From: [facility] and      Kidney Transplant Nurse Coordinators    RE: Lisseth Schwartz, 1990, [id number]     At [facility] [place], we conduct adherence checks as an important part of transplant care. Initial and listed patient assessments are not complete without adherence information.        Please complete the following information:                 Data from the last 3 months:  (data from last 3 months preferred):    Number of AMAs with dates, time, and reasons: ____________________________________________________    ______________________________________________________________________________________________    ______________________________________________________________________________________________    Number of No-Shows with dates and reasons: ______________________________________________________      ______________________________________________________________________________________________      Any concerns with Caregivers:  YES / NO    If yes, please explain:  ___________________________________________________________________________    ______________________________________________________________________________________________     Any concerns with Transportation:  YES / NO    If yes, please explain:  ___________________________________________________________________________    ______________________________________________________________________________________________    Any Psychiatric and/or Psychosocial concerns:  YES / NO     If yes, please explain: ___________________________________________________________________________    ______________________________________________________________________________________________      PLEASE RETURN TO: FAX: [phone]     Thank you for collaborating with us in the care of this patient.            [address]  ?  [place] [address]  ?  phone [phone]  ?  fax [phone]  ?  [email or web address]  Confidentiality notice: The accompanying facsimile is intended solely for the use of the recipient designated above. Document(s) transmitted herewith may contain information that is confidential and privileged. Delivery, distribution or dissemination of this communication other than to the intended recipient is strictly prohibited. If you have received this facsimile in error, please notify us immediately by telephone.

## 2022-10-27 ENCOUNTER — TELEPHONE (OUTPATIENT)
Dept: TRANSPLANT | Facility: CLINIC | Age: 32
End: 2022-10-27
Payer: MEDICARE

## 2022-10-28 ENCOUNTER — HOSPITAL ENCOUNTER (OUTPATIENT)
Dept: RADIOLOGY | Facility: HOSPITAL | Age: 32
Discharge: HOME | End: 2022-10-28
Attending: NURSE PRACTITIONER
Payer: MEDICARE

## 2022-10-28 DIAGNOSIS — Z76.82 ORGAN TRANSPLANT CANDIDATE: ICD-10-CM

## 2022-10-28 PROCEDURE — 71046 X-RAY EXAM CHEST 2 VIEWS: CPT | Mod: 26,TXP,, | Performed by: RADIOLOGY

## 2022-10-28 PROCEDURE — 72170 XR PELVIS ROUTINE AP: ICD-10-PCS | Mod: 26,TXP,, | Performed by: RADIOLOGY

## 2022-10-28 PROCEDURE — 76770 US EXAM ABDO BACK WALL COMP: CPT | Mod: 26,TXP,, | Performed by: RADIOLOGY

## 2022-10-28 PROCEDURE — 72170 X-RAY EXAM OF PELVIS: CPT | Mod: TC,TXP

## 2022-10-28 PROCEDURE — 76770 US EXAM ABDO BACK WALL COMP: CPT | Mod: TC,TXP

## 2022-10-28 PROCEDURE — 72170 X-RAY EXAM OF PELVIS: CPT | Mod: 26,TXP,, | Performed by: RADIOLOGY

## 2022-10-28 PROCEDURE — 76770 US RETROPERITONEAL COMPLETE: ICD-10-PCS | Mod: 26,TXP,, | Performed by: RADIOLOGY

## 2022-10-28 PROCEDURE — 71046 X-RAY EXAM CHEST 2 VIEWS: CPT | Mod: TC,TXP

## 2022-10-28 PROCEDURE — 71046 XR CHEST PA AND LATERAL: ICD-10-PCS | Mod: 26,TXP,, | Performed by: RADIOLOGY

## 2022-11-01 ENCOUNTER — OFFICE VISIT (OUTPATIENT)
Dept: TRANSPLANT | Facility: CLINIC | Age: 32
End: 2022-11-01
Payer: MEDICARE

## 2022-11-01 ENCOUNTER — LAB VISIT (OUTPATIENT)
Dept: LAB | Facility: HOSPITAL | Age: 32
End: 2022-11-01
Payer: MEDICARE

## 2022-11-01 VITALS
DIASTOLIC BLOOD PRESSURE: 84 MMHG | HEART RATE: 74 BPM | HEIGHT: 65 IN | WEIGHT: 203.69 LBS | OXYGEN SATURATION: 99 % | TEMPERATURE: 97 F | SYSTOLIC BLOOD PRESSURE: 159 MMHG | RESPIRATION RATE: 18 BRPM | BODY MASS INDEX: 33.94 KG/M2

## 2022-11-01 DIAGNOSIS — N18.6 ESRD (END STAGE RENAL DISEASE): ICD-10-CM

## 2022-11-01 DIAGNOSIS — Z76.82 ORGAN TRANSPLANT CANDIDATE: ICD-10-CM

## 2022-11-01 DIAGNOSIS — N25.81 SECONDARY HYPERPARATHYROIDISM: ICD-10-CM

## 2022-11-01 DIAGNOSIS — Z76.82 PATIENT ON WAITING LIST FOR KIDNEY TRANSPLANT: Primary | Chronic | ICD-10-CM

## 2022-11-01 DIAGNOSIS — N05.1 FSGS (FOCAL SEGMENTAL GLOMERULOSCLEROSIS): ICD-10-CM

## 2022-11-01 DIAGNOSIS — I15.0 RENOVASCULAR HYPERTENSION: ICD-10-CM

## 2022-11-01 LAB — BLD GP AB SCN TITR SERPL: 32 {TITER}

## 2022-11-01 PROCEDURE — 3008F PR BODY MASS INDEX (BMI) DOCUMENTED: ICD-10-PCS | Mod: CPTII,S$GLB,TXP, | Performed by: NURSE PRACTITIONER

## 2022-11-01 PROCEDURE — 86886 COOMBS TEST INDIRECT TITER: CPT | Mod: TXP | Performed by: NURSE PRACTITIONER

## 2022-11-01 PROCEDURE — 3077F SYST BP >= 140 MM HG: CPT | Mod: CPTII,S$GLB,TXP, | Performed by: NURSE PRACTITIONER

## 2022-11-01 PROCEDURE — 99499 UNLISTED E&M SERVICE: CPT | Mod: HCNC,S$GLB,, | Performed by: NURSE PRACTITIONER

## 2022-11-01 PROCEDURE — 86833 HLA CLASS II HIGH DEFIN QUAL: CPT | Mod: PO,TXP | Performed by: NURSE PRACTITIONER

## 2022-11-01 PROCEDURE — 3077F PR MOST RECENT SYSTOLIC BLOOD PRESSURE >= 140 MM HG: ICD-10-PCS | Mod: CPTII,S$GLB,TXP, | Performed by: NURSE PRACTITIONER

## 2022-11-01 PROCEDURE — 1159F MED LIST DOCD IN RCRD: CPT | Mod: CPTII,S$GLB,TXP, | Performed by: NURSE PRACTITIONER

## 2022-11-01 PROCEDURE — 3079F DIAST BP 80-89 MM HG: CPT | Mod: CPTII,S$GLB,TXP, | Performed by: NURSE PRACTITIONER

## 2022-11-01 PROCEDURE — 99215 PR OFFICE/OUTPT VISIT, EST, LEVL V, 40-54 MIN: ICD-10-PCS | Mod: S$GLB,TXP,, | Performed by: NURSE PRACTITIONER

## 2022-11-01 PROCEDURE — 99999 PR PBB SHADOW E&M-EST. PATIENT-LVL IV: CPT | Mod: PBBFAC,TXP,, | Performed by: NURSE PRACTITIONER

## 2022-11-01 PROCEDURE — 99215 OFFICE O/P EST HI 40 MIN: CPT | Mod: S$GLB,TXP,, | Performed by: NURSE PRACTITIONER

## 2022-11-01 PROCEDURE — 99999 PR PBB SHADOW E&M-EST. PATIENT-LVL IV: ICD-10-PCS | Mod: PBBFAC,TXP,, | Performed by: NURSE PRACTITIONER

## 2022-11-01 PROCEDURE — 3079F PR MOST RECENT DIASTOLIC BLOOD PRESSURE 80-89 MM HG: ICD-10-PCS | Mod: CPTII,S$GLB,TXP, | Performed by: NURSE PRACTITIONER

## 2022-11-01 PROCEDURE — 36415 COLL VENOUS BLD VENIPUNCTURE: CPT | Mod: TXP | Performed by: NURSE PRACTITIONER

## 2022-11-01 PROCEDURE — 3008F BODY MASS INDEX DOCD: CPT | Mod: CPTII,S$GLB,TXP, | Performed by: NURSE PRACTITIONER

## 2022-11-01 PROCEDURE — 86832 HLA CLASS I HIGH DEFIN QUAL: CPT | Mod: PO,TXP | Performed by: NURSE PRACTITIONER

## 2022-11-01 PROCEDURE — 99499 UNLISTED E&M SERVICE: CPT | Mod: HCNC,S$GLB,,

## 2022-11-01 PROCEDURE — 99499 RISK ADDL DX/OHS AUDIT: ICD-10-PCS | Mod: HCNC,S$GLB,, | Performed by: NURSE PRACTITIONER

## 2022-11-01 PROCEDURE — 1159F PR MEDICATION LIST DOCUMENTED IN MEDICAL RECORD: ICD-10-PCS | Mod: CPTII,S$GLB,TXP, | Performed by: NURSE PRACTITIONER

## 2022-11-01 RX ORDER — GABAPENTIN 100 MG/1
100 CAPSULE ORAL DAILY
Status: ON HOLD | COMMUNITY
Start: 2022-10-19 | End: 2023-01-05 | Stop reason: HOSPADM

## 2022-11-01 RX ORDER — IRON SUCROSE 20 MG/ML
50 INJECTION, SOLUTION INTRAVENOUS WEEKLY
Status: ON HOLD | COMMUNITY
Start: 2022-10-19 | End: 2023-01-05 | Stop reason: HOSPADM

## 2022-11-01 NOTE — PROGRESS NOTES
Kidney Transplant Recipient Reevalulation    Referring Physician: Andres Hoyt  Current Nephrologist: Eduard Vance  Waitlist Status: active  Dialysis Start Date: 10/28/2016    Subjective:     CC:  Annual reassessment of kidney transplant candidacy.    HPI:  Ms. Schwartz is a 31 y.o. year old Black or  female with ESRD secondary to FSGS.  She has been on the wait list for a kidney transplant at [facility] since 10/28/2016. Patient is currently on hemodialysis started on 10/28/2016. Patient is dialyzing on home HD every other day.  Patient reports that she is tolerating dialysis well.. She has a RUE AV fistula. Patient denies any recent hospitalizations or ED visits.    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 Looks great, not frail.    CXR 10/28/2022: Stable chest x-ray.  Cardiomegaly.  PXR 10/28/2022: No abnormal osseous findings   Renal US 10/28/2022: Small echogenic kidneys in keeping with chronic medical renal disease.  No hydronephrosis. Slight interval enlargement of a minimally complex cyst at the left lower pole.  No solid renal masses visualized.  Iliacs 9/24/2020: favorable for transplant.   Echo 12/14/2021: EF 60%, PA 29   Stress 10/20/2020: normal myocardial perfusion.   PAP 11/16/2020: Negative for intraepithelial lesion or malignancy.     Current Outpatient Medications   Medication Sig Dispense Refill    albuterol (PROVENTIL/VENTOLIN HFA) 90 mcg/actuation inhaler Inhale 1-2 puffs into the lungs every 6 (six) hours as needed for Wheezing (cough). 8.5 g 1    calcitRIOL (ROCALTROL) 0.5 MCG Cap 0.25 mcg once daily.      epoetin beta, methoxy peg (MIRCERA) 200 mcg/0.3 mL Syrg 1 Syringe.      gabapentin (NEURONTIN) 100 MG capsule Take 100 mg by mouth once daily.      iron sucrose (VENOFER) 50 mg iron/2.5 mL Soln Inject 50 mg into the vein once a week.      metoprolol succinate (TOPROL-XL) 50 MG 24 hr tablet Take 1 tablet (50 mg total) by mouth once daily. (Patient taking  "differently: Take 100 mg by mouth once daily.) 30 tablet 2    pantoprazole (PROTONIX) 40 MG tablet TAKE ONE TABLET BY MOUTH ONCE DAILY 30 tablet 2    sucroferric oxyhydroxide (VELPHORO) 500 mg Chew Take 500 mg by mouth 3 (three) times daily with meals.       cinacalcet (SENSIPAR) 30 MG Tab Pt takes at dialysis.       No current facility-administered medications for this visit.       Past Medical History:   Diagnosis Date    Allergy     Anemia     Anxiety     Breast feeding status of mother 1/17/2020    Brittle bones     ESRD (end stage renal disease)     M/W/F    General anesthetics causing adverse effect in therapeutic use     pt states "im a light weight"    Hypertension     Insomnia     PSG revealed no CHRYSTAL, but likely psychogenic etiology (anxiety)    RLS (restless legs syndrome)        Review of Systems   Constitutional:  Positive for fatigue. Negative for activity change, appetite change and fever.   HENT:  Negative for congestion, mouth sores and sore throat.    Eyes:  Negative for visual disturbance.   Respiratory:  Negative for cough, chest tightness and shortness of breath.    Cardiovascular:  Negative for chest pain, palpitations and leg swelling.   Gastrointestinal:  Negative for abdominal distention, constipation, diarrhea and nausea.   Genitourinary:  Negative for difficulty urinating, frequency and hematuria.   Musculoskeletal:  Negative for arthralgias, gait problem and myalgias.   Skin:  Negative for wound.   Allergic/Immunologic: Negative for environmental allergies, food allergies and immunocompromised state.   Neurological:  Negative for dizziness, weakness and numbness.   Psychiatric/Behavioral:  Negative for sleep disturbance. The patient is not nervous/anxious.      Objective:   body mass index is 34.19 kg/m².  BP (!) 159/84 (BP Location: Left arm, Patient Position: Sitting, BP Method: Large (Automatic))   Pulse 74   Temp 97.3 °F (36.3 °C) (Temporal)   Resp 18   Ht 5' 4.72" (1.644 m)   Wt " 92.4 kg (203 lb 11.3 oz)   SpO2 99%   BMI 34.19 kg/m²     Physical Exam  Vitals and nursing note reviewed.   Constitutional:       Appearance: Normal appearance.   HENT:      Head: Normocephalic.   Cardiovascular:      Rate and Rhythm: Normal rate and regular rhythm.      Heart sounds: Normal heart sounds.   Pulmonary:      Effort: Pulmonary effort is normal.      Breath sounds: Normal breath sounds.   Abdominal:      General: Bowel sounds are normal. There is no distension.      Palpations: Abdomen is soft.      Tenderness: There is no abdominal tenderness.   Musculoskeletal:         General: Normal range of motion.      Comments: RUE AV fistula + thrill    Skin:     General: Skin is warm and dry.   Neurological:      General: No focal deficit present.      Mental Status: She is alert.   Psychiatric:         Behavior: Behavior normal.       Labs:  Lab Results   Component Value Date    WBC 7.99 03/28/2022    HGB 6.8 (L) 06/07/2022    HCT 21.7 (L) 06/07/2022     (L) 03/28/2022    K 4.2 03/28/2022    CL 92 (L) 03/28/2022    CO2 25 03/28/2022    BUN 54 (H) 03/28/2022    CREATININE 12.8 (H) 03/28/2022    EGFRNONAA 3 (A) 03/28/2022    CALCIUM 10.2 03/28/2022    PHOS 4.3 03/28/2022    MG 1.9 03/28/2022    ALBUMIN 4.3 03/28/2022    AST 13 03/28/2022    ALT 15 03/28/2022    UTPCR 8.03 (H) 09/05/2018    .6 (H) 12/14/2021       Lab Results   Component Value Date    BILIRUBINUA Negative 01/17/2020    PROTEINUA 2+ (A) 01/17/2020    NITRITE Negative 01/17/2020    RBCUA 1 01/17/2020    WBCUA 2 01/17/2020     Lab Results   Component Value Date    CPRA 50 09/18/2022    WA3VJNJ Cw5,Cw15,Cw6,Cw18,Cw2,Cw17,B13 09/18/2022    CIABCLM WEAK----CW4, B61, B50, CW12, B60, B49, 09/18/2022    CIIAB DPB1*04:02 09/18/2022    ABCMT WEAK--- DP1, DP20, 09/18/2022       Labs were reviewed with the patient.    Pre-transplant Workup:   Reviewed with the patient.    Assessment:     1. Patient on waiting list for kidney transplant    2.  ESRD (end stage renal disease)    3. FSGS (focal segmental glomerulosclerosis)    4. Renovascular hypertension    5. Secondary hyperparathyroidism    6. BMI 34.0-34.9,adult        Plan:   Due for updated stress testing      Transplant Candidacy:   Ms. Schwartz is a suitable kidney transplant candidate.  Meets center eligibility for accepting HCV+ donor offer - yes.  Patient educated on HCV+ donors. Lisseth is willing  to accept HCV+ donor offer -  yes   Patient is a candidate for KDPI > 85 kidney donor offer - no (age, weight)  She remains in overall stable health, and will remain active on the transplant list.    Patient advised that it is recommended that all transplant candidates, and their close contacts and household members receive Covid vaccination.    Patricia Caceres NP       Follow-up:   In addition to the tests noted in the plan, Ms. Schwartz will continue to have reevaluation as per the standing pre-kidney transplant protocol:  Monthly blood for PRA  Annual return to clinic, except HIV positive, > 65 years of age, or pancreas transplant candidates who will be scheduled to see transplant every 6 months while in pre-transplant phase  Annual re-testing: CXR, EKG, yearly mammograms for women over 40 and PSA for males over 40, cardiology follow-up as recommended by initial cardiology pre-transplant evaluation  Renal ultrasound every 2 years  Baseline colonoscopy after age 50 and repeated as recommended    UNOS Patient Status  Functional Status: 60% - Requires occasional assistance but is able to care for needs  Physical Capacity: No Limitations

## 2022-11-01 NOTE — LETTER
November 3, 2022        Eduard Vance  [address]  [address]  [facility]  [place] [address]  Phone: [phone]  Fax: [phone]             [facility] 1st Fl  [address]  [facility] [address]  Phone: [phone]   Patient: Lisseth Schwartz   MR Number: [id number]   YOB: 1990   Date of Visit: 11/1/2022       Dear Dr. Eduard Vance    Thank you for referring Lisseth Schwartz to me for evaluation. Attached you will find relevant portions of my assessment and plan of care.    If you have questions, please do not hesitate to call me. I look forward to following Lisseth Schwartz along with you.    Sincerely,    Patricia Caceres, NP    Enclosure    If you would like to receive this communication electronically, please contact [email or web address] or [phone] to request [facility] access.    [facility] is a tool which provides read-only access to select patient information with whom you have a relationship. Its easy to use and provides real time access to review your patients record including encounter summaries, notes, results, and demographic information.    If you feel you have received this communication in error or would no longer like to receive these types of communications, please e-mail [email or web address]

## 2022-11-02 ENCOUNTER — TELEPHONE (OUTPATIENT)
Dept: TRANSPLANT | Facility: CLINIC | Age: 32
End: 2022-11-02
Payer: MEDICARE

## 2022-11-02 DIAGNOSIS — Z76.82 ORGAN TRANSPLANT CANDIDATE: Primary | ICD-10-CM

## 2022-11-11 LAB — HPRA INTERPRETATION: NORMAL

## 2022-11-14 ENCOUNTER — TELEPHONE (OUTPATIENT)
Dept: ADMINISTRATIVE | Facility: HOSPITAL | Age: 32
End: 2022-11-14
Payer: MEDICARE

## 2022-11-28 ENCOUNTER — TELEPHONE (OUTPATIENT)
Dept: ADMINISTRATIVE | Facility: CLINIC | Age: 32
End: 2022-11-28
Payer: MEDICARE

## 2022-11-28 ENCOUNTER — PATIENT MESSAGE (OUTPATIENT)
Dept: ADMINISTRATIVE | Facility: CLINIC | Age: 32
End: 2022-11-28
Payer: MEDICARE

## 2022-11-30 ENCOUNTER — OFFICE VISIT (OUTPATIENT)
Dept: HOME HEALTH SERVICES | Facility: CLINIC | Age: 32
End: 2022-11-30
Payer: MEDICARE

## 2022-11-30 ENCOUNTER — TELEPHONE (OUTPATIENT)
Dept: ADMINISTRATIVE | Facility: CLINIC | Age: 32
End: 2022-11-30
Payer: MEDICARE

## 2022-11-30 DIAGNOSIS — N25.81 SECONDARY HYPERPARATHYROIDISM: ICD-10-CM

## 2022-11-30 DIAGNOSIS — N18.6 ESRD (END STAGE RENAL DISEASE): ICD-10-CM

## 2022-11-30 DIAGNOSIS — Z00.00 ENCOUNTER FOR PREVENTIVE HEALTH EXAMINATION: ICD-10-CM

## 2022-11-30 DIAGNOSIS — Z99.2 DEPENDENCE ON RENAL DIALYSIS: ICD-10-CM

## 2022-11-30 DIAGNOSIS — R63.4 WEIGHT LOSS, ABNORMAL: Primary | ICD-10-CM

## 2022-11-30 PROCEDURE — 1159F PR MEDICATION LIST DOCUMENTED IN MEDICAL RECORD: ICD-10-PCS | Mod: CPTII,95,NTX, | Performed by: NURSE PRACTITIONER

## 2022-11-30 PROCEDURE — G9920 PR SCREENING AND NEGATIVE: ICD-10-PCS | Mod: CPTII,95,NTX, | Performed by: NURSE PRACTITIONER

## 2022-11-30 PROCEDURE — G9920 SCRNING PERF AND NEGATIVE: HCPCS | Mod: CPTII,95,NTX, | Performed by: NURSE PRACTITIONER

## 2022-11-30 PROCEDURE — 1160F RVW MEDS BY RX/DR IN RCRD: CPT | Mod: CPTII,95,NTX, | Performed by: NURSE PRACTITIONER

## 2022-11-30 PROCEDURE — 1160F PR REVIEW ALL MEDS BY PRESCRIBER/CLIN PHARMACIST DOCUMENTED: ICD-10-PCS | Mod: CPTII,95,NTX, | Performed by: NURSE PRACTITIONER

## 2022-11-30 PROCEDURE — G0439 PPPS, SUBSEQ VISIT: HCPCS | Mod: 95,NTX,, | Performed by: NURSE PRACTITIONER

## 2022-11-30 PROCEDURE — 1159F MED LIST DOCD IN RCRD: CPT | Mod: CPTII,95,NTX, | Performed by: NURSE PRACTITIONER

## 2022-11-30 PROCEDURE — G0439 PR MEDICARE ANNUAL WELLNESS SUBSEQUENT VISIT: ICD-10-PCS | Mod: 95,NTX,, | Performed by: NURSE PRACTITIONER

## 2022-11-30 NOTE — PATIENT INSTRUCTIONS
Counseling and Referral of Other Preventative  (Italic type indicates deductible and co-insurance are waived)    Patient Name: Lisseth Schwartz  Today's Date: 11/30/2022    Health Maintenance       Date Due Completion Date    COVID-19 Vaccine (1) Never done ---    Pneumococcal Vaccines (Age 0-64) (1 - PCV) Never done ---    TETANUS VACCINE Never done ---    Influenza Vaccine (1) 09/01/2022 10/16/2019 (Done)    Override on 10/16/2019: Done    Cervical Cancer Screening 11/16/2025 11/16/2020        No orders of the defined types were placed in this encounter.    The following information is provided to all patients.  This information is to help you find resources for any of the problems found today that may be affecting your health:                Living healthy guide: www.[facility].[place].gov      Understanding Diabetes: www.diabetes.org      Eating healthy: www.cdc.gov/healthyweight      CDC home safety checklist: www.cdc.gov/steadi/patient.html      Agency on Aging: www.goea.[place].[facility]      Alcoholics anonymous (AA): www.aa.org      Physical Activity: www.arielle.nih.gov/xk6hbhl      Tobacco use: www.quitwithusla.org

## 2022-11-30 NOTE — PROGRESS NOTES
Transplant Psychosocial Evaluation Update:  Last psychosocial evaluation for transplant was completed on 12/14/21 by MARIA LUZ Palma    Pt presents today in company of mother. Pt and mother present as alert, oriented to person, place, time, purpose of visit. Pt and mother deny concerns about going through with transplant and state motivation and sense of preparedness for transplantation, caregiver role, psychosocial risk factors, medical risk factors, financial aspects, and lifetime commitments. All concerns and education points as per transplant psychosocial evaluation process addressed (also refer to psychosocial dated 12/14/21). Pt actively participated in today's interview, with mother participating as caregiver. Pt asking questions appropriately and denies changes to previous psychosocial evaluation for transplantation which we reviewed line by line with pt and, per pt choice, with pts caregiver today.    Primary Caregivers and Transportation for Transplant:  1. Tana Schwartz, 53 y/o mother, [facility], [phone], drives  2. Princess Coyle, 53 y/o aunt, [facility], [phone]. drives.  3. Hansel Orozco, 28 y/o sister, [facility], [phone], drives    Both pt and caregiver/s plan to stay locally at either home in  or at lodging arranged by themselves.  Pt and caregiver informed that lodging assistance will be unavailable beginning 2023.  - information reviewed in depth.  - information reviewed in depth. Caregivers plan to stay at hospital as appropriate for transplant and post-transplant process.    Pts Vocation: Pt  is disabled due to ESRD.    DME: BP cuff.     ADLS:  Pt states ability to independently accomplish all adls.     Household and Dependents: pt resides with her four children, ages 4, 12, 14 and 15 and has four dependents at this time.    Income: Pt states ability to afford all monthly expenses and costs including for medications now and if transplanted based on income and on savings and assets.    Dialysis  "Adherence: Pt states current and expected compliance with all healthcare recommendations including home hemo treatments.  Dialysis compliance found under "Media" tab->"dialysis compliance".  Compliance reported as satisfactory.     Pt and wife deny any additional concerns, stating preparedness and motivation to proceed with organ transplant.     Suitability for Transplant:   Pt remains low risk for transplant at this time based on psychosocial risk factors and strengths.    Pt dilcia well overall with health needs and life in general, denies current or past suicidal/homicidal ideation. has stable supports, adequate insurance, financial stability, transportation and caregiver plans in place, and is using no substances unless prescribed.     Payer/Plan Subscr  Sex Relation Sub. Ins. ID Effective Group Num   1. [employer] MANAGE* BRITTANYAPOORVA * 1990 Female Self [id number]                                   [address] [address]   2. [place]OR[place] * 1990 Female Self [id number]* 16                                    [address] [address]            "

## 2022-11-30 NOTE — PROGRESS NOTES
The patient location is: [place]  The chief complaint leading to consultation is: awv    Visit type: audiovisual    Face to Face time with patient: 60  60 minutes of total time spent on the encounter, which includes face to face time and non-face to face time preparing to see the patient (eg, review of tests), Obtaining and/or reviewing separately obtained history, Documenting clinical information in the electronic or other health record, Independently interpreting results (not separately reported) and communicating results to the patient/family/caregiver, or Care coordination (not separately reported).         Each patient to whom he or she provides medical services by telemedicine is:  (1) informed of the relationship between the physician and patient and the respective role of any other health care provider with respect to management of the patient; and (2) notified that he or she may decline to receive medical services by telemedicine and may withdraw from such care at any time.    Notes:   Review for Opioid Screening: Pt does not have Rx for Opioids    Review for Substance Use Disorders: Patient does not use substance        Lisseth Schwartz presented for a  Medicare AWV and comprehensive Health Risk Assessment today. The following components were reviewed and updated:    Medical history  Family History  Social history  Allergies and Current Medications  Health Risk Assessment  Health Maintenance  Care Team         ** See Completed Assessments for Annual Wellness Visit within the encounter summary.**         The following assessments were completed:  Living Situation  CAGE  Depression Screening  Fall Risk Assessment (MACH 10)  Hearing Assessment(HHI)  Cognitive Function Screening  Nutrition Screening  ADL Screening  PAQ Screening      There were no vitals filed for this visit.  There is no height or weight on file to calculate BMI.  Physical Exam  Constitutional:       Appearance: Normal appearance.    Neurological:      Mental Status: She is alert.   Psychiatric:         Attention and Perception: Attention and perception normal.         Mood and Affect: Mood and affect normal.         Speech: Speech normal.         Behavior: Behavior normal. Behavior is cooperative.         Thought Content: Thought content normal.         Cognition and Memory: Cognition and memory normal.         Judgment: Judgment normal.             Diagnoses and health risks identified today and associated recommendations/orders:    1. Weight loss, abnormal  Stable, followed by provider, Pt states she has been going through a very stressful time, that, along with dialysis has caused her to lose weight    2. Encounter for preventive health examination  Stable, followed by provider    3. ESRD (end stage renal disease)  Stable, followed by provider, pt currently on dialysis     4. Secondary hyperparathyroidism  Stable, followed by provider    5. Dependence on renal dialysis  Stable, followed by provider, pt currently on dialysis       Provided Lisseth with a 5-10 year written screening schedule and personal prevention plan. Recommendations were developed using the USPSTF age appropriate recommendations. Education, counseling, and referrals were provided as needed. After Visit Summary printed and given to patient which includes a list of additional screenings\tests needed.    Follow up in about 1 year (around 11/30/2023) for your next annual wellness visit.    Elieser Wagner, JOSEPH  I offered to discuss advanced care planning, including how to pick a person who would make decisions for you if you were unable to make them for yourself, called a health care power of , and what kind of decisions you might make such as use of life sustaining treatments such as ventilators and tube feeding when faced with a life limiting illness recorded on a living will that they will need to know. (How you want to be cared for as you near the end of your natural  life)     X Patient is interested in learning more about how to make advanced directives.  I provided them paperwork and offered to discuss this with them.

## 2022-12-06 ENCOUNTER — HOSPITAL ENCOUNTER (OUTPATIENT)
Dept: CARDIOLOGY | Facility: HOSPITAL | Age: 32
Discharge: HOME | End: 2022-12-06
Attending: NURSE PRACTITIONER
Payer: MEDICARE

## 2022-12-06 VITALS
WEIGHT: 203 LBS | SYSTOLIC BLOOD PRESSURE: 154 MMHG | BODY MASS INDEX: 34.66 KG/M2 | DIASTOLIC BLOOD PRESSURE: 84 MMHG | HEIGHT: 64 IN | HEART RATE: 76 BPM

## 2022-12-06 DIAGNOSIS — Z76.82 ORGAN TRANSPLANT CANDIDATE: ICD-10-CM

## 2022-12-06 LAB
AORTIC ROOT ANNULUS: 3.28 CM
AV INDEX (PROSTH): 0.75
AV MEAN GRADIENT: 6 MMHG
AV PEAK GRADIENT: 12 MMHG
AV VALVE AREA: 2.72 CM2
AV VELOCITY RATIO: 0.72
BSA FOR ECHO PROCEDURE: 2.04 M2
CV ECHO LV RWT: 0.52 CM
CV STRESS BASE HR: 78 BPM
DIASTOLIC BLOOD PRESSURE: 85 MMHG
DOP CALC AO PEAK VEL: 1.7 M/S
DOP CALC AO VTI: 35.3 CM
DOP CALC LVOT AREA: 3.6 CM2
DOP CALC LVOT DIAMETER: 2.15 CM
DOP CALC LVOT PEAK VEL: 1.23 M/S
DOP CALC LVOT STROKE VOLUME: 96.16 CM3
DOP CALC RVOT PEAK VEL: 1.18 M/S
DOP CALC RVOT VTI: 29 CM
DOP CALCLVOT PEAK VEL VTI: 26.5 CM
E WAVE DECELERATION TIME: 189.68 MSEC
E/A RATIO: 0.93
E/E' RATIO: 12.47 M/S
ECHO LV POSTERIOR WALL: 1.41 CM (ref 0.6–1.1)
EJECTION FRACTION: 60 %
FRACTIONAL SHORTENING: 32 % (ref 28–44)
INTERVENTRICULAR SEPTUM: 1.55 CM (ref 0.6–1.1)
IVRT: 114.18 MSEC
LA MAJOR: 5.01 CM
LA MINOR: 4.82 CM
LA WIDTH: 4.8 CM
LEFT ATRIUM SIZE: 4.45 CM
LEFT ATRIUM VOLUME INDEX MOD: 41.1 ML/M2
LEFT ATRIUM VOLUME INDEX: 45.3 ML/M2
LEFT ATRIUM VOLUME MOD: 80.96 CM3
LEFT ATRIUM VOLUME: 89.2 CM3
LEFT INTERNAL DIMENSION IN SYSTOLE: 3.66 CM (ref 2.1–4)
LEFT VENTRICLE DIASTOLIC VOLUME INDEX: 71.76 ML/M2
LEFT VENTRICLE DIASTOLIC VOLUME: 141.36 ML
LEFT VENTRICLE MASS INDEX: 181 G/M2
LEFT VENTRICLE SYSTOLIC VOLUME INDEX: 28.7 ML/M2
LEFT VENTRICLE SYSTOLIC VOLUME: 56.5 ML
LEFT VENTRICULAR INTERNAL DIMENSION IN DIASTOLE: 5.4 CM (ref 3.5–6)
LEFT VENTRICULAR MASS: 355.71 G
LV LATERAL E/E' RATIO: 13.25 M/S
LV SEPTAL E/E' RATIO: 11.78 M/S
LVOT MG: 3.57 MMHG
LVOT MV: 0.9 CM/S
MV PEAK A VEL: 1.14 M/S
MV PEAK E VEL: 1.06 M/S
MV STENOSIS PRESSURE HALF TIME: 55.01 MS
MV VALVE AREA P 1/2 METHOD: 4 CM2
OHS CV CPX 1 MINUTE RECOVERY HEART RATE: 121 BPM
OHS CV CPX 85 PERCENT MAX PREDICTED HEART RATE MALE: 150
OHS CV CPX ESTIMATED METS: 7
OHS CV CPX MAX PREDICTED HEART RATE: 177
OHS CV CPX PATIENT IS FEMALE: 1
OHS CV CPX PATIENT IS MALE: 0
OHS CV CPX PEAK DIASTOLIC BLOOD PRESSURE: 90 MMHG
OHS CV CPX PEAK HEAR RATE: 162 BPM
OHS CV CPX PEAK RATE PRESSURE PRODUCT: ABNORMAL
OHS CV CPX PEAK SYSTOLIC BLOOD PRESSURE: 211 MMHG
OHS CV CPX PERCENT MAX PREDICTED HEART RATE ACHIEVED: 92
OHS CV CPX RATE PRESSURE PRODUCT PRESENTING: ABNORMAL
PISA TR MAX VEL: 2.42 M/S
PV MEAN GRADIENT: 3.05 MMHG
PV PEAK VELOCITY: 1.22 CM/S
RA MAJOR: 4.62 CM
RA PRESSURE: 3 MMHG
RA WIDTH: 4.2 CM
RIGHT VENTRICULAR END-DIASTOLIC DIMENSION: 3.62 CM
SINUS: 3.18 CM
STJ: 3.06 CM
STRESS ANGINA INDEX: 0
STRESS ECHO POST EXERCISE DUR MIN: 6 MINUTES
STRESS ECHO POST EXERCISE DUR SEC: 2 SECONDS
SYSTOLIC BLOOD PRESSURE: 154 MMHG
TDI LATERAL: 0.08 M/S
TDI SEPTAL: 0.09 M/S
TDI: 0.09 M/S
TR MAX PG: 23 MMHG
TRICUSPID ANNULAR PLANE SYSTOLIC EXCURSION: 2.57 CM
TV REST PULMONARY ARTERY PRESSURE: 26 MMHG

## 2022-12-06 PROCEDURE — 93351 STRESS TTE COMPLETE: CPT | Mod: 26,TXP,, | Performed by: INTERNAL MEDICINE

## 2022-12-06 PROCEDURE — 93351 STRESS TTE COMPLETE: CPT | Mod: TXP

## 2022-12-06 PROCEDURE — 93351 STRESS ECHO (CUPID ONLY): ICD-10-PCS | Mod: 26,TXP,, | Performed by: INTERNAL MEDICINE

## 2022-12-21 LAB
CLASS I ANTIBODIES - LUMINEX: NORMAL
CLASS I ANTIBODY COMMENTS - LUMINEX: NORMAL
CLASS II ANTIBODY COMMENTS - LUMINEX: NORMAL
CPRA %: 53
SERUM COLLECTION DT - LUMINEX CLASS I: NORMAL
SERUM COLLECTION DT - LUMINEX CLASS II: NORMAL
SPCL1 TESTING DATE: NORMAL
SPCL2 TESTING DATE: NORMAL
SPCLU TESTING DATE: NORMAL

## 2023-01-01 ENCOUNTER — TELEPHONE (OUTPATIENT)
Dept: TRANSPLANT | Facility: CLINIC | Age: 33
End: 2023-01-01
Payer: MEDICARE

## 2023-01-01 ENCOUNTER — SOCIAL WORK (OUTPATIENT)
Dept: TRANSPLANT | Facility: HOSPITAL | Age: 33
End: 2023-01-01
Payer: MEDICARE

## 2023-01-01 DIAGNOSIS — Z76.82 AWAITING ORGAN TRANSPLANT STATUS: Primary | ICD-10-CM

## 2023-01-02 ENCOUNTER — ANESTHESIA EVENT (OUTPATIENT)
Dept: SURGERY | Facility: HOSPITAL | Age: 33
DRG: 652 | End: 2023-01-02
Payer: MEDICARE

## 2023-01-02 ENCOUNTER — ANESTHESIA (OUTPATIENT)
Dept: SURGERY | Facility: HOSPITAL | Age: 33
DRG: 652 | End: 2023-01-02
Payer: MEDICARE

## 2023-01-02 ENCOUNTER — HOSPITAL ENCOUNTER (INPATIENT)
Facility: HOSPITAL | Age: 33
LOS: 3 days | Discharge: HOME | DRG: 652 | End: 2023-01-05
Attending: TRANSPLANT SURGERY | Admitting: TRANSPLANT SURGERY
Payer: MEDICARE

## 2023-01-02 DIAGNOSIS — N25.81 SECONDARY HYPERPARATHYROIDISM: ICD-10-CM

## 2023-01-02 DIAGNOSIS — D62 ACUTE BLOOD LOSS ANEMIA: ICD-10-CM

## 2023-01-02 DIAGNOSIS — N05.1 FSGS (FOCAL SEGMENTAL GLOMERULOSCLEROSIS): ICD-10-CM

## 2023-01-02 DIAGNOSIS — I10 HYPERTENSION, UNSPECIFIED TYPE: ICD-10-CM

## 2023-01-02 DIAGNOSIS — Z91.89 AT RISK FOR OPPORTUNISTIC INFECTIONS: ICD-10-CM

## 2023-01-02 DIAGNOSIS — D63.8 ANEMIA OF CHRONIC DISEASE: ICD-10-CM

## 2023-01-02 DIAGNOSIS — N18.6 ESRD (END STAGE RENAL DISEASE): ICD-10-CM

## 2023-01-02 DIAGNOSIS — Z01.818 PRE-OP EVALUATION: ICD-10-CM

## 2023-01-02 DIAGNOSIS — N18.6 ESRD (END STAGE RENAL DISEASE): Primary | ICD-10-CM

## 2023-01-02 DIAGNOSIS — R06.02 SOB (SHORTNESS OF BREATH): ICD-10-CM

## 2023-01-02 DIAGNOSIS — Z29.89 PROPHYLACTIC IMMUNOTHERAPY: ICD-10-CM

## 2023-01-02 DIAGNOSIS — E83.39 HYPERPHOSPHATEMIA: ICD-10-CM

## 2023-01-02 DIAGNOSIS — Z94.0 S/P KIDNEY TRANSPLANT: Primary | ICD-10-CM

## 2023-01-02 LAB
25(OH)D3+25(OH)D2 SERPL-MCNC: 18 NG/ML (ref 30–96)
ABO + RH BLD: NORMAL
ALBUMIN SERPL BCP-MCNC: 3.4 G/DL (ref 3.5–5.2)
ALBUMIN SERPL BCP-MCNC: 3.9 G/DL (ref 3.5–5.2)
ALP SERPL-CCNC: 83 U/L (ref 55–135)
ALT SERPL W/O P-5'-P-CCNC: 10 U/L (ref 10–44)
ANION GAP SERPL CALC-SCNC: 18 MMOL/L (ref 8–16)
ANION GAP SERPL CALC-SCNC: 20 MMOL/L (ref 8–16)
ANION GAP SERPL CALC-SCNC: 20 MMOL/L (ref 8–16)
APTT BLDCRRT: 27.2 SEC (ref 21–32)
AST SERPL-CCNC: 10 U/L (ref 10–40)
BASOPHILS # BLD AUTO: 0 K/UL (ref 0–0.2)
BASOPHILS # BLD AUTO: 0.03 K/UL (ref 0–0.2)
BASOPHILS NFR BLD: 0 % (ref 0–1.9)
BASOPHILS NFR BLD: 0.5 % (ref 0–1.9)
BILIRUB SERPL-MCNC: 1 MG/DL (ref 0.1–1)
BLD GP AB SCN CELLS X3 SERPL QL: NORMAL
BUN SERPL-MCNC: 68 MG/DL (ref 6–20)
BUN SERPL-MCNC: 70 MG/DL (ref 6–20)
BUN SERPL-MCNC: 73 MG/DL (ref 6–20)
CALCIUM SERPL-MCNC: 10.1 MG/DL (ref 8.7–10.5)
CALCIUM SERPL-MCNC: 8.7 MG/DL (ref 8.7–10.5)
CALCIUM SERPL-MCNC: 9.3 MG/DL (ref 8.7–10.5)
CHLORIDE SERPL-SCNC: 96 MMOL/L (ref 95–110)
CHLORIDE SERPL-SCNC: 98 MMOL/L (ref 95–110)
CHLORIDE SERPL-SCNC: 98 MMOL/L (ref 95–110)
CHOLEST SERPL-MCNC: 126 MG/DL (ref 120–199)
CHOLEST/HDLC SERPL: 5.3 {RATIO} (ref 2–5)
CO2 SERPL-SCNC: 19 MMOL/L (ref 23–29)
CO2 SERPL-SCNC: 20 MMOL/L (ref 23–29)
CO2 SERPL-SCNC: 25 MMOL/L (ref 23–29)
CREAT SERPL-MCNC: 19 MG/DL (ref 0.5–1.4)
CREAT SERPL-MCNC: 19.9 MG/DL (ref 0.5–1.4)
CREAT SERPL-MCNC: 21.1 MG/DL (ref 0.5–1.4)
CREAT UR-MCNC: 94 MG/DL (ref 15–325)
DIFFERENTIAL METHOD: ABNORMAL
DIFFERENTIAL METHOD: ABNORMAL
EOSINOPHIL # BLD AUTO: 0 K/UL (ref 0–0.5)
EOSINOPHIL # BLD AUTO: 0.1 K/UL (ref 0–0.5)
EOSINOPHIL NFR BLD: 0 % (ref 0–8)
EOSINOPHIL NFR BLD: 0.9 % (ref 0–8)
ERYTHROCYTE [DISTWIDTH] IN BLOOD BY AUTOMATED COUNT: 14.9 % (ref 11.5–14.5)
ERYTHROCYTE [DISTWIDTH] IN BLOOD BY AUTOMATED COUNT: 15.3 % (ref 11.5–14.5)
EST. GFR  (NO RACE VARIABLE): 2 ML/MIN/1.73 M^2
EST. GFR  (NO RACE VARIABLE): 2.1 ML/MIN/1.73 M^2
EST. GFR  (NO RACE VARIABLE): 2.2 ML/MIN/1.73 M^2
GLUCOSE SERPL-MCNC: 123 MG/DL (ref 70–110)
GLUCOSE SERPL-MCNC: 137 MG/DL (ref 70–110)
GLUCOSE SERPL-MCNC: 74 MG/DL (ref 70–110)
HBV CORE AB SERPL QL IA: NORMAL
HBV CORE IGM SERPL QL IA: NORMAL
HBV SURFACE AG SERPL QL IA: NORMAL
HCG INTACT+B SERPL-ACNC: <2.4 MIU/ML
HCT VFR BLD AUTO: 25.6 % (ref 37–48.5)
HCT VFR BLD AUTO: 26.9 % (ref 37–48.5)
HCT VFR BLD AUTO: 26.9 % (ref 37–48.5)
HCV AB SERPL QL IA: NORMAL
HDLC SERPL-MCNC: 24 MG/DL (ref 40–75)
HDLC SERPL: 19 % (ref 20–50)
HGB BLD-MCNC: 8.5 G/DL (ref 12–16)
HGB BLD-MCNC: 9 G/DL (ref 12–16)
HIV 1+2 AB+HIV1 P24 AG SERPL QL IA: NORMAL
IMM GRANULOCYTES # BLD AUTO: 0.03 K/UL (ref 0–0.04)
IMM GRANULOCYTES # BLD AUTO: 0.07 K/UL (ref 0–0.04)
IMM GRANULOCYTES NFR BLD AUTO: 0.5 % (ref 0–0.5)
IMM GRANULOCYTES NFR BLD AUTO: 0.8 % (ref 0–0.5)
INR PPP: 1.1 (ref 0.8–1.2)
LDH SERPL L TO P-CCNC: 193 U/L (ref 110–260)
LDLC SERPL CALC-MCNC: 81.4 MG/DL (ref 63–159)
LYMPHOCYTES # BLD AUTO: 0.1 K/UL (ref 1–4.8)
LYMPHOCYTES # BLD AUTO: 1.4 K/UL (ref 1–4.8)
LYMPHOCYTES NFR BLD: 0.7 % (ref 18–48)
LYMPHOCYTES NFR BLD: 21.5 % (ref 18–48)
MCH RBC QN AUTO: 30.9 PG (ref 27–31)
MCH RBC QN AUTO: 31.8 PG (ref 27–31)
MCHC RBC AUTO-ENTMCNC: 31.6 G/DL (ref 32–36)
MCHC RBC AUTO-ENTMCNC: 33.5 G/DL (ref 32–36)
MCV RBC AUTO: 95 FL (ref 82–98)
MCV RBC AUTO: 98 FL (ref 82–98)
MONOCYTES # BLD AUTO: 0.4 K/UL (ref 0.3–1)
MONOCYTES # BLD AUTO: 0.5 K/UL (ref 0.3–1)
MONOCYTES NFR BLD: 4.1 % (ref 4–15)
MONOCYTES NFR BLD: 7.7 % (ref 4–15)
NEUTROPHILS # BLD AUTO: 4.4 K/UL (ref 1.8–7.7)
NEUTROPHILS # BLD AUTO: 8.6 K/UL (ref 1.8–7.7)
NEUTROPHILS NFR BLD: 68.9 % (ref 38–73)
NEUTROPHILS NFR BLD: 94.4 % (ref 38–73)
NONHDLC SERPL-MCNC: 102 MG/DL
NRBC BLD-RTO: 0 /100 WBC
NRBC BLD-RTO: 0 /100 WBC
PHOSPHATE SERPL-MCNC: 10.4 MG/DL (ref 2.7–4.5)
PHOSPHATE SERPL-MCNC: 9.4 MG/DL (ref 2.7–4.5)
PLATELET # BLD AUTO: 116 K/UL (ref 150–450)
PLATELET # BLD AUTO: 157 K/UL (ref 150–450)
PMV BLD AUTO: 10.9 FL (ref 9.2–12.9)
PMV BLD AUTO: 11.3 FL (ref 9.2–12.9)
POCT GLUCOSE: 109 MG/DL (ref 70–110)
POCT GLUCOSE: 139 MG/DL (ref 70–110)
POTASSIUM SERPL-SCNC: 4.2 MMOL/L (ref 3.5–5.1)
POTASSIUM SERPL-SCNC: 4.4 MMOL/L (ref 3.5–5.1)
POTASSIUM SERPL-SCNC: 4.6 MMOL/L (ref 3.5–5.1)
PROT SERPL-MCNC: 7.8 G/DL (ref 6–8.4)
PROT UR-MCNC: 448 MG/DL (ref 0–15)
PROT/CREAT UR: 4.77 MG/G{CREAT} (ref 0–0.2)
PROTHROMBIN TIME: 11.1 SEC (ref 9–12.5)
PTH-INTACT SERPL-MCNC: 1451.5 PG/ML (ref 9–77)
RBC # BLD AUTO: 2.75 M/UL (ref 4–5.4)
RBC # BLD AUTO: 2.83 M/UL (ref 4–5.4)
SARS-COV-2 RDRP RESP QL NAA+PROBE: NEGATIVE
SODIUM SERPL-SCNC: 135 MMOL/L (ref 136–145)
SODIUM SERPL-SCNC: 138 MMOL/L (ref 136–145)
SODIUM SERPL-SCNC: 141 MMOL/L (ref 136–145)
TRIGL SERPL-MCNC: 103 MG/DL (ref 30–150)
URATE SERPL-MCNC: 8.1 MG/DL (ref 2.4–5.7)
WBC # BLD AUTO: 6.34 K/UL (ref 3.9–12.7)
WBC # BLD AUTO: 9.05 K/UL (ref 3.9–12.7)

## 2023-01-02 PROCEDURE — 86706 HEP B SURFACE ANTIBODY: CPT | Mod: HCNC | Performed by: PHYSICIAN ASSISTANT

## 2023-01-02 PROCEDURE — 85025 COMPLETE CBC W/AUTO DIFF WBC: CPT | Mod: HCNC | Performed by: PHYSICIAN ASSISTANT

## 2023-01-02 PROCEDURE — 99223 PR INITIAL HOSPITAL CARE,LEVL III: ICD-10-PCS | Mod: AI,HCNC,NTX, | Performed by: PHYSICIAN ASSISTANT

## 2023-01-02 PROCEDURE — 25000003 PHARM REV CODE 250: Mod: HCNC,NTX | Performed by: PHYSICIAN ASSISTANT

## 2023-01-02 PROCEDURE — 50605 INSERT URETERAL SUPPORT: CPT | Mod: 51,HCNC,RT, | Performed by: TRANSPLANT SURGERY

## 2023-01-02 PROCEDURE — 25000003 PHARM REV CODE 250: Mod: HCNC,NTX | Performed by: INTERNAL MEDICINE

## 2023-01-02 PROCEDURE — 20600001 HC STEP DOWN PRIVATE ROOM: Mod: HCNC

## 2023-01-02 PROCEDURE — 84156 ASSAY OF PROTEIN URINE: CPT | Mod: HCNC | Performed by: PHYSICIAN ASSISTANT

## 2023-01-02 PROCEDURE — 84100 ASSAY OF PHOSPHORUS: CPT | Mod: HCNC | Performed by: PHYSICIAN ASSISTANT

## 2023-01-02 PROCEDURE — 93005 ELECTROCARDIOGRAM TRACING: CPT | Mod: HCNC

## 2023-01-02 PROCEDURE — 63600175 PHARM REV CODE 636 W HCPCS: Mod: HCNC,NTX | Performed by: PHYSICIAN ASSISTANT

## 2023-01-02 PROCEDURE — 71000033 HC RECOVERY, INTIAL HOUR: Mod: HCNC | Performed by: TRANSPLANT SURGERY

## 2023-01-02 PROCEDURE — U0002 COVID-19 LAB TEST NON-CDC: HCPCS | Mod: HCNC | Performed by: PHYSICIAN ASSISTANT

## 2023-01-02 PROCEDURE — 87522 HEPATITIS C REVRS TRNSCRPJ: CPT | Mod: HCNC | Performed by: PHYSICIAN ASSISTANT

## 2023-01-02 PROCEDURE — 37000008 HC ANESTHESIA 1ST 15 MINUTES: Mod: HCNC | Performed by: TRANSPLANT SURGERY

## 2023-01-02 PROCEDURE — 80053 COMPREHEN METABOLIC PANEL: CPT | Mod: HCNC | Performed by: PHYSICIAN ASSISTANT

## 2023-01-02 PROCEDURE — 83970 ASSAY OF PARATHORMONE: CPT | Mod: HCNC | Performed by: PHYSICIAN ASSISTANT

## 2023-01-02 PROCEDURE — 84702 CHORIONIC GONADOTROPIN TEST: CPT | Mod: HCNC | Performed by: PHYSICIAN ASSISTANT

## 2023-01-02 PROCEDURE — 71000015 HC POSTOP RECOV 1ST HR: Mod: HCNC | Performed by: TRANSPLANT SURGERY

## 2023-01-02 PROCEDURE — 50323 PR TRANSPLANT,PREP CADAVER RENAL GRAFT: ICD-10-PCS | Mod: 51,HCNC,, | Performed by: TRANSPLANT SURGERY

## 2023-01-02 PROCEDURE — 93010 ELECTROCARDIOGRAM REPORT: CPT | Mod: HCNC,,, | Performed by: INTERNAL MEDICINE

## 2023-01-02 PROCEDURE — 93010 EKG 12-LEAD: ICD-10-PCS | Mod: HCNC,,, | Performed by: INTERNAL MEDICINE

## 2023-01-02 PROCEDURE — 80069 RENAL FUNCTION PANEL: CPT | Mod: HCNC | Performed by: STUDENT IN AN ORGANIZED HEALTH CARE EDUCATION/TRAINING PROGRAM

## 2023-01-02 PROCEDURE — S5010 5% DEXTROSE AND 0.45% SALINE: HCPCS | Mod: HCNC | Performed by: STUDENT IN AN ORGANIZED HEALTH CARE EDUCATION/TRAINING PROGRAM

## 2023-01-02 PROCEDURE — 85014 HEMATOCRIT: CPT | Mod: HCNC | Performed by: STUDENT IN AN ORGANIZED HEALTH CARE EDUCATION/TRAINING PROGRAM

## 2023-01-02 PROCEDURE — 36000931 HC OR TIME LEV VII EA ADD 15 MIN: Mod: HCNC | Performed by: TRANSPLANT SURGERY

## 2023-01-02 PROCEDURE — 36620 INSERTION CATHETER ARTERY: CPT | Mod: 59,HCNC,, | Performed by: ANESTHESIOLOGY

## 2023-01-02 PROCEDURE — 80048 BASIC METABOLIC PNL TOTAL CA: CPT | Mod: HCNC,XB | Performed by: PHYSICIAN ASSISTANT

## 2023-01-02 PROCEDURE — C2617 STENT, NON-COR, TEM W/O DEL: HCPCS | Mod: HCNC | Performed by: TRANSPLANT SURGERY

## 2023-01-02 PROCEDURE — 63600175 PHARM REV CODE 636 W HCPCS: Mod: HCNC | Performed by: NURSE ANESTHETIST, CERTIFIED REGISTERED

## 2023-01-02 PROCEDURE — 25000003 PHARM REV CODE 250: Mod: HCNC | Performed by: STUDENT IN AN ORGANIZED HEALTH CARE EDUCATION/TRAINING PROGRAM

## 2023-01-02 PROCEDURE — 36415 COLL VENOUS BLD VENIPUNCTURE: CPT | Mod: HCNC | Performed by: STUDENT IN AN ORGANIZED HEALTH CARE EDUCATION/TRAINING PROGRAM

## 2023-01-02 PROCEDURE — 71000039 HC RECOVERY, EACH ADD'L HOUR: Mod: HCNC | Performed by: TRANSPLANT SURGERY

## 2023-01-02 PROCEDURE — 36000930 HC OR TIME LEV VII 1ST 15 MIN: Mod: HCNC | Performed by: TRANSPLANT SURGERY

## 2023-01-02 PROCEDURE — 63600175 PHARM REV CODE 636 W HCPCS: Mod: HCNC | Performed by: ANESTHESIOLOGY

## 2023-01-02 PROCEDURE — 99223 1ST HOSP IP/OBS HIGH 75: CPT | Mod: AI,HCNC,NTX, | Performed by: PHYSICIAN ASSISTANT

## 2023-01-02 PROCEDURE — 63600175 PHARM REV CODE 636 W HCPCS: Mod: HCNC | Performed by: STUDENT IN AN ORGANIZED HEALTH CARE EDUCATION/TRAINING PROGRAM

## 2023-01-02 PROCEDURE — 25000003 PHARM REV CODE 250: Mod: HCNC | Performed by: PHYSICIAN ASSISTANT

## 2023-01-02 PROCEDURE — 85610 PROTHROMBIN TIME: CPT | Mod: HCNC | Performed by: PHYSICIAN ASSISTANT

## 2023-01-02 PROCEDURE — 86900 BLOOD TYPING SEROLOGIC ABO: CPT | Mod: HCNC | Performed by: PHYSICIAN ASSISTANT

## 2023-01-02 PROCEDURE — 27201423 OPTIME MED/SURG SUP & DEVICES STERILE SUPPLY: Mod: HCNC | Performed by: TRANSPLANT SURGERY

## 2023-01-02 PROCEDURE — D9220A PRA ANESTHESIA: Mod: HCNC,CRNA,, | Performed by: NURSE ANESTHETIST, CERTIFIED REGISTERED

## 2023-01-02 PROCEDURE — 86803 HEPATITIS C AB TEST: CPT | Mod: HCNC | Performed by: PHYSICIAN ASSISTANT

## 2023-01-02 PROCEDURE — 85730 THROMBOPLASTIN TIME PARTIAL: CPT | Mod: HCNC | Performed by: PHYSICIAN ASSISTANT

## 2023-01-02 PROCEDURE — 82962 GLUCOSE BLOOD TEST: CPT | Mod: HCNC | Performed by: TRANSPLANT SURGERY

## 2023-01-02 PROCEDURE — 25000003 PHARM REV CODE 250: Mod: HCNC | Performed by: ANESTHESIOLOGY

## 2023-01-02 PROCEDURE — 63600175 PHARM REV CODE 636 W HCPCS: Mod: HCNC | Performed by: TRANSPLANT SURGERY

## 2023-01-02 PROCEDURE — 87340 HEPATITIS B SURFACE AG IA: CPT | Mod: HCNC | Performed by: PHYSICIAN ASSISTANT

## 2023-01-02 PROCEDURE — D9220A PRA ANESTHESIA: ICD-10-PCS | Mod: HCNC,ANES,, | Performed by: ANESTHESIOLOGY

## 2023-01-02 PROCEDURE — 83615 LACTATE (LD) (LDH) ENZYME: CPT | Mod: HCNC | Performed by: PHYSICIAN ASSISTANT

## 2023-01-02 PROCEDURE — D9220A PRA ANESTHESIA: Mod: HCNC,ANES,, | Performed by: ANESTHESIOLOGY

## 2023-01-02 PROCEDURE — 80061 LIPID PANEL: CPT | Mod: HCNC | Performed by: PHYSICIAN ASSISTANT

## 2023-01-02 PROCEDURE — 63600175 PHARM REV CODE 636 W HCPCS: Mod: HCNC | Performed by: CLINICAL NURSE SPECIALIST

## 2023-01-02 PROCEDURE — 25000003 PHARM REV CODE 250: Mod: HCNC | Performed by: NURSE ANESTHETIST, CERTIFIED REGISTERED

## 2023-01-02 PROCEDURE — 84550 ASSAY OF BLOOD/URIC ACID: CPT | Mod: HCNC | Performed by: PHYSICIAN ASSISTANT

## 2023-01-02 PROCEDURE — 81200001 HC KIDNEY ACQUISITION - CADAVER

## 2023-01-02 PROCEDURE — 36620 PR INSERT CATH,ART,PERCUT,SHORTTERM: ICD-10-PCS | Mod: 59,HCNC,, | Performed by: ANESTHESIOLOGY

## 2023-01-02 PROCEDURE — 63600175 PHARM REV CODE 636 W HCPCS: Mod: HCNC,NTX | Performed by: INTERNAL MEDICINE

## 2023-01-02 PROCEDURE — 82306 VITAMIN D 25 HYDROXY: CPT | Mod: HCNC | Performed by: PHYSICIAN ASSISTANT

## 2023-01-02 PROCEDURE — 36415 COLL VENOUS BLD VENIPUNCTURE: CPT | Mod: HCNC | Performed by: PHYSICIAN ASSISTANT

## 2023-01-02 PROCEDURE — D9220A PRA ANESTHESIA: ICD-10-PCS | Mod: HCNC,CRNA,, | Performed by: NURSE ANESTHETIST, CERTIFIED REGISTERED

## 2023-01-02 PROCEDURE — 50360 RNL ALTRNSPLJ W/O RCP NFRCT: CPT | Mod: HCNC,RT,, | Performed by: TRANSPLANT SURGERY

## 2023-01-02 PROCEDURE — 81300002 HC KIDNEY TRANSPORT, GROUND 4-5 HOURS

## 2023-01-02 PROCEDURE — 86705 HEP B CORE ANTIBODY IGM: CPT | Mod: HCNC | Performed by: PHYSICIAN ASSISTANT

## 2023-01-02 PROCEDURE — 86704 HEP B CORE ANTIBODY TOTAL: CPT | Mod: HCNC | Performed by: PHYSICIAN ASSISTANT

## 2023-01-02 PROCEDURE — 50605 PR URETEROTOMY TO INSERT STENT: ICD-10-PCS | Mod: 51,HCNC,RT, | Performed by: TRANSPLANT SURGERY

## 2023-01-02 PROCEDURE — 87389 HIV-1 AG W/HIV-1&-2 AB AG IA: CPT | Mod: HCNC | Performed by: PHYSICIAN ASSISTANT

## 2023-01-02 PROCEDURE — 25000003 PHARM REV CODE 250: Mod: HCNC | Performed by: TRANSPLANT SURGERY

## 2023-01-02 PROCEDURE — 50360 PR TRANSPLANTATION OF KIDNEY: ICD-10-PCS | Mod: HCNC,RT,, | Performed by: TRANSPLANT SURGERY

## 2023-01-02 PROCEDURE — 37000009 HC ANESTHESIA EA ADD 15 MINS: Mod: HCNC | Performed by: TRANSPLANT SURGERY

## 2023-01-02 DEVICE — STENT DOUBLE J 7FRX12CM
Type: IMPLANTABLE DEVICE | Site: URETER | Status: NON-FUNCTIONAL
Removed: 2023-01-25

## 2023-01-02 RX ORDER — GLUCAGON 1 MG
1 KIT INJECTION CONTINUOUS PRN
Status: DISCONTINUED | OUTPATIENT
Start: 2023-01-02 | End: 2023-01-05 | Stop reason: HOSPADM

## 2023-01-02 RX ORDER — METHYLPREDNISOLONE SOD SUCC 125 MG
125 VIAL (EA) INJECTION ONCE
Status: COMPLETED | OUTPATIENT
Start: 2023-01-04 | End: 2023-01-04

## 2023-01-02 RX ORDER — DEXAMETHASONE SODIUM PHOSPHATE 4 MG/ML
INJECTION, SOLUTION INTRA-ARTICULAR; INTRALESIONAL; INTRAMUSCULAR; INTRAVENOUS; SOFT TISSUE
Status: DISCONTINUED | OUTPATIENT
Start: 2023-01-02 | End: 2023-01-02

## 2023-01-02 RX ORDER — DIPHENHYDRAMINE HYDROCHLORIDE 50 MG/ML
50 INJECTION INTRAMUSCULAR; INTRAVENOUS ONCE
Status: COMPLETED | OUTPATIENT
Start: 2023-01-02 | End: 2023-01-02

## 2023-01-02 RX ORDER — FAMOTIDINE 20 MG/1
20 TABLET, FILM COATED ORAL NIGHTLY
Status: DISCONTINUED | OUTPATIENT
Start: 2023-01-02 | End: 2023-01-03

## 2023-01-02 RX ORDER — FENTANYL CITRATE 50 UG/ML
INJECTION, SOLUTION INTRAMUSCULAR; INTRAVENOUS
Status: DISCONTINUED | OUTPATIENT
Start: 2023-01-02 | End: 2023-01-02

## 2023-01-02 RX ORDER — MUPIROCIN 20 MG/G
OINTMENT TOPICAL
Status: DISCONTINUED | OUTPATIENT
Start: 2023-01-02 | End: 2023-01-02

## 2023-01-02 RX ORDER — IBUPROFEN 200 MG
16 TABLET ORAL
Status: DISCONTINUED | OUTPATIENT
Start: 2023-01-02 | End: 2023-01-05 | Stop reason: HOSPADM

## 2023-01-02 RX ORDER — OXYCODONE HYDROCHLORIDE 5 MG/1
5 TABLET ORAL EVERY 6 HOURS PRN
Status: DISCONTINUED | OUTPATIENT
Start: 2023-01-02 | End: 2023-01-03

## 2023-01-02 RX ORDER — CINACALCET 30 MG/1
30 TABLET, FILM COATED ORAL
Status: DISCONTINUED | OUTPATIENT
Start: 2023-01-03 | End: 2023-01-03

## 2023-01-02 RX ORDER — ONDANSETRON 2 MG/ML
4 INJECTION INTRAMUSCULAR; INTRAVENOUS EVERY 6 HOURS PRN
Status: DISCONTINUED | OUTPATIENT
Start: 2023-01-02 | End: 2023-01-05 | Stop reason: HOSPADM

## 2023-01-02 RX ORDER — SODIUM CHLORIDE 0.9 % (FLUSH) 0.9 %
10 SYRINGE (ML) INJECTION
Status: DISCONTINUED | OUTPATIENT
Start: 2023-01-02 | End: 2023-01-05 | Stop reason: HOSPADM

## 2023-01-02 RX ORDER — NICARDIPINE HYDROCHLORIDE 0.2 MG/ML
0-15 INJECTION INTRAVENOUS CONTINUOUS
Status: CANCELLED | OUTPATIENT
Start: 2023-01-02

## 2023-01-02 RX ORDER — DEXTROSE MONOHYDRATE AND SODIUM CHLORIDE 5; .45 G/100ML; G/100ML
INJECTION, SOLUTION INTRAVENOUS CONTINUOUS
Status: DISCONTINUED | OUTPATIENT
Start: 2023-01-02 | End: 2023-01-03

## 2023-01-02 RX ORDER — SODIUM CHLORIDE 9 MG/ML
INJECTION, SOLUTION INTRAVENOUS CONTINUOUS
Status: DISCONTINUED | OUTPATIENT
Start: 2023-01-02 | End: 2023-01-03

## 2023-01-02 RX ORDER — LABETALOL HCL 20 MG/4 ML
10 SYRINGE (ML) INTRAVENOUS EVERY 6 HOURS PRN
Status: DISCONTINUED | OUTPATIENT
Start: 2023-01-02 | End: 2023-01-05 | Stop reason: HOSPADM

## 2023-01-02 RX ORDER — PREDNISONE 20 MG/1
20 TABLET ORAL DAILY
Status: DISCONTINUED | OUTPATIENT
Start: 2023-01-05 | End: 2023-01-05 | Stop reason: HOSPADM

## 2023-01-02 RX ORDER — HYDRALAZINE HYDROCHLORIDE 20 MG/ML
10 INJECTION INTRAMUSCULAR; INTRAVENOUS ONCE
Status: COMPLETED | OUTPATIENT
Start: 2023-01-02 | End: 2023-01-02

## 2023-01-02 RX ORDER — HYDRALAZINE HYDROCHLORIDE 20 MG/ML
INJECTION INTRAMUSCULAR; INTRAVENOUS
Status: DISPENSED
Start: 2023-01-02 | End: 2023-01-02

## 2023-01-02 RX ORDER — VALGANCICLOVIR 450 MG/1
450 TABLET, FILM COATED ORAL EVERY MORNING
Status: DISCONTINUED | OUTPATIENT
Start: 2023-01-12 | End: 2023-01-05 | Stop reason: HOSPADM

## 2023-01-02 RX ORDER — LABETALOL HYDROCHLORIDE 5 MG/ML
5 INJECTION, SOLUTION INTRAVENOUS ONCE
Status: COMPLETED | OUTPATIENT
Start: 2023-01-02 | End: 2023-01-02

## 2023-01-02 RX ORDER — ONDANSETRON 2 MG/ML
INJECTION INTRAMUSCULAR; INTRAVENOUS
Status: DISCONTINUED | OUTPATIENT
Start: 2023-01-02 | End: 2023-01-02

## 2023-01-02 RX ORDER — ACETAMINOPHEN 325 MG/1
650 TABLET ORAL EVERY 8 HOURS
Status: DISCONTINUED | OUTPATIENT
Start: 2023-01-02 | End: 2023-01-03

## 2023-01-02 RX ORDER — NIFEDIPINE 30 MG/1
30 TABLET, EXTENDED RELEASE ORAL DAILY
Status: DISCONTINUED | OUTPATIENT
Start: 2023-01-02 | End: 2023-01-03

## 2023-01-02 RX ORDER — HYDROMORPHONE HYDROCHLORIDE 1 MG/ML
0.2 INJECTION, SOLUTION INTRAMUSCULAR; INTRAVENOUS; SUBCUTANEOUS ONCE
Status: COMPLETED | OUTPATIENT
Start: 2023-01-02 | End: 2023-01-02

## 2023-01-02 RX ORDER — HEPARIN SODIUM 5000 [USP'U]/ML
5000 INJECTION, SOLUTION INTRAVENOUS; SUBCUTANEOUS EVERY 8 HOURS
Status: DISCONTINUED | OUTPATIENT
Start: 2023-01-02 | End: 2023-01-05 | Stop reason: HOSPADM

## 2023-01-02 RX ORDER — ACETAMINOPHEN 325 MG/1
650 TABLET ORAL
Status: DISPENSED | OUTPATIENT
Start: 2023-01-03 | End: 2023-01-05

## 2023-01-02 RX ORDER — HYDRALAZINE HYDROCHLORIDE 20 MG/ML
10 INJECTION INTRAMUSCULAR; INTRAVENOUS ONCE
Status: DISCONTINUED | OUTPATIENT
Start: 2023-01-02 | End: 2023-01-03

## 2023-01-02 RX ORDER — TRAMADOL HYDROCHLORIDE 50 MG/1
50 TABLET ORAL EVERY 6 HOURS PRN
Status: DISCONTINUED | OUTPATIENT
Start: 2023-01-02 | End: 2023-01-05 | Stop reason: HOSPADM

## 2023-01-02 RX ORDER — FUROSEMIDE 10 MG/ML
INJECTION INTRAMUSCULAR; INTRAVENOUS
Status: DISCONTINUED | OUTPATIENT
Start: 2023-01-02 | End: 2023-01-02

## 2023-01-02 RX ORDER — METHYLPREDNISOLONE SOD SUCC 125 MG
250 VIAL (EA) INJECTION ONCE
Status: COMPLETED | OUTPATIENT
Start: 2023-01-03 | End: 2023-01-03

## 2023-01-02 RX ORDER — BUPIVACAINE HYDROCHLORIDE 2.5 MG/ML
INJECTION, SOLUTION EPIDURAL; INFILTRATION; INTRACAUDAL
Status: DISCONTINUED | OUTPATIENT
Start: 2023-01-02 | End: 2023-01-02 | Stop reason: HOSPADM

## 2023-01-02 RX ORDER — ACETAMINOPHEN 650 MG/20.3ML
650 LIQUID ORAL ONCE
Status: COMPLETED | OUTPATIENT
Start: 2023-01-02 | End: 2023-01-02

## 2023-01-02 RX ORDER — HYDRALAZINE HYDROCHLORIDE 20 MG/ML
10 INJECTION INTRAMUSCULAR; INTRAVENOUS EVERY 8 HOURS PRN
Status: DISCONTINUED | OUTPATIENT
Start: 2023-01-02 | End: 2023-01-02

## 2023-01-02 RX ORDER — MUPIROCIN 20 MG/G
1 OINTMENT TOPICAL 2 TIMES DAILY
Status: DISCONTINUED | OUTPATIENT
Start: 2023-01-02 | End: 2023-01-05 | Stop reason: HOSPADM

## 2023-01-02 RX ORDER — HYDROMORPHONE HYDROCHLORIDE 1 MG/ML
0.2 INJECTION, SOLUTION INTRAMUSCULAR; INTRAVENOUS; SUBCUTANEOUS EVERY 5 MIN PRN
Status: COMPLETED | OUTPATIENT
Start: 2023-01-02 | End: 2023-01-02

## 2023-01-02 RX ORDER — DIPHENHYDRAMINE HCL 25 MG
25 CAPSULE ORAL
Status: COMPLETED | OUTPATIENT
Start: 2023-01-03 | End: 2023-01-04

## 2023-01-02 RX ORDER — BISACODYL 5 MG
10 TABLET, DELAYED RELEASE (ENTERIC COATED) ORAL NIGHTLY
Status: DISCONTINUED | OUTPATIENT
Start: 2023-01-02 | End: 2023-01-05 | Stop reason: HOSPADM

## 2023-01-02 RX ORDER — IBUPROFEN 200 MG
24 TABLET ORAL
Status: DISCONTINUED | OUTPATIENT
Start: 2023-01-02 | End: 2023-01-05 | Stop reason: HOSPADM

## 2023-01-02 RX ORDER — CISATRACURIUM BESYLATE 10 MG/ML
INJECTION, SOLUTION INTRAVENOUS
Status: DISCONTINUED | OUTPATIENT
Start: 2023-01-02 | End: 2023-01-02

## 2023-01-02 RX ORDER — MYCOPHENOLATE MOFETIL 250 MG/1
1000 CAPSULE ORAL 2 TIMES DAILY
Status: DISCONTINUED | OUTPATIENT
Start: 2023-01-02 | End: 2023-01-05 | Stop reason: HOSPADM

## 2023-01-02 RX ORDER — LABETALOL HCL 20 MG/4 ML
SYRINGE (ML) INTRAVENOUS
Status: DISPENSED
Start: 2023-01-02 | End: 2023-01-03

## 2023-01-02 RX ORDER — PROPOFOL 10 MG/ML
VIAL (ML) INTRAVENOUS
Status: DISCONTINUED | OUTPATIENT
Start: 2023-01-02 | End: 2023-01-02

## 2023-01-02 RX ORDER — HEPARIN SODIUM 5000 [USP'U]/ML
5000 INJECTION, SOLUTION INTRAVENOUS; SUBCUTANEOUS ONCE
Status: COMPLETED | OUTPATIENT
Start: 2023-01-02 | End: 2023-01-02

## 2023-01-02 RX ORDER — METOPROLOL TARTRATE 50 MG/1
50 TABLET ORAL 2 TIMES DAILY
Status: DISCONTINUED | OUTPATIENT
Start: 2023-01-02 | End: 2023-01-05 | Stop reason: HOSPADM

## 2023-01-02 RX ORDER — MIDAZOLAM HYDROCHLORIDE 1 MG/ML
INJECTION, SOLUTION INTRAMUSCULAR; INTRAVENOUS
Status: DISCONTINUED | OUTPATIENT
Start: 2023-01-02 | End: 2023-01-02

## 2023-01-02 RX ORDER — HYDRALAZINE HYDROCHLORIDE 20 MG/ML
10 INJECTION INTRAMUSCULAR; INTRAVENOUS EVERY 8 HOURS PRN
Status: DISCONTINUED | OUTPATIENT
Start: 2023-01-02 | End: 2023-01-05 | Stop reason: HOSPADM

## 2023-01-02 RX ORDER — MANNITOL 250 MG/ML
INJECTION, SOLUTION INTRAVENOUS
Status: DISCONTINUED | OUTPATIENT
Start: 2023-01-02 | End: 2023-01-02

## 2023-01-02 RX ORDER — PREGABALIN 75 MG/1
75 CAPSULE ORAL
Status: COMPLETED | OUTPATIENT
Start: 2023-01-02 | End: 2023-01-02

## 2023-01-02 RX ORDER — LIDOCAINE HYDROCHLORIDE 20 MG/ML
INJECTION INTRAVENOUS
Status: DISCONTINUED | OUTPATIENT
Start: 2023-01-02 | End: 2023-01-02

## 2023-01-02 RX ORDER — DIPHENHYDRAMINE HCL 25 MG
50 CAPSULE ORAL ONCE AS NEEDED
Status: DISCONTINUED | OUTPATIENT
Start: 2023-01-02 | End: 2023-01-05 | Stop reason: HOSPADM

## 2023-01-02 RX ORDER — LABETALOL HYDROCHLORIDE 5 MG/ML
20 INJECTION, SOLUTION INTRAVENOUS ONCE
Status: DISCONTINUED | OUTPATIENT
Start: 2023-01-02 | End: 2023-01-02

## 2023-01-02 RX ORDER — SULFAMETHOXAZOLE AND TRIMETHOPRIM 400; 80 MG/1; MG/1
1 TABLET ORAL EVERY MORNING
Status: DISCONTINUED | OUTPATIENT
Start: 2023-01-12 | End: 2023-01-05 | Stop reason: HOSPADM

## 2023-01-02 RX ORDER — SODIUM CHLORIDE 0.9 % (FLUSH) 0.9 %
3 SYRINGE (ML) INJECTION
Status: DISCONTINUED | OUTPATIENT
Start: 2023-01-02 | End: 2023-01-02 | Stop reason: HOSPADM

## 2023-01-02 RX ORDER — TACROLIMUS 1 MG/1
3 CAPSULE ORAL 2 TIMES DAILY
Status: DISCONTINUED | OUTPATIENT
Start: 2023-01-02 | End: 2023-01-04

## 2023-01-02 RX ORDER — EPINEPHRINE 1 MG/ML
1 INJECTION, SOLUTION, CONCENTRATE INTRAVENOUS ONCE AS NEEDED
Status: DISCONTINUED | OUTPATIENT
Start: 2023-01-02 | End: 2023-01-05 | Stop reason: HOSPADM

## 2023-01-02 RX ORDER — CEFAZOLIN SODIUM 1 G/3ML
INJECTION, POWDER, FOR SOLUTION INTRAMUSCULAR; INTRAVENOUS
Status: DISCONTINUED | OUTPATIENT
Start: 2023-01-02 | End: 2023-01-02 | Stop reason: HOSPADM

## 2023-01-02 RX ORDER — DOCUSATE SODIUM 100 MG/1
100 CAPSULE, LIQUID FILLED ORAL 3 TIMES DAILY
Status: DISCONTINUED | OUTPATIENT
Start: 2023-01-02 | End: 2023-01-05 | Stop reason: HOSPADM

## 2023-01-02 RX ORDER — INSULIN ASPART 100 [IU]/ML
0-5 INJECTION, SOLUTION INTRAVENOUS; SUBCUTANEOUS
Status: DISCONTINUED | OUTPATIENT
Start: 2023-01-02 | End: 2023-01-05 | Stop reason: HOSPADM

## 2023-01-02 RX ORDER — LABETALOL HCL 20 MG/4 ML
5 SYRINGE (ML) INTRAVENOUS ONCE
Status: COMPLETED | OUTPATIENT
Start: 2023-01-02 | End: 2023-01-02

## 2023-01-02 RX ADMIN — FUROSEMIDE 100 MG: 10 INJECTION, SOLUTION INTRAMUSCULAR; INTRAVENOUS at 12:01

## 2023-01-02 RX ADMIN — HYDRALAZINE HYDROCHLORIDE 10 MG: 20 INJECTION INTRAMUSCULAR; INTRAVENOUS at 04:01

## 2023-01-02 RX ADMIN — TRAMADOL HYDROCHLORIDE 50 MG: 50 TABLET, COATED ORAL at 04:01

## 2023-01-02 RX ADMIN — ANTI-THYMOCYTE GLOBULIN (RABBIT) 50 MG: 5 INJECTION, POWDER, LYOPHILIZED, FOR SOLUTION INTRAVENOUS at 11:01

## 2023-01-02 RX ADMIN — HYDROMORPHONE HYDROCHLORIDE 0.2 MG: 1 INJECTION, SOLUTION INTRAMUSCULAR; INTRAVENOUS; SUBCUTANEOUS at 02:01

## 2023-01-02 RX ADMIN — PROPOFOL 200 MG: 10 INJECTION, EMULSION INTRAVENOUS at 10:01

## 2023-01-02 RX ADMIN — HYDROMORPHONE HYDROCHLORIDE 0.2 MG: 1 INJECTION, SOLUTION INTRAMUSCULAR; INTRAVENOUS; SUBCUTANEOUS at 03:01

## 2023-01-02 RX ADMIN — DEXTROSE AND SODIUM CHLORIDE: 5; 450 INJECTION, SOLUTION INTRAVENOUS at 02:01

## 2023-01-02 RX ADMIN — CISATRACURIUM BESYLATE 6 MG: 10 INJECTION, SOLUTION INTRAVENOUS at 10:01

## 2023-01-02 RX ADMIN — HEPARIN SODIUM 5000 UNITS: 5000 INJECTION INTRAVENOUS; SUBCUTANEOUS at 09:01

## 2023-01-02 RX ADMIN — MYCOPHENOLATE MOFETIL 1000 MG: 250 CAPSULE ORAL at 09:01

## 2023-01-02 RX ADMIN — FENTANYL CITRATE 100 MCG: 50 INJECTION, SOLUTION INTRAMUSCULAR; INTRAVENOUS at 10:01

## 2023-01-02 RX ADMIN — ONDANSETRON 4 MG: 2 INJECTION INTRAMUSCULAR; INTRAVENOUS at 10:01

## 2023-01-02 RX ADMIN — MUPIROCIN: 20 OINTMENT TOPICAL at 09:01

## 2023-01-02 RX ADMIN — SUGAMMADEX 200 MG: 100 INJECTION, SOLUTION INTRAVENOUS at 01:01

## 2023-01-02 RX ADMIN — SODIUM CHLORIDE, SODIUM GLUCONATE, SODIUM ACETATE, POTASSIUM CHLORIDE, MAGNESIUM CHLORIDE, SODIUM PHOSPHATE, DIBASIC, AND POTASSIUM PHOSPHATE: .53; .5; .37; .037; .03; .012; .00082 INJECTION, SOLUTION INTRAVENOUS at 12:01

## 2023-01-02 RX ADMIN — MANNITOL 25 G: 12.5 INJECTION, SOLUTION INTRAVENOUS at 12:01

## 2023-01-02 RX ADMIN — LIDOCAINE HYDROCHLORIDE 80 MG: 20 INJECTION INTRAVENOUS at 10:01

## 2023-01-02 RX ADMIN — ACETAMINOPHEN 650 MG: 160 SOLUTION ORAL at 10:01

## 2023-01-02 RX ADMIN — METOPROLOL TARTRATE 50 MG: 50 TABLET, FILM COATED ORAL at 09:01

## 2023-01-02 RX ADMIN — HYDROMORPHONE HYDROCHLORIDE 0.2 MG: 1 INJECTION, SOLUTION INTRAMUSCULAR; INTRAVENOUS; SUBCUTANEOUS at 04:01

## 2023-01-02 RX ADMIN — DEXAMETHASONE SODIUM PHOSPHATE 4 MG: 4 INJECTION, SOLUTION INTRAMUSCULAR; INTRAVENOUS at 10:01

## 2023-01-02 RX ADMIN — TACROLIMUS 3 MG: 1 CAPSULE ORAL at 06:01

## 2023-01-02 RX ADMIN — DIPHENHYDRAMINE HYDROCHLORIDE 50 MG: 50 INJECTION, SOLUTION INTRAMUSCULAR; INTRAVENOUS at 10:01

## 2023-01-02 RX ADMIN — BISACODYL 10 MG: 5 TABLET, COATED ORAL at 09:01

## 2023-01-02 RX ADMIN — SODIUM CHLORIDE, SODIUM GLUCONATE, SODIUM ACETATE, POTASSIUM CHLORIDE, MAGNESIUM CHLORIDE, SODIUM PHOSPHATE, DIBASIC, AND POTASSIUM PHOSPHATE: .53; .5; .37; .037; .03; .012; .00082 INJECTION, SOLUTION INTRAVENOUS at 10:01

## 2023-01-02 RX ADMIN — SODIUM CHLORIDE 225 ML: 9 INJECTION, SOLUTION INTRAVENOUS at 03:01

## 2023-01-02 RX ADMIN — HYDROMORPHONE HYDROCHLORIDE 0.2 MG: 1 INJECTION, SOLUTION INTRAMUSCULAR; INTRAVENOUS; SUBCUTANEOUS at 07:01

## 2023-01-02 RX ADMIN — LABETALOL HYDROCHLORIDE 5 MG: 5 INJECTION, SOLUTION INTRAVENOUS at 03:01

## 2023-01-02 RX ADMIN — MIDAZOLAM HYDROCHLORIDE 2 MG: 1 INJECTION, SOLUTION INTRAMUSCULAR; INTRAVENOUS at 10:01

## 2023-01-02 RX ADMIN — PREGABALIN 75 MG: 75 CAPSULE ORAL at 09:01

## 2023-01-02 RX ADMIN — HYDROMORPHONE HYDROCHLORIDE 0.2 MG: 1 INJECTION, SOLUTION INTRAMUSCULAR; INTRAVENOUS; SUBCUTANEOUS at 05:01

## 2023-01-02 RX ADMIN — MYCOPHENOLATE MOFETIL 1000 MG: 250 CAPSULE ORAL at 02:01

## 2023-01-02 RX ADMIN — CISATRACURIUM BESYLATE 14 MG: 10 INJECTION, SOLUTION INTRAVENOUS at 10:01

## 2023-01-02 RX ADMIN — DOCUSATE SODIUM 100 MG: 100 CAPSULE ORAL at 02:01

## 2023-01-02 RX ADMIN — HEPARIN SODIUM 5000 UNITS: 5000 INJECTION INTRAVENOUS; SUBCUTANEOUS at 02:01

## 2023-01-02 RX ADMIN — FAMOTIDINE 20 MG: 20 TABLET ORAL at 09:01

## 2023-01-02 RX ADMIN — LABETALOL HYDROCHLORIDE 5 MG: 5 INJECTION, SOLUTION INTRAVENOUS at 04:01

## 2023-01-02 RX ADMIN — HYDRALAZINE HYDROCHLORIDE 10 MG: 20 INJECTION, SOLUTION INTRAMUSCULAR; INTRAVENOUS at 09:01

## 2023-01-02 RX ADMIN — OXYCODONE 5 MG: 5 TABLET ORAL at 09:01

## 2023-01-02 RX ADMIN — CEFAZOLIN 2 G: 2 INJECTION, POWDER, FOR SOLUTION INTRAMUSCULAR; INTRAVENOUS at 10:01

## 2023-01-02 RX ADMIN — DEXTROSE 500 MG: 50 INJECTION, SOLUTION INTRAVENOUS at 10:01

## 2023-01-02 RX ADMIN — OXYCODONE 5 MG: 5 TABLET ORAL at 02:01

## 2023-01-02 RX ADMIN — CEFAZOLIN 1 G: 330 INJECTION, POWDER, FOR SOLUTION INTRAMUSCULAR; INTRAVENOUS at 10:01

## 2023-01-02 RX ADMIN — CISATRACURIUM BESYLATE 2 MG: 10 INJECTION, SOLUTION INTRAVENOUS at 11:01

## 2023-01-02 RX ADMIN — DOCUSATE SODIUM 100 MG: 100 CAPSULE ORAL at 09:01

## 2023-01-02 RX ADMIN — THERA TABS 1 TABLET: TAB at 02:01

## 2023-01-02 RX ADMIN — NIFEDIPINE 30 MG: 30 TABLET, FILM COATED, EXTENDED RELEASE ORAL at 05:01

## 2023-01-02 NOTE — ANESTHESIA RELEASE NOTE
"Anesthesia Release from PACU Note    Patient: Lisseth Schwartz    Procedure(s) Performed: Procedure(s) (LRB):  TRANSPLANT, KIDNEY (N/A)    Anesthesia type: general    Post pain: Adequate analgesia    Post assessment: no apparent anesthetic complications    Last Vitals:   Visit Vitals  BP (!) 174/92   Pulse 103   Temp 36.8 °C (98.2 °F) (Temporal)   Resp 20   Ht 5' 6" (1.676 m)   Wt 91.2 kg (200 lb 15.2 oz)   SpO2 95%   Breastfeeding No   BMI 32.43 kg/m²       Post vital signs: stable    Level of consciousness: awake    Nausea/Vomiting: no nausea/no vomiting    Complications: none    Airway Patency: patent    Respiratory: unassisted, spontaneous ventilation    Cardiovascular: stable and hypertensive    Hydration: hypovolemic  "

## 2023-01-02 NOTE — OP NOTE
Pre-operative Discussion Note  Kidney Transplant Surgery    Lisseth Schwartz is a 32 y.o. female with ESRD, requiring chronic dialysis admitted for kidney transplant.  I discussed the planned procedure in detail, including expected hospital course and outcomes, benefits, risks, and potential complications.  Complications discussed included death, graft failure, bleeding, infection, vascular thrombosis, and rejection.  I discussed the risks of anesthesia, as well as the potential need for re-operation.  The possibility of other complications not specifically mentioned was also discussed.  Also, I discussed the need for lifelong immunosuppression and the possibility of serious complications from immunosuppressive drugs.    The discussion included the risks that the patient will incur if she elects to not have the proposed procedure.    Relevant donor-specific risk factors were disclosed and discussed with the patient, including:       Specific PHS donor risk criteria for the organ donor include:  None    HCV:     The patient was SARS-CoV-2 /COVID-19 tested with negative results.    COVID-19: I discussed the possibility of COVID-19 transmission with the patient. Although KIERAN testing is available for the virus using a technique which in theory should be very accurate, there is no data yet regarding the likelihood of a  false-negative test leading to virus transmission. Based on accuracy of testing for other viruses, it is expected that this risk is extremely small.     I also discussed that transplant immunosuppression will increase susceptibility to COVID-19 and other viruses, and that although we use stringent precautions to protect patients from infection, it is possible for a transplant recipient to contract this infection. If COVID-19 infection should occur, it would be a serious matter with a significant risk of death.    All questions were answered.  The patient and available family members voice  understanding and agree to proceed with the transplant.    UNOS Patient Status  Note on scores:  ICU = 10 = total assistance  TSU = 20-30 = partial assistance  Outpatient admitted for transplant requiring medical care in last year = 40-50 = partial assistance  Scores 60 or higher indicate no assistance, meaning no need for medical care in last year. This would be very unusual for a transplant candidate.    UNOS Patient Status  Functional Status: 50% - Requires considerable assistance and frequent medical care  Physical Capacity: No Limitations

## 2023-01-02 NOTE — ANESTHESIA PROCEDURE NOTES
Arterial    Diagnosis: renal failure    Patient location during procedure: done in OR  Procedure start time: 1/2/2023 10:16 AM  Timeout: 1/2/2023 10:15 AM  Procedure end time: 1/2/2023 10:19 AM    Staffing  Authorizing Provider: Devin Del Valle MD  Performing Provider: Devin Del Valle MD    Anesthesiologist was present at the time of the procedure.    Preanesthetic Checklist  Completed: patient identified, IV checked, site marked, risks and benefits discussed, surgical consent, monitors and equipment checked, pre-op evaluation, timeout performed and anesthesia consent givenArterial  Skin Prep: chlorhexidine gluconate and isopropyl alcohol  Local Infiltration: none  Orientation: left  Location: radial    Catheter Size: 20 G  Catheter placement by Ultrasound guidance. Heme positive aspiration all ports.   Vessel Caliber: medium, patent, compressibility normal  Vascular Doppler:  not done  Needle advanced into vessel with real time Ultrasound guidance.Insertion Attempts: 1  Assessment  Dressing: secured with tape and tegaderm  Patient: Tolerated well

## 2023-01-02 NOTE — TRANSFER OF CARE
"Anesthesia Transfer of Care Note    Patient: Lisseth Schwartz    Procedure(s) Performed: Procedure(s) (LRB):  TRANSPLANT, KIDNEY (N/A)    Patient location: PACU    Anesthesia Type: general    Transport from OR: Transported from OR on 6-10 L/min O2 by face mask with adequate spontaneous ventilation    Post pain: adequate analgesia    Post assessment: no apparent anesthetic complications and tolerated procedure well    Post vital signs: stable    Level of consciousness: awake    Nausea/Vomiting: no nausea/vomiting    Complications: none    Transfer of care protocol was followed      Last vitals:   Visit Vitals  BP (!) 200/110 (BP Location: Left arm, Patient Position: Lying)   Pulse 80   Temp 36.3 °C (97.3 °F) (Oral)   Resp 16   Ht 5' 6" (1.676 m)   Wt 91.2 kg (200 lb 15.2 oz)   SpO2 99%   Breastfeeding No   BMI 32.43 kg/m²     "

## 2023-01-02 NOTE — OP NOTE
Operative Report    Date of Procedure: 1/2/2023  Date of Transplant (UNOS): 1/2/23    Surgeons:  Surgeon(s) and Role:     * Go Beard MD - Primary     * Sofía Lozoya MD - Resident - Assisting     * Chacorta Dumont MD - Resident - Assisting    First Assistant Attestation:  The indicated resident served as first assistant for this procedure.    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 year-old female with end-stage kidney disease secondary to Focal Glomerular Sclerosis (Focal Segmental - FSG) who has been evaluated for a kidney transplant.  The procedure was thoroughly discussed with the patient, including potential risks, complications, and alternatives.  Specific complications mentioned included death, graft non-function, bleeding, infection, and rejection, as well as the possibility of other complications not specifically mentioned.    Donor Risk Factors: Prior to the operation, the patient was advised of any donor-specific risk factors requiring specific disclosure.  Factors in this case included nothing that required specific disclosure.      Specific PHS donor risk criteria for the organ donor include:  None    All questions were answered, the patient voiced appropriate understanding, and she agreed to proceed with the planned procedure.    ABO Confirmation: Immediately following arrival of the donor organ and prior to implantation, a formal ABO confirmation was done according to hospital and UNOS policies.  I confirmed the UNOS ID number ([id number]) of the donor organ and the donor and recipient ABO types, directly verifying these data by comparison with the UNOS Match Run  report ([id number]).  This confirmation was personally done by an attending surgeon and circulating nurse, and is officially documented elsewhere.    Time-Out: A complete time out was carried out prior to incision, with confirmation of patient identity, correct procedure, correct operative site, appropriate antibiotic prophylaxis, review of any known allergies, and presence of all needed equipment.    Procedure in Detail  Prior to starting the operation, the right kidney  was prepared on the back table. Arterial anatomy was single. Venous anatomy was single. Ureteral anatomy was single. Back table vascular reconstruction was not required .  Unneeded fat was removed from the kidney, the vessels were cleaned of adherent tissue and tested for leaks, and the kidney was maintained at ice temperature in organ preservation solution until it was brought to the operative field.     The patient was brought into the operating room and placed in a supine position on the OR table.  After the induction of general endotracheal anesthesia, lines were placed by the anesthesiologist.  The urinary bladder was catheterized and irrigated with antibiotic solution.  There was no tension on the axillae and all pressure points were padded.  Sequential compression boots were used as were Kimmie Huggers.  The abdomen was prepped and draped in the usual sterile fashion.  Skin was incised over the right with a knife and deepened with electrocautery.  The peritoneum and its contents were swept medially, exposing the right external iliac artery and the right external iliac vein.  The Bookwalter retractor was used to provide exposure.  Overlying lymphatics were ligated or cauterized and the vessels were dissected free for a length compatible with anastomosis.  The kidney was brought to the OR table at 1/2/2023 11:40 AM.  Venous control was obtained with a vascular clamp.  A venotomy was made, the vein irrigated, and an end renal to right external iliac  vein anastomosis was created with 5-0 polypropylene.  Arterial control was obtained with a vascular clamp.  Arteriotomy was made, the artery irrigated, and an end renal to right external iliac artery anastomosis was created with 5-0 polypropylene.  The kidney was unclamped and reperfused at 1/2/2023 12:05 PM.  Reperfusion quality was good. Intraoperative urine production was not observed.  After hemostasis was obtained, a Lich uretero-neocystostomy was created.  The bladder was filled and identified, opened, and the anastomosis created using 6-0 PDS.  The bladder muscle was closed over the distal ureter to create an antireflux tunnel.  A ureteral stent was used.  With the kidney well perfused and sitting appropriately without tension on the anastomoses, viscera were replaced in their usual position.  The wound was closed after a final check for hemostasis.  Overall, the graft quality was assessed to be good. At the end of the case the needle, sponge and instrument counts were all correct.  Sterile dressings were applied and the patient was brought to the recovery room/ICU in good condition.    Estimated Blood Loss: 50 mL  Fluids Administered:    Drains: None  Specimens: none    Findings:    Organ Transplanted: Right Kidney    Arterial Anatomy: single  Number of Arteries: 2  Configuration of Multiple Arteries: common patch   Venous Anatomy: single  Number of Veins: 1  Ureteral Anatomy: single  Number of Ureters: 1  Reperfusion Quality: good  Overall Graft Quality: good  Intraoperative Urine Production: no  Flores: not to be removed before 2 days.  Ureteral Stent: Yes    Ischemic Times:   Anastomosis (warm ischemia) time: 25 minutes   Cold ischemia time: 1,259 minutes  Total ischemia time: 1284 minutes    Donor Data:  UNOS ID:  [id number]  UNOS Match Run:  [id number]  Donor Type:  Donation after Brain Death  Donor CMV Status:  Negative  Donor HBcAB:  Negative  Donor HCV Status:  Negative

## 2023-01-02 NOTE — ANESTHESIA PROCEDURE NOTES
Intubation    Date/Time: 1/2/2023 10:27 AM  Performed by: Filiberto Rodriguez CRNA  Authorized by: Devin Del Valle MD     Intubation:     Induction:  Intravenous    Intubated:  Postinduction    Mask Ventilation:  Easy mask    Attempts:  1    Attempted By:  CRNA    Method of Intubation:  Video laryngoscopy    Blade:  Wallace 3    Laryngeal View Grade: Grade I - full view of cords      Difficult Airway Encountered?: No      Complications:  None    Airway Device:  Oral endotracheal tube    Airway Device Size:  7.5    Style/Cuff Inflation:  Cuffed (inflated to minimal occlusive pressure)    Tube secured:  22    Placement Verified By:  Capnometry and Fiber optic visualization    Complicating Factors:  None    Findings Post-Intubation:  BS equal bilateral and atraumatic/condition of teeth unchanged

## 2023-01-02 NOTE — PROGRESS NOTES
On Call Transplant    SW reviewed patients chart for psychosocial barriers or concerns as patient may have an organ offer.  No concerns/needs identified or indicated at this time. Transplant SW remains available.    If patient is transplanted, transplant social work to follow. Transplant social work team remains available.

## 2023-01-02 NOTE — ANESTHESIA POSTPROCEDURE EVALUATION
Anesthesia Post Evaluation    Patient: Lisseth Schwartz    Procedure(s) Performed: Procedure(s) (LRB):  TRANSPLANT, KIDNEY (N/A)    Final Anesthesia Type: general      Patient location during evaluation: PACU  Patient participation: Yes- Able to Participate  Level of consciousness: awake and alert  Post-procedure vital signs: reviewed and stable  Pain management: adequate  Airway patency: patent    PONV status at discharge: No PONV  Anesthetic complications: no      Cardiovascular status: hypertensive  Respiratory status: spontaneous ventilation  Hydration status: hypovolemic  Follow-up not needed.          Vitals Value Taken Time   /99 01/02/23 1602   Temp 37 °C (98.6 °F) 01/02/23 1500   Pulse 92 01/02/23 1605   Resp 71 01/02/23 1605   SpO2 96 % 01/02/23 1605   Vitals shown include unvalidated device data.      No case tracking events are documented in the log.      Pain/Asmita Score: Pain Rating Prior to Med Admin: 7 (1/2/2023  3:05 PM)  Asmita Score: 8 (1/2/2023  2:30 PM)

## 2023-01-02 NOTE — PROGRESS NOTES
Notified by lab at 1530 of pt's critical Phos 9.4. Attempting to page kidney transplant team since 1530 to notify of critical lab. Still awaiting call back after multiple pages.

## 2023-01-02 NOTE — SUBJECTIVE & OBJECTIVE
"  Subjective:     Chief Complaint/Reason for Admission: Kidney Transplant    History of Present Illness:  Ms. Schwartz is a 32 y.o.  female with ESRD secondary to FSGS.  She has been on the wait list for a kidney transplant at [facility] since 10/28/2016. Patient is currently on hemodialysis started on 10/28/2016. Patient is dialyzing on home HD every other day.  Patient reports that she is tolerating dialysis well.. She has a RUE AV fistula. She presents to the hospital for kidney transplant. She denies fever/chills. Pre op labs and imaging pending    Dialysis History: Ms. Montanez with ESRD, requiring chronic dialysis who is on hemodialysis started on 2016. Patient is dialyzing on home hemo every other day.  Patient reports that she is tolerating dialysis well.  Date of Last Dialysis: 1/31/22    Native urine output per day: "normal amount: 3-4 times a day    Previous Transplant: no    PTA Medications   Medication Sig    albuterol (PROVENTIL/VENTOLIN HFA) 90 mcg/actuation inhaler Inhale 1-2 puffs into the lungs every 6 (six) hours as needed for Wheezing (cough).    calcitRIOL (ROCALTROL) 0.5 MCG Cap 0.25 mcg once daily.    cinacalcet (SENSIPAR) 30 MG Tab Pt takes at dialysis.    epoetin beta, methoxy peg (MIRCERA) 200 mcg/0.3 mL Syrg 1 Syringe.    gabapentin (NEURONTIN) 100 MG capsule Take 100 mg by mouth once daily.    iron sucrose (VENOFER) 50 mg iron/2.5 mL Soln Inject 50 mg into the vein once a week.    metoprolol succinate (TOPROL-XL) 50 MG 24 hr tablet Take 1 tablet (50 mg total) by mouth once daily. (Patient taking differently: Take 100 mg by mouth once daily.)    pantoprazole (PROTONIX) 40 MG tablet TAKE ONE TABLET BY MOUTH ONCE DAILY    sucroferric oxyhydroxide (VELPHORO) 500 mg Chew Take 500 mg by mouth 3 (three) times daily with meals.        Review of patient's allergies indicates:   Allergen Reactions    Lisinopril Other (See Comments)     Severe cough    Adhesive tape-silicones Itching     " "Burns    Furosemide Other (See Comments)     Almost gave her right sided heartfailure       Past Medical History:   Diagnosis Date    Allergy     Anemia     Anxiety     Breast feeding status of mother 1/17/2020    Brittle bones     ESRD (end stage renal disease)     M/W/F    General anesthetics causing adverse effect in therapeutic use     pt states "im a light weight"    Hypertension     Insomnia     PSG revealed no CHRYSTAL, but likely psychogenic etiology (anxiety)    RLS (restless legs syndrome)      Past Surgical History:   Procedure Laterality Date    Arm surgery Right     x 2    AV FISTULA PLACEMENT Right     HYSTEROSCOPY WITH HYDROTHERMAL ABLATION OF ENDOMETRIUM WITH DILATION AND CURETTAGE N/A 12/4/2018    Procedure: HYSTEROSCOPY, WITH DILATION AND CURETTAGE OF UTERUS AND HYDROTHERMAL ENDOMETRIAL ABLATION;  Surgeon: Tiara Red MD;  Location: [facility] OR;  Service: OB/GYN;  Laterality: N/A;  ATTEMPTED     LAPAROSCOPIC SALPINGECTOMY Bilateral 12/4/2018    Procedure: SALPINGECTOMY, LAPAROSCOPIC;  Surgeon: Tiara Red MD;  Location: [facility] OR;  Service: OB/GYN;  Laterality: Bilateral;    RENAL BIOPSY      tumor removed      from face per medical records     Family History       Problem Relation (Age of Onset)    Breast cancer Paternal Grandmother    Diabetes Maternal Grandmother    Heart attack Maternal Grandmother    Hypertension Father    Lung cancer Maternal Grandfather    Prostate cancer Maternal Grandfather          Tobacco Use    Smoking status: Never    Smokeless tobacco: Never    Tobacco comments:     Situational smoking, due to family crisis   Substance and Sexual Activity    Alcohol use: Never    Drug use: Never    Sexual activity: Yes     Partners: Male        Review of Systems   Constitutional:  Negative for activity change and appetite change.   Respiratory:  Negative for shortness of breath.    Cardiovascular:  Negative for leg swelling.   Gastrointestinal:  Negative for abdominal pain and vomiting. "   Neurological:  Negative for weakness.   Psychiatric/Behavioral:  Negative for agitation, behavioral problems and confusion.    Objective:     Vital Signs (Most Recent):           There is no height or weight on file to calculate BMI.     Physical Exam  Vitals and nursing note reviewed.   Cardiovascular:      Rate and Rhythm: Normal rate and regular rhythm.      Heart sounds: No murmur heard.    No gallop.   Pulmonary:      Effort: Pulmonary effort is normal.      Breath sounds: No wheezing or rales.   Abdominal:      General: There is no distension.      Tenderness: There is no abdominal tenderness. There is no guarding or rebound.   Neurological:      Mental Status: She is alert and oriented to person, place, and time.   Psychiatric:         Mood and Affect: Mood normal.         Behavior: Behavior normal.         Thought Content: Thought content normal.         Judgment: Judgment normal.       Laboratory  Pre op labs pending    Diagnostic Results:  Pre op imaging pending

## 2023-01-02 NOTE — H&P
"[facility]  H&P      Subjective:     Chief Complaint/Reason for Admission: Kidney Transplant    History of Present Illness:  Ms. Schwartz is a 32 y.o.  female with ESRD secondary to FSGS.  She has been on the wait list for a kidney transplant at [facility] since 10/28/2016. Patient is currently on hemodialysis started on 10/28/2016. Patient is dialyzing on home HD every other day.  Patient reports that she is tolerating dialysis well.. She has a RUE AV fistula. She presents to the hospital for kidney transplant. She denies fever/chills. Pre op labs and imaging pending    Dialysis History: Ms. Montanez with ESRD, requiring chronic dialysis who is on hemodialysis started on 2016. Patient is dialyzing on home hemo every other day.  Patient reports that she is tolerating dialysis well.  Date of Last Dialysis: 1/31/22    Native urine output per day: "normal" amount: 3-4 times a day    Previous Transplant: no    PTA Medications   Medication Sig    albuterol (PROVENTIL/VENTOLIN HFA) 90 mcg/actuation inhaler Inhale 1-2 puffs into the lungs every 6 (six) hours as needed for Wheezing (cough).    calcitRIOL (ROCALTROL) 0.5 MCG Cap 0.25 mcg once daily.    cinacalcet (SENSIPAR) 30 MG Tab Pt takes at dialysis.    epoetin beta, methoxy peg (MIRCERA) 200 mcg/0.3 mL Syrg 1 Syringe.    gabapentin (NEURONTIN) 100 MG capsule Take 100 mg by mouth once daily.    iron sucrose (VENOFER) 50 mg iron/2.5 mL Soln Inject 50 mg into the vein once a week.    metoprolol succinate (TOPROL-XL) 50 MG 24 hr tablet Take 1 tablet (50 mg total) by mouth once daily. (Patient taking differently: Take 100 mg by mouth once daily.)    pantoprazole (PROTONIX) 40 MG tablet TAKE ONE TABLET BY MOUTH ONCE DAILY    sucroferric oxyhydroxide (VELPHORO) 500 mg Chew Take 500 mg by mouth 3 (three) times daily with meals.        Review of patient's allergies indicates:   Allergen Reactions    Lisinopril Other (See " "Comments)     Severe cough    Adhesive tape-silicones Itching     Burns    Furosemide Other (See Comments)     Almost gave her right sided heartfailure       Past Medical History:   Diagnosis Date    Allergy     Anemia     Anxiety     Breast feeding status of mother 1/17/2020    Brittle bones     ESRD (end stage renal disease)     M/W/F    General anesthetics causing adverse effect in therapeutic use     pt states "im a light weight"    Hypertension     Insomnia     PSG revealed no CHRYSTAL, but likely psychogenic etiology (anxiety)    RLS (restless legs syndrome)      Past Surgical History:   Procedure Laterality Date    Arm surgery Right     x 2    AV FISTULA PLACEMENT Right     HYSTEROSCOPY WITH HYDROTHERMAL ABLATION OF ENDOMETRIUM WITH DILATION AND CURETTAGE N/A 12/4/2018    Procedure: HYSTEROSCOPY, WITH DILATION AND CURETTAGE OF UTERUS AND HYDROTHERMAL ENDOMETRIAL ABLATION;  Surgeon: Tiara Red MD;  Location: [facility] OR;  Service: OB/GYN;  Laterality: N/A;  ATTEMPTED     LAPAROSCOPIC SALPINGECTOMY Bilateral 12/4/2018    Procedure: SALPINGECTOMY, LAPAROSCOPIC;  Surgeon: Tiara Red MD;  Location: [facility] OR;  Service: OB/GYN;  Laterality: Bilateral;    RENAL BIOPSY      tumor removed      from face per medical records     Family History       Problem Relation (Age of Onset)    Breast cancer Paternal Grandmother    Diabetes Maternal Grandmother    Heart attack Maternal Grandmother    Hypertension Father    Lung cancer Maternal Grandfather    Prostate cancer Maternal Grandfather          Tobacco Use    Smoking status: Never    Smokeless tobacco: Never    Tobacco comments:     Situational smoking, due to family crisis   Substance and Sexual Activity    Alcohol use: Never    Drug use: Never    Sexual activity: Yes     Partners: Male        Review of Systems   Constitutional:  Negative for activity change and appetite change.   Respiratory:  Negative for shortness of breath.    Cardiovascular:  " Negative for leg swelling.   Gastrointestinal:  Negative for abdominal pain and vomiting.   Neurological:  Negative for weakness.   Psychiatric/Behavioral:  Negative for agitation, behavioral problems and confusion.    Objective:     Vital Signs (Most Recent):           There is no height or weight on file to calculate BMI.     Physical Exam  Vitals and nursing note reviewed.   Cardiovascular:      Rate and Rhythm: Normal rate and regular rhythm.      Heart sounds: No murmur heard.    No gallop.   Pulmonary:      Effort: Pulmonary effort is normal.      Breath sounds: No wheezing or rales.   Abdominal:      General: There is no distension.      Tenderness: There is no abdominal tenderness. There is no guarding or rebound.   Neurological:      Mental Status: She is alert and oriented to person, place, and time.   Psychiatric:         Mood and Affect: Mood normal.         Behavior: Behavior normal.         Thought Content: Thought content normal.         Judgment: Judgment normal.       Laboratory  Pre op labs pending    Diagnostic Results:  Pre op imaging pending        Assessment/Plan:     * ESRD (end stage renal disease)  - Pre op labs and imaging pending  - Thymo induction  - Dr Beard primary surgeon      FSGS (focal segmental glomerulosclerosis)  - Will check daily pr/cr ratio post transplant while inpatient          The patient presents for kidney transplant.  There are no apparent contraindications to proceeding with the planned transplant.  The patient understands that the transplant could potentially be cancelled pending detailed assessment of the donor organ.  She will receive Thymoglobulin induction.  A complete discussion of the transplant procedure, including risks, complications, and alternatives, as well as any donor-specific risk factors requiring specific disclosure, will be carried out by the responsible staff surgeon prior to the procedure.     Discharge Planning: Not a candidate for discharge at  this time      ANDIE LebronC  Kidney Transplant  [facility]

## 2023-01-02 NOTE — TELEPHONE ENCOUNTER
ON-CALL NOTE    UNOS# [id number]    Notified by Mathieu Sanders, , that Lisseth Schwartz is eligible for kidney offer.  Spoke with patient and identified no acute medical issues with telephone assessment. Protocol script read to patient regarding N/A, standard donor offer. Patient verbalized understanding, all questions answered, patient accepts organ offer. Notified by Mathieu Sanders that virtual crossmatch is  inconclusive and will need fresh sample due to it being outdated .  Patient will come to ER to provide fresh sample.  Patient reports no sensitizing event since last blood sample for PRA received. Notified Bre in [facility] Lab to perform a prospective  crossmatch per guideline.    Patient was asked if they have had a positive COVID-19 test or if they have any signs or symptoms. Informed patient that they will be tested for COVID-19 upon arrival to the hospital, unless have a previous positive result. If tested and result is positive, the transplant will not be able to occur, they will be inactivated on the wait list for 28 days per protocol and required to quarantine.     Patient has not received the COVID vaccine.    Patient notified of plan to come to ER to provide sample and states understanding.  Patient will wait a friend's house near hospital pending results of crossmatch.     0700 on 1/2/23 - crossmatch negative, admit for transplant

## 2023-01-02 NOTE — HPI
Ms. Schwartz is a 32 y.o.  female with ESRD secondary to FSGS.  She has been on the wait list for a kidney transplant at [facility] since 10/28/2016. Patient is currently on hemodialysis started on 10/28/2016. Patient is dialyzing on home HD every other day.  Patient reports that she is tolerating dialysis well.. She has a RUE AV fistula. She presents to the hospital for kidney transplant. She denies fever/chills. Pre op labs and imaging pending

## 2023-01-02 NOTE — ANESTHESIA PREPROCEDURE EVALUATION
"[facility]  Anesthesia Pre-Operative Evaluation         Patient Name: Lisseth Schwartz  YOB: 1990  MRN: [id number]    SUBJECTIVE:     Pre-operative Evaluation for Procedure(s) (LRB):  TRANSPLANT, KIDNEY (N/A)     01/02/2023    Lisseth Schwartz is a 32 y.o. female with a PMHx significant for ESRD 2/2 FSGS, HTN, GERD. Patient dialyzes every other day, last dialysis 1/31/22.     Previously reported anesthesia problems includes "slow to wake up". Patient reports she is "a lightweight".     The patient now presents for the above procedure(s).    Previous Airway:  Present Prior to Hospital Arrival?: No; Placement Date: 12/04/18; Placement Time: 0902; Method of Intubation: Direct laryngoscopy; Inserted by: CRNA; Airway Device: Endotracheal Tube; Mask Ventilation: Easy; Intubated: Postinduction; Blade: Lela #2; Airway Device Size: 7.0; Style: Cuffed; Cuff Inflation: Minimal occlusive pressure; Placement Verified By: Auscultation, Capnometry; Grade: Grade I; Complicating Factors: None; Intubation Findings: Positive EtCO2, Bilateral breath sounds, Atraumatic/Condition of teeth unchanged;  Depth of Insertion: 21; Securment: Lips; Complications: None; Breath Sounds: Equal Bilateral; Insertion Attempts: 1    LDA:        Hemodialysis AV Fistula Right upper arm (Active)   Number of days:        Drips: None documented.      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    Low back pain    Gastroesophageal reflux disease    Restless legs syndrome (RLS)    Psychophysiological insomnia    Primary snoring    Inadequate sleep hygiene    Hypotension    Patient on waiting list for kidney transplant    Anemia    Secondary hyperparathyroidism    Dependence on renal dialysis       Past Medical History: " "  Diagnosis Date    Allergy     Anemia     Anxiety     Breast feeding status of mother 1/17/2020    Brittle bones     ESRD (end stage renal disease)     M/W/F    General anesthetics causing adverse effect in therapeutic use     pt states "im a light weight"    Hypertension     Insomnia     PSG revealed no CHRYSTAL, but likely psychogenic etiology (anxiety)    RLS (restless legs syndrome)        Review of patient's allergies indicates:   Allergen Reactions    Lisinopril Other (See Comments)     Severe cough    Adhesive tape-silicones Itching     Burns    Furosemide Other (See Comments)     Almost gave her right sided heartfailure       Current Inpatient Medications:   acetaminophen  650 mg Per NG tube Once    antithymocyte globulin (rabbit), hydrocortisone 20mg, with optional heparin 1000 units in NS 500ml (FOR PERIPHERAL LINE ADMINISTRATION ONLY)  1.5 mg/kg (Adjusted) Intravenous Once    diphenhydrAMINE  50 mg Intravenous Once    heparin (porcine)  5,000 Units Subcutaneous Once    methylPREDNISolone sodium succinate injection  500 mg Intravenous Once       Current Outpatient Medications   Medication Instructions    albuterol (PROVENTIL/VENTOLIN HFA) 90 mcg/actuation inhaler 1-2 puffs, Inhalation, Every 6 hours PRN    calcitRIOL (ROCALTROL) 0.25 mcg, Daily    cinacalcet (SENSIPAR) 30 MG Tab Pt takes at dialysis.    epoetin beta, methoxy peg (MIRCERA) 200 mcg/0.3 mL Syrg 1 Syringe    gabapentin (NEURONTIN) 100 mg, Oral, Daily    metoprolol succinate (TOPROL-XL) 50 mg, Oral, Daily    pantoprazole (PROTONIX) 40 MG tablet TAKE ONE TABLET BY MOUTH ONCE DAILY    sucroferric oxyhydroxide (VELPHORO) 500 mg, Oral, 3 times daily with meals    VENOFER 50 mg, Intravenous, Weekly       Past Surgical History:   Procedure Laterality Date    Arm surgery Right     x 2    AV FISTULA PLACEMENT Right     HYSTEROSCOPY WITH HYDROTHERMAL ABLATION OF ENDOMETRIUM WITH DILATION AND CURETTAGE N/A 12/4/2018    " Procedure: HYSTEROSCOPY, WITH DILATION AND CURETTAGE OF UTERUS AND HYDROTHERMAL ENDOMETRIAL ABLATION;  Surgeon: Tiara Red MD;  Location: [facility] OR;  Service: OB/GYN;  Laterality: N/A;  ATTEMPTED     LAPAROSCOPIC SALPINGECTOMY Bilateral 12/4/2018    Procedure: SALPINGECTOMY, LAPAROSCOPIC;  Surgeon: Tiara Red MD;  Location: [facility] OR;  Service: OB/GYN;  Laterality: Bilateral;    RENAL BIOPSY      tumor removed      from face per medical records       Social History     Substance and Sexual Activity   Drug Use Never     Tobacco Use: Low Risk     Smoking Tobacco Use: Never    Smokeless Tobacco Use: Never    Passive Exposure: Not on file     Alcohol Use: Not on file         OBJECTIVE:     Vital Signs Range (Last 24H):  Pulse:  [80]   Resp:  [16]   BP: (200)/(110)   SpO2:  [99 %]       Significant Labs    Heme Profile  Lab Results   Component Value Date    WBC 7.99 03/28/2022    HGB 6.8 (L) 06/07/2022    HCT 21.7 (L) 06/07/2022     (L) 03/28/2022       Coagulation Studies  Lab Results   Component Value Date    LABPROT 10.9 09/24/2020    INR 1.0 09/24/2020    APTT 27.6 09/24/2020       BMP  Lab Results   Component Value Date     (L) 03/28/2022    K 4.2 03/28/2022    CL 92 (L) 03/28/2022    CO2 25 03/28/2022    BUN 54 (H) 03/28/2022    CREATININE 12.8 (H) 03/28/2022    MG 1.9 03/28/2022    PHOS 4.3 03/28/2022       Liver Function Tests  Lab Results   Component Value Date    AST 13 03/28/2022    ALT 15 03/28/2022    ALKPHOS 74 03/28/2022    BILITOT 0.7 03/28/2022    PROT 8.8 (H) 03/28/2022    ALBUMIN 4.3 03/28/2022       Lipid Profile  Lab Results   Component Value Date    CHOL 130 09/24/2020    HDL 24 (L) 09/24/2020    TRIG 145 09/24/2020       Endocrine Profile  Lab Results   Component Value Date    HGBA1C 4.7 01/18/2020    TSH 0.993 09/22/2020       Cardiac Studies    EKG:   Results for orders placed or performed during the hospital encounter of 03/28/22   EKG 12-lead    Collection Time:  03/28/22  7:30 PM    Narrative    Test Reason : R07.9,    Vent. Rate : 075 BPM     Atrial Rate : 075 BPM     P-R Int : 092 ms          QRS Dur : 094 ms      QT Int : 380 ms       P-R-T Axes : 032 033 028 degrees     QTc Int : 424 ms    Sinus rhythm with short IN  Junctional ST depression, probably normal  Borderline Abnormal ECG  When compared with ECG of 20-OCT-2020 11:09,  ST now depressed in Inferior leads  Confirmed by MD JUAN CARLOS, MELLO ([id number]) on 3/31/2022 9:48:21 PM    Referred By: [id number]   SELF           Confirmed By:MELLO RANGEL MD       KENNY  No results found for this or any previous visit.      TTE  Results for orders placed during the hospital encounter of 12/14/21    Echo Saline Bubble? No    Interpretation Summary  · The left ventricle is normal in size with concentric hypertrophy and normal systolic function. The estimated ejection fraction is 60%.  · Normal left ventricular diastolic function.  · Normal right ventricular size with normal right ventricular systolic function.  · Biatrial enlargement.  · The estimated PA systolic pressure is 29 mmHg.  · Intermediate central venous pressure (8 mmHg).    Stress Echo  Results for orders placed during the hospital encounter of 12/06/22    Stress Echo Which stress agent will be used? Treadmill Exercise    Interpretation Summary  · The left ventricle is normal in size with moderate concentric hypertrophy and normal systolic function.  · Moderate left atrial enlargement.  · The test was stopped because the patient experienced fatigue. The patient reached the end of the protocol.  · There were no arrhythmias during stress.  · The estimated ejection fraction is 60%.  · Indeterminate left ventricular diastolic function.  · Normal right ventricular size with normal right ventricular systolic function.  · Normal central venous pressure (3 mmHg).  · The estimated PA systolic pressure is 26 mmHg.  · The stress echo portion of this study is negative for myocardial  ischemia.  · The patient's exercise capacity was mildly impaired.  · The ECG portion of this study is negative for myocardial ischemia.      Nuclear Stress Echo  Results for orders placed during the hospital encounter of 10/20/20    Lexiscan Card Interp Cupid Stress test with myocardial perfusion    Interpretation Summary    The study shows normal myocardial perfusion.    The perfusion scan is free of evidence from myocardial ischemia or injury.    Gated perfusion images showed an ejection fraction of 57% at rest and 50% post stress.    The EKG portion of this study is negative for ischemia.      ASSESSMENT/PLAN:         Pre-op Assessment    I have reviewed the Patient Summary Reports.     I have reviewed the Nursing Notes.       Review of Systems  Anesthesia Hx:  Slow to wake up.  Denies Family Hx of Anesthesia complications.  Personal Hx of Anesthesia complications Slow To Awaken/Delayed Emergence and mild, somewhat sensitive to sedation / narcotics   Social:  No Alcohol Use, Non-Smoker    Hematology/Oncology:     Oncology Normal    -- Anemia:   EENT/Dental:   Papilledema and headaches, being worked up by neurologist.   Cardiovascular:   Hypertension    Pulmonary:  Pulmonary Normal    Renal/:   Chronic Renal Disease, ESRD, Dialysis    Hepatic/GI:   GERD    Musculoskeletal:  Musculoskeletal Normal    OB/GYN/PEDS:  Hx preeclampsia.     Neurological:   Headaches Seizures    Endocrine:  Endocrine Normal    Dermatological:  Skin Normal    Psych:   anxiety          Physical Exam  General: Well nourished, Cooperative and Alert    Airway:  Mallampati: III / II  Mouth Opening: Normal  TM Distance: Normal  Tongue: Normal  Neck ROM: Normal ROM    Dental:  Intact    Chest/Lungs:  Normal Respiratory Rate    Heart:  Rate: Normal  Rhythm: Regular Rhythm        Anesthesia Plan  Type of Anesthesia, risks & benefits discussed:    Anesthesia Type: Gen ETT  Intra-op Monitoring Plan: Standard ASA Monitors and Art Line  Post Op  Pain Control Plan: multimodal analgesia and IV/PO Opioids PRN  Induction:  IV  Airway Plan: Direct, Post-Induction  Informed Consent: Informed consent signed with the Patient and all parties understand the risks and agree with anesthesia plan.  All questions answered.   ASA Score: 3  Day of Surgery Review of History & Physical: H&P Update referred to the surgeon/provider.    Ready For Surgery From Anesthesia Perspective.     .

## 2023-01-02 NOTE — OP NOTE
Operative Report    Date of Procedure: 1/2/2023    Surgeon: Go Beard MD  First Assistant: Chacorta Dumont MD     Pre-operative Diagnosis: Allograft kidney for transplantation  Post-operative Diagnosis: Same    Procedure(s) Performed: Back Table Preparation of Kidney, Simple    Anesthesia: Not applicable  Estimated Blood Loss: Not applicable  Fluids Administered: Not applicable    Findings: as described below   Drains: not applicable    Preamble  Indications: This report describes only the backbench preparation of the kidney prior to transplantation.  The transplant operation itself is described in a separate report.    ABO Confirmation: Immediately following arrival of the donor organ and prior to implantation, a formal ABO confirmation was done according to hospital and [facility] policies.  I confirmed the [facility] ID number of the donor organ and the donor and recipient ABO types, directly verifying these data by comparison with the [facility] report.  This confirmation was personally done by an attending surgeon and circulating nurse, and is officially documented elsewhere.    Time-Out: A complete time out was carried out prior to the procedure, with confirmation of patient identity, correct procedure, correct operative site, appropriate antibiotic prophylaxis, review of any known allergies, and presence of all needed equipment.    Procedure in Detail  Prior to starting the operation, the right kidney  was prepared on the back table. Arterial anatomy was single. Venous anatomy was single. Ureteral anatomy was single. Back table vascular reconstruction was not required .  Unneeded fat was removed from the kidney, the vessels were cleaned of adherent tissue and tested for leaks, and the kidney was maintained at ice temperature in organ preservation solution until it was brought to the operative field.     An extension of the right renal vein was fashioned by firing two vascular load staplers across the vena cava to the  left renal vein orifice.

## 2023-01-03 PROBLEM — N18.6 ESRD (END STAGE RENAL DISEASE): Status: RESOLVED | Noted: 2017-02-01 | Resolved: 2023-01-03

## 2023-01-03 PROBLEM — D63.8 ANEMIA OF CHRONIC DISEASE: Status: ACTIVE | Noted: 2022-08-11

## 2023-01-03 PROBLEM — D62 ACUTE BLOOD LOSS ANEMIA: Status: ACTIVE | Noted: 2023-01-03

## 2023-01-03 PROBLEM — I95.9 HYPOTENSION: Status: RESOLVED | Noted: 2020-04-13 | Resolved: 2023-01-03

## 2023-01-03 PROBLEM — Z76.82 PATIENT ON WAITING LIST FOR KIDNEY TRANSPLANT: Chronic | Status: RESOLVED | Noted: 2021-12-14 | Resolved: 2023-01-03

## 2023-01-03 LAB
ALBUMIN SERPL BCP-MCNC: 3.2 G/DL (ref 3.5–5.2)
ANION GAP SERPL CALC-SCNC: 14 MMOL/L (ref 8–16)
ANION GAP SERPL CALC-SCNC: 14 MMOL/L (ref 8–16)
BASOPHILS # BLD AUTO: 0 K/UL (ref 0–0.2)
BASOPHILS # BLD AUTO: 0.02 K/UL (ref 0–0.2)
BASOPHILS NFR BLD: 0 % (ref 0–1.9)
BASOPHILS NFR BLD: 0.2 % (ref 0–1.9)
BUN SERPL-MCNC: 62 MG/DL (ref 6–20)
BUN SERPL-MCNC: 69 MG/DL (ref 6–20)
CALCIUM SERPL-MCNC: 9.2 MG/DL (ref 8.7–10.5)
CALCIUM SERPL-MCNC: 9.7 MG/DL (ref 8.7–10.5)
CHLORIDE SERPL-SCNC: 102 MMOL/L (ref 95–110)
CHLORIDE SERPL-SCNC: 95 MMOL/L (ref 95–110)
CO2 SERPL-SCNC: 21 MMOL/L (ref 23–29)
CO2 SERPL-SCNC: 23 MMOL/L (ref 23–29)
CREAT SERPL-MCNC: 14.8 MG/DL (ref 0.5–1.4)
CREAT SERPL-MCNC: 19.1 MG/DL (ref 0.5–1.4)
CREAT UR-MCNC: 120 MG/DL (ref 15–325)
CREAT UR-MCNC: 123 MG/DL (ref 15–325)
DIFFERENTIAL METHOD: ABNORMAL
DIFFERENTIAL METHOD: ABNORMAL
EOSINOPHIL # BLD AUTO: 0 K/UL (ref 0–0.5)
EOSINOPHIL # BLD AUTO: 0 K/UL (ref 0–0.5)
EOSINOPHIL NFR BLD: 0 % (ref 0–8)
EOSINOPHIL NFR BLD: 0 % (ref 0–8)
ERYTHROCYTE [DISTWIDTH] IN BLOOD BY AUTOMATED COUNT: 15.1 % (ref 11.5–14.5)
ERYTHROCYTE [DISTWIDTH] IN BLOOD BY AUTOMATED COUNT: 15.2 % (ref 11.5–14.5)
EST. GFR  (NO RACE VARIABLE): 2.2 ML/MIN/1.73 M^2
EST. GFR  (NO RACE VARIABLE): 3 ML/MIN/1.73 M^2
GLUCOSE SERPL-MCNC: 112 MG/DL (ref 70–110)
GLUCOSE SERPL-MCNC: 113 MG/DL (ref 70–110)
HCT VFR BLD AUTO: 26.5 % (ref 37–48.5)
HCT VFR BLD AUTO: 27 % (ref 37–48.5)
HCV RNA SERPL QL NAA+PROBE: NOT DETECTED
HCV RNA SPEC NAA+PROBE-ACNC: NOT DETECTED IU/ML
HGB BLD-MCNC: 8.2 G/DL (ref 12–16)
HGB BLD-MCNC: 8.5 G/DL (ref 12–16)
IMM GRANULOCYTES # BLD AUTO: 0.04 K/UL (ref 0–0.04)
IMM GRANULOCYTES # BLD AUTO: 0.05 K/UL (ref 0–0.04)
IMM GRANULOCYTES NFR BLD AUTO: 0.4 % (ref 0–0.5)
IMM GRANULOCYTES NFR BLD AUTO: 0.6 % (ref 0–0.5)
LYMPHOCYTES # BLD AUTO: 0.1 K/UL (ref 1–4.8)
LYMPHOCYTES # BLD AUTO: 0.1 K/UL (ref 1–4.8)
LYMPHOCYTES NFR BLD: 0.6 % (ref 18–48)
LYMPHOCYTES NFR BLD: 0.7 % (ref 18–48)
MAGNESIUM SERPL-MCNC: 2 MG/DL (ref 1.6–2.6)
MCH RBC QN AUTO: 31 PG (ref 27–31)
MCH RBC QN AUTO: 31.4 PG (ref 27–31)
MCHC RBC AUTO-ENTMCNC: 30.9 G/DL (ref 32–36)
MCHC RBC AUTO-ENTMCNC: 31.5 G/DL (ref 32–36)
MCV RBC AUTO: 102 FL (ref 82–98)
MCV RBC AUTO: 99 FL (ref 82–98)
MONOCYTES # BLD AUTO: 0.1 K/UL (ref 0.3–1)
MONOCYTES # BLD AUTO: 0.6 K/UL (ref 0.3–1)
MONOCYTES NFR BLD: 1.1 % (ref 4–15)
MONOCYTES NFR BLD: 5.4 % (ref 4–15)
NEUTROPHILS # BLD AUTO: 10.1 K/UL (ref 1.8–7.7)
NEUTROPHILS # BLD AUTO: 8.2 K/UL (ref 1.8–7.7)
NEUTROPHILS NFR BLD: 93.3 % (ref 38–73)
NEUTROPHILS NFR BLD: 97.7 % (ref 38–73)
NRBC BLD-RTO: 0 /100 WBC
NRBC BLD-RTO: 0 /100 WBC
PHOSPHATE SERPL-MCNC: 9.4 MG/DL (ref 2.7–4.5)
PHOSPHATE SERPL-MCNC: 9.4 MG/DL (ref 2.7–4.5)
PLATELET # BLD AUTO: 142 K/UL (ref 150–450)
PLATELET # BLD AUTO: 155 K/UL (ref 150–450)
PMV BLD AUTO: 11.2 FL (ref 9.2–12.9)
PMV BLD AUTO: 12 FL (ref 9.2–12.9)
POCT GLUCOSE: 126 MG/DL (ref 70–110)
POCT GLUCOSE: 179 MG/DL (ref 70–110)
POTASSIUM SERPL-SCNC: 4.5 MMOL/L (ref 3.5–5.1)
POTASSIUM SERPL-SCNC: 4.9 MMOL/L (ref 3.5–5.1)
PROT UR-MCNC: 113 MG/DL (ref 0–15)
PROT UR-MCNC: 85 MG/DL (ref 0–15)
PROT/CREAT UR: 0.69 MG/G{CREAT} (ref 0–0.2)
PROT/CREAT UR: 0.94 MG/G{CREAT} (ref 0–0.2)
RBC # BLD AUTO: 2.61 M/UL (ref 4–5.4)
RBC # BLD AUTO: 2.74 M/UL (ref 4–5.4)
SODIUM SERPL-SCNC: 132 MMOL/L (ref 136–145)
SODIUM SERPL-SCNC: 137 MMOL/L (ref 136–145)
TACROLIMUS BLD-MCNC: <2 NG/ML (ref 5–15)
WBC # BLD AUTO: 10.77 K/UL (ref 3.9–12.7)
WBC # BLD AUTO: 8.39 K/UL (ref 3.9–12.7)

## 2023-01-03 PROCEDURE — 85025 COMPLETE CBC W/AUTO DIFF WBC: CPT | Mod: 91,HCNC | Performed by: PHYSICIAN ASSISTANT

## 2023-01-03 PROCEDURE — 82570 ASSAY OF URINE CREATININE: CPT | Mod: HCNC | Performed by: STUDENT IN AN ORGANIZED HEALTH CARE EDUCATION/TRAINING PROGRAM

## 2023-01-03 PROCEDURE — 63600175 PHARM REV CODE 636 W HCPCS: Mod: HCNC | Performed by: STUDENT IN AN ORGANIZED HEALTH CARE EDUCATION/TRAINING PROGRAM

## 2023-01-03 PROCEDURE — 84156 ASSAY OF PROTEIN URINE: CPT | Mod: 91,HCNC | Performed by: TRANSPLANT SURGERY

## 2023-01-03 PROCEDURE — 99233 PR SUBSEQUENT HOSPITAL CARE,LEVL III: ICD-10-PCS | Mod: HCNC,,, | Performed by: PHYSICIAN ASSISTANT

## 2023-01-03 PROCEDURE — 99233 SBSQ HOSP IP/OBS HIGH 50: CPT | Mod: HCNC,,, | Performed by: PHYSICIAN ASSISTANT

## 2023-01-03 PROCEDURE — 36415 COLL VENOUS BLD VENIPUNCTURE: CPT | Mod: HCNC | Performed by: PHYSICIAN ASSISTANT

## 2023-01-03 PROCEDURE — 80197 ASSAY OF TACROLIMUS: CPT | Mod: HCNC | Performed by: STUDENT IN AN ORGANIZED HEALTH CARE EDUCATION/TRAINING PROGRAM

## 2023-01-03 PROCEDURE — 20600001 HC STEP DOWN PRIVATE ROOM: Mod: HCNC

## 2023-01-03 PROCEDURE — 85025 COMPLETE CBC W/AUTO DIFF WBC: CPT | Mod: HCNC | Performed by: STUDENT IN AN ORGANIZED HEALTH CARE EDUCATION/TRAINING PROGRAM

## 2023-01-03 PROCEDURE — 25000003 PHARM REV CODE 250: Mod: HCNC | Performed by: TRANSPLANT SURGERY

## 2023-01-03 PROCEDURE — 63600175 PHARM REV CODE 636 W HCPCS: Mod: HCNC | Performed by: CLINICAL NURSE SPECIALIST

## 2023-01-03 PROCEDURE — 25000003 PHARM REV CODE 250: Mod: HCNC | Performed by: PHYSICIAN ASSISTANT

## 2023-01-03 PROCEDURE — 25000003 PHARM REV CODE 250: Mod: HCNC | Performed by: CLINICAL NURSE SPECIALIST

## 2023-01-03 PROCEDURE — 83735 ASSAY OF MAGNESIUM: CPT | Mod: HCNC | Performed by: STUDENT IN AN ORGANIZED HEALTH CARE EDUCATION/TRAINING PROGRAM

## 2023-01-03 PROCEDURE — 36415 COLL VENOUS BLD VENIPUNCTURE: CPT | Mod: HCNC | Performed by: STUDENT IN AN ORGANIZED HEALTH CARE EDUCATION/TRAINING PROGRAM

## 2023-01-03 PROCEDURE — 25000003 PHARM REV CODE 250: Mod: HCNC | Performed by: STUDENT IN AN ORGANIZED HEALTH CARE EDUCATION/TRAINING PROGRAM

## 2023-01-03 PROCEDURE — 80048 BASIC METABOLIC PNL TOTAL CA: CPT | Mod: HCNC | Performed by: PHYSICIAN ASSISTANT

## 2023-01-03 PROCEDURE — 80069 RENAL FUNCTION PANEL: CPT | Mod: HCNC | Performed by: STUDENT IN AN ORGANIZED HEALTH CARE EDUCATION/TRAINING PROGRAM

## 2023-01-03 PROCEDURE — 94761 N-INVAS EAR/PLS OXIMETRY MLT: CPT | Mod: HCNC

## 2023-01-03 RX ORDER — OXYCODONE HYDROCHLORIDE 5 MG/1
5 TABLET ORAL EVERY 4 HOURS PRN
Status: DISCONTINUED | OUTPATIENT
Start: 2023-01-03 | End: 2023-01-05 | Stop reason: HOSPADM

## 2023-01-03 RX ORDER — NIFEDIPINE 30 MG/1
60 TABLET, EXTENDED RELEASE ORAL DAILY
Status: DISCONTINUED | OUTPATIENT
Start: 2023-01-03 | End: 2023-01-05

## 2023-01-03 RX ORDER — CALCIUM CARBONATE 200(500)MG
1000 TABLET,CHEWABLE ORAL 2 TIMES DAILY PRN
Status: DISCONTINUED | OUTPATIENT
Start: 2023-01-03 | End: 2023-01-05 | Stop reason: HOSPADM

## 2023-01-03 RX ORDER — VALGANCICLOVIR 450 MG/1
900 TABLET, FILM COATED ORAL EVERY MORNING
Qty: 60 TABLET | Refills: 2 | Status: SHIPPED | OUTPATIENT
Start: 2023-01-02 | End: 2023-01-05 | Stop reason: SDUPTHER

## 2023-01-03 RX ORDER — SULFAMETHOXAZOLE AND TRIMETHOPRIM 400; 80 MG/1; MG/1
1 TABLET ORAL EVERY MORNING
Qty: 30 TABLET | Refills: 5 | Status: SHIPPED | OUTPATIENT
Start: 2023-01-02 | End: 2023-06-09 | Stop reason: SDUPTHER

## 2023-01-03 RX ORDER — PANTOPRAZOLE SODIUM 40 MG/1
40 TABLET, DELAYED RELEASE ORAL DAILY
Status: DISCONTINUED | OUTPATIENT
Start: 2023-01-03 | End: 2023-01-05 | Stop reason: HOSPADM

## 2023-01-03 RX ORDER — MYCOPHENOLATE MOFETIL 250 MG/1
1000 CAPSULE ORAL 2 TIMES DAILY
Qty: 240 CAPSULE | Refills: 11 | Status: SHIPPED | OUTPATIENT
Start: 2023-01-03 | End: 2024-01-12 | Stop reason: SDUPTHER

## 2023-01-03 RX ORDER — HYDROMORPHONE HYDROCHLORIDE 1 MG/ML
0.2 INJECTION, SOLUTION INTRAMUSCULAR; INTRAVENOUS; SUBCUTANEOUS EVERY 6 HOURS PRN
Status: DISCONTINUED | OUTPATIENT
Start: 2023-01-03 | End: 2023-01-04

## 2023-01-03 RX ORDER — HYDRALAZINE HYDROCHLORIDE 50 MG/1
50 TABLET, FILM COATED ORAL EVERY 8 HOURS
Status: DISCONTINUED | OUTPATIENT
Start: 2023-01-03 | End: 2023-01-05 | Stop reason: HOSPADM

## 2023-01-03 RX ORDER — ACETAMINOPHEN 325 MG/1
650 TABLET ORAL EVERY 8 HOURS
Status: DISPENSED | OUTPATIENT
Start: 2023-01-03 | End: 2023-01-04

## 2023-01-03 RX ORDER — CALCITRIOL 0.25 UG/1
0.25 CAPSULE ORAL DAILY
Status: DISCONTINUED | OUTPATIENT
Start: 2023-01-03 | End: 2023-01-05 | Stop reason: HOSPADM

## 2023-01-03 RX ORDER — DOCUSATE SODIUM 100 MG/1
100 CAPSULE, LIQUID FILLED ORAL 3 TIMES DAILY PRN
Refills: 0 | Status: ON HOLD | COMMUNITY
Start: 2023-01-03 | End: 2023-07-17 | Stop reason: HOSPADM

## 2023-01-03 RX ORDER — TACROLIMUS 1 MG/1
6 CAPSULE ORAL EVERY 12 HOURS
Qty: 360 CAPSULE | Refills: 11 | Status: SHIPPED | OUTPATIENT
Start: 2023-01-03 | End: 2023-01-05 | Stop reason: SDUPTHER

## 2023-01-03 RX ORDER — CLONIDINE HYDROCHLORIDE 0.1 MG/1
0.1 TABLET ORAL ONCE
Status: COMPLETED | OUTPATIENT
Start: 2023-01-03 | End: 2023-01-03

## 2023-01-03 RX ORDER — PREDNISONE 5 MG/1
TABLET ORAL
Qty: 70 TABLET | Refills: 11 | Status: SHIPPED | OUTPATIENT
Start: 2023-01-03 | End: 2024-01-12 | Stop reason: SDUPTHER

## 2023-01-03 RX ORDER — NIFEDIPINE 60 MG/1
60 TABLET, EXTENDED RELEASE ORAL DAILY
Qty: 30 TABLET | Refills: 5 | Status: SHIPPED | OUTPATIENT
Start: 2023-01-04 | End: 2023-01-05 | Stop reason: SDUPTHER

## 2023-01-03 RX ORDER — ALUMINUM HYDROXIDE 320 MG/5ML
320 LIQUID ORAL
Status: DISCONTINUED | OUTPATIENT
Start: 2023-01-03 | End: 2023-01-03

## 2023-01-03 RX ORDER — METOPROLOL TARTRATE 50 MG/1
50 TABLET ORAL 2 TIMES DAILY
Qty: 60 TABLET | Refills: 5 | Status: SHIPPED | OUTPATIENT
Start: 2023-01-03 | End: 2023-01-17 | Stop reason: DRUGHIGH

## 2023-01-03 RX ORDER — CALCITRIOL 0.25 UG/1
0.25 CAPSULE ORAL DAILY
Qty: 30 CAPSULE | Refills: 5 | Status: SHIPPED | OUTPATIENT
Start: 2023-01-03 | End: 2023-08-08 | Stop reason: SDUPTHER

## 2023-01-03 RX ORDER — PANTOPRAZOLE SODIUM 40 MG/1
40 TABLET, DELAYED RELEASE ORAL DAILY
Qty: 30 TABLET | Refills: 5 | Status: SHIPPED | OUTPATIENT
Start: 2023-01-03 | End: 2023-06-09 | Stop reason: SDUPTHER

## 2023-01-03 RX ORDER — BISACODYL 5 MG
10 TABLET, DELAYED RELEASE (ENTERIC COATED) ORAL DAILY PRN
Refills: 0 | COMMUNITY
Start: 2023-01-03 | End: 2024-03-20

## 2023-01-03 RX ORDER — SODIUM CHLORIDE 9 MG/ML
INJECTION, SOLUTION INTRAVENOUS CONTINUOUS
Status: DISCONTINUED | OUTPATIENT
Start: 2023-01-03 | End: 2023-01-04

## 2023-01-03 RX ADMIN — MYCOPHENOLATE MOFETIL 1000 MG: 250 CAPSULE ORAL at 08:01

## 2023-01-03 RX ADMIN — HEPARIN SODIUM 5000 UNITS: 5000 INJECTION INTRAVENOUS; SUBCUTANEOUS at 02:01

## 2023-01-03 RX ADMIN — BISACODYL 10 MG: 5 TABLET, COATED ORAL at 08:01

## 2023-01-03 RX ADMIN — OXYCODONE 5 MG: 5 TABLET ORAL at 02:01

## 2023-01-03 RX ADMIN — SUCROFERRIC OXYHYDROXIDE 500 MG: 500 TABLET, CHEWABLE ORAL at 12:01

## 2023-01-03 RX ADMIN — SUCROFERRIC OXYHYDROXIDE 1500 MG: 500 TABLET, CHEWABLE ORAL at 05:01

## 2023-01-03 RX ADMIN — HYDRALAZINE HYDROCHLORIDE 10 MG: 20 INJECTION, SOLUTION INTRAMUSCULAR; INTRAVENOUS at 12:01

## 2023-01-03 RX ADMIN — TRAMADOL HYDROCHLORIDE 50 MG: 50 TABLET, COATED ORAL at 02:01

## 2023-01-03 RX ADMIN — ACETAMINOPHEN 650 MG: 325 TABLET ORAL at 08:01

## 2023-01-03 RX ADMIN — SODIUM CHLORIDE: 9 INJECTION, SOLUTION INTRAVENOUS at 09:01

## 2023-01-03 RX ADMIN — DOCUSATE SODIUM 100 MG: 100 CAPSULE ORAL at 02:01

## 2023-01-03 RX ADMIN — OXYCODONE 5 MG: 5 TABLET ORAL at 05:01

## 2023-01-03 RX ADMIN — DOCUSATE SODIUM 100 MG: 100 CAPSULE ORAL at 08:01

## 2023-01-03 RX ADMIN — TRAMADOL HYDROCHLORIDE 50 MG: 50 TABLET, COATED ORAL at 03:01

## 2023-01-03 RX ADMIN — CALCIUM CARBONATE (ANTACID) CHEW TAB 500 MG 1000 MG: 500 CHEW TAB at 02:01

## 2023-01-03 RX ADMIN — TRAMADOL HYDROCHLORIDE 50 MG: 50 TABLET, COATED ORAL at 09:01

## 2023-01-03 RX ADMIN — METOPROLOL TARTRATE 50 MG: 50 TABLET, FILM COATED ORAL at 08:01

## 2023-01-03 RX ADMIN — TRAMADOL HYDROCHLORIDE 50 MG: 50 TABLET, COATED ORAL at 08:01

## 2023-01-03 RX ADMIN — METHYLPREDNISOLONE SODIUM SUCCINATE 250 MG: 125 INJECTION, POWDER, FOR SOLUTION INTRAMUSCULAR; INTRAVENOUS at 08:01

## 2023-01-03 RX ADMIN — OXYCODONE 5 MG: 5 TABLET ORAL at 10:01

## 2023-01-03 RX ADMIN — TACROLIMUS 3 MG: 1 CAPSULE ORAL at 05:01

## 2023-01-03 RX ADMIN — PANTOPRAZOLE SODIUM 40 MG: 40 TABLET, DELAYED RELEASE ORAL at 12:01

## 2023-01-03 RX ADMIN — TACROLIMUS 3 MG: 1 CAPSULE ORAL at 08:01

## 2023-01-03 RX ADMIN — ANTI-THYMOCYTE GLOBULIN (RABBIT) 100 MG: 5 INJECTION, POWDER, LYOPHILIZED, FOR SOLUTION INTRAVENOUS at 09:01

## 2023-01-03 RX ADMIN — ACETAMINOPHEN 650 MG: 325 TABLET ORAL at 05:01

## 2023-01-03 RX ADMIN — NIFEDIPINE 60 MG: 30 TABLET, FILM COATED, EXTENDED RELEASE ORAL at 08:01

## 2023-01-03 RX ADMIN — CLONIDINE HYDROCHLORIDE 0.1 MG: 0.1 TABLET ORAL at 02:01

## 2023-01-03 RX ADMIN — THERA TABS 1 TABLET: TAB at 08:01

## 2023-01-03 RX ADMIN — HEPARIN SODIUM 5000 UNITS: 5000 INJECTION INTRAVENOUS; SUBCUTANEOUS at 08:01

## 2023-01-03 RX ADMIN — SODIUM CHLORIDE: 9 INJECTION, SOLUTION INTRAVENOUS at 07:01

## 2023-01-03 RX ADMIN — ACETAMINOPHEN 650 MG: 325 TABLET ORAL at 02:01

## 2023-01-03 RX ADMIN — DIPHENHYDRAMINE HYDROCHLORIDE 25 MG: 25 CAPSULE ORAL at 08:01

## 2023-01-03 RX ADMIN — HYDRALAZINE HYDROCHLORIDE 50 MG: 50 TABLET ORAL at 08:01

## 2023-01-03 RX ADMIN — CALCITRIOL CAPSULES 0.25 MCG 0.25 MCG: 0.25 CAPSULE ORAL at 12:01

## 2023-01-03 RX ADMIN — MUPIROCIN 1 G: 20 OINTMENT TOPICAL at 08:01

## 2023-01-03 RX ADMIN — HEPARIN SODIUM 5000 UNITS: 5000 INJECTION INTRAVENOUS; SUBCUTANEOUS at 06:01

## 2023-01-03 RX ADMIN — HYDRALAZINE HYDROCHLORIDE 50 MG: 50 TABLET ORAL at 02:01

## 2023-01-03 RX ADMIN — HYDRALAZINE HYDROCHLORIDE 50 MG: 50 TABLET ORAL at 05:01

## 2023-01-03 RX ADMIN — HYDROMORPHONE HYDROCHLORIDE 0.2 MG: 1 INJECTION, SOLUTION INTRAMUSCULAR; INTRAVENOUS; SUBCUTANEOUS at 03:01

## 2023-01-03 RX ADMIN — SODIUM CHLORIDE: 9 INJECTION, SOLUTION INTRAVENOUS at 05:01

## 2023-01-03 NOTE — NURSING TRANSFER
Nursing Transfer Note      1/2/2023     Reason patient is being transferred: back to room post recovery    Transfer To: [id number]    Transfer via bed    Transfer with Iv pole/fluids    Transported by transport    Medicines sent: none    Any special needs or follow-up needed: hourly urine output    Chart send with patient: Yes    Notified: mother    Patient reassessed at: 1800

## 2023-01-03 NOTE — PLAN OF CARE
Recommendations    1. Continue Regular diet - add renal restriction if needed     2. If PO intake <50%, add Novasource renal BID      3. RD to monitor and follow    Goals: Meet % EEN, EPN by RD f/u  Nutrition Goal Status: new  Communication of RD Recs:  (POC)

## 2023-01-03 NOTE — NURSING
Notified DOMINIC Llanes NP of continued complaints of pain and elevated BP. Orders received to administered ordered IV dilaudid per NP do not administered other pain medications just the dilaudid at this time. Will monitor.

## 2023-01-03 NOTE — NURSING
Notified DOMINIC Llanes NP of continued pain the upper abdomen, RLQ anterior and posterior sides. Patient given TUMS for complains of Reflux

## 2023-01-03 NOTE — PLAN OF CARE
* AAO x 4   * Abdomen rounded   * Blood glucose monitoring AC & HS. Blood glucose reading 139 no sliding scale coverage administered per MAR. See chart for all blood glucose readings.   * Bowel sounds absent.   * Blood pressure readings elevated medications administered see flow sheet for all readings.   * Regular diet patient tolerating well. See chart for % eaten.    * No edema noted.   * Bed at lowest position and locked, call light within reach, non-slip socks on, mother at bedside, and side rails up x 2.  Encouraged patient to call for assistance when needed patient and mother stated understanding.   * Left femoral fistula +/+   * Left upper arm fistula -/-   * Right femoral fistula -/-   * Right upper arm fistula -/-   * D 5 1/2 NS @ 50 cc/hr and NS I=O rate is dependent on hourly urine output.  See flow sheet for intake totals.   * Incision to the RLQ with dermabond site clean dry and intact no signs or symptoms of infection noted.    * Left hand PIV 18 G (1/2/23) patent, dressing clean dry and intact no signs or symptoms of infection noted.   * Left FA PIV 20 G (1/2/23) patent, dressing clean dry and intact no signs or symptoms of infection noted.   * Oxygen 1 liters via nasal cannula. Oxygen saturation 95-99 % respirations even and unlabored. Snoring noted when sleeping.    * Patient has complaints of pain to the right abdomen PRN pain medication administered. Moderate relief noted.   * Self Medications ready and placed in room. Teaching to begin this AM.    * Skin assessment preformed no breakdown noted.   * Standard precautions maintained.   * Continuous telemetry monitoring continued SR 90s.   * Patient able to turn self no assistance needed.   * Flores catheter (1/2/23) patent and intact secured to leg with securment device. Light pink urine noted see flow sheet for intake and output totals.

## 2023-01-03 NOTE — NURSING
Patient admitted for Kidney transplant. Transplant coordinator met with the patient on rounds to introduce self and explain the coordinator role. The post-transplant teaching book was given. Transplant Coordinator explained that she will follow the patient while in the hospital and assist with discharge.       ESRD 2/2 FSGS  DBD, SCD, KDPI 27%  Cold time 20 hr 59 min  Donor had no infectious diseases of note  Thymo induction

## 2023-01-03 NOTE — CONSULTS
"  [facility] - Transplant Stepdown  Adult Nutrition  Consult Note    SUMMARY     Recommendations    1. Continue Regular diet - add renal restriction if needed     2. If PO intake <50%, add Novasource renal BID      3. RD to monitor and follow    Goals: Meet % EEN, EPN by RD f/u  Nutrition Goal Status: new  Communication of RD Recs:  (POC)    Assessment and Plan    Nutrition Problem  Increased nutrient needs (protein, energy)    Related to (etiology):   Increased physiological demands    Signs and Symptoms (as evidenced by):   S/p DBD Kidney transplant (1/2)    Interventions/Recommendations (treatment strategy):  Collaboration with other providers    Nutrition Diagnosis Status:   New      Reason for Assessment    Reason For Assessment: consult  Diagnosis:  (s/p kidney transplant)  Relevant Medical History: HTN, anemia of chronic disease  Interdisciplinary Rounds: did not attend    General Information Comments:   S/p DBD Kidney transplant (1/2). Pt tolerated some grits and sausages this AM. Pt reports appetite was on and off PTA due to HD. Denies N/V, chewing/swallowing difficulties. Stated UBW 89-92kg. Pt appears nourished, NFPE not warranted. No indicator of malnutrition. Post tx diet provided with handout.    Nutrition Discharge Planning: Post transplant nutrition education provided on 1/3. Food safety/drug interactions emphasized. General healthy/low salt diet recommended. Education material left at bedside. No other needs identified.    Nutrition Risk Screen    Nutrition Risk Screen: no indicators present    Anthropometrics    Temp: 98.2 °F (36.8 °C)  Height Method: Stated  Height: 5' 6" (167.6 cm)  Height (inches): 66 in  Weight Method: Standard Scale  Weight: 91.2 kg (200 lb 15.2 oz)  Weight (lb): 200.95 lb  Ideal Body Weight (IBW), Female: 130 lb  % Ideal Body Weight, Female (lb): 154.58 %  BMI (Calculated): 32.4     Lab/Procedures/Meds    Pertinent Labs Reviewed: reviewed  Pertinent Labs Comments: P 9.4, " glucose 113, Na 132, BUN 19.1, eGFR 2.2  Pertinent Medications Reviewed: reviewed  Pertinent Medications Comments: prednisone, methylprednisolone, pantoprazole, calcitriol, mycophenolate, tacrolimus, heparin, MV    Estimated/Assessed Needs    Weight Used For Calorie Calculations: 90.7 kg (200 lb)  Energy Calorie Requirements (kcal): 1785 kcal  Energy Need Method: Henry-St Jeor (PAL 1.1)  Protein Requirements: 59g (1g/kg IBW)  Weight Used For Protein Calculations: 59 kg (130 lb) (IBW)  Fluid Requirements (mL): 1ml/kcal or per MD  Estimated Fluid Requirement Method: RDA Method  RDA Method (mL): 1785     Nutrition Prescription Ordered    Current Diet Order: Regular    Evaluation of Received Nutrient/Fluid Intake    I/O: + 1.8L since admit  Energy Calories Required: not meeting needs  Protein Required: not meeting needs  Comments: LBM 1/1  Tolerance: tolerating    Nutrition Risk    Level of Risk/Frequency of Follow-up:  (1x/week)     Monitor and Evaluation    Food and Nutrient Intake: energy intake, food and beverage intake  Food and Nutrient Adminstration: diet order  Physical Activity and Function: nutrition-related ADLs and IADLs  Anthropometric Measurements: height/length, weight, weight change, body mass index  Biochemical Data, Medical Tests and Procedures: gastrointestinal profile, electrolyte and renal panel, glucose/endocrine profile, inflammatory profile, lipid profile  Nutrition-Focused Physical Findings: overall appearance       Nutrition Follow-Up    RD Follow-up?: Yes

## 2023-01-03 NOTE — ASSESSMENT & PLAN NOTE
- 2 day estevez. No drains in place  - CIT ~21 hours  - Making urine but awaiting clearance   - Plan for IVF @ 100 cc/hr  - Repeat labs at noon  - Encourage ambulation. Encourage IS

## 2023-01-03 NOTE — NURSING
01/02/23 2202   Vital Signs   BP (!) 175/96   MAP (mmHg) 129   BP Location Left arm   BP Method Automatic   Patient Position Lying     Notified DOMINIC Llanes NP of above blood pressure patient sleeping at this time. Per NP administered IV hydralazine 10 mg now will monitor.

## 2023-01-03 NOTE — SUBJECTIVE & OBJECTIVE
Subjective:   History of Present Illness:  Ms. Schwartz is a 32 y.o.  female with ESRD secondary to FSGS.  She has been on the wait list for a kidney transplant at [facility] since 10/28/2016. Patient is currently on hemodialysis started on 10/28/2016. Patient is dialyzing on home HD every other day.  Patient reports that she is tolerating dialysis well.. She has a RUE AV fistula. She presents to the hospital for kidney transplant. She denies fever/chills. Pre op labs and imaging pending        Hospital Course:  Patient is now s/p DBD kidney transplant on 1/2/22 (Thymo induction CIT ~21 hours). Surgery without complication. No drains placed. 2 day estevez. Hypertensive pre and post op requiring IV antihypertensives    Interval History: Patient with hypertension requiring IV antihypertensives. Oral medications added and adjusted with better control this AM. She is feeling fine. +incisional pain. Making urine. Awaiting renal clearance. Plan for repeat labs at 1200. Adjust fluids to NS at 100 cc/hr. Continue to monitor.         Past Medical, Surgical, Family, and Social History:   Unchanged from H&P.    Scheduled Meds:   acetaminophen  650 mg Oral Q24H    acetaminophen  650 mg Oral Q8H    antithymocyte globulin (rabbit), hydrocortisone 20mg, with optional heparin 1000 units in NS 500ml (FOR PERIPHERAL LINE ADMINISTRATION ONLY)  1.5 mg/kg (Adjusted) Intravenous Daily    bisacodyL  10 mg Oral QHS    calcitRIOL  0.25 mcg Oral Daily    diphenhydrAMINE  25 mg Oral Q24H    docusate sodium  100 mg Oral TID    heparin (porcine)  5,000 Units Subcutaneous Q8H    hydrALAZINE  50 mg Oral Q8H    [START ON 1/4/2023] methylPREDNISolone sodium succinate injection  125 mg Intravenous Once    metoprolol tartrate  50 mg Oral BID    multivitamin  1 tablet Oral Daily    mupirocin  1 g Nasal BID    mycophenolate  1,000 mg Oral BID    NIFEdipine  60 mg Oral Daily    pantoprazole  40 mg Oral Daily    [START ON 1/5/2023] predniSONE   20 mg Oral Daily    sucroferric oxyhydroxide  500 mg Oral TID WM    [START ON 1/12/2023] sulfamethoxazole-trimethoprim 400-80mg  1 tablet Oral Daily AM    tacrolimus  3 mg Oral BID    [START ON 1/12/2023] valGANciclovir  450 mg Oral Daily AM     Continuous Infusions:   sodium chloride 0.9% 100 mL/hr at 01/03/23 1206    glucagon (human recombinant)       PRN Meds:calcium carbonate, dextrose 50%, diphenhydrAMINE, EPINEPHrine (PF), glucagon (human recombinant), glucose, glucose, glucose, hydrALAZINE, hydrocortisone sodium succinate, HYDROmorphone, insulin aspart U-100, labetalol, ondansetron, oxyCODONE, sodium chloride 0.9%, sodium chloride 0.9%, traMADoL    Intake/Output - Last 3 Shifts         01/01 0700  01/02 0659 01/02 0700  01/03 0659 01/03 0700  01/04 0659    P.O.  690 180    I.V. (mL/kg)  1153.2 (12.7) 1087.4 (11.9)    IV Piggyback  1825     Total Intake(mL/kg)  3668.2 (40.3) 1267.4 (13.9)    Urine (mL/kg/hr)  2460 620 (1.1)    Stool  0     Total Output  2460 620    Net  +1208.2 +647.4           Stool Occurrence  0 x              Review of Systems   Constitutional:  Negative for activity change and appetite change.   Respiratory:  Negative for shortness of breath.    Gastrointestinal:  Positive for abdominal pain. Negative for nausea and vomiting.   Genitourinary:  Negative for decreased urine volume.   Skin:  Positive for wound.   Allergic/Immunologic: Positive for immunocompromised state.   Neurological:  Negative for weakness.   Psychiatric/Behavioral:  Negative for confusion and decreased concentration.     Objective:     Vital Signs (Most Recent):  Temp: 98.2 °F (36.8 °C) (01/03/23 1120)  Pulse: 90 (01/03/23 1202)  Resp: 20 (01/03/23 1202)  BP: (!) 158/90 (01/03/23 1202)  SpO2: 100 % (01/03/23 1202)   Vital Signs (24h Range):  Temp:  [97.6 °F (36.4 °C)-99.1 °F (37.3 °C)] 98.2 °F (36.8 °C)  Pulse:  [] 90  Resp:  [14-86] 20  SpO2:  [89 %-100 %] 100 %  BP: (132-207)/() 158/90     Weight: 91.2 kg  "(200 lb 15.2 oz)  Height: 5' 6" (167.6 cm)  Body mass index is 32.43 kg/m².    Physical Exam  Vitals and nursing note reviewed.   Cardiovascular:      Rate and Rhythm: Normal rate and regular rhythm.      Heart sounds: No murmur heard.    No gallop.   Pulmonary:      Effort: Pulmonary effort is normal.      Breath sounds: No wheezing.   Abdominal:      Tenderness: There is no abdominal tenderness. There is no guarding or rebound.      Comments: Kidney incision clean dry intact with staples in place    Neurological:      Mental Status: She is alert and oriented to person, place, and time.   Psychiatric:         Mood and Affect: Mood normal.         Behavior: Behavior normal.         Thought Content: Thought content normal.         Judgment: Judgment normal.       Laboratory:  CBC:   Recent Labs   Lab 01/02/23  0859 01/02/23  0907 01/02/23  1347 01/02/23  1905 01/03/23  0555   WBC 6.34  --   --  9.05 10.77   RBC 2.75*  --   --  2.83* 2.74*   HGB 8.5*  --   --  9.0* 8.5*   HCT 26.9*   < > 25.6* 26.9* 27.0*     --   --  116* 155   MCV 98  --   --  95 99*   MCH 30.9  --   --  31.8* 31.0   MCHC 31.6*  --   --  33.5 31.5*    < > = values in this interval not displayed.     BMP:   Recent Labs   Lab 01/02/23  1347 01/02/23  1905 01/03/23  0555   * 137* 113*    135* 132*   K 4.4 4.6 4.9   CL 98 96 95   CO2 20* 19* 23   BUN 70* 68* 69*   CREATININE 19.9* 19.0* 19.1*   CALCIUM 8.7 9.3 9.7       Diagnostic Results:  None  "

## 2023-01-03 NOTE — ASSESSMENT & PLAN NOTE
- Patient refusing sensipar due to it upsetting her stomach. Has not been taking it as an outpatient  - Continue calcitriol 0.25 mg daily

## 2023-01-03 NOTE — PROGRESS NOTES
TRANSPLANT NOTE:    ORGAN:  RIGHT KIDNEY   Disease Etiology: Focal Glomerular Sclerosis (Focal Segmental - FSG)  Donor Type:  Donation after Brain Death   CDC High Risk:  No   Donor CMV Status:   Donor HBcAB:  Negative   Donor HCV Status:  Negative   Peak cPRA % (within 1 year of transplant): 81%    Lisseth Schwartz is a 32 y.o. female s/p   Donation after Brain Death  kidney transplant on 1/2/2023 (Kidney) for Focal Glomerular Sclerosis (Focal Segmental - FSG).   This patient will receive 3 doses of Thymoglobulin for induction.  This patients maintenance immunosuppression will include a steroid taper per protocol to 5mg daily, Prograf, and Cellcept maintenance.  Opportunistic infection prophylaxis will include Valcyte for 3 months (CMV D - , R + ) and Bactrim for 6 months.  Patient is to begin self medications upon transfer to the [facility], and I plan to meet with this patient and his/her support person prior to discharge to review the medication section of the Kidney Transplant Education Manual.  I have reviewed the pre-op medications and have restarted those, as appropriate.

## 2023-01-03 NOTE — PROGRESS NOTES
[facility] - Transplant Stepdown  Kidney Transplant  Progress Note      Reason for Follow-up: Reassessment of Kidney Transplant - 1/2/2023  (#1) recipient and management of immunosuppression.    ORGAN:  RIGHT KIDNEY   Donor Type:  Donation after Brain Death   Donor CMV Status: Negative  Donor HBcAB:Negative  Donor HCV Status:Negative  Donor HBV KIERAN: Negative  Donor HCV KIERAN: Negative      Subjective:   History of Present Illness:  Ms. Schwartz is a 32 y.o.  female with ESRD secondary to FSGS.  She has been on the wait list for a kidney transplant at [facility] since 10/28/2016. Patient is currently on hemodialysis started on 10/28/2016. Patient is dialyzing on home HD every other day.  Patient reports that she is tolerating dialysis well.. She has a RUE AV fistula. She presents to the hospital for kidney transplant. She denies fever/chills. Pre op labs and imaging pending        Hospital Course:  Patient is now s/p DBD kidney transplant on 1/2/22 (Thymo induction CIT ~21 hours). Surgery without complication. No drains placed. 2 day estevez. Hypertensive pre and post op requiring IV antihypertensives    Interval History: Patient with hypertension requiring IV antihypertensives. Oral medications added and adjusted with better control this AM. She is feeling fine. +incisional pain. Making urine. Awaiting renal clearance. Plan for repeat labs at 1200. Adjust fluids to NS at 100 cc/hr. Continue to monitor.         Past Medical, Surgical, Family, and Social History:   Unchanged from H&P.    Scheduled Meds:   acetaminophen  650 mg Oral Q24H    acetaminophen  650 mg Oral Q8H    antithymocyte globulin (rabbit), hydrocortisone 20mg, with optional heparin 1000 units in NS 500ml (FOR PERIPHERAL LINE ADMINISTRATION ONLY)  1.5 mg/kg (Adjusted) Intravenous Daily    bisacodyL  10 mg Oral QHS    calcitRIOL  0.25 mcg Oral Daily    diphenhydrAMINE  25 mg Oral Q24H    docusate sodium  100 mg Oral TID    heparin (porcine)   5,000 Units Subcutaneous Q8H    hydrALAZINE  50 mg Oral Q8H    [START ON 1/4/2023] methylPREDNISolone sodium succinate injection  125 mg Intravenous Once    metoprolol tartrate  50 mg Oral BID    multivitamin  1 tablet Oral Daily    mupirocin  1 g Nasal BID    mycophenolate  1,000 mg Oral BID    NIFEdipine  60 mg Oral Daily    pantoprazole  40 mg Oral Daily    [START ON 1/5/2023] predniSONE  20 mg Oral Daily    sucroferric oxyhydroxide  500 mg Oral TID WM    [START ON 1/12/2023] sulfamethoxazole-trimethoprim 400-80mg  1 tablet Oral Daily AM    tacrolimus  3 mg Oral BID    [START ON 1/12/2023] valGANciclovir  450 mg Oral Daily AM     Continuous Infusions:   sodium chloride 0.9% 100 mL/hr at 01/03/23 1206    glucagon (human recombinant)       PRN Meds:calcium carbonate, dextrose 50%, diphenhydrAMINE, EPINEPHrine (PF), glucagon (human recombinant), glucose, glucose, glucose, hydrALAZINE, hydrocortisone sodium succinate, HYDROmorphone, insulin aspart U-100, labetalol, ondansetron, oxyCODONE, sodium chloride 0.9%, sodium chloride 0.9%, traMADoL    Intake/Output - Last 3 Shifts         01/01 0700  01/02 0659 01/02 0700  01/03 0659 01/03 0700  01/04 0659    P.O.  690 180    I.V. (mL/kg)  1153.2 (12.7) 1087.4 (11.9)    IV Piggyback  1825     Total Intake(mL/kg)  3668.2 (40.3) 1267.4 (13.9)    Urine (mL/kg/hr)  2460 620 (1.1)    Stool  0     Total Output  2460 620    Net  +1208.2 +647.4           Stool Occurrence  0 x              Review of Systems   Constitutional:  Negative for activity change and appetite change.   Respiratory:  Negative for shortness of breath.    Gastrointestinal:  Positive for abdominal pain. Negative for nausea and vomiting.   Genitourinary:  Negative for decreased urine volume.   Skin:  Positive for wound.   Allergic/Immunologic: Positive for immunocompromised state.   Neurological:  Negative for weakness.   Psychiatric/Behavioral:  Negative for confusion and decreased concentration.    "  Objective:     Vital Signs (Most Recent):  Temp: 98.2 °F (36.8 °C) (01/03/23 1120)  Pulse: 90 (01/03/23 1202)  Resp: 20 (01/03/23 1202)  BP: (!) 158/90 (01/03/23 1202)  SpO2: 100 % (01/03/23 1202)   Vital Signs (24h Range):  Temp:  [97.6 °F (36.4 °C)-99.1 °F (37.3 °C)] 98.2 °F (36.8 °C)  Pulse:  [] 90  Resp:  [14-86] 20  SpO2:  [89 %-100 %] 100 %  BP: (132-207)/() 158/90     Weight: 91.2 kg (200 lb 15.2 oz)  Height: 5' 6" (167.6 cm)  Body mass index is 32.43 kg/m².    Physical Exam  Vitals and nursing note reviewed.   Cardiovascular:      Rate and Rhythm: Normal rate and regular rhythm.      Heart sounds: No murmur heard.    No gallop.   Pulmonary:      Effort: Pulmonary effort is normal.      Breath sounds: No wheezing.   Abdominal:      Tenderness: There is no abdominal tenderness. There is no guarding or rebound.      Comments: Kidney incision clean dry intact with staples in place    Neurological:      Mental Status: She is alert and oriented to person, place, and time.   Psychiatric:         Mood and Affect: Mood normal.         Behavior: Behavior normal.         Thought Content: Thought content normal.         Judgment: Judgment normal.       Laboratory:  CBC:   Recent Labs   Lab 01/02/23  0859 01/02/23  0907 01/02/23  1347 01/02/23  1905 01/03/23  0555   WBC 6.34  --   --  9.05 10.77   RBC 2.75*  --   --  2.83* 2.74*   HGB 8.5*  --   --  9.0* 8.5*   HCT 26.9*   < > 25.6* 26.9* 27.0*     --   --  116* 155   MCV 98  --   --  95 99*   MCH 30.9  --   --  31.8* 31.0   MCHC 31.6*  --   --  33.5 31.5*    < > = values in this interval not displayed.     BMP:   Recent Labs   Lab 01/02/23  1347 01/02/23  1905 01/03/23  0555   * 137* 113*    135* 132*   K 4.4 4.6 4.9   CL 98 96 95   CO2 20* 19* 23   BUN 70* 68* 69*   CREATININE 19.9* 19.0* 19.1*   CALCIUM 8.7 9.3 9.7       Diagnostic Results:  None    Assessment/Plan:     * S/P kidney transplant  - 2 day estevez. No drains in place  - CIT " ~21 hours  - Making urine but awaiting clearance   - Plan for IVF @ 100 cc/hr  - Repeat labs at noon  - Encourage ambulation. Encourage IS      Acute blood loss anemia  - Expected post operatively  - H/H stable  - Monitor with daily cbc      Prophylactic immunotherapy  - See long term use of immunosuppressant medication       Secondary hyperparathyroidism  - Patient refusing sensipar due to it upsetting her stomach. Has not been taking it as an outpatient  - Continue calcitriol 0.25 mg daily      Anemia of chronic disease  - See acute blood loss anemia      Hyperphosphatemia  - Ordered patient's home phos binder      Hypertension  - Continue nifedipine, hydralazine, and metoprolol      FSGS (focal segmental glomerulosclerosis)  - Will check daily pr/cr ratio post transplant while inpatient          Discharge Planning: Not a candidate for discharge at this time       Luana Bella PAJazmineC  Kidney Transplant  [facility] - Transplant Stepdown

## 2023-01-03 NOTE — HOSPITAL COURSE
Patient is now s/p DBD kidney transplant on 1/2/22 (Thymo induction CIT ~21 hours). Surgery without complication. No drains placed. 2 day estevez. Hypertensive pre and post op requiring IV antihypertensives    Interval History: No acute events overnight. Patient feeling well today. Does have incisional pain but tolerable. Creatinine clearing with good UOP. Estevez removed this AM. Incision clean, dry, intact. Tolerating diet. Stop IVF. Likely dc tomorrow. Continue to monitor.

## 2023-01-03 NOTE — NURSING
Notified DOMINIC Llanes NP of elevated SBP 180s patient having pain when coughing. PO oxycodone administered per MAR, and scheduled Metoprolol

## 2023-01-04 DIAGNOSIS — Z94.0 KIDNEY REPLACED BY TRANSPLANT: Primary | ICD-10-CM

## 2023-01-04 PROBLEM — Z99.2 DEPENDENCE ON RENAL DIALYSIS: Status: RESOLVED | Noted: 2022-11-30 | Resolved: 2023-01-04

## 2023-01-04 LAB
ALBUMIN SERPL BCP-MCNC: 3.2 G/DL (ref 3.5–5.2)
ANION GAP SERPL CALC-SCNC: 11 MMOL/L (ref 8–16)
ANION GAP SERPL CALC-SCNC: 13 MMOL/L (ref 8–16)
BASOPHILS # BLD AUTO: 0 K/UL (ref 0–0.2)
BASOPHILS # BLD AUTO: 0.01 K/UL (ref 0–0.2)
BASOPHILS NFR BLD: 0 % (ref 0–1.9)
BASOPHILS NFR BLD: 0.1 % (ref 0–1.9)
BUN SERPL-MCNC: 56 MG/DL (ref 6–20)
BUN SERPL-MCNC: 64 MG/DL (ref 6–20)
CALCIUM SERPL-MCNC: 9.2 MG/DL (ref 8.7–10.5)
CALCIUM SERPL-MCNC: 9.5 MG/DL (ref 8.7–10.5)
CHLORIDE SERPL-SCNC: 106 MMOL/L (ref 95–110)
CHLORIDE SERPL-SCNC: 107 MMOL/L (ref 95–110)
CO2 SERPL-SCNC: 20 MMOL/L (ref 23–29)
CO2 SERPL-SCNC: 20 MMOL/L (ref 23–29)
CREAT SERPL-MCNC: 11.8 MG/DL (ref 0.5–1.4)
CREAT SERPL-MCNC: 9.4 MG/DL (ref 0.5–1.4)
CREAT UR-MCNC: 226 MG/DL (ref 15–325)
DIFFERENTIAL METHOD: ABNORMAL
DIFFERENTIAL METHOD: ABNORMAL
EOSINOPHIL # BLD AUTO: 0 K/UL (ref 0–0.5)
EOSINOPHIL # BLD AUTO: 0 K/UL (ref 0–0.5)
EOSINOPHIL NFR BLD: 0 % (ref 0–8)
EOSINOPHIL NFR BLD: 0 % (ref 0–8)
ERYTHROCYTE [DISTWIDTH] IN BLOOD BY AUTOMATED COUNT: 15.2 % (ref 11.5–14.5)
ERYTHROCYTE [DISTWIDTH] IN BLOOD BY AUTOMATED COUNT: 15.4 % (ref 11.5–14.5)
EST. GFR  (NO RACE VARIABLE): 3.9 ML/MIN/1.73 M^2
EST. GFR  (NO RACE VARIABLE): 5.2 ML/MIN/1.73 M^2
GLUCOSE SERPL-MCNC: 126 MG/DL (ref 70–110)
GLUCOSE SERPL-MCNC: 98 MG/DL (ref 70–110)
HCT VFR BLD AUTO: 26.3 % (ref 37–48.5)
HCT VFR BLD AUTO: 27.3 % (ref 37–48.5)
HGB BLD-MCNC: 8 G/DL (ref 12–16)
HGB BLD-MCNC: 8.6 G/DL (ref 12–16)
IMM GRANULOCYTES # BLD AUTO: 0.04 K/UL (ref 0–0.04)
IMM GRANULOCYTES # BLD AUTO: 0.05 K/UL (ref 0–0.04)
IMM GRANULOCYTES NFR BLD AUTO: 0.5 % (ref 0–0.5)
IMM GRANULOCYTES NFR BLD AUTO: 0.7 % (ref 0–0.5)
LYMPHOCYTES # BLD AUTO: 0.1 K/UL (ref 1–4.8)
LYMPHOCYTES # BLD AUTO: 0.1 K/UL (ref 1–4.8)
LYMPHOCYTES NFR BLD: 0.7 % (ref 18–48)
LYMPHOCYTES NFR BLD: 1.6 % (ref 18–48)
MAGNESIUM SERPL-MCNC: 2 MG/DL (ref 1.6–2.6)
MCH RBC QN AUTO: 31 PG (ref 27–31)
MCH RBC QN AUTO: 31.3 PG (ref 27–31)
MCHC RBC AUTO-ENTMCNC: 30.4 G/DL (ref 32–36)
MCHC RBC AUTO-ENTMCNC: 31.5 G/DL (ref 32–36)
MCV RBC AUTO: 102 FL (ref 82–98)
MCV RBC AUTO: 99 FL (ref 82–98)
MONOCYTES # BLD AUTO: 0.2 K/UL (ref 0.3–1)
MONOCYTES # BLD AUTO: 0.6 K/UL (ref 0.3–1)
MONOCYTES NFR BLD: 2.2 % (ref 4–15)
MONOCYTES NFR BLD: 7.7 % (ref 4–15)
NEUTROPHILS # BLD AUTO: 6.6 K/UL (ref 1.8–7.7)
NEUTROPHILS # BLD AUTO: 7.4 K/UL (ref 1.8–7.7)
NEUTROPHILS NFR BLD: 90.2 % (ref 38–73)
NEUTROPHILS NFR BLD: 96.3 % (ref 38–73)
NRBC BLD-RTO: 0 /100 WBC
NRBC BLD-RTO: 0 /100 WBC
PHOSPHATE SERPL-MCNC: 5.7 MG/DL (ref 2.7–4.5)
PLATELET # BLD AUTO: 150 K/UL (ref 150–450)
PLATELET # BLD AUTO: 152 K/UL (ref 150–450)
PMV BLD AUTO: 11.5 FL (ref 9.2–12.9)
PMV BLD AUTO: 11.6 FL (ref 9.2–12.9)
POCT GLUCOSE: 119 MG/DL (ref 70–110)
POCT GLUCOSE: 86 MG/DL (ref 70–110)
POCT GLUCOSE: 94 MG/DL (ref 70–110)
POTASSIUM SERPL-SCNC: 4.4 MMOL/L (ref 3.5–5.1)
POTASSIUM SERPL-SCNC: 4.4 MMOL/L (ref 3.5–5.1)
PROT UR-MCNC: 82 MG/DL (ref 0–15)
PROT/CREAT UR: 0.36 MG/G{CREAT} (ref 0–0.2)
RBC # BLD AUTO: 2.58 M/UL (ref 4–5.4)
RBC # BLD AUTO: 2.75 M/UL (ref 4–5.4)
SODIUM SERPL-SCNC: 137 MMOL/L (ref 136–145)
SODIUM SERPL-SCNC: 140 MMOL/L (ref 136–145)
TACROLIMUS BLD-MCNC: <2 NG/ML (ref 5–15)
TROPONIN I SERPL DL<=0.01 NG/ML-MCNC: 0.03 NG/ML (ref 0–0.03)
WBC # BLD AUTO: 6.87 K/UL (ref 3.9–12.7)
WBC # BLD AUTO: 8.23 K/UL (ref 3.9–12.7)

## 2023-01-04 PROCEDURE — 80069 RENAL FUNCTION PANEL: CPT | Mod: HCNC | Performed by: STUDENT IN AN ORGANIZED HEALTH CARE EDUCATION/TRAINING PROGRAM

## 2023-01-04 PROCEDURE — 63600175 PHARM REV CODE 636 W HCPCS: Mod: HCNC | Performed by: STUDENT IN AN ORGANIZED HEALTH CARE EDUCATION/TRAINING PROGRAM

## 2023-01-04 PROCEDURE — 80197 ASSAY OF TACROLIMUS: CPT | Mod: HCNC | Performed by: STUDENT IN AN ORGANIZED HEALTH CARE EDUCATION/TRAINING PROGRAM

## 2023-01-04 PROCEDURE — 94761 N-INVAS EAR/PLS OXIMETRY MLT: CPT | Mod: HCNC

## 2023-01-04 PROCEDURE — 25000003 PHARM REV CODE 250: Mod: HCNC | Performed by: CLINICAL NURSE SPECIALIST

## 2023-01-04 PROCEDURE — 93010 EKG 12-LEAD: ICD-10-PCS | Mod: HCNC,,, | Performed by: INTERNAL MEDICINE

## 2023-01-04 PROCEDURE — 36415 COLL VENOUS BLD VENIPUNCTURE: CPT | Mod: HCNC | Performed by: PHYSICIAN ASSISTANT

## 2023-01-04 PROCEDURE — 20600001 HC STEP DOWN PRIVATE ROOM: Mod: HCNC

## 2023-01-04 PROCEDURE — 63600175 PHARM REV CODE 636 W HCPCS: Mod: HCNC | Performed by: PHYSICIAN ASSISTANT

## 2023-01-04 PROCEDURE — 82570 ASSAY OF URINE CREATININE: CPT | Mod: HCNC | Performed by: STUDENT IN AN ORGANIZED HEALTH CARE EDUCATION/TRAINING PROGRAM

## 2023-01-04 PROCEDURE — 84484 ASSAY OF TROPONIN QUANT: CPT | Mod: HCNC | Performed by: PHYSICIAN ASSISTANT

## 2023-01-04 PROCEDURE — 99233 SBSQ HOSP IP/OBS HIGH 50: CPT | Mod: 24,HCNC,, | Performed by: PHYSICIAN ASSISTANT

## 2023-01-04 PROCEDURE — 25000003 PHARM REV CODE 250: Mod: HCNC | Performed by: STUDENT IN AN ORGANIZED HEALTH CARE EDUCATION/TRAINING PROGRAM

## 2023-01-04 PROCEDURE — 93005 ELECTROCARDIOGRAM TRACING: CPT | Mod: HCNC

## 2023-01-04 PROCEDURE — 85025 COMPLETE CBC W/AUTO DIFF WBC: CPT | Mod: HCNC | Performed by: STUDENT IN AN ORGANIZED HEALTH CARE EDUCATION/TRAINING PROGRAM

## 2023-01-04 PROCEDURE — 84484 ASSAY OF TROPONIN QUANT: CPT | Mod: 91,HCNC | Performed by: PHYSICIAN ASSISTANT

## 2023-01-04 PROCEDURE — 36415 COLL VENOUS BLD VENIPUNCTURE: CPT | Mod: HCNC | Performed by: STUDENT IN AN ORGANIZED HEALTH CARE EDUCATION/TRAINING PROGRAM

## 2023-01-04 PROCEDURE — 25000003 PHARM REV CODE 250: Mod: HCNC | Performed by: PHYSICIAN ASSISTANT

## 2023-01-04 PROCEDURE — 99900035 HC TECH TIME PER 15 MIN (STAT): Mod: HCNC

## 2023-01-04 PROCEDURE — 83735 ASSAY OF MAGNESIUM: CPT | Mod: HCNC | Performed by: STUDENT IN AN ORGANIZED HEALTH CARE EDUCATION/TRAINING PROGRAM

## 2023-01-04 PROCEDURE — 94799 UNLISTED PULMONARY SVC/PX: CPT | Mod: HCNC

## 2023-01-04 PROCEDURE — 85025 COMPLETE CBC W/AUTO DIFF WBC: CPT | Mod: 91,HCNC | Performed by: PHYSICIAN ASSISTANT

## 2023-01-04 PROCEDURE — 99233 PR SUBSEQUENT HOSPITAL CARE,LEVL III: ICD-10-PCS | Mod: 24,HCNC,, | Performed by: PHYSICIAN ASSISTANT

## 2023-01-04 PROCEDURE — 80048 BASIC METABOLIC PNL TOTAL CA: CPT | Mod: HCNC | Performed by: PHYSICIAN ASSISTANT

## 2023-01-04 PROCEDURE — 93010 ELECTROCARDIOGRAM REPORT: CPT | Mod: HCNC,,, | Performed by: INTERNAL MEDICINE

## 2023-01-04 RX ORDER — TACROLIMUS 1 MG/1
2 CAPSULE ORAL ONCE
Status: COMPLETED | OUTPATIENT
Start: 2023-01-04 | End: 2023-01-04

## 2023-01-04 RX ORDER — BISACODYL 10 MG
10 SUPPOSITORY, RECTAL RECTAL ONCE
Status: COMPLETED | OUTPATIENT
Start: 2023-01-04 | End: 2023-01-04

## 2023-01-04 RX ORDER — POLYETHYLENE GLYCOL 3350 17 G/17G
17 POWDER, FOR SOLUTION ORAL DAILY
Status: DISCONTINUED | OUTPATIENT
Start: 2023-01-04 | End: 2023-01-05 | Stop reason: HOSPADM

## 2023-01-04 RX ORDER — TACROLIMUS 1 MG/1
5 CAPSULE ORAL 2 TIMES DAILY
Status: DISCONTINUED | OUTPATIENT
Start: 2023-01-04 | End: 2023-01-05

## 2023-01-04 RX ADMIN — METOPROLOL TARTRATE 50 MG: 50 TABLET, FILM COATED ORAL at 08:01

## 2023-01-04 RX ADMIN — TACROLIMUS 3 MG: 1 CAPSULE ORAL at 08:01

## 2023-01-04 RX ADMIN — SUCROFERRIC OXYHYDROXIDE 1500 MG: 500 TABLET, CHEWABLE ORAL at 02:01

## 2023-01-04 RX ADMIN — DOCUSATE SODIUM 100 MG: 100 CAPSULE ORAL at 02:01

## 2023-01-04 RX ADMIN — PANTOPRAZOLE SODIUM 40 MG: 40 TABLET, DELAYED RELEASE ORAL at 08:01

## 2023-01-04 RX ADMIN — TRAMADOL HYDROCHLORIDE 50 MG: 50 TABLET, COATED ORAL at 03:01

## 2023-01-04 RX ADMIN — METHYLPREDNISOLONE SODIUM SUCCINATE 125 MG: 125 INJECTION, POWDER, FOR SOLUTION INTRAMUSCULAR; INTRAVENOUS at 12:01

## 2023-01-04 RX ADMIN — HYDRALAZINE HYDROCHLORIDE 50 MG: 50 TABLET ORAL at 05:01

## 2023-01-04 RX ADMIN — OXYCODONE 5 MG: 5 TABLET ORAL at 08:01

## 2023-01-04 RX ADMIN — MYCOPHENOLATE MOFETIL 1000 MG: 250 CAPSULE ORAL at 08:01

## 2023-01-04 RX ADMIN — HEPARIN SODIUM 5000 UNITS: 5000 INJECTION INTRAVENOUS; SUBCUTANEOUS at 02:01

## 2023-01-04 RX ADMIN — CALCITRIOL CAPSULES 0.25 MCG 0.25 MCG: 0.25 CAPSULE ORAL at 08:01

## 2023-01-04 RX ADMIN — HYDRALAZINE HYDROCHLORIDE 50 MG: 50 TABLET ORAL at 08:01

## 2023-01-04 RX ADMIN — OXYCODONE 5 MG: 5 TABLET ORAL at 09:01

## 2023-01-04 RX ADMIN — SUCROFERRIC OXYHYDROXIDE 1500 MG: 500 TABLET, CHEWABLE ORAL at 06:01

## 2023-01-04 RX ADMIN — TACROLIMUS 2 MG: 1 CAPSULE ORAL at 09:01

## 2023-01-04 RX ADMIN — MUPIROCIN 1 G: 20 OINTMENT TOPICAL at 08:01

## 2023-01-04 RX ADMIN — DOCUSATE SODIUM 100 MG: 100 CAPSULE ORAL at 08:01

## 2023-01-04 RX ADMIN — DIPHENHYDRAMINE HYDROCHLORIDE 25 MG: 25 CAPSULE ORAL at 12:01

## 2023-01-04 RX ADMIN — BISACODYL 10 MG: 10 SUPPOSITORY RECTAL at 04:01

## 2023-01-04 RX ADMIN — HEPARIN SODIUM 5000 UNITS: 5000 INJECTION INTRAVENOUS; SUBCUTANEOUS at 05:01

## 2023-01-04 RX ADMIN — POLYETHYLENE GLYCOL 3350 17 G: 17 POWDER, FOR SOLUTION ORAL at 08:01

## 2023-01-04 RX ADMIN — ACETAMINOPHEN 650 MG: 325 TABLET ORAL at 12:01

## 2023-01-04 RX ADMIN — HEPARIN SODIUM 5000 UNITS: 5000 INJECTION INTRAVENOUS; SUBCUTANEOUS at 08:01

## 2023-01-04 RX ADMIN — THERA TABS 1 TABLET: TAB at 08:01

## 2023-01-04 RX ADMIN — TACROLIMUS 5 MG: 1 CAPSULE ORAL at 06:01

## 2023-01-04 RX ADMIN — HYDRALAZINE HYDROCHLORIDE 10 MG: 20 INJECTION, SOLUTION INTRAMUSCULAR; INTRAVENOUS at 12:01

## 2023-01-04 RX ADMIN — HYDRALAZINE HYDROCHLORIDE 50 MG: 50 TABLET ORAL at 02:01

## 2023-01-04 RX ADMIN — ANTI-THYMOCYTE GLOBULIN (RABBIT) 100 MG: 5 INJECTION, POWDER, LYOPHILIZED, FOR SOLUTION INTRAVENOUS at 01:01

## 2023-01-04 RX ADMIN — NIFEDIPINE 60 MG: 30 TABLET, FILM COATED, EXTENDED RELEASE ORAL at 08:01

## 2023-01-04 RX ADMIN — OXYCODONE 5 MG: 5 TABLET ORAL at 05:01

## 2023-01-04 RX ADMIN — BISACODYL 10 MG: 5 TABLET, COATED ORAL at 08:01

## 2023-01-04 RX ADMIN — SUCROFERRIC OXYHYDROXIDE 1500 MG: 500 TABLET, CHEWABLE ORAL at 08:01

## 2023-01-04 NOTE — PROGRESS NOTES
Admit Note     Met with patient and mother to assess needs. Patient is a 32 y.o. single female, admitted for kidney transplant.      Patient admitted from home on 1/2/2023 .  At this time, patient presents as pleasant, well groomed, recall good, concentration/judgement good, average intelligence, calm, communicative, cooperative, and asking and answering questions appropriately.  At this time, patients caregiver presents as alert and oriented x 4, pleasant, good eye contact, well groomed, recall good, concentration/judgement good, average intelligence, calm, communicative, cooperative, and asking and answering questions appropriately.    Household/Family Systems     Patient resides with patient's child(karen), age(s) 4, 12, 14, and 15 yo , at [address] [place] [address] [facility] [address].  Support system includes pt's mother Tana Schwartz, sister Nahum Orozco ([phone]), and father Ganesh Cesar ([phone]).  Pt reports having strong family support and having multiple family members available for additional caregiver support if ever needed.  Patient does have dependents that are in need of being cared for. Patient's 4 minor children are being cared for by pt's cousin and multiple family members available.     Patients primary caregiver is Tana Schwartz, patients mother, phone number [phone].  Confirmed patients contact information is [phone] (home);   Telephone Information:   Mobile [phone]   .  Patient states having an additional phone number ([phone]) registered to her same cell phone that transplant can use if needed.    During admission, patient's caregiver plans to stay in patient's room.  Confirmed patient and patients caregivers do have access to reliable transportation.    Cognitive Status/Learning     Patient reports reading ability as college and states patient does have difficulty with writing sometimes due to hand spasms.  Patient reports patient learns best by multisensory  information.   Needed: No.   Highest education level: Attended College/Technical School    Vocation/Disability   .  Working for Income: No  If no, reason not working: Disability  Patient is disabled due to ESRD since October 2016.  Prior to disability, patient  was employed as a  for [employer].    Adherence     Patient reports a high level of adherence to patients health care regimen.  However, following SW's meeting with pt, KTS rounds reviewed pt's pre-transplant labs which were concerning for likely non-adherence to dialysis treatment.  Adherence counseling and education provided. Patient verbalizes understanding.    Substance Use    Patient reports the following substance usage.    Tobacco: none, patient denies any use.  Alcohol: none, patient denies any use.  Illicit Drugs/Non-prescribed Medications: none, patient denies any use.  Patient states clear understanding of the potential impact of substance use.  Substance abstinence/cessation counseling, education and resources provided and reviewed.     Services Utilizing/ADLS    Infusion Service: Prior to admission, patient utilizing? no  Home Health: Prior to admission, patient utilizing? no  DME: Prior to admission, no  Pulmonary/Cardiac Rehab: Prior to admission, no  Dialysis:  Prior to admission, yes - home HD prior to transplant  Transplant Specialty Pharmacy:  Prior to admission, no; patient provides permission to release necessary information to specialty pharmacy for medications post-tranplant. Resources provided and patient is choosing [facility] pharmacy at this time.    Prior to admission, patient reports patient was independent with ADLS and was driving.  Patient reports patient is not able to care for self at this time due to compromised medical condition (as documented in medical record) and physical weakness..  Patient indicates a willingness to care for self once medically cleared to do so.    Insurance/Medications    Insured  by   Payer/Plan Subscr  Sex Relation Sub. Ins. ID Effective Group Num   1. HU[employer]NA MANAGE* APOORVA SOTO * 1990 Female Self [id number] 21 [id number]                                   [address] [address]   2. [place]* APO[place]VA SOTO * 1990 Female Self [id number]* [id number]                                    [address] [address]      Primary Insurance (for UNOS reporting): Public Insurance - Medicare & Choice  Secondary Insurance (for UNOS reporting): Public Insurance - Medicaid    Patient reports patient is able to obtain and afford medications at this time and at time of discharge.    Living Will/Healthcare Power of     Patient states patient does not have a LW and/or HCPA.   provided education regarding LW and HCPA and the completion of forms.    Coping/Mental Health    Patient is coping adequately with the aid of  family members. Patient reports some more recent difficulty coping due to a stressful event that occurred within her family earlier this year which pt would not elaborate upon.  Patient denies mental health difficulties currently but has reported history of anxiety.  Patient denies being on any medication due to side effects (with Klonopin) but maintains routine counseling / psychotherapy visits with Dr. Guillory [facility].  Pt denies need for further intervention at this time.    Discharge Planning    At time of discharge, patient plans to return to patient's home under the care of mother and multiple relatives.  Patients mother will transport patient.  Per rounds today, expected discharge date has not been medically determined at this time. Patient and patients caregiver  verbalize understanding and are involved in treatment planning and discharge process.    Additional Concerns    Patient's caretaker denies additional needs and/or concerns at this time. Patient is being followed for needs, education, resources, information, emotional support,  supportive counseling, and for supportive and skilled discharge plan of care.  providing ongoing psychosocial support, education, resources and d/c planning as needed.  SW remains available.  remains available. Patient's caregiver verbalizes understanding and agreement with information reviewed,  availability and how to access available resources as needed. Patient denies additional needs and/or concerns at this time. Patient verbalizes understanding and agreement with information reviewed, social work availability, and how to access available resources as needed.

## 2023-01-04 NOTE — PLAN OF CARE
Pt aao x4. Call bell within reach. She is up with 1 person assist. She has been up in bedside chair since noon. She has ambulated in hallway a short distance. Plan to ambulate a little more tonight before getting back in bed. She is eating about 25% of meals. She is belching but has not passed gas or had bm yet. Encouraging her to drink more. She continues on NS at 100ml/hr. Estevez intact draining pink tinged urine. Plan to d/c estevez at 6am. Pt instructed on IS use. Encouraged her to use IS frequently. Thymo #2/3 given today. Self med box set up and ready. Will begin teaching soon. Mother at bedside and attentive to pt's needs. CR trending down. Patient verbalized understanding of plan of care.

## 2023-01-04 NOTE — PLAN OF CARE
AAO x 4. ^BP PRN IV hydralazine administered. Telemetry monitoring SR. RLQ incision BAHMAN with dermabond CDI. NS @ 100 mL/hr continuous. Flores to gravity with clear yellow UOP. Flores d/c'd at 0530 per order-hat placed in toilet for accurate UOP. PRN tramadol and oxycodone for pain control. Self meds introduced to pt and mother at bedside. See flowsheet for assessment findings.

## 2023-01-04 NOTE — SUBJECTIVE & OBJECTIVE
Subjective:   History of Present Illness:  Ms. Schwartz is a 32 y.o.  female with ESRD secondary to FSGS.  She has been on the wait list for a kidney transplant at [facility] since 10/28/2016. Patient is currently on hemodialysis started on 10/28/2016. Patient is dialyzing on home HD every other day.  Patient reports that she is tolerating dialysis well.. She has a RUE AV fistula. She presents to the hospital for kidney transplant. She denies fever/chills. Pre op labs and imaging pending        Hospital Course:  Patient is now s/p DBD kidney transplant on 1/2/22 (Thymo induction CIT ~21 hours). Surgery without complication. No drains placed. 2 day estevez. Hypertensive pre and post op requiring IV antihypertensives    Interval History: No acute events overnight. Patient feeling well today. Does have incisional pain but tolerable. Creatinine clearing with good UOP. Estevez removed this AM. Incision clean, dry, intact. Tolerating diet. Stop IVF. Likely dc tomorrow. Continue to monitor.         Past Medical, Surgical, Family, and Social History:   Unchanged from H&P.    Scheduled Meds:   acetaminophen  650 mg Oral Q24H    acetaminophen  650 mg Oral Q8H    antithymocyte globulin (rabbit), hydrocortisone 20mg, with optional heparin 1000 units in NS 500ml (FOR PERIPHERAL LINE ADMINISTRATION ONLY)  1.5 mg/kg (Adjusted) Intravenous Daily    bisacodyL  10 mg Oral QHS    calcitRIOL  0.25 mcg Oral Daily    diphenhydrAMINE  25 mg Oral Q24H    docusate sodium  100 mg Oral TID    heparin (porcine)  5,000 Units Subcutaneous Q8H    hydrALAZINE  50 mg Oral Q8H    methylPREDNISolone sodium succinate injection  125 mg Intravenous Once    metoprolol tartrate  50 mg Oral BID    multivitamin  1 tablet Oral Daily    mupirocin  1 g Nasal BID    mycophenolate  1,000 mg Oral BID    NIFEdipine  60 mg Oral Daily    pantoprazole  40 mg Oral Daily    polyethylene glycol  17 g Oral Daily    [START ON 1/5/2023] predniSONE  20 mg Oral  "Daily    sucroferric oxyhydroxide  1,500 mg Oral TID WM    [START ON 1/12/2023] sulfamethoxazole-trimethoprim 400-80mg  1 tablet Oral Daily AM    tacrolimus  2 mg Oral Once    tacrolimus  5 mg Oral BID    [START ON 1/12/2023] valGANciclovir  450 mg Oral Daily AM     Continuous Infusions:   glucagon (human recombinant)       PRN Meds:calcium carbonate, dextrose 50%, diphenhydrAMINE, EPINEPHrine (PF), glucagon (human recombinant), glucose, glucose, glucose, hydrALAZINE, hydrocortisone sodium succinate, insulin aspart U-100, labetalol, ondansetron, oxyCODONE, sodium chloride 0.9%, sodium chloride 0.9%, traMADoL    Intake/Output - Last 3 Shifts         01/02 0700 01/03 0659 01/03 0700 01/04 0659 01/04 0700 01/05 0659    P.O. 690 1440     I.V. (mL/kg) 1153.2 (12.7) 3014 (32.5)     IV Piggyback 1825 491     Total Intake(mL/kg) 3668.2 (40.3) 4945 (53.3)     Urine (mL/kg/hr) 2460 3120 (1.4)     Stool 0 0     Total Output 2460 3120     Net +1208.2 +1825            Stool Occurrence 0 x 0 x              Review of Systems   Constitutional:  Negative for activity change and appetite change.   Respiratory:  Negative for shortness of breath.    Gastrointestinal:  Positive for abdominal pain. Negative for nausea and vomiting.   Genitourinary:  Negative for decreased urine volume.   Skin:  Positive for wound.   Allergic/Immunologic: Positive for immunocompromised state.   Neurological:  Negative for weakness.   Psychiatric/Behavioral:  Negative for confusion and decreased concentration.     Objective:     Vital Signs (Most Recent):  Temp: 97.6 °F (36.4 °C) (01/04/23 0815)  Pulse: 89 (01/04/23 0818)  Resp: 16 (01/04/23 0919)  BP: (!) 141/77 (01/04/23 0815)  SpO2: 97 % (01/04/23 0815)   Vital Signs (24h Range):  Temp:  [97.6 °F (36.4 °C)-98.5 °F (36.9 °C)] 97.6 °F (36.4 °C)  Pulse:  [82-97] 89  Resp:  [15-23] 16  SpO2:  [97 %-100 %] 97 %  BP: (141-178)/(74-94) 141/77     Weight: 92.7 kg (204 lb 5.9 oz)  Height: 5' 6" (167.6 " cm)  Body mass index is 32.99 kg/m².    Physical Exam  Vitals and nursing note reviewed.   Cardiovascular:      Rate and Rhythm: Normal rate and regular rhythm.      Heart sounds: No murmur heard.    No gallop.   Pulmonary:      Effort: Pulmonary effort is normal.      Breath sounds: No wheezing.   Abdominal:      Tenderness: There is no abdominal tenderness. There is no guarding or rebound.      Comments: Kidney incision clean dry intact with staples in place    Neurological:      Mental Status: She is alert and oriented to person, place, and time.   Psychiatric:         Mood and Affect: Mood normal.         Behavior: Behavior normal.         Thought Content: Thought content normal.         Judgment: Judgment normal.       Laboratory:  CBC:   Recent Labs   Lab 01/03/23  0555 01/03/23  1330 01/04/23  0643   WBC 10.77 8.39 8.23   RBC 2.74* 2.61* 2.58*   HGB 8.5* 8.2* 8.0*   HCT 27.0* 26.5* 26.3*    142* 152   MCV 99* 102* 102*   MCH 31.0 31.4* 31.0   MCHC 31.5* 30.9* 30.4*       BMP:   Recent Labs   Lab 01/03/23  0555 01/03/23  1330 01/04/23  0643   * 112* 98   * 137 137   K 4.9 4.5 4.4   CL 95 102 106   CO2 23 21* 20*   BUN 69* 62* 64*   CREATININE 19.1* 14.8* 11.8*   CALCIUM 9.7 9.2 9.2         Diagnostic Results:  None

## 2023-01-04 NOTE — PROGRESS NOTES
"[facility] - Transplant Stepdown  Adult Nutrition  Progress Note    SUMMARY       Recommendations    1. Continue Regular diet - add renal restriction if needed      2. If PO intake <50%, add Novasource renal BID       3. RD to monitor and follow    Goals: Meet % EEN, EPN by RD f/u  Nutrition Goal Status: progressing towards goal  Communication of RD Recs:  (POC)    Assessment and Plan    Nutrition Problem  Increased nutrient needs (protein, energy)     Related to (etiology):   Increased physiological demands     Signs and Symptoms (as evidenced by):   S/p DBD Kidney transplant (1/2)     Interventions/Recommendations (treatment strategy):  Collaboration with other providers     Nutrition Diagnosis Status:   Continue     Reason for Assessment    Reason For Assessment: RD follow-up  Diagnosis:  (s/p kidney transplant)  Relevant Medical History: HTN, anemia of chronic disease  Interdisciplinary Rounds: did not attend    General Information Comments:   1/4: Pt reports fair appetite. Tolerating 25% of meals per chart. Reiterated post tx nutrition education, all question has been answered. No other needs identified at this time.    1/3: S/p DBD Kidney transplant (1/2). Pt tolerated some grits and sausages this AM. Pt reports appetite was on and off PTA due to HD. Denies N/V, chewing/swallowing difficulties. Stated UBW 89-92kg. Pt appears nourished, NFPE not warranted. No indicator of malnutrition. Post tx diet provided with handout.    Nutrition Discharge Planning: Post transplant nutrition education provided on 1/3. Food safety/drug interactions emphasized. General healthy/low salt diet recommended. Education material left at bedside. No other needs identified.    Nutrition Risk Screen    Nutrition Risk Screen: no indicators present    Anthropometrics    Temp: 98.5 °F (36.9 °C)  Height Method: Stated  Height: 5' 6" (167.6 cm)  Height (inches): 66 in  Weight Method: Bed Scale  Weight: 92.7 kg (204 lb 5.9 oz)  Weight " (lb): 204.37 lb  Ideal Body Weight (IBW), Female: 130 lb  % Ideal Body Weight, Female (lb): 154.58 %  BMI (Calculated): 33     Lab/Procedures/Meds    Pertinent Labs Reviewed: reviewed  Pertinent Labs Comments: BUN 64, Cr 11.8, P 57, eGFR 3.9  Pertinent Medications Reviewed: reviewed  Pertinent Medications Comments: prednisone, tacrolimus, pantoprazole, calcitriol, mycophenolate    Estimated/Assessed Needs    Weight Used For Calorie Calculations: 90.7 kg (200 lb)  Energy Calorie Requirements (kcal): 1785 kcal  Energy Need Method: [place]-St Jeor (PAL 1.1)  Protein Requirements: 59g (1g/kg IBW)  Weight Used For Protein Calculations: 59 kg (130 lb) (IBW)  Fluid Requirements (mL): 1ml/kcal or per MD  Estimated Fluid Requirement Method: RDA Method  RDA Method (mL): 1785     Nutrition Prescription Ordered    Current Diet Order: Regular    Evaluation of Received Nutrient/Fluid Intake    I/O: +3L since admit  Energy Calories Required: not meeting needs  Protein Required: not meeting needs  Comments: LBM 1/1  Tolerance: tolerating    Nutrition Risk    Level of Risk/Frequency of Follow-up:  (1x/week)     Monitor and Evaluation    Food and Nutrient Intake: energy intake, food and beverage intake  Food and Nutrient Adminstration: diet order  Physical Activity and Function: nutrition-related ADLs and IADLs  Anthropometric Measurements: height/length, weight, weight change, body mass index  Biochemical Data, Medical Tests and Procedures: gastrointestinal profile, electrolyte and renal panel, glucose/endocrine profile, inflammatory profile, lipid profile  Nutrition-Focused Physical Findings: overall appearance     Nutrition Follow-Up    RD Follow-up?: Yes

## 2023-01-04 NOTE — PLAN OF CARE
Pt aaox4.  Bed in low and locked position.  Nonskid socks in use.  Call bell, phone, food, drink within reach in bed or on bedside table.   mother at bedside and active in care.  Both aware to call if needing assistance.  Pain controlled with prn po oxy.  Sandra removed this am per night shift - now voiding per hat.  States small BM this afternoon - suppository also ordered and to be given per pt.  Miralax started.  IVF dc this am.  Tacro dose increased.  RLQ faiza grimm dermabond.  Tele in use.  [facility]  See flowsheet for full assessment and details.

## 2023-01-04 NOTE — PROGRESS NOTES
Update:      contacted patient's dialysis unit at medical teams request. Prior to transplant patient dialyzed at [facility]    Phone: [phone]  Fax: [phone]     spoke with patient's nurse Toya who reported patient had good compliance. Toya reports that patient's baseline creatinine while on home hemodialysis is 19. She also reported that patient's home hemodialysis unit was malfunctioning and that they had planned to go to her home to assist her with it, and then found out that patient had been transplanted.    Dialysis unit RN denied any other concerns regarding patient compliance.     Ivana Estes LMSW

## 2023-01-04 NOTE — ASSESSMENT & PLAN NOTE
- 2 day estevez removed 1/4/22. No drains in place  - CIT ~21 hours  - Making urine with renal clearance   - Plan to stop IVF  - Encourage ambulation. Encourage IS

## 2023-01-04 NOTE — PROGRESS NOTES
[facility] - Transplant Stepdown  Kidney Transplant  Progress Note      Reason for Follow-up: Reassessment of Kidney Transplant - 1/2/2023  (#1) recipient and management of immunosuppression.    ORGAN:  RIGHT KIDNEY   Donor Type:  Donation after Brain Death   Donor CMV Status: Negative  Donor HBcAB:Negative  Donor HCV Status:Negative  Donor HBV KIERAN: Negative  Donor HCV KIERAN: Negative      Subjective:   History of Present Illness:  Ms. Schwartz is a 32 y.o.  female with ESRD secondary to FSGS.  She has been on the wait list for a kidney transplant at [facility] since 10/28/2016. Patient is currently on hemodialysis started on 10/28/2016. Patient is dialyzing on home HD every other day.  Patient reports that she is tolerating dialysis well.. She has a RUE AV fistula. She presents to the hospital for kidney transplant. She denies fever/chills. Pre op labs and imaging pending        Hospital Course:  Patient is now s/p DBD kidney transplant on 1/2/22 (Thymo induction CIT ~21 hours). Surgery without complication. No drains placed. 2 day estevez. Hypertensive pre and post op requiring IV antihypertensives    Interval History: No acute events overnight. Patient feeling well today. Does have incisional pain but tolerable. Creatinine clearing with good UOP. Estevez removed this AM. Incision clean, dry, intact. Tolerating diet. Stop IVF. Likely dc tomorrow. Continue to monitor.         Past Medical, Surgical, Family, and Social History:   Unchanged from H&P.    Scheduled Meds:   acetaminophen  650 mg Oral Q24H    acetaminophen  650 mg Oral Q8H    antithymocyte globulin (rabbit), hydrocortisone 20mg, with optional heparin 1000 units in NS 500ml (FOR PERIPHERAL LINE ADMINISTRATION ONLY)  1.5 mg/kg (Adjusted) Intravenous Daily    bisacodyL  10 mg Oral QHS    calcitRIOL  0.25 mcg Oral Daily    diphenhydrAMINE  25 mg Oral Q24H    docusate sodium  100 mg Oral TID    heparin (porcine)  5,000 Units Subcutaneous Q8H    hydrALAZINE   50 mg Oral Q8H    methylPREDNISolone sodium succinate injection  125 mg Intravenous Once    metoprolol tartrate  50 mg Oral BID    multivitamin  1 tablet Oral Daily    mupirocin  1 g Nasal BID    mycophenolate  1,000 mg Oral BID    NIFEdipine  60 mg Oral Daily    pantoprazole  40 mg Oral Daily    polyethylene glycol  17 g Oral Daily    [START ON 1/5/2023] predniSONE  20 mg Oral Daily    sucroferric oxyhydroxide  1,500 mg Oral TID WM    [START ON 1/12/2023] sulfamethoxazole-trimethoprim 400-80mg  1 tablet Oral Daily AM    tacrolimus  2 mg Oral Once    tacrolimus  5 mg Oral BID    [START ON 1/12/2023] valGANciclovir  450 mg Oral Daily AM     Continuous Infusions:   glucagon (human recombinant)       PRN Meds:calcium carbonate, dextrose 50%, diphenhydrAMINE, EPINEPHrine (PF), glucagon (human recombinant), glucose, glucose, glucose, hydrALAZINE, hydrocortisone sodium succinate, insulin aspart U-100, labetalol, ondansetron, oxyCODONE, sodium chloride 0.9%, sodium chloride 0.9%, traMADoL    Intake/Output - Last 3 Shifts         01/02 0700  01/03 0659 01/03 0700  01/04 0659 01/04 0700  01/05 0659    P.O. 690 1440     I.V. (mL/kg) 1153.2 (12.7) 3014 (32.5)     IV Piggyback 1825 491     Total Intake(mL/kg) 3668.2 (40.3) 4945 (53.3)     Urine (mL/kg/hr) 2460 3120 (1.4)     Stool 0 0     Total Output 2460 3120     Net +1208.2 +1825            Stool Occurrence 0 x 0 x              Review of Systems   Constitutional:  Negative for activity change and appetite change.   Respiratory:  Negative for shortness of breath.    Gastrointestinal:  Positive for abdominal pain. Negative for nausea and vomiting.   Genitourinary:  Negative for decreased urine volume.   Skin:  Positive for wound.   Allergic/Immunologic: Positive for immunocompromised state.   Neurological:  Negative for weakness.   Psychiatric/Behavioral:  Negative for confusion and decreased concentration.     Objective:     Vital Signs (Most Recent):  Temp: 97.6 °F (36.4  "°C) (01/04/23 0815)  Pulse: 89 (01/04/23 0818)  Resp: 16 (01/04/23 0919)  BP: (!) 141/77 (01/04/23 0815)  SpO2: 97 % (01/04/23 0815)   Vital Signs (24h Range):  Temp:  [97.6 °F (36.4 °C)-98.5 °F (36.9 °C)] 97.6 °F (36.4 °C)  Pulse:  [82-97] 89  Resp:  [15-23] 16  SpO2:  [97 %-100 %] 97 %  BP: (141-178)/(74-94) 141/77     Weight: 92.7 kg (204 lb 5.9 oz)  Height: 5' 6" (167.6 cm)  Body mass index is 32.99 kg/m².    Physical Exam  Vitals and nursing note reviewed.   Cardiovascular:      Rate and Rhythm: Normal rate and regular rhythm.      Heart sounds: No murmur heard.    No gallop.   Pulmonary:      Effort: Pulmonary effort is normal.      Breath sounds: No wheezing.   Abdominal:      Tenderness: There is no abdominal tenderness. There is no guarding or rebound.      Comments: Kidney incision clean dry intact   Neurological:      Mental Status: She is alert and oriented to person, place, and time.   Psychiatric:         Mood and Affect: Mood normal.         Behavior: Behavior normal.         Thought Content: Thought content normal.         Judgment: Judgment normal.       Laboratory:  CBC:   Recent Labs   Lab 01/03/23  0555 01/03/23  1330 01/04/23  0643   WBC 10.77 8.39 8.23   RBC 2.74* 2.61* 2.58*   HGB 8.5* 8.2* 8.0*   HCT 27.0* 26.5* 26.3*    142* 152   MCV 99* 102* 102*   MCH 31.0 31.4* 31.0   MCHC 31.5* 30.9* 30.4*       BMP:   Recent Labs   Lab 01/03/23  0555 01/03/23  1330 01/04/23  0643   * 112* 98   * 137 137   K 4.9 4.5 4.4   CL 95 102 106   CO2 23 21* 20*   BUN 69* 62* 64*   CREATININE 19.1* 14.8* 11.8*   CALCIUM 9.7 9.2 9.2         Diagnostic Results:  None    Assessment/Plan:     * S/P kidney transplant  - 2 day estevez removed 1/4/22. No drains in place  - CIT ~21 hours  - Making urine with renal clearance   - Plan to stop IVF  - Encourage ambulation. Encourage IS      Acute blood loss anemia  - Expected post operatively  - H/H stable  - Monitor with daily cbc      Prophylactic " immunotherapy  - See long term use of immunosuppressant medication       Secondary hyperparathyroidism  - Patient refusing sensipar due to it upsetting her stomach. Has not been taking it as an outpatient  - Continue calcitriol 0.25 mg daily      Anemia of chronic disease  - See acute blood loss anemia      Hyperphosphatemia  - Continue patient's home phos binder      Hypertension  - Continue nifedipine, hydralazine, and metoprolol      FSGS (focal segmental glomerulosclerosis)  - Will check daily pr/cr ratio post transplant while inpatient          Discharge Planning: Not a candidate for discharge at this time       ANDIE LebronC  Kidney Transplant  [facility] - Transplant Stepdown

## 2023-01-04 NOTE — DISCHARGE SUMMARY
[facility] - Transplant Stepdown  Kidney Transplant  Discharge Summary    Patient Name: Lisseth Schwartz  MRN: [id number]  Admission Date: 1/2/2023  Hospital Length of Stay: 2 days  Discharge Date and Time:  01/05/2023 1:26 PM  Attending Physician: Go Beard MD   Discharging Provider: Luana Bella PA-C  Primary Care Provider: No primary care provider on file.    HPI:   Ms. Schwartz is a 32 y.o.  female with ESRD secondary to FSGS.  She has been on the wait list for a kidney transplant at [facility] since 10/28/2016. Patient is currently on hemodialysis started on 10/28/2016. Patient is dialyzing on home HD every other day.  Patient reports that she is tolerating dialysis well.. She has a RUE AV fistula. She presents to the hospital for kidney transplant. She denies fever/chills. Pre op labs and imaging pending      Procedure(s) (LRB):  TRANSPLANT, KIDNEY (N/A)     Hospital Course:    Patient is now s/p DBD kidney transplant on 1/2/22 (Thymo induction CIT ~21 hours). Surgery without complication. No drains placed. 2 day estevez. Patient with hypertension requiring IV antihypertensives pre and post op. Oral antihypertensives added and adjusted with better BP control now. She progressed well post operatively. 2 day estevez removed on POD#2 with no issues voiding following removal. She is making adequate urine with renal clearance. She is tolerating diet, passing flatus, ambulating. Incision clean, dry, intact with staples in place. She is stable and ready for discharge. She will follow up with labs and clinic appointment on 1/6/23 Patient verbalized her understanding prior to discharge.         Goals of Care Treatment Preferences:  Code Status: Full Code      Final Active Diagnoses:    Diagnosis Date Noted POA    PRINCIPAL PROBLEM:  S/P kidney transplant [Z94.0] 01/02/2023 Not Applicable    Acute blood loss anemia [D62] 01/03/2023 No    Prophylactic immunotherapy [Z29.8] 01/02/2023 Not Applicable     At risk for opportunistic infections [Z91.89] 01/02/2023 No    Secondary hyperparathyroidism [N25.81] 11/30/2022 Yes    Anemia of chronic disease [D63.8] 08/11/2022 Yes    Hyperphosphatemia [E83.39] 09/21/2018 Yes    FSGS (focal segmental glomerulosclerosis) [N05.1] 02/01/2017 Yes    Hypertension [I10] 12/10/2015 Yes      Problems Resolved During this Admission:       Treatments: as above    Consults (From admission, onward)        Status Ordering Provider     Inpatient consult to Registered Dietitian/Nutritionist  Once        Provider:  (Not yet assigned)    Completed UZAIR RENO     Inpatient consult to Transplant Nephrology (KTM)  Once        Provider:  (Not yet assigned)    Acknowledged UZAIR RENO          Pending Diagnostic Studies:     Procedure Component Value Units Date/Time    Hepatitis B Surface Antibody, Qual/Quant [[id number]] Collected: 01/02/23 0859    Order Status: Sent Lab Status: In process Updated: 01/02/23 0928    Specimen: Blood         Significant Diagnostic Studies: Labs:   CMP   Recent Labs   Lab 01/04/23  0643 01/04/23  1718 01/05/23  0759    140 141   K 4.4 4.4 4.0    107 110   CO2 20* 20* 21*   GLU 98 126* 76   BUN 64* 56* 47*   CREATININE 11.8* 9.4* 6.7*   CALCIUM 9.2 9.5 9.8   ALBUMIN 3.2*  --  3.4*   ANIONGAP 11 13 10   , CBC   Recent Labs   Lab 01/04/23  0643 01/04/23  1718 01/05/23  0758   WBC 8.23 6.87 3.53*   HGB 8.0* 8.6* 7.9*   HCT 26.3* 27.3* 25.8*    150 113*    and All labs within the past 24 hours have been reviewed    Discharged Condition: good    Disposition:     Follow Up:    Patient Instructions:   No discharge procedures on file.  Medications:  Reconciled Home Medications:      Medication List      START taking these medications    bisacodyL 5 mg EC tablet  Commonly known as: DULCOLAX  Take 2 tablets (10 mg total) by mouth daily as needed for Constipation.     docusate sodium 100 MG capsule  Commonly known as: COLACE  Take 1 capsule (100 mg  total) by mouth 3 (three) times daily as needed for Constipation.     metoprolol tartrate 50 MG tablet  Commonly known as: LOPRESSOR  Take 1 tablet (50 mg total) by mouth 2 (two) times daily.     mycophenolate 250 mg Cap  Commonly known as: CELLCEPT  Take 4 capsules (1,000 mg total) by mouth 2 (two) times daily.     NIFEdipine 60 MG (OSM) 24 hr tablet  Commonly known as: PROCARDIA-XL  Take 1 tablet (60 mg total) by mouth 2 (two) times a day.     oxyCODONE 5 MG immediate release tablet  Commonly known as: ROXICODONE  Take 1 tablet (5 mg total) by mouth every 6 (six) hours as needed for Pain.     predniSONE 5 MG tablet  Commonly known as: DELTASONE  Take by mouth daily: 20 mg 1/6-1/12; 15mg 1/13-1/19; 10 mg 1/20-1/26; 5 mg daily thereafter 1/27/23     sulfamethoxazole-trimethoprim 400-80mg 400-80 mg per tablet  Commonly known as: BACTRIM,SEPTRA  Take 1 tablet by mouth every morning. Stop: 7/1/2023     tacrolimus 1 MG Cap  Commonly known as: PROGRAF  Take 8 capsules (8 mg total) by mouth every 12 (twelve) hours.     valGANciclovir 450 mg Tab  Commonly known as: VALCYTE  Take 1 tablet (450 mg total) by mouth every morning. Stop: 4/2/2023        CHANGE how you take these medications    calcitRIOL 0.25 MCG Cap  Commonly known as: ROCALTROL  Take 1 capsule (0.25 mcg total) by mouth once daily.  What changed:   · medication strength  · how to take this     pantoprazole 40 MG tablet  Commonly known as: PROTONIX  Take 1 tablet (40 mg total) by mouth once daily.  What changed: when to take this        CONTINUE taking these medications    albuterol 90 mcg/actuation inhaler  Commonly known as: PROVENTIL/VENTOLIN HFA  Inhale 1-2 puffs into the lungs every 6 (six) hours as needed for Wheezing (cough).     clonazePAM 0.5 MG tablet  Commonly known as: KlonoPIN  Take 0.5 mg by mouth daily as needed.        STOP taking these medications    cinacalcet 30 MG Tab  Commonly known as: SENSIPAR     gabapentin 100 MG capsule  Commonly known  as: NEURONTIN     metoprolol succinate 50 MG 24 hr tablet  Commonly known as: TOPROL-XL     MIRCERA 200 mcg/0.3 mL Syrg  Generic drug: epoetin beta, methoxy peg     sucroferric oxyhydroxide 500 mg Chew  Commonly known as: VELPHORO     VENOFER 50 mg iron/2.5 mL Soln  Generic drug: iron sucrose          Time spent caring for patient (Greater than 1/2 spent in direct face-to-face contact): > 30 minutes    Luana Bella PAJazmineC  Kidney Transplant  [facility]

## 2023-01-05 VITALS
SYSTOLIC BLOOD PRESSURE: 173 MMHG | HEART RATE: 89 BPM | OXYGEN SATURATION: 97 % | RESPIRATION RATE: 16 BRPM | DIASTOLIC BLOOD PRESSURE: 100 MMHG | BODY MASS INDEX: 34.72 KG/M2 | HEIGHT: 66 IN | WEIGHT: 216.06 LBS | TEMPERATURE: 99 F

## 2023-01-05 DIAGNOSIS — Z94.0 KIDNEY REPLACED BY TRANSPLANT: Primary | ICD-10-CM

## 2023-01-05 LAB
ALBUMIN SERPL BCP-MCNC: 3.4 G/DL (ref 3.5–5.2)
ANION GAP SERPL CALC-SCNC: 10 MMOL/L (ref 8–16)
BASOPHILS # BLD AUTO: 0.01 K/UL (ref 0–0.2)
BASOPHILS NFR BLD: 0.3 % (ref 0–1.9)
BUN SERPL-MCNC: 47 MG/DL (ref 6–20)
CALCIUM SERPL-MCNC: 9.8 MG/DL (ref 8.7–10.5)
CHLORIDE SERPL-SCNC: 110 MMOL/L (ref 95–110)
CO2 SERPL-SCNC: 21 MMOL/L (ref 23–29)
CREAT SERPL-MCNC: 6.7 MG/DL (ref 0.5–1.4)
CREAT UR-MCNC: 166 MG/DL (ref 15–325)
DIFFERENTIAL METHOD: ABNORMAL
EOSINOPHIL # BLD AUTO: 0 K/UL (ref 0–0.5)
EOSINOPHIL NFR BLD: 0 % (ref 0–8)
ERYTHROCYTE [DISTWIDTH] IN BLOOD BY AUTOMATED COUNT: 15.1 % (ref 11.5–14.5)
EST. GFR  (NO RACE VARIABLE): 7.7 ML/MIN/1.73 M^2
GLUCOSE SERPL-MCNC: 76 MG/DL (ref 70–110)
HBV SURFACE AB SER QL IA: POSITIVE
HBV SURFACE AB SERPL IA-ACNC: 48 MIU/ML
HCT VFR BLD AUTO: 25.8 % (ref 37–48.5)
HGB BLD-MCNC: 7.9 G/DL (ref 12–16)
IMM GRANULOCYTES # BLD AUTO: 0.04 K/UL (ref 0–0.04)
IMM GRANULOCYTES NFR BLD AUTO: 1.1 % (ref 0–0.5)
LYMPHOCYTES # BLD AUTO: 0.1 K/UL (ref 1–4.8)
LYMPHOCYTES NFR BLD: 3.7 % (ref 18–48)
MAGNESIUM SERPL-MCNC: 1.9 MG/DL (ref 1.6–2.6)
MCH RBC QN AUTO: 31 PG (ref 27–31)
MCHC RBC AUTO-ENTMCNC: 30.6 G/DL (ref 32–36)
MCV RBC AUTO: 101 FL (ref 82–98)
MONOCYTES # BLD AUTO: 0.3 K/UL (ref 0.3–1)
MONOCYTES NFR BLD: 8.5 % (ref 4–15)
NEUTROPHILS # BLD AUTO: 3.1 K/UL (ref 1.8–7.7)
NEUTROPHILS NFR BLD: 86.4 % (ref 38–73)
NRBC BLD-RTO: 0 /100 WBC
PHOSPHATE SERPL-MCNC: 3.1 MG/DL (ref 2.7–4.5)
PLATELET # BLD AUTO: 113 K/UL (ref 150–450)
PMV BLD AUTO: 11.4 FL (ref 9.2–12.9)
POCT GLUCOSE: 75 MG/DL (ref 70–110)
POTASSIUM SERPL-SCNC: 4 MMOL/L (ref 3.5–5.1)
PROT UR-MCNC: 72 MG/DL (ref 0–15)
PROT/CREAT UR: 0.43 MG/G{CREAT} (ref 0–0.2)
RBC # BLD AUTO: 2.55 M/UL (ref 4–5.4)
SODIUM SERPL-SCNC: 141 MMOL/L (ref 136–145)
TACROLIMUS BLD-MCNC: <2 NG/ML (ref 5–15)
TROPONIN I SERPL DL<=0.01 NG/ML-MCNC: 0.01 NG/ML (ref 0–0.03)
WBC # BLD AUTO: 3.53 K/UL (ref 3.9–12.7)

## 2023-01-05 PROCEDURE — 1111F DSCHRG MED/CURRENT MED MERGE: CPT | Mod: HCNC,CPTII,, | Performed by: PHYSICIAN ASSISTANT

## 2023-01-05 PROCEDURE — 99239 HOSP IP/OBS DSCHRG MGMT >30: CPT | Mod: 24,HCNC,, | Performed by: PHYSICIAN ASSISTANT

## 2023-01-05 PROCEDURE — 99239 PR HOSPITAL DISCHARGE DAY,>30 MIN: ICD-10-PCS | Mod: 24,HCNC,, | Performed by: PHYSICIAN ASSISTANT

## 2023-01-05 PROCEDURE — 82570 ASSAY OF URINE CREATININE: CPT | Mod: HCNC | Performed by: STUDENT IN AN ORGANIZED HEALTH CARE EDUCATION/TRAINING PROGRAM

## 2023-01-05 PROCEDURE — 36415 COLL VENOUS BLD VENIPUNCTURE: CPT | Mod: HCNC | Performed by: STUDENT IN AN ORGANIZED HEALTH CARE EDUCATION/TRAINING PROGRAM

## 2023-01-05 PROCEDURE — 63600175 PHARM REV CODE 636 W HCPCS: Mod: HCNC | Performed by: STUDENT IN AN ORGANIZED HEALTH CARE EDUCATION/TRAINING PROGRAM

## 2023-01-05 PROCEDURE — 25000003 PHARM REV CODE 250: Mod: HCNC | Performed by: STUDENT IN AN ORGANIZED HEALTH CARE EDUCATION/TRAINING PROGRAM

## 2023-01-05 PROCEDURE — 63600175 PHARM REV CODE 636 W HCPCS: Mod: HCNC | Performed by: PHYSICIAN ASSISTANT

## 2023-01-05 PROCEDURE — 85025 COMPLETE CBC W/AUTO DIFF WBC: CPT | Mod: HCNC | Performed by: STUDENT IN AN ORGANIZED HEALTH CARE EDUCATION/TRAINING PROGRAM

## 2023-01-05 PROCEDURE — 25000003 PHARM REV CODE 250: Mod: HCNC | Performed by: PHYSICIAN ASSISTANT

## 2023-01-05 PROCEDURE — 80197 ASSAY OF TACROLIMUS: CPT | Mod: HCNC | Performed by: STUDENT IN AN ORGANIZED HEALTH CARE EDUCATION/TRAINING PROGRAM

## 2023-01-05 PROCEDURE — 80069 RENAL FUNCTION PANEL: CPT | Mod: HCNC | Performed by: STUDENT IN AN ORGANIZED HEALTH CARE EDUCATION/TRAINING PROGRAM

## 2023-01-05 PROCEDURE — 25000003 PHARM REV CODE 250: Mod: HCNC | Performed by: CLINICAL NURSE SPECIALIST

## 2023-01-05 PROCEDURE — 83735 ASSAY OF MAGNESIUM: CPT | Mod: HCNC | Performed by: STUDENT IN AN ORGANIZED HEALTH CARE EDUCATION/TRAINING PROGRAM

## 2023-01-05 PROCEDURE — 1111F PR DISCHARGE MEDS RECONCILED W/ CURRENT OUTPATIENT MED LIST: ICD-10-PCS | Mod: HCNC,CPTII,, | Performed by: PHYSICIAN ASSISTANT

## 2023-01-05 PROCEDURE — 63600175 PHARM REV CODE 636 W HCPCS: Mod: HCNC | Performed by: INTERNAL MEDICINE

## 2023-01-05 RX ORDER — TACROLIMUS 1 MG/1
8 CAPSULE ORAL EVERY 12 HOURS
Qty: 480 CAPSULE | Refills: 11 | Status: SHIPPED | OUTPATIENT
Start: 2023-01-05 | End: 2023-01-06 | Stop reason: DRUGHIGH

## 2023-01-05 RX ORDER — DIPHENHYDRAMINE HCL 25 MG
25 CAPSULE ORAL ONCE
Status: COMPLETED | OUTPATIENT
Start: 2023-01-05 | End: 2023-01-05

## 2023-01-05 RX ORDER — NIFEDIPINE 60 MG/1
60 TABLET, EXTENDED RELEASE ORAL 2 TIMES DAILY
Qty: 60 TABLET | Refills: 5 | Status: SHIPPED | OUTPATIENT
Start: 2023-01-05 | End: 2023-04-24

## 2023-01-05 RX ORDER — CLONAZEPAM 0.5 MG/1
0.5 TABLET ORAL DAILY PRN
COMMUNITY
Start: 2022-12-07 | End: 2024-03-20

## 2023-01-05 RX ORDER — TACROLIMUS 1 MG/1
8 CAPSULE ORAL 2 TIMES DAILY
Status: DISCONTINUED | OUTPATIENT
Start: 2023-01-05 | End: 2023-01-05 | Stop reason: HOSPADM

## 2023-01-05 RX ORDER — VALGANCICLOVIR 450 MG/1
450 TABLET, FILM COATED ORAL EVERY MORNING
Qty: 30 TABLET | Refills: 2 | Status: SHIPPED | OUTPATIENT
Start: 2023-01-05 | End: 2023-03-22

## 2023-01-05 RX ORDER — TACROLIMUS 1 MG/1
3 CAPSULE ORAL ONCE
Status: COMPLETED | OUTPATIENT
Start: 2023-01-05 | End: 2023-01-05

## 2023-01-05 RX ORDER — OXYCODONE HYDROCHLORIDE 5 MG/1
5 TABLET ORAL EVERY 6 HOURS PRN
Qty: 28 TABLET | Refills: 0 | Status: SHIPPED | OUTPATIENT
Start: 2023-01-05 | End: 2023-11-10

## 2023-01-05 RX ORDER — HYDRALAZINE HYDROCHLORIDE 50 MG/1
50 TABLET, FILM COATED ORAL EVERY 8 HOURS
Qty: 90 TABLET | Refills: 11 | Status: SHIPPED | OUTPATIENT
Start: 2023-01-05 | End: 2023-01-17 | Stop reason: DRUGHIGH

## 2023-01-05 RX ORDER — NIFEDIPINE 30 MG/1
60 TABLET, EXTENDED RELEASE ORAL 2 TIMES DAILY
Status: DISCONTINUED | OUTPATIENT
Start: 2023-01-05 | End: 2023-01-05 | Stop reason: HOSPADM

## 2023-01-05 RX ADMIN — OXYCODONE 5 MG: 5 TABLET ORAL at 11:01

## 2023-01-05 RX ADMIN — CALCITRIOL CAPSULES 0.25 MCG 0.25 MCG: 0.25 CAPSULE ORAL at 08:01

## 2023-01-05 RX ADMIN — HYDRALAZINE HYDROCHLORIDE 50 MG: 50 TABLET ORAL at 02:01

## 2023-01-05 RX ADMIN — TACROLIMUS 5 MG: 1 CAPSULE ORAL at 08:01

## 2023-01-05 RX ADMIN — HEPARIN SODIUM 5000 UNITS: 5000 INJECTION INTRAVENOUS; SUBCUTANEOUS at 05:01

## 2023-01-05 RX ADMIN — MUPIROCIN 1 G: 20 OINTMENT TOPICAL at 08:01

## 2023-01-05 RX ADMIN — SUCROFERRIC OXYHYDROXIDE 1500 MG: 500 TABLET, CHEWABLE ORAL at 08:01

## 2023-01-05 RX ADMIN — MYCOPHENOLATE MOFETIL 1000 MG: 250 CAPSULE ORAL at 08:01

## 2023-01-05 RX ADMIN — TRAMADOL HYDROCHLORIDE 50 MG: 50 TABLET, COATED ORAL at 05:01

## 2023-01-05 RX ADMIN — OXYCODONE 5 MG: 5 TABLET ORAL at 12:01

## 2023-01-05 RX ADMIN — DIPHENHYDRAMINE HYDROCHLORIDE 25 MG: 25 CAPSULE ORAL at 07:01

## 2023-01-05 RX ADMIN — TRAMADOL HYDROCHLORIDE 50 MG: 50 TABLET, COATED ORAL at 02:01

## 2023-01-05 RX ADMIN — DOCUSATE SODIUM 100 MG: 100 CAPSULE ORAL at 08:01

## 2023-01-05 RX ADMIN — HYDRALAZINE HYDROCHLORIDE 10 MG: 20 INJECTION, SOLUTION INTRAMUSCULAR; INTRAVENOUS at 12:01

## 2023-01-05 RX ADMIN — TACROLIMUS 3 MG: 1 CAPSULE ORAL at 11:01

## 2023-01-05 RX ADMIN — POLYETHYLENE GLYCOL 3350 17 G: 17 POWDER, FOR SOLUTION ORAL at 08:01

## 2023-01-05 RX ADMIN — THERA TABS 1 TABLET: TAB at 08:01

## 2023-01-05 RX ADMIN — PANTOPRAZOLE SODIUM 40 MG: 40 TABLET, DELAYED RELEASE ORAL at 08:01

## 2023-01-05 RX ADMIN — METOPROLOL TARTRATE 50 MG: 50 TABLET, FILM COATED ORAL at 08:01

## 2023-01-05 RX ADMIN — NIFEDIPINE 60 MG: 30 TABLET, FILM COATED, EXTENDED RELEASE ORAL at 08:01

## 2023-01-05 RX ADMIN — HYDRALAZINE HYDROCHLORIDE 50 MG: 50 TABLET ORAL at 05:01

## 2023-01-05 RX ADMIN — PREDNISONE 20 MG: 20 TABLET ORAL at 08:01

## 2023-01-05 NOTE — PROGRESS NOTES
Transplant Social Work Discharge Note:    Pt will discharge to patient's home under the care of Tana Schwartz, patient's mother, phone number [phone].      met with patient and caregiver in room. Patient reported readiness to discharge home today. Patient noted that she is surprising her children with her return and is looking forward to seeing them. Patient denied any mental health concerns at this time including anxiety and depression.      discussed resources available both inpatient and outpatient and how to access them. Patient and caregiver agree to contact transplant team with needs, questions, or concerns as they arise.    Patient's caregiver requested a letter for her work,  informed VIKKI Sinclair who reported to this  that she will provide a letter.     Pt aware of, involved in, and coping well with this discharge plan. Pt did not have any concerns with the discharge plan at this time. SW remains available at [phone].    Ivana Estes LMSW

## 2023-01-05 NOTE — PROGRESS NOTES
Transplant Teaching Book given to patient, Lisseth Schwartz, on 1/03/2023.  During the course of the hospital stay the patient received information regarding kidney transplant. Teaching and instruction were completed.  Areas that were discussed included: how to contact the Transplant Team, the importance of measuring intake of fluids and urine output, and monitoring vital signs such as blood pressure, temperature, and daily weights.  Parameters for which to report abnormal findings were given.  Appointment were provided along with the rational for the importance of lab work and clinic visits.  A written medication list was provided.  The importance of immunosuppressive medications, their common side effects, and treatment to prevent or minimize side effects has been reviewed.  Signs and symptoms of rejection and infection along with various treatments were reviewed.  The need to avoid infection was discussed.  Wound care and special consideration regarding activities of daily living were explained.  Written and verbal teaching of the above information was given.     Discussed with the patient and caregiver the importance of maintaining COVID-19 precautions; wearing a mask, good handwashing, and social distancing.  Also, to report any signs or symptoms (fever, difficulty breathing, loss of taste/smell, etc.), suspected exposure, or COVID testing, immediately to the transplant program.     Education was provided tot he patient and her mother

## 2023-01-05 NOTE — PLAN OF CARE
AAO x 4. ^BP PRN IV hydralazine administered. Telemetry monitoring SR. Troponin 0.032 and 0.012. RLQ incision BAHMAN with dermabond CDI. PRN tramadol and oxycodone for pain control. UOP 1150 mL o/n. 1 small BM. Pt pulled 100% self meds. See flowsheet for assessment findings.

## 2023-01-05 NOTE — PHYSICIAN QUERY
PT Name: Lisseth Schwartz  MR #: [id number]     Documentation Clarification      CDS/: Tiarra Spence RN               Contact information: [email or web address]    This form is a permanent document in the medical record.     Query Date: January 5, 2023    By submitting this query, we are merely seeking further clarification of documentation. Please utilize your independent clinical judgment when addressing the question(s) below.    The Medical Record reflects the following:     Indicators             Supporting Clinical Findings Location in Medical Record   X Anemia documented Past Medical History:  Anemia    Acute blood loss anemia  - Expected post operatively  - H/H stable  - Monitor with daily cbc    Anemia of chronic disease  - See acute blood loss anemia    Final Active Diagnoses:  Acute blood loss anemia   Anemia of chronic disease   H&P 1/2        TK PN 1/3        TK PN 1/3      DCS 1/4   X H&H  01/02/23 08:59 01/02/23 19:05 01/03/23 05:55   Hgb 8.5 (L) 9.0 (L) 8.5 (L)   Hct 26.9 (L) 26.9 (L) 27.0 (L)        01/03/23 13:30 01/04/23 06:43 01/04/23 17:18 1/5/23  0758   Hgb 8.2 (L) 8.0 (L) 8.6 (L) 7.9 (L)   Hct 26.5 (L) 26.3 (L) 27.3 (L) 25.8 (L)        Labs 1/2-1/3              Labs 1/3-1/5   X BP                    HR Vital Signs (24h Range):  Temp:  [97.6 °F (36.4 °C)-99.1 °F (37.3 °C)]   Pulse:  []   Resp:  [14-86]   SpO2:  [89 %-100 %]  BP: (132-207)/()    TK PN 1/3    GI bleeding documented     X Acute bleeding (Non GI site) Donation after Brain Death Kidney transplant  Estimated Blood Loss: 50 mL   Op Note 1/2    Transfusion(s)     X Acute/Chronic illness Acute blood loss anemia     Anemia of chronic disease    DCS 1/4    DCS 1/4    Treatments      Other         Due to the conflicting clinical picture, please clinically validate the diagnosis of _Acute blood loss anemia_ .    If validated, please provide additional clinical support for the diagnosis.       [   ] Acute blood loss  anemia is not confirmed and/or it has been ruled out.      [ X  ] Acute blood loss anemia is confirmed. Please specify clinical support (signs & symptoms) for the confirmed diagnosis: __expected post operatively due to expected blood loss from surgey__________________     [   ] Other clarification (please specify): ___________________           Form No. [id number]

## 2023-01-05 NOTE — PLAN OF CARE
SSC met with patient/family at bedside. Patient experience rounding completed and reviewed the following.     Do you know your discharge plan? Yes      If yes, what is the plan? Home    If you are discharging home, do you have help at home? Yes    Do you think you will need help at home at discharge? No     Have you discussed your needs and preferences with your SW/CM? Yes     Assigned SW/CM notified of any patient/family needs or concerns.

## 2023-01-05 NOTE — PROGRESS NOTES
Pt and mother given dc paperwork.  Both verbalized understanding and had no questions.  Pain med given prior.  Piv and tele removed.  Transport placed

## 2023-01-05 NOTE — PROGRESS NOTES
"DISCHARGE NOTE:    Lisseth Schwartz is a 32 y.o. female s/p  RIGHT KIDNEY   Donation after Brain Death  transplant on 1/2/2023 (Kidney) for ESRD secondary to Focal Glomerular Sclerosis (Focal Segmental - FSG).      Past Medical History:   Diagnosis Date    Allergy     Anemia     Anxiety     Breast feeding status of mother 1/17/2020    Brittle bones     ESRD (end stage renal disease)     M/W/F    General anesthetics causing adverse effect in therapeutic use     pt states "im a light weight"    Hypertension     Insomnia     PSG revealed no CHRYSTAL, but likely psychogenic etiology (anxiety)    RLS (restless legs syndrome)        Hospital Course: Uncomplicated hospital course - BP management slightly difficult - on lopressor 50 mg BID, Procardia 60 mg BID and Hydralazine 50 mg q8 - continue to monitor outpatient.     Allergies:   Review of patient's allergies indicates:   Allergen Reactions    Lisinopril Other (See Comments)     Severe cough    Adhesive tape-silicones Itching     Burns    Furosemide Other (See Comments)     Almost gave her right sided heartfailure       Patient Pharmacy: [facility]    Discharge Medications:     Medication List        START taking these medications      bisacodyL 5 mg EC tablet  Commonly known as: DULCOLAX  Take 2 tablets (10 mg total) by mouth daily as needed for Constipation.     docusate sodium 100 MG capsule  Commonly known as: COLACE  Take 1 capsule (100 mg total) by mouth 3 (three) times daily as needed for Constipation.     hydrALAZINE 50 MG tablet  Commonly known as: APRESOLINE  Take 1 tablet (50 mg total) by mouth every 8 (eight) hours.     metoprolol tartrate 50 MG tablet  Commonly known as: LOPRESSOR  Take 1 tablet (50 mg total) by mouth 2 (two) times daily.     mycophenolate 250 mg Cap  Commonly known as: CELLCEPT  Take 4 capsules (1,000 mg total) by mouth 2 (two) times daily.     NIFEdipine 60 MG (OSM) 24 hr tablet  Commonly known as: PROCARDIA-XL  Take 1 tablet (60 mg total) by " mouth 2 (two) times a day.     oxyCODONE 5 MG immediate release tablet  Commonly known as: ROXICODONE  Take 1 tablet (5 mg total) by mouth every 6 (six) hours as needed for Pain.     predniSONE 5 MG tablet  Commonly known as: DELTASONE  Take by mouth daily: 20 mg 1/6-1/12; 15mg 1/13-1/19; 10 mg 1/20-1/26; 5 mg daily thereafter 1/27/23     sulfamethoxazole-trimethoprim 400-80mg 400-80 mg per tablet  Commonly known as: BACTRIM,SEPTRA  Take 1 tablet by mouth every morning. Stop: 7/1/2023     tacrolimus 1 MG Cap  Commonly known as: PROGRAF  Take 8 capsules (8 mg total) by mouth every 12 (twelve) hours.     valGANciclovir 450 mg Tab  Commonly known as: VALCYTE  Take 1 tablet (450 mg total) by mouth every morning. Stop: 4/2/2023            CHANGE how you take these medications      calcitRIOL 0.25 MCG Cap  Commonly known as: ROCALTROL  Take 1 capsule (0.25 mcg total) by mouth once daily.  What changed:   medication strength  how to take this     pantoprazole 40 MG tablet  Commonly known as: PROTONIX  Take 1 tablet (40 mg total) by mouth once daily.  What changed: when to take this            CONTINUE taking these medications      albuterol 90 mcg/actuation inhaler  Commonly known as: PROVENTIL/VENTOLIN HFA  Inhale 1-2 puffs into the lungs every 6 (six) hours as needed for Wheezing (cough).     clonazePAM 0.5 MG tablet  Commonly known as: KlonoPIN            STOP taking these medications      cinacalcet 30 MG Tab  Commonly known as: SENSIPAR     gabapentin 100 MG capsule  Commonly known as: NEURONTIN     metoprolol succinate 50 MG 24 hr tablet  Commonly known as: TOPROL-XL     MIRCERA 200 mcg/0.3 mL Syrg  Generic drug: epoetin beta, methoxy peg     sucroferric oxyhydroxide 500 mg Chew  Commonly known as: VELPHORO     VENOFER 50 mg iron/2.5 mL Soln  Generic drug: iron sucrose               Where to Get Your Medications        These medications were sent to [facility] [address], [place]  [address]      Hours: Mon-Fri 7a-7p, Sat-Sun 10a-4p Phone: [phone]   calcitRIOL 0.25 MCG Cap  hydrALAZINE 50 MG tablet  metoprolol tartrate 50 MG tablet  mycophenolate 250 mg Cap  NIFEdipine 60 MG (OSM) 24 hr tablet  oxyCODONE 5 MG immediate release tablet  pantoprazole 40 MG tablet  predniSONE 5 MG tablet  sulfamethoxazole-trimethoprim 400-80mg 400-80 mg per tablet  tacrolimus 1 MG Cap  valGANciclovir 450 mg Tab       You can get these medications from any pharmacy    You don't need a prescription for these medications  bisacodyL 5 mg EC tablet  docusate sodium 100 MG capsule          Pharmacy Interventions/Recommendations:  1) Transplant Immunosuppression: Induction Thymoglobulin, and maintenance Tacrolimus 8 mg BID, Cellcept 1000 mg BID, and Prednisone taper.     2) Opportunistic Infection prophylaxis: Bactrim x 6 months and Valcyte x 3 months.     3) Osteoporosis Prevention measures (liver txp): N/A    4) Patient Counseling/Education: Demonstrated the use of the BP cuff, thermometer.    5) Follow-Up/Discharge Needs:  Monitor BP and adjust meds with improved SCr.     6) Patient Assistance Information: N/A    7) The following medications have been placed on HOLD and should be restarted in the outpatient setting (when appropriate): N/A    Lisseth and her caregiver verbalized their understanding and had the opportunity to ask questions.

## 2023-01-05 NOTE — PROGRESS NOTES
Transplant Coordinator  1/2-1/5/2023    Pt admitted for a cadaveric kdiney transplant. ESRD 2/2 FSGS. SCD, KDPI 27%, Thymo induction, CMV D-R+. CIT>20hrs    Pt completed Home hemo but admit Cr was >19  Kidney function improving daily, down to 6.7 at time of d/c. Making good UOP    RLQ incision BAHMAN with dermabond.   Flores removed prior to d/c    Pt will d/c home to [facility] 1/6/23 and RTC to meet with coordinator

## 2023-01-06 ENCOUNTER — CLINICAL SUPPORT (OUTPATIENT)
Dept: TRANSPLANT | Facility: CLINIC | Age: 33
End: 2023-01-06
Payer: MEDICARE

## 2023-01-06 ENCOUNTER — LAB VISIT (OUTPATIENT)
Dept: LAB | Facility: HOSPITAL | Age: 33
End: 2023-01-06
Payer: MEDICARE

## 2023-01-06 VITALS
BODY MASS INDEX: 33.62 KG/M2 | WEIGHT: 209.19 LBS | RESPIRATION RATE: 16 BRPM | SYSTOLIC BLOOD PRESSURE: 180 MMHG | HEART RATE: 100 BPM | HEIGHT: 66 IN | DIASTOLIC BLOOD PRESSURE: 88 MMHG | OXYGEN SATURATION: 100 % | TEMPERATURE: 98 F

## 2023-01-06 DIAGNOSIS — Z94.0 KIDNEY REPLACED BY TRANSPLANT: Primary | ICD-10-CM

## 2023-01-06 DIAGNOSIS — Z94.0 S/P KIDNEY TRANSPLANT: ICD-10-CM

## 2023-01-06 DIAGNOSIS — Z57.8 EMPLOYEE EXPOSURE TO BLOOD: ICD-10-CM

## 2023-01-06 DIAGNOSIS — E83.39 HYPOPHOSPHATEMIA: Primary | ICD-10-CM

## 2023-01-06 DIAGNOSIS — Z94.0 KIDNEY REPLACED BY TRANSPLANT: ICD-10-CM

## 2023-01-06 DIAGNOSIS — T86.10 COMPLICATION OF TRANSPLANTED KIDNEY, UNSPECIFIED COMPLICATION: ICD-10-CM

## 2023-01-06 DIAGNOSIS — Z91.89 PERSONAL HISTORY OF POISONING, PRESENTING HAZARDS TO HEALTH: ICD-10-CM

## 2023-01-06 LAB
ALBUMIN SERPL BCP-MCNC: 3.5 G/DL (ref 3.5–5.2)
ANION GAP SERPL CALC-SCNC: 9 MMOL/L (ref 8–16)
BASOPHILS # BLD AUTO: 0.02 K/UL (ref 0–0.2)
BASOPHILS NFR BLD: 0.5 % (ref 0–1.9)
BUN SERPL-MCNC: 36 MG/DL (ref 6–20)
CALCIUM SERPL-MCNC: 9.8 MG/DL (ref 8.7–10.5)
CHLORIDE SERPL-SCNC: 109 MMOL/L (ref 95–110)
CO2 SERPL-SCNC: 22 MMOL/L (ref 23–29)
CREAT SERPL-MCNC: 3.7 MG/DL (ref 0.5–1.4)
DIFFERENTIAL METHOD: ABNORMAL
EOSINOPHIL # BLD AUTO: 0 K/UL (ref 0–0.5)
EOSINOPHIL NFR BLD: 0.7 % (ref 0–8)
ERYTHROCYTE [DISTWIDTH] IN BLOOD BY AUTOMATED COUNT: 15.3 % (ref 11.5–14.5)
EST. GFR  (NO RACE VARIABLE): 15.8 ML/MIN/1.73 M^2
GLUCOSE SERPL-MCNC: 81 MG/DL (ref 70–110)
HCT VFR BLD AUTO: 26.9 % (ref 37–48.5)
HGB BLD-MCNC: 8 G/DL (ref 12–16)
IMM GRANULOCYTES # BLD AUTO: 0.07 K/UL (ref 0–0.04)
IMM GRANULOCYTES NFR BLD AUTO: 1.6 % (ref 0–0.5)
LYMPHOCYTES # BLD AUTO: 0.2 K/UL (ref 1–4.8)
LYMPHOCYTES NFR BLD: 3.5 % (ref 18–48)
MAGNESIUM SERPL-MCNC: 1.7 MG/DL (ref 1.6–2.6)
MCH RBC QN AUTO: 30.4 PG (ref 27–31)
MCHC RBC AUTO-ENTMCNC: 29.7 G/DL (ref 32–36)
MCV RBC AUTO: 102 FL (ref 82–98)
MONOCYTES # BLD AUTO: 0.5 K/UL (ref 0.3–1)
MONOCYTES NFR BLD: 11 % (ref 4–15)
NEUTROPHILS # BLD AUTO: 3.5 K/UL (ref 1.8–7.7)
NEUTROPHILS NFR BLD: 82.7 % (ref 38–73)
NRBC BLD-RTO: 0 /100 WBC
PHOSPHATE SERPL-MCNC: 2.2 MG/DL (ref 2.7–4.5)
PLATELET # BLD AUTO: 120 K/UL (ref 150–450)
PMV BLD AUTO: 11.5 FL (ref 9.2–12.9)
POTASSIUM SERPL-SCNC: 3.9 MMOL/L (ref 3.5–5.1)
RBC # BLD AUTO: 2.63 M/UL (ref 4–5.4)
SODIUM SERPL-SCNC: 140 MMOL/L (ref 136–145)
TACROLIMUS BLD-MCNC: <2 NG/ML (ref 5–15)
WBC # BLD AUTO: 4.27 K/UL (ref 3.9–12.7)

## 2023-01-06 PROCEDURE — 36415 COLL VENOUS BLD VENIPUNCTURE: CPT | Mod: HCNC | Performed by: INTERNAL MEDICINE

## 2023-01-06 PROCEDURE — 83735 ASSAY OF MAGNESIUM: CPT | Mod: HCNC | Performed by: INTERNAL MEDICINE

## 2023-01-06 PROCEDURE — 80197 ASSAY OF TACROLIMUS: CPT | Mod: HCNC | Performed by: INTERNAL MEDICINE

## 2023-01-06 PROCEDURE — 80069 RENAL FUNCTION PANEL: CPT | Mod: HCNC | Performed by: INTERNAL MEDICINE

## 2023-01-06 PROCEDURE — 85025 COMPLETE CBC W/AUTO DIFF WBC: CPT | Mod: HCNC | Performed by: INTERNAL MEDICINE

## 2023-01-06 RX ORDER — KETOCONAZOLE 200 MG/1
100 TABLET ORAL DAILY
Qty: 15 TABLET | Refills: 11 | Status: SHIPPED | OUTPATIENT
Start: 2023-01-06 | End: 2024-01-22 | Stop reason: SDUPTHER

## 2023-01-06 RX ORDER — TACROLIMUS 1 MG/1
10 CAPSULE ORAL EVERY 12 HOURS
Qty: 480 CAPSULE | Refills: 11 | Status: SHIPPED | OUTPATIENT
Start: 2023-01-06 | End: 2023-01-18 | Stop reason: DRUGHIGH

## 2023-01-06 SDOH — SOCIAL DETERMINANTS OF HEALTH (SDOH): OCCUPATIONAL EXPOSURE TO OTHER RISK FACTORS: Z57.8

## 2023-01-06 NOTE — PROGRESS NOTES
Kidney Post-Transplant Assessment    Referring Physician: Andres Hoyt  Current Nephrologist: dEuard Vance    ORGAN: RIGHT KIDNEY  Donor Type: donation after brain death  PHS Increased Risk: no  Cold Ischemia: 1,259 mins  Induction Medications: thymoglobulin    Subjective:     CC:  Reassessment of renal allograft function and management of chronic immunosuppression.    HPI:  Ms. Schwartz is a 32 y.o. year old Black or  female with history of ESRD secondary to FSGS who was on HD 10/2016 until she received a donation after brain death kidney transplant on 1/2/23 (Thymo induction CIT ~21 hours, CMV -/+). Surgery without complication. Her most recent creatinine is 2.2. She takes mycophenolate mofetil, prednisone, and tacrolimus for maintenance immunosuppression. Her post transplant course has been uncomplicated to date.    Several complaints today. Has been having episodes of chest discomfort, seem to occur after taking medications.     Staying hydrated, reports dysuria and frequency. UA pending for today. Headache and tremors, tolerable.    Pain on incision, 2 spots in particular that are bothering her. Is becoming constipated as she does not want to bear down to try and have BM due to abdominal pain.     Monitoring BP at home, believes there is a discrepancy with home BP cuff as readings are much different than clinic readings.       Current Outpatient Medications   Medication Sig Dispense Refill    albuterol (PROVENTIL/VENTOLIN HFA) 90 mcg/actuation inhaler Inhale 1-2 puffs into the lungs every 6 (six) hours as needed for Wheezing (cough). 8.5 g 1    bisacodyL (DULCOLAX) 5 mg EC tablet Take 2 tablets (10 mg total) by mouth daily as needed for Constipation.  0    calcitRIOL (ROCALTROL) 0.25 MCG Cap Take 1 capsule (0.25 mcg total) by mouth once daily. 30 capsule 5    clonazePAM (KLONOPIN) 0.5 MG tablet Take 0.5 mg by mouth daily as needed.      docusate sodium (COLACE) 100 MG capsule Take 1  "capsule (100 mg total) by mouth 3 (three) times daily as needed for Constipation.  0    hydrALAZINE (APRESOLINE) 50 MG tablet Take 1 tablet (50 mg total) by mouth every 8 (eight) hours. 90 tablet 11    ketoconazole (NIZORAL) 200 mg Tab Take 0.5 tablets (100 mg total) by mouth once daily. 15 tablet 11    magnesium oxide (MAG-OX) 400 mg (241.3 mg magnesium) tablet Take 1 tablet (400 mg total) by mouth 2 (two) times daily. 60 tablet 11    metoprolol tartrate (LOPRESSOR) 50 MG tablet Take 1 tablet (50 mg total) by mouth 2 (two) times daily. 60 tablet 5    mycophenolate (CELLCEPT) 250 mg Cap Take 4 capsules (1,000 mg total) by mouth 2 (two) times daily. 240 capsule 11    NIFEdipine (PROCARDIA-XL) 60 MG (OSM) 24 hr tablet Take 1 tablet (60 mg total) by mouth 2 (two) times a day. 60 tablet 5    oxyCODONE (ROXICODONE) 5 MG immediate release tablet Take 1 tablet (5 mg total) by mouth every 6 (six) hours as needed for Pain. 28 tablet 0    pantoprazole (PROTONIX) 40 MG tablet Take 1 tablet (40 mg total) by mouth once daily. 30 tablet 5    predniSONE (DELTASONE) 5 MG tablet Take by mouth daily: 20 mg 1/6-1/12; 15mg 1/13-1/19; 10 mg 1/20-1/26; 5 mg daily thereafter 1/27/23 70 tablet 11    sulfamethoxazole-trimethoprim 400-80mg (BACTRIM,SEPTRA) 400-80 mg per tablet Take 1 tablet by mouth every morning. Stop: 7/1/2023 30 tablet 5    tacrolimus (PROGRAF) 1 MG Cap Take 10 capsules (10 mg total) by mouth every 12 (twelve) hours. 480 capsule 11    valGANciclovir (VALCYTE) 450 mg Tab Take 1 tablet (450 mg total) by mouth every morning. Stop: 4/2/2023 30 tablet 2     No current facility-administered medications for this visit.       Past Medical History:   Diagnosis Date    Allergy     Anemia     Anxiety     Breast feeding status of mother 1/17/2020    Brittle bones     ESRD (end stage renal disease)     M/W/F    General anesthetics causing adverse effect in therapeutic use     pt states "im a light weight"    Hypertension     Insomnia  " "   PSG revealed no CHRYSTAL, but likely psychogenic etiology (anxiety)    RLS (restless legs syndrome)        Review of Systems   Constitutional:  Positive for fatigue. Negative for activity change, appetite change and fever.   HENT:  Negative for congestion, mouth sores and sore throat.    Eyes:  Negative for visual disturbance.   Respiratory:  Negative for cough, chest tightness and shortness of breath.    Cardiovascular:  Negative for chest pain, palpitations and leg swelling.   Gastrointestinal:  Positive for abdominal pain and constipation. Negative for abdominal distention, diarrhea and nausea.   Genitourinary:  Positive for dysuria. Negative for difficulty urinating, frequency and hematuria.   Musculoskeletal:  Negative for arthralgias, gait problem and myalgias.   Skin:  Positive for wound.   Allergic/Immunologic: Positive for immunocompromised state. Negative for environmental allergies and food allergies.   Neurological:  Negative for dizziness, weakness and numbness.   Psychiatric/Behavioral:  Negative for sleep disturbance. The patient is not nervous/anxious.      Objective:   Blood pressure (!) 164/87, pulse 88, temperature 97.2 °F (36.2 °C), resp. rate 16, height 5' 5" (1.651 m), weight 88.4 kg (194 lb 14.2 oz), SpO2 100 %.body mass index is 32.43 kg/m².    Physical Exam  Vitals and nursing note reviewed.   Constitutional:       Appearance: Normal appearance.   HENT:      Head: Normocephalic.   Cardiovascular:      Rate and Rhythm: Normal rate and regular rhythm.      Heart sounds: Normal heart sounds.   Pulmonary:      Effort: Pulmonary effort is normal.      Breath sounds: Normal breath sounds.   Abdominal:      General: Bowel sounds are normal. There is no distension.      Palpations: Abdomen is soft.      Tenderness: There is no abdominal tenderness.      Comments: Surgical incision well approximated, no redness or drainage. 2 areas of discomfort with palpable swelling beneath skin   Musculoskeletal:    "      General: Normal range of motion.   Skin:     General: Skin is warm and dry.   Neurological:      General: No focal deficit present.      Mental Status: She is alert.   Psychiatric:         Behavior: Behavior normal.       Labs:  Lab Results   Component Value Date    WBC 5.80 01/09/2023    HGB 8.8 (L) 01/09/2023    HCT 28.8 (L) 01/09/2023     01/09/2023    K 4.1 01/09/2023     (H) 01/09/2023    CO2 21 (L) 01/09/2023    BUN 21 (H) 01/09/2023    CREATININE 2.2 (H) 01/09/2023    EGFRNONAA 3 (A) 03/28/2022    CALCIUM 10.2 01/09/2023    PHOS 3.4 01/09/2023    MG 1.5 (L) 01/09/2023    ALBUMIN 3.9 01/09/2023    AST 10 01/02/2023    ALT 10 01/02/2023    UTPCR 0.49 (H) 01/06/2023    PTH 1,451.5 (H) 01/02/2023    TACROLIMUS 7.3 01/09/2023     Labs were reviewed with the patient    Assessment:     1. S/P kidney transplant    2. FSGS (focal segmental glomerulosclerosis)    3. Prophylactic immunotherapy    4. Renovascular hypertension    5. At risk for opportunistic infections    6. Chest wall discomfort        Plan:   Chest discomfort-scheduled for EKG following clinic visit. Gave ER precautions   Abdominal pain-scheduled for US following clinic visit  Dysuria-UA/culture pending  Constipation-is going to try drinking hot beverages as this is what always works for her      1. CKD stage: IV      2. Immunosuppression: Prograf trough 7.3. Continue Prograf 10/10, Ketoconazole 100 mg QD to augment prograf levels,  MMF 1000 mg BID, and Prednisone taper. Will continue to monitor for drug toxicities    3. Allograft Function: Cr continues to trend down. Continue good po hydration.   -ESRD secondary to FSGS who was on HD 10/2016 until she received a donation after brain death kidney transplant on 1/2/23 (Thymo induction CIT ~21 hours, CMV -/+).  Lab Results   Component Value Date    CREATININE 2.2 (H) 01/09/2023       4. Hypertension management: advise low salt diet and home BP monitoring    Hydralazine 50 mg TID,  Lopressor 50 mg BID, Nifedipine 60 mg QD     5. Metabolic Bone Disease/Secondary Hyperparathyroidism:stable  MagOx 400 mg BID (started today), Calcitriol 0.25 mcg QD   Lab Results   Component Value Date    PTH 1,451.5 (H) 01/02/2023    CALCIUM 10.2 01/09/2023    PHOS 3.4 01/09/2023 1/9/2023  POD 7   Magnesium 1.6 - 2.6 mg/dL 1.5 (A)       6. Electrolytes:  Will monitor /guidelines  Lab Results   Component Value Date     01/09/2023    K 4.1 01/09/2023     (H) 01/09/2023    CO2 21 (L) 01/09/2023       7. Anemia: TOBI per protocol   Lab Results   Component Value Date    WBC 5.80 01/09/2023    HGB 8.8 (L) 01/09/2023    HCT 28.8 (L) 01/09/2023     (H) 01/09/2023     01/09/2023         8.  Cytopenias: no significant cytopenias will monitor as per our guidelines. Medicine list reviewed including potential causes of drug-induced cytopenias    9.Proteinuria: continue p/c ratio as per FSGS guidelines    1/6/2023  POD 4   Prot/Creat Ratio, Urine 0.00 - 0.20 0.49 (A)     10. BK virus infection screening:  will continue to monitor per guidelines    11. Weight education: provided during the clinic visit   Body mass index is 32.43 kg/m².     12.Patient safety education regarding immunosuppression including prophylaxis posttransplant for CMV, PCP : Education provided about vaccination and prevention of infections     PCP ppx: Bactrim until 7/1/23  CMV ppx: Valcyte until 4/2/23           Follow-up:   Clinic: return to transplant clinic weekly for the first month after transplant; every 2 weeks during months 2-3; then at 6-, 9-, 12-, 18-, 24-, and 36- months post-transplant to reassess for complications from immunosuppression toxicity and monitor for rejection.  Annually thereafter.    Labs: since patient remains at high risk for rejection and drug-related complications that warrant close monitoring, labs will be ordered as follows: continue twice weekly CBC, renal panel, and drug level for first month;  then same labs once weekly through 3rd month post-transplant.  Urine for UA and protein/creatinine ratio monthly.  Serum BK - PCR at 1-, 3-, 6-, 9-, 12-, 18-, 24-, 36-, 48-, and 60 months post-transplant.  Hepatic panel at 1-, 2-, 3-, 6-, 9-, 12-, 18-, 24-, and 36- months post-transplant.    Patricia Caceres NP       Education:   Material provided to the patient.  Patient reminded to call with any health changes since these can be early signs of significant complications.  Also, I advised the patient to be sure any new medications or changes of old medications are discussed with either a pharmacist or physician knowledgeable with transplant to avoid rejection/drug toxicity related to significant drug interactions.    Patient advised that it is recommended that all transplanted patients, and their close contacts and household members receive Covid vaccination.

## 2023-01-06 NOTE — TELEPHONE ENCOUNTER
Called patient regarding orders below. Pt verbalized understanding. Labs on Sunday. Pt to RTC on Monday to meet with coordinator and to be examined by katie. Pt unable to be taught Friday 2/2 pain.       ----- Message from Sara Caballero MD sent at 1/6/2023  1:10 PM CST -----  Increase tacro to 10 mg BID and add ketoconazole 100 mg daily. Repeat level Sunday + renal panel.

## 2023-01-08 ENCOUNTER — TELEPHONE (OUTPATIENT)
Dept: TRANSPLANT | Facility: CLINIC | Age: 33
End: 2023-01-08
Payer: MEDICARE

## 2023-01-08 ENCOUNTER — LAB VISIT (OUTPATIENT)
Dept: LAB | Facility: HOSPITAL | Age: 33
End: 2023-01-08
Attending: INTERNAL MEDICINE
Payer: MEDICARE

## 2023-01-08 DIAGNOSIS — Z94.0 KIDNEY REPLACED BY TRANSPLANT: ICD-10-CM

## 2023-01-08 LAB
ALBUMIN SERPL BCP-MCNC: 3.9 G/DL (ref 3.5–5.2)
ANION GAP SERPL CALC-SCNC: 10 MMOL/L (ref 8–16)
BUN SERPL-MCNC: 26 MG/DL (ref 6–20)
CALCIUM SERPL-MCNC: 9.6 MG/DL (ref 8.7–10.5)
CHLORIDE SERPL-SCNC: 111 MMOL/L (ref 95–110)
CO2 SERPL-SCNC: 20 MMOL/L (ref 23–29)
CREAT SERPL-MCNC: 2.3 MG/DL (ref 0.5–1.4)
EST. GFR  (NO RACE VARIABLE): 27.9 ML/MIN/1.73 M^2
GLUCOSE SERPL-MCNC: 104 MG/DL (ref 70–110)
MAGNESIUM SERPL-MCNC: 1.5 MG/DL (ref 1.6–2.6)
PHOSPHATE SERPL-MCNC: 3 MG/DL (ref 2.7–4.5)
POTASSIUM SERPL-SCNC: 3.9 MMOL/L (ref 3.5–5.1)
SODIUM SERPL-SCNC: 141 MMOL/L (ref 136–145)
TACROLIMUS BLD-MCNC: 4.5 NG/ML (ref 5–15)

## 2023-01-08 PROCEDURE — 80197 ASSAY OF TACROLIMUS: CPT | Mod: HCNC | Performed by: INTERNAL MEDICINE

## 2023-01-08 PROCEDURE — 36415 COLL VENOUS BLD VENIPUNCTURE: CPT | Mod: HCNC | Performed by: INTERNAL MEDICINE

## 2023-01-08 PROCEDURE — 80069 RENAL FUNCTION PANEL: CPT | Mod: HCNC | Performed by: INTERNAL MEDICINE

## 2023-01-08 PROCEDURE — 83735 ASSAY OF MAGNESIUM: CPT | Mod: HCNC | Performed by: INTERNAL MEDICINE

## 2023-01-08 NOTE — TELEPHONE ENCOUNTER
Lab review with Dr. Monroy.  No changes to medication dosages at this time.  Patient is to repeat labs in am at [facility].  The above communicated to patient who agreed.  Appointments changed from [place] to Transplant Lab at [facility].

## 2023-01-09 ENCOUNTER — CLINICAL SUPPORT (OUTPATIENT)
Dept: TRANSPLANT | Facility: CLINIC | Age: 33
End: 2023-01-09
Payer: MEDICARE

## 2023-01-09 ENCOUNTER — OFFICE VISIT (OUTPATIENT)
Dept: TRANSPLANT | Facility: CLINIC | Age: 33
End: 2023-01-09
Payer: MEDICARE

## 2023-01-09 ENCOUNTER — HOSPITAL ENCOUNTER (OUTPATIENT)
Dept: CARDIOLOGY | Facility: CLINIC | Age: 33
Discharge: HOME | End: 2023-01-09
Payer: MEDICARE

## 2023-01-09 ENCOUNTER — HOSPITAL ENCOUNTER (OUTPATIENT)
Dept: RADIOLOGY | Facility: HOSPITAL | Age: 33
Discharge: HOME | End: 2023-01-09
Attending: INTERNAL MEDICINE
Payer: MEDICARE

## 2023-01-09 ENCOUNTER — PATIENT MESSAGE (OUTPATIENT)
Dept: TRANSPLANT | Facility: CLINIC | Age: 33
End: 2023-01-09

## 2023-01-09 VITALS
WEIGHT: 194.88 LBS | HEART RATE: 88 BPM | HEIGHT: 65 IN | TEMPERATURE: 97 F | SYSTOLIC BLOOD PRESSURE: 164 MMHG | OXYGEN SATURATION: 100 % | BODY MASS INDEX: 32.47 KG/M2 | RESPIRATION RATE: 16 BRPM | DIASTOLIC BLOOD PRESSURE: 87 MMHG

## 2023-01-09 VITALS
DIASTOLIC BLOOD PRESSURE: 87 MMHG | HEIGHT: 65 IN | BODY MASS INDEX: 32.47 KG/M2 | WEIGHT: 194.88 LBS | TEMPERATURE: 97 F | RESPIRATION RATE: 16 BRPM | HEART RATE: 88 BPM | OXYGEN SATURATION: 100 % | SYSTOLIC BLOOD PRESSURE: 164 MMHG

## 2023-01-09 DIAGNOSIS — I15.0 RENOVASCULAR HYPERTENSION: ICD-10-CM

## 2023-01-09 DIAGNOSIS — Z29.89 PROPHYLACTIC IMMUNOTHERAPY: ICD-10-CM

## 2023-01-09 DIAGNOSIS — R07.89 CHEST WALL DISCOMFORT: ICD-10-CM

## 2023-01-09 DIAGNOSIS — Z94.0 S/P KIDNEY TRANSPLANT: Primary | ICD-10-CM

## 2023-01-09 DIAGNOSIS — T86.10 COMPLICATION OF TRANSPLANTED KIDNEY, UNSPECIFIED COMPLICATION: ICD-10-CM

## 2023-01-09 DIAGNOSIS — Z91.89 AT RISK FOR OPPORTUNISTIC INFECTIONS: ICD-10-CM

## 2023-01-09 DIAGNOSIS — N05.1 FSGS (FOCAL SEGMENTAL GLOMERULOSCLEROSIS): ICD-10-CM

## 2023-01-09 PROCEDURE — 3077F SYST BP >= 140 MM HG: CPT | Mod: HCNC,CPTII,S$GLB, | Performed by: NURSE PRACTITIONER

## 2023-01-09 PROCEDURE — 99215 OFFICE O/P EST HI 40 MIN: CPT | Mod: HCNC,S$GLB,, | Performed by: NURSE PRACTITIONER

## 2023-01-09 PROCEDURE — 1160F PR REVIEW ALL MEDS BY PRESCRIBER/CLIN PHARMACIST DOCUMENTED: ICD-10-PCS | Mod: HCNC,CPTII,S$GLB, | Performed by: NURSE PRACTITIONER

## 2023-01-09 PROCEDURE — 99999 PR PBB SHADOW E&M-EST. PATIENT-LVL V: ICD-10-PCS | Mod: PBBFAC,HCNC,, | Performed by: NURSE PRACTITIONER

## 2023-01-09 PROCEDURE — 76700 US EXAM ABDOM COMPLETE: CPT | Mod: 26,HCNC,, | Performed by: STUDENT IN AN ORGANIZED HEALTH CARE EDUCATION/TRAINING PROGRAM

## 2023-01-09 PROCEDURE — 3077F PR MOST RECENT SYSTOLIC BLOOD PRESSURE >= 140 MM HG: ICD-10-PCS | Mod: HCNC,CPTII,S$GLB, | Performed by: NURSE PRACTITIONER

## 2023-01-09 PROCEDURE — 93010 EKG 12-LEAD: ICD-10-PCS | Mod: HCNC,S$GLB,, | Performed by: INTERNAL MEDICINE

## 2023-01-09 PROCEDURE — 1160F RVW MEDS BY RX/DR IN RCRD: CPT | Mod: HCNC,CPTII,S$GLB, | Performed by: NURSE PRACTITIONER

## 2023-01-09 PROCEDURE — 1111F PR DISCHARGE MEDS RECONCILED W/ CURRENT OUTPATIENT MED LIST: ICD-10-PCS | Mod: HCNC,CPTII,S$GLB, | Performed by: NURSE PRACTITIONER

## 2023-01-09 PROCEDURE — 3079F PR MOST RECENT DIASTOLIC BLOOD PRESSURE 80-89 MM HG: ICD-10-PCS | Mod: HCNC,CPTII,S$GLB, | Performed by: NURSE PRACTITIONER

## 2023-01-09 PROCEDURE — 3008F BODY MASS INDEX DOCD: CPT | Mod: HCNC,CPTII,S$GLB, | Performed by: NURSE PRACTITIONER

## 2023-01-09 PROCEDURE — 99999 PR PBB SHADOW E&M-EST. PATIENT-LVL IV: ICD-10-PCS | Mod: PBBFAC,HCNC,,

## 2023-01-09 PROCEDURE — 99499 UNLISTED E&M SERVICE: CPT | Mod: HCNC,S$GLB,, | Performed by: NURSE PRACTITIONER

## 2023-01-09 PROCEDURE — 76700 US EXAM ABDOM COMPLETE: CPT | Mod: TC,HCNC

## 2023-01-09 PROCEDURE — 1159F MED LIST DOCD IN RCRD: CPT | Mod: HCNC,CPTII,S$GLB, | Performed by: NURSE PRACTITIONER

## 2023-01-09 PROCEDURE — 93010 ELECTROCARDIOGRAM REPORT: CPT | Mod: HCNC,S$GLB,, | Performed by: INTERNAL MEDICINE

## 2023-01-09 PROCEDURE — 99215 PR OFFICE/OUTPT VISIT, EST, LEVL V, 40-54 MIN: ICD-10-PCS | Mod: HCNC,S$GLB,, | Performed by: NURSE PRACTITIONER

## 2023-01-09 PROCEDURE — 1111F DSCHRG MED/CURRENT MED MERGE: CPT | Mod: HCNC,CPTII,S$GLB, | Performed by: NURSE PRACTITIONER

## 2023-01-09 PROCEDURE — 76700 US ABDOMEN COMPLETE: ICD-10-PCS | Mod: 26,HCNC,, | Performed by: STUDENT IN AN ORGANIZED HEALTH CARE EDUCATION/TRAINING PROGRAM

## 2023-01-09 PROCEDURE — 93005 ELECTROCARDIOGRAM TRACING: CPT | Mod: HCNC,S$GLB,, | Performed by: NURSE PRACTITIONER

## 2023-01-09 PROCEDURE — 99999 PR PBB SHADOW E&M-EST. PATIENT-LVL V: CPT | Mod: PBBFAC,HCNC,, | Performed by: NURSE PRACTITIONER

## 2023-01-09 PROCEDURE — 3079F DIAST BP 80-89 MM HG: CPT | Mod: HCNC,CPTII,S$GLB, | Performed by: NURSE PRACTITIONER

## 2023-01-09 PROCEDURE — 93005 EKG 12-LEAD: ICD-10-PCS | Mod: HCNC,S$GLB,, | Performed by: NURSE PRACTITIONER

## 2023-01-09 PROCEDURE — 99999 PR PBB SHADOW E&M-EST. PATIENT-LVL IV: CPT | Mod: PBBFAC,HCNC,,

## 2023-01-09 PROCEDURE — 3008F PR BODY MASS INDEX (BMI) DOCUMENTED: ICD-10-PCS | Mod: HCNC,CPTII,S$GLB, | Performed by: NURSE PRACTITIONER

## 2023-01-09 PROCEDURE — 1159F PR MEDICATION LIST DOCUMENTED IN MEDICAL RECORD: ICD-10-PCS | Mod: HCNC,CPTII,S$GLB, | Performed by: NURSE PRACTITIONER

## 2023-01-09 PROCEDURE — 99499 RISK ADDL DX/OHS AUDIT: ICD-10-PCS | Mod: HCNC,S$GLB,, | Performed by: NURSE PRACTITIONER

## 2023-01-09 RX ORDER — LANOLIN ALCOHOL/MO/W.PET/CERES
400 CREAM (GRAM) TOPICAL 2 TIMES DAILY
Qty: 60 TABLET | Refills: 11 | Status: SHIPPED | OUTPATIENT
Start: 2023-01-09 | End: 2023-06-05 | Stop reason: DRUGHIGH

## 2023-01-09 NOTE — PATIENT INSTRUCTIONS
It is my privilege to participate in your transplant care! Please be sure to let us know if you have any questions or concerns about your health care - we cannot help you if we do not know. Don't forget we are on call 24/7 for any emergencies.      Best Wishes,  Patricia Caceres NP-C

## 2023-01-09 NOTE — LETTER
January 9, 2023        Eduard Vance  [address]  [address]  [facility]  [place] [place] [address]  Phone: [phone]  Fax: [phone]             [facility] Trans[facility] 1st Fl  [address]  [place] [address]  Phone: [phone]   Patient: Lisseth Schwartz   MR Number: [id number]   YOB: 1990   Date of Visit: 1/9/2023       Dear Dr. Eduard Vance    Thank you for referring Lisseth Schwartz to me for evaluation. Attached you will find relevant portions of my assessment and plan of care.    If you have questions, please do not hesitate to call me. I look forward to following Lisseth Schwartz along with you.    Sincerely,    Patricia Caceres, NP    Enclosure    If you would like to receive this communication electronically, please contact [email or web address] or [phone] to request [employer] access.    [employer] is a tool which provides read-only access to select patient information with whom you have a relationship. Its easy to use and provides real time access to review your patients record including encounter summaries, notes, results, and demographic information.    If you feel you have received this communication in error or would no longer like to receive these types of communications, please e-mail [email or web address]

## 2023-01-09 NOTE — PROGRESS NOTES
Clinic Note: First Return to Clinic Post-  Kidney Transplant    Lisseth Schwartz  is a 32 y.o. female  S/p  RIGHT KIDNEY  transplant on 1/2/2023 (Kidney) for Focal Glomerular Sclerosis (Focal Segmental - FSG).      Discharge Course (Issues/Concerns): Patient has multiple complaints today including: SOB, headache, tremor, stomach fullness/pain, difficulty eating, back and chest pain, dry eyes and mouth, urination irritation.     Objective:   There were no vitals filed for this visit.    Met with patient and her caregiver in the clinic to review current medication list.     Current Outpatient Medications   Medication Sig Dispense Refill    albuterol (PROVENTIL/VENTOLIN HFA) 90 mcg/actuation inhaler Inhale 1-2 puffs into the lungs every 6 (six) hours as needed for Wheezing (cough). 8.5 g 1    bisacodyL (DULCOLAX) 5 mg EC tablet Take 2 tablets (10 mg total) by mouth daily as needed for Constipation.  0    calcitRIOL (ROCALTROL) 0.25 MCG Cap Take 1 capsule (0.25 mcg total) by mouth once daily. 30 capsule 5    clonazePAM (KLONOPIN) 0.5 MG tablet Take 0.5 mg by mouth daily as needed.      docusate sodium (COLACE) 100 MG capsule Take 1 capsule (100 mg total) by mouth 3 (three) times daily as needed for Constipation.  0    hydrALAZINE (APRESOLINE) 50 MG tablet Take 1 tablet (50 mg total) by mouth every 8 (eight) hours. 90 tablet 11    k phos di & mono-sod phos mono (K-PHOS-NEUTRAL) 250 mg Tab Take 2 tablets by mouth 2 (two) times a day. 120 tablet 5    ketoconazole (NIZORAL) 200 mg Tab Take 0.5 tablets (100 mg total) by mouth once daily. 15 tablet 11    metoprolol tartrate (LOPRESSOR) 50 MG tablet Take 1 tablet (50 mg total) by mouth 2 (two) times daily. 60 tablet 5    mycophenolate (CELLCEPT) 250 mg Cap Take 4 capsules (1,000 mg total) by mouth 2 (two) times daily. 240 capsule 11    NIFEdipine (PROCARDIA-XL) 60 MG (OSM) 24 hr tablet Take 1 tablet (60 mg total) by mouth 2 (two) times a day. 60 tablet 5    oxyCODONE  (ROXICODONE) 5 MG immediate release tablet Take 1 tablet (5 mg total) by mouth every 6 (six) hours as needed for Pain. 28 tablet 0    pantoprazole (PROTONIX) 40 MG tablet Take 1 tablet (40 mg total) by mouth once daily. 30 tablet 5    predniSONE (DELTASONE) 5 MG tablet Take by mouth daily: 20 mg 1/6-1/12; 15mg 1/13-1/19; 10 mg 1/20-1/26; 5 mg daily thereafter 1/27/23 70 tablet 11    sulfamethoxazole-trimethoprim 400-80mg (BACTRIM,SEPTRA) 400-80 mg per tablet Take 1 tablet by mouth every morning. Stop: 7/1/2023 30 tablet 5    tacrolimus (PROGRAF) 1 MG Cap Take 10 capsules (10 mg total) by mouth every 12 (twelve) hours. 480 capsule 11    valGANciclovir (VALCYTE) 450 mg Tab Take 1 tablet (450 mg total) by mouth every morning. Stop: 4/2/2023 30 tablet 2    magnesium oxide (MAG-OX) 400 mg (241.3 mg magnesium) tablet Take 1 tablet (400 mg total) by mouth 2 (two) times daily. 60 tablet 11     No current facility-administered medications for this visit.       Pharmacy Interventions/Recommendations:     1) Graft Function & Immunosuppression Issues: SCr 2.2 today, continues to decrease.    2) Opportunistic Infection prophylaxis:   PCP ppx: Bactrim until 7/1/23  CMV ppx: Valcyte until 4/2/23  Fungal ppx: n/a    3) Donor Serologies & Monitoring:     Donor CMV Status: Negative  Donor HBV KIERAN: Negative  Donor HCV KIERAN: Negative      4) Pain Management & Bowel Regimen: oxycodone    5) Blood Pressure Management: 167/84--pt states monitor at home does not seem to coordinate with monitor in clinic.  Discussed using another home monitor that pt has to compare and log.  Will adjust regimen as necessary.     6) Blood Sugar Management & Follow-up: n/a    7) Electrolyte Management: Mg=1.5, will start mg ox 400 mg bid today; ph=3.4 will stop KPN    8) Osteoporosis Prevention Strategy (Liver Transplant): n/a    8) OTHER medication follow-up (patient assistance, held medications, etc):   -anticipate pt complaints related to elevated Prograf  level (in process) since on keto + 10 mg bid, if level comes back WNL, can consider rapid prednisone taper.  If complaints do not resolve may need additional tests.        10) Reinforced medication education conducted in the hospital, including medication indications, dosing, administration, side effects, monitoring-- including timing of immunosuppressant levels.     Patient received their FIRST fill of medications from [facility].  Discussed the process for obtaining refills of medications, including verifying the dose and mailing address to have medications delivered.     Lisseth and her caregivers verbalized understanding and had the opportunity to ask questions.

## 2023-01-13 ENCOUNTER — LAB VISIT (OUTPATIENT)
Dept: LAB | Facility: HOSPITAL | Age: 33
End: 2023-01-13
Attending: INTERNAL MEDICINE
Payer: MEDICARE

## 2023-01-13 ENCOUNTER — PATIENT MESSAGE (OUTPATIENT)
Dept: TRANSPLANT | Facility: CLINIC | Age: 33
End: 2023-01-13
Payer: MEDICARE

## 2023-01-13 ENCOUNTER — TELEPHONE (OUTPATIENT)
Dept: TRANSPLANT | Facility: CLINIC | Age: 33
End: 2023-01-13
Payer: MEDICARE

## 2023-01-13 DIAGNOSIS — Z94.0 KIDNEY REPLACED BY TRANSPLANT: ICD-10-CM

## 2023-01-13 DIAGNOSIS — T86.90 COMPLICATION OF TRANSPLANT, UNSPECIFIED COMPLICATION, UNSPECIFIED TRANSPLANT TYPE: Primary | ICD-10-CM

## 2023-01-13 LAB
ALBUMIN SERPL BCP-MCNC: 4 G/DL (ref 3.5–5.2)
ANION GAP SERPL CALC-SCNC: 11 MMOL/L (ref 8–16)
BASOPHILS # BLD AUTO: 0.05 K/UL (ref 0–0.2)
BASOPHILS NFR BLD: 0.7 % (ref 0–1.9)
BUN SERPL-MCNC: 29 MG/DL (ref 6–20)
CALCIUM SERPL-MCNC: 10.6 MG/DL (ref 8.7–10.5)
CHLORIDE SERPL-SCNC: 114 MMOL/L (ref 95–110)
CO2 SERPL-SCNC: 14 MMOL/L (ref 23–29)
CREAT SERPL-MCNC: 2 MG/DL (ref 0.5–1.4)
DIFFERENTIAL METHOD: ABNORMAL
EOSINOPHIL # BLD AUTO: 0.1 K/UL (ref 0–0.5)
EOSINOPHIL NFR BLD: 1.5 % (ref 0–8)
ERYTHROCYTE [DISTWIDTH] IN BLOOD BY AUTOMATED COUNT: 15.3 % (ref 11.5–14.5)
EST. GFR  (NO RACE VARIABLE): 33 ML/MIN/1.73 M^2
GLUCOSE SERPL-MCNC: 111 MG/DL (ref 70–110)
HCT VFR BLD AUTO: 31.7 % (ref 37–48.5)
HGB BLD-MCNC: 9.9 G/DL (ref 12–16)
IMM GRANULOCYTES # BLD AUTO: 0.11 K/UL (ref 0–0.04)
IMM GRANULOCYTES NFR BLD AUTO: 1.5 % (ref 0–0.5)
LYMPHOCYTES # BLD AUTO: 0.4 K/UL (ref 1–4.8)
LYMPHOCYTES NFR BLD: 5.7 % (ref 18–48)
MAGNESIUM SERPL-MCNC: 1.6 MG/DL (ref 1.6–2.6)
MCH RBC QN AUTO: 30.8 PG (ref 27–31)
MCHC RBC AUTO-ENTMCNC: 31.2 G/DL (ref 32–36)
MCV RBC AUTO: 99 FL (ref 82–98)
MONOCYTES # BLD AUTO: 0.6 K/UL (ref 0.3–1)
MONOCYTES NFR BLD: 8.1 % (ref 4–15)
NEUTROPHILS # BLD AUTO: 6.2 K/UL (ref 1.8–7.7)
NEUTROPHILS NFR BLD: 82.5 % (ref 38–73)
NRBC BLD-RTO: 0 /100 WBC
PHOSPHATE SERPL-MCNC: 2.7 MG/DL (ref 2.7–4.5)
PLATELET # BLD AUTO: 251 K/UL (ref 150–450)
PMV BLD AUTO: 10.1 FL (ref 9.2–12.9)
POTASSIUM SERPL-SCNC: 4.6 MMOL/L (ref 3.5–5.1)
RBC # BLD AUTO: 3.21 M/UL (ref 4–5.4)
SODIUM SERPL-SCNC: 139 MMOL/L (ref 136–145)
WBC # BLD AUTO: 7.5 K/UL (ref 3.9–12.7)

## 2023-01-13 PROCEDURE — 80197 ASSAY OF TACROLIMUS: CPT | Mod: HCNC | Performed by: INTERNAL MEDICINE

## 2023-01-13 PROCEDURE — 85025 COMPLETE CBC W/AUTO DIFF WBC: CPT | Mod: HCNC | Performed by: INTERNAL MEDICINE

## 2023-01-13 PROCEDURE — 83735 ASSAY OF MAGNESIUM: CPT | Mod: HCNC | Performed by: INTERNAL MEDICINE

## 2023-01-13 PROCEDURE — 36415 COLL VENOUS BLD VENIPUNCTURE: CPT | Mod: HCNC | Performed by: INTERNAL MEDICINE

## 2023-01-13 PROCEDURE — 80069 RENAL FUNCTION PANEL: CPT | Mod: HCNC | Performed by: INTERNAL MEDICINE

## 2023-01-13 NOTE — TELEPHONE ENCOUNTER
"Called and discussed below with patient. Pt began talking quickly about how she feels dizzy after hydralazine. Asked what BP was prior (150's; HR 90's) asked what it was when she felt that way. Pt stated not sure as she doesn't recheck.     Pt complaining of abd pain its a "poke-y" pain. Stated she is leaking clear and bloody fluid. Said the glue isn't as good. Stated she is experiencing fait amount of pain. Asked pt if she wanted an appt to see surgeon in [facility]. Pt denied need bc she didn't want to go to [facility]. Told patient to go to there ER.     Discussed below--verbalized understanding.     ----- Message from Sara Caballero MD sent at 1/13/2023  1:44 PM CST -----  Acidosis - not noted previously? Assess if any diarrhea. If no symptoms,await repeat lab.  Calcium 10.6 - High calcium noted - avoid calcium containing supplements (calcium carbonate or citrate, such as Tums, Citracal, etc) and supplements with vit D as well.   "

## 2023-01-13 NOTE — TELEPHONE ENCOUNTER
----- Message from Sara Caballero MD sent at 1/13/2023  1:44 PM CST -----  Acidosis - not noted previously? Assess if any diarrhea. If no symptoms,await repeat lab.  Calcium 10.6 - High calcium noted - avoid calcium containing supplements (calcium carbonate or citrate, such as Tums, Citracal, etc) and supplements with vit D as well.

## 2023-01-14 DIAGNOSIS — Z94.0 S/P KIDNEY TRANSPLANT: Primary | ICD-10-CM

## 2023-01-14 LAB — TACROLIMUS BLD-MCNC: 7.8 NG/ML (ref 5–15)

## 2023-01-14 RX ORDER — SODIUM BICARBONATE 650 MG/1
1300 TABLET ORAL 2 TIMES DAILY
Qty: 120 TABLET | Refills: 11 | Status: SHIPPED | OUTPATIENT
Start: 2023-01-14 | End: 2023-01-25 | Stop reason: SDUPTHER

## 2023-01-14 NOTE — TELEPHONE ENCOUNTER
SCHEDULED WEEKEND LABS NOTE    Date of Contact: 1/14/23 Saturday scheduled labs reviewed with Dr. Maldonado.  Received orders to instruct Lisseth Schwartz to start sodium bicarbonate 1300 mg BID with repeat labs.  Patient expressed understanding of instructions.  Message sent to post-transplant coordinator with action taken.

## 2023-01-17 ENCOUNTER — PATIENT MESSAGE (OUTPATIENT)
Dept: TRANSPLANT | Facility: CLINIC | Age: 33
End: 2023-01-17
Payer: MEDICARE

## 2023-01-17 ENCOUNTER — LAB VISIT (OUTPATIENT)
Dept: LAB | Facility: HOSPITAL | Age: 33
End: 2023-01-17
Attending: INTERNAL MEDICINE
Payer: MEDICARE

## 2023-01-17 DIAGNOSIS — R00.9 ELEVATED HEART RATE WITH ELEVATED BLOOD PRESSURE AND DIAGNOSIS OF HYPERTENSION: Primary | ICD-10-CM

## 2023-01-17 DIAGNOSIS — Z94.0 S/P KIDNEY TRANSPLANT: ICD-10-CM

## 2023-01-17 DIAGNOSIS — T86.90 COMPLICATION OF TRANSPLANT, UNSPECIFIED COMPLICATION, UNSPECIFIED TRANSPLANT TYPE: ICD-10-CM

## 2023-01-17 DIAGNOSIS — T86.10 COMPLICATION OF TRANSPLANTED KIDNEY, UNSPECIFIED COMPLICATION: ICD-10-CM

## 2023-01-17 DIAGNOSIS — I10 ELEVATED HEART RATE WITH ELEVATED BLOOD PRESSURE AND DIAGNOSIS OF HYPERTENSION: Primary | ICD-10-CM

## 2023-01-17 DIAGNOSIS — Z94.0 KIDNEY REPLACED BY TRANSPLANT: ICD-10-CM

## 2023-01-17 LAB
ALBUMIN SERPL BCP-MCNC: 3.8 G/DL (ref 3.5–5.2)
ANION GAP SERPL CALC-SCNC: 10 MMOL/L (ref 8–16)
BASOPHILS # BLD AUTO: 0.1 K/UL (ref 0–0.2)
BASOPHILS NFR BLD: 1 % (ref 0–1.9)
BUN SERPL-MCNC: 29 MG/DL (ref 6–20)
CALCIUM SERPL-MCNC: 9.9 MG/DL (ref 8.7–10.5)
CHLORIDE SERPL-SCNC: 113 MMOL/L (ref 95–110)
CO2 SERPL-SCNC: 16 MMOL/L (ref 23–29)
CREAT SERPL-MCNC: 2 MG/DL (ref 0.5–1.4)
DIFFERENTIAL METHOD: ABNORMAL
EOSINOPHIL # BLD AUTO: 0 K/UL (ref 0–0.5)
EOSINOPHIL NFR BLD: 0.3 % (ref 0–8)
ERYTHROCYTE [DISTWIDTH] IN BLOOD BY AUTOMATED COUNT: 15.3 % (ref 11.5–14.5)
EST. GFR  (NO RACE VARIABLE): 33 ML/MIN/1.73 M^2
GLUCOSE SERPL-MCNC: 112 MG/DL (ref 70–110)
HCT VFR BLD AUTO: 33 % (ref 37–48.5)
HGB BLD-MCNC: 10 G/DL (ref 12–16)
IMM GRANULOCYTES # BLD AUTO: 0.07 K/UL (ref 0–0.04)
IMM GRANULOCYTES NFR BLD AUTO: 0.7 % (ref 0–0.5)
LYMPHOCYTES # BLD AUTO: 0.5 K/UL (ref 1–4.8)
LYMPHOCYTES NFR BLD: 5.1 % (ref 18–48)
MAGNESIUM SERPL-MCNC: 1.5 MG/DL (ref 1.6–2.6)
MCH RBC QN AUTO: 31.3 PG (ref 27–31)
MCHC RBC AUTO-ENTMCNC: 30.3 G/DL (ref 32–36)
MCV RBC AUTO: 103 FL (ref 82–98)
MONOCYTES # BLD AUTO: 0.8 K/UL (ref 0.3–1)
MONOCYTES NFR BLD: 7.7 % (ref 4–15)
NEUTROPHILS # BLD AUTO: 8.5 K/UL (ref 1.8–7.7)
NEUTROPHILS NFR BLD: 85.2 % (ref 38–73)
NRBC BLD-RTO: 0 /100 WBC
PHOSPHATE SERPL-MCNC: 2.7 MG/DL (ref 2.7–4.5)
PLATELET # BLD AUTO: 231 K/UL (ref 150–450)
PMV BLD AUTO: 10.7 FL (ref 9.2–12.9)
POTASSIUM SERPL-SCNC: 4.7 MMOL/L (ref 3.5–5.1)
RBC # BLD AUTO: 3.2 M/UL (ref 4–5.4)
SODIUM SERPL-SCNC: 139 MMOL/L (ref 136–145)
WBC # BLD AUTO: 9.99 K/UL (ref 3.9–12.7)

## 2023-01-17 PROCEDURE — 83735 ASSAY OF MAGNESIUM: CPT | Mod: HCNC | Performed by: INTERNAL MEDICINE

## 2023-01-17 PROCEDURE — 80069 RENAL FUNCTION PANEL: CPT | Mod: HCNC | Performed by: INTERNAL MEDICINE

## 2023-01-17 PROCEDURE — 82955 ASSAY OF G6PD ENZYME: CPT | Mod: HCNC | Performed by: INTERNAL MEDICINE

## 2023-01-17 PROCEDURE — 85025 COMPLETE CBC W/AUTO DIFF WBC: CPT | Mod: HCNC | Performed by: INTERNAL MEDICINE

## 2023-01-17 PROCEDURE — 80197 ASSAY OF TACROLIMUS: CPT | Mod: HCNC | Performed by: INTERNAL MEDICINE

## 2023-01-17 RX ORDER — METOPROLOL TARTRATE 50 MG/1
100 TABLET ORAL 2 TIMES DAILY
Qty: 60 TABLET | Refills: 5 | Status: SHIPPED | OUTPATIENT
Start: 2023-01-17 | End: 2023-01-25

## 2023-01-17 RX ORDER — HYDRALAZINE HYDROCHLORIDE 50 MG/1
50 TABLET, FILM COATED ORAL EVERY 12 HOURS
Qty: 90 TABLET | Refills: 11 | Status: SHIPPED | OUTPATIENT
Start: 2023-01-17 | End: 2023-11-13

## 2023-01-17 NOTE — TELEPHONE ENCOUNTER
Spoke to patient regarding orders below. Pt verbalized understanding. Pt stated she has not been taking her sodium bicarb correctly as she wasn't taking the new dose.         -Please update med list to reflect she changed hydralazine to BID   -If BP gets <130, she can cut hydralazine in half to 25 mg   -Have her get EKG and 2d echo   -Increase metoprolol to 100 mg BID to help with tachycardia     Please ask her to record BPs and remind her hydralazine is an 8 hr med. By monitoring log we can decide how to adjust meds further.

## 2023-01-18 ENCOUNTER — PATIENT MESSAGE (OUTPATIENT)
Dept: TRANSPLANT | Facility: CLINIC | Age: 33
End: 2023-01-18
Payer: MEDICARE

## 2023-01-18 DIAGNOSIS — Z94.0 KIDNEY REPLACED BY TRANSPLANT: ICD-10-CM

## 2023-01-18 DIAGNOSIS — Z94.0 S/P KIDNEY TRANSPLANT: ICD-10-CM

## 2023-01-18 LAB — TACROLIMUS BLD-MCNC: 9.9 NG/ML (ref 5–15)

## 2023-01-18 RX ORDER — TACROLIMUS 1 MG/1
8 CAPSULE ORAL EVERY 12 HOURS
Qty: 480 CAPSULE | Refills: 11 | Status: SHIPPED | OUTPATIENT
Start: 2023-01-18 | End: 2023-01-20 | Stop reason: DRUGHIGH

## 2023-01-18 NOTE — TELEPHONE ENCOUNTER
----- Message from Sara Caballero MD sent at 1/18/2023 12:20 PM CST -----  Decrease tacrolimus to 8 mg BID  If taking NaHCO3 1300 BID, increase to TID for acidosis  Mention to her if creat remains around 2, we will want to biopsy. Make sure he is hydrating with 2-3 L water daily. Plan US allograft in next few days.

## 2023-01-19 ENCOUNTER — LAB VISIT (OUTPATIENT)
Dept: LAB | Facility: HOSPITAL | Age: 33
End: 2023-01-19
Attending: INTERNAL MEDICINE
Payer: MEDICARE

## 2023-01-19 ENCOUNTER — TELEPHONE (OUTPATIENT)
Dept: TRANSPLANT | Facility: CLINIC | Age: 33
End: 2023-01-19
Payer: MEDICARE

## 2023-01-19 DIAGNOSIS — Z94.0 KIDNEY REPLACED BY TRANSPLANT: ICD-10-CM

## 2023-01-19 LAB
ALBUMIN SERPL BCP-MCNC: 3.8 G/DL (ref 3.5–5.2)
ANION GAP SERPL CALC-SCNC: 6 MMOL/L (ref 8–16)
BASOPHILS # BLD AUTO: 0.08 K/UL (ref 0–0.2)
BASOPHILS NFR BLD: 1 % (ref 0–1.9)
BUN SERPL-MCNC: 26 MG/DL (ref 6–20)
CALCIUM SERPL-MCNC: 9.9 MG/DL (ref 8.7–10.5)
CHLORIDE SERPL-SCNC: 112 MMOL/L (ref 95–110)
CO2 SERPL-SCNC: 20 MMOL/L (ref 23–29)
CREAT SERPL-MCNC: 1.8 MG/DL (ref 0.5–1.4)
DIFFERENTIAL METHOD: ABNORMAL
EOSINOPHIL # BLD AUTO: 0 K/UL (ref 0–0.5)
EOSINOPHIL NFR BLD: 0.4 % (ref 0–8)
ERYTHROCYTE [DISTWIDTH] IN BLOOD BY AUTOMATED COUNT: 15.2 % (ref 11.5–14.5)
EST. GFR  (NO RACE VARIABLE): 37 ML/MIN/1.73 M^2
G6PD RBC-CCNT: 7.1 U/G HB (ref 8–11.9)
GLUCOSE SERPL-MCNC: 109 MG/DL (ref 70–110)
HCT VFR BLD AUTO: 30.6 % (ref 37–48.5)
HGB BLD-MCNC: 9.7 G/DL (ref 12–16)
IMM GRANULOCYTES # BLD AUTO: 0.05 K/UL (ref 0–0.04)
IMM GRANULOCYTES NFR BLD AUTO: 0.6 % (ref 0–0.5)
LYMPHOCYTES # BLD AUTO: 0.5 K/UL (ref 1–4.8)
LYMPHOCYTES NFR BLD: 5.9 % (ref 18–48)
MAGNESIUM SERPL-MCNC: 1.7 MG/DL (ref 1.6–2.6)
MCH RBC QN AUTO: 31 PG (ref 27–31)
MCHC RBC AUTO-ENTMCNC: 31.7 G/DL (ref 32–36)
MCV RBC AUTO: 98 FL (ref 82–98)
MONOCYTES # BLD AUTO: 0.7 K/UL (ref 0.3–1)
MONOCYTES NFR BLD: 8 % (ref 4–15)
NEUTROPHILS # BLD AUTO: 7.1 K/UL (ref 1.8–7.7)
NEUTROPHILS NFR BLD: 84.1 % (ref 38–73)
NRBC BLD-RTO: 0 /100 WBC
PHOSPHATE SERPL-MCNC: 2.5 MG/DL (ref 2.7–4.5)
PLATELET # BLD AUTO: 186 K/UL (ref 150–450)
PMV BLD AUTO: 10.3 FL (ref 9.2–12.9)
POTASSIUM SERPL-SCNC: 4.5 MMOL/L (ref 3.5–5.1)
RBC # BLD AUTO: 3.13 M/UL (ref 4–5.4)
SODIUM SERPL-SCNC: 138 MMOL/L (ref 136–145)
WBC # BLD AUTO: 8.42 K/UL (ref 3.9–12.7)

## 2023-01-19 PROCEDURE — 80069 RENAL FUNCTION PANEL: CPT | Mod: HCNC | Performed by: INTERNAL MEDICINE

## 2023-01-19 PROCEDURE — 80197 ASSAY OF TACROLIMUS: CPT | Mod: HCNC | Performed by: INTERNAL MEDICINE

## 2023-01-19 PROCEDURE — 85025 COMPLETE CBC W/AUTO DIFF WBC: CPT | Mod: HCNC | Performed by: INTERNAL MEDICINE

## 2023-01-19 PROCEDURE — 36415 COLL VENOUS BLD VENIPUNCTURE: CPT | Mod: HCNC | Performed by: INTERNAL MEDICINE

## 2023-01-19 PROCEDURE — 83735 ASSAY OF MAGNESIUM: CPT | Mod: HCNC | Performed by: INTERNAL MEDICINE

## 2023-01-19 NOTE — TELEPHONE ENCOUNTER
Discussed switching bactrim to atovaquone 2/2 itching. Per Dr. Monroy's request wanted this RN to discuss other options as well, but that Bactrim was our best line of defense for oppurtunistic infections. Pt stated the itching is that bad and she can stay on the bactrim.     ---- Message from Howard Durand PharmD sent at 1/19/2023  1:32 PM CST -----  Yes, Atovaquone 1500 mg PO daily will be better since G6PD is low.     Howard  ----- Message -----  From: Tata Hdz RN  Sent: 1/19/2023   1:28 PM CST  To: Abdominal Transplant Pharmacists      ----- Message -----  From: Sara Caballero MD  Sent: 1/19/2023   1:19 PM CST  To: [facility] Post-Kidney Transplant Clinical    G6PD low - will check with pharmD if atovaquone would be safer than dapsone.[Believe bactrim causes itching]

## 2023-01-20 ENCOUNTER — HOSPITAL ENCOUNTER (OUTPATIENT)
Dept: RADIOLOGY | Facility: HOSPITAL | Age: 33
Discharge: HOME | End: 2023-01-20
Attending: INTERNAL MEDICINE
Payer: MEDICARE

## 2023-01-20 ENCOUNTER — PATIENT MESSAGE (OUTPATIENT)
Dept: TRANSPLANT | Facility: CLINIC | Age: 33
End: 2023-01-20
Payer: MEDICARE

## 2023-01-20 DIAGNOSIS — Z94.0 S/P KIDNEY TRANSPLANT: ICD-10-CM

## 2023-01-20 DIAGNOSIS — Z94.0 KIDNEY REPLACED BY TRANSPLANT: ICD-10-CM

## 2023-01-20 LAB — TACROLIMUS BLD-MCNC: 11.4 NG/ML (ref 5–15)

## 2023-01-20 PROCEDURE — 76776 US EXAM K TRANSPL W/DOPPLER: CPT | Mod: TC,HCNC

## 2023-01-20 PROCEDURE — 76776 US TRANSPLANT KIDNEY WITH DOPPLER: ICD-10-PCS | Mod: 26,HCNC,, | Performed by: RADIOLOGY

## 2023-01-20 PROCEDURE — 76776 US EXAM K TRANSPL W/DOPPLER: CPT | Mod: 26,HCNC,, | Performed by: RADIOLOGY

## 2023-01-20 RX ORDER — TACROLIMUS 1 MG/1
7 CAPSULE ORAL EVERY 12 HOURS
Qty: 480 CAPSULE | Refills: 11 | Status: SHIPPED | OUTPATIENT
Start: 2023-01-20 | End: 2023-01-30 | Stop reason: DRUGHIGH

## 2023-01-20 NOTE — TELEPHONE ENCOUNTER
----- Message from Angel Maldonado MD sent at 1/20/2023 11:24 AM CST -----  Please lower prograf to 7 mg PO bid

## 2023-01-23 ENCOUNTER — TELEPHONE (OUTPATIENT)
Dept: CARDIOLOGY | Facility: HOSPITAL | Age: 33
End: 2023-01-23
Payer: MEDICARE

## 2023-01-23 ENCOUNTER — LAB VISIT (OUTPATIENT)
Dept: LAB | Facility: HOSPITAL | Age: 33
End: 2023-01-23
Attending: INTERNAL MEDICINE
Payer: MEDICARE

## 2023-01-23 ENCOUNTER — TELEPHONE (OUTPATIENT)
Dept: TRANSPLANT | Facility: CLINIC | Age: 33
End: 2023-01-23
Payer: MEDICARE

## 2023-01-23 DIAGNOSIS — Z94.0 KIDNEY REPLACED BY TRANSPLANT: ICD-10-CM

## 2023-01-23 LAB
ALBUMIN SERPL BCP-MCNC: 3.8 G/DL (ref 3.5–5.2)
ANION GAP SERPL CALC-SCNC: 8 MMOL/L (ref 8–16)
BASOPHILS # BLD AUTO: 0.06 K/UL (ref 0–0.2)
BASOPHILS NFR BLD: 0.9 % (ref 0–1.9)
BUN SERPL-MCNC: 26 MG/DL (ref 6–20)
CALCIUM SERPL-MCNC: 9.6 MG/DL (ref 8.7–10.5)
CHLORIDE SERPL-SCNC: 111 MMOL/L (ref 95–110)
CO2 SERPL-SCNC: 21 MMOL/L (ref 23–29)
CREAT SERPL-MCNC: 1.9 MG/DL (ref 0.5–1.4)
DIFFERENTIAL METHOD: ABNORMAL
EOSINOPHIL # BLD AUTO: 0.1 K/UL (ref 0–0.5)
EOSINOPHIL NFR BLD: 1 % (ref 0–8)
ERYTHROCYTE [DISTWIDTH] IN BLOOD BY AUTOMATED COUNT: 14.9 % (ref 11.5–14.5)
EST. GFR  (NO RACE VARIABLE): 35 ML/MIN/1.73 M^2
GLUCOSE SERPL-MCNC: 102 MG/DL (ref 70–110)
HCT VFR BLD AUTO: 30 % (ref 37–48.5)
HGB BLD-MCNC: 9.7 G/DL (ref 12–16)
IMM GRANULOCYTES # BLD AUTO: 0.03 K/UL (ref 0–0.04)
IMM GRANULOCYTES NFR BLD AUTO: 0.4 % (ref 0–0.5)
LYMPHOCYTES # BLD AUTO: 0.5 K/UL (ref 1–4.8)
LYMPHOCYTES NFR BLD: 6.8 % (ref 18–48)
MAGNESIUM SERPL-MCNC: 1.5 MG/DL (ref 1.6–2.6)
MCH RBC QN AUTO: 31.3 PG (ref 27–31)
MCHC RBC AUTO-ENTMCNC: 32.3 G/DL (ref 32–36)
MCV RBC AUTO: 97 FL (ref 82–98)
MONOCYTES # BLD AUTO: 0.6 K/UL (ref 0.3–1)
MONOCYTES NFR BLD: 8.3 % (ref 4–15)
NEUTROPHILS # BLD AUTO: 5.7 K/UL (ref 1.8–7.7)
NEUTROPHILS NFR BLD: 82.6 % (ref 38–73)
NRBC BLD-RTO: 0 /100 WBC
PHOSPHATE SERPL-MCNC: 2.3 MG/DL (ref 2.7–4.5)
PLATELET # BLD AUTO: 186 K/UL (ref 150–450)
PMV BLD AUTO: 11.1 FL (ref 9.2–12.9)
POTASSIUM SERPL-SCNC: 4.6 MMOL/L (ref 3.5–5.1)
RBC # BLD AUTO: 3.1 M/UL (ref 4–5.4)
SODIUM SERPL-SCNC: 140 MMOL/L (ref 136–145)
WBC # BLD AUTO: 6.9 K/UL (ref 3.9–12.7)

## 2023-01-23 PROCEDURE — 83735 ASSAY OF MAGNESIUM: CPT | Mod: HCNC | Performed by: INTERNAL MEDICINE

## 2023-01-23 PROCEDURE — 36415 COLL VENOUS BLD VENIPUNCTURE: CPT | Mod: HCNC | Performed by: INTERNAL MEDICINE

## 2023-01-23 PROCEDURE — 85025 COMPLETE CBC W/AUTO DIFF WBC: CPT | Mod: HCNC | Performed by: INTERNAL MEDICINE

## 2023-01-23 PROCEDURE — 80069 RENAL FUNCTION PANEL: CPT | Mod: HCNC | Performed by: INTERNAL MEDICINE

## 2023-01-23 PROCEDURE — 80197 ASSAY OF TACROLIMUS: CPT | Mod: HCNC | Performed by: INTERNAL MEDICINE

## 2023-01-23 NOTE — TELEPHONE ENCOUNTER
Pt refusing her 2D echo and EKG ordered last week by Dr. Monroy stating now that she has better blood pressures she doesn't need these.     Pt frustrated on the phone stating she knows the fluid collection is large and hurting her kidney. Explained to pt need for ultrasound on Wednesday, but verbalized she is not happy about another ultrasound as this is his third one but stated she would comply.     Surgery appt and Porfirio appt Wednesday after her stent is removed.     ----- Message from Sara Caballero MD sent at 1/23/2023  1:19 PM CST -----  Repeat US this week

## 2023-01-23 NOTE — TELEPHONE ENCOUNTER
Appointment Information   Name: APOORVA SOTO MRN: [id number]   Date: 1/24/2023 Status: [facility]   Appt Time: 1:20 PM Length: 10   Visit Type: EKG [2099] Copay: $0.00   Provider: ISAIAS CRUZ CARDIO Dept: [facility], Cardiology       Dept Address: [address] [place] [address] [place] [address]       Dept Phone Number: [phone]   Referring Provider: TAMANNA SPICER CSN: [id number]   Appt Phone:     Notes:     Made On: 1/23/2023 12:35 PM

## 2023-01-23 NOTE — TELEPHONE ENCOUNTER
Appointment Information   Name: APOORVA SOTO MRN: [id number]   Date: 1/24/2023 Status: [facility]   Appt Time: 1:30 PM Length: 45   Visit Type: TRANSTHORACIC ECHO COMPLETE [[id number]] Copay: $0.00   Provider: CARDIOVASCULAR 3, ONL Dept: [facility], Cardiology       Dept Address: [address] [place] [address] [place] [address]       Dept Phone Number: [phone]   Referring Provider: TAMANNA SPICER CSN: [id number]   Appt Phone:     Notes:

## 2023-01-23 NOTE — TELEPHONE ENCOUNTER
----- Message from Mart Camarena sent at 1/23/2023 11:55 AM CST -----  Contact: Han // nurse from the main [facility]  Han is requesting a called back to schedule the patient EKG and ECHO. Please call her back at ext:[phone]    Thanks  CF

## 2023-01-23 NOTE — TELEPHONE ENCOUNTER
----- Message from Ava Agee sent at 1/23/2023 11:21 AM CST -----  Contact: Amy   Amy is requesting a call back about scheduling the pt for an EKG & ECHO in the [facility].

## 2023-01-24 LAB — TACROLIMUS BLD-MCNC: 11 NG/ML (ref 5–15)

## 2023-01-25 ENCOUNTER — PROCEDURE VISIT (OUTPATIENT)
Dept: UROLOGY | Facility: CLINIC | Age: 33
End: 2023-01-25
Payer: MEDICARE

## 2023-01-25 ENCOUNTER — OFFICE VISIT (OUTPATIENT)
Dept: TRANSPLANT | Facility: CLINIC | Age: 33
End: 2023-01-25
Payer: MEDICARE

## 2023-01-25 ENCOUNTER — PATIENT MESSAGE (OUTPATIENT)
Dept: TRANSPLANT | Facility: CLINIC | Age: 33
End: 2023-01-25

## 2023-01-25 VITALS
BODY MASS INDEX: 32.65 KG/M2 | HEIGHT: 65 IN | OXYGEN SATURATION: 100 % | RESPIRATION RATE: 16 BRPM | HEIGHT: 65 IN | WEIGHT: 196 LBS | DIASTOLIC BLOOD PRESSURE: 60 MMHG | TEMPERATURE: 97 F | OXYGEN SATURATION: 100 % | TEMPERATURE: 97 F | SYSTOLIC BLOOD PRESSURE: 118 MMHG | BODY MASS INDEX: 32.65 KG/M2 | RESPIRATION RATE: 16 BRPM | HEART RATE: 89 BPM | WEIGHT: 196 LBS | SYSTOLIC BLOOD PRESSURE: 118 MMHG | HEART RATE: 89 BPM | DIASTOLIC BLOOD PRESSURE: 60 MMHG

## 2023-01-25 VITALS
WEIGHT: 195.31 LBS | TEMPERATURE: 98 F | HEART RATE: 83 BPM | BODY MASS INDEX: 32.54 KG/M2 | RESPIRATION RATE: 16 BRPM | HEIGHT: 65 IN | SYSTOLIC BLOOD PRESSURE: 142 MMHG | DIASTOLIC BLOOD PRESSURE: 65 MMHG

## 2023-01-25 DIAGNOSIS — E66.1 CLASS 1 DRUG-INDUCED OBESITY WITH SERIOUS COMORBIDITY AND BODY MASS INDEX (BMI) OF 34.0 TO 34.9 IN ADULT: ICD-10-CM

## 2023-01-25 DIAGNOSIS — Z94.0 IMMUNOSUPPRESSIVE MANAGEMENT ENCOUNTER FOLLOWING KIDNEY TRANSPLANT: ICD-10-CM

## 2023-01-25 DIAGNOSIS — K21.9 GASTROESOPHAGEAL REFLUX DISEASE WITHOUT ESOPHAGITIS: ICD-10-CM

## 2023-01-25 DIAGNOSIS — Z94.0 KIDNEY REPLACED BY TRANSPLANT: Primary | ICD-10-CM

## 2023-01-25 DIAGNOSIS — R93.429 ABNORMAL ULTRASOUND OF KIDNEY: ICD-10-CM

## 2023-01-25 DIAGNOSIS — Z29.89 PROPHYLACTIC IMMUNOTHERAPY: ICD-10-CM

## 2023-01-25 DIAGNOSIS — Z51.81 ENCOUNTER FOR THERAPEUTIC DRUG MONITORING: Primary | ICD-10-CM

## 2023-01-25 DIAGNOSIS — I10 ESSENTIAL HYPERTENSION: ICD-10-CM

## 2023-01-25 DIAGNOSIS — Z79.899 IMMUNOSUPPRESSIVE MANAGEMENT ENCOUNTER FOLLOWING KIDNEY TRANSPLANT: ICD-10-CM

## 2023-01-25 DIAGNOSIS — N25.81 SECONDARY HYPERPARATHYROIDISM: ICD-10-CM

## 2023-01-25 DIAGNOSIS — D63.8 ANEMIA OF CHRONIC DISEASE: ICD-10-CM

## 2023-01-25 DIAGNOSIS — N05.1 FSGS (FOCAL SEGMENTAL GLOMERULOSCLEROSIS): ICD-10-CM

## 2023-01-25 DIAGNOSIS — Z94.0 DECEASED-DONOR KIDNEY TRANSPLANT: Primary | ICD-10-CM

## 2023-01-25 DIAGNOSIS — Z91.89 AT RISK FOR OPPORTUNISTIC INFECTIONS: ICD-10-CM

## 2023-01-25 DIAGNOSIS — Z94.0 S/P KIDNEY TRANSPLANT: ICD-10-CM

## 2023-01-25 DIAGNOSIS — T86.19 OTHER COMPLICATION OF KIDNEY TRANSPLANT: ICD-10-CM

## 2023-01-25 DIAGNOSIS — Z94.0 KIDNEY REPLACED BY TRANSPLANT: ICD-10-CM

## 2023-01-25 DIAGNOSIS — E87.20 METABOLIC ACIDOSIS: ICD-10-CM

## 2023-01-25 PROBLEM — E83.39 HYPERPHOSPHATEMIA: Status: RESOLVED | Noted: 2018-09-21 | Resolved: 2023-01-25

## 2023-01-25 PROBLEM — D62 ACUTE BLOOD LOSS ANEMIA: Status: RESOLVED | Noted: 2023-01-03 | Resolved: 2023-01-25

## 2023-01-25 PROCEDURE — 99999 PR PBB SHADOW E&M-EST. PATIENT-LVL III: CPT | Mod: PBBFAC,HCNC,, | Performed by: SURGERY

## 2023-01-25 PROCEDURE — 99214 OFFICE O/P EST MOD 30 MIN: CPT | Mod: 24,HCNC,S$GLB, | Performed by: SURGERY

## 2023-01-25 PROCEDURE — 52310 PR CYSTOSCOPY,REMV CALCULUS,SIMPLE: ICD-10-PCS | Mod: HCNC,S$GLB,, | Performed by: UROLOGY

## 2023-01-25 PROCEDURE — 3078F PR MOST RECENT DIASTOLIC BLOOD PRESSURE < 80 MM HG: ICD-10-PCS | Mod: HCNC,CPTII,S$GLB, | Performed by: INTERNAL MEDICINE

## 2023-01-25 PROCEDURE — 3074F PR MOST RECENT SYSTOLIC BLOOD PRESSURE < 130 MM HG: ICD-10-PCS | Mod: HCNC,CPTII,S$GLB, | Performed by: SURGERY

## 2023-01-25 PROCEDURE — 1111F PR DISCHARGE MEDS RECONCILED W/ CURRENT OUTPATIENT MED LIST: ICD-10-PCS | Mod: HCNC,CPTII,S$GLB, | Performed by: INTERNAL MEDICINE

## 2023-01-25 PROCEDURE — 3078F DIAST BP <80 MM HG: CPT | Mod: HCNC,CPTII,S$GLB, | Performed by: SURGERY

## 2023-01-25 PROCEDURE — 3008F BODY MASS INDEX DOCD: CPT | Mod: HCNC,CPTII,S$GLB, | Performed by: SURGERY

## 2023-01-25 PROCEDURE — 3078F DIAST BP <80 MM HG: CPT | Mod: HCNC,CPTII,S$GLB, | Performed by: INTERNAL MEDICINE

## 2023-01-25 PROCEDURE — 52310 CYSTOSCOPY AND TREATMENT: CPT | Mod: HCNC,S$GLB,, | Performed by: UROLOGY

## 2023-01-25 PROCEDURE — 3074F PR MOST RECENT SYSTOLIC BLOOD PRESSURE < 130 MM HG: ICD-10-PCS | Mod: HCNC,CPTII,S$GLB, | Performed by: INTERNAL MEDICINE

## 2023-01-25 PROCEDURE — 99999 PR PBB SHADOW E&M-EST. PATIENT-LVL IV: CPT | Mod: PBBFAC,HCNC,, | Performed by: INTERNAL MEDICINE

## 2023-01-25 PROCEDURE — 3074F SYST BP LT 130 MM HG: CPT | Mod: HCNC,CPTII,S$GLB, | Performed by: INTERNAL MEDICINE

## 2023-01-25 PROCEDURE — 1159F PR MEDICATION LIST DOCUMENTED IN MEDICAL RECORD: ICD-10-PCS | Mod: HCNC,CPTII,S$GLB, | Performed by: INTERNAL MEDICINE

## 2023-01-25 PROCEDURE — 3066F NEPHROPATHY DOC TX: CPT | Mod: HCNC,CPTII,S$GLB, | Performed by: SURGERY

## 2023-01-25 PROCEDURE — 3066F PR DOCUMENTATION OF TREATMENT FOR NEPHROPATHY: ICD-10-PCS | Mod: HCNC,CPTII,S$GLB, | Performed by: INTERNAL MEDICINE

## 2023-01-25 PROCEDURE — 3078F PR MOST RECENT DIASTOLIC BLOOD PRESSURE < 80 MM HG: ICD-10-PCS | Mod: HCNC,CPTII,S$GLB, | Performed by: SURGERY

## 2023-01-25 PROCEDURE — 3008F PR BODY MASS INDEX (BMI) DOCUMENTED: ICD-10-PCS | Mod: HCNC,CPTII,S$GLB, | Performed by: INTERNAL MEDICINE

## 2023-01-25 PROCEDURE — 3074F SYST BP LT 130 MM HG: CPT | Mod: HCNC,CPTII,S$GLB, | Performed by: SURGERY

## 2023-01-25 PROCEDURE — 99214 PR OFFICE/OUTPT VISIT, EST, LEVL IV, 30-39 MIN: ICD-10-PCS | Mod: 24,HCNC,S$GLB, | Performed by: SURGERY

## 2023-01-25 PROCEDURE — 99499 RISK ADDL DX/OHS AUDIT: ICD-10-PCS | Mod: HCNC,S$GLB,, | Performed by: INTERNAL MEDICINE

## 2023-01-25 PROCEDURE — 99999 PR PBB SHADOW E&M-EST. PATIENT-LVL IV: ICD-10-PCS | Mod: PBBFAC,HCNC,, | Performed by: INTERNAL MEDICINE

## 2023-01-25 PROCEDURE — 99999 PR PBB SHADOW E&M-EST. PATIENT-LVL III: ICD-10-PCS | Mod: PBBFAC,HCNC,, | Performed by: SURGERY

## 2023-01-25 PROCEDURE — 1160F RVW MEDS BY RX/DR IN RCRD: CPT | Mod: HCNC,CPTII,S$GLB, | Performed by: INTERNAL MEDICINE

## 2023-01-25 PROCEDURE — 99215 PR OFFICE/OUTPT VISIT, EST, LEVL V, 40-54 MIN: ICD-10-PCS | Mod: HCNC,S$GLB,, | Performed by: INTERNAL MEDICINE

## 2023-01-25 PROCEDURE — 3008F BODY MASS INDEX DOCD: CPT | Mod: HCNC,CPTII,S$GLB, | Performed by: INTERNAL MEDICINE

## 2023-01-25 PROCEDURE — 99215 OFFICE O/P EST HI 40 MIN: CPT | Mod: HCNC,S$GLB,, | Performed by: INTERNAL MEDICINE

## 2023-01-25 PROCEDURE — 3066F NEPHROPATHY DOC TX: CPT | Mod: HCNC,CPTII,S$GLB, | Performed by: INTERNAL MEDICINE

## 2023-01-25 PROCEDURE — 3008F PR BODY MASS INDEX (BMI) DOCUMENTED: ICD-10-PCS | Mod: HCNC,CPTII,S$GLB, | Performed by: SURGERY

## 2023-01-25 PROCEDURE — 3066F PR DOCUMENTATION OF TREATMENT FOR NEPHROPATHY: ICD-10-PCS | Mod: HCNC,CPTII,S$GLB, | Performed by: SURGERY

## 2023-01-25 PROCEDURE — 99499 UNLISTED E&M SERVICE: CPT | Mod: HCNC,S$GLB,, | Performed by: INTERNAL MEDICINE

## 2023-01-25 PROCEDURE — 1111F PR DISCHARGE MEDS RECONCILED W/ CURRENT OUTPATIENT MED LIST: ICD-10-PCS | Mod: HCNC,CPTII,S$GLB, | Performed by: SURGERY

## 2023-01-25 PROCEDURE — 1159F MED LIST DOCD IN RCRD: CPT | Mod: HCNC,CPTII,S$GLB, | Performed by: INTERNAL MEDICINE

## 2023-01-25 PROCEDURE — 1160F PR REVIEW ALL MEDS BY PRESCRIBER/CLIN PHARMACIST DOCUMENTED: ICD-10-PCS | Mod: HCNC,CPTII,S$GLB, | Performed by: INTERNAL MEDICINE

## 2023-01-25 PROCEDURE — 1111F DSCHRG MED/CURRENT MED MERGE: CPT | Mod: HCNC,CPTII,S$GLB, | Performed by: SURGERY

## 2023-01-25 PROCEDURE — 1111F DSCHRG MED/CURRENT MED MERGE: CPT | Mod: HCNC,CPTII,S$GLB, | Performed by: INTERNAL MEDICINE

## 2023-01-25 RX ORDER — LIDOCAINE HYDROCHLORIDE 20 MG/ML
JELLY TOPICAL
Status: COMPLETED | OUTPATIENT
Start: 2023-01-25 | End: 2023-01-25

## 2023-01-25 RX ORDER — METOPROLOL TARTRATE 50 MG/1
200 TABLET ORAL 2 TIMES DAILY
Qty: 720 TABLET | Refills: 3 | Status: SHIPPED | OUTPATIENT
Start: 2023-01-25 | End: 2023-11-13 | Stop reason: SDUPTHER

## 2023-01-25 RX ADMIN — LIDOCAINE HYDROCHLORIDE: 20 JELLY TOPICAL at 09:01

## 2023-01-25 NOTE — PROGRESS NOTES
Transplant Surgery  Kidney Transplant Recipient Follow-up    Referring Physician: Andres Hoyt  Current Nephrologist: Eduard Vance    Subjective:     Chief Complaint: Lisseth Schwartz is a 32 y.o. year old Black or  female who is status post Kidney transplant performed on 1/2/2023.    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 .  From a transplant perspective, she reports normal urination.  Lisseth is here for management of her immunosuppression medication.  Lisseth states that her immunosuppression is being well tolerated.  Hypertension is not present.    External provider notes reviewed: No    Review of Systems   Constitutional:  Negative for fatigue.   HENT:  Negative for drooling, postnasal drip and sore throat.    Eyes:  Negative for discharge and itching.   Respiratory:  Negative for choking and stridor.    Gastrointestinal:  Positive for abdominal pain. Negative for rectal pain.   Endocrine: Negative for polydipsia.   Genitourinary:  Negative for enuresis and genital sores.   Musculoskeletal:  Negative for back pain, neck pain and neck stiffness.   Allergic/Immunologic: Positive for immunocompromised state.   Neurological:  Negative for facial asymmetry and numbness.   Hematological:  Negative for adenopathy.   Psychiatric/Behavioral:  Negative for behavioral problems, self-injury and suicidal ideas.      Objective:     Physical Exam  Abdominal:          Comments: Thickened scar, no fluctuance, no evidence of infection, no evidence of hernia   Lab Results   Component Value Date    CREATININE 1.9 (H) 01/23/2023    BUN 26 (H) 01/23/2023     Lab Results   Component Value Date    WBC 6.90 01/23/2023    HGB 9.7 (L) 01/23/2023    HCT 30.0 (L) 01/23/2023     HCT 34 (L) 01/02/2023     01/23/2023     Lab Results   Component Value Date    TACROLIMUS 11.0 01/23/2023       Diagnostics:  The following labs have been reviewed: CBC  CMP  The following radiology images have been independently reviewed and interpreted: Renal US    Assessment and Plan:        S/P Kidney transplant.  Chronic immunosuppressive medications for rejection prophylaxis at target.  Plan: no adjustment needed.  Continue monitoring symptoms, labs and drug levels for drug-related toxicity and side effects.  Renal hypertension at target.  Will arrange for follow up ultrasound, and also CT abdo pelvis with po contrast no iv contrast    Additional testing to be completed according to the Kidney: Written Order Guideline for Kidney Transplant Follow-Up (KI-09)    Interpretation of tests and discussion of patient management involves all members of the multidisciplinary transplant team.  Patient advised that it is recommended that all transplant candidates, and their close contacts and household members receive Covid vaccination.  Follow-up: Patient reminded to call with any health changes, since these can be early signs of significant complications.  Also, I advised the patient to be sure any new medications or changes of old medications are discussed with either a pharmacist, or physician knowledgeable with transplant to avoid rejection/drug toxicity related to significant drug interactions.    Alvarez Lala MD       [facility] Patient Status  Functional Status: 90% - Able to carry on normal activity: minor symptoms of disease  Physical Capacity: No Limitations

## 2023-01-25 NOTE — PROCEDURES
CYSTOSCOPY W/ STENT REMOVAL    Date/Time: 1/25/2023 9:45 AM  Performed by: Amy Carey MD  Authorized by: Sara Caballero MD   Comments: Procedure: Flexible cysto-uretheroscopy and stent removal   Pre Procedure Diagnosis:s/p kidney transplant   Post Procedure Diagnosis:same   Surgeon: Amy Carey MD   Anesthesia: 2% uro-jet lidocaine jelly for local analgesia   Flexible cysto-urethroscopy was performed after consent was obtained. The risks and benefits were explained.   2% lidocaine urojet was used for local analgesia.   The genitalia was prepped and draped in the sterile fashion with betadine.   The flexible scope was advanced into the urethra and into the bladder. The stent was removed without difficulty.   The patient tolerated the procedure well without complication.   They will follow up with transplant.

## 2023-01-25 NOTE — PATIENT INSTRUCTIONS
What to Expect After a Cystoscopy with Stent Removal  For the next 24-48 hours, you may feel a mild burning when you urinate. This burning is normal and expected. Drink plenty of water to dilute the urine to help relieve the burning sensation. You may also see a small amount of blood in your urine after the procedure.    Unless you are already taking antibiotics, you may be given an antibiotic after the test to prevent infection.    Signs and Symptoms to Report  Call the [facility] at [phone] if you develop any of the following:  Fever of 101 degrees or higher  Chills or persistent bleeding  Inability to urinate    After hours or on weekends, you may reach a urology resident on call at this number: [phone].

## 2023-01-25 NOTE — LETTER
January 25, 2023        Eduard Vance  [address]  [address]  [facility]  [facility] [address]  Phone: [phone]  Fax: [phone]             [facility]- Transplant 1st Fl  [address]  [facility] [address]  Phone: [phone]   Patient: Lisseth Schwartz   MR Number: [id number]   YOB: 1990   Date of Visit: 1/25/2023       Dear Dr. Eduard Vance    Thank you for referring Lisseth Schwartz to me for evaluation. Attached you will find relevant portions of my assessment and plan of care.    If you have questions, please do not hesitate to call me. I look forward to following Lisseth Schwartz along with you.    Sincerely,    Alvarez Lala MD    Enclosure    If you would like to receive this communication electronically, please contact [email or web address] or [phone] to request [employer] access.    [employer] is a tool which provides read-only access to select patient information with whom you have a relationship. Its easy to use and provides real time access to review your patients record including encounter summaries, notes, results, and demographic information.    If you feel you have received this communication in error or would no longer like to receive these types of communications, please e-mail [email or web address]

## 2023-01-25 NOTE — LETTER
January 26, 2023        Eduard Vance  [address]  [address]  [facility]  [place] [facility] [address]  Phone: [phone]  Fax: [phone]             [facility]- Transplant 1st Fl  [address]  [facility] [address]  Phone: [phone]   Patient: Lisseth Schwartz   MR Number: [id number]   YOB: 1990   Date of Visit: 1/25/2023       Dear Dr. Eduard Vance    Thank you for referring Lisseth Schwartz to me for evaluation. Attached you will find relevant portions of my assessment and plan of care.    If you have questions, please do not hesitate to call me. I look forward to following Lisseth Schwartz along with you.    Sincerely,    Sara Caballero MD    Enclosure    If you would like to receive this communication electronically, please contact [email or web address] or [phone] to request [employer] access.    [employer] is a tool which provides read-only access to select patient information with whom you have a relationship. Its easy to use and provides real time access to review your patients record including encounter summaries, notes, results, and demographic information.    If you feel you have received this communication in error or would no longer like to receive these types of communications, please e-mail [email or web address]

## 2023-01-25 NOTE — PROGRESS NOTES
Post-Transplant Assessment    Referring Physician: Andres Hoyt  Current Nephrologist: Eduard Vance    ORGAN: RIGHT KIDNEY  Donor Type: donation after brain death  PHS Increased Risk: no  Cold Ischemia: 1,259 mins  Induction Medications: thymoglobulin    Chief Complaint   Patient presents with    Kidney Transplant Reassessment     History of present illness:  Patient is a 32 y.o. female.   Presents to the clinic today for medical conditions listed below.  Problem Noted   -Donor Kidney Transplant 2023    ESKD from FSGS  DBDKT 23 KDPI 27 CIT 21 cPRA 50-81 CW DSA induced with thymo. Post op course without issue except for tremors from tac that improved with adding keto and reducing dose  sCr stalled around 2 and repeated kidney US show persistence of collection and increased velocites     At Risk for Opportunistic Infections 2023    On tmp-smx and valgan     Secondary Hyperparathyroidism 2022    On calcitriol     Fsgs (Focal Segmental Glomerulosclerosis) 2017    Post transplant UPCRs<0.2     Gastroesophageal Reflux Disease Without Esophagitis 12/10/2015    Last Assessment & Plan:   Formatting of this note might be different from the original.  History & Physical Continue home PPI.    Discharge Summary Continue PPI.    Follow-up Continue PPI     Essential Hypertension 12/10/2015    On nifedipine, metoprolol, hydralazine      Psychotic Episode 12/10/2015   Acute Blood Loss Anemia (Resolved) 1/3/2023   Hyperphosphatemia (Resolved) 2018     Review of Systems   Constitutional: Negative.    HENT: Negative.     Eyes: Negative.    Respiratory: Negative.     Cardiovascular: Negative.    Gastrointestinal:  Positive for abdominal pain.   Genitourinary: Negative.    Musculoskeletal: Negative.    Skin: Negative.    Neurological: Negative.    Endo/Heme/Allergies: Negative.    Psychiatric/Behavioral: Negative.       History:  Past Medical History:   Diagnosis Date    Allergy     Anemia   "   Anxiety     Breast feeding status of mother 1/17/2020    Brittle bones     ESRD (end stage renal disease)     M/W/F    General anesthetics causing adverse effect in therapeutic use     pt states "im a light weight"    Hypertension     Insomnia     PSG revealed no CHRYSTAL, but likely psychogenic etiology (anxiety)    RLS (restless legs syndrome)       Past Surgical History:   Procedure Laterality Date    Arm surgery Right     x 2    AV FISTULA PLACEMENT Right     CYSTOSCOPY      HYSTEROSCOPY WITH HYDROTHERMAL ABLATION OF ENDOMETRIUM WITH DILATION AND CURETTAGE N/A 12/04/2018    Procedure: HYSTEROSCOPY, WITH DILATION AND CURETTAGE OF UTERUS AND HYDROTHERMAL ENDOMETRIAL ABLATION;  Surgeon: Tiara Red MD;  Location: [facility] OR;  Service: OB/GYN;  Laterality: N/A;  ATTEMPTED     KIDNEY TRANSPLANT N/A 01/02/2023    Procedure: TRANSPLANT, KIDNEY;  Surgeon: Go Beard MD;  Location: [address];  Service: Transplant;  Laterality: N/A;    LAPAROSCOPIC SALPINGECTOMY Bilateral 12/04/2018    Procedure: SALPINGECTOMY, LAPAROSCOPIC;  Surgeon: Tiara Red MD;  Location: [facility] OR;  Service: OB/GYN;  Laterality: Bilateral;    RENAL BIOPSY      tumor removed      from face per medical records        Current Outpatient Medications:     albuterol (PROVENTIL/VENTOLIN HFA) 90 mcg/actuation inhaler, Inhale 1-2 puffs into the lungs every 6 (six) hours as needed for Wheezing (cough)., Disp: 8.5 g, Rfl: 1    bisacodyL (DULCOLAX) 5 mg EC tablet, Take 2 tablets (10 mg total) by mouth daily as needed for Constipation., Disp: , Rfl: 0    calcitRIOL (ROCALTROL) 0.25 MCG Cap, Take 1 capsule (0.25 mcg total) by mouth once daily., Disp: 30 capsule, Rfl: 5    clonazePAM (KLONOPIN) 0.5 MG tablet, Take 0.5 mg by mouth daily as needed., Disp: , Rfl:     docusate sodium (COLACE) 100 MG capsule, Take 1 capsule (100 mg total) by mouth 3 (three) times daily as needed for Constipation., Disp: , Rfl: 0    hydrALAZINE (APRESOLINE) 50 MG tablet, Take " 1 tablet (50 mg total) by mouth every 12 (twelve) hours. If BP <130 take 25 mg (cut in half), Disp: 90 tablet, Rfl: 11    ketoconazole (NIZORAL) 200 mg Tab, Take 0.5 tablets (100 mg total) by mouth once daily., Disp: 15 tablet, Rfl: 11    magnesium oxide (MAG-OX) 400 mg (241.3 mg magnesium) tablet, Take 1 tablet (400 mg total) by mouth 2 (two) times daily., Disp: 60 tablet, Rfl: 11    mycophenolate (CELLCEPT) 250 mg Cap, Take 4 capsules (1,000 mg total) by mouth 2 (two) times daily., Disp: 240 capsule, Rfl: 11    NIFEdipine (PROCARDIA-XL) 60 MG (OSM) 24 hr tablet, Take 1 tablet (60 mg total) by mouth 2 (two) times a day., Disp: 60 tablet, Rfl: 5    oxyCODONE (ROXICODONE) 5 MG immediate release tablet, Take 1 tablet (5 mg total) by mouth every 6 (six) hours as needed for Pain., Disp: 28 tablet, Rfl: 0    pantoprazole (PROTONIX) 40 MG tablet, Take 1 tablet (40 mg total) by mouth once daily., Disp: 30 tablet, Rfl: 5    predniSONE (DELTASONE) 5 MG tablet, Take by mouth daily: 20 mg 1/6-1/12; 15mg 1/13-1/19; 10 mg 1/20-1/26; 5 mg daily thereafter 1/27/23, Disp: 70 tablet, Rfl: 11    sodium bicarbonate 650 MG tablet, Take 2 tablets (1,300 mg total) by mouth 2 (two) times a day for 30 days, Disp: 120 tablet, Rfl: 0    sulfamethoxazole-trimethoprim 400-80mg (BACTRIM,SEPTRA) 400-80 mg per tablet, Take 1 tablet by mouth every morning. Stop: 7/1/2023, Disp: 30 tablet, Rfl: 5    tacrolimus (PROGRAF) 1 MG Cap, Take 7 capsules (7 mg total) by mouth every 12 (twelve) hours., Disp: 480 capsule, Rfl: 11    valGANciclovir (VALCYTE) 450 mg Tab, Take 1 tablet (450 mg total) by mouth every morning. Stop: 4/2/2023, Disp: 30 tablet, Rfl: 2    metoprolol tartrate (LOPRESSOR) 50 MG tablet, Take 4 tablets (200 mg total) by mouth 2 (two) times daily., Disp: 720 tablet, Rfl: 3  No current facility-administered medications for this visit.  Review of patient's allergies indicates:   Allergen Reactions    Lisinopril Other (See Comments)      Severe cough    Adhesive tape-silicones Itching     Burns    Furosemide Other (See Comments)     Almost gave her right sided heartfailure      Social History     Tobacco Use    Smoking status: Never    Smokeless tobacco: Never    Tobacco comments:     Situational smoking, due to family crisis   Substance Use Topics    Alcohol use: Never      Family History   Problem Relation Age of Onset    Hypertension Father     Breast cancer Paternal Grandmother     Diabetes Maternal Grandmother     Heart attack Maternal Grandmother     Lung cancer Maternal Grandfather     Prostate cancer Maternal Grandfather         Physical Exam :  Vitals:    23 1332   BP: 118/60   Pulse: 89   Resp: 16   Temp: 97.3 °F (36.3 °C)     Physical Exam  Vitals and nursing note reviewed.   Constitutional:       General: She is not in acute distress.     Appearance: She is obese.   Eyes:      General: No scleral icterus.  Cardiovascular:      Rate and Rhythm: Normal rate.   Pulmonary:      Effort: Pulmonary effort is normal. No respiratory distress.   Abdominal:      General: There is no distension.      Palpations: Abdomen is soft.      Tenderness: There is no abdominal tenderness.      Comments: Incision well healed. Firm amorphous collection tracking from groin to RLQ    Musculoskeletal:      Right lower leg: No edema.      Left lower leg: No edema.   Skin:     Coloration: Skin is not jaundiced.   Neurological:      Mental Status: She is alert.       Labs reviewed   Images Reviewed    Assessment:    DBDKT 23 KDPI 27 CIT 21 cPRA 50-81 CW DSA induced with thymo. Post op course without issue except for tremors from tac that improved with adding keto and reducing dose  sCr stalled around 2 and repeated kidney US show persistence of collection and increased velocites    1. -donor kidney transplant    2. Abnormal ultrasound of kidney    3. Immunosuppressive management encounter following kidney transplant    4. At risk for opportunistic  infections    5. Prophylactic immunotherapy    6. Essential hypertension    7. Metabolic acidosis    8. S/P kidney transplant    9. FSGS (focal segmental glomerulosclerosis)    10. Anemia of chronic disease    11. Secondary hyperparathyroidism    12. Class 1 drug-induced obesity with serious comorbidity and body mass index (BMI) of 34.0 to 34.9 in adult    13. Gastroesophageal reflux disease without esophagitis        Plan:  Continue keto and tac 7/7  Repeat US today if possible to assess velocity and fluid collection; Surgeon to see in clinic and we need to follow up US and if not not felt to be structural issue, will likely need biopsy    1. CKD stage:   Will continue follow up as per our center guidelines. patient to continue close follow up with the local General nephrologist. Education provided in appropriate fluid intake, potassium intake. Continue with oral hydration.    2. Immunosuppression:   Keto, tac 7/7, MMF 1000/1000, pred taper  Will closely monitor for toxicities, education provided about adherence to medicines and need to communicate any side effect to the transplant nurse or physician.  Lab Results   Component Value Date    TACROLIMUS 11.0 01/23/2023    TACROLIMUS 11.4 01/19/2023    TACROLIMUS 9.9 01/17/2023         3. Allograft Function:  Stable at baseline for the patient. Continue follow up as per our guidelines and with the local General nephrologist. Communication will be sent today.  Lab Results   Component Value Date    CREATININE 1.9 (H) 01/23/2023    CREATININE 1.8 (H) 01/19/2023    CREATININE 2.0 (H) 01/17/2023     No results found for: AMYLASE, LIPASE    4. Hypertension management:    Continue with home blood pressure monitoring, low salt and healthy life discussed with the patient    5. Metabolic Bone Disease/Secondary Hyperparathyroidism:   Calcium and phosphorus level discussed with the patient, patient will continue follow up with the general nephrologist for management of metabolic  bone disease. Calcium and phosphorus as per our center protocol.  Lab Results   Component Value Date    PTH 1,451.5 (H) 01/02/2023    CALCIUM 9.6 01/23/2023    PHOS 2.3 (L) 01/23/2023    PHOS 2.5 (L) 01/19/2023    PHOS 2.7 01/17/2023       6. Electrolytes:   Reviewed with the patient, essentially within the normal range no need for acute changes today, will monitor as per our center guidelines.  Lab Results   Component Value Date     01/23/2023    K 4.6 01/23/2023     (H) 01/23/2023    CO2 21 (L) 01/23/2023    CO2 20 (L) 01/19/2023    CO2 16 (L) 01/17/2023       7. Anemia:   Will continue monitoring as per our center guidelines. No indication for acute intervention today.  Lab Results   Component Value Date    WBC 6.90 01/23/2023    HGB 9.7 (L) 01/23/2023    HCT 30.0 (L) 01/23/2023    MCV 97 01/23/2023     01/23/2023       8.Proteinuria:   Will continue with pr/cr ratio as per our center guidelines  Lab Results   Component Value Date    PROTEINURINE 11 01/23/2023    CREATRANDUR 87.0 01/23/2023    UTPCR 0.13 01/23/2023    UTPCR 0.18 01/17/2023    UTPCR 0.49 (H) 01/06/2023        9. BK virus infection screening:   Will continue with urine or blood PCR as per our guidelines to prevent BK virus viremia and allograft dysfunction  No results found for: BKVIRUSDNAUR, BKQUANTURINE, BKVIRUSLOG, BKVIRUSURINE, BKVIRUSPCRQB      10. Weight education:   Provided during the clinic visit.  Body mass index is 32.61 kg/m².       11.Patient safety education regarding immunosuppression including prophylaxis posttransplant for CMV, PCP :   Education provided about vaccination and prevention of infections.    12.  Cytopenias:   No significant cytopenias will monitor as per our guidelines. Medicine list reviewed including potential causes of drug-induced cytopenias  Lab Results   Component Value Date    WBC 6.90 01/23/2023    HGB 9.7 (L) 01/23/2023    HCT 30.0 (L) 01/23/2023    MCV 97 01/23/2023     01/23/2023        13. Post-transplant Prophylaxis: CMV Infection, PJP and Candida mucosistis and other indicated for this particular patient.     I spoke with the patient for 30 minutes. More than half dedicated to counseling and education. All questions answered    No follow-ups on file.     No orders of the defined types were placed in this encounter.    Medications Discontinued During This Encounter   Medication Reason    sodium bicarbonate 650 MG tablet Duplicate Order    metoprolol tartrate (LOPRESSOR) 50 MG tablet         Jesus Odell Jr.    STAFF:  Lisseth Schwartz was discussed with Dr. Odell as outlined.  I have personally seen, interviewed and evaluated her, reviewed the information in this note, and agree with the findings listed in the attached encounter.  Will schedule US to follow up on prior findings as noted. Plan reviewed with pt. Also made aware if creat remains >1.5, we may need biopsy.

## 2023-01-26 ENCOUNTER — LAB VISIT (OUTPATIENT)
Dept: LAB | Facility: HOSPITAL | Age: 33
End: 2023-01-26
Attending: INTERNAL MEDICINE
Payer: MEDICARE

## 2023-01-26 ENCOUNTER — PATIENT MESSAGE (OUTPATIENT)
Dept: TRANSPLANT | Facility: CLINIC | Age: 33
End: 2023-01-26
Payer: MEDICARE

## 2023-01-26 DIAGNOSIS — Z94.0 KIDNEY REPLACED BY TRANSPLANT: ICD-10-CM

## 2023-01-26 LAB
ALBUMIN SERPL BCP-MCNC: 3.7 G/DL (ref 3.5–5.2)
ANION GAP SERPL CALC-SCNC: 7 MMOL/L (ref 8–16)
BASOPHILS # BLD AUTO: 0.06 K/UL (ref 0–0.2)
BASOPHILS NFR BLD: 1 % (ref 0–1.9)
BUN SERPL-MCNC: 26 MG/DL (ref 6–20)
CALCIUM SERPL-MCNC: 9.8 MG/DL (ref 8.7–10.5)
CHLORIDE SERPL-SCNC: 111 MMOL/L (ref 95–110)
CO2 SERPL-SCNC: 21 MMOL/L (ref 23–29)
CREAT SERPL-MCNC: 1.7 MG/DL (ref 0.5–1.4)
DIFFERENTIAL METHOD: ABNORMAL
EOSINOPHIL # BLD AUTO: 0.2 K/UL (ref 0–0.5)
EOSINOPHIL NFR BLD: 3.2 % (ref 0–8)
ERYTHROCYTE [DISTWIDTH] IN BLOOD BY AUTOMATED COUNT: 15.3 % (ref 11.5–14.5)
EST. GFR  (NO RACE VARIABLE): 40 ML/MIN/1.73 M^2
GLUCOSE SERPL-MCNC: 105 MG/DL (ref 70–110)
HCT VFR BLD AUTO: 31.9 % (ref 37–48.5)
HGB BLD-MCNC: 9.9 G/DL (ref 12–16)
IMM GRANULOCYTES # BLD AUTO: 0.04 K/UL (ref 0–0.04)
IMM GRANULOCYTES NFR BLD AUTO: 0.6 % (ref 0–0.5)
LYMPHOCYTES # BLD AUTO: 0.5 K/UL (ref 1–4.8)
LYMPHOCYTES NFR BLD: 8.5 % (ref 18–48)
MAGNESIUM SERPL-MCNC: 1.5 MG/DL (ref 1.6–2.6)
MCH RBC QN AUTO: 31 PG (ref 27–31)
MCHC RBC AUTO-ENTMCNC: 31 G/DL (ref 32–36)
MCV RBC AUTO: 100 FL (ref 82–98)
MONOCYTES # BLD AUTO: 0.6 K/UL (ref 0.3–1)
MONOCYTES NFR BLD: 9.3 % (ref 4–15)
NEUTROPHILS # BLD AUTO: 4.9 K/UL (ref 1.8–7.7)
NEUTROPHILS NFR BLD: 77.4 % (ref 38–73)
NRBC BLD-RTO: 0 /100 WBC
PHOSPHATE SERPL-MCNC: 2.2 MG/DL (ref 2.7–4.5)
PLATELET # BLD AUTO: 188 K/UL (ref 150–450)
PMV BLD AUTO: 10.4 FL (ref 9.2–12.9)
POTASSIUM SERPL-SCNC: 4.5 MMOL/L (ref 3.5–5.1)
RBC # BLD AUTO: 3.19 M/UL (ref 4–5.4)
SODIUM SERPL-SCNC: 139 MMOL/L (ref 136–145)
WBC # BLD AUTO: 6.26 K/UL (ref 3.9–12.7)

## 2023-01-26 PROCEDURE — 85025 COMPLETE CBC W/AUTO DIFF WBC: CPT | Mod: HCNC | Performed by: INTERNAL MEDICINE

## 2023-01-26 PROCEDURE — 83735 ASSAY OF MAGNESIUM: CPT | Mod: HCNC | Performed by: INTERNAL MEDICINE

## 2023-01-26 PROCEDURE — 36415 COLL VENOUS BLD VENIPUNCTURE: CPT | Mod: HCNC | Performed by: INTERNAL MEDICINE

## 2023-01-26 PROCEDURE — 80197 ASSAY OF TACROLIMUS: CPT | Mod: HCNC | Performed by: INTERNAL MEDICINE

## 2023-01-26 PROCEDURE — 80069 RENAL FUNCTION PANEL: CPT | Mod: HCNC | Performed by: INTERNAL MEDICINE

## 2023-01-27 LAB — TACROLIMUS BLD-MCNC: 7.4 NG/ML (ref 5–15)

## 2023-01-30 ENCOUNTER — LAB VISIT (OUTPATIENT)
Dept: LAB | Facility: HOSPITAL | Age: 33
End: 2023-01-30
Attending: INTERNAL MEDICINE
Payer: MEDICARE

## 2023-01-30 ENCOUNTER — HOSPITAL ENCOUNTER (OUTPATIENT)
Dept: RADIOLOGY | Facility: HOSPITAL | Age: 33
Discharge: HOME | End: 2023-01-30
Attending: SURGERY
Payer: MEDICARE

## 2023-01-30 DIAGNOSIS — Z94.0 DECEASED-DONOR KIDNEY TRANSPLANT: ICD-10-CM

## 2023-01-30 DIAGNOSIS — T86.19 OTHER COMPLICATION OF KIDNEY TRANSPLANT: ICD-10-CM

## 2023-01-30 DIAGNOSIS — Z94.0 S/P KIDNEY TRANSPLANT: ICD-10-CM

## 2023-01-30 DIAGNOSIS — Z94.0 KIDNEY REPLACED BY TRANSPLANT: ICD-10-CM

## 2023-01-30 LAB
ALBUMIN SERPL BCP-MCNC: 3.7 G/DL (ref 3.5–5.2)
ANION GAP SERPL CALC-SCNC: 9 MMOL/L (ref 8–16)
BASOPHILS # BLD AUTO: 0.05 K/UL (ref 0–0.2)
BASOPHILS NFR BLD: 1.1 % (ref 0–1.9)
BUN SERPL-MCNC: 19 MG/DL (ref 6–20)
CALCIUM SERPL-MCNC: 9.9 MG/DL (ref 8.7–10.5)
CHLORIDE SERPL-SCNC: 109 MMOL/L (ref 95–110)
CO2 SERPL-SCNC: 20 MMOL/L (ref 23–29)
CREAT SERPL-MCNC: 1.8 MG/DL (ref 0.5–1.4)
DIFFERENTIAL METHOD: ABNORMAL
EOSINOPHIL # BLD AUTO: 0.2 K/UL (ref 0–0.5)
EOSINOPHIL NFR BLD: 3.5 % (ref 0–8)
ERYTHROCYTE [DISTWIDTH] IN BLOOD BY AUTOMATED COUNT: 15.2 % (ref 11.5–14.5)
EST. GFR  (NO RACE VARIABLE): 37 ML/MIN/1.73 M^2
GLUCOSE SERPL-MCNC: 106 MG/DL (ref 70–110)
HCT VFR BLD AUTO: 31.4 % (ref 37–48.5)
HGB BLD-MCNC: 10 G/DL (ref 12–16)
IMM GRANULOCYTES # BLD AUTO: 0.04 K/UL (ref 0–0.04)
IMM GRANULOCYTES NFR BLD AUTO: 0.9 % (ref 0–0.5)
LYMPHOCYTES # BLD AUTO: 0.5 K/UL (ref 1–4.8)
LYMPHOCYTES NFR BLD: 9.8 % (ref 18–48)
MAGNESIUM SERPL-MCNC: 1.4 MG/DL (ref 1.6–2.6)
MCH RBC QN AUTO: 31.1 PG (ref 27–31)
MCHC RBC AUTO-ENTMCNC: 31.8 G/DL (ref 32–36)
MCV RBC AUTO: 98 FL (ref 82–98)
MONOCYTES # BLD AUTO: 0.4 K/UL (ref 0.3–1)
MONOCYTES NFR BLD: 9.4 % (ref 4–15)
NEUTROPHILS # BLD AUTO: 3.5 K/UL (ref 1.8–7.7)
NEUTROPHILS NFR BLD: 75.3 % (ref 38–73)
NRBC BLD-RTO: 0 /100 WBC
PHOSPHATE SERPL-MCNC: 2.1 MG/DL (ref 2.7–4.5)
PLATELET # BLD AUTO: 203 K/UL (ref 150–450)
PMV BLD AUTO: 10.5 FL (ref 9.2–12.9)
POTASSIUM SERPL-SCNC: 4.5 MMOL/L (ref 3.5–5.1)
RBC # BLD AUTO: 3.22 M/UL (ref 4–5.4)
SODIUM SERPL-SCNC: 138 MMOL/L (ref 136–145)
WBC # BLD AUTO: 4.58 K/UL (ref 3.9–12.7)

## 2023-01-30 PROCEDURE — A9698 NON-RAD CONTRAST MATERIALNOC: HCPCS | Mod: HCNC | Performed by: SURGERY

## 2023-01-30 PROCEDURE — 80069 RENAL FUNCTION PANEL: CPT | Mod: HCNC | Performed by: INTERNAL MEDICINE

## 2023-01-30 PROCEDURE — 76776 US EXAM K TRANSPL W/DOPPLER: CPT | Mod: 26,HCNC,, | Performed by: INTERNAL MEDICINE

## 2023-01-30 PROCEDURE — 76776 US EXAM K TRANSPL W/DOPPLER: CPT | Mod: TC,HCNC

## 2023-01-30 PROCEDURE — 74176 CT ABD & PELVIS W/O CONTRAST: CPT | Mod: TC,HCNC

## 2023-01-30 PROCEDURE — 80197 ASSAY OF TACROLIMUS: CPT | Mod: HCNC | Performed by: INTERNAL MEDICINE

## 2023-01-30 PROCEDURE — 85025 COMPLETE CBC W/AUTO DIFF WBC: CPT | Mod: HCNC | Performed by: INTERNAL MEDICINE

## 2023-01-30 PROCEDURE — 74176 CT ABDOMEN PELVIS WITHOUT CONTRAST: ICD-10-PCS | Mod: 26,HCNC,, | Performed by: STUDENT IN AN ORGANIZED HEALTH CARE EDUCATION/TRAINING PROGRAM

## 2023-01-30 PROCEDURE — 74176 CT ABD & PELVIS W/O CONTRAST: CPT | Mod: 26,HCNC,, | Performed by: STUDENT IN AN ORGANIZED HEALTH CARE EDUCATION/TRAINING PROGRAM

## 2023-01-30 PROCEDURE — 83735 ASSAY OF MAGNESIUM: CPT | Mod: HCNC | Performed by: INTERNAL MEDICINE

## 2023-01-30 PROCEDURE — 25500020 PHARM REV CODE 255: Mod: HCNC | Performed by: SURGERY

## 2023-01-30 PROCEDURE — 76776 US TRANSPLANT KIDNEY WITH DOPPLER: ICD-10-PCS | Mod: 26,HCNC,, | Performed by: INTERNAL MEDICINE

## 2023-01-30 PROCEDURE — 36415 COLL VENOUS BLD VENIPUNCTURE: CPT | Mod: HCNC | Performed by: INTERNAL MEDICINE

## 2023-01-30 RX ORDER — TACROLIMUS 1 MG/1
8 CAPSULE ORAL EVERY 12 HOURS
Qty: 480 CAPSULE | Refills: 11 | Status: SHIPPED | OUTPATIENT
Start: 2023-01-30 | End: 2023-02-15 | Stop reason: DRUGHIGH

## 2023-01-30 RX ADMIN — Medication 450 ML: at 03:01

## 2023-01-30 NOTE — TELEPHONE ENCOUNTER
My chart message sent----- Message from Sara Caballero MD sent at 1/30/2023  1:08 PM CST -----  Increase tacro to 8 mg BID

## 2023-01-31 LAB — TACROLIMUS BLD-MCNC: 8.1 NG/ML (ref 5–15)

## 2023-02-01 ENCOUNTER — TELEPHONE (OUTPATIENT)
Dept: TRANSPLANT | Facility: CLINIC | Age: 33
End: 2023-02-01
Payer: MEDICARE

## 2023-02-01 DIAGNOSIS — T86.19 OTHER COMPLICATION OF KIDNEY TRANSPLANT: Primary | ICD-10-CM

## 2023-02-01 NOTE — TELEPHONE ENCOUNTER
Patient notified.     ----- Message from Alvarez Lala MD sent at 1/30/2023  7:05 PM CST -----  Please have collections drained in IR.  Thanks.  ----- Message -----  From: Interface, Rad Results In  Sent: 1/30/2023   4:06 PM CST  To: Alvarez Lala MD

## 2023-02-06 ENCOUNTER — LAB VISIT (OUTPATIENT)
Dept: LAB | Facility: HOSPITAL | Age: 33
End: 2023-02-06
Attending: INTERNAL MEDICINE
Payer: MEDICARE

## 2023-02-06 ENCOUNTER — TELEPHONE (OUTPATIENT)
Dept: TRANSPLANT | Facility: CLINIC | Age: 33
End: 2023-02-06
Payer: MEDICARE

## 2023-02-06 DIAGNOSIS — Z57.8 EMPLOYEE EXPOSURE TO BLOOD: ICD-10-CM

## 2023-02-06 DIAGNOSIS — Z94.0 KIDNEY REPLACED BY TRANSPLANT: ICD-10-CM

## 2023-02-06 DIAGNOSIS — Z91.89 PERSONAL HISTORY OF POISONING, PRESENTING HAZARDS TO HEALTH: ICD-10-CM

## 2023-02-06 LAB
ALBUMIN SERPL BCP-MCNC: 3.9 G/DL (ref 3.5–5.2)
ALBUMIN SERPL BCP-MCNC: 3.9 G/DL (ref 3.5–5.2)
ALP SERPL-CCNC: 147 U/L (ref 55–135)
ALT SERPL W/O P-5'-P-CCNC: 36 U/L (ref 10–44)
ANION GAP SERPL CALC-SCNC: 9 MMOL/L (ref 8–16)
AST SERPL-CCNC: 17 U/L (ref 10–40)
BASOPHILS # BLD AUTO: 0.03 K/UL (ref 0–0.2)
BASOPHILS NFR BLD: 0.6 % (ref 0–1.9)
BILIRUB DIRECT SERPL-MCNC: 0.2 MG/DL (ref 0.1–0.3)
BILIRUB SERPL-MCNC: 0.3 MG/DL (ref 0.1–1)
BUN SERPL-MCNC: 25 MG/DL (ref 6–20)
CALCIUM SERPL-MCNC: 9.8 MG/DL (ref 8.7–10.5)
CHLORIDE SERPL-SCNC: 111 MMOL/L (ref 95–110)
CO2 SERPL-SCNC: 20 MMOL/L (ref 23–29)
CREAT SERPL-MCNC: 1.7 MG/DL (ref 0.5–1.4)
DIFFERENTIAL METHOD: ABNORMAL
EOSINOPHIL # BLD AUTO: 0.1 K/UL (ref 0–0.5)
EOSINOPHIL NFR BLD: 2.7 % (ref 0–8)
ERYTHROCYTE [DISTWIDTH] IN BLOOD BY AUTOMATED COUNT: 14.6 % (ref 11.5–14.5)
EST. GFR  (NO RACE VARIABLE): 40 ML/MIN/1.73 M^2
GLUCOSE SERPL-MCNC: 117 MG/DL (ref 70–110)
HCT VFR BLD AUTO: 32 % (ref 37–48.5)
HGB BLD-MCNC: 10.2 G/DL (ref 12–16)
IMM GRANULOCYTES # BLD AUTO: 0.02 K/UL (ref 0–0.04)
IMM GRANULOCYTES NFR BLD AUTO: 0.4 % (ref 0–0.5)
LYMPHOCYTES # BLD AUTO: 0.4 K/UL (ref 1–4.8)
LYMPHOCYTES NFR BLD: 7.6 % (ref 18–48)
MAGNESIUM SERPL-MCNC: 1.6 MG/DL (ref 1.6–2.6)
MCH RBC QN AUTO: 31.6 PG (ref 27–31)
MCHC RBC AUTO-ENTMCNC: 31.9 G/DL (ref 32–36)
MCV RBC AUTO: 99 FL (ref 82–98)
MONOCYTES # BLD AUTO: 0.4 K/UL (ref 0.3–1)
MONOCYTES NFR BLD: 8.6 % (ref 4–15)
NEUTROPHILS # BLD AUTO: 3.9 K/UL (ref 1.8–7.7)
NEUTROPHILS NFR BLD: 80.1 % (ref 38–73)
NRBC BLD-RTO: 0 /100 WBC
PHOSPHATE SERPL-MCNC: 2.1 MG/DL (ref 2.7–4.5)
PLATELET # BLD AUTO: 176 K/UL (ref 150–450)
PMV BLD AUTO: 10.9 FL (ref 9.2–12.9)
POTASSIUM SERPL-SCNC: 4.3 MMOL/L (ref 3.5–5.1)
PROT SERPL-MCNC: 7 G/DL (ref 6–8.4)
RBC # BLD AUTO: 3.23 M/UL (ref 4–5.4)
SODIUM SERPL-SCNC: 140 MMOL/L (ref 136–145)
WBC # BLD AUTO: 4.89 K/UL (ref 3.9–12.7)

## 2023-02-06 PROCEDURE — 80197 ASSAY OF TACROLIMUS: CPT | Mod: HCNC | Performed by: INTERNAL MEDICINE

## 2023-02-06 PROCEDURE — 83735 ASSAY OF MAGNESIUM: CPT | Mod: HCNC | Performed by: INTERNAL MEDICINE

## 2023-02-06 PROCEDURE — 87522 HEPATITIS C REVRS TRNSCRPJ: CPT | Mod: HCNC | Performed by: INTERNAL MEDICINE

## 2023-02-06 PROCEDURE — 84075 ASSAY ALKALINE PHOSPHATASE: CPT | Mod: HCNC | Performed by: INTERNAL MEDICINE

## 2023-02-06 PROCEDURE — 85025 COMPLETE CBC W/AUTO DIFF WBC: CPT | Mod: HCNC | Performed by: INTERNAL MEDICINE

## 2023-02-06 PROCEDURE — 36415 COLL VENOUS BLD VENIPUNCTURE: CPT | Mod: HCNC | Performed by: INTERNAL MEDICINE

## 2023-02-06 PROCEDURE — 87536 HIV-1 QUANT&REVRSE TRNSCRPJ: CPT | Mod: HCNC | Performed by: INTERNAL MEDICINE

## 2023-02-06 PROCEDURE — 80069 RENAL FUNCTION PANEL: CPT | Mod: HCNC | Performed by: INTERNAL MEDICINE

## 2023-02-06 PROCEDURE — 87799 DETECT AGENT NOS DNA QUANT: CPT | Mod: HCNC | Performed by: INTERNAL MEDICINE

## 2023-02-06 PROCEDURE — 87517 HEPATITIS B DNA QUANT: CPT | Mod: HCNC | Performed by: INTERNAL MEDICINE

## 2023-02-06 SDOH — SOCIAL DETERMINANTS OF HEALTH (SDOH): OCCUPATIONAL EXPOSURE TO OTHER RISK FACTORS: Z57.8

## 2023-02-06 NOTE — TELEPHONE ENCOUNTER
----- Message from Cabrera Cortez sent at 2/6/2023 10:09 AM CST -----  Regarding: call back  Pt call to speak with someone in regards to her appt that's for 02/08 requesting call back     Call

## 2023-02-06 NOTE — TELEPHONE ENCOUNTER
Called patient back and explained why she needed to come into the clinic for her 2 month transplant. Offered to move appointment to another day patient refused and stated she will be at the clinic on 02/08 at her scheduled time.

## 2023-02-07 ENCOUNTER — PATIENT MESSAGE (OUTPATIENT)
Dept: TRANSPLANT | Facility: CLINIC | Age: 33
End: 2023-02-07
Payer: MEDICARE

## 2023-02-07 LAB
HCV RNA SERPL QL NAA+PROBE: NOT DETECTED
HCV RNA SPEC NAA+PROBE-ACNC: NOT DETECTED IU/ML
HIV1 RNA # SERPL NAA+PROBE: NOT DETECTED COPIES/ML
HIV1 RNA SERPL QL NAA+PROBE: NOT DETECTED
TACROLIMUS BLD-MCNC: 7.3 NG/ML (ref 5–15)

## 2023-02-08 ENCOUNTER — PATIENT MESSAGE (OUTPATIENT)
Dept: TRANSPLANT | Facility: CLINIC | Age: 33
End: 2023-02-08

## 2023-02-08 ENCOUNTER — OFFICE VISIT (OUTPATIENT)
Dept: TRANSPLANT | Facility: CLINIC | Age: 33
End: 2023-02-08
Payer: MEDICARE

## 2023-02-08 ENCOUNTER — TELEPHONE (OUTPATIENT)
Dept: TRANSPLANT | Facility: CLINIC | Age: 33
End: 2023-02-08
Payer: MEDICARE

## 2023-02-08 DIAGNOSIS — Z79.899 IMMUNOSUPPRESSIVE MANAGEMENT ENCOUNTER FOLLOWING KIDNEY TRANSPLANT: ICD-10-CM

## 2023-02-08 DIAGNOSIS — Z94.0 KIDNEY REPLACED BY TRANSPLANT: Primary | ICD-10-CM

## 2023-02-08 DIAGNOSIS — I10 ESSENTIAL HYPERTENSION: ICD-10-CM

## 2023-02-08 DIAGNOSIS — N05.1 FSGS (FOCAL SEGMENTAL GLOMERULOSCLEROSIS): ICD-10-CM

## 2023-02-08 DIAGNOSIS — Z91.89 AT RISK FOR OPPORTUNISTIC INFECTIONS: ICD-10-CM

## 2023-02-08 DIAGNOSIS — Z94.0 IMMUNOSUPPRESSIVE MANAGEMENT ENCOUNTER FOLLOWING KIDNEY TRANSPLANT: ICD-10-CM

## 2023-02-08 PROCEDURE — 99214 OFFICE O/P EST MOD 30 MIN: CPT | Mod: HCNC,95,, | Performed by: NURSE PRACTITIONER

## 2023-02-08 PROCEDURE — 1159F MED LIST DOCD IN RCRD: CPT | Mod: HCNC,CPTII,95, | Performed by: NURSE PRACTITIONER

## 2023-02-08 PROCEDURE — 1159F PR MEDICATION LIST DOCUMENTED IN MEDICAL RECORD: ICD-10-PCS | Mod: HCNC,CPTII,95, | Performed by: NURSE PRACTITIONER

## 2023-02-08 PROCEDURE — 1160F RVW MEDS BY RX/DR IN RCRD: CPT | Mod: HCNC,CPTII,95, | Performed by: NURSE PRACTITIONER

## 2023-02-08 PROCEDURE — 99214 PR OFFICE/OUTPT VISIT, EST, LEVL IV, 30-39 MIN: ICD-10-PCS | Mod: HCNC,95,, | Performed by: NURSE PRACTITIONER

## 2023-02-08 PROCEDURE — 1160F PR REVIEW ALL MEDS BY PRESCRIBER/CLIN PHARMACIST DOCUMENTED: ICD-10-PCS | Mod: HCNC,CPTII,95, | Performed by: NURSE PRACTITIONER

## 2023-02-08 PROCEDURE — 3066F PR DOCUMENTATION OF TREATMENT FOR NEPHROPATHY: ICD-10-PCS | Mod: HCNC,CPTII,95, | Performed by: NURSE PRACTITIONER

## 2023-02-08 PROCEDURE — 3066F NEPHROPATHY DOC TX: CPT | Mod: HCNC,CPTII,95, | Performed by: NURSE PRACTITIONER

## 2023-02-08 NOTE — PROGRESS NOTES
Kidney Post-Transplant Assessment    Referring Physician: Andres Hoyt  Current Nephrologist: Eduard Vance    ORGAN: RIGHT KIDNEY  Donor Type: donation after brain death  PHS Increased Risk: no  Cold Ischemia: 1,259 mins  Induction Medications: thymoglobulin    Subjective:   The patient location is: [place]  The chief complaint leading to consultation is: Kidney Post-Transplant Assessment    Visit type: audiovisual    Face to Face time with patient:   30 minutes of total time spent on the encounter, which includes face to face time and non-face to face time preparing to see the patient (eg, review of tests), Obtaining and/or reviewing separately obtained history, Documenting clinical information in the electronic or other health record, Independently interpreting results (not separately reported) and communicating results to the patient/family/caregiver, or Care coordination (not separately reported).     Each patient to whom he or she provides medical services by telemedicine is:  (1) informed of the relationship between the physician and patient and the respective role of any other health care provider with respect to management of the patient; and (2) notified that he or she may decline to receive medical services by telemedicine and may withdraw from such care at any time.      CC:  Reassessment of renal allograft function and management of chronic immunosuppression.    HPI:  Ms. Schwartz is a 32 y.o. year old Black or  female with history of ESRD secondary to FSGS who was on HD 10/2016 until she received a donation after brain death kidney transplant on 1/2/23 (Thymo induction, CIT ~21 hours, CMV -/+). Surgery without complication. Her most recent creatinine is 1.7.  She takes mycophenolate mofetil, prednisone, and tacrolimus for maintenance immunosuppression.     Had allograft US and CT to evaluate fluid collections on 1/30. Dr. Lala reviewed US and wants fluid collections drained in  IR-scheduled for 2/13 in [place]. She is very uncomfortable getting procedure done in [place], would feel much better having done at main campus.    Overall feels well without complaints. Trying to drink almost 3 L water daily, no problems with urination. BP at goal, no peripheral edema.    Tolerating IS without issues, no diarrhea or vomiting. Has been increasing activity level, walking with her son in the neighborhood.      Current Outpatient Medications   Medication Sig Dispense Refill    albuterol (PROVENTIL/VENTOLIN HFA) 90 mcg/actuation inhaler Inhale 1-2 puffs into the lungs every 6 (six) hours as needed for Wheezing (cough). 8.5 g 1    bisacodyL (DULCOLAX) 5 mg EC tablet Take 2 tablets (10 mg total) by mouth daily as needed for Constipation.  0    calcitRIOL (ROCALTROL) 0.25 MCG Cap Take 1 capsule (0.25 mcg total) by mouth once daily. 30 capsule 5    clonazePAM (KLONOPIN) 0.5 MG tablet Take 0.5 mg by mouth daily as needed.      docusate sodium (COLACE) 100 MG capsule Take 1 capsule (100 mg total) by mouth 3 (three) times daily as needed for Constipation.  0    hydrALAZINE (APRESOLINE) 50 MG tablet Take 1 tablet (50 mg total) by mouth every 12 (twelve) hours. If BP <130 take 25 mg (cut in half) 90 tablet 11    ketoconazole (NIZORAL) 200 mg Tab Take 0.5 tablets (100 mg total) by mouth once daily. 15 tablet 11    magnesium oxide (MAG-OX) 400 mg (241.3 mg magnesium) tablet Take 1 tablet (400 mg total) by mouth 2 (two) times daily. 60 tablet 11    metoprolol tartrate (LOPRESSOR) 50 MG tablet Take 4 tablets (200 mg total) by mouth 2 (two) times daily. 720 tablet 3    mycophenolate (CELLCEPT) 250 mg Cap Take 4 capsules (1,000 mg total) by mouth 2 (two) times daily. 240 capsule 11    NIFEdipine (PROCARDIA-XL) 60 MG (OSM) 24 hr tablet Take 1 tablet (60 mg total) by mouth 2 (two) times a day. 60 tablet 5    oxyCODONE (ROXICODONE) 5 MG immediate release tablet Take 1 tablet (5 mg total) by mouth every 6 (six)  "hours as needed for Pain. 28 tablet 0    pantoprazole (PROTONIX) 40 MG tablet Take 1 tablet (40 mg total) by mouth once daily. 30 tablet 5    predniSONE (DELTASONE) 5 MG tablet Take by mouth daily: 20 mg 1/6-1/12; 15mg 1/13-1/19; 10 mg 1/20-1/26; 5 mg daily thereafter 1/27/23 70 tablet 11    sodium bicarbonate 650 MG tablet Take 2 tablets (1,300 mg total) by mouth 2 (two) times a day for 30 days 120 tablet 0    sulfamethoxazole-trimethoprim 400-80mg (BACTRIM,SEPTRA) 400-80 mg per tablet Take 1 tablet by mouth every morning. Stop: 7/1/2023 30 tablet 5    tacrolimus (PROGRAF) 1 MG Cap Take 8 capsules (8 mg total) by mouth every 12 (twelve) hours. 480 capsule 11    valGANciclovir (VALCYTE) 450 mg Tab Take 1 tablet (450 mg total) by mouth every morning. Stop: 4/2/2023 30 tablet 2     No current facility-administered medications for this visit.       Past Medical History:   Diagnosis Date    Allergy     Anemia     Anxiety     Breast feeding status of mother 1/17/2020    Brittle bones     ESRD (end stage renal disease)     M/W/F    General anesthetics causing adverse effect in therapeutic use     pt states "im a light weight"    Hypertension     Insomnia     PSG revealed no CHRYSTAL, but likely psychogenic etiology (anxiety)    RLS (restless legs syndrome)        Review of Systems   Constitutional:  Positive for fatigue. Negative for activity change, appetite change and fever.   HENT:  Negative for congestion, mouth sores and sore throat.    Eyes:  Negative for visual disturbance.   Respiratory:  Negative for cough, chest tightness and shortness of breath.    Cardiovascular:  Negative for chest pain, palpitations and leg swelling.   Gastrointestinal:  Negative for abdominal distention, abdominal pain, constipation, diarrhea and nausea.   Genitourinary:  Negative for difficulty urinating, dysuria, frequency and hematuria.   Musculoskeletal:  Negative for arthralgias, gait problem and myalgias.   Skin:  Negative for wound. "   Allergic/Immunologic: Positive for immunocompromised state. Negative for environmental allergies and food allergies.   Neurological:  Negative for dizziness, weakness and numbness.   Psychiatric/Behavioral:  Negative for sleep disturbance. The patient is not nervous/anxious.      Objective:   There were no vitals taken for this visit.body mass index is unknown because there is no height or weight on file.    Physical Exam    Labs:  Lab Results   Component Value Date    WBC 4.89 02/06/2023    HGB 10.2 (L) 02/06/2023    HCT 32.0 (L) 02/06/2023     02/06/2023    K 4.3 02/06/2023     (H) 02/06/2023    CO2 20 (L) 02/06/2023    BUN 25 (H) 02/06/2023    CREATININE 1.7 (H) 02/06/2023    EGFRNONAA 3 (A) 03/28/2022    CALCIUM 9.8 02/06/2023    PHOS 2.1 (L) 02/06/2023    MG 1.6 02/06/2023    ALBUMIN 3.9 02/06/2023    ALBUMIN 3.9 02/06/2023    AST 17 02/06/2023    ALT 36 02/06/2023    UTPCR Unable to calculate 02/06/2023    PTH 1,451.5 (H) 01/02/2023    TACROLIMUS 7.3 02/06/2023     Labs were reviewed with the patient    Assessment:     1. Kidney replaced by transplant    2. FSGS (focal segmental glomerulosclerosis)    3. Immunosuppressive management encounter following kidney transplant    4. Essential hypertension    5. At risk for opportunistic infections        Plan:   IR fluid collection drainage scheduled 2/13 in [place]-she is very uncomfortable having it done not at [facility], will attempt to get it scheduled here if possible      1. CKD stage: 3b    2. Immunosuppression: Prograf trough 7.3. Continue Prograf 8/8, Ketoconazole 100 mg QD to augment prograf levels,  MMF 1000 mg BID, and Prednisone taper. Will continue to monitor for drug toxicities    3. Allograft Function: stable. Continue good po hydration.   -ESRD secondary to FSGS on HD 10/2016 until she received a donation after brain death kidney transplant on 1/2/23 (Thymo induction CIT ~21 hours, CMV -/+).  Lab Results   Component Value Date     CREATININE 1.7 (H) 02/06/2023       2/6/2023  POD 35   eGFR >60 mL/min/1.73 m^2 40 (A)       4. Hypertension management: advise low salt diet and home BP monitoring    Hydralazine 50 mg TID, Lopressor 50 mg BID, Nifedipine 60 mg QD     5. Metabolic Bone Disease/Secondary Hyperparathyroidism:stable  MagOx 400 mg BID, Calcitriol 0.25 mcg QD   Lab Results   Component Value Date    PTH 1,451.5 (H) 01/02/2023    CALCIUM 9.8 02/06/2023    PHOS 2.1 (L) 02/06/2023        2/6/2023  POD 35   Magnesium 1.6 - 2.6 mg/dL 1.6       6. Electrolytes:  Will monitor /guidelines  Lab Results   Component Value Date     02/06/2023    K 4.3 02/06/2023     (H) 02/06/2023    CO2 20 (L) 02/06/2023       7. Anemia: TOBI per protocol   Lab Results   Component Value Date    WBC 4.89 02/06/2023    HGB 10.2 (L) 02/06/2023    HCT 32.0 (L) 02/06/2023    MCV 99 (H) 02/06/2023     02/06/2023         8.  Cytopenias: no significant cytopenias will monitor as per our guidelines. Medicine list reviewed including potential causes of drug-induced cytopenias    9.Proteinuria: continue p/c ratio as per FSGS guidelines    2/6/2023  POD 35   Prot/Creat Ratio, Urine 0.00 - 0.20 Unable to calculate     10. BK virus infection screening:  will continue to monitor per guidelines  BK PCR 2/6/2023 pending    11. Weight education: provided during the clinic visit   There is no height or weight on file to calculate BMI.     12.Patient safety education regarding immunosuppression including prophylaxis posttransplant for CMV, PCP : Education provided about vaccination and prevention of infections     PCP ppx: Bactrim until 7/1/23  CMV ppx: Valcyte until 4/2/23           Follow-up:   Clinic: return to transplant clinic weekly for the first month after transplant; every 2 weeks during months 2-3; then at 6-, 9-, 12-, 18-, 24-, and 36- months post-transplant to reassess for complications from immunosuppression toxicity and monitor for rejection.  Annually  thereafter.    Labs: since patient remains at high risk for rejection and drug-related complications that warrant close monitoring, labs will be ordered as follows: continue twice weekly CBC, renal panel, and drug level for first month; then same labs once weekly through 3rd month post-transplant.  Urine for UA and protein/creatinine ratio monthly.  Serum BK - PCR at 1-, 3-, 6-, 9-, 12-, 18-, 24-, 36-, 48-, and 60 months post-transplant.  Hepatic panel at 1-, 2-, 3-, 6-, 9-, 12-, 18-, 24-, and 36- months post-transplant.    Patricia Caceres NP       Education:   Material provided to the patient.  Patient reminded to call with any health changes since these can be early signs of significant complications.  Also, I advised the patient to be sure any new medications or changes of old medications are discussed with either a pharmacist or physician knowledgeable with transplant to avoid rejection/drug toxicity related to significant drug interactions.    Patient advised that it is recommended that all transplanted patients, and their close contacts and household members receive Covid vaccination.

## 2023-02-08 NOTE — LETTER
February 8, 2023        Eduard Vance  [address]  [address]  [facility]  [facility] [place] [address]  Phone: [phone]  Fax: [phone]             [facility]- Transplant 1st Fl  [address]  [facility] [address]  Phone: [phone]   Patient: Lisseth Schwartz   MR Number: [id number]   YOB: 1990   Date of Visit: 2/8/2023       Dear Dr. Eduard Vance    Thank you for referring Lisseth Schwartz to me for evaluation. Attached you will find relevant portions of my assessment and plan of care.    If you have questions, please do not hesitate to call me. I look forward to following Lisseth Schwartz along with you.    Sincerely,    Patricia Caceres, NP    Enclosure    If you would like to receive this communication electronically, please contact [email or web address] or [phone] to request [employer] access.    [employer] is a tool which provides read-only access to select patient information with whom you have a relationship. Its easy to use and provides real time access to review your patients record including encounter summaries, notes, results, and demographic information.    If you feel you have received this communication in error or would no longer like to receive these types of communications, please e-mail [email or web address]

## 2023-02-08 NOTE — TELEPHONE ENCOUNTER
Switched to a VV. My chart message sent as patient sent a my chart message 35 minutes prior to this phone call.     ----- Message from Long Johnson sent at 2/8/2023  9:51 AM CST -----  Regarding: Change to Virtual  Patient called in to speak with  to see if her appt can be switched to virtual instead of in-person. Requested a call back to confirm.            Contact: [phone]

## 2023-02-09 LAB
BKV DNA SERPL NAA+PROBE-ACNC: <125 COPIES/ML
BKV DNA SERPL NAA+PROBE-LOG#: <2.1 LOG (10) COPIES/ML
BKV DNA SERPL QL NAA+PROBE: NOT DETECTED
HBV DNA SERPL NAA+PROBE-ACNC: <10 IU/ML
HBV DNA SERPL NAA+PROBE-LOG IU: <1 LOG (10) IU/ML
HBV DNA SERPL QL NAA+PROBE: NOT DETECTED

## 2023-02-10 ENCOUNTER — PATIENT MESSAGE (OUTPATIENT)
Dept: TRANSPLANT | Facility: CLINIC | Age: 33
End: 2023-02-10
Payer: MEDICARE

## 2023-02-10 DIAGNOSIS — D68.9 COAGULATION DEFECT, UNSPECIFIED: Primary | ICD-10-CM

## 2023-02-10 DIAGNOSIS — N25.81 SECONDARY HYPERPARATHYROIDISM: ICD-10-CM

## 2023-02-10 DIAGNOSIS — I10 ESSENTIAL HYPERTENSION: ICD-10-CM

## 2023-02-13 ENCOUNTER — TELEPHONE (OUTPATIENT)
Dept: RADIOLOGY | Facility: HOSPITAL | Age: 33
End: 2023-02-13
Payer: MEDICARE

## 2023-02-13 ENCOUNTER — LAB VISIT (OUTPATIENT)
Dept: LAB | Facility: HOSPITAL | Age: 33
End: 2023-02-13
Attending: INTERNAL MEDICINE
Payer: MEDICARE

## 2023-02-13 DIAGNOSIS — D63.8 ANEMIA OF CHRONIC DISEASE: ICD-10-CM

## 2023-02-13 DIAGNOSIS — N05.1 FSGS (FOCAL SEGMENTAL GLOMERULOSCLEROSIS): ICD-10-CM

## 2023-02-13 DIAGNOSIS — D68.9 COAGULATION DEFECT, UNSPECIFIED: ICD-10-CM

## 2023-02-13 DIAGNOSIS — I10 ESSENTIAL HYPERTENSION: ICD-10-CM

## 2023-02-13 DIAGNOSIS — Z94.0 KIDNEY REPLACED BY TRANSPLANT: ICD-10-CM

## 2023-02-13 DIAGNOSIS — N18.9 CHRONIC KIDNEY DISEASE, UNSPECIFIED CKD STAGE: ICD-10-CM

## 2023-02-13 DIAGNOSIS — N25.81 SECONDARY HYPERPARATHYROIDISM: ICD-10-CM

## 2023-02-13 LAB
ALBUMIN SERPL BCP-MCNC: 3.9 G/DL (ref 3.5–5.2)
ALBUMIN SERPL BCP-MCNC: 3.9 G/DL (ref 3.5–5.2)
ALP SERPL-CCNC: 148 U/L (ref 55–135)
ALT SERPL W/O P-5'-P-CCNC: 32 U/L (ref 10–44)
ANION GAP SERPL CALC-SCNC: 11 MMOL/L (ref 8–16)
ANION GAP SERPL CALC-SCNC: 11 MMOL/L (ref 8–16)
AST SERPL-CCNC: 19 U/L (ref 10–40)
BASOPHILS # BLD AUTO: 0.06 K/UL (ref 0–0.2)
BASOPHILS NFR BLD: 1 % (ref 0–1.9)
BILIRUB SERPL-MCNC: 0.2 MG/DL (ref 0.1–1)
BUN SERPL-MCNC: 27 MG/DL (ref 6–20)
BUN SERPL-MCNC: 27 MG/DL (ref 6–20)
CALCIUM SERPL-MCNC: 9.8 MG/DL (ref 8.7–10.5)
CALCIUM SERPL-MCNC: 9.8 MG/DL (ref 8.7–10.5)
CHLORIDE SERPL-SCNC: 109 MMOL/L (ref 95–110)
CHLORIDE SERPL-SCNC: 109 MMOL/L (ref 95–110)
CO2 SERPL-SCNC: 18 MMOL/L (ref 23–29)
CO2 SERPL-SCNC: 18 MMOL/L (ref 23–29)
CREAT SERPL-MCNC: 1.6 MG/DL (ref 0.5–1.4)
CREAT SERPL-MCNC: 1.6 MG/DL (ref 0.5–1.4)
DIFFERENTIAL METHOD: ABNORMAL
EOSINOPHIL # BLD AUTO: 0.2 K/UL (ref 0–0.5)
EOSINOPHIL NFR BLD: 3.2 % (ref 0–8)
ERYTHROCYTE [DISTWIDTH] IN BLOOD BY AUTOMATED COUNT: 14.2 % (ref 11.5–14.5)
EST. GFR  (NO RACE VARIABLE): 43 ML/MIN/1.73 M^2
EST. GFR  (NO RACE VARIABLE): 43 ML/MIN/1.73 M^2
GLUCOSE SERPL-MCNC: 107 MG/DL (ref 70–110)
GLUCOSE SERPL-MCNC: 107 MG/DL (ref 70–110)
HCT VFR BLD AUTO: 33.7 % (ref 37–48.5)
HGB BLD-MCNC: 10.6 G/DL (ref 12–16)
IMM GRANULOCYTES # BLD AUTO: 0.03 K/UL (ref 0–0.04)
IMM GRANULOCYTES NFR BLD AUTO: 0.5 % (ref 0–0.5)
INR PPP: 1 (ref 0.8–1.2)
INR PPP: 1 (ref 0.8–1.2)
LYMPHOCYTES # BLD AUTO: 0.5 K/UL (ref 1–4.8)
LYMPHOCYTES NFR BLD: 8.8 % (ref 18–48)
MAGNESIUM SERPL-MCNC: 1.5 MG/DL (ref 1.6–2.6)
MCH RBC QN AUTO: 31.1 PG (ref 27–31)
MCHC RBC AUTO-ENTMCNC: 31.5 G/DL (ref 32–36)
MCV RBC AUTO: 99 FL (ref 82–98)
MONOCYTES # BLD AUTO: 0.5 K/UL (ref 0.3–1)
MONOCYTES NFR BLD: 8.4 % (ref 4–15)
NEUTROPHILS # BLD AUTO: 4.6 K/UL (ref 1.8–7.7)
NEUTROPHILS NFR BLD: 78.1 % (ref 38–73)
NRBC BLD-RTO: 0 /100 WBC
PHOSPHATE SERPL-MCNC: 2.6 MG/DL (ref 2.7–4.5)
PLATELET # BLD AUTO: 192 K/UL (ref 150–450)
PMV BLD AUTO: 10.6 FL (ref 9.2–12.9)
POTASSIUM SERPL-SCNC: 4.4 MMOL/L (ref 3.5–5.1)
POTASSIUM SERPL-SCNC: 4.4 MMOL/L (ref 3.5–5.1)
PROT SERPL-MCNC: 7.4 G/DL (ref 6–8.4)
PROTHROMBIN TIME: 10.3 SEC (ref 9–12.5)
PROTHROMBIN TIME: 10.3 SEC (ref 9–12.5)
RBC # BLD AUTO: 3.41 M/UL (ref 4–5.4)
SODIUM SERPL-SCNC: 138 MMOL/L (ref 136–145)
SODIUM SERPL-SCNC: 138 MMOL/L (ref 136–145)
WBC # BLD AUTO: 5.93 K/UL (ref 3.9–12.7)

## 2023-02-13 PROCEDURE — 84100 ASSAY OF PHOSPHORUS: CPT | Performed by: INTERNAL MEDICINE

## 2023-02-13 PROCEDURE — 85610 PROTHROMBIN TIME: CPT | Mod: HCNC | Performed by: RADIOLOGY

## 2023-02-13 PROCEDURE — 80197 ASSAY OF TACROLIMUS: CPT | Mod: HCNC | Performed by: INTERNAL MEDICINE

## 2023-02-13 PROCEDURE — 80053 COMPREHEN METABOLIC PANEL: CPT | Mod: HCNC | Performed by: RADIOLOGY

## 2023-02-13 PROCEDURE — 85025 COMPLETE CBC W/AUTO DIFF WBC: CPT | Mod: HCNC | Performed by: INTERNAL MEDICINE

## 2023-02-13 PROCEDURE — 36415 COLL VENOUS BLD VENIPUNCTURE: CPT | Mod: HCNC | Performed by: RADIOLOGY

## 2023-02-13 PROCEDURE — 83735 ASSAY OF MAGNESIUM: CPT | Mod: HCNC | Performed by: INTERNAL MEDICINE

## 2023-02-13 NOTE — TELEPHONE ENCOUNTER
Called patient and confirmed radiology procedure appointment for 2/14/23 at 0830. Instructed pt to be NPO after midnight tonight, take BP meds in morning with a few sips of water only, arrive to [facility] on [address] at 0730, and have a ride home post procedure. Pt verbalized understanding and had all questions answered. Pt confirmed no blood thinners are being taken

## 2023-02-14 ENCOUNTER — HOSPITAL ENCOUNTER (OUTPATIENT)
Dept: RADIOLOGY | Facility: HOSPITAL | Age: 33
Discharge: HOME | End: 2023-02-14
Attending: SURGERY
Payer: MEDICARE

## 2023-02-14 ENCOUNTER — PATIENT MESSAGE (OUTPATIENT)
Dept: TRANSPLANT | Facility: CLINIC | Age: 33
End: 2023-02-14
Payer: MEDICARE

## 2023-02-14 ENCOUNTER — HOSPITAL ENCOUNTER (OUTPATIENT)
Dept: RADIOLOGY | Facility: HOSPITAL | Age: 33
Discharge: HOME | End: 2023-02-14
Attending: PHYSICIAN ASSISTANT
Payer: MEDICARE

## 2023-02-14 VITALS
OXYGEN SATURATION: 97 % | HEART RATE: 80 BPM | DIASTOLIC BLOOD PRESSURE: 59 MMHG | RESPIRATION RATE: 16 BRPM | SYSTOLIC BLOOD PRESSURE: 126 MMHG

## 2023-02-14 VITALS
DIASTOLIC BLOOD PRESSURE: 75 MMHG | OXYGEN SATURATION: 100 % | BODY MASS INDEX: 32.49 KG/M2 | HEIGHT: 65 IN | WEIGHT: 195 LBS | HEART RATE: 72 BPM | SYSTOLIC BLOOD PRESSURE: 114 MMHG | RESPIRATION RATE: 17 BRPM

## 2023-02-14 DIAGNOSIS — T86.19 OTHER COMPLICATION OF KIDNEY TRANSPLANT: ICD-10-CM

## 2023-02-14 LAB
B-HCG UR QL: NEGATIVE
CTP QC/QA: YES
TACROLIMUS BLD-MCNC: 10.8 NG/ML (ref 5–15)

## 2023-02-14 PROCEDURE — 76942 ECHO GUIDE FOR BIOPSY: CPT | Mod: TC,HCNC

## 2023-02-14 PROCEDURE — 76942 ECHO GUIDE FOR BIOPSY: CPT | Mod: 26,HCNC,, | Performed by: RADIOLOGY

## 2023-02-14 PROCEDURE — 81025 URINE PREGNANCY TEST: CPT | Mod: HCNC | Performed by: RADIOLOGY

## 2023-02-14 PROCEDURE — 63600175 PHARM REV CODE 636 W HCPCS: Mod: HCNC | Performed by: RADIOLOGY

## 2023-02-14 PROCEDURE — 76942 US GUIDED NEEDLE PLACEMENT: ICD-10-PCS | Mod: 26,HCNC,, | Performed by: RADIOLOGY

## 2023-02-14 RX ORDER — FENTANYL CITRATE 50 UG/ML
INJECTION, SOLUTION INTRAMUSCULAR; INTRAVENOUS CODE/TRAUMA/SEDATION MEDICATION
Status: COMPLETED | OUTPATIENT
Start: 2023-02-14 | End: 2023-02-14

## 2023-02-14 RX ORDER — MIDAZOLAM HYDROCHLORIDE 1 MG/ML
INJECTION INTRAMUSCULAR; INTRAVENOUS CODE/TRAUMA/SEDATION MEDICATION
Status: COMPLETED | OUTPATIENT
Start: 2023-02-14 | End: 2023-02-14

## 2023-02-14 RX ADMIN — FENTANYL CITRATE 25 MCG: 50 INJECTION, SOLUTION INTRAMUSCULAR; INTRAVENOUS at 09:02

## 2023-02-14 RX ADMIN — MIDAZOLAM HYDROCHLORIDE 1 MG: 1 INJECTION INTRAMUSCULAR; INTRAVENOUS at 09:02

## 2023-02-14 NOTE — DISCHARGE INSTRUCTIONS
Please return to ER if any of these symptoms occur:  Fever over 101 degrees,  Bleeding from the puncture site not controlled,  Pain not controlled with Aleve or Tylenol,    No driving for 24 hours after procedure due to sedation given during procedure.     Do not submerge in standing water for 2 days after biopsy but you may shower.    Resume home medications and diet    Test results will be with Dr. Lala in 5-7 days, please follow up with him for results and any other questions or concerns that you may have.

## 2023-02-14 NOTE — DISCHARGE INSTRUCTIONS
Please return to ER if any of these symptoms occur:  Fever over 101 degrees,  Bleeding from the puncture site not controlled,  Pain not controlled with Aleve or Tylenol,    No driving for 24 hours after procedure due to sedation given during procedure.     Do not submerge in standing water for 2 days after biopsy but you may shower.    Resume home medications and diet    Results will be with Dr. Lala in 5-7 days, please follow up with him for results and any other questions or concerns that you may have.

## 2023-02-14 NOTE — PLAN OF CARE
Verbal and written discharge instructions given to patient including when to seek medical attention and site care. Pt verbalized understanding. PIV safely discontinued. Bandaid to procedure site remains CDI. Pt reports minimal pain and NADN. VSS. Pt transported to main entrance via w/c with all belongings, where met by family in personal vehicle. Pt was stable on discharge.

## 2023-02-14 NOTE — DISCHARGE SUMMARY
[facility] (Hospital)  Discharge Note  Short Stay    US Guided Needle Placement      OUTCOME: Patient tolerated treatment/procedure well without complication and is now ready for discharge.    DISPOSITION: Home or Self Care    FINAL DIAGNOSIS:  <principal problem not specified>    FOLLOWUP: In clinic    DISCHARGE INSTRUCTIONS:  No discharge procedures on file.     TIME SPENT ON DISCHARGE: 15 minutes    Pre Op Diagnosis: Subcutaneous fluid collection of right lower abdominal wall     Post Op Diagnosis: same     Procedure:  Fluid aspiration     Procedure performed by: Harpal CHUNG, Alexus VILLAREAL     Written Informed Consent Obtained: Yes     Specimen Removed:  yes     Estimated Blood Loss:  minimal     Findings: Local anesthesia and moderate sedation were used.     The patient tolerated the procedure well and there were no complications.      Disposition:  F/U in clinic or with ordering physician    Discharge instructions:  Light activity for 24 hours.  Remove band aid in 24 hours.  No baths (showers are appropriate).      Sterile technique was performed in the RLQ of abdomen, lidocaine was used as a local anesthetic.  5 cc of serosanguinous fluid aspirated from RLQ.  Pt tolerated the procedure well without immediate complications.  Please see radiologist report for details. F/u with PCP and/or ordering physician.

## 2023-02-15 DIAGNOSIS — Z94.0 KIDNEY REPLACED BY TRANSPLANT: ICD-10-CM

## 2023-02-15 DIAGNOSIS — Z94.0 S/P KIDNEY TRANSPLANT: ICD-10-CM

## 2023-02-15 RX ORDER — SODIUM BICARBONATE 650 MG/1
1300 TABLET ORAL 3 TIMES DAILY
Qty: 180 TABLET | Refills: 11 | Status: SHIPPED | OUTPATIENT
Start: 2023-02-15 | End: 2023-02-20 | Stop reason: DRUGHIGH

## 2023-02-15 RX ORDER — TACROLIMUS 1 MG/1
6 CAPSULE ORAL EVERY 12 HOURS
Qty: 480 CAPSULE | Refills: 11 | Status: SHIPPED | OUTPATIENT
Start: 2023-02-15 | End: 2023-02-22 | Stop reason: DRUGHIGH

## 2023-02-15 NOTE — TELEPHONE ENCOUNTER
Verbal order received from Dr. Monroy for patient ot take 6 mg BID instead of 7 mg 2/2 pt not making FK dose change.       ----- Message from Tata Hdz RN sent at 2/14/2023  3:00 PM CST -----    ----- Message -----  From: Sara Caballero MD  Sent: 2/14/2023   1:04 PM CST  To: [facility] Post-Kidney Transplant Clinical    If 12 hr trough, lower to 7 mg BID. Repeat level as ordered.  Increase na bicarbonate to 1950 mg BID or 1300 mg TID

## 2023-02-20 ENCOUNTER — LAB VISIT (OUTPATIENT)
Dept: LAB | Facility: HOSPITAL | Age: 33
End: 2023-02-20
Attending: INTERNAL MEDICINE
Payer: MEDICARE

## 2023-02-20 ENCOUNTER — PATIENT MESSAGE (OUTPATIENT)
Dept: TRANSPLANT | Facility: CLINIC | Age: 33
End: 2023-02-20
Payer: MEDICARE

## 2023-02-20 DIAGNOSIS — Z94.0 KIDNEY REPLACED BY TRANSPLANT: ICD-10-CM

## 2023-02-20 LAB
ALBUMIN SERPL BCP-MCNC: 3.8 G/DL (ref 3.5–5.2)
ANION GAP SERPL CALC-SCNC: 10 MMOL/L (ref 8–16)
BASOPHILS # BLD AUTO: 0.06 K/UL (ref 0–0.2)
BASOPHILS NFR BLD: 1 % (ref 0–1.9)
BUN SERPL-MCNC: 23 MG/DL (ref 6–20)
CALCIUM SERPL-MCNC: 9.7 MG/DL (ref 8.7–10.5)
CHLORIDE SERPL-SCNC: 111 MMOL/L (ref 95–110)
CO2 SERPL-SCNC: 18 MMOL/L (ref 23–29)
CREAT SERPL-MCNC: 1.6 MG/DL (ref 0.5–1.4)
DIFFERENTIAL METHOD: ABNORMAL
EOSINOPHIL # BLD AUTO: 0.1 K/UL (ref 0–0.5)
EOSINOPHIL NFR BLD: 1.7 % (ref 0–8)
ERYTHROCYTE [DISTWIDTH] IN BLOOD BY AUTOMATED COUNT: 14.1 % (ref 11.5–14.5)
EST. GFR  (NO RACE VARIABLE): 43 ML/MIN/1.73 M^2
GLUCOSE SERPL-MCNC: 110 MG/DL (ref 70–110)
HCT VFR BLD AUTO: 34.1 % (ref 37–48.5)
HGB BLD-MCNC: 11.1 G/DL (ref 12–16)
IMM GRANULOCYTES # BLD AUTO: 0.04 K/UL (ref 0–0.04)
IMM GRANULOCYTES NFR BLD AUTO: 0.7 % (ref 0–0.5)
LYMPHOCYTES # BLD AUTO: 0.6 K/UL (ref 1–4.8)
LYMPHOCYTES NFR BLD: 9.5 % (ref 18–48)
MAGNESIUM SERPL-MCNC: 1.4 MG/DL (ref 1.6–2.6)
MCH RBC QN AUTO: 31.6 PG (ref 27–31)
MCHC RBC AUTO-ENTMCNC: 32.6 G/DL (ref 32–36)
MCV RBC AUTO: 97 FL (ref 82–98)
MONOCYTES # BLD AUTO: 0.6 K/UL (ref 0.3–1)
MONOCYTES NFR BLD: 9.6 % (ref 4–15)
NEUTROPHILS # BLD AUTO: 4.7 K/UL (ref 1.8–7.7)
NEUTROPHILS NFR BLD: 77.5 % (ref 38–73)
NRBC BLD-RTO: 0 /100 WBC
PHOSPHATE SERPL-MCNC: 2.3 MG/DL (ref 2.7–4.5)
PLATELET # BLD AUTO: 206 K/UL (ref 150–450)
PMV BLD AUTO: 9.9 FL (ref 9.2–12.9)
POTASSIUM SERPL-SCNC: 4.4 MMOL/L (ref 3.5–5.1)
RBC # BLD AUTO: 3.51 M/UL (ref 4–5.4)
SODIUM SERPL-SCNC: 139 MMOL/L (ref 136–145)
WBC # BLD AUTO: 6.02 K/UL (ref 3.9–12.7)

## 2023-02-20 PROCEDURE — 80197 ASSAY OF TACROLIMUS: CPT | Mod: HCNC | Performed by: INTERNAL MEDICINE

## 2023-02-20 PROCEDURE — 85025 COMPLETE CBC W/AUTO DIFF WBC: CPT | Mod: HCNC | Performed by: INTERNAL MEDICINE

## 2023-02-20 PROCEDURE — 83735 ASSAY OF MAGNESIUM: CPT | Mod: HCNC | Performed by: INTERNAL MEDICINE

## 2023-02-20 PROCEDURE — 36415 COLL VENOUS BLD VENIPUNCTURE: CPT | Mod: HCNC | Performed by: INTERNAL MEDICINE

## 2023-02-20 PROCEDURE — 80069 RENAL FUNCTION PANEL: CPT | Mod: HCNC | Performed by: INTERNAL MEDICINE

## 2023-02-20 RX ORDER — SODIUM BICARBONATE 650 MG/1
1950 TABLET ORAL 3 TIMES DAILY
Qty: 270 TABLET | Refills: 11 | Status: SHIPPED | OUTPATIENT
Start: 2023-02-20 | End: 2024-02-29

## 2023-02-20 NOTE — TELEPHONE ENCOUNTER
My chart message sent     ----- Message from Chrissy Irby MD sent at 2/20/2023  9:32 AM CST -----  NaHCO3 to 3 pills TID.

## 2023-02-21 LAB — TACROLIMUS BLD-MCNC: 12 NG/ML (ref 5–15)

## 2023-02-22 ENCOUNTER — PATIENT MESSAGE (OUTPATIENT)
Dept: TRANSPLANT | Facility: CLINIC | Age: 33
End: 2023-02-22
Payer: MEDICARE

## 2023-02-22 DIAGNOSIS — Z94.0 KIDNEY REPLACED BY TRANSPLANT: ICD-10-CM

## 2023-02-22 DIAGNOSIS — Z94.0 S/P KIDNEY TRANSPLANT: ICD-10-CM

## 2023-02-22 NOTE — TELEPHONE ENCOUNTER
D/w orders below. Pt verbalized understanding.   Appt on Friday moved to VV per pt request.   Labs Monday--pt takes meds at 10 am and 10 pm will draw levels at 10 pm.     ----- Message from Chrissy Irby MD sent at 2/21/2023 10:40 AM CST -----  Tac to 6/5 mg if it is a true level

## 2023-02-23 RX ORDER — TACROLIMUS 1 MG/1
CAPSULE ORAL
Qty: 480 CAPSULE | Refills: 11 | Status: SHIPPED | OUTPATIENT
Start: 2023-02-23 | End: 2023-07-06 | Stop reason: DRUGHIGH

## 2023-02-24 ENCOUNTER — PATIENT MESSAGE (OUTPATIENT)
Dept: HEPATOLOGY | Facility: CLINIC | Age: 33
End: 2023-02-24
Payer: MEDICARE

## 2023-02-24 ENCOUNTER — OFFICE VISIT (OUTPATIENT)
Dept: TRANSPLANT | Facility: CLINIC | Age: 33
End: 2023-02-24
Payer: MEDICARE

## 2023-02-24 DIAGNOSIS — I10 ESSENTIAL HYPERTENSION: ICD-10-CM

## 2023-02-24 DIAGNOSIS — Z94.0 DECEASED-DONOR KIDNEY TRANSPLANT: Primary | ICD-10-CM

## 2023-02-24 DIAGNOSIS — N05.1 FSGS (FOCAL SEGMENTAL GLOMERULOSCLEROSIS): ICD-10-CM

## 2023-02-24 DIAGNOSIS — N25.81 SECONDARY HYPERPARATHYROIDISM: ICD-10-CM

## 2023-02-24 PROCEDURE — 3066F NEPHROPATHY DOC TX: CPT | Mod: HCNC,CPTII,95, | Performed by: NURSE PRACTITIONER

## 2023-02-24 PROCEDURE — 3066F PR DOCUMENTATION OF TREATMENT FOR NEPHROPATHY: ICD-10-PCS | Mod: HCNC,CPTII,95, | Performed by: NURSE PRACTITIONER

## 2023-02-24 PROCEDURE — 99214 PR OFFICE/OUTPT VISIT, EST, LEVL IV, 30-39 MIN: ICD-10-PCS | Mod: HCNC,95,, | Performed by: NURSE PRACTITIONER

## 2023-02-24 PROCEDURE — 99214 OFFICE O/P EST MOD 30 MIN: CPT | Mod: HCNC,95,, | Performed by: NURSE PRACTITIONER

## 2023-02-24 NOTE — LETTER
February 24, 2023        Eduard Vance  [address]  [address]  [facility]  [facility] [place] [address]  Phone: [phone]  Fax: [phone]             [facility]- Transplant 1st Fl  [address]  [facility] [address]  Phone: [phone]   Patient: Lisseth Schwartz   MR Number: [id number]   YOB: 1990   Date of Visit: 2/24/2023       Dear Dr. Eduard Vance    Thank you for referring Lisseth Schwartz to me for evaluation. Attached you will find relevant portions of my assessment and plan of care.    If you have questions, please do not hesitate to call me. I look forward to following Lisseth Schwartz along with you.    Sincerely,    Allie Rivera, NP    Enclosure    If you would like to receive this communication electronically, please contact [email or web address] or [phone] to request [employer] Link access.    [employer] is a tool which provides read-only access to select patient information with whom you have a relationship. Its easy to use and provides real time access to review your patients record including encounter summaries, notes, results, and demographic information.    If you feel you have received this communication in error or would no longer like to receive these types of communications, please e-mail [email or web address]

## 2023-02-24 NOTE — PROGRESS NOTES
Kidney Post-Transplant Assessment    Referring Physician: Andres Hoyt  Current Nephrologist: Eduard Vance    ORGAN: RIGHT KIDNEY  Donor Type: donation after brain death  PHS Increased Risk: no  Cold Ischemia: 1,259 mins  Induction Medications: thymoglobulin    Subjective:   The patient location is: [place]  The chief complaint leading to consultation is: Kidney Post-Transplant Assessment    Visit type: audiovisual    Face to Face time with patient:   30 minutes of total time spent on the encounter, which includes face to face time and non-face to face time preparing to see the patient (eg, review of tests), Obtaining and/or reviewing separately obtained history, Documenting clinical information in the electronic or other health record, Independently interpreting results (not separately reported) and communicating results to the patient/family/caregiver, or Care coordination (not separately reported).     Each patient to whom he or she provides medical services by telemedicine is:  (1) informed of the relationship between the physician and patient and the respective role of any other health care provider with respect to management of the patient; and (2) notified that he or she may decline to receive medical services by telemedicine and may withdraw from such care at any time.      CC:  Reassessment of renal allograft function and management of chronic immunosuppression.    HPI:  Ms. Schwartz is a 32 y.o. year old Black or  female with history of ESRD secondary to FSGS who was on HD 10/2016 until she received a donation after brain death kidney transplant on 1/2/23 (Thymo induction, CIT ~21 hours, CMV -/+). Surgery without complication. Her most recent creatinine is 1.6.  She takes mycophenolate mofetil, prednisone, and tacrolimus for maintenance immunosuppression.     Had allograft US and CT to evaluate fluid collections on 1/30. Dr. Lala reviewed US and wants fluid collections drained in  IR-scheduled for 2/14 in [place]. Reports drainage attempted but unsuccessful due to size of collection.     Overall feels well without complaints. Trying to drink almost 3 L water daily, no problems with urination. BP at goal, no peripheral edema.    Tolerating IS without issues, no diarrhea or vomiting. Has been increasing activity level, walking with her son in the neighborhood. Fatigue is improving. Wishing to start more activities. Discussed in detail to start slow and increase slowly over time. No heavy lifting or abd exercising currently.       Current Outpatient Medications   Medication Sig Dispense Refill    albuterol (PROVENTIL/VENTOLIN HFA) 90 mcg/actuation inhaler Inhale 1-2 puffs into the lungs every 6 (six) hours as needed for Wheezing (cough). 8.5 g 1    bisacodyL (DULCOLAX) 5 mg EC tablet Take 2 tablets (10 mg total) by mouth daily as needed for Constipation.  0    calcitRIOL (ROCALTROL) 0.25 MCG Cap Take 1 capsule (0.25 mcg total) by mouth once daily. 30 capsule 5    clonazePAM (KLONOPIN) 0.5 MG tablet Take 0.5 mg by mouth daily as needed.      docusate sodium (COLACE) 100 MG capsule Take 1 capsule (100 mg total) by mouth 3 (three) times daily as needed for Constipation.  0    hydrALAZINE (APRESOLINE) 50 MG tablet Take 1 tablet (50 mg total) by mouth every 12 (twelve) hours. If BP <130 take 25 mg (cut in half) 90 tablet 11    ketoconazole (NIZORAL) 200 mg Tab Take 0.5 tablets (100 mg total) by mouth once daily. 15 tablet 11    magnesium oxide (MAG-OX) 400 mg (241.3 mg magnesium) tablet Take 1 tablet (400 mg total) by mouth 2 (two) times daily. 60 tablet 11    metoprolol tartrate (LOPRESSOR) 50 MG tablet Take 4 tablets (200 mg total) by mouth 2 (two) times daily. 720 tablet 3    mycophenolate (CELLCEPT) 250 mg Cap Take 4 capsules (1,000 mg total) by mouth 2 (two) times daily. 240 capsule 11    NIFEdipine (PROCARDIA-XL) 60 MG (OSM) 24 hr tablet Take 1 tablet (60 mg total) by mouth 2 (two) times a  "day. 60 tablet 5    oxyCODONE (ROXICODONE) 5 MG immediate release tablet Take 1 tablet (5 mg total) by mouth every 6 (six) hours as needed for Pain. 28 tablet 0    pantoprazole (PROTONIX) 40 MG tablet Take 1 tablet (40 mg total) by mouth once daily. 30 tablet 5    predniSONE (DELTASONE) 5 MG tablet Take by mouth daily: 20 mg 1/6-1/12; 15mg 1/13-1/19; 10 mg 1/20-1/26; 5 mg daily thereafter 1/27/23 70 tablet 11    sodium bicarbonate 650 MG tablet Take 3 tablets (1,950 mg total) by mouth 3 (three) times daily. 270 tablet 11    sulfamethoxazole-trimethoprim 400-80mg (BACTRIM,SEPTRA) 400-80 mg per tablet Take 1 tablet by mouth every morning. Stop: 7/1/2023 30 tablet 5    tacrolimus (PROGRAF) 1 MG Cap Take 6 capsules (6 mg total) by mouth every morning AND 5 capsules (5 mg total) every evening. 480 capsule 11    valGANciclovir (VALCYTE) 450 mg Tab Take 1 tablet (450 mg total) by mouth every morning. Stop: 4/2/2023 30 tablet 2     No current facility-administered medications for this visit.       Past Medical History:   Diagnosis Date    Allergy     Anemia     Anxiety     Breast feeding status of mother 1/17/2020    Brittle bones     ESRD (end stage renal disease)     M/W/F    General anesthetics causing adverse effect in therapeutic use     pt states "im a light weight"    Hypertension     Insomnia     PSG revealed no CHRYSTAL, but likely psychogenic etiology (anxiety)    RLS (restless legs syndrome)        Review of Systems   Constitutional:  Positive for fatigue. Negative for activity change, appetite change and fever.   HENT:  Negative for congestion, mouth sores and sore throat.    Eyes:  Negative for visual disturbance.   Respiratory:  Negative for cough, chest tightness and shortness of breath.    Cardiovascular:  Negative for chest pain, palpitations and leg swelling.   Gastrointestinal:  Negative for abdominal distention, abdominal pain, constipation, diarrhea and nausea.   Genitourinary:  Negative for difficulty " urinating, dysuria, frequency and hematuria.   Musculoskeletal:  Negative for arthralgias, gait problem and myalgias.   Skin:  Negative for wound.   Allergic/Immunologic: Positive for immunocompromised state. Negative for environmental allergies and food allergies.   Neurological:  Negative for dizziness, weakness and numbness.   Psychiatric/Behavioral:  Negative for sleep disturbance. The patient is not nervous/anxious.      Objective:   There were no vitals taken for this visit.body mass index is unknown because there is no height or weight on file.    Physical Exam  Deferred due to av visit    Labs:  Lab Results   Component Value Date    WBC 6.02 2023    HGB 11.1 (L) 2023    HCT 34.1 (L) 2023     2023    K 4.4 2023     (H) 2023    CO2 18 (L) 2023    BUN 23 (H) 2023    CREATININE 1.6 (H) 2023    EGFRNONAA 3 (A) 2022    CALCIUM 9.7 2023    PHOS 2.3 (L) 2023    MG 1.4 (L) 2023    ALBUMIN 3.8 2023    AST 19 2023    ALT 32 2023    UTPCR Unable to calculate 2023    PTH 1,451.5 (H) 2023    TACROLIMUS 12.0 2023     Labs were reviewed with the patient    Assessment:     1. -donor kidney transplant    2. Essential hypertension    3. FSGS (focal segmental glomerulosclerosis)    4. Secondary hyperparathyroidism        Plan:   Continue current IS therapy  Routine f/u       1. CKD stage: 3b    2. Immunosuppression: Prograf trough 12. Continue reduced Prograf 6/5, Ketoconazole 100 mg QD to augment prograf levels,  MMF 1000 mg BID, and Prednisone taper. Will continue to monitor for drug toxicities    3. Allograft Function: stable. Continue good po hydration.   -ESRD secondary to FSGS on HD 10/2016 until she received a donation after brain death kidney transplant on 23 (Thymo induction CIT ~21 hours, CMV -/+).  Lab Results   Component Value Date    CREATININE 1.6 (H) 2023          2/20/2023  POD 49   Creatinine 0.5 - 1.4 mg/dL 1.6 (A)   eGFR >60 mL/min/1.73 m^2 43 (A)   Glucose 70 - 110 mg/dL 110       4. Hypertension management: advise low salt diet and home BP monitoring    Hydralazine 50 mg TID, Lopressor 50 mg BID, Nifedipine 60 mg QD     5. Metabolic Bone Disease/Secondary Hyperparathyroidism:stable  MagOx 400 mg BID, Calcitriol 0.25 mcg QD   Lab Results   Component Value Date    PTH 1,451.5 (H) 01/02/2023    CALCIUM 9.7 02/20/2023    PHOS 2.3 (L) 02/20/2023 2/20/2023  POD 49   Magnesium 1.6 - 2.6 mg/dL 1.4 (A)       6. Electrolytes:  Will monitor /guidelines  Lab Results   Component Value Date     02/20/2023    K 4.4 02/20/2023     (H) 02/20/2023    CO2 18 (L) 02/20/2023       7. Anemia: TOBI per protocol   Lab Results   Component Value Date    WBC 6.02 02/20/2023    HGB 11.1 (L) 02/20/2023    HCT 34.1 (L) 02/20/2023    MCV 97 02/20/2023     02/20/2023         8.  Cytopenias: no significant cytopenias will monitor as per our guidelines. Medicine list reviewed including potential causes of drug-induced cytopenias    9.Proteinuria: continue p/c ratio as per FSGS guidelines      2/20/2023  POD 49   Prot/Creat Ratio, Urine 0.00 - 0.20 Unable to calculate       10. BK virus infection screening:  will continue to monitor per guidelines    2/6/2023  POD 35   BK Virus DNA, Blood Not detected Not detected   BK Virus DNA PCR, Quant, Blood <125 Copies/mL <125       11. Weight education: provided during the clinic visit   There is no height or weight on file to calculate BMI.     12.Patient safety education regarding immunosuppression including prophylaxis posttransplant for CMV, PCP : Education provided about vaccination and prevention of infections     PCP ppx: Bactrim until 7/1/23  CMV ppx: Valcyte until 4/2/23           Follow-up:   Clinic: return to transplant clinic weekly for the first month after transplant; every 2 weeks during months 2-3; then at 6-, 9-, 12-,  18-, 24-, and 36- months post-transplant to reassess for complications from immunosuppression toxicity and monitor for rejection.  Annually thereafter.    Labs: since patient remains at high risk for rejection and drug-related complications that warrant close monitoring, labs will be ordered as follows: continue twice weekly CBC, renal panel, and drug level for first month; then same labs once weekly through 3rd month post-transplant.  Urine for UA and protein/creatinine ratio monthly.  Serum BK - PCR at 1-, 3-, 6-, 9-, 12-, 18-, 24-, 36-, 48-, and 60 months post-transplant.  Hepatic panel at 1-, 2-, 3-, 6-, 9-, 12-, 18-, 24-, and 36- months post-transplant.    Allie Rivera NP       Education:   Material provided to the patient.  Patient reminded to call with any health changes since these can be early signs of significant complications.  Also, I advised the patient to be sure any new medications or changes of old medications are discussed with either a pharmacist or physician knowledgeable with transplant to avoid rejection/drug toxicity related to significant drug interactions.    Patient advised that it is recommended that all transplanted patients, and their close contacts and household members receive Covid vaccination.

## 2023-02-27 ENCOUNTER — LAB VISIT (OUTPATIENT)
Dept: LAB | Facility: HOSPITAL | Age: 33
End: 2023-02-27
Attending: INTERNAL MEDICINE
Payer: MEDICARE

## 2023-02-27 DIAGNOSIS — Z94.0 KIDNEY REPLACED BY TRANSPLANT: ICD-10-CM

## 2023-02-27 LAB
ALBUMIN SERPL BCP-MCNC: 3.8 G/DL (ref 3.5–5.2)
ANION GAP SERPL CALC-SCNC: 8 MMOL/L (ref 8–16)
BASOPHILS # BLD AUTO: 0.03 K/UL (ref 0–0.2)
BASOPHILS NFR BLD: 0.6 % (ref 0–1.9)
BUN SERPL-MCNC: 18 MG/DL (ref 6–20)
CALCIUM SERPL-MCNC: 9.6 MG/DL (ref 8.7–10.5)
CHLORIDE SERPL-SCNC: 110 MMOL/L (ref 95–110)
CO2 SERPL-SCNC: 22 MMOL/L (ref 23–29)
CREAT SERPL-MCNC: 1.3 MG/DL (ref 0.5–1.4)
DIFFERENTIAL METHOD: ABNORMAL
EOSINOPHIL # BLD AUTO: 0.1 K/UL (ref 0–0.5)
EOSINOPHIL NFR BLD: 2.8 % (ref 0–8)
ERYTHROCYTE [DISTWIDTH] IN BLOOD BY AUTOMATED COUNT: 13.6 % (ref 11.5–14.5)
EST. GFR  (NO RACE VARIABLE): 55 ML/MIN/1.73 M^2
GLUCOSE SERPL-MCNC: 113 MG/DL (ref 70–110)
HCT VFR BLD AUTO: 33.8 % (ref 37–48.5)
HGB BLD-MCNC: 10.9 G/DL (ref 12–16)
IMM GRANULOCYTES # BLD AUTO: 0.08 K/UL (ref 0–0.04)
IMM GRANULOCYTES NFR BLD AUTO: 1.7 % (ref 0–0.5)
LYMPHOCYTES # BLD AUTO: 0.4 K/UL (ref 1–4.8)
LYMPHOCYTES NFR BLD: 9.3 % (ref 18–48)
MAGNESIUM SERPL-MCNC: 1.5 MG/DL (ref 1.6–2.6)
MCH RBC QN AUTO: 31.1 PG (ref 27–31)
MCHC RBC AUTO-ENTMCNC: 32.2 G/DL (ref 32–36)
MCV RBC AUTO: 96 FL (ref 82–98)
MONOCYTES # BLD AUTO: 0.5 K/UL (ref 0.3–1)
MONOCYTES NFR BLD: 11 % (ref 4–15)
NEUTROPHILS # BLD AUTO: 3.5 K/UL (ref 1.8–7.7)
NEUTROPHILS NFR BLD: 74.6 % (ref 38–73)
NRBC BLD-RTO: 0 /100 WBC
PHOSPHATE SERPL-MCNC: 2.3 MG/DL (ref 2.7–4.5)
PLATELET # BLD AUTO: 197 K/UL (ref 150–450)
PMV BLD AUTO: 10.7 FL (ref 9.2–12.9)
POTASSIUM SERPL-SCNC: 4.2 MMOL/L (ref 3.5–5.1)
RBC # BLD AUTO: 3.51 M/UL (ref 4–5.4)
SODIUM SERPL-SCNC: 140 MMOL/L (ref 136–145)
WBC # BLD AUTO: 4.64 K/UL (ref 3.9–12.7)

## 2023-02-27 PROCEDURE — 80197 ASSAY OF TACROLIMUS: CPT | Mod: HCNC | Performed by: INTERNAL MEDICINE

## 2023-02-27 PROCEDURE — 36415 COLL VENOUS BLD VENIPUNCTURE: CPT | Mod: HCNC | Performed by: INTERNAL MEDICINE

## 2023-02-27 PROCEDURE — 85025 COMPLETE CBC W/AUTO DIFF WBC: CPT | Mod: HCNC | Performed by: INTERNAL MEDICINE

## 2023-02-27 PROCEDURE — 83735 ASSAY OF MAGNESIUM: CPT | Mod: HCNC | Performed by: INTERNAL MEDICINE

## 2023-02-27 PROCEDURE — 80069 RENAL FUNCTION PANEL: CPT | Mod: HCNC | Performed by: INTERNAL MEDICINE

## 2023-02-28 LAB — TACROLIMUS BLD-MCNC: 7.7 NG/ML (ref 5–15)

## 2023-03-02 ENCOUNTER — PATIENT MESSAGE (OUTPATIENT)
Dept: TRANSPLANT | Facility: CLINIC | Age: 33
End: 2023-03-02
Payer: MEDICARE

## 2023-03-06 ENCOUNTER — LAB VISIT (OUTPATIENT)
Dept: LAB | Facility: HOSPITAL | Age: 33
End: 2023-03-06
Attending: INTERNAL MEDICINE
Payer: MEDICARE

## 2023-03-06 DIAGNOSIS — Z94.0 KIDNEY REPLACED BY TRANSPLANT: ICD-10-CM

## 2023-03-06 LAB
ALBUMIN SERPL BCP-MCNC: 4 G/DL (ref 3.5–5.2)
ALBUMIN SERPL BCP-MCNC: 4 G/DL (ref 3.5–5.2)
ALP SERPL-CCNC: 106 U/L (ref 55–135)
ALT SERPL W/O P-5'-P-CCNC: 19 U/L (ref 10–44)
ANION GAP SERPL CALC-SCNC: 12 MMOL/L (ref 8–16)
AST SERPL-CCNC: 13 U/L (ref 10–40)
BASOPHILS # BLD AUTO: 0.05 K/UL (ref 0–0.2)
BASOPHILS NFR BLD: 1 % (ref 0–1.9)
BILIRUB DIRECT SERPL-MCNC: 0.1 MG/DL (ref 0.1–0.3)
BILIRUB SERPL-MCNC: 0.2 MG/DL (ref 0.1–1)
BUN SERPL-MCNC: 27 MG/DL (ref 6–20)
CALCIUM SERPL-MCNC: 9.7 MG/DL (ref 8.7–10.5)
CHLORIDE SERPL-SCNC: 110 MMOL/L (ref 95–110)
CO2 SERPL-SCNC: 19 MMOL/L (ref 23–29)
CREAT SERPL-MCNC: 1.5 MG/DL (ref 0.5–1.4)
DIFFERENTIAL METHOD: ABNORMAL
EOSINOPHIL # BLD AUTO: 0.1 K/UL (ref 0–0.5)
EOSINOPHIL NFR BLD: 1.9 % (ref 0–8)
ERYTHROCYTE [DISTWIDTH] IN BLOOD BY AUTOMATED COUNT: 13.2 % (ref 11.5–14.5)
EST. GFR  (NO RACE VARIABLE): 47 ML/MIN/1.73 M^2
GLUCOSE SERPL-MCNC: 108 MG/DL (ref 70–110)
HCT VFR BLD AUTO: 32.7 % (ref 37–48.5)
HGB BLD-MCNC: 10.6 G/DL (ref 12–16)
IMM GRANULOCYTES # BLD AUTO: 0.23 K/UL (ref 0–0.04)
IMM GRANULOCYTES NFR BLD AUTO: 4.4 % (ref 0–0.5)
LYMPHOCYTES # BLD AUTO: 0.5 K/UL (ref 1–4.8)
LYMPHOCYTES NFR BLD: 8.6 % (ref 18–48)
MAGNESIUM SERPL-MCNC: 1.6 MG/DL (ref 1.6–2.6)
MCH RBC QN AUTO: 31.1 PG (ref 27–31)
MCHC RBC AUTO-ENTMCNC: 32.4 G/DL (ref 32–36)
MCV RBC AUTO: 96 FL (ref 82–98)
MONOCYTES # BLD AUTO: 0.5 K/UL (ref 0.3–1)
MONOCYTES NFR BLD: 9.9 % (ref 4–15)
NEUTROPHILS # BLD AUTO: 3.9 K/UL (ref 1.8–7.7)
NEUTROPHILS NFR BLD: 74.2 % (ref 38–73)
NRBC BLD-RTO: 0 /100 WBC
PHOSPHATE SERPL-MCNC: 2.4 MG/DL (ref 2.7–4.5)
PLATELET # BLD AUTO: 181 K/UL (ref 150–450)
PMV BLD AUTO: 10.4 FL (ref 9.2–12.9)
POTASSIUM SERPL-SCNC: 4.1 MMOL/L (ref 3.5–5.1)
PROT SERPL-MCNC: 7.2 G/DL (ref 6–8.4)
RBC # BLD AUTO: 3.41 M/UL (ref 4–5.4)
SODIUM SERPL-SCNC: 141 MMOL/L (ref 136–145)
WBC # BLD AUTO: 5.26 K/UL (ref 3.9–12.7)

## 2023-03-06 PROCEDURE — 80069 RENAL FUNCTION PANEL: CPT | Mod: HCNC | Performed by: INTERNAL MEDICINE

## 2023-03-06 PROCEDURE — 87799 DETECT AGENT NOS DNA QUANT: CPT | Mod: HCNC | Performed by: INTERNAL MEDICINE

## 2023-03-06 PROCEDURE — 80197 ASSAY OF TACROLIMUS: CPT | Mod: HCNC | Performed by: INTERNAL MEDICINE

## 2023-03-06 PROCEDURE — 83735 ASSAY OF MAGNESIUM: CPT | Mod: HCNC | Performed by: INTERNAL MEDICINE

## 2023-03-06 PROCEDURE — 84075 ASSAY ALKALINE PHOSPHATASE: CPT | Mod: HCNC | Performed by: INTERNAL MEDICINE

## 2023-03-06 PROCEDURE — 85025 COMPLETE CBC W/AUTO DIFF WBC: CPT | Mod: HCNC | Performed by: INTERNAL MEDICINE

## 2023-03-06 NOTE — PROGRESS NOTES
Kidney Post-Transplant Assessment    Referring Physician: Andres Hoyt  Current Nephrologist: Eduard Vance    ORGAN: RIGHT KIDNEY  Donor Type: donation after brain death  PHS Increased Risk: no  Cold Ischemia: 1,259 mins  Induction Medications: thymoglobulin    Subjective:     CC:  Reassessment of renal allograft function and management of chronic immunosuppression.    HPI:  Ms. Schwartz is a 32 y.o. year old Black or  female with history of ESRD secondary to FSGS who was on HD 10/2016 until she received a donation after brain death kidney transplant on 1/2/23 (Thymo induction, CIT ~21 hours, CMV -/+). Surgery without complication. Her most recent creatinine is 1.6.  She takes mycophenolate mofetil, prednisone, and tacrolimus for maintenance immunosuppression.     Feels amazing today, has been super active. Drinking 2-3 L water daily, no problems with urination. BP at goal, no peripheral edema.    Does have occasional discomfort over allograft when she does a lot of activity, resolves with rest.     Tolerating IS without issues, no diarrhea or vomiting.     Current Outpatient Medications   Medication Sig Dispense Refill    albuterol (PROVENTIL/VENTOLIN HFA) 90 mcg/actuation inhaler Inhale 1-2 puffs into the lungs every 6 (six) hours as needed for Wheezing (cough). 8.5 g 1    bisacodyL (DULCOLAX) 5 mg EC tablet Take 2 tablets (10 mg total) by mouth daily as needed for Constipation.  0    calcitRIOL (ROCALTROL) 0.25 MCG Cap Take 1 capsule (0.25 mcg total) by mouth once daily. 30 capsule 5    clonazePAM (KLONOPIN) 0.5 MG tablet Take 0.5 mg by mouth daily as needed.      hydrALAZINE (APRESOLINE) 50 MG tablet Take 1 tablet (50 mg total) by mouth every 12 (twelve) hours. If BP <130 take 25 mg (cut in half) 90 tablet 11    ketoconazole (NIZORAL) 200 mg Tab Take 0.5 tablets (100 mg total) by mouth once daily. 15 tablet 11    magnesium oxide (MAG-OX) 400 mg (241.3 mg magnesium) tablet Take 1  "tablet (400 mg total) by mouth 2 (two) times daily. 60 tablet 11    metoprolol tartrate (LOPRESSOR) 50 MG tablet Take 4 tablets (200 mg total) by mouth 2 (two) times daily. 720 tablet 3    mycophenolate (CELLCEPT) 250 mg Cap Take 4 capsules (1,000 mg total) by mouth 2 (two) times daily. 240 capsule 11    NIFEdipine (PROCARDIA-XL) 60 MG (OSM) 24 hr tablet Take 1 tablet (60 mg total) by mouth 2 (two) times a day. 60 tablet 5    oxyCODONE (ROXICODONE) 5 MG immediate release tablet Take 1 tablet (5 mg total) by mouth every 6 (six) hours as needed for Pain. 28 tablet 0    pantoprazole (PROTONIX) 40 MG tablet Take 1 tablet (40 mg total) by mouth once daily. 30 tablet 5    predniSONE (DELTASONE) 5 MG tablet Take by mouth daily: 20 mg 1/6-1/12; 15mg 1/13-1/19; 10 mg 1/20-1/26; 5 mg daily thereafter 1/27/23 70 tablet 11    sodium bicarbonate 650 MG tablet Take 3 tablets (1,950 mg total) by mouth 3 (three) times daily. 270 tablet 11    sulfamethoxazole-trimethoprim 400-80mg (BACTRIM,SEPTRA) 400-80 mg per tablet Take 1 tablet by mouth every morning. Stop: 7/1/2023 30 tablet 5    tacrolimus (PROGRAF) 1 MG Cap Take 6 capsules (6 mg total) by mouth every morning AND 5 capsules (5 mg total) every evening. 480 capsule 11    valGANciclovir (VALCYTE) 450 mg Tab Take 1 tablet (450 mg total) by mouth every morning. Stop: 4/2/2023 30 tablet 2    docusate sodium (COLACE) 100 MG capsule Take 1 capsule (100 mg total) by mouth 3 (three) times daily as needed for Constipation. (Patient not taking: Reported on 3/8/2023)  0     No current facility-administered medications for this visit.       Past Medical History:   Diagnosis Date    Allergy     Anemia     Anxiety     Breast feeding status of mother 1/17/2020    Brittle bones     ESRD (end stage renal disease)     M/W/F    General anesthetics causing adverse effect in therapeutic use     pt states "im a light weight"    Hypertension     Insomnia     PSG revealed no CHRYSTAL, but likely psychogenic " "etiology (anxiety)    RLS (restless legs syndrome)        Review of Systems   Constitutional:  Positive for fatigue. Negative for activity change, appetite change and fever.   HENT:  Negative for congestion, mouth sores and sore throat.    Eyes:  Negative for visual disturbance.   Respiratory:  Negative for cough, chest tightness and shortness of breath.    Cardiovascular:  Negative for chest pain, palpitations and leg swelling.   Gastrointestinal:  Negative for abdominal distention, abdominal pain, constipation, diarrhea and nausea.   Genitourinary:  Negative for difficulty urinating, dysuria, frequency and hematuria.   Musculoskeletal:  Negative for arthralgias, gait problem and myalgias.   Skin:  Negative for wound.   Allergic/Immunologic: Positive for immunocompromised state. Negative for environmental allergies and food allergies.   Neurological:  Negative for dizziness, weakness and numbness.   Psychiatric/Behavioral:  Negative for sleep disturbance. The patient is not nervous/anxious.      Objective:   Blood pressure 119/70, pulse 99, resp. rate 18, height 5' 5" (1.651 m), SpO2 100 %.body mass index is 32.45 kg/m².    Physical Exam  Vitals and nursing note reviewed.   Constitutional:       Appearance: Normal appearance.   HENT:      Head: Normocephalic.   Cardiovascular:      Rate and Rhythm: Normal rate and regular rhythm.      Heart sounds: Normal heart sounds.   Pulmonary:      Effort: Pulmonary effort is normal.      Breath sounds: Normal breath sounds.   Abdominal:      General: Bowel sounds are normal. There is no distension.      Palpations: Abdomen is soft.      Tenderness: There is no abdominal tenderness.   Musculoskeletal:         General: Normal range of motion.   Skin:     General: Skin is warm and dry.   Neurological:      General: No focal deficit present.      Mental Status: She is alert.   Psychiatric:         Behavior: Behavior normal.       Labs:  Lab Results   Component Value Date    WBC " 5.26 2023    HGB 10.6 (L) 2023    HCT 32.7 (L) 2023     2023    K 4.1 2023     2023    CO2 19 (L) 2023    BUN 27 (H) 2023    CREATININE 1.5 (H) 2023    EGFRNONAA 3 (A) 2022    CALCIUM 9.7 2023    PHOS 2.4 (L) 2023    MG 1.6 2023    ALBUMIN 4.0 2023    ALBUMIN 4.0 2023    AST 13 2023    ALT 19 2023    UTPCR Unable to calculate 2023    PTH 1,451.5 (H) 2023    TACROLIMUS 7.9 2023     Labs were reviewed with the patient    Assessment:     1. -donor kidney transplant    2. FSGS (focal segmental glomerulosclerosis)    3. Essential hypertension    4. Immunosuppressive management encounter following kidney transplant    5. At risk for opportunistic infections      Plan:   Continue good oral hydration  Consider allograft US in the future if pain worsens        1. CKD stage: 3a    2. Immunosuppression: Prograf trough 7.9. Continue Prograf 6/5, Ketoconazole 100 mg QD to augment prograf levels,  MMF 1000 mg BID, and Prednisone 5 mg QD. Will continue to monitor for drug toxicities    3. Allograft Function: stable. Continue good po hydration.   -ESRD secondary to FSGS on HD 10/2016 until she received a donation after brain death kidney transplant on 23 (Thymo induction CIT ~21 hours, CMV -/+).  Lab Results   Component Value Date    CREATININE 1.5 (H) 2023       3/6/2023  POD 63   eGFR >60 mL/min/1.73 m^2 47 (A)       4. Hypertension management: advise low salt diet and home BP monitoring    Hydralazine 50 mg BID, Lopressor 50 mg BID, Nifedipine 60 mg QD     5. Metabolic Bone Disease/Secondary Hyperparathyroidism:stable  MagOx 400 mg BID, Calcitriol 0.25 mcg QD   Lab Results   Component Value Date    PTH 1,451.5 (H) 2023    CALCIUM 9.7 2023    PHOS 2.4 (L) 2023        3/6/2023  POD 63   Magnesium 1.6 - 2.6 mg/dL 1.6       6. Electrolytes:  Will monitor  /guidelines  Sodium bicarb 1950 mg TID  Lab Results   Component Value Date     03/06/2023    K 4.1 03/06/2023     03/06/2023    CO2 19 (L) 03/06/2023       7. Anemia: TOBI per protocol   Lab Results   Component Value Date    WBC 5.26 03/06/2023    HGB 10.6 (L) 03/06/2023    HCT 32.7 (L) 03/06/2023    MCV 96 03/06/2023     03/06/2023         8.  Cytopenias: no significant cytopenias will monitor as per our guidelines. Medicine list reviewed including potential causes of drug-induced cytopenias    9.Proteinuria: continue p/c ratio as per FSGS guidelines    3/6/2023  POD 63   Prot/Creat Ratio, Urine 0.00 - 0.20 Unable to calculate     10. BK virus infection screening:  will continue to monitor per guidelines  BK PCR 3/6/2023 pending    11. Weight education: provided during the clinic visit   Body mass index is 32.45 kg/m².     12.Patient safety education regarding immunosuppression including prophylaxis posttransplant for CMV, PCP : Education provided about vaccination and prevention of infections     PCP ppx: Bactrim until 7/1/23  CMV ppx: Valcyte until 4/2/23           Follow-up:   Clinic: return to transplant clinic weekly for the first month after transplant; every 2 weeks during months 2-3; then at 6-, 9-, 12-, 18-, 24-, and 36- months post-transplant to reassess for complications from immunosuppression toxicity and monitor for rejection.  Annually thereafter.    Labs: since patient remains at high risk for rejection and drug-related complications that warrant close monitoring, labs will be ordered as follows: continue twice weekly CBC, renal panel, and drug level for first month; then same labs once weekly through 3rd month post-transplant.  Urine for UA and protein/creatinine ratio monthly.  Serum BK - PCR at 1-, 3-, 6-, 9-, 12-, 18-, 24-, 36-, 48-, and 60 months post-transplant.  Hepatic panel at 1-, 2-, 3-, 6-, 9-, 12-, 18-, 24-, and 36- months post-transplant.    Patricia Caceres NP        Education:   Material provided to the patient.  Patient reminded to call with any health changes since these can be early signs of significant complications.  Also, I advised the patient to be sure any new medications or changes of old medications are discussed with either a pharmacist or physician knowledgeable with transplant to avoid rejection/drug toxicity related to significant drug interactions.    Patient advised that it is recommended that all transplanted patients, and their close contacts and household members receive Covid vaccination.

## 2023-03-07 LAB — TACROLIMUS BLD-MCNC: 7.9 NG/ML (ref 5–15)

## 2023-03-08 ENCOUNTER — OFFICE VISIT (OUTPATIENT)
Dept: TRANSPLANT | Facility: CLINIC | Age: 33
End: 2023-03-08
Payer: MEDICARE

## 2023-03-08 VITALS
HEIGHT: 65 IN | SYSTOLIC BLOOD PRESSURE: 119 MMHG | DIASTOLIC BLOOD PRESSURE: 70 MMHG | OXYGEN SATURATION: 100 % | HEART RATE: 99 BPM | BODY MASS INDEX: 32.45 KG/M2 | RESPIRATION RATE: 18 BRPM

## 2023-03-08 DIAGNOSIS — Z79.899 IMMUNOSUPPRESSIVE MANAGEMENT ENCOUNTER FOLLOWING KIDNEY TRANSPLANT: ICD-10-CM

## 2023-03-08 DIAGNOSIS — Z91.89 AT RISK FOR OPPORTUNISTIC INFECTIONS: ICD-10-CM

## 2023-03-08 DIAGNOSIS — Z94.0 DECEASED-DONOR KIDNEY TRANSPLANT: Primary | ICD-10-CM

## 2023-03-08 DIAGNOSIS — N05.1 FSGS (FOCAL SEGMENTAL GLOMERULOSCLEROSIS): ICD-10-CM

## 2023-03-08 DIAGNOSIS — Z94.0 IMMUNOSUPPRESSIVE MANAGEMENT ENCOUNTER FOLLOWING KIDNEY TRANSPLANT: ICD-10-CM

## 2023-03-08 DIAGNOSIS — I10 ESSENTIAL HYPERTENSION: ICD-10-CM

## 2023-03-08 PROCEDURE — 3074F SYST BP LT 130 MM HG: CPT | Mod: HCNC,CPTII,S$GLB, | Performed by: NURSE PRACTITIONER

## 2023-03-08 PROCEDURE — 3078F DIAST BP <80 MM HG: CPT | Mod: HCNC,CPTII,S$GLB, | Performed by: NURSE PRACTITIONER

## 2023-03-08 PROCEDURE — 3066F PR DOCUMENTATION OF TREATMENT FOR NEPHROPATHY: ICD-10-PCS | Mod: HCNC,CPTII,S$GLB, | Performed by: NURSE PRACTITIONER

## 2023-03-08 PROCEDURE — 3008F PR BODY MASS INDEX (BMI) DOCUMENTED: ICD-10-PCS | Mod: HCNC,CPTII,S$GLB, | Performed by: NURSE PRACTITIONER

## 2023-03-08 PROCEDURE — 3066F NEPHROPATHY DOC TX: CPT | Mod: HCNC,CPTII,S$GLB, | Performed by: NURSE PRACTITIONER

## 2023-03-08 PROCEDURE — 99999 PR PBB SHADOW E&M-EST. PATIENT-LVL IV: CPT | Mod: PBBFAC,HCNC,, | Performed by: NURSE PRACTITIONER

## 2023-03-08 PROCEDURE — 1159F MED LIST DOCD IN RCRD: CPT | Mod: HCNC,CPTII,S$GLB, | Performed by: NURSE PRACTITIONER

## 2023-03-08 PROCEDURE — 99215 OFFICE O/P EST HI 40 MIN: CPT | Mod: HCNC,S$GLB,, | Performed by: NURSE PRACTITIONER

## 2023-03-08 PROCEDURE — 99999 PR PBB SHADOW E&M-EST. PATIENT-LVL IV: ICD-10-PCS | Mod: PBBFAC,HCNC,, | Performed by: NURSE PRACTITIONER

## 2023-03-08 PROCEDURE — 3008F BODY MASS INDEX DOCD: CPT | Mod: HCNC,CPTII,S$GLB, | Performed by: NURSE PRACTITIONER

## 2023-03-08 PROCEDURE — 1159F PR MEDICATION LIST DOCUMENTED IN MEDICAL RECORD: ICD-10-PCS | Mod: HCNC,CPTII,S$GLB, | Performed by: NURSE PRACTITIONER

## 2023-03-08 PROCEDURE — 99215 PR OFFICE/OUTPT VISIT, EST, LEVL V, 40-54 MIN: ICD-10-PCS | Mod: HCNC,S$GLB,, | Performed by: NURSE PRACTITIONER

## 2023-03-08 PROCEDURE — 3074F PR MOST RECENT SYSTOLIC BLOOD PRESSURE < 130 MM HG: ICD-10-PCS | Mod: HCNC,CPTII,S$GLB, | Performed by: NURSE PRACTITIONER

## 2023-03-08 PROCEDURE — 3078F PR MOST RECENT DIASTOLIC BLOOD PRESSURE < 80 MM HG: ICD-10-PCS | Mod: HCNC,CPTII,S$GLB, | Performed by: NURSE PRACTITIONER

## 2023-03-08 NOTE — LETTER
March 8, 2023        Eduard Vance  [address]  [address]  [facility]  [facility] [place] [address]  Phone: [phone]  Fax: [phone]             [facility] 1st Fl  [address]  [facility] [address]  Phone: [phone]   Patient: Lisseth Schwartz   MR Number: [id number]   YOB: 1990   Date of Visit: 3/8/2023       Dear Dr. Eduard Vance    Thank you for referring Lisseth Schwartz to me for evaluation. Attached you will find relevant portions of my assessment and plan of care.    If you have questions, please do not hesitate to call me. I look forward to following Lisseth Schwartz along with you.    Sincerely,    Patricia Caceres, NP    Enclosure    If you would like to receive this communication electronically, please contact [email or web address] or [phone] to request [employer] access.    [employer] is a tool which provides read-only access to select patient information with whom you have a relationship. Its easy to use and provides real time access to review your patients record including encounter summaries, notes, results, and demographic information.    If you feel you have received this communication in error or would no longer like to receive these types of communications, please e-mail [email or web address]

## 2023-03-09 LAB
BKV DNA SERPL NAA+PROBE-ACNC: <125 COPIES/ML
BKV DNA SERPL NAA+PROBE-LOG#: <2.1 LOG (10) COPIES/ML
BKV DNA SERPL QL NAA+PROBE: NOT DETECTED

## 2023-03-13 ENCOUNTER — LAB VISIT (OUTPATIENT)
Dept: LAB | Facility: HOSPITAL | Age: 33
End: 2023-03-13
Attending: INTERNAL MEDICINE
Payer: MEDICARE

## 2023-03-13 DIAGNOSIS — Z94.0 KIDNEY REPLACED BY TRANSPLANT: ICD-10-CM

## 2023-03-13 LAB
ALBUMIN SERPL BCP-MCNC: 4 G/DL (ref 3.5–5.2)
ANION GAP SERPL CALC-SCNC: 10 MMOL/L (ref 8–16)
ANISOCYTOSIS BLD QL SMEAR: SLIGHT
BASOPHILS # BLD AUTO: ABNORMAL K/UL (ref 0–0.2)
BASOPHILS NFR BLD: 0 % (ref 0–1.9)
BUN SERPL-MCNC: 25 MG/DL (ref 6–20)
CALCIUM SERPL-MCNC: 9.8 MG/DL (ref 8.7–10.5)
CHLORIDE SERPL-SCNC: 108 MMOL/L (ref 95–110)
CO2 SERPL-SCNC: 20 MMOL/L (ref 23–29)
CREAT SERPL-MCNC: 1.3 MG/DL (ref 0.5–1.4)
DACRYOCYTES BLD QL SMEAR: ABNORMAL
DIFFERENTIAL METHOD: ABNORMAL
EOSINOPHIL # BLD AUTO: ABNORMAL K/UL (ref 0–0.5)
EOSINOPHIL NFR BLD: 4 % (ref 0–8)
ERYTHROCYTE [DISTWIDTH] IN BLOOD BY AUTOMATED COUNT: 12.9 % (ref 11.5–14.5)
EST. GFR  (NO RACE VARIABLE): 55 ML/MIN/1.73 M^2
GLUCOSE SERPL-MCNC: 109 MG/DL (ref 70–110)
HCT VFR BLD AUTO: 35.1 % (ref 37–48.5)
HGB BLD-MCNC: 11.3 G/DL (ref 12–16)
IMM GRANULOCYTES # BLD AUTO: ABNORMAL K/UL (ref 0–0.04)
IMM GRANULOCYTES NFR BLD AUTO: ABNORMAL % (ref 0–0.5)
LYMPHOCYTES # BLD AUTO: ABNORMAL K/UL (ref 1–4.8)
LYMPHOCYTES NFR BLD: 18 % (ref 18–48)
MAGNESIUM SERPL-MCNC: 1.6 MG/DL (ref 1.6–2.6)
MCH RBC QN AUTO: 31.4 PG (ref 27–31)
MCHC RBC AUTO-ENTMCNC: 32.2 G/DL (ref 32–36)
MCV RBC AUTO: 98 FL (ref 82–98)
MONOCYTES # BLD AUTO: ABNORMAL K/UL (ref 0.3–1)
MONOCYTES NFR BLD: 10 % (ref 4–15)
NEUTROPHILS NFR BLD: 68 % (ref 38–73)
NRBC BLD-RTO: 0 /100 WBC
PHOSPHATE SERPL-MCNC: 2.1 MG/DL (ref 2.7–4.5)
PLATELET # BLD AUTO: 181 K/UL (ref 150–450)
PLATELET BLD QL SMEAR: ABNORMAL
PMV BLD AUTO: 11 FL (ref 9.2–12.9)
POTASSIUM SERPL-SCNC: 4.1 MMOL/L (ref 3.5–5.1)
RBC # BLD AUTO: 3.6 M/UL (ref 4–5.4)
SODIUM SERPL-SCNC: 138 MMOL/L (ref 136–145)
TARGETS BLD QL SMEAR: ABNORMAL
WBC # BLD AUTO: 4.07 K/UL (ref 3.9–12.7)

## 2023-03-13 PROCEDURE — 85007 BL SMEAR W/DIFF WBC COUNT: CPT | Mod: HCNC | Performed by: INTERNAL MEDICINE

## 2023-03-13 PROCEDURE — 80197 ASSAY OF TACROLIMUS: CPT | Mod: HCNC | Performed by: INTERNAL MEDICINE

## 2023-03-13 PROCEDURE — 83735 ASSAY OF MAGNESIUM: CPT | Mod: HCNC | Performed by: INTERNAL MEDICINE

## 2023-03-13 PROCEDURE — 36415 COLL VENOUS BLD VENIPUNCTURE: CPT | Mod: HCNC | Performed by: INTERNAL MEDICINE

## 2023-03-13 PROCEDURE — 80069 RENAL FUNCTION PANEL: CPT | Mod: HCNC | Performed by: INTERNAL MEDICINE

## 2023-03-13 PROCEDURE — 85027 COMPLETE CBC AUTOMATED: CPT | Mod: HCNC | Performed by: INTERNAL MEDICINE

## 2023-03-14 ENCOUNTER — NURSE TRIAGE (OUTPATIENT)
Dept: ADMINISTRATIVE | Facility: CLINIC | Age: 33
End: 2023-03-14
Payer: MEDICARE

## 2023-03-14 LAB — TACROLIMUS BLD-MCNC: 7.4 NG/ML (ref 5–15)

## 2023-03-15 ENCOUNTER — PATIENT MESSAGE (OUTPATIENT)
Dept: TRANSPLANT | Facility: CLINIC | Age: 33
End: 2023-03-15
Payer: MEDICARE

## 2023-03-15 NOTE — TELEPHONE ENCOUNTER
"Pt calls and states "My 4 year old has a stomach bug and I've been taking care of him wearing a mask. Last night he snuck into my bed and now all of a sudden I'm feeling really terrible." Pt notes she is jittery, experiencing diarrhea (has had 2 episodes thus far), nausea, but not vomiting. +night sweats    OCP, Dr. Berad advises that pt monitor her symptoms for now. He notes that if pt develops fever or cannot keep fluids down, she needs to head to ED. He also states that pt should call her coordinator tomorrow if symptoms continue. Pt is informed.    Pt is instructed to call back with any new/worsening sxs, questions, or concerns. She verbalizes understanding.   Reason for Disposition   [1] Constant abdominal pain AND [2] present > 2 hours    Additional Information   Negative: Shock suspected (e.g., cold/pale/clammy skin, too weak to stand, low BP, rapid pulse)   Negative: Difficult to awaken or acting confused (e.g., disoriented, slurred speech)   Negative: Sounds like a life-threatening emergency to the triager   Negative: [1] SEVERE abdominal pain (e.g., excruciating) AND [2] present > 1 hour   Negative: [1] SEVERE abdominal pain AND [2] age > 60 years   Negative: [1] Blood in the stool AND [2] moderate or large amount of blood   Negative: Black or tarry bowel movements (Exception: chronic-unchanged black-grey bowel movements AND is taking iron pills or Pepto-bismol)   Negative: [1] Drinking very little AND [2] dehydration suspected (e.g., no urine > 12 hours, very dry mouth, very lightheaded)   Negative: Patient sounds very sick or weak to the triager   Negative: [1] SEVERE diarrhea (e.g., 7 or more times / day more than normal) AND [2] age > 60 years    Protocols used: Diarrhea-A-AH    "

## 2023-03-20 ENCOUNTER — LAB VISIT (OUTPATIENT)
Dept: LAB | Facility: HOSPITAL | Age: 33
End: 2023-03-20
Attending: INTERNAL MEDICINE
Payer: MEDICARE

## 2023-03-20 DIAGNOSIS — Z94.0 KIDNEY REPLACED BY TRANSPLANT: ICD-10-CM

## 2023-03-20 LAB
CREAT UR-MCNC: 103.3 MG/DL (ref 15–325)
PROT UR-MCNC: <7 MG/DL (ref 0–15)
PROT/CREAT UR: NORMAL MG/G{CREAT} (ref 0–0.2)

## 2023-03-20 PROCEDURE — 84156 ASSAY OF PROTEIN URINE: CPT | Mod: HCNC | Performed by: INTERNAL MEDICINE

## 2023-03-21 ENCOUNTER — TELEPHONE (OUTPATIENT)
Dept: TRANSPLANT | Facility: CLINIC | Age: 33
End: 2023-03-21
Payer: MEDICARE

## 2023-03-21 NOTE — TELEPHONE ENCOUNTER
----- Message from Sara Caballero MD sent at 3/21/2023  1:20 PM CDT -----  Please verify this level is accurate, meds have not changed [ new ones added or removed], and repeat a 12 hr trough.

## 2023-03-21 NOTE — TELEPHONE ENCOUNTER
Labs Thursday. No changes other than not feeling well c stomach bug. Stated she did not go to the ER bc her whole family was sick.   Pt discussing BP with RN, asked pt again to keep log. Will d/w Patricia tomorrow.     ----- Message from Sara Caballero MD sent at 3/21/2023  1:20 PM CDT -----  Please verify this level is accurate, meds have not changed [ new ones added or removed], and repeat a 12 hr trough.

## 2023-03-22 ENCOUNTER — PATIENT MESSAGE (OUTPATIENT)
Dept: TRANSPLANT | Facility: CLINIC | Age: 33
End: 2023-03-22
Payer: MEDICARE

## 2023-03-22 ENCOUNTER — OFFICE VISIT (OUTPATIENT)
Dept: TRANSPLANT | Facility: CLINIC | Age: 33
End: 2023-03-22
Payer: MEDICARE

## 2023-03-22 DIAGNOSIS — Z79.899 IMMUNOSUPPRESSIVE MANAGEMENT ENCOUNTER FOLLOWING KIDNEY TRANSPLANT: ICD-10-CM

## 2023-03-22 DIAGNOSIS — Z94.0 IMMUNOSUPPRESSIVE MANAGEMENT ENCOUNTER FOLLOWING KIDNEY TRANSPLANT: ICD-10-CM

## 2023-03-22 DIAGNOSIS — N18.2 CKD (CHRONIC KIDNEY DISEASE), STAGE II: ICD-10-CM

## 2023-03-22 DIAGNOSIS — Z91.89 AT RISK FOR OPPORTUNISTIC INFECTIONS: ICD-10-CM

## 2023-03-22 DIAGNOSIS — Z94.0 S/P KIDNEY TRANSPLANT: Primary | ICD-10-CM

## 2023-03-22 DIAGNOSIS — I15.0 RENOVASCULAR HYPERTENSION: ICD-10-CM

## 2023-03-22 DIAGNOSIS — N05.1 FSGS (FOCAL SEGMENTAL GLOMERULOSCLEROSIS): ICD-10-CM

## 2023-03-22 PROCEDURE — 1159F PR MEDICATION LIST DOCUMENTED IN MEDICAL RECORD: ICD-10-PCS | Mod: HCNC,CPTII,95, | Performed by: NURSE PRACTITIONER

## 2023-03-22 PROCEDURE — 3066F NEPHROPATHY DOC TX: CPT | Mod: HCNC,CPTII,95, | Performed by: NURSE PRACTITIONER

## 2023-03-22 PROCEDURE — 1160F RVW MEDS BY RX/DR IN RCRD: CPT | Mod: HCNC,CPTII,95, | Performed by: NURSE PRACTITIONER

## 2023-03-22 PROCEDURE — 1160F PR REVIEW ALL MEDS BY PRESCRIBER/CLIN PHARMACIST DOCUMENTED: ICD-10-PCS | Mod: HCNC,CPTII,95, | Performed by: NURSE PRACTITIONER

## 2023-03-22 PROCEDURE — 99214 PR OFFICE/OUTPT VISIT, EST, LEVL IV, 30-39 MIN: ICD-10-PCS | Mod: HCNC,95,, | Performed by: NURSE PRACTITIONER

## 2023-03-22 PROCEDURE — 99214 OFFICE O/P EST MOD 30 MIN: CPT | Mod: HCNC,95,, | Performed by: NURSE PRACTITIONER

## 2023-03-22 PROCEDURE — 3066F PR DOCUMENTATION OF TREATMENT FOR NEPHROPATHY: ICD-10-PCS | Mod: HCNC,CPTII,95, | Performed by: NURSE PRACTITIONER

## 2023-03-22 PROCEDURE — 1159F MED LIST DOCD IN RCRD: CPT | Mod: HCNC,CPTII,95, | Performed by: NURSE PRACTITIONER

## 2023-03-22 RX ORDER — VALGANCICLOVIR 450 MG/1
900 TABLET, FILM COATED ORAL EVERY MORNING
Qty: 60 TABLET | Refills: 2 | Status: SHIPPED | OUTPATIENT
Start: 2023-03-22 | End: 2023-04-24

## 2023-03-22 NOTE — PATIENT INSTRUCTIONS
Increase valcyte to 900 mg daily.  HOLD nifedipine, monitor BP. Goal 130/80    It is my privilege to participate in your transplant care! Please be sure to let us know if you have any questions or concerns about your health care - we cannot help you if we do not know. Don't forget we are on call 24/7 for any emergencies.      Best Wishes,  Patricia LERMAC

## 2023-03-22 NOTE — PROGRESS NOTES
Kidney Post-Transplant Assessment    Referring Physician: Andres Hoyt  Current Nephrologist: Eduard Vance    ORGAN: RIGHT KIDNEY  Donor Type: donation after brain death  PHS Increased Risk: no  Cold Ischemia: 1,259 mins  Induction Medications: thymoglobulin    Subjective:   The patient location is: [place]  The chief complaint leading to consultation is: Kidney Post-Transplant Assessment    Visit type: audiovisual    Face to Face time with patient:   30 minutes of total time spent on the encounter, which includes face to face time and non-face to face time preparing to see the patient (eg, review of tests), Obtaining and/or reviewing separately obtained history, Documenting clinical information in the electronic or other health record, Independently interpreting results (not separately reported) and communicating results to the patient/family/caregiver, or Care coordination (not separately reported).     Each patient to whom he or she provides medical services by telemedicine is:  (1) informed of the relationship between the physician and patient and the respective role of any other health care provider with respect to management of the patient; and (2) notified that he or she may decline to receive medical services by telemedicine and may withdraw from such care at any time.      CC:  Reassessment of renal allograft function and management of chronic immunosuppression.    HPI:  Ms. Schwartz is a 32 y.o. year old Black or  female with history of ESRD secondary to FSGS who was on HD 10/2016 until she received a donation after brain death kidney transplant on 1/2/23 (Thymo induction, CIT ~21 hours, CMV -/+). Surgery without complication. Her most recent creatinine is 1.2.  She takes mycophenolate mofetil, prednisone, and tacrolimus for maintenance immunosuppression.     Recent GI illness with n/v/d, has been slowly improving. Able to tolerate hydration and food now. Still with abdominal  discomfort, no more diarrhea.    No problems with urination. Feels BPs have been running on the lower side at home (110/70s). No peripheral edema.     Tolerating IS without issues, no diarrhea or vomiting.     Current Outpatient Medications   Medication Sig Dispense Refill    albuterol (PROVENTIL/VENTOLIN HFA) 90 mcg/actuation inhaler Inhale 1-2 puffs into the lungs every 6 (six) hours as needed for Wheezing (cough). 8.5 g 1    bisacodyL (DULCOLAX) 5 mg EC tablet Take 2 tablets (10 mg total) by mouth daily as needed for Constipation.  0    calcitRIOL (ROCALTROL) 0.25 MCG Cap Take 1 capsule (0.25 mcg total) by mouth once daily. 30 capsule 5    clonazePAM (KLONOPIN) 0.5 MG tablet Take 0.5 mg by mouth daily as needed.      docusate sodium (COLACE) 100 MG capsule Take 1 capsule (100 mg total) by mouth 3 (three) times daily as needed for Constipation. (Patient not taking: Reported on 3/8/2023)  0    hydrALAZINE (APRESOLINE) 50 MG tablet Take 1 tablet (50 mg total) by mouth every 12 (twelve) hours. If BP <130 take 25 mg (cut in half) 90 tablet 11    ketoconazole (NIZORAL) 200 mg Tab Take 0.5 tablets (100 mg total) by mouth once daily. 15 tablet 11    magnesium oxide (MAG-OX) 400 mg (241.3 mg magnesium) tablet Take 1 tablet (400 mg total) by mouth 2 (two) times daily. 60 tablet 11    metoprolol tartrate (LOPRESSOR) 50 MG tablet Take 4 tablets (200 mg total) by mouth 2 (two) times daily. 720 tablet 3    mycophenolate (CELLCEPT) 250 mg Cap Take 4 capsules (1,000 mg total) by mouth 2 (two) times daily. 240 capsule 11    NIFEdipine (PROCARDIA-XL) 60 MG (OSM) 24 hr tablet Take 1 tablet (60 mg total) by mouth 2 (two) times a day. 60 tablet 5    oxyCODONE (ROXICODONE) 5 MG immediate release tablet Take 1 tablet (5 mg total) by mouth every 6 (six) hours as needed for Pain. 28 tablet 0    pantoprazole (PROTONIX) 40 MG tablet Take 1 tablet (40 mg total) by mouth once daily. 30 tablet 5    predniSONE (DELTASONE) 5 MG tablet Take by  "mouth daily: 20 mg 1/6-1/12; 15mg 1/13-1/19; 10 mg 1/20-1/26; 5 mg daily thereafter 1/27/23 70 tablet 11    sodium bicarbonate 650 MG tablet Take 3 tablets (1,950 mg total) by mouth 3 (three) times daily. 270 tablet 11    sulfamethoxazole-trimethoprim 400-80mg (BACTRIM,SEPTRA) 400-80 mg per tablet Take 1 tablet by mouth every morning. Stop: 7/1/2023 30 tablet 5    tacrolimus (PROGRAF) 1 MG Cap Take 6 capsules (6 mg total) by mouth every morning AND 5 capsules (5 mg total) every evening. 480 capsule 11    valGANciclovir (VALCYTE) 450 mg Tab Take 1 tablet (450 mg total) by mouth every morning. Stop: 4/2/2023 30 tablet 2     No current facility-administered medications for this visit.       Past Medical History:   Diagnosis Date    Allergy     Anemia     Anxiety     Breast feeding status of mother 1/17/2020    Brittle bones     ESRD (end stage renal disease)     M/W/F    General anesthetics causing adverse effect in therapeutic use     pt states "im a light weight"    Hypertension     Insomnia     PSG revealed no CHRYSTAL, but likely psychogenic etiology (anxiety)    RLS (restless legs syndrome)        Review of Systems   Constitutional:  Positive for fatigue. Negative for activity change, appetite change and fever.   HENT:  Negative for congestion, mouth sores and sore throat.    Eyes:  Negative for visual disturbance.   Respiratory:  Negative for cough, chest tightness and shortness of breath.    Cardiovascular:  Negative for chest pain, palpitations and leg swelling.   Gastrointestinal:  Negative for abdominal distention, abdominal pain, constipation, diarrhea and nausea.   Genitourinary:  Negative for difficulty urinating, dysuria, frequency and hematuria.   Musculoskeletal:  Negative for arthralgias, gait problem and myalgias.   Skin:  Negative for wound.   Allergic/Immunologic: Positive for immunocompromised state. Negative for environmental allergies and food allergies.   Neurological:  Negative for dizziness, " weakness and numbness.   Psychiatric/Behavioral:  Negative for sleep disturbance. The patient is not nervous/anxious.      Objective:   There were no vitals taken for this visit.body mass index is unknown because there is no height or weight on file.    Physical Exam    Labs:  Lab Results   Component Value Date    WBC 3.71 (L) 03/20/2023    HGB 11.7 (L) 03/20/2023    HCT 35.7 (L) 03/20/2023     03/20/2023    K 3.8 03/20/2023     03/20/2023    CO2 24 03/20/2023    BUN 16 03/20/2023    CREATININE 1.2 03/20/2023    EGFRNONAA 3 (A) 03/28/2022    CALCIUM 9.6 03/20/2023    PHOS 1.9 (L) 03/20/2023    MG 1.5 (L) 03/20/2023    ALBUMIN 4.0 03/20/2023    AST 13 03/06/2023    ALT 19 03/06/2023    UTPCR Unable to calculate 03/20/2023    PTH 1,451.5 (H) 01/02/2023    TACROLIMUS 13.1 03/20/2023     Labs were reviewed with the patient    Assessment:     1. S/P kidney transplant    2. FSGS (focal segmental glomerulosclerosis)    3. CKD (chronic kidney disease), stage II    4. Immunosuppressive management encounter following kidney transplant    5. Renovascular hypertension    6. At risk for opportunistic infections      Plan:   Valcyte 900 mg QD for improved renal function  Recent illness, repeating lab work tomorrow  HIGH phosphorous diet  Holding nifedipine for now, BP monitoring at home. If stable can DC at next visit       1. CKD stage: 2    2. Immunosuppression: Prograf trough 13.1 (repeating level tomorrow). Continue Prograf 6/5, Ketoconazole 100 mg QD to augment prograf levels,  MMF 1000 mg BID, and Prednisone 5 mg QD. Will continue to monitor for drug toxicities    3. Allograft Function: stable. Continue good po hydration.   -ESRD secondary to FSGS on HD 10/2016 until she received a donation after brain death kidney transplant on 1/2/23 (Thymo induction CIT ~21 hours, CMV -/+).  Lab Results   Component Value Date    CREATININE 1.2 03/20/2023       3/20/2023  POD 77   eGFR >60 mL/min/1.73 m^2 >60       4.  Hypertension management: advise low salt diet and home BP monitoring    Hydralazine 50 mg BID, Lopressor 200 mg BID, Nifedipine 60 mg BID (HOLDING)    5. Metabolic Bone Disease/Secondary Hyperparathyroidism:stable  MagOx 400 mg BID, Calcitriol 0.25 mcg QD   Recent illness, HIGH phosphorous diet. If phosphorous remains low on next labs would start [facility]  Lab Results   Component Value Date    PTH 1,451.5 (H) 01/02/2023    CALCIUM 9.6 03/20/2023    PHOS 1.9 (L) 03/20/2023        3/20/2023  POD 77   Magnesium 1.6 - 2.6 mg/dL 1.5 (A)       6. Electrolytes:  Will monitor /guidelines  Sodium bicarb 1950 mg TID  Lab Results   Component Value Date     03/20/2023    K 3.8 03/20/2023     03/20/2023    CO2 24 03/20/2023       7. Anemia: stable, no need for intervention  Lab Results   Component Value Date    WBC 3.71 (L) 03/20/2023    HGB 11.7 (L) 03/20/2023    HCT 35.7 (L) 03/20/2023    MCV 95 03/20/2023     03/20/2023         8.  Cytopenias: no significant cytopenias will monitor as per our guidelines. Medicine list reviewed including potential causes of drug-induced cytopenias    9.Proteinuria: continue p/c ratio as per FSGS guidelines    3/20/2023  POD 77   Prot/Creat Ratio, Urine 0.00 - 0.20 Unable to calculate     10. BK virus infection screening:  will continue to monitor per guidelines   3/6/2023  POD 63   BK Virus DNA, Blood Not detected Not detected   BK Virus DNA PCR, Quant, Blood <125 Copies/mL <125       11. Weight education: provided during the clinic visit   There is no height or weight on file to calculate BMI.     12.Patient safety education regarding immunosuppression including prophylaxis posttransplant for CMV, PCP : Education provided about vaccination and prevention of infections     PCP ppx: Bactrim until 7/1/23  CMV ppx: Valcyte until 4/2/23           Follow-up:   Clinic: return to transplant clinic weekly for the first month after transplant; every 2 weeks during months 2-3; then at 6-,  9-, 12-, 18-, 24-, and 36- months post-transplant to reassess for complications from immunosuppression toxicity and monitor for rejection.  Annually thereafter.    Labs: since patient remains at high risk for rejection and drug-related complications that warrant close monitoring, labs will be ordered as follows: continue twice weekly CBC, renal panel, and drug level for first month; then same labs once weekly through 3rd month post-transplant.  Urine for UA and protein/creatinine ratio monthly.  Serum BK - PCR at 1-, 3-, 6-, 9-, 12-, 18-, 24-, 36-, 48-, and 60 months post-transplant.  Hepatic panel at 1-, 2-, 3-, 6-, 9-, 12-, 18-, 24-, and 36- months post-transplant.    Patricia Caceres NP       Education:   Material provided to the patient.  Patient reminded to call with any health changes since these can be early signs of significant complications.  Also, I advised the patient to be sure any new medications or changes of old medications are discussed with either a pharmacist or physician knowledgeable with transplant to avoid rejection/drug toxicity related to significant drug interactions.    Patient advised that it is recommended that all transplanted patients, and their close contacts and household members receive Covid vaccination.

## 2023-03-22 NOTE — LETTER
March 22, 2023        Eduard Vance  [address]  [address]  [facility]  [place] [facility] [place] [address]  Phone: [phone]  Fax: [phone]             [facility] Transplant 1st Fl  [address]  [facility] [address]  Phone: [phone]   Patient: Lisseth Schwartz   MR Number: [id number]   YOB: 1990   Date of Visit: 3/22/2023       Dear Dr. Eduard Vance    Thank you for referring Lisseth Schwartz to me for evaluation. Attached you will find relevant portions of my assessment and plan of care.    If you have questions, please do not hesitate to call me. I look forward to following Lisseth Schwartz along with you.    Sincerely,    Patricia Caceres, NP    Enclosure    If you would like to receive this communication electronically, please contact [email or web address] or [phone] to request [facility] Link access.    [facility] Link is a tool which provides read-only access to select patient information with whom you have a relationship. Its easy to use and provides real time access to review your patients record including encounter summaries, notes, results, and demographic information.    If you feel you have received this communication in error or would no longer like to receive these types of communications, please e-mail [email or web address]

## 2023-03-23 ENCOUNTER — LAB VISIT (OUTPATIENT)
Dept: LAB | Facility: HOSPITAL | Age: 33
End: 2023-03-23
Attending: INTERNAL MEDICINE
Payer: MEDICARE

## 2023-03-23 DIAGNOSIS — Z94.0 KIDNEY REPLACED BY TRANSPLANT: ICD-10-CM

## 2023-03-23 PROCEDURE — 36415 COLL VENOUS BLD VENIPUNCTURE: CPT | Mod: HCNC | Performed by: INTERNAL MEDICINE

## 2023-03-23 PROCEDURE — 80197 ASSAY OF TACROLIMUS: CPT | Mod: HCNC | Performed by: INTERNAL MEDICINE

## 2023-03-24 LAB — TACROLIMUS BLD-MCNC: 9 NG/ML (ref 5–15)

## 2023-03-27 ENCOUNTER — LAB VISIT (OUTPATIENT)
Dept: LAB | Facility: HOSPITAL | Age: 33
End: 2023-03-27
Attending: INTERNAL MEDICINE
Payer: MEDICARE

## 2023-03-27 DIAGNOSIS — Z94.0 KIDNEY REPLACED BY TRANSPLANT: ICD-10-CM

## 2023-03-27 LAB
ALBUMIN SERPL BCP-MCNC: 3.8 G/DL (ref 3.5–5.2)
ANION GAP SERPL CALC-SCNC: 10 MMOL/L (ref 8–16)
ANISOCYTOSIS BLD QL SMEAR: SLIGHT
BASOPHILS NFR BLD: 0 % (ref 0–1.9)
BUN SERPL-MCNC: 21 MG/DL (ref 6–20)
CALCIUM SERPL-MCNC: 9.8 MG/DL (ref 8.7–10.5)
CHLORIDE SERPL-SCNC: 110 MMOL/L (ref 95–110)
CO2 SERPL-SCNC: 23 MMOL/L (ref 23–29)
CREAT SERPL-MCNC: 1.5 MG/DL (ref 0.5–1.4)
DIFFERENTIAL METHOD: ABNORMAL
EOSINOPHIL NFR BLD: 1 % (ref 0–8)
ERYTHROCYTE [DISTWIDTH] IN BLOOD BY AUTOMATED COUNT: 12.6 % (ref 11.5–14.5)
EST. GFR  (NO RACE VARIABLE): 47 ML/MIN/1.73 M^2
GLUCOSE SERPL-MCNC: 105 MG/DL (ref 70–110)
HCT VFR BLD AUTO: 35.7 % (ref 37–48.5)
HGB BLD-MCNC: 11.2 G/DL (ref 12–16)
IMM GRANULOCYTES # BLD AUTO: ABNORMAL K/UL (ref 0–0.04)
IMM GRANULOCYTES NFR BLD AUTO: ABNORMAL % (ref 0–0.5)
LYMPHOCYTES NFR BLD: 12 % (ref 18–48)
MAGNESIUM SERPL-MCNC: 1.6 MG/DL (ref 1.6–2.6)
MCH RBC QN AUTO: 30.9 PG (ref 27–31)
MCHC RBC AUTO-ENTMCNC: 31.4 G/DL (ref 32–36)
MCV RBC AUTO: 98 FL (ref 82–98)
MONOCYTES NFR BLD: 16 % (ref 4–15)
NEUTROPHILS NFR BLD: 67 % (ref 38–73)
NEUTS BAND NFR BLD MANUAL: 4 %
NRBC BLD-RTO: 0 /100 WBC
PHOSPHATE SERPL-MCNC: 2.7 MG/DL (ref 2.7–4.5)
PLATELET # BLD AUTO: 172 K/UL (ref 150–450)
PLATELET BLD QL SMEAR: ABNORMAL
PMV BLD AUTO: 10.4 FL (ref 9.2–12.9)
POIKILOCYTOSIS BLD QL SMEAR: SLIGHT
POTASSIUM SERPL-SCNC: 4.1 MMOL/L (ref 3.5–5.1)
RBC # BLD AUTO: 3.63 M/UL (ref 4–5.4)
SODIUM SERPL-SCNC: 143 MMOL/L (ref 136–145)
WBC # BLD AUTO: 3.33 K/UL (ref 3.9–12.7)

## 2023-03-27 PROCEDURE — 80069 RENAL FUNCTION PANEL: CPT | Mod: HCNC | Performed by: INTERNAL MEDICINE

## 2023-03-27 PROCEDURE — 80197 ASSAY OF TACROLIMUS: CPT | Mod: HCNC | Performed by: INTERNAL MEDICINE

## 2023-03-27 PROCEDURE — 85007 BL SMEAR W/DIFF WBC COUNT: CPT | Mod: HCNC | Performed by: INTERNAL MEDICINE

## 2023-03-27 PROCEDURE — 36415 COLL VENOUS BLD VENIPUNCTURE: CPT | Mod: HCNC | Performed by: INTERNAL MEDICINE

## 2023-03-27 PROCEDURE — 85027 COMPLETE CBC AUTOMATED: CPT | Mod: HCNC | Performed by: INTERNAL MEDICINE

## 2023-03-27 PROCEDURE — 83735 ASSAY OF MAGNESIUM: CPT | Mod: HCNC | Performed by: INTERNAL MEDICINE

## 2023-03-28 LAB — TACROLIMUS BLD-MCNC: 10.9 NG/ML (ref 5–15)

## 2023-04-03 ENCOUNTER — LAB VISIT (OUTPATIENT)
Dept: LAB | Facility: HOSPITAL | Age: 33
End: 2023-04-03
Attending: INTERNAL MEDICINE
Payer: MEDICARE

## 2023-04-03 DIAGNOSIS — Z94.0 KIDNEY REPLACED BY TRANSPLANT: ICD-10-CM

## 2023-04-03 LAB
ALBUMIN SERPL BCP-MCNC: 4 G/DL (ref 3.5–5.2)
ALBUMIN SERPL BCP-MCNC: 4 G/DL (ref 3.5–5.2)
ALP SERPL-CCNC: 98 U/L (ref 55–135)
ALT SERPL W/O P-5'-P-CCNC: 25 U/L (ref 10–44)
ANION GAP SERPL CALC-SCNC: 8 MMOL/L (ref 8–16)
AST SERPL-CCNC: 15 U/L (ref 10–40)
BASOPHILS # BLD AUTO: 0.03 K/UL (ref 0–0.2)
BASOPHILS NFR BLD: 0.9 % (ref 0–1.9)
BILIRUB DIRECT SERPL-MCNC: 0.2 MG/DL (ref 0.1–0.3)
BILIRUB SERPL-MCNC: 0.4 MG/DL (ref 0.1–1)
BUN SERPL-MCNC: 17 MG/DL (ref 6–20)
CALCIUM SERPL-MCNC: 9.5 MG/DL (ref 8.7–10.5)
CHLORIDE SERPL-SCNC: 110 MMOL/L (ref 95–110)
CHOLEST SERPL-MCNC: 77 MG/DL (ref 120–199)
CHOLEST/HDLC SERPL: 2.9 {RATIO} (ref 2–5)
CO2 SERPL-SCNC: 22 MMOL/L (ref 23–29)
CREAT SERPL-MCNC: 1.4 MG/DL (ref 0.5–1.4)
DIFFERENTIAL METHOD: ABNORMAL
EOSINOPHIL # BLD AUTO: 0.1 K/UL (ref 0–0.5)
EOSINOPHIL NFR BLD: 1.5 % (ref 0–8)
ERYTHROCYTE [DISTWIDTH] IN BLOOD BY AUTOMATED COUNT: 12.5 % (ref 11.5–14.5)
EST. GFR  (NO RACE VARIABLE): 51 ML/MIN/1.73 M^2
GLUCOSE SERPL-MCNC: 90 MG/DL (ref 70–110)
HCT VFR BLD AUTO: 35.5 % (ref 37–48.5)
HDLC SERPL-MCNC: 27 MG/DL (ref 40–75)
HDLC SERPL: 35.1 % (ref 20–50)
HGB BLD-MCNC: 11.7 G/DL (ref 12–16)
IMM GRANULOCYTES # BLD AUTO: 0.13 K/UL (ref 0–0.04)
IMM GRANULOCYTES NFR BLD AUTO: 3.8 % (ref 0–0.5)
LDLC SERPL CALC-MCNC: 32.6 MG/DL (ref 63–159)
LYMPHOCYTES # BLD AUTO: 0.4 K/UL (ref 1–4.8)
LYMPHOCYTES NFR BLD: 12.3 % (ref 18–48)
MAGNESIUM SERPL-MCNC: 1.4 MG/DL (ref 1.6–2.6)
MCH RBC QN AUTO: 31.8 PG (ref 27–31)
MCHC RBC AUTO-ENTMCNC: 33 G/DL (ref 32–36)
MCV RBC AUTO: 97 FL (ref 82–98)
MONOCYTES # BLD AUTO: 0.4 K/UL (ref 0.3–1)
MONOCYTES NFR BLD: 11.7 % (ref 4–15)
NEUTROPHILS # BLD AUTO: 2.4 K/UL (ref 1.8–7.7)
NEUTROPHILS NFR BLD: 69.8 % (ref 38–73)
NONHDLC SERPL-MCNC: 50 MG/DL
NRBC BLD-RTO: 0 /100 WBC
PHOSPHATE SERPL-MCNC: 2.4 MG/DL (ref 2.7–4.5)
PLATELET # BLD AUTO: 178 K/UL (ref 150–450)
PMV BLD AUTO: 10.9 FL (ref 9.2–12.9)
POTASSIUM SERPL-SCNC: 3.9 MMOL/L (ref 3.5–5.1)
PROT SERPL-MCNC: 6.9 G/DL (ref 6–8.4)
RBC # BLD AUTO: 3.68 M/UL (ref 4–5.4)
SODIUM SERPL-SCNC: 140 MMOL/L (ref 136–145)
TRIGL SERPL-MCNC: 87 MG/DL (ref 30–150)
WBC # BLD AUTO: 3.42 K/UL (ref 3.9–12.7)

## 2023-04-03 PROCEDURE — 80069 RENAL FUNCTION PANEL: CPT | Mod: HCNC | Performed by: INTERNAL MEDICINE

## 2023-04-03 PROCEDURE — 87799 DETECT AGENT NOS DNA QUANT: CPT | Mod: HCNC | Performed by: INTERNAL MEDICINE

## 2023-04-03 PROCEDURE — 80197 ASSAY OF TACROLIMUS: CPT | Mod: HCNC | Performed by: INTERNAL MEDICINE

## 2023-04-03 PROCEDURE — 80061 LIPID PANEL: CPT | Mod: HCNC | Performed by: INTERNAL MEDICINE

## 2023-04-03 PROCEDURE — 84075 ASSAY ALKALINE PHOSPHATASE: CPT | Mod: HCNC | Performed by: INTERNAL MEDICINE

## 2023-04-03 PROCEDURE — 83735 ASSAY OF MAGNESIUM: CPT | Mod: HCNC | Performed by: INTERNAL MEDICINE

## 2023-04-03 PROCEDURE — 85025 COMPLETE CBC W/AUTO DIFF WBC: CPT | Mod: HCNC | Performed by: INTERNAL MEDICINE

## 2023-04-04 LAB — TACROLIMUS BLD-MCNC: 14.8 NG/ML (ref 5–15)

## 2023-04-05 ENCOUNTER — PES CALL (OUTPATIENT)
Dept: ADMINISTRATIVE | Facility: OTHER | Age: 33
End: 2023-04-05
Payer: MEDICARE

## 2023-04-17 ENCOUNTER — LAB VISIT (OUTPATIENT)
Dept: LAB | Facility: HOSPITAL | Age: 33
End: 2023-04-17
Attending: INTERNAL MEDICINE
Payer: MEDICARE

## 2023-04-17 DIAGNOSIS — Z94.0 KIDNEY REPLACED BY TRANSPLANT: ICD-10-CM

## 2023-04-17 PROCEDURE — 36415 COLL VENOUS BLD VENIPUNCTURE: CPT | Mod: HCNC | Performed by: INTERNAL MEDICINE

## 2023-04-17 PROCEDURE — 80197 ASSAY OF TACROLIMUS: CPT | Mod: HCNC | Performed by: INTERNAL MEDICINE

## 2023-04-18 LAB — TACROLIMUS BLD-MCNC: 8.6 NG/ML (ref 5–15)

## 2023-04-21 NOTE — PROGRESS NOTES
Kidney Post-Transplant Assessment    Referring Physician: Andres Hoyt  Current Nephrologist: Eduard Vance    ORGAN: RIGHT KIDNEY  Donor Type: donation after brain death  PHS Increased Risk: no  Cold Ischemia: 1,259 mins  Induction Medications: thymoglobulin    Subjective:   The patient location is: [place]  The chief complaint leading to consultation is: Kidney Post-Transplant Assessment    Visit type: audiovisual    Face to Face time with patient:   30 minutes of total time spent on the encounter, which includes face to face time and non-face to face time preparing to see the patient (eg, review of tests), Obtaining and/or reviewing separately obtained history, Documenting clinical information in the electronic or other health record, Independently interpreting results (not separately reported) and communicating results to the patient/family/caregiver, or Care coordination (not separately reported).     Each patient to whom he or she provides medical services by telemedicine is:  (1) informed of the relationship between the physician and patient and the respective role of any other health care provider with respect to management of the patient; and (2) notified that he or she may decline to receive medical services by telemedicine and may withdraw from such care at any time.      CC:  Reassessment of renal allograft function and management of chronic immunosuppression.    HPI:  Ms. Schwartz is a 32 y.o. year old Black or  female with history of ESRD secondary to FSGS who was on HD 10/2016 until she received a donation after brain death kidney transplant on 1/2/23 (Thymo induction, CIT ~21 hours, CMV -/+). Surgery without complication. Her most recent creatinine is 1.4.  She takes mycophenolate mofetil, prednisone, and tacrolimus for maintenance immunosuppression.     Feels great without complaints. Has been exercising and walking, trying to stay active. Staying hydrated, no problems with  urination. -130 with holding nifedipine. No peripheral edema.    Would like to write letter to donors family.     Tolerating IS without issues, no diarrhea or vomiting.     Current Outpatient Medications   Medication Sig Dispense Refill    albuterol (PROVENTIL/VENTOLIN HFA) 90 mcg/actuation inhaler Inhale 1-2 puffs into the lungs every 6 (six) hours as needed for Wheezing (cough). 8.5 g 1    bisacodyL (DULCOLAX) 5 mg EC tablet Take 2 tablets (10 mg total) by mouth daily as needed for Constipation.  0    calcitRIOL (ROCALTROL) 0.25 MCG Cap Take 1 capsule (0.25 mcg total) by mouth once daily. 30 capsule 5    clonazePAM (KLONOPIN) 0.5 MG tablet Take 0.5 mg by mouth daily as needed.      docusate sodium (COLACE) 100 MG capsule Take 1 capsule (100 mg total) by mouth 3 (three) times daily as needed for Constipation. (Patient not taking: Reported on 3/8/2023)  0    hydrALAZINE (APRESOLINE) 50 MG tablet Take 1 tablet (50 mg total) by mouth every 12 (twelve) hours. If BP <130 take 25 mg (cut in half) 90 tablet 11    ketoconazole (NIZORAL) 200 mg Tab Take 0.5 tablets (100 mg total) by mouth once daily. 15 tablet 11    magnesium oxide (MAG-OX) 400 mg (241.3 mg magnesium) tablet Take 1 tablet (400 mg total) by mouth 2 (two) times daily. 60 tablet 11    metoprolol tartrate (LOPRESSOR) 50 MG tablet Take 4 tablets (200 mg total) by mouth 2 (two) times daily. 720 tablet 3    mycophenolate (CELLCEPT) 250 mg Cap Take 4 capsules (1,000 mg total) by mouth 2 (two) times daily. 240 capsule 11    NIFEdipine (PROCARDIA-XL) 60 MG (OSM) 24 hr tablet Take 1 tablet (60 mg total) by mouth 2 (two) times a day. 60 tablet 5    oxyCODONE (ROXICODONE) 5 MG immediate release tablet Take 1 tablet (5 mg total) by mouth every 6 (six) hours as needed for Pain. 28 tablet 0    pantoprazole (PROTONIX) 40 MG tablet Take 1 tablet (40 mg total) by mouth once daily. 30 tablet 5    predniSONE (DELTASONE) 5 MG tablet Take by mouth daily: 20 mg 1/6-1/12;  "15mg 1/13-1/19; 10 mg 1/20-1/26; 5 mg daily thereafter 1/27/23 70 tablet 11    sodium bicarbonate 650 MG tablet Take 3 tablets (1,950 mg total) by mouth 3 (three) times daily. 270 tablet 11    sulfamethoxazole-trimethoprim 400-80mg (BACTRIM,SEPTRA) 400-80 mg per tablet Take 1 tablet by mouth every morning. Stop: 7/1/2023 30 tablet 5    tacrolimus (PROGRAF) 1 MG Cap Take 6 capsules (6 mg total) by mouth every morning AND 5 capsules (5 mg total) every evening. 480 capsule 11    valGANciclovir (VALCYTE) 450 mg Tab Take 2 tablets (900 mg total) by mouth every morning. Stop: 4/2/2023 60 tablet 2     No current facility-administered medications for this visit.       Past Medical History:   Diagnosis Date    Allergy     Anemia     Anxiety     Breast feeding status of mother 1/17/2020    Brittle bones     ESRD (end stage renal disease)     M/W/F    General anesthetics causing adverse effect in therapeutic use     pt states "im a light weight"    Hypertension     Insomnia     PSG revealed no CHRYSTAL, but likely psychogenic etiology (anxiety)    RLS (restless legs syndrome)        Review of Systems   Constitutional:  Positive for fatigue. Negative for activity change, appetite change and fever.   HENT:  Negative for congestion, mouth sores and sore throat.    Eyes:  Negative for visual disturbance.   Respiratory:  Negative for cough, chest tightness and shortness of breath.    Cardiovascular:  Negative for chest pain, palpitations and leg swelling.   Gastrointestinal:  Negative for abdominal distention, abdominal pain, constipation, diarrhea and nausea.   Genitourinary:  Negative for difficulty urinating, dysuria, frequency and hematuria.   Musculoskeletal:  Negative for arthralgias, gait problem and myalgias.   Skin:  Negative for wound.   Allergic/Immunologic: Positive for immunocompromised state. Negative for environmental allergies and food allergies.   Neurological:  Negative for dizziness, weakness and numbness. "   Psychiatric/Behavioral:  Negative for sleep disturbance. The patient is not nervous/anxious.      Objective:   There were no vitals taken for this visit.body mass index is unknown because there is no height or weight on file.    Physical Exam    Labs:  Lab Results   Component Value Date    WBC 3.42 (L) 2023    HGB 11.7 (L) 2023    HCT 35.5 (L) 2023     2023    K 3.9 2023     2023    CO2 22 (L) 2023    BUN 17 2023    CREATININE 1.4 2023    EGFRNONAA 3 (A) 2022    CALCIUM 9.5 2023    PHOS 2.4 (L) 2023    MG 1.4 (L) 2023    ALBUMIN 4.0 2023    ALBUMIN 4.0 2023    AST 15 2023    ALT 25 2023    UTPCR 0.07 2023    PTH 1,451.5 (H) 2023    TACROLIMUS 8.6 2023     Labs were reviewed with the patient    Assessment:     1. -donor kidney transplant    2. FSGS (focal segmental glomerulosclerosis)    3. Essential hypertension    4. Immunosuppressive management encounter following kidney transplant    5. At risk for opportunistic infections        Plan:   DC nifedipine  DC valcyte-completed       1. CKD stage: 2    2. Immunosuppression: Prograf trough 8.6.  Continue Prograf 6/5, Ketoconazole 100 mg QD to augment prograf levels,  MMF 1000 mg BID, and Prednisone 5 mg QD. Will continue to monitor for drug toxicities    3. Allograft Function: stable. Continue good po hydration.   -ESRD secondary to FSGS on HD 10/2016 until she received a donation after brain death kidney transplant on 23 (Thymo induction CIT ~21 hours, CMV -/+).  Lab Results   Component Value Date    CREATININE 1.4 2023       4/3/2023  3mo 0wk   eGFR >60 mL/min/1.73 m^2 51 (A)       4. Hypertension management: advise low salt diet and home BP monitoring    Hydralazine 50 mg BID, Lopressor 200 mg BID    5. Metabolic Bone Disease/Secondary Hyperparathyroidism:stable  MagOx 400 mg BID, Calcitriol 0.25 mcg QD, HIGH  phosphorous diet.   Lab Results   Component Value Date    PTH 1,451.5 (H) 01/02/2023    CALCIUM 9.5 04/03/2023    PHOS 2.4 (L) 04/03/2023        4/3/2023  3mo 0wk   Magnesium 1.6 - 2.6 mg/dL 1.4 (A)       6. Electrolytes:  Will monitor /guidelines  Sodium bicarb 1950 mg TID  Lab Results   Component Value Date     04/03/2023    K 3.9 04/03/2023     04/03/2023    CO2 22 (L) 04/03/2023       7. Anemia: stable, no need for intervention  Lab Results   Component Value Date    WBC 3.42 (L) 04/03/2023    HGB 11.7 (L) 04/03/2023    HCT 35.5 (L) 04/03/2023    MCV 97 04/03/2023     04/03/2023         8.  Cytopenias: no significant cytopenias will monitor as per our guidelines. Medicine list reviewed including potential causes of drug-induced cytopenias    9.Proteinuria: continue p/c ratio as per FSGS guidelines    4/3/2023  3mo 0wk   Prot/Creat Ratio, Urine 0.00 - 0.20 0.07     10. BK virus infection screening:  will continue to monitor per guidelines   4/3/2023  3mo 0wk   BK Virus DNA, Blood Not detected Not detected   BK Virus DNA PCR, Quant, Blood <125 Copies/mL <125       11. Weight education: provided during the clinic visit   There is no height or weight on file to calculate BMI.     12.Patient safety education regarding immunosuppression including prophylaxis posttransplant for CMV, PCP : Education provided about vaccination and prevention of infections     PCP ppx: Bactrim until 7/1/23  CMV ppx: Valcyte until 4/2/23-completed           Follow-up:   Clinic: return to transplant clinic weekly for the first month after transplant; every 2 weeks during months 2-3; then at 6-, 9-, 12-, 18-, 24-, and 36- months post-transplant to reassess for complications from immunosuppression toxicity and monitor for rejection.  Annually thereafter.    Labs: since patient remains at high risk for rejection and drug-related complications that warrant close monitoring, labs will be ordered as follows: continue twice  weekly CBC, renal panel, and drug level for first month; then same labs once weekly through 3rd month post-transplant.  Urine for UA and protein/creatinine ratio monthly.  Serum BK - PCR at 1-, 3-, 6-, 9-, 12-, 18-, 24-, 36-, 48-, and 60 months post-transplant.  Hepatic panel at 1-, 2-, 3-, 6-, 9-, 12-, 18-, 24-, and 36- months post-transplant.    Patricia Caceres NP       Education:   Material provided to the patient.  Patient reminded to call with any health changes since these can be early signs of significant complications.  Also, I advised the patient to be sure any new medications or changes of old medications are discussed with either a pharmacist or physician knowledgeable with transplant to avoid rejection/drug toxicity related to significant drug interactions.    Patient advised that it is recommended that all transplanted patients, and their close contacts and household members receive Covid vaccination.

## 2023-04-24 ENCOUNTER — OFFICE VISIT (OUTPATIENT)
Dept: TRANSPLANT | Facility: CLINIC | Age: 33
End: 2023-04-24
Payer: MEDICARE

## 2023-04-24 DIAGNOSIS — Z79.899 IMMUNOSUPPRESSIVE MANAGEMENT ENCOUNTER FOLLOWING KIDNEY TRANSPLANT: ICD-10-CM

## 2023-04-24 DIAGNOSIS — N05.1 FSGS (FOCAL SEGMENTAL GLOMERULOSCLEROSIS): ICD-10-CM

## 2023-04-24 DIAGNOSIS — Z91.89 AT RISK FOR OPPORTUNISTIC INFECTIONS: ICD-10-CM

## 2023-04-24 DIAGNOSIS — Z94.0 DECEASED-DONOR KIDNEY TRANSPLANT: Primary | ICD-10-CM

## 2023-04-24 DIAGNOSIS — Z94.0 IMMUNOSUPPRESSIVE MANAGEMENT ENCOUNTER FOLLOWING KIDNEY TRANSPLANT: ICD-10-CM

## 2023-04-24 DIAGNOSIS — I10 ESSENTIAL HYPERTENSION: ICD-10-CM

## 2023-04-24 PROCEDURE — 3066F PR DOCUMENTATION OF TREATMENT FOR NEPHROPATHY: ICD-10-PCS | Mod: HCNC,CPTII,95, | Performed by: NURSE PRACTITIONER

## 2023-04-24 PROCEDURE — 99214 OFFICE O/P EST MOD 30 MIN: CPT | Mod: HCNC,95,, | Performed by: NURSE PRACTITIONER

## 2023-04-24 PROCEDURE — 1159F PR MEDICATION LIST DOCUMENTED IN MEDICAL RECORD: ICD-10-PCS | Mod: HCNC,CPTII,95, | Performed by: NURSE PRACTITIONER

## 2023-04-24 PROCEDURE — 1160F RVW MEDS BY RX/DR IN RCRD: CPT | Mod: HCNC,CPTII,95, | Performed by: NURSE PRACTITIONER

## 2023-04-24 PROCEDURE — 1159F MED LIST DOCD IN RCRD: CPT | Mod: HCNC,CPTII,95, | Performed by: NURSE PRACTITIONER

## 2023-04-24 PROCEDURE — 99214 PR OFFICE/OUTPT VISIT, EST, LEVL IV, 30-39 MIN: ICD-10-PCS | Mod: HCNC,95,, | Performed by: NURSE PRACTITIONER

## 2023-04-24 PROCEDURE — 3066F NEPHROPATHY DOC TX: CPT | Mod: HCNC,CPTII,95, | Performed by: NURSE PRACTITIONER

## 2023-04-24 PROCEDURE — 1160F PR REVIEW ALL MEDS BY PRESCRIBER/CLIN PHARMACIST DOCUMENTED: ICD-10-PCS | Mod: HCNC,CPTII,95, | Performed by: NURSE PRACTITIONER

## 2023-04-24 NOTE — LETTER
April 24, 2023        Eduard Vance  [address]  [address]  [facility]  [facility] [place] [address]  Phone: [phone]  Fax: [phone]             [facility] Trans[facility] 1st Fl  [address]  [facility] [address]  Phone: [phone]   Patient: Lisseth Schwartz   MR Number: [id number]   YOB: 1990   Date of Visit: 4/24/2023       Dear Dr. Eduard Vance    Thank you for referring Lisseth Schwartz to me for evaluation. Attached you will find relevant portions of my assessment and plan of care.    If you have questions, please do not hesitate to call me. I look forward to following Lisseth Schwartz along with you.    Sincerely,    Patricia Caceres, NP    Enclosure    If you would like to receive this communication electronically, please contact [email or web address] or [phone] to request [email or web address] Link access.    [email or web address] Link is a tool which provides read-only access to select patient information with whom you have a relationship. Its easy to use and provides real time access to review your patients record including encounter summaries, notes, results, and demographic information.    If you feel you have received this communication in error or would no longer like to receive these types of communications, please e-mail [email or web address]

## 2023-05-01 ENCOUNTER — LAB VISIT (OUTPATIENT)
Dept: LAB | Facility: HOSPITAL | Age: 33
End: 2023-05-01
Attending: INTERNAL MEDICINE
Payer: MEDICARE

## 2023-05-01 DIAGNOSIS — Z94.0 KIDNEY REPLACED BY TRANSPLANT: ICD-10-CM

## 2023-05-01 LAB
ALBUMIN SERPL BCP-MCNC: 4 G/DL (ref 3.5–5.2)
ANION GAP SERPL CALC-SCNC: 8 MMOL/L (ref 8–16)
BASOPHILS # BLD AUTO: 0.03 K/UL (ref 0–0.2)
BASOPHILS NFR BLD: 0.6 % (ref 0–1.9)
BUN SERPL-MCNC: 19 MG/DL (ref 6–20)
CALCIUM SERPL-MCNC: 9.7 MG/DL (ref 8.7–10.5)
CHLORIDE SERPL-SCNC: 109 MMOL/L (ref 95–110)
CO2 SERPL-SCNC: 23 MMOL/L (ref 23–29)
CREAT SERPL-MCNC: 1.2 MG/DL (ref 0.5–1.4)
DIFFERENTIAL METHOD: ABNORMAL
EOSINOPHIL # BLD AUTO: 0.1 K/UL (ref 0–0.5)
EOSINOPHIL NFR BLD: 1.5 % (ref 0–8)
ERYTHROCYTE [DISTWIDTH] IN BLOOD BY AUTOMATED COUNT: 12.7 % (ref 11.5–14.5)
EST. GFR  (NO RACE VARIABLE): >60 ML/MIN/1.73 M^2
GIANT PLATELETS BLD QL SMEAR: PRESENT
GLUCOSE SERPL-MCNC: 107 MG/DL (ref 70–110)
HCT VFR BLD AUTO: 36.2 % (ref 37–48.5)
HGB BLD-MCNC: 11.4 G/DL (ref 12–16)
IMM GRANULOCYTES # BLD AUTO: 0.13 K/UL (ref 0–0.04)
IMM GRANULOCYTES NFR BLD AUTO: 2.4 % (ref 0–0.5)
LYMPHOCYTES # BLD AUTO: 0.4 K/UL (ref 1–4.8)
LYMPHOCYTES NFR BLD: 6.7 % (ref 18–48)
MAGNESIUM SERPL-MCNC: 1.6 MG/DL (ref 1.6–2.6)
MCH RBC QN AUTO: 30.6 PG (ref 27–31)
MCHC RBC AUTO-ENTMCNC: 31.5 G/DL (ref 32–36)
MCV RBC AUTO: 97 FL (ref 82–98)
MONOCYTES # BLD AUTO: 0.5 K/UL (ref 0.3–1)
MONOCYTES NFR BLD: 8.6 % (ref 4–15)
NEUTROPHILS # BLD AUTO: 4.3 K/UL (ref 1.8–7.7)
NEUTROPHILS NFR BLD: 80.2 % (ref 38–73)
NRBC BLD-RTO: 0 /100 WBC
PHOSPHATE SERPL-MCNC: 2.9 MG/DL (ref 2.7–4.5)
PLATELET # BLD AUTO: 191 K/UL (ref 150–450)
PMV BLD AUTO: 11.2 FL (ref 9.2–12.9)
POTASSIUM SERPL-SCNC: 4.2 MMOL/L (ref 3.5–5.1)
RBC # BLD AUTO: 3.73 M/UL (ref 4–5.4)
SODIUM SERPL-SCNC: 140 MMOL/L (ref 136–145)
STOMATOCYTES BLD QL SMEAR: PRESENT
WBC # BLD AUTO: 5.36 K/UL (ref 3.9–12.7)

## 2023-05-01 PROCEDURE — 85025 COMPLETE CBC W/AUTO DIFF WBC: CPT | Performed by: INTERNAL MEDICINE

## 2023-05-01 PROCEDURE — 80069 RENAL FUNCTION PANEL: CPT | Performed by: INTERNAL MEDICINE

## 2023-05-01 PROCEDURE — 83735 ASSAY OF MAGNESIUM: CPT | Performed by: INTERNAL MEDICINE

## 2023-05-01 PROCEDURE — 80197 ASSAY OF TACROLIMUS: CPT | Performed by: INTERNAL MEDICINE

## 2023-05-01 PROCEDURE — 36415 COLL VENOUS BLD VENIPUNCTURE: CPT | Performed by: INTERNAL MEDICINE

## 2023-05-02 LAB — TACROLIMUS BLD-MCNC: 9.9 NG/ML (ref 5–15)

## 2023-06-05 ENCOUNTER — LAB VISIT (OUTPATIENT)
Dept: LAB | Facility: HOSPITAL | Age: 33
End: 2023-06-05
Attending: INTERNAL MEDICINE
Payer: MEDICARE

## 2023-06-05 DIAGNOSIS — Z94.0 KIDNEY REPLACED BY TRANSPLANT: ICD-10-CM

## 2023-06-05 DIAGNOSIS — Z94.0 S/P KIDNEY TRANSPLANT: ICD-10-CM

## 2023-06-05 LAB
ALBUMIN SERPL BCP-MCNC: 3.8 G/DL (ref 3.5–5.2)
ANION GAP SERPL CALC-SCNC: 10 MMOL/L (ref 8–16)
BASOPHILS # BLD AUTO: 0.03 K/UL (ref 0–0.2)
BASOPHILS NFR BLD: 0.4 % (ref 0–1.9)
BUN SERPL-MCNC: 24 MG/DL (ref 6–20)
CALCIUM SERPL-MCNC: 9.7 MG/DL (ref 8.7–10.5)
CHLORIDE SERPL-SCNC: 111 MMOL/L (ref 95–110)
CO2 SERPL-SCNC: 18 MMOL/L (ref 23–29)
CREAT SERPL-MCNC: 1.1 MG/DL (ref 0.5–1.4)
DIFFERENTIAL METHOD: ABNORMAL
EOSINOPHIL # BLD AUTO: 0.2 K/UL (ref 0–0.5)
EOSINOPHIL NFR BLD: 2.4 % (ref 0–8)
ERYTHROCYTE [DISTWIDTH] IN BLOOD BY AUTOMATED COUNT: 12.6 % (ref 11.5–14.5)
EST. GFR  (NO RACE VARIABLE): >60 ML/MIN/1.73 M^2
GLUCOSE SERPL-MCNC: 94 MG/DL (ref 70–110)
HCT VFR BLD AUTO: 34.3 % (ref 37–48.5)
HGB BLD-MCNC: 10.9 G/DL (ref 12–16)
IMM GRANULOCYTES # BLD AUTO: 0.1 K/UL (ref 0–0.04)
IMM GRANULOCYTES NFR BLD AUTO: 1.5 % (ref 0–0.5)
LYMPHOCYTES # BLD AUTO: 0.6 K/UL (ref 1–4.8)
LYMPHOCYTES NFR BLD: 8.2 % (ref 18–48)
MAGNESIUM SERPL-MCNC: 1.4 MG/DL (ref 1.6–2.6)
MCH RBC QN AUTO: 31.1 PG (ref 27–31)
MCHC RBC AUTO-ENTMCNC: 31.8 G/DL (ref 32–36)
MCV RBC AUTO: 98 FL (ref 82–98)
MONOCYTES # BLD AUTO: 0.7 K/UL (ref 0.3–1)
MONOCYTES NFR BLD: 9.7 % (ref 4–15)
NEUTROPHILS # BLD AUTO: 5.3 K/UL (ref 1.8–7.7)
NEUTROPHILS NFR BLD: 77.8 % (ref 38–73)
NRBC BLD-RTO: 0 /100 WBC
PHOSPHATE SERPL-MCNC: 3.2 MG/DL (ref 2.7–4.5)
PLATELET # BLD AUTO: 157 K/UL (ref 150–450)
PMV BLD AUTO: 11.2 FL (ref 9.2–12.9)
POTASSIUM SERPL-SCNC: 4.1 MMOL/L (ref 3.5–5.1)
RBC # BLD AUTO: 3.5 M/UL (ref 4–5.4)
SODIUM SERPL-SCNC: 139 MMOL/L (ref 136–145)
WBC # BLD AUTO: 6.8 K/UL (ref 3.9–12.7)

## 2023-06-05 PROCEDURE — 80197 ASSAY OF TACROLIMUS: CPT | Performed by: INTERNAL MEDICINE

## 2023-06-05 PROCEDURE — 85025 COMPLETE CBC W/AUTO DIFF WBC: CPT | Performed by: INTERNAL MEDICINE

## 2023-06-05 PROCEDURE — 80069 RENAL FUNCTION PANEL: CPT | Performed by: INTERNAL MEDICINE

## 2023-06-05 PROCEDURE — 36415 COLL VENOUS BLD VENIPUNCTURE: CPT | Performed by: INTERNAL MEDICINE

## 2023-06-05 PROCEDURE — 83735 ASSAY OF MAGNESIUM: CPT | Performed by: INTERNAL MEDICINE

## 2023-06-05 RX ORDER — LANOLIN ALCOHOL/MO/W.PET/CERES
800 CREAM (GRAM) TOPICAL 2 TIMES DAILY
Qty: 60 TABLET | Refills: 11 | Status: SHIPPED | OUTPATIENT
Start: 2023-06-05 | End: 2024-02-12 | Stop reason: SDUPTHER

## 2023-06-05 NOTE — TELEPHONE ENCOUNTER
----- Message from Ca Gaitan DO sent at 6/5/2023  3:20 PM CDT -----  Near baseline graft function  Cont on sodium bicarb supplements  Increase mgOx to 800 BID   Stable Hgb  FK level pending

## 2023-06-06 ENCOUNTER — PATIENT MESSAGE (OUTPATIENT)
Dept: PHARMACY | Facility: CLINIC | Age: 33
End: 2023-06-06
Payer: MEDICARE

## 2023-06-06 LAB — TACROLIMUS BLD-MCNC: 9.6 NG/ML (ref 5–15)

## 2023-06-09 DIAGNOSIS — Z94.0 S/P KIDNEY TRANSPLANT: ICD-10-CM

## 2023-06-09 RX ORDER — SULFAMETHOXAZOLE AND TRIMETHOPRIM 400; 80 MG/1; MG/1
1 TABLET ORAL EVERY MORNING
Qty: 30 TABLET | Refills: 5 | Status: SHIPPED | OUTPATIENT
Start: 2023-06-09 | End: 2023-06-09

## 2023-06-09 RX ORDER — PANTOPRAZOLE SODIUM 40 MG/1
40 TABLET, DELAYED RELEASE ORAL DAILY
Qty: 30 TABLET | Refills: 5 | Status: SHIPPED | OUTPATIENT
Start: 2023-06-09 | End: 2024-02-12 | Stop reason: SDUPTHER

## 2023-06-09 RX ORDER — SULFAMETHOXAZOLE AND TRIMETHOPRIM 400; 80 MG/1; MG/1
1 TABLET ORAL EVERY MORNING
Qty: 30 TABLET | Refills: 0 | Status: SHIPPED | OUTPATIENT
Start: 2023-06-09 | End: 2023-07-09

## 2023-06-22 ENCOUNTER — PES CALL (OUTPATIENT)
Dept: ADMINISTRATIVE | Facility: CLINIC | Age: 33
End: 2023-06-22
Payer: MEDICARE

## 2023-06-29 ENCOUNTER — PES CALL (OUTPATIENT)
Dept: ADMINISTRATIVE | Facility: CLINIC | Age: 33
End: 2023-06-29
Payer: MEDICARE

## 2023-07-02 ENCOUNTER — HOSPITAL ENCOUNTER (EMERGENCY)
Facility: HOSPITAL | Age: 33
Discharge: HOME | End: 2023-07-02
Attending: EMERGENCY MEDICINE
Payer: MEDICARE

## 2023-07-02 VITALS
OXYGEN SATURATION: 98 % | RESPIRATION RATE: 16 BRPM | SYSTOLIC BLOOD PRESSURE: 120 MMHG | TEMPERATURE: 98 F | BODY MASS INDEX: 33.35 KG/M2 | DIASTOLIC BLOOD PRESSURE: 80 MMHG | WEIGHT: 200.38 LBS | HEART RATE: 70 BPM

## 2023-07-02 DIAGNOSIS — S30.1XXA CONTUSION OF ABDOMINAL WALL, INITIAL ENCOUNTER: Primary | ICD-10-CM

## 2023-07-02 PROCEDURE — 99282 EMERGENCY DEPT VISIT SF MDM: CPT | Mod: HCNC

## 2023-07-02 NOTE — ED PROVIDER NOTES
"SCRIBE #1 NOTE: I, Judy Turner, am scribing for, and in the presence of, Cheryl High MD. I have scribed the entire note.      History      Chief Complaint   Patient presents with    Abscess     Pt. Reports she had a kidney transplant in January. She noticed a knot that popped up close to her new kidney to the lower right side of her abd.        Review of patient's allergies indicates:   Allergen Reactions    Lisinopril Other (See Comments)     Severe cough    Adhesive tape-silicones Itching     Burns    Furosemide Other (See Comments)     Almost gave her right sided heartfailure        HPI   HPI    7/2/2023, 8:53 AM   History obtained from the patient      History of Present Illness: Lisseth Schwartz is a 32 y.o. female patient with a PMHx of ESRD s/p kidney transplant on 1/2/23 and HTN who presents to the Emergency Department for R lateral abdominal pain which onset this morning. Pt reports that she was cutting her grass recently and hit the R side of her abdomen while doing so. Now, she has a "knot" and pain to the R lateral region of her abdomen. She is concerned because she had a kidney transplanted in this area. Symptoms are constant and moderate in severity. No mitigating or exacerbating factors reported. Associated sxs include ecchymosis to the R lateral abdominal region and a "knot" to the R lateral abdominal region. Patient denies any fever, chills, N/V/D, CP, SOB, weakness, and all other sxs at this time. No prior Tx reported. No further complaints or concerns at this time.         Arrival mode: Personal vehicle    PCP: Primary Doctor No       Past Medical History:  Past Medical History:   Diagnosis Date    Allergy     Anemia     Anxiety     Breast feeding status of mother 1/17/2020    Brittle bones     ESRD (end stage renal disease)     M/W/F    General anesthetics causing adverse effect in therapeutic use     pt states "im a light weight"    Hypertension     Insomnia     PSG revealed no CHRYSTAL, " "but likely psychogenic etiology (anxiety)    RLS (restless legs syndrome)        Past Surgical History:  Past Surgical History:   Procedure Laterality Date    Arm surgery Right     x 2    AV FISTULA PLACEMENT Right     CYSTOSCOPY      HYSTEROSCOPY WITH HYDROTHERMAL ABLATION OF ENDOMETRIUM WITH DILATION AND CURETTAGE N/A 12/04/2018    Procedure: HYSTEROSCOPY, WITH DILATION AND CURETTAGE OF UTERUS AND HYDROTHERMAL ENDOMETRIAL ABLATION;  Surgeon: Tiara Red MD;  Location: [facility] OR;  Service: OB/GYN;  Laterality: N/A;  ATTEMPTED     KIDNEY TRANSPLANT N/A 01/02/2023    Procedure: TRANSPLANT, KIDNEY;  Surgeon: Go Beard MD;  Location: [facility] OR [address] [place] [address];  Service: Transplant;  Laterality: N/A;    LAPAROSCOPIC SALPINGECTOMY Bilateral 12/04/2018    Procedure: SALPINGECTOMY, LAPAROSCOPIC;  Surgeon: Tiara Red MD;  Location: [facility] OR;  Service: OB/GYN;  Laterality: Bilateral;    RENAL BIOPSY      tumor removed      from face per medical records         Family History:  Family History   Problem Relation Age of Onset    Hypertension Father     Breast cancer Paternal Grandmother     Diabetes Maternal Grandmother     Heart attack Maternal Grandmother     Lung cancer Maternal Grandfather     Prostate cancer Maternal Grandfather        Social History:  Social History     Tobacco Use    Smoking status: Never    Smokeless tobacco: Never    Tobacco comments:     Situational smoking, due to family crisis   Substance and Sexual Activity    Alcohol use: Never    Drug use: Never    Sexual activity: Yes     Partners: Male       ROS   Review of Systems   Constitutional:  Negative for chills and fever.   HENT:  Negative for sore throat.    Respiratory:  Negative for shortness of breath.    Cardiovascular:  Negative for chest pain.   Gastrointestinal:  Positive for abdominal pain (R lateral). Negative for diarrhea, nausea and vomiting.        (+) "knot" to the R lateral region   Genitourinary:  Negative for dysuria. "   Musculoskeletal:  Negative for back pain.   Skin:  Positive for color change (ecchymosis to the R lateral abdominal region). Negative for rash.   Neurological:  Negative for weakness.   Hematological:  Does not bruise/bleed easily.   All other systems reviewed and are negative.    Physical Exam      Initial Vitals [07/02/23 0838]   BP Pulse Resp Temp SpO2   121/81 68 18 97.9 °F (36.6 °C) 100 %      MAP       --          Physical Exam  Nursing Notes and Vital Signs Reviewed.  Constitutional: Patient is in no acute distress. Well-developed and well-nourished.  Head: Atraumatic. Normocephalic.  Eyes: PERRL. EOM intact. Conjunctivae are not pale. No scleral icterus.  ENT: Mucous membranes are moist. Oropharynx is clear and symmetric.    Neck: Supple. Full ROM. No lymphadenopathy.  Cardiovascular: Regular rate. Regular rhythm. No murmurs, rubs, or gallops. Distal pulses are 2+ and symmetric.  Pulmonary/Chest: No respiratory distress. Clear to auscultation bilaterally. No wheezing or rales.  Abdominal: Soft and non-distended.  There is no tenderness.  No rebound, guarding, or rigidity.   Musculoskeletal: Moves all extremities. No obvious deformities. No edema.  Skin: There is a small, superficial contusion the size of a quarter to the R lateral abdominal wall. It is not overlying the transplant incision site. No evidence of an abscess or infection.  Neurological:  Alert, awake, and appropriate.  Normal speech.  No acute focal neurological deficits are appreciated.  Psychiatric: Normal affect. Good eye contact. Appropriate in content.    ED Course    Procedures  ED Vital Signs:  Vitals:    07/02/23 0838   BP: 121/81   Pulse: 68   Resp: 18   Temp: 97.9 °F (36.6 °C)   TempSrc: Oral   SpO2: 100%   Weight: 90.9 kg (200 lb 6.4 oz)       Abnormal Lab Results:  Labs Reviewed - No data to display       Imaging Results:  Imaging Results    None                 The Emergency Provider reviewed the vital signs and test results,  which are outlined above.    ED Discussion     8:57 AM: Reassessed pt at this time.  Discussed with pt all pertinent ED information and results. Discussed pt dx and plan of tx. Gave pt all f/u and return to the ED instructions. All questions and concerns were addressed at this time. Pt expresses understanding of information and instructions, and is comfortable with plan to discharge. Pt is stable for discharge.    I discussed with patient and/or family/caretaker that evaluation in the ED does not suggest any emergent or life threatening medical conditions requiring immediate intervention beyond what was provided in the ED, and I believe patient is safe for discharge.  Regardless, an unremarkable evaluation in the ED does not preclude the development or presence of a serious of life threatening condition. As such, patient was instructed to return immediately for any worsening or change in current symptoms.           ED Medication(s):  Medications - No data to display     Follow-up Information       Local Primary Care Doctor In 2 days.    Why: Return to the Emergency Room, If symptoms worsen                           New Prescriptions    No medications on file         Medical Decision Making    Medical Decision Making:   Differential Diagnosis:   1. Soft tissue abdominal wall contusion  ED Management:  Patient presents for eval of small area of tenderness and bruising to right lateral abdominal wall, her exam is consistent with a very small soft tissue contusion, there is no concern for infection and there is no concern for it overlying her surgical transplant site which is normal, she has been cutting grass and using push , I personally do not feel she needs lab work or imaging, bruising is small and very superficial and not anywhere near here transplant site, reassurance provided, stable for discharge home, vital signs normal         Scribe Attestation:   Scribe #1: I performed the above scribed service  and the documentation accurately describes the services I performed. I attest to the accuracy of the note.    Attending:   Physician Attestation Statement for Scribe #1: I, Cheryl High MD, personally performed the services described in this documentation, as scribed by Judy Turner, in my presence, and it is both accurate and complete.          Clinical Impression       ICD-10-CM ICD-9-CM   1. Contusion of abdominal wall, initial encounter  S30.1XXA 922.2       Disposition:   Disposition: Discharged  Condition: Stable       Cheryl High MD  07/02/23 1023

## 2023-07-03 ENCOUNTER — PATIENT MESSAGE (OUTPATIENT)
Dept: TRANSPLANT | Facility: CLINIC | Age: 33
End: 2023-07-03
Payer: MEDICARE

## 2023-07-05 ENCOUNTER — LAB VISIT (OUTPATIENT)
Dept: LAB | Facility: HOSPITAL | Age: 33
End: 2023-07-05
Attending: INTERNAL MEDICINE
Payer: MEDICARE

## 2023-07-05 DIAGNOSIS — Z94.0 KIDNEY REPLACED BY TRANSPLANT: ICD-10-CM

## 2023-07-05 LAB
ALBUMIN SERPL BCP-MCNC: 4.2 G/DL (ref 3.5–5.2)
ALBUMIN SERPL BCP-MCNC: 4.2 G/DL (ref 3.5–5.2)
ALP SERPL-CCNC: 95 U/L (ref 55–135)
ALT SERPL W/O P-5'-P-CCNC: 16 U/L (ref 10–44)
ANION GAP SERPL CALC-SCNC: 12 MMOL/L (ref 8–16)
AST SERPL-CCNC: 15 U/L (ref 10–40)
BASOPHILS # BLD AUTO: 0.03 K/UL (ref 0–0.2)
BASOPHILS NFR BLD: 0.6 % (ref 0–1.9)
BILIRUB DIRECT SERPL-MCNC: 0.2 MG/DL (ref 0.1–0.3)
BILIRUB SERPL-MCNC: 0.4 MG/DL (ref 0.1–1)
BUN SERPL-MCNC: 25 MG/DL (ref 6–20)
CALCIUM SERPL-MCNC: 10 MG/DL (ref 8.7–10.5)
CHLORIDE SERPL-SCNC: 112 MMOL/L (ref 95–110)
CO2 SERPL-SCNC: 19 MMOL/L (ref 23–29)
CREAT SERPL-MCNC: 1.4 MG/DL (ref 0.5–1.4)
DIFFERENTIAL METHOD: ABNORMAL
EOSINOPHIL # BLD AUTO: 0 K/UL (ref 0–0.5)
EOSINOPHIL NFR BLD: 0.8 % (ref 0–8)
ERYTHROCYTE [DISTWIDTH] IN BLOOD BY AUTOMATED COUNT: 12.1 % (ref 11.5–14.5)
EST. GFR  (NO RACE VARIABLE): 51 ML/MIN/1.73 M^2
GLUCOSE SERPL-MCNC: 112 MG/DL (ref 70–110)
HCT VFR BLD AUTO: 36.8 % (ref 37–48.5)
HGB BLD-MCNC: 11.5 G/DL (ref 12–16)
IMM GRANULOCYTES # BLD AUTO: 0.03 K/UL (ref 0–0.04)
IMM GRANULOCYTES NFR BLD AUTO: 0.6 % (ref 0–0.5)
LYMPHOCYTES # BLD AUTO: 0.4 K/UL (ref 1–4.8)
LYMPHOCYTES NFR BLD: 9.3 % (ref 18–48)
MAGNESIUM SERPL-MCNC: 1.5 MG/DL (ref 1.6–2.6)
MCH RBC QN AUTO: 30.7 PG (ref 27–31)
MCHC RBC AUTO-ENTMCNC: 31.3 G/DL (ref 32–36)
MCV RBC AUTO: 98 FL (ref 82–98)
MONOCYTES # BLD AUTO: 0.6 K/UL (ref 0.3–1)
MONOCYTES NFR BLD: 11.8 % (ref 4–15)
NEUTROPHILS # BLD AUTO: 3.6 K/UL (ref 1.8–7.7)
NEUTROPHILS NFR BLD: 76.9 % (ref 38–73)
NRBC BLD-RTO: 0 /100 WBC
PHOSPHATE SERPL-MCNC: 2.8 MG/DL (ref 2.7–4.5)
PLATELET # BLD AUTO: 150 K/UL (ref 150–450)
PMV BLD AUTO: 11.5 FL (ref 9.2–12.9)
POTASSIUM SERPL-SCNC: 4.6 MMOL/L (ref 3.5–5.1)
PROT SERPL-MCNC: 7.2 G/DL (ref 6–8.4)
RBC # BLD AUTO: 3.75 M/UL (ref 4–5.4)
SODIUM SERPL-SCNC: 143 MMOL/L (ref 136–145)
WBC # BLD AUTO: 4.74 K/UL (ref 3.9–12.7)

## 2023-07-05 PROCEDURE — 80069 RENAL FUNCTION PANEL: CPT | Mod: HCNC | Performed by: INTERNAL MEDICINE

## 2023-07-05 PROCEDURE — 83735 ASSAY OF MAGNESIUM: CPT | Mod: HCNC | Performed by: INTERNAL MEDICINE

## 2023-07-05 PROCEDURE — 84075 ASSAY ALKALINE PHOSPHATASE: CPT | Mod: HCNC | Performed by: INTERNAL MEDICINE

## 2023-07-05 PROCEDURE — 36415 COLL VENOUS BLD VENIPUNCTURE: CPT | Mod: HCNC | Performed by: INTERNAL MEDICINE

## 2023-07-05 PROCEDURE — 87799 DETECT AGENT NOS DNA QUANT: CPT | Mod: HCNC | Performed by: INTERNAL MEDICINE

## 2023-07-05 PROCEDURE — 85025 COMPLETE CBC W/AUTO DIFF WBC: CPT | Mod: HCNC | Performed by: INTERNAL MEDICINE

## 2023-07-05 PROCEDURE — 80197 ASSAY OF TACROLIMUS: CPT | Mod: HCNC | Performed by: INTERNAL MEDICINE

## 2023-07-06 DIAGNOSIS — Z94.0 KIDNEY REPLACED BY TRANSPLANT: ICD-10-CM

## 2023-07-06 DIAGNOSIS — Z94.0 S/P KIDNEY TRANSPLANT: ICD-10-CM

## 2023-07-06 LAB — TACROLIMUS BLD-MCNC: 10.1 NG/ML (ref 5–15)

## 2023-07-06 NOTE — TELEPHONE ENCOUNTER
Message sent.     ----- Message from Sara Caballero MD sent at 7/6/2023  1:34 PM CDT -----  Lower tacro to 5 mg BID

## 2023-07-07 ENCOUNTER — PATIENT MESSAGE (OUTPATIENT)
Dept: TRANSPLANT | Facility: CLINIC | Age: 33
End: 2023-07-07
Payer: MEDICARE

## 2023-07-07 RX ORDER — TACROLIMUS 1 MG/1
CAPSULE ORAL
Qty: 480 CAPSULE | Refills: 11 | Status: SHIPPED | OUTPATIENT
Start: 2023-07-07 | End: 2023-08-10 | Stop reason: DRUGHIGH

## 2023-07-13 VITALS
HEART RATE: 74 BPM | DIASTOLIC BLOOD PRESSURE: 83 MMHG | BODY MASS INDEX: 34.5 KG/M2 | OXYGEN SATURATION: 98 % | TEMPERATURE: 98 F | RESPIRATION RATE: 18 BRPM | SYSTOLIC BLOOD PRESSURE: 156 MMHG | WEIGHT: 207.31 LBS

## 2023-07-13 PROCEDURE — 99283 EMERGENCY DEPT VISIT LOW MDM: CPT | Mod: HCNC

## 2023-07-14 ENCOUNTER — HOSPITAL ENCOUNTER (EMERGENCY)
Facility: HOSPITAL | Age: 33
Discharge: HOME | End: 2023-07-14
Attending: EMERGENCY MEDICINE
Payer: MEDICARE

## 2023-07-14 DIAGNOSIS — Z71.1 CONCERN ABOUT STD IN FEMALE WITHOUT DIAGNOSIS: Primary | ICD-10-CM

## 2023-07-14 DIAGNOSIS — Z72.51 UNPROTECTED SEX: ICD-10-CM

## 2023-07-14 LAB
HCV AB SERPL QL IA: NEGATIVE
HIV 1+2 AB+HIV1 P24 AG SERPL QL IA: NEGATIVE

## 2023-07-14 PROCEDURE — 87591 N.GONORRHOEAE DNA AMP PROB: CPT | Mod: HCNC | Performed by: NURSE PRACTITIONER

## 2023-07-14 PROCEDURE — 86803 HEPATITIS C AB TEST: CPT | Mod: HCNC | Performed by: EMERGENCY MEDICINE

## 2023-07-14 PROCEDURE — 87389 HIV-1 AG W/HIV-1&-2 AB AG IA: CPT | Mod: HCNC | Performed by: EMERGENCY MEDICINE

## 2023-07-14 NOTE — ED PROVIDER NOTES
"     HISTORY     Chief Complaint   Patient presents with    Female  Problem     Pt is concerned about potential STD exposure.  Pt denies vaginal itching, discharge, or other symptoms.     Review of patient's allergies indicates:   Allergen Reactions    Lisinopril Other (See Comments)     Severe cough    Adhesive tape-silicones Itching     Burns    Furosemide Other (See Comments)     Almost gave her right sided heartfailure        HPI   32-year-old female presents emergency room for STD testing.  Patient states she just started dating her ex-boyfriend again who is a dialysis patient.  Patient states "I was on dialysis and I did not look as bad as he does so he must have something." Patient denies using protection.  Patient denies any STD symptoms.  Patient has a history of kidney transplant.    The history is provided by the patient.      PCP: Primary Doctor No     Past Medical History:  Past Medical History:   Diagnosis Date    Allergy     Anemia     Anxiety     Breast feeding status of mother 1/17/2020    Brittle bones     ESRD (end stage renal disease)     M/W/F    General anesthetics causing adverse effect in therapeutic use     pt states "im a light weight"    Hypertension     Insomnia     PSG revealed no CHRYSTAL, but likely psychogenic etiology (anxiety)    RLS (restless legs syndrome)         Past Surgical History:  Past Surgical History:   Procedure Laterality Date    Arm surgery Right     x 2    AV FISTULA PLACEMENT Right     CYSTOSCOPY      HYSTEROSCOPY WITH HYDROTHERMAL ABLATION OF ENDOMETRIUM WITH DILATION AND CURETTAGE N/A 12/04/2018    Procedure: HYSTEROSCOPY, WITH DILATION AND CURETTAGE OF UTERUS AND HYDROTHERMAL ENDOMETRIAL ABLATION;  Surgeon: Tiara Red MD;  Location: [facility] OR;  Service: OB/GYN;  Laterality: N/A;  ATTEMPTED     KIDNEY TRANSPLANT N/A 01/02/2023    Procedure: TRANSPLANT, KIDNEY;  Surgeon: Go Beard MD;  Location: [address];  Service: Transplant;  Laterality: N/A;    " LAPAROSCOPIC SALPINGECTOMY Bilateral 12/04/2018    Procedure: SALPINGECTOMY, LAPAROSCOPIC;  Surgeon: Tiara Red MD;  Location: [facility];  Service: OB/GYN;  Laterality: Bilateral;    RENAL BIOPSY      tumor removed      from face per medical records        Family History:  Family History   Problem Relation Age of Onset    Hypertension Father     Breast cancer Paternal Grandmother     Diabetes Maternal Grandmother     Heart attack Maternal Grandmother     Lung cancer Maternal Grandfather     Prostate cancer Maternal Grandfather         Social History:  Social History     Tobacco Use    Smoking status: Never    Smokeless tobacco: Never    Tobacco comments:     Situational smoking, due to family crisis   Substance and Sexual Activity    Alcohol use: Never    Drug use: Never    Sexual activity: Yes     Partners: Male         ROS   Review of Systems   Constitutional:  Negative for fever.   HENT:  Negative for sore throat.    Respiratory:  Negative for shortness of breath.    Cardiovascular:  Negative for chest pain.   Gastrointestinal:  Negative for nausea.   Genitourinary:  Negative for dysuria.        STD concern    Musculoskeletal:  Negative for back pain.   Skin:  Negative for rash.   Neurological:  Negative for weakness.   Hematological:  Does not bruise/bleed easily.     PHYSICAL EXAM     Initial Vitals [07/13/23 2132]   BP Pulse Resp Temp SpO2   (!) 156/83 74 18 97.8 °F (36.6 °C) 98 %      MAP       --           Physical Exam    Constitutional: She appears well-developed and well-nourished. No distress.   HENT:   Head: Normocephalic and atraumatic.   Eyes: Conjunctivae are normal. Pupils are equal, round, and reactive to light.   Neck: Neck supple.   Normal range of motion.  Cardiovascular:  Normal rate, regular rhythm and normal heart sounds.           Pulmonary/Chest: Breath sounds normal.   Abdominal: Abdomen is soft. Bowel sounds are normal. She exhibits no distension. There is no abdominal tenderness.  There is no rebound.   Genitourinary:    Genitourinary Comments: Deferred due to no symptoms      Musculoskeletal:         General: No edema. Normal range of motion.      Cervical back: Normal range of motion and neck supple.     Neurological: She is alert and oriented to person, place, and time. She has normal strength.   Skin: Skin is warm and dry.   Psychiatric: She has a normal mood and affect.        ED COURSE   Procedures  ED ONGOING VITALS:  Vitals:    07/13/23 2132   BP: (!) 156/83   Pulse: 74   Resp: 18   Temp: 97.8 °F (36.6 °C)   TempSrc: Oral   SpO2: 98%   Weight: 94 kg (207 lb 5.5 oz)         ABNORMAL LAB VALUES:  Labs Reviewed   C. TRACHOMATIS/N. GONORRHOEAE BY AMP DNA   HIV 1 / 2 ANTIBODY    Narrative:     Release to patient->Immediate   HEPATITIS C ANTIBODY    Narrative:     Release to patient->Immediate         ALL LAB VALUES:  Results for orders placed or performed during the hospital encounter of 07/14/23   HIV 1/2 Ag/Ab (4th Gen)   Result Value Ref Range    HIV 1/2 Ag/Ab Negative Negative   Hepatitis C antibody   Result Value Ref Range    Hepatitis C Ab Negative Negative     GC pending      RADIOLOGY STUDIES:  Imaging Results    None                   The above vital signs and test results have been reviewed by the emergency provider.     ED Medications:  Discharge Medication List as of 7/14/2023 12:52 AM        Discharge Medications:  Discharge Medication List as of 7/14/2023 12:52 AM          Follow-up Information       Schedule an appointment as soon as possible for a visit  with PCP.               [facility] - Emergency Dept..    Specialty: Emergency Medicine  Contact information:  [address] [place] [id number] [phone]                          I discussed with patient and/or family/caretaker that evaluation in the ED does not suggest any emergent or life threatening medical conditions requiring immediate intervention beyond what was provided in the ED, and I  believe patient is safe for discharge. Regardless, an unremarkable evaluation in the ED does not preclude the development or presence of a serious or life threatening condition. As such, patient was instructed to return immediately for any worsening or change in current symptoms.    Pre-hypertension/Hypertension: The pt has been informed that they may have pre-hypertension or hypertension based on a blood pressure reading in the ED. I recommend that the pt call the PCP listed on their discharge instructions or a physician of their choice this week to arrange f/u for further evaluation of possible pre-hypertension or hypertension.     Regarding the possible exposure to a SEXUALLY TRANSMITTED DISEASES, I reviewed with patient the leading strategies to prevent and/or reduce sexually transmitted diseases (STDs): abstinence; correct and consistent condom use; refrain from sexual contact with infected persons; limit number of sex partners, and choose carefully; always use a latex condom during sex, even if using another method of birth control such as the Pill or Depo-Provera; talk with a sex partner about sexual history, STDs, and safer sex; have regular check-ups/medical exams; and check yourself frequently for STD symptoms. Advised patient to refrain from engaging in high risk sexual behaviors and notify their primary care provider if they: have concerns about having an STD; have questions regarding STDs. Instructed patient to seek care if they develop any signs or symptoms of an STD such as: discharge from vagina, penis or rectum; pain or burning during urination or intercourse; pain in the abdomen (women), testicles (men), or buttocks and legs; blisters, open sores, warts, rash, or swelling in the genital or anal areas or mouth; and persistent flu-like symptoms--including fever, headache, aching muscles, or swollen glands-which may precede STD symptoms.  I educated the patient on the use of their local health units  for STD screening and treatment.       MEDICAL DECISION MAKING                 CLINICAL IMPRESSION       ICD-10-CM ICD-9-CM   1. Concern about STD in female without diagnosis  Z71.1 V65.5   2. Unprotected sex  Z72.51 V69.2       Disposition:   Disposition: Discharged  Condition: Stable       Stan De La Rosa NP  07/14/23 9210

## 2023-07-15 ENCOUNTER — HOSPITAL ENCOUNTER (OUTPATIENT)
Facility: HOSPITAL | Age: 33
Discharge: HOME | End: 2023-07-17
Attending: STUDENT IN AN ORGANIZED HEALTH CARE EDUCATION/TRAINING PROGRAM | Admitting: STUDENT IN AN ORGANIZED HEALTH CARE EDUCATION/TRAINING PROGRAM
Payer: MEDICARE

## 2023-07-15 DIAGNOSIS — Z94.0 DECEASED-DONOR KIDNEY TRANSPLANT: ICD-10-CM

## 2023-07-15 DIAGNOSIS — N05.1 FSGS (FOCAL SEGMENTAL GLOMERULOSCLEROSIS): ICD-10-CM

## 2023-07-15 DIAGNOSIS — K56.7 ILEUS: ICD-10-CM

## 2023-07-15 DIAGNOSIS — K52.9 ENTERITIS: Primary | ICD-10-CM

## 2023-07-15 DIAGNOSIS — F51.04 PSYCHOPHYSIOLOGICAL INSOMNIA: ICD-10-CM

## 2023-07-15 DIAGNOSIS — N25.81 SECONDARY HYPERPARATHYROIDISM: ICD-10-CM

## 2023-07-15 DIAGNOSIS — Z72.821 INADEQUATE SLEEP HYGIENE: ICD-10-CM

## 2023-07-15 DIAGNOSIS — K21.9 GASTROESOPHAGEAL REFLUX DISEASE WITHOUT ESOPHAGITIS: ICD-10-CM

## 2023-07-15 DIAGNOSIS — D63.8 ANEMIA OF CHRONIC DISEASE: ICD-10-CM

## 2023-07-15 DIAGNOSIS — Z29.89 PROPHYLACTIC IMMUNOTHERAPY: ICD-10-CM

## 2023-07-15 DIAGNOSIS — E83.42 HYPOMAGNESEMIA: ICD-10-CM

## 2023-07-15 DIAGNOSIS — I10 ESSENTIAL HYPERTENSION: ICD-10-CM

## 2023-07-15 LAB
ALBUMIN SERPL BCP-MCNC: 4.4 G/DL (ref 3.5–5.2)
ALP SERPL-CCNC: 104 U/L (ref 55–135)
ALT SERPL W/O P-5'-P-CCNC: 18 U/L (ref 10–44)
ANION GAP SERPL CALC-SCNC: 13 MMOL/L (ref 8–16)
AST SERPL-CCNC: 18 U/L (ref 10–40)
BASOPHILS # BLD AUTO: 0.01 K/UL (ref 0–0.2)
BASOPHILS NFR BLD: 0.1 % (ref 0–1.9)
BILIRUB SERPL-MCNC: 0.5 MG/DL (ref 0.1–1)
BUN SERPL-MCNC: 14 MG/DL (ref 6–20)
C TRACH DNA SPEC QL NAA+PROBE: NOT DETECTED
CALCIUM SERPL-MCNC: 10.1 MG/DL (ref 8.7–10.5)
CHLORIDE SERPL-SCNC: 107 MMOL/L (ref 95–110)
CO2 SERPL-SCNC: 21 MMOL/L (ref 23–29)
CREAT SERPL-MCNC: 1.1 MG/DL (ref 0.5–1.4)
DIFFERENTIAL METHOD: ABNORMAL
EOSINOPHIL # BLD AUTO: 0.1 K/UL (ref 0–0.5)
EOSINOPHIL NFR BLD: 0.5 % (ref 0–8)
ERYTHROCYTE [DISTWIDTH] IN BLOOD BY AUTOMATED COUNT: 12 % (ref 11.5–14.5)
EST. GFR  (NO RACE VARIABLE): >60 ML/MIN/1.73 M^2
GLUCOSE SERPL-MCNC: 107 MG/DL (ref 70–110)
HCT VFR BLD AUTO: 36.8 % (ref 37–48.5)
HGB BLD-MCNC: 12 G/DL (ref 12–16)
IMM GRANULOCYTES # BLD AUTO: 0.07 K/UL (ref 0–0.04)
IMM GRANULOCYTES NFR BLD AUTO: 0.6 % (ref 0–0.5)
LIPASE SERPL-CCNC: 46 U/L (ref 4–60)
LYMPHOCYTES # BLD AUTO: 0.5 K/UL (ref 1–4.8)
LYMPHOCYTES NFR BLD: 4.4 % (ref 18–48)
MAGNESIUM SERPL-MCNC: 1.4 MG/DL (ref 1.6–2.6)
MCH RBC QN AUTO: 31.3 PG (ref 27–31)
MCHC RBC AUTO-ENTMCNC: 32.6 G/DL (ref 32–36)
MCV RBC AUTO: 96 FL (ref 82–98)
MONOCYTES # BLD AUTO: 0.8 K/UL (ref 0.3–1)
MONOCYTES NFR BLD: 7.1 % (ref 4–15)
N GONORRHOEA DNA SPEC QL NAA+PROBE: NOT DETECTED
NEUTROPHILS # BLD AUTO: 9.6 K/UL (ref 1.8–7.7)
NEUTROPHILS NFR BLD: 87.3 % (ref 38–73)
NRBC BLD-RTO: 0 /100 WBC
PHOSPHATE SERPL-MCNC: 2.8 MG/DL (ref 2.7–4.5)
PLATELET # BLD AUTO: 152 K/UL (ref 150–450)
PMV BLD AUTO: 11.8 FL (ref 9.2–12.9)
POTASSIUM SERPL-SCNC: 3.9 MMOL/L (ref 3.5–5.1)
PROT SERPL-MCNC: 7.6 G/DL (ref 6–8.4)
RBC # BLD AUTO: 3.84 M/UL (ref 4–5.4)
SODIUM SERPL-SCNC: 141 MMOL/L (ref 136–145)
WBC # BLD AUTO: 10.94 K/UL (ref 3.9–12.7)

## 2023-07-15 PROCEDURE — 83690 ASSAY OF LIPASE: CPT | Mod: HCNC | Performed by: STUDENT IN AN ORGANIZED HEALTH CARE EDUCATION/TRAINING PROGRAM

## 2023-07-15 PROCEDURE — 85025 COMPLETE CBC W/AUTO DIFF WBC: CPT | Mod: HCNC | Performed by: STUDENT IN AN ORGANIZED HEALTH CARE EDUCATION/TRAINING PROGRAM

## 2023-07-15 PROCEDURE — 83735 ASSAY OF MAGNESIUM: CPT | Mod: HCNC | Performed by: STUDENT IN AN ORGANIZED HEALTH CARE EDUCATION/TRAINING PROGRAM

## 2023-07-15 PROCEDURE — 63600175 PHARM REV CODE 636 W HCPCS: Mod: HCNC | Performed by: STUDENT IN AN ORGANIZED HEALTH CARE EDUCATION/TRAINING PROGRAM

## 2023-07-15 PROCEDURE — 80053 COMPREHEN METABOLIC PANEL: CPT | Mod: HCNC | Performed by: STUDENT IN AN ORGANIZED HEALTH CARE EDUCATION/TRAINING PROGRAM

## 2023-07-15 PROCEDURE — 96374 THER/PROPH/DIAG INJ IV PUSH: CPT | Mod: HCNC

## 2023-07-15 PROCEDURE — 84100 ASSAY OF PHOSPHORUS: CPT | Mod: HCNC | Performed by: STUDENT IN AN ORGANIZED HEALTH CARE EDUCATION/TRAINING PROGRAM

## 2023-07-15 PROCEDURE — 99285 EMERGENCY DEPT VISIT HI MDM: CPT | Mod: 25,HCNC

## 2023-07-15 PROCEDURE — 96375 TX/PRO/DX INJ NEW DRUG ADDON: CPT | Mod: HCNC

## 2023-07-15 RX ORDER — PROCHLORPERAZINE EDISYLATE 5 MG/ML
10 INJECTION INTRAMUSCULAR; INTRAVENOUS
Status: COMPLETED | OUTPATIENT
Start: 2023-07-15 | End: 2023-07-15

## 2023-07-15 RX ORDER — MORPHINE SULFATE 4 MG/ML
6 INJECTION, SOLUTION INTRAMUSCULAR; INTRAVENOUS
Status: COMPLETED | OUTPATIENT
Start: 2023-07-15 | End: 2023-07-15

## 2023-07-15 RX ORDER — ONDANSETRON 2 MG/ML
4 INJECTION INTRAMUSCULAR; INTRAVENOUS
Status: COMPLETED | OUTPATIENT
Start: 2023-07-15 | End: 2023-07-15

## 2023-07-15 RX ORDER — DIPHENHYDRAMINE HYDROCHLORIDE 50 MG/ML
12.5 INJECTION INTRAMUSCULAR; INTRAVENOUS
Status: DISPENSED | OUTPATIENT
Start: 2023-07-15 | End: 2023-07-16

## 2023-07-15 RX ADMIN — ONDANSETRON 4 MG: 2 INJECTION INTRAMUSCULAR; INTRAVENOUS at 10:07

## 2023-07-15 RX ADMIN — MORPHINE SULFATE 3 MG: 4 INJECTION INTRAVENOUS at 10:07

## 2023-07-15 RX ADMIN — PROCHLORPERAZINE EDISYLATE 10 MG: 5 INJECTION INTRAMUSCULAR; INTRAVENOUS at 10:07

## 2023-07-16 PROBLEM — E83.42 HYPOMAGNESEMIA: Status: ACTIVE | Noted: 2023-07-16

## 2023-07-16 PROBLEM — K52.9 ENTERITIS: Status: ACTIVE | Noted: 2023-07-16

## 2023-07-16 PROCEDURE — 96361 HYDRATE IV INFUSION ADD-ON: CPT

## 2023-07-16 PROCEDURE — 63600175 PHARM REV CODE 636 W HCPCS: Mod: HCNC | Performed by: NURSE PRACTITIONER

## 2023-07-16 PROCEDURE — 63600175 PHARM REV CODE 636 W HCPCS: Mod: HCNC | Performed by: STUDENT IN AN ORGANIZED HEALTH CARE EDUCATION/TRAINING PROGRAM

## 2023-07-16 PROCEDURE — 96375 TX/PRO/DX INJ NEW DRUG ADDON: CPT | Mod: HCNC

## 2023-07-16 PROCEDURE — G0378 HOSPITAL OBSERVATION PER HR: HCPCS | Mod: HCNC

## 2023-07-16 PROCEDURE — 25000003 PHARM REV CODE 250: Mod: HCNC | Performed by: NURSE PRACTITIONER

## 2023-07-16 RX ORDER — ONDANSETRON HYDROCHLORIDE 4 MG/5ML
4 SOLUTION ORAL EVERY 6 HOURS
Status: DISCONTINUED | OUTPATIENT
Start: 2023-07-16 | End: 2023-07-17 | Stop reason: HOSPADM

## 2023-07-16 RX ORDER — MAGNESIUM SULFATE 1 G/100ML
1 INJECTION INTRAVENOUS ONCE
Status: DISCONTINUED | OUTPATIENT
Start: 2023-07-16 | End: 2023-07-16

## 2023-07-16 RX ORDER — PREDNISONE 5 MG/1
5 TABLET ORAL DAILY
Status: DISCONTINUED | OUTPATIENT
Start: 2023-07-16 | End: 2023-07-17 | Stop reason: HOSPADM

## 2023-07-16 RX ORDER — MYCOPHENOLATE MOFETIL 250 MG/1
1000 CAPSULE ORAL 2 TIMES DAILY
Status: DISCONTINUED | OUTPATIENT
Start: 2023-07-16 | End: 2023-07-17 | Stop reason: HOSPADM

## 2023-07-16 RX ORDER — CALCITRIOL 0.25 UG/1
0.25 CAPSULE ORAL DAILY
Status: DISCONTINUED | OUTPATIENT
Start: 2023-07-16 | End: 2023-07-17 | Stop reason: HOSPADM

## 2023-07-16 RX ORDER — SODIUM BICARBONATE 650 MG/1
1950 TABLET ORAL 3 TIMES DAILY
Status: DISCONTINUED | OUTPATIENT
Start: 2023-07-16 | End: 2023-07-17 | Stop reason: HOSPADM

## 2023-07-16 RX ORDER — LANOLIN ALCOHOL/MO/W.PET/CERES
800 CREAM (GRAM) TOPICAL 2 TIMES DAILY
Status: DISCONTINUED | OUTPATIENT
Start: 2023-07-16 | End: 2023-07-17 | Stop reason: HOSPADM

## 2023-07-16 RX ORDER — METOCLOPRAMIDE HYDROCHLORIDE 5 MG/ML
5 INJECTION INTRAMUSCULAR; INTRAVENOUS
Status: COMPLETED | OUTPATIENT
Start: 2023-07-16 | End: 2023-07-16

## 2023-07-16 RX ORDER — HYDRALAZINE HYDROCHLORIDE 50 MG/1
50 TABLET, FILM COATED ORAL EVERY 12 HOURS
Status: DISCONTINUED | OUTPATIENT
Start: 2023-07-16 | End: 2023-07-17 | Stop reason: HOSPADM

## 2023-07-16 RX ORDER — METOPROLOL TARTRATE 50 MG/1
200 TABLET ORAL 2 TIMES DAILY
Status: DISCONTINUED | OUTPATIENT
Start: 2023-07-16 | End: 2023-07-17 | Stop reason: HOSPADM

## 2023-07-16 RX ORDER — TACROLIMUS 1 MG/1
5 CAPSULE ORAL 2 TIMES DAILY
Status: DISCONTINUED | OUTPATIENT
Start: 2023-07-16 | End: 2023-07-17 | Stop reason: HOSPADM

## 2023-07-16 RX ORDER — SODIUM CHLORIDE 9 MG/ML
INJECTION, SOLUTION INTRAVENOUS ONCE
Status: COMPLETED | OUTPATIENT
Start: 2023-07-16 | End: 2023-07-16

## 2023-07-16 RX ADMIN — HYDRALAZINE HYDROCHLORIDE 50 MG: 50 TABLET ORAL at 09:07

## 2023-07-16 RX ADMIN — Medication 800 MG: at 09:07

## 2023-07-16 RX ADMIN — ONDANSETRON 4 MG: 4 SOLUTION ORAL at 12:07

## 2023-07-16 RX ADMIN — MYCOPHENOLATE MOFETIL 1000 MG: 250 CAPSULE ORAL at 08:07

## 2023-07-16 RX ADMIN — ONDANSETRON 4 MG: 4 SOLUTION ORAL at 06:07

## 2023-07-16 RX ADMIN — METOPROLOL TARTRATE 200 MG: 50 TABLET, FILM COATED ORAL at 08:07

## 2023-07-16 RX ADMIN — TACROLIMUS 5 MG: 1 CAPSULE ORAL at 05:07

## 2023-07-16 RX ADMIN — METOPROLOL TARTRATE 200 MG: 50 TABLET, FILM COATED ORAL at 09:07

## 2023-07-16 RX ADMIN — HYDRALAZINE HYDROCHLORIDE 50 MG: 50 TABLET ORAL at 08:07

## 2023-07-16 RX ADMIN — SODIUM CHLORIDE: 9 INJECTION, SOLUTION INTRAVENOUS at 06:07

## 2023-07-16 RX ADMIN — SODIUM BICARBONATE 650 MG TABLET 1950 MG: at 08:07

## 2023-07-16 RX ADMIN — CALCITRIOL CAPSULES 0.25 MCG 0.25 MCG: 0.25 CAPSULE ORAL at 08:07

## 2023-07-16 RX ADMIN — PREDNISONE 5 MG: 5 TABLET ORAL at 08:07

## 2023-07-16 RX ADMIN — SODIUM BICARBONATE 650 MG TABLET 1950 MG: at 09:07

## 2023-07-16 RX ADMIN — METOCLOPRAMIDE 5 MG: 5 INJECTION, SOLUTION INTRAMUSCULAR; INTRAVENOUS at 01:07

## 2023-07-16 RX ADMIN — ONDANSETRON 4 MG: 4 SOLUTION ORAL at 11:07

## 2023-07-16 RX ADMIN — TACROLIMUS 5 MG: 1 CAPSULE ORAL at 08:07

## 2023-07-16 RX ADMIN — ONDANSETRON 4 MG: 4 SOLUTION ORAL at 05:07

## 2023-07-16 RX ADMIN — SODIUM BICARBONATE 650 MG TABLET 1950 MG: at 02:07

## 2023-07-16 RX ADMIN — Medication 800 MG: at 08:07

## 2023-07-16 RX ADMIN — MYCOPHENOLATE MOFETIL 1000 MG: 250 CAPSULE ORAL at 09:07

## 2023-07-16 NOTE — HPI
Lisseth Schwartz is a 32 year with history of hypertension, ESRD r/t FSGS  s/p kidney transplant 1/2023 who presented to ED for further evaluation of abdominal pain that began yesterday. Admits to associated symptoms of Nausea, vomiting, and diarrhea. She denies fever, chills, chest pain, hematemesis, she denies BRBPR.     CT abdomen shows evidence of ileus versus enteritis. She admits to emesis in ED. Last loose stool was prior to arrival to ED. Labs and vitals are stable. She will be placed in observation under the care of hospital medicine.

## 2023-07-16 NOTE — NURSING
Pt arrived via bed from ED, aroused via voice, drowsy, assessment per flowsheet,unable to review home medications. Denies any pain or discomfort at this time. Will continue to monitor.

## 2023-07-16 NOTE — H&P
"[facility] ([facility])  [facility] Medicine  History & Physical    Patient Name: Lisseth Schwartz  MRN: [id number]  Patient Class: OP- Observation  Admission Date: 7/15/2023  Attending Physician: London Nunez MD   Primary Care Provider: Primary Doctor No         Patient information was obtained from patient and ER records.     Subjective:     Principal Problem:Enteritis    Chief Complaint:   Chief Complaint   Patient presents with    Abdominal Pain     Pt to ED via [facility] abd pain w/ N no V/D since 17:00 tonight. Pt seen in ED 7/13 for STD. Hx ESRD with kidney transplant 1/23.        HPI: Lisseth Schwartz is a 32 year with history of hypertension, ESRD r/t FSGS  s/p kidney transplant 1/2023 who presented to ED for further evaluation of abdominal pain that began yesterday. Admits to associated symptoms of Nausea, vomiting, and diarrhea. She denies fever, chills, chest pain, hematemesis, she denies BRBPR.     CT abdomen shows evidence of ileus versus enteritis. She admits to emesis in ED. Last loose stool was prior to arrival to ED. Labs and vitals are stable. She will be placed in observation under the care of [facility] medicine.       Past Medical History:   Diagnosis Date    Allergy     Anemia     Anxiety     Breast feeding status of mother 1/17/2020    Brittle bones     ESRD (end stage renal disease)     M/W/F    General anesthetics causing adverse effect in therapeutic use     pt states "im a light weight"    Hypertension     Insomnia     PSG revealed no CHRYSTAL, but likely psychogenic etiology (anxiety)    RLS (restless legs syndrome)        Past Surgical History:   Procedure Laterality Date    Arm surgery Right     x 2    AV FISTULA PLACEMENT Right     CYSTOSCOPY      HYSTEROSCOPY WITH HYDROTHERMAL ABLATION OF ENDOMETRIUM WITH DILATION AND CURETTAGE N/A 12/04/2018    Procedure: HYSTEROSCOPY, WITH DILATION AND CURETTAGE OF UTERUS AND HYDROTHERMAL ENDOMETRIAL ABLATION;  Surgeon: Tiara GUERRERO " MD Neda;  Location: [facility] OR;  Service: OB/GYN;  Laterality: N/A;  ATTEMPTED     KIDNEY TRANSPLANT N/A 01/02/2023    Procedure: TRANSPLANT, KIDNEY;  Surgeon: Go Beard MD;  Location: [address];  Service: Transplant;  Laterality: N/A;    LAPAROSCOPIC SALPINGECTOMY Bilateral 12/04/2018    Procedure: SALPINGECTOMY, LAPAROSCOPIC;  Surgeon: Tiara Red MD;  Location: [facility];  Service: OB/GYN;  Laterality: Bilateral;    RENAL BIOPSY      tumor removed      from face per medical records       Review of patient's allergies indicates:   Allergen Reactions    Lisinopril Other (See Comments)     Severe cough    Adhesive tape-silicones Itching     Burns    Furosemide Other (See Comments)     Almost gave her right sided heartfailure       No current facility-administered medications on file prior to encounter.     Current Outpatient Medications on File Prior to Encounter   Medication Sig    albuterol (PROVENTIL/VENTOLIN HFA) 90 mcg/actuation inhaler Inhale 1-2 puffs into the lungs every 6 (six) hours as needed for Wheezing (cough).    bisacodyL (DULCOLAX) 5 mg EC tablet Take 2 tablets (10 mg total) by mouth daily as needed for Constipation.    calcitRIOL (ROCALTROL) 0.25 MCG Cap Take 1 capsule (0.25 mcg total) by mouth once daily.    clonazePAM (KLONOPIN) 0.5 MG tablet Take 0.5 mg by mouth daily as needed.    docusate sodium (COLACE) 100 MG capsule Take 1 capsule (100 mg total) by mouth 3 (three) times daily as needed for Constipation. (Patient not taking: Reported on 3/8/2023)    hydrALAZINE (APRESOLINE) 50 MG tablet Take 1 tablet (50 mg total) by mouth every 12 (twelve) hours. If BP <130 take 25 mg (cut in half)    ketoconazole (NIZORAL) 200 mg Tab Take 0.5 tablets (100 mg total) by mouth once daily.    magnesium oxide (MAG-OX) 400 mg (241.3 mg magnesium) tablet Take 2 tablets (800 mg total) by mouth 2 (two) times daily.    metoprolol tartrate (LOPRESSOR) 50 MG tablet Take 4 tablets (200 mg  total) by mouth 2 (two) times daily.    mycophenolate (CELLCEPT) 250 mg Cap Take 4 capsules (1,000 mg total) by mouth 2 (two) times daily.    oxyCODONE (ROXICODONE) 5 MG immediate release tablet Take 1 tablet (5 mg total) by mouth every 6 (six) hours as needed for Pain.    pantoprazole (PROTONIX) 40 MG tablet Take 1 tablet (40 mg total) by mouth once daily.    predniSONE (DELTASONE) 5 MG tablet Take by mouth daily: 20 mg 1/6-1/12; 15mg 1/13-1/19; 10 mg 1/20-1/26; 5 mg daily thereafter 1/27/23    sodium bicarbonate 650 MG tablet Take 3 tablets (1,950 mg total) by mouth 3 (three) times daily.    tacrolimus (PROGRAF) 1 MG Cap Take 5 capsules (5 mg total) by mouth every morning AND 5 capsules (5 mg total) every evening.     Family History       Problem Relation (Age of Onset)    Breast cancer Paternal Grandmother    Diabetes Maternal Grandmother    Heart attack Maternal Grandmother    Hypertension Father    Lung cancer Maternal Grandfather    Prostate cancer Maternal Grandfather          Tobacco Use    Smoking status: Never    Smokeless tobacco: Never    Tobacco comments:     Situational smoking, due to family crisis   Substance and Sexual Activity    Alcohol use: Never    Drug use: Never    Sexual activity: Yes     Partners: Male     Review of Systems   Constitutional:  Negative for activity change, diaphoresis, fatigue and unexpected weight change.   HENT:  Negative for congestion, ear pain and sore throat.    Eyes: Negative.    Respiratory:  Negative for shortness of breath and wheezing.    Cardiovascular:  Negative for chest pain and palpitations.   Gastrointestinal:  Positive for abdominal pain, diarrhea and nausea. Negative for constipation and vomiting.   Endocrine: Negative.    Genitourinary:  Negative for flank pain, hematuria and urgency.   Musculoskeletal:  Negative for joint swelling and neck pain.   Skin:  Negative for pallor.   Neurological:  Negative for seizures, syncope and light-headedness.    Hematological: Negative.    Psychiatric/Behavioral: Negative.         Objective:     Vital Signs (Most Recent):  Temp: 98.1 °F (36.7 °C) (07/16/23 0425)  Pulse: 105 (07/16/23 0425)  Resp: 19 (07/16/23 0425)  BP: (!) 141/90 (07/16/23 0425)  SpO2: 99 % (07/16/23 0425) Vital Signs (24h Range):  Temp:  [97.6 °F (36.4 °C)-99.6 °F (37.6 °C)] 98.1 °F (36.7 °C)  Pulse:  [] 105  Resp:  [16-19] 19  SpO2:  [98 %-100 %] 99 %  BP: (136-153)/(81-90) 141/90     Weight: 91.6 kg (201 lb 15.1 oz)  Body mass index is 33.6 kg/m².     Physical Exam  Constitutional:       Appearance: She is well-developed.      Comments: Asleep but easily aroused. Reports 8/10 pain, but resting comfortably     HENT:      Head: Normocephalic and atraumatic.   Cardiovascular:      Rate and Rhythm: Normal rate and regular rhythm.      Heart sounds: Normal heart sounds. No murmur heard.  Pulmonary:      Effort: Pulmonary effort is normal. No respiratory distress.      Breath sounds: Normal breath sounds.   Abdominal:      General: Bowel sounds are normal. There is no distension.      Palpations: Abdomen is soft.      Tenderness: There is no abdominal tenderness.   Musculoskeletal:         General: Normal range of motion.      Cervical back: Normal range of motion and neck supple.   Skin:     General: Skin is warm and dry.   Neurological:      Mental Status: She is alert and oriented to person, place, and time.              Significant Labs: All pertinent labs within the past 24 hours have been reviewed.  CBC:   Recent Labs   Lab 07/15/23  2243   WBC 10.94   HGB 12.0   HCT 36.8*        CMP:   Recent Labs   Lab 07/15/23  2243      K 3.9      CO2 21*      BUN 14   CREATININE 1.1   CALCIUM 10.1   PROT 7.6   ALBUMIN 4.4   BILITOT 0.5   ALKPHOS 104   AST 18   ALT 18   ANIONGAP 13       Significant Imaging:   Imaging Results              CT Abdomen Pelvis  Without Contrast (Final result)  Result time 07/15/23 23:36:34      Final  result by Whitney Bueno MD (07/15/23 23:36:34)                   Impression:      Small bowel ileus or enteritis suspected      Electronically signed by: Whitney Bueno  Date:    07/15/2023  Time:    23:36               Narrative:    EXAMINATION:  CT ABDOMEN PELVIS WITHOUT CONTRAST    CLINICAL HISTORY:  Abdominal pain, acute, nonlocalized;    TECHNIQUE:  Low dose axial images, sagittal and coronal reformations were obtained from the lung bases to the pubic symphysis.  Contrast was not administered.    COMPARISON:  [facility] 2023    FINDINGS:  Liver and spleen stable size.  Gallbladder present    Pancreas unremarkable    Native kidneys atrophic    Small bowel minimally distended containing fluid.  No signs of appendicitis unenhanced    Right pelvic kidney without hydronephrosis    Urinary bladder decompressed    Uterus may be leiomyomatous                                        Assessment/Plan:     * Enteritis  CT demonstrates ileus vs enteritis  --Gentle hydration overnight and NPO given possible ileus  --Scheduled antiemetics  --Hold antidiarrheal agents as patient has not had other episodes of diarrhea.  --Resume clear diet when appropriate then advance to bland    Hypomagnesemia  1.4. PO supplementation      Essential hypertension  Systolic blood pressure 140-150  --resume hydralazine and metoprolol    -donor kidney transplant  ESRD from FSGS s/p kidney transplant 2023.   --resume mycophenolate, prednisone, and tacrolimus for maintenance  --kidney function stable      VTE Risk Mitigation (From admission, onward)    None               On 2023, patient should be placed in hospital observation services under my care in collaboration with Dr. Nunez.      Robin Bonner NP  Department of Hospital Medicine  '[facility] ([facility])

## 2023-07-16 NOTE — PLAN OF CARE
Problem: Adult Inpatient Plan of Care  Goal: Plan of Care Review  Outcome: Ongoing, Progressing  Goal: Patient-Specific Goal (Individualized)  Outcome: Ongoing, Progressing  Goal: Absence of Hospital-Acquired Illness or Injury  Outcome: Ongoing, Progressing  Goal: Optimal Comfort and Wellbeing  Outcome: Ongoing, Progressing  Goal: Readiness for Transition of Care  Outcome: Ongoing, Progressing     Problem: Adjustment to Transplant (Kidney Transplant)  Goal: Optimal Coping with Organ Transplant  Outcome: Ongoing, Progressing     Problem: Bowel Motility Impaired (Kidney Transplant)  Goal: Effective Bowel Elimination  Outcome: Ongoing, Progressing     Problem: Donor Organ Function (Kidney Transplant)  Goal: Effective Renal Function  Outcome: Ongoing, Progressing     Problem: Fluid and Electrolyte Imbalance (Kidney Transplant)  Goal: Fluid and Electrolyte Balance  Outcome: Ongoing, Progressing     Problem: Glycemic Control Impaired (Kidney Transplant)  Goal: Blood Glucose Level Within Targeted Range  Outcome: Ongoing, Progressing     Problem: Oral Intake Inadequate (Kidney Transplant)  Goal: Optimal Nutrition Intake  Outcome: Ongoing, Progressing     Problem: Pain (Kidney Transplant)  Goal: Acceptable Pain Control  Outcome: Ongoing, Progressing     Problem: Pain Acute  Goal: Acceptable Pain Control and Functional Ability  Outcome: Ongoing, Progressing     Problem: Nausea and Vomiting  Goal: Fluid and Electrolyte Balance  Outcome: Ongoing, Progressing

## 2023-07-16 NOTE — SUBJECTIVE & OBJECTIVE
"Past Medical History:   Diagnosis Date    Allergy     Anemia     Anxiety     Breast feeding status of mother 1/17/2020    Brittle bones     ESRD (end stage renal disease)     M/W/F    General anesthetics causing adverse effect in therapeutic use     pt states "im a light weight"    Hypertension     Insomnia     PSG revealed no CHRYSTAL, but likely psychogenic etiology (anxiety)    RLS (restless legs syndrome)        Past Surgical History:   Procedure Laterality Date    Arm surgery Right     x 2    AV FISTULA PLACEMENT Right     CYSTOSCOPY      HYSTEROSCOPY WITH HYDROTHERMAL ABLATION OF ENDOMETRIUM WITH DILATION AND CURETTAGE N/A 12/04/2018    Procedure: HYSTEROSCOPY, WITH DILATION AND CURETTAGE OF UTERUS AND HYDROTHERMAL ENDOMETRIAL ABLATION;  Surgeon: Tiara Red MD;  Location: [facility];  Service: OB/GYN;  Laterality: N/A;  ATTEMPTED     KIDNEY TRANSPLANT N/A 01/02/2023    Procedure: TRANSPLANT, KIDNEY;  Surgeon: Go Beard MD;  Location: [address];  Service: Transplant;  Laterality: N/A;    LAPAROSCOPIC SALPINGECTOMY Bilateral 12/04/2018    Procedure: SALPINGECTOMY, LAPAROSCOPIC;  Surgeon: Tiara Red MD;  Location: [facility];  Service: OB/GYN;  Laterality: Bilateral;    RENAL BIOPSY      tumor removed      from face per medical records       Review of patient's allergies indicates:   Allergen Reactions    Lisinopril Other (See Comments)     Severe cough    Adhesive tape-silicones Itching     Burns    Furosemide Other (See Comments)     Almost gave her right sided heartfailure       No current facility-administered medications on file prior to encounter.     Current Outpatient Medications on File Prior to Encounter   Medication Sig    albuterol (PROVENTIL/VENTOLIN HFA) 90 mcg/actuation inhaler Inhale 1-2 puffs into the lungs every 6 (six) hours as needed for Wheezing (cough).    bisacodyL (DULCOLAX) 5 mg EC tablet Take 2 tablets (10 mg total) by mouth daily as needed for Constipation.    calcitRIOL " (ROCALTROL) 0.25 MCG Cap Take 1 capsule (0.25 mcg total) by mouth once daily.    clonazePAM (KLONOPIN) 0.5 MG tablet Take 0.5 mg by mouth daily as needed.    docusate sodium (COLACE) 100 MG capsule Take 1 capsule (100 mg total) by mouth 3 (three) times daily as needed for Constipation. (Patient not taking: Reported on 3/8/2023)    hydrALAZINE (APRESOLINE) 50 MG tablet Take 1 tablet (50 mg total) by mouth every 12 (twelve) hours. If BP <130 take 25 mg (cut in half)    ketoconazole (NIZORAL) 200 mg Tab Take 0.5 tablets (100 mg total) by mouth once daily.    magnesium oxide (MAG-OX) 400 mg (241.3 mg magnesium) tablet Take 2 tablets (800 mg total) by mouth 2 (two) times daily.    metoprolol tartrate (LOPRESSOR) 50 MG tablet Take 4 tablets (200 mg total) by mouth 2 (two) times daily.    mycophenolate (CELLCEPT) 250 mg Cap Take 4 capsules (1,000 mg total) by mouth 2 (two) times daily.    oxyCODONE (ROXICODONE) 5 MG immediate release tablet Take 1 tablet (5 mg total) by mouth every 6 (six) hours as needed for Pain.    pantoprazole (PROTONIX) 40 MG tablet Take 1 tablet (40 mg total) by mouth once daily.    predniSONE (DELTASONE) 5 MG tablet Take by mouth daily: 20 mg 1/6-1/12; 15mg 1/13-1/19; 10 mg 1/20-1/26; 5 mg daily thereafter 1/27/23    sodium bicarbonate 650 MG tablet Take 3 tablets (1,950 mg total) by mouth 3 (three) times daily.    tacrolimus (PROGRAF) 1 MG Cap Take 5 capsules (5 mg total) by mouth every morning AND 5 capsules (5 mg total) every evening.     Family History       Problem Relation (Age of Onset)    Breast cancer Paternal Grandmother    Diabetes Maternal Grandmother    Heart attack Maternal Grandmother    Hypertension Father    Lung cancer Maternal Grandfather    Prostate cancer Maternal Grandfather          Tobacco Use    Smoking status: Never    Smokeless tobacco: Never    Tobacco comments:     Situational smoking, due to family crisis   Substance and Sexual Activity    Alcohol use: Never    Drug  use: Never    Sexual activity: Yes     Partners: Male     Review of Systems   Constitutional:  Negative for activity change, diaphoresis, fatigue and unexpected weight change.   HENT:  Negative for congestion, ear pain and sore throat.    Eyes: Negative.    Respiratory:  Negative for shortness of breath and wheezing.    Cardiovascular:  Negative for chest pain and palpitations.   Gastrointestinal:  Positive for abdominal pain, diarrhea and nausea. Negative for constipation and vomiting.   Endocrine: Negative.    Genitourinary:  Negative for flank pain, hematuria and urgency.   Musculoskeletal:  Negative for joint swelling and neck pain.   Skin:  Negative for pallor.   Neurological:  Negative for seizures, syncope and light-headedness.   Hematological: Negative.    Psychiatric/Behavioral: Negative.         Objective:     Vital Signs (Most Recent):  Temp: 98.1 °F (36.7 °C) (07/16/23 0425)  Pulse: 105 (07/16/23 0425)  Resp: 19 (07/16/23 0425)  BP: (!) 141/90 (07/16/23 0425)  SpO2: 99 % (07/16/23 0425) Vital Signs (24h Range):  Temp:  [97.6 °F (36.4 °C)-99.6 °F (37.6 °C)] 98.1 °F (36.7 °C)  Pulse:  [] 105  Resp:  [16-19] 19  SpO2:  [98 %-100 %] 99 %  BP: (136-153)/(81-90) 141/90     Weight: 91.6 kg (201 lb 15.1 oz)  Body mass index is 33.6 kg/m².     Physical Exam  Constitutional:       Appearance: She is well-developed.      Comments: Asleep but easily aroused. Reports 8/10 pain, but resting comfortably     HENT:      Head: Normocephalic and atraumatic.   Cardiovascular:      Rate and Rhythm: Normal rate and regular rhythm.      Heart sounds: Normal heart sounds. No murmur heard.  Pulmonary:      Effort: Pulmonary effort is normal. No respiratory distress.      Breath sounds: Normal breath sounds.   Abdominal:      General: Bowel sounds are normal. There is no distension.      Palpations: Abdomen is soft.      Tenderness: There is no abdominal tenderness.   Musculoskeletal:         General: Normal range of  motion.      Cervical back: Normal range of motion and neck supple.   Skin:     General: Skin is warm and dry.   Neurological:      Mental Status: She is alert and oriented to person, place, and time.              Significant Labs: All pertinent labs within the past 24 hours have been reviewed.  CBC:   Recent Labs   Lab 07/15/23  2243   WBC 10.94   HGB 12.0   HCT 36.8*        CMP:   Recent Labs   Lab 07/15/23  2243      K 3.9      CO2 21*      BUN 14   CREATININE 1.1   CALCIUM 10.1   PROT 7.6   ALBUMIN 4.4   BILITOT 0.5   ALKPHOS 104   AST 18   ALT 18   ANIONGAP 13       Significant Imaging:   Imaging Results              CT Abdomen Pelvis  Without Contrast (Final result)  Result time 07/15/23 23:36:34      Final result by Whitney Bueno MD (07/15/23 23:36:34)                   Impression:      Small bowel ileus or enteritis suspected      Electronically signed by: Whitney Bueno  Date:    07/15/2023  Time:    23:36               Narrative:    EXAMINATION:  CT ABDOMEN PELVIS WITHOUT CONTRAST    CLINICAL HISTORY:  Abdominal pain, acute, nonlocalized;    TECHNIQUE:  Low dose axial images, sagittal and coronal reformations were obtained from the lung bases to the pubic symphysis.  Contrast was not administered.    COMPARISON:  Is January 2023    FINDINGS:  Liver and spleen stable size.  Gallbladder present    Pancreas unremarkable    Native kidneys atrophic    Small bowel minimally distended containing fluid.  No signs of appendicitis unenhanced    Right pelvic kidney without hydronephrosis    Urinary bladder decompressed    Uterus may be leiomyomatous

## 2023-07-16 NOTE — ASSESSMENT & PLAN NOTE
ESRD from FSGS s/p kidney transplant 1/2023.   --resume mycophenolate, prednisone, and tacrolimus for maintenance  --kidney function stable

## 2023-07-16 NOTE — ED PROVIDER NOTES
"SCRIBE #1 NOTE: I, Forrest Covarrubias, am scribing for, and in the presence of, Jason Lee MD. I have scribed the entire note.       History     Chief Complaint   Patient presents with    Abdominal Pain     Pt to ED via [facility] abd pain w/ N no V/D since 17:00 tonight. Pt seen in ED 7/13 for STD. Hx ESRD with kidney transplant 1/23.     Review of patient's allergies indicates:   Allergen Reactions    Lisinopril Other (See Comments)     Severe cough    Adhesive tape-silicones Itching     Burns    Furosemide Other (See Comments)     Almost gave her right sided heartfailure         History of Present Illness     HPI    7/15/2023, 10:00 PM  History obtained from the patient      History of Present Illness: Lisseth Schwartz is a 32 y.o. female patient with a PMHx of HTN, anemia, ESRD, and hx of kidney transplant who presents to the Emergency Department for evaluation of abd pain which onset gradually today PTA. Patient has a hx of kidney transplant in Jan. 2023. Symptoms are constant and moderate in severity. No mitigating or exacerbating factors reported. Associated sxs include CP, back pain, and nausea. Patient denies any fever, chills, dizziness, SOB, and all other sxs at this time. No prior Tx. No further complaints or concerns at this time.       Arrival mode: EMS     PCP: Primary Doctor No        Past Medical History:  Past Medical History:   Diagnosis Date    Allergy     Anemia     Anxiety     Breast feeding status of mother 1/17/2020    Brittle bones     ESRD (end stage renal disease)     M/W/F    General anesthetics causing adverse effect in therapeutic use     pt states "im a light weight"    Hypertension     Insomnia     PSG revealed no CHRYSTAL, but likely psychogenic etiology (anxiety)    RLS (restless legs syndrome)        Past Surgical History:  Past Surgical History:   Procedure Laterality Date    Arm surgery Right     x 2    AV FISTULA PLACEMENT Right     CYSTOSCOPY      HYSTEROSCOPY WITH HYDROTHERMAL " ABLATION OF ENDOMETRIUM WITH DILATION AND CURETTAGE N/A 12/04/2018    Procedure: HYSTEROSCOPY, WITH DILATION AND CURETTAGE OF UTERUS AND HYDROTHERMAL ENDOMETRIAL ABLATION;  Surgeon: Tiara Red MD;  Location: [facility];  Service: OB/GYN;  Laterality: N/A;  ATTEMPTED     KIDNEY TRANSPLANT N/A 01/02/2023    Procedure: TRANSPLANT, KIDNEY;  Surgeon: Go Beard MD;  [facility];  Service: Transplant;  Laterality: N/A;    LAPAROSCOPIC SALPINGECTOMY Bilateral 12/04/2018    Procedure: SALPINGECTOMY, LAPAROSCOPIC;  Surgeon: Tiara Red MD;  Location: [facility];  Service: OB/GYN;  Laterality: Bilateral;    RENAL BIOPSY      tumor removed      from face per medical records         Family History:  Family History   Problem Relation Age of Onset    Hypertension Father     Breast cancer Paternal Grandmother     Diabetes Maternal Grandmother     Heart attack Maternal Grandmother     Lung cancer Maternal Grandfather     Prostate cancer Maternal Grandfather        Social History:  Social History     Tobacco Use    Smoking status: Never    Smokeless tobacco: Never    Tobacco comments:     Situational smoking, due to family crisis   Substance and Sexual Activity    Alcohol use: Never    Drug use: Never    Sexual activity: Yes     Partners: Male        Review of Systems     Review of Systems   Constitutional:  Negative for fever.   HENT:  Negative for sore throat.    Respiratory:  Negative for shortness of breath.    Cardiovascular:  Positive for chest pain.   Gastrointestinal:  Positive for abdominal pain (upper) and nausea.   Genitourinary:  Negative for dysuria.   Musculoskeletal:  Positive for back pain.   Skin:  Negative for rash.   Neurological:  Negative for dizziness and weakness.   Hematological:  Does not bruise/bleed easily.   All other systems reviewed and are negative.     Physical Exam     Initial Vitals [07/15/23 2135]   BP Pulse Resp Temp SpO2   (!) 145/85 85 16 97.6 °F (36.4 °C) 98 %      MAP      "  --          Physical Exam  Constitutional:       General: She is not in acute distress.     Appearance: Normal appearance. She is well-developed. She is not ill-appearing.   HENT:      Head: Normocephalic and atraumatic.      Nose: No congestion or rhinorrhea.   Eyes:      Conjunctiva/sclera: Conjunctivae normal.      Pupils: Pupils are equal, round, and reactive to light.   Cardiovascular:      Rate and Rhythm: Normal rate and regular rhythm.      Heart sounds: Normal heart sounds. No murmur heard.    No friction rub. No gallop.   Pulmonary:      Effort: No respiratory distress.      Breath sounds: Normal breath sounds. No stridor. No wheezing, rhonchi or rales.   Abdominal:      General: Bowel sounds are normal. There is no distension.      Palpations: Abdomen is soft.      Tenderness: There is abdominal tenderness (upper quadrant). There is no guarding or rebound.       Musculoskeletal:         General: No tenderness, deformity or signs of injury. Normal range of motion.      Cervical back: Normal range of motion and neck supple. No tenderness.   Skin:     General: Skin is warm and dry.      Coloration: Skin is not jaundiced.      Findings: No rash.   Neurological:      General: No focal deficit present.      Mental Status: She is alert and oriented to person, place, and time.      Cranial Nerves: No cranial nerve deficit.      Sensory: No sensory deficit.      Motor: No weakness.     Nursing Notes and Vital Signs Reviewed.       ED Course   Procedures  ED Vital Signs:  Vitals:    07/15/23 2135 07/15/23 2220 07/15/23 2247   BP: (!) 145/85 136/83    Pulse: 85 83    Resp: 16 18 18   Temp: 97.6 °F (36.4 °C)     TempSrc: Oral     SpO2: 98% 100%    Height: 5' 5" (1.651 m)         Abnormal Lab Results:  Labs Reviewed   CBC W/ AUTO DIFFERENTIAL - Abnormal; Notable for the following components:       Result Value    RBC 3.84 (*)     Hematocrit 36.8 (*)     MCH 31.3 (*)     Immature Granulocytes 0.6 (*)     Gran # (ANC) " 9.6 (*)     Immature Grans (Abs) 0.07 (*)     Lymph # 0.5 (*)     Gran % 87.3 (*)     Lymph % 4.4 (*)     All other components within normal limits   COMPREHENSIVE METABOLIC PANEL - Abnormal; Notable for the following components:    CO2 21 (*)     All other components within normal limits   MAGNESIUM - Abnormal; Notable for the following components:    Magnesium 1.4 (*)     All other components within normal limits   LIPASE   PHOSPHORUS   URINALYSIS, REFLEX TO URINE CULTURE   PREGNANCY TEST, URINE RAPID        All Lab Results:  Results for orders placed or performed during the hospital encounter of 07/15/23   CBC W/ AUTO DIFFERENTIAL   Result Value Ref Range    WBC 10.94 3.90 - 12.70 K/uL    RBC 3.84 (L) 4.00 - 5.40 M/uL    Hemoglobin 12.0 12.0 - 16.0 g/dL    Hematocrit 36.8 (L) 37.0 - 48.5 %    MCV 96 82 - 98 fL    MCH 31.3 (H) 27.0 - 31.0 pg    MCHC 32.6 32.0 - 36.0 g/dL    RDW 12.0 11.5 - 14.5 %    Platelets 152 150 - 450 K/uL    MPV 11.8 9.2 - 12.9 fL    Immature Granulocytes 0.6 (H) 0.0 - 0.5 %    Gran # (ANC) 9.6 (H) 1.8 - 7.7 K/uL    Immature Grans (Abs) 0.07 (H) 0.00 - 0.04 K/uL    Lymph # 0.5 (L) 1.0 - 4.8 K/uL    Mono # 0.8 0.3 - 1.0 K/uL    Eos # 0.1 0.0 - 0.5 K/uL    Baso # 0.01 0.00 - 0.20 K/uL    nRBC 0 0 /100 WBC    Gran % 87.3 (H) 38.0 - 73.0 %    Lymph % 4.4 (L) 18.0 - 48.0 %    Mono % 7.1 4.0 - 15.0 %    Eosinophil % 0.5 0.0 - 8.0 %    Basophil % 0.1 0.0 - 1.9 %    Differential Method Automated    Comp. Metabolic Panel   Result Value Ref Range    Sodium 141 136 - 145 mmol/L    Potassium 3.9 3.5 - 5.1 mmol/L    Chloride 107 95 - 110 mmol/L    CO2 21 (L) 23 - 29 mmol/L    Glucose 107 70 - 110 mg/dL    BUN 14 6 - 20 mg/dL    Creatinine 1.1 0.5 - 1.4 mg/dL    Calcium 10.1 8.7 - 10.5 mg/dL    Total Protein 7.6 6.0 - 8.4 g/dL    Albumin 4.4 3.5 - 5.2 g/dL    Total Bilirubin 0.5 0.1 - 1.0 mg/dL    Alkaline Phosphatase 104 55 - 135 U/L    AST 18 10 - 40 U/L    ALT 18 10 - 44 U/L    eGFR >60 >60 mL/min/1.73  "m^2    Anion Gap 13 8 - 16 mmol/L   Lipase   Result Value Ref Range    Lipase 46 4 - 60 U/L   Magnesium   Result Value Ref Range    Magnesium 1.4 (L) 1.6 - 2.6 mg/dL   Phosphorus   Result Value Ref Range    Phosphorus 2.8 2.7 - 4.5 mg/dL        Imaging Results:  Imaging Results              CT Abdomen Pelvis  Without Contrast (Final result)  Result time 07/15/23 23:36:34      Final result by Whitney Bueno MD (07/15/23 23:36:34)                   Impression:      Small bowel ileus or enteritis suspected      Electronically signed by: Whitney Bueno  Date:    07/15/2023  Time:    23:36               Narrative:    EXAMINATION:  CT ABDOMEN PELVIS WITHOUT CONTRAST    CLINICAL HISTORY:  Abdominal pain, acute, nonlocalized;    TECHNIQUE:  Low dose axial images, sagittal and coronal reformations were obtained from the lung bases to the pubic symphysis.  Contrast was not administered.    COMPARISON:  Is January 2023    FINDINGS:  Liver and spleen stable size.  Gallbladder present    Pancreas unremarkable    Native kidneys atrophic    Small bowel minimally distended containing fluid.  No signs of appendicitis unenhanced    Right pelvic kidney without hydronephrosis    Urinary bladder decompressed    Uterus may be leiomyomatous                                              The Emergency Provider reviewed the vital signs and test results, which are outlined above.     ED Discussion     1:08 AM: Discussed case with Robin Bonner NP ([facility]). Dr. Nunez agrees with current care and management of pt and accepts admission.   Admitting Service:   Admitting Physician: Dr. Nunez  Admit to: obs [facility] tele       ED Course as of 07/16/23 0109   Sat Jul 15, 2023   2203 History per transplant team notes:  "Ms. Schwartz is a 32 y.o. 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 Surgery without complication. Her most recent creatinine is 1.6.  She takes mycophenolate mofetil, prednisone, and tacrolimus for maintenance immunosuppression."  [KB]      ED Course User Index  [KB] Jason Lee MD     Medical Decision Making:   History:   Old Medical Records: I decided to obtain old medical records.  Old Records Summarized: records from previous admission(s), records from another hospital and records from clinic visits.  Differential Diagnosis:   Enteritis, ileus, SBO  Clinical Tests:   Lab Tests: Ordered and Reviewed  Radiological Study: Ordered and Reviewed  Other:   I have discussed this case with another health care provider.       <> Summary of the Discussion: Discussed with HM to continue evaluation and management of patient with refractory N/V and possible ileus.         ED Medication(s):  Medications   diphenhydrAMINE injection 12.5 mg (12.5 mg Intravenous Not Given 7/15/23 2230)   metoclopramide HCl injection 5 mg (has no administration in time range)   morphine injection 6 mg (3 mg Intravenous Given 7/15/23 2247)   ondansetron injection 4 mg (4 mg Intravenous Given 7/15/23 2248)   prochlorperazine injection Soln 10 mg (10 mg Intravenous Given 7/15/23 2248)       New Prescriptions    No medications on file               Scribe Attestation:   Scribe #1: I performed the above scribed service and the documentation accurately describes the services I performed. I attest to the accuracy of the note.     Attending:   Physician Attestation Statement for Scribe #1: I, Jason Lee MD, personally performed the services described in this documentation, as scribed by Forrest Covarrubias, in my presence, and it is both accurate and complete.           Clinical Impression       ICD-10-CM ICD-9-CM   1. Enteritis  K52.9 558.9   2. Ileus  K56.7 560.1   3. FSGS (focal segmental glomerulosclerosis)  N05.1 582.1   4. Psychophysiological insomnia  F51.04 307.42   5. Inadequate " sleep hygiene  Z72.821 307.49   6. Anemia of chronic disease  D63.8 285.29   7. Secondary hyperparathyroidism  N25.81 588.81   8. -donor kidney transplant  Z94.0 V42.0   9. Hypomagnesemia  E83.42 275.2   10. Essential hypertension  I10 401.9   11. Gastroesophageal reflux disease without esophagitis  K21.9 530.81   12. Prophylactic immunotherapy  Z29.8 V07.2       Disposition:   Disposition: Placed in Observation  Condition: Jose Lee MD  23 0937

## 2023-07-16 NOTE — PROGRESS NOTES
"Lisseth Schwartz, a 32-year-old patient with a history of hypertension and end-stage renal disease related to FSGS, underwent a kidney transplant in January 2023. She visited the Emergency Department (ED) due to abdominal pain that started a day prior, accompanied by nausea, vomiting, and diarrhea. However, she denied any symptoms of fever, chills, chest pain, hematemesis, or bright red blood per rectum (BRBPR). The patient's abdomen CT scan indicated a potential ileus or enteritis. Furthermore, she confirmed an episode of vomiting in the ED, with the last occurrence of loose stool before her ED visit. Both lab results and vitals remained stable. The patient was admitted to observation.     /68 (BP Location: Left arm, Patient Position: Lying)   Pulse 98   Temp 99.1 °F (37.3 °C) (Oral)   Resp 18   Ht 5' 5" (1.651 m)   Wt 91.6 kg (201 lb 15.1 oz)   SpO2 100%   Breastfeeding No   BMI 33.60 kg/m²       At our encounter this morning, patient reports feeling much better. She is scheduled for antiemetics, while her antidiarrheal medication has been withheld due to the absence of further episodes of diarrhea. Plans to resume a clear diet have been made when suitable, followed by an advancement to a bland diet.    Rest of plan as stated in the H&P from this morning.     Jewel Melendez MD  [facility]      "

## 2023-07-16 NOTE — ASSESSMENT & PLAN NOTE
CT demonstrates ileus vs enteritis  --Gentle hydration overnight and NPO given possible ileus  --Scheduled antiemetics  --Hold antidiarrheal agents as patient has not had other episodes of diarrhea.  --Resume clear diet when appropriate then advance to bland

## 2023-07-17 VITALS
WEIGHT: 201.94 LBS | HEIGHT: 65 IN | BODY MASS INDEX: 33.65 KG/M2 | OXYGEN SATURATION: 98 % | SYSTOLIC BLOOD PRESSURE: 121 MMHG | TEMPERATURE: 98 F | HEART RATE: 76 BPM | RESPIRATION RATE: 16 BRPM | DIASTOLIC BLOOD PRESSURE: 66 MMHG

## 2023-07-17 PROCEDURE — 25000003 PHARM REV CODE 250: Mod: HCNC | Performed by: NURSE PRACTITIONER

## 2023-07-17 PROCEDURE — G0378 HOSPITAL OBSERVATION PER HR: HCPCS | Mod: HCNC

## 2023-07-17 PROCEDURE — 63600175 PHARM REV CODE 636 W HCPCS: Mod: HCNC | Performed by: NURSE PRACTITIONER

## 2023-07-17 RX ORDER — PANTOPRAZOLE SODIUM 40 MG/1
40 TABLET, DELAYED RELEASE ORAL DAILY
Status: DISCONTINUED | OUTPATIENT
Start: 2023-07-17 | End: 2023-07-17 | Stop reason: HOSPADM

## 2023-07-17 RX ADMIN — PREDNISONE 5 MG: 5 TABLET ORAL at 08:07

## 2023-07-17 RX ADMIN — MYCOPHENOLATE MOFETIL 1000 MG: 250 CAPSULE ORAL at 08:07

## 2023-07-17 RX ADMIN — PANTOPRAZOLE SODIUM 40 MG: 40 TABLET, DELAYED RELEASE ORAL at 09:07

## 2023-07-17 RX ADMIN — Medication 800 MG: at 08:07

## 2023-07-17 RX ADMIN — ONDANSETRON 4 MG: 4 SOLUTION ORAL at 06:07

## 2023-07-17 RX ADMIN — HYDRALAZINE HYDROCHLORIDE 50 MG: 50 TABLET ORAL at 08:07

## 2023-07-17 RX ADMIN — TACROLIMUS 5 MG: 1 CAPSULE ORAL at 07:07

## 2023-07-17 RX ADMIN — SODIUM BICARBONATE 650 MG TABLET 1950 MG: at 08:07

## 2023-07-17 RX ADMIN — CALCITRIOL CAPSULES 0.25 MCG 0.25 MCG: 0.25 CAPSULE ORAL at 08:07

## 2023-07-17 RX ADMIN — METOPROLOL TARTRATE 200 MG: 50 TABLET, FILM COATED ORAL at 08:07

## 2023-07-17 NOTE — ASSESSMENT & PLAN NOTE
-ESRD from FSGS s/p kidney transplant 1/2023   -resume mycophenolate, prednisone, and tacrolimus for maintenance  -kidney function stable

## 2023-07-17 NOTE — ASSESSMENT & PLAN NOTE
Chronic, controlled  -Latest blood pressure and vitals reviewed-   Temp:  [98 °F (36.7 °C)-99.3 °F (37.4 °C)]   Pulse:  [66-87]   Resp:  [16-18]   BP: (115-132)/(60-68)   SpO2:  [98 %-100 %] .   -Home meds for hypertension were reviewed and noted below.   Hypertension Medications             hydrALAZINE (APRESOLINE) 50 MG tablet Take 1 tablet (50 mg total) by mouth every 12 (twelve) hours. If BP <130 take 25 mg (cut in half)    metoprolol tartrate (LOPRESSOR) 50 MG tablet Take 4 tablets (200 mg total) by mouth 2 (two) times daily.        -While in the hospital, will manage blood pressure as follows; Continue home antihypertensive regimen  -PRN anti-hypertensive medication if patient's BP > 160/100   -optimize pain management

## 2023-07-17 NOTE — PLAN OF CARE
[facility] - Telemetry (Hospital)  Discharge Assessment    Primary Care Provider: Primary Doctor No     Discharge Assessment (most recent)       BRIEF DISCHARGE ASSESSMENT - 07/17/23 5883          Discharge Planning    Assessment Type Discharge Planning Brief Assessment     Resource/Environmental Concerns none     Support Systems Parent   mother, Tana Schwartz    Equipment Currently Used at Home none     Patient/Family Anticipates Transition to home     Patient/Family Anticipated Services at Transition none     DME Needed Upon Discharge  none     Discharge Plan A Home with family

## 2023-07-17 NOTE — PLAN OF CARE
[facility] - Telemetry (Hospital)  Discharge Assessment    Primary Care Provider: Primary Doctor No     Discharge Assessment (most recent)       BRIEF DISCHARGE ASSESSMENT - 07/17/23 2313          Discharge Planning    Assessment Type Discharge Planning Brief Assessment     Resource/Environmental Concerns none     Support Systems Parent   mother, Tana Schwartz    Equipment Currently Used at Home none     Patient/Family Anticipates Transition to home     Patient/Family Anticipated Services at Transition none     DME Needed Upon Discharge  none     Discharge Plan A Home with family

## 2023-07-17 NOTE — DISCHARGE SUMMARY
'[facility] ([facility])  [facility] Medicine  Discharge Summary      Patient Name: Lisseth Schwartz  MRN: [id number]  ALEXANDRE: [id number]  Patient Class: OP- Observation  Admission Date: 7/15/2023  Hospital Length of Stay: 0 days  Discharge Date and Time: 7/17/2023 12:08 PM  Attending Physician: Jonathon Garrido MD  Discharging Provider: Anjelica Covarrubias NP  Primary Care Provider: Primary Doctor Calli    Primary Care Team: Networked reference to record PCT     HPI:   Lisseth Schwartz is a 32 year with history of hypertension, ESRD r/t FSGS  s/p kidney transplant 1/2023 who presented to ED for further evaluation of abdominal pain that began yesterday. Admits to associated symptoms of Nausea, vomiting, and diarrhea. She denies fever, chills, chest pain, hematemesis, she denies BRBPR.     CT abdomen shows evidence of ileus versus enteritis. She admits to emesis in ED. Last loose stool was prior to arrival to ED. Labs and vitals are stable. She will be placed in observation under the care of [facility] medicine.       * No surgery found *      Hospital Course:   31 y/o female admitted with c/o abdominal pain with nausea and vomiting. She reports eating an egg roll from her favorite restaurant and while eating it, she began to have pain in her upper stomach with associated nausea and vomiting. She had a friend that ate the same thing and she also reports not feeling well after eating. She came to the hospital and said she doesn't remember because she was out of it. She started having diarrhea after arriving to hospital. Imaging revealed an ileus verses enteritis. She was admitted for overnight evaluation.   Her abdominal pain resolved and no further nausea or vomiting. She still reports some diarrhea on discharge, but improving.   She was able to tolerate regular diet.   She reports having similar episodes in the past.  Will send referral to GI for evaluation and OP case management.    Follow up with PCP in 3-5 days for  hospital follow up.     Patient seen and examined on the day of discharge.  All questions and concerns were addressed prior to discharge.    Face to face encounter with patient: 33 minutes       Goals of Care Treatment Preferences:  Code Status: Full Code      Consults:     Cardiac/Vascular  Essential hypertension  Chronic, controlled  -Latest blood pressure and vitals reviewed-   Temp:  [98 °F (36.7 °C)-99.3 °F (37.4 °C)]   Pulse:  [66-87]   Resp:  [16-18]   BP: (115-132)/(60-68)   SpO2:  [98 %-100 %] .   -Home meds for hypertension were reviewed and noted below.   Hypertension Medications               hydrALAZINE (APRESOLINE) 50 MG tablet Take 1 tablet (50 mg total) by mouth every 12 (twelve) hours. If BP <130 take 25 mg (cut in half)    metoprolol tartrate (LOPRESSOR) 50 MG tablet Take 4 tablets (200 mg total) by mouth 2 (two) times daily.          -While in the hospital, will manage blood pressure as follows; Continue home antihypertensive regimen  -PRN anti-hypertensive medication if patient's BP > 160/100   -optimize pain management    Renal/  Hypomagnesemia  -Mg level 1.4  -repleted  -will need follow up with PCP for repeat labs    -donor kidney transplant  -ESRD from FSGS s/p kidney transplant 2023   -resume mycophenolate, prednisone, and tacrolimus for maintenance  -kidney function stable    GI  * Enteritis  -CT demonstrates ileus vs enteritis  -s/p gentle hydration while NPO   -antiemetics PRN      Final Active Diagnoses:    Diagnosis Date Noted POA    PRINCIPAL PROBLEM:  Enteritis [K52.9] 2023 Yes    Hypomagnesemia [E83.42] 2023 Yes    -donor kidney transplant [Z94.0] 2023 Not Applicable    Essential hypertension [I10] 12/10/2015 Yes      Problems Resolved During this Admission:       Discharged Condition: good    Disposition: Home or Self Care    Follow Up:   Follow-up Information       Niki Ac NP. Schedule an appointment as soon as possible for a visit.     Specialty: Internal Medicine  Why: call office and schedule a hospital follow up in 3-5 days  Contact information:  [address] [place] [address]  [phone]                           Patient Instructions:      Ambulatory referral/consult to Outpatient Case Management   Referral Priority: Routine Referral Type: Consultation   Referral Reason: Specialty Services Required   Number of Visits Requested: 1     Ambulatory referral/consult to Gastroenterology   Standing Status: Future   Referral Priority: Routine Referral Type: Consultation   Referral Reason: Specialty Services Required   Requested Specialty: Gastroenterology   Number of Visits Requested: 1     Diet renal     Activity as tolerated       Significant Diagnostic Studies: Labs: CMP   Recent Labs   Lab 07/15/23  2243      K 3.9      CO2 21*      BUN 14   CREATININE 1.1   CALCIUM 10.1   PROT 7.6   ALBUMIN 4.4   BILITOT 0.5   ALKPHOS 104   AST 18   ALT 18   ANIONGAP 13    and CBC   Recent Labs   Lab 07/15/23  2243   WBC 10.94   HGB 12.0   HCT 36.8*          Pending Diagnostic Studies:       None           Medications:  Reconciled Home Medications:      Medication List        CONTINUE taking these medications      albuterol 90 mcg/actuation inhaler  Commonly known as: PROVENTIL/VENTOLIN HFA  Inhale 1-2 puffs into the lungs every 6 (six) hours as needed for Wheezing (cough).     bisacodyL 5 mg EC tablet  Commonly known as: DULCOLAX  Take 2 tablets (10 mg total) by mouth daily as needed for Constipation.     calcitRIOL 0.25 MCG Cap  Commonly known as: ROCALTROL  Take 1 capsule (0.25 mcg total) by mouth once daily.     clonazePAM 0.5 MG tablet  Commonly known as: KlonoPIN  Take 0.5 mg by mouth daily as needed.     hydrALAZINE 50 MG tablet  Commonly known as: APRESOLINE  Take 1 tablet (50 mg total) by mouth every 12 (twelve) hours. If BP <130 take 25 mg (cut in half)     ketoconazole 200 mg Tab  Commonly known as: NIZORAL  Take  0.5 tablets (100 mg total) by mouth once daily.     magnesium oxide 400 mg (241.3 mg magnesium) tablet  Commonly known as: MAG-OX  Take 2 tablets (800 mg total) by mouth 2 (two) times daily.     metoprolol tartrate 50 MG tablet  Commonly known as: LOPRESSOR  Take 4 tablets (200 mg total) by mouth 2 (two) times daily.     mycophenolate 250 mg Cap  Commonly known as: CELLCEPT  Take 4 capsules (1,000 mg total) by mouth 2 (two) times daily.     oxyCODONE 5 MG immediate release tablet  Commonly known as: ROXICODONE  Take 1 tablet (5 mg total) by mouth every 6 (six) hours as needed for Pain.     pantoprazole 40 MG tablet  Commonly known as: PROTONIX  Take 1 tablet (40 mg total) by mouth once daily.     predniSONE 5 MG tablet  Commonly known as: DELTASONE  Take by mouth daily: 20 mg 1/6-1/12; 15mg 1/13-1/19; 10 mg 1/20-1/26; 5 mg daily thereafter 1/27/23     sodium bicarbonate 650 MG tablet  Take 3 tablets (1,950 mg total) by mouth 3 (three) times daily.     tacrolimus 1 MG Cap  Commonly known as: PROGRAF  Take 5 capsules (5 mg total) by mouth every morning AND 5 capsules (5 mg total) every evening.            STOP taking these medications      docusate sodium 100 MG capsule  Commonly known as: COLACE              Indwelling Lines/Drains at time of discharge:   Lines/Drains/Airways       Drain  Duration                  Hemodialysis AV Fistula Right upper arm -- days         Ureteral Drain/Stent 01/02/23 Right ureter 7 Fr. 196 days                    Time spent on the discharge of patient: 46 minutes         Anjelica Covarrubias NP  Department of Hospital Medicine  '[facility] ([facility])

## 2023-07-17 NOTE — PLAN OF CARE
AOx4. Able to verbalize needs & follow commands. Calm and cooperative throughout shift.   NAEON. POC reviewed with pt. Interventions implemented as appropriate.    VS stable; SR on tele-monitor.  On RA.    PIV patent. Saline locked. Integrity maintained.   Clear liquid diet, per Dr. Melendez's note.   Skin WDI. No new skin issues.   No c/o pain.   Continent of b/b.   Ambulatory. Activity ad floresita. Frequent position changes encouraged. Able to reposition in bed independently.  Educated on s/sx of pressure injury;  verbalized understanding.  NADN. Resting quietly in bed.   Free of falls. Hourly rounding complete.   All safety measures remain in place. SR up x2; bed low & locked. Call light w/in reach.   Will continue to monitor throughout shift.  Chart check complete.

## 2023-07-17 NOTE — HOSPITAL COURSE
31 y/o female admitted with c/o abdominal pain with nausea and vomiting. She reports eating an egg roll from her favorite restaurant and while eating it, she began to have pain in her upper stomach with associated nausea and vomiting. She had a friend that ate the same thing and she also reports not feeling well after eating. She came to the hospital and said she doesn't remember because she was out of it. She started having diarrhea after arriving to hospital. Imaging revealed an ileus verses enteritis. She was admitted for overnight evaluation.   Her abdominal pain resolved and no further nausea or vomiting. She still reports some diarrhea on discharge, but improving.   She was able to tolerate regular diet.   She reports having similar episodes in the past.  Will send referral to GI for evaluation and OP case management.    Follow up with PCP in 3-5 days for hospital follow up.     Patient seen and examined on the day of discharge.  All questions and concerns were addressed prior to discharge.    Face to face encounter with patient: 33 minutes

## 2023-08-07 ENCOUNTER — LAB VISIT (OUTPATIENT)
Dept: LAB | Facility: HOSPITAL | Age: 33
End: 2023-08-07
Attending: INTERNAL MEDICINE
Payer: MEDICARE

## 2023-08-07 DIAGNOSIS — Z94.0 KIDNEY REPLACED BY TRANSPLANT: ICD-10-CM

## 2023-08-07 LAB
ALBUMIN SERPL BCP-MCNC: 3.6 G/DL (ref 3.5–5.2)
ANION GAP SERPL CALC-SCNC: 7 MMOL/L (ref 8–16)
BASOPHILS # BLD AUTO: 0.03 K/UL (ref 0–0.2)
BASOPHILS NFR BLD: 0.6 % (ref 0–1.9)
BUN SERPL-MCNC: 21 MG/DL (ref 6–20)
CALCIUM SERPL-MCNC: 9.7 MG/DL (ref 8.7–10.5)
CHLORIDE SERPL-SCNC: 109 MMOL/L (ref 95–110)
CO2 SERPL-SCNC: 24 MMOL/L (ref 23–29)
CREAT SERPL-MCNC: 1.1 MG/DL (ref 0.5–1.4)
DIFFERENTIAL METHOD: ABNORMAL
EOSINOPHIL # BLD AUTO: 0.1 K/UL (ref 0–0.5)
EOSINOPHIL NFR BLD: 1.6 % (ref 0–8)
ERYTHROCYTE [DISTWIDTH] IN BLOOD BY AUTOMATED COUNT: 12 % (ref 11.5–14.5)
EST. GFR  (NO RACE VARIABLE): >60 ML/MIN/1.73 M^2
GLUCOSE SERPL-MCNC: 113 MG/DL (ref 70–110)
HCT VFR BLD AUTO: 36.6 % (ref 37–48.5)
HGB BLD-MCNC: 11.6 G/DL (ref 12–16)
IMM GRANULOCYTES # BLD AUTO: 0.06 K/UL (ref 0–0.04)
IMM GRANULOCYTES NFR BLD AUTO: 1.2 % (ref 0–0.5)
LYMPHOCYTES # BLD AUTO: 0.6 K/UL (ref 1–4.8)
LYMPHOCYTES NFR BLD: 11.4 % (ref 18–48)
MAGNESIUM SERPL-MCNC: 1.4 MG/DL (ref 1.6–2.6)
MCH RBC QN AUTO: 31 PG (ref 27–31)
MCHC RBC AUTO-ENTMCNC: 31.7 G/DL (ref 32–36)
MCV RBC AUTO: 98 FL (ref 82–98)
MONOCYTES # BLD AUTO: 0.6 K/UL (ref 0.3–1)
MONOCYTES NFR BLD: 12.4 % (ref 4–15)
NEUTROPHILS # BLD AUTO: 3.6 K/UL (ref 1.8–7.7)
NEUTROPHILS NFR BLD: 72.8 % (ref 38–73)
NRBC BLD-RTO: 0 /100 WBC
PHOSPHATE SERPL-MCNC: 3.3 MG/DL (ref 2.7–4.5)
PLATELET # BLD AUTO: 168 K/UL (ref 150–450)
PMV BLD AUTO: 11.5 FL (ref 9.2–12.9)
POTASSIUM SERPL-SCNC: 4.1 MMOL/L (ref 3.5–5.1)
RBC # BLD AUTO: 3.74 M/UL (ref 4–5.4)
SODIUM SERPL-SCNC: 140 MMOL/L (ref 136–145)
WBC # BLD AUTO: 4.93 K/UL (ref 3.9–12.7)

## 2023-08-07 PROCEDURE — 85025 COMPLETE CBC W/AUTO DIFF WBC: CPT | Mod: HCNC | Performed by: INTERNAL MEDICINE

## 2023-08-07 PROCEDURE — 80197 ASSAY OF TACROLIMUS: CPT | Mod: HCNC | Performed by: INTERNAL MEDICINE

## 2023-08-07 PROCEDURE — 83735 ASSAY OF MAGNESIUM: CPT | Mod: HCNC | Performed by: INTERNAL MEDICINE

## 2023-08-07 PROCEDURE — 80069 RENAL FUNCTION PANEL: CPT | Mod: HCNC | Performed by: INTERNAL MEDICINE

## 2023-08-07 PROCEDURE — 36415 COLL VENOUS BLD VENIPUNCTURE: CPT | Mod: HCNC | Performed by: INTERNAL MEDICINE

## 2023-08-08 ENCOUNTER — TELEPHONE (OUTPATIENT)
Dept: TRANSPLANT | Facility: CLINIC | Age: 33
End: 2023-08-08
Payer: MEDICARE

## 2023-08-08 DIAGNOSIS — Z94.0 S/P KIDNEY TRANSPLANT: ICD-10-CM

## 2023-08-08 LAB — TACROLIMUS BLD-MCNC: 6.5 NG/ML (ref 5–15)

## 2023-08-08 RX ORDER — CALCITRIOL 0.25 UG/1
0.25 CAPSULE ORAL DAILY
Qty: 30 CAPSULE | Refills: 5 | Status: SHIPPED | OUTPATIENT
Start: 2023-08-08 | End: 2023-11-13

## 2023-08-08 NOTE — TELEPHONE ENCOUNTER
Called and spoke with patient about low prograf level. Pt reports level is more like a 13 hour trough, and she is correctly taking half-tab keto daily and 5mg prograf BID. Instructed pt to repeat prograf level tomorrow AM. Pt verbalized understanding.   ----- Message from Sara Caballero MD sent at 8/8/2023  1:05 PM CDT -----  Please verify this level is accurate, meds have not changed [ new ones added or removed], and repeat a 12 hr trough.   Assess if taking ketoconazole

## 2023-08-09 ENCOUNTER — LAB VISIT (OUTPATIENT)
Dept: LAB | Facility: HOSPITAL | Age: 33
End: 2023-08-09
Payer: MEDICARE

## 2023-08-09 DIAGNOSIS — Z94.0 KIDNEY REPLACED BY TRANSPLANT: ICD-10-CM

## 2023-08-09 PROCEDURE — 36415 COLL VENOUS BLD VENIPUNCTURE: CPT | Mod: HCNC | Performed by: INTERNAL MEDICINE

## 2023-08-09 PROCEDURE — 80197 ASSAY OF TACROLIMUS: CPT | Mod: HCNC | Performed by: INTERNAL MEDICINE

## 2023-08-10 DIAGNOSIS — Z94.0 S/P KIDNEY TRANSPLANT: ICD-10-CM

## 2023-08-10 DIAGNOSIS — Z94.0 KIDNEY REPLACED BY TRANSPLANT: ICD-10-CM

## 2023-08-10 LAB — TACROLIMUS BLD-MCNC: 5.4 NG/ML (ref 5–15)

## 2023-08-10 NOTE — TELEPHONE ENCOUNTER
Message sent. labs 8/21.     ----- Message from Sara Caballero MD sent at 8/10/2023  1:19 PM CDT -----  Increase tacro to 6 mg BID if this is 12 hr trough.

## 2023-08-11 RX ORDER — TACROLIMUS 1 MG/1
CAPSULE ORAL
Qty: 480 CAPSULE | Refills: 11 | Status: SHIPPED | OUTPATIENT
Start: 2023-08-11 | End: 2023-12-15

## 2023-08-14 ENCOUNTER — PATIENT MESSAGE (OUTPATIENT)
Dept: PHARMACY | Facility: CLINIC | Age: 33
End: 2023-08-14
Payer: MEDICARE

## 2023-08-14 ENCOUNTER — OFFICE VISIT (OUTPATIENT)
Dept: TRANSPLANT | Facility: CLINIC | Age: 33
End: 2023-08-14
Payer: MEDICARE

## 2023-08-14 ENCOUNTER — PATIENT MESSAGE (OUTPATIENT)
Dept: TRANSPLANT | Facility: CLINIC | Age: 33
End: 2023-08-14

## 2023-08-14 DIAGNOSIS — Z94.0 IMMUNOSUPPRESSIVE MANAGEMENT ENCOUNTER FOLLOWING KIDNEY TRANSPLANT: ICD-10-CM

## 2023-08-14 DIAGNOSIS — Z79.899 IMMUNOSUPPRESSIVE MANAGEMENT ENCOUNTER FOLLOWING KIDNEY TRANSPLANT: ICD-10-CM

## 2023-08-14 DIAGNOSIS — Z94.0 DECEASED-DONOR KIDNEY TRANSPLANT: ICD-10-CM

## 2023-08-14 DIAGNOSIS — N18.2 CHRONIC KIDNEY DISEASE (CKD), STAGE II (MILD): Primary | ICD-10-CM

## 2023-08-14 DIAGNOSIS — I12.9 RENAL HYPERTENSION: ICD-10-CM

## 2023-08-14 DIAGNOSIS — K56.7 ILEUS: ICD-10-CM

## 2023-08-14 DIAGNOSIS — Z29.89 PROPHYLACTIC IMMUNOTHERAPY: ICD-10-CM

## 2023-08-14 PROCEDURE — 99215 PR OFFICE/OUTPT VISIT, EST, LEVL V, 40-54 MIN: ICD-10-PCS | Mod: HCNC,95,, | Performed by: INTERNAL MEDICINE

## 2023-08-14 PROCEDURE — 3066F PR DOCUMENTATION OF TREATMENT FOR NEPHROPATHY: ICD-10-PCS | Mod: HCNC,CPTII,95, | Performed by: INTERNAL MEDICINE

## 2023-08-14 PROCEDURE — 99215 OFFICE O/P EST HI 40 MIN: CPT | Mod: HCNC,95,, | Performed by: INTERNAL MEDICINE

## 2023-08-14 PROCEDURE — 3066F NEPHROPATHY DOC TX: CPT | Mod: HCNC,CPTII,95, | Performed by: INTERNAL MEDICINE

## 2023-08-14 NOTE — PROGRESS NOTES
The patient location is:  Home  The chief complaint leading to consultation is:  Reassessment of kidney function and immunosuppression    Visit type: audiovisual    Face to Face time with patient:  24 minutes  Fifty minutes of total time spent on the encounter, which includes face to face time and non-face to face time preparing to see the patient (eg, review of tests), Obtaining and/or reviewing separately obtained history, Documenting clinical information in the electronic or other health record, Independently interpreting results (not separately reported) and communicating results to the patient/family/caregiver, or Care coordination (not separately reported).  Today, I also spoke to pharmacist about her medications.    Each patient to whom he or she provides medical services by telemedicine is:  (1) informed of the relationship between the physician and patient and the respective role of any other health care provider with respect to management of the patient; and (2) notified that he or she may decline to receive medical services by telemedicine and may withdraw from such care at any time.    Notes:     Post-Transplant Assessment    Referring Physician: Andres Hoyt  Current Nephrologist: Eduard Vance    ORGAN: RIGHT KIDNEY  Donor Type: donation after brain death  PHS Increased Risk: no  Cold Ischemia: 1,259 mins  Induction Medications: thymoglobulin    Subjective:     CC:  Reassessment of renal allograft function and management of chronic immunosuppression.    HPI:  Ms. Schwartz is a 32 y.o. year old Black or  female who received a donation after brain death kidney transplant on 1/2/23.  She has CKD stage 2 - GFR 60-89 and her baseline creatinine is low 1s. She takes mycophenolate mofetil, prednisone, and tacrolimus for maintenance immunosuppression.      POST TRANSPLANT UPDATE 08/14/2023   Lisseth feels well overall.  She does mention occasionally feeling bloated if she has stomach  issues given she was recently hospitalized with ileus versus enteritis.  We discussed at length that she should see gastroenterology, and I noted consult has already been placed for her  She takes ketoconazole to augment tacrolimus levels.  Today, she reported CANNOT AFFORD ketoconazole and other OTC meds.  She understands importance of continuing the ketoconazole, but noted she had despite whatever catheterization she had on getting school supplies for her children    Current Outpatient Medications   Medication Sig    albuterol (PROVENTIL/VENTOLIN HFA) 90 mcg/actuation inhaler Inhale 1-2 puffs into the lungs every 6 (six) hours as needed for Wheezing (cough).    bisacodyL (DULCOLAX) 5 mg EC tablet Take 2 tablets (10 mg total) by mouth daily as needed for Constipation.    calcitRIOL (ROCALTROL) 0.25 MCG Cap Take 1 capsule (0.25 mcg total) by mouth once daily.    clonazePAM (KLONOPIN) 0.5 MG tablet Take 0.5 mg by mouth daily as needed.    hydrALAZINE (APRESOLINE) 50 MG tablet Take 1 tablet (50 mg total) by mouth every 12 (twelve) hours. If BP <130 take 25 mg (cut in half)    ketoconazole (NIZORAL) 200 mg Tab Take 0.5 tablets (100 mg total) by mouth once daily.    magnesium oxide (MAG-OX) 400 mg (241.3 mg magnesium) tablet Take 2 tablets (800 mg total) by mouth 2 (two) times daily.    metoprolol tartrate (LOPRESSOR) 50 MG tablet Take 4 tablets (200 mg total) by mouth 2 (two) times daily.    mycophenolate (CELLCEPT) 250 mg Cap Take 4 capsules (1,000 mg total) by mouth 2 (two) times daily.    oxyCODONE (ROXICODONE) 5 MG immediate release tablet Take 1 tablet (5 mg total) by mouth every 6 (six) hours as needed for Pain.    pantoprazole (PROTONIX) 40 MG tablet Take 1 tablet (40 mg total) by mouth once daily.    predniSONE (DELTASONE) 5 MG tablet Take by mouth daily: 20 mg 1/6-1/12; 15mg 1/13-1/19; 10 mg 1/20-1/26; 5 mg daily thereafter 1/27/23    sodium bicarbonate 650 MG tablet Take 3 tablets (1,950 mg total) by mouth 3  (three) times daily.    tacrolimus (PROGRAF) 1 MG Cap Take 6 capsules (6 mg total) by mouth every morning AND 6 capsules (6 mg total) every evening.     No current facility-administered medications for this visit.         Review of Systems   Constitutional:  Negative for fever.   Eyes:  Negative for visual disturbance.   Respiratory:  Negative for shortness of breath.    Cardiovascular:  Negative for chest pain and leg swelling.   Gastrointestinal:  Positive for abdominal distention (Bloating).   Genitourinary:  Negative for difficulty urinating and dysuria.   Skin:  Negative for rash.   Allergic/Immunologic: Positive for immunocompromised state.     Objective:     There were no vitals taken for this visit.body mass index is unknown because there is no height or weight on file.    Physical Exam appears well.  Speech, affect normal.    Labs:  Recent Labs   Lab 09/24/20  0818 07/12/21  1212 12/14/21  1113 03/28/22  2032 06/07/22  0942 01/02/23  0859 01/02/23  0907 04/03/23  1018 04/17/23  1130 06/05/23  1126 06/05/23  1139 07/05/23  1140 07/05/23  1145 07/15/23  2243 08/07/23  1146 08/09/23  1114   WBC 7.79  --   --  7.99  --  6.34   < > 3.42 L   < >  --    < >  --  4.74 10.94 4.93  --    Hemoglobin 9.5 L  --   --  10.2 L   < > 8.5 L   < > 11.7 L   < >  --    < >  --  11.5 L 12.0 11.6 L  --    POC Hematocrit  --   --   --   --   --   --    < >  --   --   --   --   --   --   --   --   --    Hematocrit 29.0 L  --   --  30.3 L   < > 26.9 L   < > 35.5 L   < >  --    < >  --  36.8 L 36.8 L 36.6 L  --    Sodium 139   < >  --  133 L  --  141   < > 140   < >  --    < >  --  143 141 140  --    Potassium 4.5   < >  --  4.2  --  4.2   < > 3.9   < >  --    < >  --  4.6 3.9 4.1  --    Chloride 93 L   < >  --  92 L  --  98   < > 110   < >  --    < >  --  112 H 107 109  --    CO2 31 H  --   --  25  --  25   < > 22 L   < >  --    < >  --  19 L 21 L 24  --    Carbon Dioxide  --    < >  --   --   --   --   --   --   --   --   --   --    --   --   --   --    BUN 63 H  --   --  54 H  --  73 H   < > 17   < >  --    < >  --  25 H 14 21 H  --    Blood Urea Nitrogen  --    < >  --   --   --   --   --   --   --   --   --   --   --   --   --   --    Creatinine 16.0 H   < >  --  12.8 H  --  21.1 H   < > 1.4   < >  --    < >  --  1.4 1.1 1.1  --    eGFR if non  2.6 A  --   --  3 A  --   --   --   --   --   --   --   --   --   --   --   --    Calcium 10.2   < >  --  10.2  --  10.1   < > 9.5   < >  --    < >  --  10.0 10.1 9.7  --    Phosphorus 5.8 H  --   --  4.3  --  10.4 HH   < > 2.4 L   < >  --    < >  --  2.8 2.8 3.3  --    Magnesium  --   --   --  1.9  --   --    < > 1.4 L   < >  --    < >  --  1.5 L 1.4 L 1.4 L  --    Albumin 4.3  --   --  4.3  --  3.9   < > 4.0  4.0   < >  --    < >  --  4.2  4.2 4.4 3.6  --    AST 17  --   --  13  --  10   < > 15  --   --   --   --  15 18  --   --    ALT 19  --   --  15  --  10   < > 25  --   --   --   --  16 18  --   --    Prot/Creat Ratio, Urine  --   --   --   --   --   --    < >  --    < > 0.08  --  0.07  --   --  Unable to calculate  --    PTH, Intact 520.0 H  --  963.6 H  --   --  1,451.5 H  --   --   --   --   --   --   --   --   --   --    Tacrolimus Lvl  --   --   --   --   --   --    < > 14.8   < >  --    < >  --  10.1  --  6.5 5.4    < > = values in this interval not displayed.   Labs were reviewed with the patient.    Assessment:     1. Chronic kidney disease (CKD), stage II (mild)    2. -donor kidney transplant    3. Immunosuppressive management encounter following kidney transplant    4. Prophylactic immunotherapy    5. Renal hypertension    6. Ileus      Plan:   New orders:  PTH with next lab      CKD 2 post kidney transplant 2023  -doing well, now 224 days post  -electrolytes and urinalysis show no issues concern    Immunosuppression management for patient on prophylactic immunosuppression  -continue tacrolimus, CellCept, prednisone  -ketoconazole being used to augment  tacrolimus levels.  Currently, she reports not being able to afford the medication.  I have spoken to pharmacy in initiated the process of trying to explore options for the patient.  She is aware we will contact her once we have more information  -continue monitoring for toxicity and side effects.  Her complaints of bloating and GI issues merits close monitoring.  It is possible this could be related to mycophenolate, the pattern is rather unusual    History of secondary hyperparathyroidism with PTH January 2023 was 1,451  -repeat PTH with next lab    At risk for opportunistic infections  -completed prophylaxis with Valcyte and Bactrim  -bk pcr negative 07/05/2023    Renal hypertension   - continue current meds and monitor reason home    Follow-up:   Clinic: return to transplant clinic weekly for the first month after transplant; every 2 weeks during months 2-3; then at 6-, 9-, 12-, 18-, 24-, and 36- months post-transplant to reassess for complications from immunosuppression toxicity and monitor for rejection.  Annually thereafter.    Labs: since patient remains at high risk for rejection and drug-related complications that warrant close monitoring, labs will be ordered as follows: continue twice weekly CBC, renal panel, and drug level for first month; then same labs once weekly through 3rd month post-transplant.  Urine for UA and protein/creatinine ratio monthly.  Serum BK - PCR at 1-, 3-, 6-, 9-, 12-, 18-, 24-, 36- 48-, and 60 months post-transplant.  Hepatic panel at 1-, 2-, 3-, 6-, 9-, 12-, 18-, 24-, and 36- months post-transplant.    Sara Caballero MD       [facility] Patient Status  Functional Status: 90% - Able to carry on normal activity: minor symptoms of disease  Physical Capacity: No Limitations

## 2023-08-14 NOTE — LETTER
August 14, 2023        Eduard Vance  [address]  [address]  [facility]  [place] [address]  Phone: [phone]  Fax: [phone]             [facility] Trans[facility] 1st Fl  [address]  [facility] [address]  Phone: [phone]   Patient: Lisseth Schwartz   MR Number: [id number]   YOB: 1990   Date of Visit: 8/14/2023       Dear Dr. Eduard Vance    Thank you for referring Lisseth Schwartz to me for evaluation. Attached you will find relevant portions of my assessment and plan of care.    If you have questions, please do not hesitate to call me. I look forward to following Lisseth Schwartz along with you.    Sincerely,    Sara Caballero MD    Enclosure    If you would like to receive this communication electronically, please contact [email or web address] or [phone] to request [employer] access.    [employer] is a tool which provides read-only access to select patient information with whom you have a relationship. Its easy to use and provides real time access to review your patients record including encounter summaries, notes, results, and demographic information.    If you feel you have received this communication in error or would no longer like to receive these types of communications, please e-mail [email or web address]

## 2023-08-16 DIAGNOSIS — N25.81 SECONDARY HYPERPARATHYROIDISM: Primary | ICD-10-CM

## 2023-08-18 ENCOUNTER — OUTPATIENT CASE MANAGEMENT (OUTPATIENT)
Dept: ADMINISTRATIVE | Facility: OTHER | Age: 33
End: 2023-08-18
Payer: MEDICARE

## 2023-08-30 RX ORDER — ALBUTEROL SULFATE 90 UG/1
1-2 AEROSOL, METERED RESPIRATORY (INHALATION) EVERY 6 HOURS PRN
Qty: 8.5 G | Refills: 1 | OUTPATIENT
Start: 2023-08-30

## 2023-09-11 ENCOUNTER — LAB VISIT (OUTPATIENT)
Dept: LAB | Facility: HOSPITAL | Age: 33
End: 2023-09-11
Attending: INTERNAL MEDICINE
Payer: MEDICARE

## 2023-09-11 DIAGNOSIS — Z94.0 KIDNEY REPLACED BY TRANSPLANT: ICD-10-CM

## 2023-09-11 LAB
ALBUMIN SERPL BCP-MCNC: 3.9 G/DL (ref 3.5–5.2)
ANION GAP SERPL CALC-SCNC: 10 MMOL/L (ref 8–16)
BASOPHILS # BLD AUTO: 0.03 K/UL (ref 0–0.2)
BASOPHILS NFR BLD: 0.6 % (ref 0–1.9)
BUN SERPL-MCNC: 15 MG/DL (ref 6–20)
CALCIUM SERPL-MCNC: 9.6 MG/DL (ref 8.7–10.5)
CHLORIDE SERPL-SCNC: 107 MMOL/L (ref 95–110)
CO2 SERPL-SCNC: 23 MMOL/L (ref 23–29)
CREAT SERPL-MCNC: 1.2 MG/DL (ref 0.5–1.4)
DIFFERENTIAL METHOD: ABNORMAL
EOSINOPHIL # BLD AUTO: 0.1 K/UL (ref 0–0.5)
EOSINOPHIL NFR BLD: 2 % (ref 0–8)
ERYTHROCYTE [DISTWIDTH] IN BLOOD BY AUTOMATED COUNT: 12.1 % (ref 11.5–14.5)
EST. GFR  (NO RACE VARIABLE): >60 ML/MIN/1.73 M^2
GLUCOSE SERPL-MCNC: 100 MG/DL (ref 70–110)
HCT VFR BLD AUTO: 36.8 % (ref 37–48.5)
HGB BLD-MCNC: 11.7 G/DL (ref 12–16)
IMM GRANULOCYTES # BLD AUTO: 0.02 K/UL (ref 0–0.04)
IMM GRANULOCYTES NFR BLD AUTO: 0.4 % (ref 0–0.5)
LYMPHOCYTES # BLD AUTO: 0.7 K/UL (ref 1–4.8)
LYMPHOCYTES NFR BLD: 12.8 % (ref 18–48)
MAGNESIUM SERPL-MCNC: 1.6 MG/DL (ref 1.6–2.6)
MCH RBC QN AUTO: 31.1 PG (ref 27–31)
MCHC RBC AUTO-ENTMCNC: 31.8 G/DL (ref 32–36)
MCV RBC AUTO: 98 FL (ref 82–98)
MONOCYTES # BLD AUTO: 0.6 K/UL (ref 0.3–1)
MONOCYTES NFR BLD: 11 % (ref 4–15)
NEUTROPHILS # BLD AUTO: 3.7 K/UL (ref 1.8–7.7)
NEUTROPHILS NFR BLD: 73.2 % (ref 38–73)
NRBC BLD-RTO: 0 /100 WBC
PHOSPHATE SERPL-MCNC: 3.2 MG/DL (ref 2.7–4.5)
PLATELET # BLD AUTO: 173 K/UL (ref 150–450)
PMV BLD AUTO: 11.4 FL (ref 9.2–12.9)
POTASSIUM SERPL-SCNC: 4.1 MMOL/L (ref 3.5–5.1)
RBC # BLD AUTO: 3.76 M/UL (ref 4–5.4)
SODIUM SERPL-SCNC: 140 MMOL/L (ref 136–145)
WBC # BLD AUTO: 5.08 K/UL (ref 3.9–12.7)

## 2023-09-11 PROCEDURE — 83735 ASSAY OF MAGNESIUM: CPT | Mod: HCNC | Performed by: INTERNAL MEDICINE

## 2023-09-11 PROCEDURE — 80197 ASSAY OF TACROLIMUS: CPT | Mod: HCNC | Performed by: INTERNAL MEDICINE

## 2023-09-11 PROCEDURE — 80069 RENAL FUNCTION PANEL: CPT | Mod: HCNC | Performed by: INTERNAL MEDICINE

## 2023-09-11 PROCEDURE — 85025 COMPLETE CBC W/AUTO DIFF WBC: CPT | Mod: HCNC | Performed by: INTERNAL MEDICINE

## 2023-09-11 PROCEDURE — 36415 COLL VENOUS BLD VENIPUNCTURE: CPT | Mod: HCNC | Performed by: INTERNAL MEDICINE

## 2023-09-12 LAB — TACROLIMUS BLD-MCNC: 10.2 NG/ML (ref 5–15)

## 2023-10-03 ENCOUNTER — LAB VISIT (OUTPATIENT)
Dept: LAB | Facility: HOSPITAL | Age: 33
End: 2023-10-03
Attending: INTERNAL MEDICINE
Payer: MEDICARE

## 2023-10-03 DIAGNOSIS — Z94.0 KIDNEY REPLACED BY TRANSPLANT: ICD-10-CM

## 2023-10-03 LAB
ALBUMIN SERPL BCP-MCNC: 4.5 G/DL (ref 3.5–5.2)
ALBUMIN SERPL BCP-MCNC: 4.5 G/DL (ref 3.5–5.2)
ALP SERPL-CCNC: 78 U/L (ref 55–135)
ALT SERPL W/O P-5'-P-CCNC: 17 U/L (ref 10–44)
ANION GAP SERPL CALC-SCNC: 11 MMOL/L (ref 8–16)
AST SERPL-CCNC: 18 U/L (ref 10–40)
BASOPHILS # BLD AUTO: 0.03 K/UL (ref 0–0.2)
BASOPHILS NFR BLD: 0.6 % (ref 0–1.9)
BILIRUB DIRECT SERPL-MCNC: 0.3 MG/DL (ref 0.1–0.3)
BILIRUB SERPL-MCNC: 0.7 MG/DL (ref 0.1–1)
BUN SERPL-MCNC: 22 MG/DL (ref 6–20)
CALCIUM SERPL-MCNC: 10 MG/DL (ref 8.7–10.5)
CHLORIDE SERPL-SCNC: 106 MMOL/L (ref 95–110)
CHOLEST SERPL-MCNC: 118 MG/DL (ref 120–199)
CHOLEST/HDLC SERPL: 3.3 {RATIO} (ref 2–5)
CO2 SERPL-SCNC: 22 MMOL/L (ref 23–29)
CREAT SERPL-MCNC: 1.2 MG/DL (ref 0.5–1.4)
DIFFERENTIAL METHOD: ABNORMAL
EOSINOPHIL # BLD AUTO: 0.2 K/UL (ref 0–0.5)
EOSINOPHIL NFR BLD: 3.4 % (ref 0–8)
ERYTHROCYTE [DISTWIDTH] IN BLOOD BY AUTOMATED COUNT: 11.9 % (ref 11.5–14.5)
EST. GFR  (NO RACE VARIABLE): >60 ML/MIN/1.73 M^2
GLUCOSE SERPL-MCNC: 96 MG/DL (ref 70–110)
HCT VFR BLD AUTO: 39.4 % (ref 37–48.5)
HDLC SERPL-MCNC: 36 MG/DL (ref 40–75)
HDLC SERPL: 30.5 % (ref 20–50)
HGB BLD-MCNC: 12.7 G/DL (ref 12–16)
IMM GRANULOCYTES # BLD AUTO: 0.02 K/UL (ref 0–0.04)
IMM GRANULOCYTES NFR BLD AUTO: 0.4 % (ref 0–0.5)
INR PPP: 1 (ref 0.8–1.2)
LDLC SERPL CALC-MCNC: 62.8 MG/DL (ref 63–159)
LYMPHOCYTES # BLD AUTO: 0.8 K/UL (ref 1–4.8)
LYMPHOCYTES NFR BLD: 15.1 % (ref 18–48)
MAGNESIUM SERPL-MCNC: 1.6 MG/DL (ref 1.6–2.6)
MCH RBC QN AUTO: 31 PG (ref 27–31)
MCHC RBC AUTO-ENTMCNC: 32.2 G/DL (ref 32–36)
MCV RBC AUTO: 96 FL (ref 82–98)
MONOCYTES # BLD AUTO: 0.6 K/UL (ref 0.3–1)
MONOCYTES NFR BLD: 10.2 % (ref 4–15)
NEUTROPHILS # BLD AUTO: 3.8 K/UL (ref 1.8–7.7)
NEUTROPHILS NFR BLD: 70.3 % (ref 38–73)
NONHDLC SERPL-MCNC: 82 MG/DL
NRBC BLD-RTO: 0 /100 WBC
PHOSPHATE SERPL-MCNC: 2.6 MG/DL (ref 2.7–4.5)
PLATELET # BLD AUTO: 177 K/UL (ref 150–450)
PMV BLD AUTO: 11.3 FL (ref 9.2–12.9)
POTASSIUM SERPL-SCNC: 4.1 MMOL/L (ref 3.5–5.1)
PROT SERPL-MCNC: 7.7 G/DL (ref 6–8.4)
PROTHROMBIN TIME: 11 SEC (ref 9–12.5)
RBC # BLD AUTO: 4.1 M/UL (ref 4–5.4)
SODIUM SERPL-SCNC: 139 MMOL/L (ref 136–145)
TRIGL SERPL-MCNC: 96 MG/DL (ref 30–150)
WBC # BLD AUTO: 5.37 K/UL (ref 3.9–12.7)

## 2023-10-03 PROCEDURE — 86977 RBC SERUM PRETX INCUBJ/INHIB: CPT | Mod: HCNC,PO | Performed by: INTERNAL MEDICINE

## 2023-10-03 PROCEDURE — 84075 ASSAY ALKALINE PHOSPHATASE: CPT | Mod: HCNC | Performed by: INTERNAL MEDICINE

## 2023-10-03 PROCEDURE — 83735 ASSAY OF MAGNESIUM: CPT | Mod: HCNC | Performed by: INTERNAL MEDICINE

## 2023-10-03 PROCEDURE — 80061 LIPID PANEL: CPT | Mod: HCNC | Performed by: INTERNAL MEDICINE

## 2023-10-03 PROCEDURE — 85025 COMPLETE CBC W/AUTO DIFF WBC: CPT | Mod: HCNC | Performed by: INTERNAL MEDICINE

## 2023-10-03 PROCEDURE — 86832 HLA CLASS I HIGH DEFIN QUAL: CPT | Mod: HCNC,PO | Performed by: INTERNAL MEDICINE

## 2023-10-03 PROCEDURE — 86833 HLA CLASS II HIGH DEFIN QUAL: CPT | Mod: HCNC,PO | Performed by: INTERNAL MEDICINE

## 2023-10-03 PROCEDURE — 80197 ASSAY OF TACROLIMUS: CPT | Mod: HCNC | Performed by: INTERNAL MEDICINE

## 2023-10-03 PROCEDURE — 86352 CELL FUNCTION ASSAY W/STIM: CPT | Mod: HCNC | Performed by: INTERNAL MEDICINE

## 2023-10-03 PROCEDURE — 80069 RENAL FUNCTION PANEL: CPT | Mod: HCNC | Performed by: INTERNAL MEDICINE

## 2023-10-03 PROCEDURE — 87799 DETECT AGENT NOS DNA QUANT: CPT | Mod: HCNC | Performed by: INTERNAL MEDICINE

## 2023-10-03 PROCEDURE — 85610 PROTHROMBIN TIME: CPT | Mod: HCNC | Performed by: INTERNAL MEDICINE

## 2023-10-04 ENCOUNTER — LAB VISIT (OUTPATIENT)
Dept: LAB | Facility: HOSPITAL | Age: 33
End: 2023-10-04
Attending: INTERNAL MEDICINE
Payer: MEDICARE

## 2023-10-04 DIAGNOSIS — T86.10 COMPLICATION OF TRANSPLANTED KIDNEY, UNSPECIFIED COMPLICATION: Primary | ICD-10-CM

## 2023-10-04 DIAGNOSIS — Z94.0 KIDNEY REPLACED BY TRANSPLANT: ICD-10-CM

## 2023-10-04 LAB
CMV DNA SPEC QL NAA+PROBE: NORMAL
CYTOMEGALOVIRUS PCR, QUANT: NOT DETECTED IU/ML
TACROLIMUS BLD-MCNC: 7.7 NG/ML (ref 5–15)

## 2023-10-04 PROCEDURE — 36415 COLL VENOUS BLD VENIPUNCTURE: CPT | Mod: HCNC | Performed by: INTERNAL MEDICINE

## 2023-10-04 PROCEDURE — 86352 CELL FUNCTION ASSAY W/STIM: CPT | Mod: HCNC | Performed by: INTERNAL MEDICINE

## 2023-10-09 LAB
CLASS I ANTIBODY COMMENTS - LUMINEX: NORMAL
CLASS II ANTIBODY COMMENTS - LUMINEX: NORMAL
DSA1 TESTING DATE: NORMAL
DSA12 TESTING DATE: NORMAL
DSA2 TESTING DATE: NORMAL
SERUM COLLECTION DT - LUMINEX CLASS I: NORMAL
SERUM COLLECTION DT - LUMINEX CLASS II: NORMAL

## 2023-10-10 RX ORDER — ALBUTEROL SULFATE 90 UG/1
1-2 AEROSOL, METERED RESPIRATORY (INHALATION) EVERY 6 HOURS PRN
Qty: 8.5 G | Refills: 1 | Status: SHIPPED | OUTPATIENT
Start: 2023-10-10

## 2023-10-11 ENCOUNTER — PATIENT MESSAGE (OUTPATIENT)
Dept: TRANSPLANT | Facility: CLINIC | Age: 33
End: 2023-10-11
Payer: MEDICARE

## 2023-10-12 DIAGNOSIS — R63.5 WEIGHT GAIN: ICD-10-CM

## 2023-10-12 DIAGNOSIS — T86.10 COMPLICATION OF TRANSPLANTED KIDNEY, UNSPECIFIED COMPLICATION: Primary | ICD-10-CM

## 2023-10-16 ENCOUNTER — TELEPHONE (OUTPATIENT)
Dept: BARIATRICS | Facility: CLINIC | Age: 33
End: 2023-10-16
Payer: MEDICARE

## 2023-10-17 ENCOUNTER — HOSPITAL ENCOUNTER (EMERGENCY)
Facility: HOSPITAL | Age: 33
Discharge: HOME | End: 2023-10-17
Attending: EMERGENCY MEDICINE
Payer: MEDICARE

## 2023-10-17 VITALS
HEIGHT: 65 IN | SYSTOLIC BLOOD PRESSURE: 130 MMHG | DIASTOLIC BLOOD PRESSURE: 85 MMHG | WEIGHT: 206.13 LBS | BODY MASS INDEX: 34.34 KG/M2 | TEMPERATURE: 98 F | RESPIRATION RATE: 18 BRPM | HEART RATE: 74 BPM | OXYGEN SATURATION: 99 %

## 2023-10-17 DIAGNOSIS — M79.601 RIGHT ARM PAIN: Primary | ICD-10-CM

## 2023-10-17 LAB
ALBUMIN SERPL BCP-MCNC: 4.4 G/DL (ref 3.5–5.2)
ALP SERPL-CCNC: 107 U/L (ref 55–135)
ALT SERPL W/O P-5'-P-CCNC: 16 U/L (ref 10–44)
ANION GAP SERPL CALC-SCNC: 12 MMOL/L (ref 8–16)
AST SERPL-CCNC: 14 U/L (ref 10–40)
BASOPHILS # BLD AUTO: 0.03 K/UL (ref 0–0.2)
BASOPHILS NFR BLD: 0.4 % (ref 0–1.9)
BILIRUB SERPL-MCNC: 0.5 MG/DL (ref 0.1–1)
BUN SERPL-MCNC: 18 MG/DL (ref 6–20)
CALCIUM SERPL-MCNC: 9.8 MG/DL (ref 8.7–10.5)
CHLORIDE SERPL-SCNC: 106 MMOL/L (ref 95–110)
CO2 SERPL-SCNC: 22 MMOL/L (ref 23–29)
CREAT SERPL-MCNC: 1.1 MG/DL (ref 0.5–1.4)
DIFFERENTIAL METHOD: ABNORMAL
EOSINOPHIL # BLD AUTO: 0.1 K/UL (ref 0–0.5)
EOSINOPHIL NFR BLD: 0.9 % (ref 0–8)
ERYTHROCYTE [DISTWIDTH] IN BLOOD BY AUTOMATED COUNT: 12.1 % (ref 11.5–14.5)
EST. GFR  (NO RACE VARIABLE): >60 ML/MIN/1.73 M^2
GLUCOSE SERPL-MCNC: 152 MG/DL (ref 70–110)
HCT VFR BLD AUTO: 38.8 % (ref 37–48.5)
HGB BLD-MCNC: 12.6 G/DL (ref 12–16)
IMM GRANULOCYTES # BLD AUTO: 0.02 K/UL (ref 0–0.04)
IMM GRANULOCYTES NFR BLD AUTO: 0.3 % (ref 0–0.5)
LYMPHOCYTES # BLD AUTO: 0.9 K/UL (ref 1–4.8)
LYMPHOCYTES NFR BLD: 13.7 % (ref 18–48)
MCH RBC QN AUTO: 31.3 PG (ref 27–31)
MCHC RBC AUTO-ENTMCNC: 32.5 G/DL (ref 32–36)
MCV RBC AUTO: 97 FL (ref 82–98)
MONOCYTES # BLD AUTO: 0.5 K/UL (ref 0.3–1)
MONOCYTES NFR BLD: 7.7 % (ref 4–15)
NEUTROPHILS # BLD AUTO: 5.2 K/UL (ref 1.8–7.7)
NEUTROPHILS NFR BLD: 77 % (ref 38–73)
NRBC BLD-RTO: 0 /100 WBC
PLATELET # BLD AUTO: 166 K/UL (ref 150–450)
PMV BLD AUTO: 11.1 FL (ref 9.2–12.9)
POTASSIUM SERPL-SCNC: 3.6 MMOL/L (ref 3.5–5.1)
PROT SERPL-MCNC: 7.9 G/DL (ref 6–8.4)
RBC # BLD AUTO: 4.02 M/UL (ref 4–5.4)
SODIUM SERPL-SCNC: 140 MMOL/L (ref 136–145)
WBC # BLD AUTO: 6.73 K/UL (ref 3.9–12.7)

## 2023-10-17 PROCEDURE — 80053 COMPREHEN METABOLIC PANEL: CPT | Mod: HCNC | Performed by: EMERGENCY MEDICINE

## 2023-10-17 PROCEDURE — 25000003 PHARM REV CODE 250: Mod: HCNC | Performed by: EMERGENCY MEDICINE

## 2023-10-17 PROCEDURE — 85025 COMPLETE CBC W/AUTO DIFF WBC: CPT | Mod: HCNC | Performed by: EMERGENCY MEDICINE

## 2023-10-17 PROCEDURE — 99284 EMERGENCY DEPT VISIT MOD MDM: CPT | Mod: 25,HCNC

## 2023-10-17 RX ORDER — ACETAMINOPHEN 500 MG
1000 TABLET ORAL
Status: COMPLETED | OUTPATIENT
Start: 2023-10-17 | End: 2023-10-17

## 2023-10-17 RX ORDER — ACETAMINOPHEN 325 MG/1
325 TABLET ORAL EVERY 6 HOURS PRN
Refills: 0 | COMMUNITY
Start: 2023-10-17

## 2023-10-17 RX ADMIN — ACETAMINOPHEN 1000 MG: 500 TABLET ORAL at 02:10

## 2023-10-17 NOTE — ED PROVIDER NOTES
"SCRIBE #1 NOTE: I, Danna Muniz, am scribing for, and in the presence of, Ronnie Burrell Jr., MD. I have scribed the entire note.       History     Chief Complaint   Patient presents with    Arm Pain     Pt kidney transplant 1/23. Presents to ED tonight CO pain and numbness to R arm. Dialysis fistula still present. Arm very warm and red to touch. Denies any sticks or proceedures to that arm     Review of patient's allergies indicates:   Allergen Reactions    Lisinopril Other (See Comments)     Severe cough    Adhesive tape-silicones Itching     Burns    Furosemide Other (See Comments)     Almost gave her right sided heartfailure         History of Present Illness     HPI    10/17/2023, 1:40 AM  History obtained from the patient      History of Present Illness: Lisseth Schwartz is a 32 y.o. female patient with a PMHx of anemia, ESRD (hemodialysis MWF) s/p kidney transplant (1/2/23), and HTN who presents to the Emergency Department for evaluation of RUE pain which onset this evening. The pt completed dialysis (RUE fistula) this morning with no complications. Tonight, she reports swelling, redness, warmth, pain, and weakness to the RUE as well as numbness and paresthesia to the right fingertips. Symptoms are constant and moderate in severity. No mitigating or exacerbating factors reported. Patient denies any fever, CP, SOB, N/V, and all other sxs at this time. No prior Tx reported. No further complaints or concerns at this time.       Arrival mode: Personal vehicle    PCP: No, Primary Doctor        Past Medical History:  Past Medical History:   Diagnosis Date    Allergy     Anemia     Anxiety     Breast feeding status of mother 1/17/2020    Brittle bones     ESRD (end stage renal disease)     M/W/F    General anesthetics causing adverse effect in therapeutic use     pt states "im a light weight"    Hypertension     Insomnia     PSG revealed no CHRYSTAL, but likely psychogenic etiology (anxiety)    RLS (restless legs " syndrome)        Past Surgical History:  Past Surgical History:   Procedure Laterality Date    Arm surgery Right     x 2    AV FISTULA PLACEMENT Right     CYSTOSCOPY      HYSTEROSCOPY WITH HYDROTHERMAL ABLATION OF ENDOMETRIUM WITH DILATION AND CURETTAGE N/A 12/04/2018    Procedure: HYSTEROSCOPY, WITH DILATION AND CURETTAGE OF UTERUS AND HYDROTHERMAL ENDOMETRIAL ABLATION;  Surgeon: Tiara Red MD;  Location: [facility] OR;  Service: OB/GYN;  Laterality: N/A;  ATTEMPTED     KIDNEY TRANSPLANT N/A 01/02/2023    Procedure: TRANSPLANT, KIDNEY;  Surgeon: Go Beard MD;  Location: [address];  Service: Transplant;  Laterality: N/A;    LAPAROSCOPIC SALPINGECTOMY Bilateral 12/04/2018    Procedure: SALPINGECTOMY, LAPAROSCOPIC;  Surgeon: Tiara Red MD;  Location: [facility] OR;  Service: OB/GYN;  Laterality: Bilateral;    RENAL BIOPSY      tumor removed      from face per medical records         Family History:  Family History   Problem Relation Age of Onset    Hypertension Father     Breast cancer Paternal Grandmother     Diabetes Maternal Grandmother     Heart attack Maternal Grandmother     Lung cancer Maternal Grandfather     Prostate cancer Maternal Grandfather        Social History:  Social History     Tobacco Use    Smoking status: Never    Smokeless tobacco: Never    Tobacco comments:     Situational smoking, due to family crisis   Substance and Sexual Activity    Alcohol use: Never    Drug use: Never    Sexual activity: Yes     Partners: Male        Review of Systems     Review of Systems   Constitutional:  Negative for fever.   HENT:  Negative for sore throat.    Respiratory:  Negative for shortness of breath.    Cardiovascular:  Negative for chest pain.   Gastrointestinal:  Negative for nausea and vomiting.   Genitourinary:  Negative for dysuria.   Musculoskeletal:  Positive for myalgias (RUE). Negative for back pain.   Skin:  Negative for rash.   Neurological:  Positive for weakness (RUE) and numbness (right  "fingertips).        [+] paresthesia (right fingertips)   Hematological:  Does not bruise/bleed easily.   All other systems reviewed and are negative.     Physical Exam     Initial Vitals [10/17/23 0054]   BP Pulse Resp Temp SpO2   (!) 143/88 71 18 97.9 °F (36.6 °C) 100 %      MAP       --          Physical Exam  Nursing Notes and Vital Signs Reviewed.  Constitutional: Patient is in no acute distress. Well-developed and well-nourished.  Head: Atraumatic. Normocephalic.  Eyes: PERRL. EOM intact. Conjunctivae are not pale. No scleral icterus.  ENT: Mucous membranes are moist. Oropharynx is clear and symmetric.    Neck: Supple. Full ROM. No lymphadenopathy.  Cardiovascular: Regular rate. Regular rhythm. No murmurs, rubs, or gallops. Distal pulses are 2+ and symmetric.  Pulmonary/Chest: No respiratory distress. Clear to auscultation bilaterally. No wheezing or rales.  Abdominal: Soft and non-distended.  There is no tenderness.  No rebound, guarding, or rigidity. Good bowel sounds.  Genitourinary: No CVA tenderness  Musculoskeletal: Moves all extremities. No obvious deformities. No edema. No calf tenderness. RUE fistula with good thrill. Neurovascularly intact distal to pain. Mild tenderness to the area around the fistula, no palpable fluctuance.  Skin: Warm and dry.  Neurological:  Alert, awake, and appropriate.  Normal speech.  No acute focal neurological deficits are appreciated.  Psychiatric: Normal affect. Good eye contact. Appropriate in content.     ED Course   Procedures  ED Vital Signs:  Vitals:    10/17/23 0054 10/17/23 0208 10/17/23 0230 10/17/23 0330   BP: (!) 143/88 138/83 137/79 138/88   Pulse: 71 65 65 74   Resp: 18      Temp: 97.9 °F (36.6 °C)      TempSrc: Oral      SpO2: 100% 100% 99% 99%   Weight: 93.5 kg (206 lb 2.1 oz)      Height: 5' 5" (1.651 m)       10/17/23 0520   BP: 130/85   Pulse: 74   Resp:    Temp:    TempSrc:    SpO2: 99%   Weight:    Height:        Abnormal Lab Results:  Labs Reviewed "   CBC W/ AUTO DIFFERENTIAL - Abnormal; Notable for the following components:       Result Value    MCH 31.3 (*)     Lymph # 0.9 (*)     Gran % 77.0 (*)     Lymph % 13.7 (*)     All other components within normal limits   COMPREHENSIVE METABOLIC PANEL - Abnormal; Notable for the following components:    CO2 22 (*)     Glucose 152 (*)     All other components within normal limits        All Lab Results:  Results for orders placed or performed during the hospital encounter of 10/17/23   CBC auto differential   Result Value Ref Range    WBC 6.73 3.90 - 12.70 K/uL    RBC 4.02 4.00 - 5.40 M/uL    Hemoglobin 12.6 12.0 - 16.0 g/dL    Hematocrit 38.8 37.0 - 48.5 %    MCV 97 82 - 98 fL    MCH 31.3 (H) 27.0 - 31.0 pg    MCHC 32.5 32.0 - 36.0 g/dL    RDW 12.1 11.5 - 14.5 %    Platelets 166 150 - 450 K/uL    MPV 11.1 9.2 - 12.9 fL    Immature Granulocytes 0.3 0.0 - 0.5 %    Gran # (ANC) 5.2 1.8 - 7.7 K/uL    Immature Grans (Abs) 0.02 0.00 - 0.04 K/uL    Lymph # 0.9 (L) 1.0 - 4.8 K/uL    Mono # 0.5 0.3 - 1.0 K/uL    Eos # 0.1 0.0 - 0.5 K/uL    Baso # 0.03 0.00 - 0.20 K/uL    nRBC 0 0 /100 WBC    Gran % 77.0 (H) 38.0 - 73.0 %    Lymph % 13.7 (L) 18.0 - 48.0 %    Mono % 7.7 4.0 - 15.0 %    Eosinophil % 0.9 0.0 - 8.0 %    Basophil % 0.4 0.0 - 1.9 %    Differential Method Automated    Comprehensive metabolic panel   Result Value Ref Range    Sodium 140 136 - 145 mmol/L    Potassium 3.6 3.5 - 5.1 mmol/L    Chloride 106 95 - 110 mmol/L    CO2 22 (L) 23 - 29 mmol/L    Glucose 152 (H) 70 - 110 mg/dL    BUN 18 6 - 20 mg/dL    Creatinine 1.1 0.5 - 1.4 mg/dL    Calcium 9.8 8.7 - 10.5 mg/dL    Total Protein 7.9 6.0 - 8.4 g/dL    Albumin 4.4 3.5 - 5.2 g/dL    Total Bilirubin 0.5 0.1 - 1.0 mg/dL    Alkaline Phosphatase 107 55 - 135 U/L    AST 14 10 - 40 U/L    ALT 16 10 - 44 U/L    eGFR >60 >60 mL/min/1.73 m^2    Anion Gap 12 8 - 16 mmol/L         Imaging Results:  Imaging Results              US Upper Extremity Veins Right (Final result)   Result time 10/17/23 05:30:26      Final result by Jose Juan Johnson MD (10/17/23 05:30:26)                   Impression:     No evidence for deep venous thrombosis of the right upper extremity.    Finalized on: 10/17/2023 5:30 AM By:  Jose Juan Johnson MD  [facility]# [id number]      2023-10-17 05:32:40.453    [facility]               Narrative:    EXAM: US UPPER EXTREMITY VEINS RIGHT    CLINICAL HISTORY: Right arm pain    FINDINGS:  Color-flow and duplex Doppler imaging of the right upper extremity from the internal jugular vein through the subclavian vein and axillary vein into the brachial vein, basilic vein, and cephalic vein shows normal spontaneous flow, phasicity, augmentation, and compression.                                                  The Emergency Provider reviewed the vital signs and test results, which are outlined above.     ED Discussion     2:17 AM: Fantasma Rodriguez RN: the pt reports that she was horse-playing with her boyfriend and may have strained her RUE.    5:42 AM: Reassessed pt at this time.  Discussed with pt all pertinent ED information and results. Discussed pt dx and plan of tx. Gave pt all f/u and return to the ED instructions. All questions and concerns were addressed at this time. Pt expresses understanding of information and instructions, and is comfortable with plan to discharge. Pt is stable for discharge.    I discussed with patient and/or family/caretaker that evaluation in the ED does not suggest any emergent or life threatening medical conditions requiring immediate intervention beyond what was provided in the ED, and I believe patient is safe for discharge.  Regardless, an unremarkable evaluation in the ED does not preclude the development or presence of a serious of life threatening condition. As such, patient was instructed to return immediately for any worsening or change in current symptoms.         Medical Decision Making  Amount and/or Complexity of Data Reviewed  Labs: ordered.  Decision-making details documented in ED Course.  Radiology: ordered. Decision-making details documented in ED Course.    Risk  OTC drugs.                ED Medication(s):  Medications   acetaminophen tablet 1,000 mg (1,000 mg Oral Given 10/17/23 0201)       Discharge Medication List as of 10/17/2023  5:42 AM        START taking these medications    Details   acetaminophen (TYLENOL) 325 MG tablet Take 1 tablet (325 mg total) by mouth every 6 (six) hours as needed for Pain., Starting Tue 10/17/2023, OTC              Follow-up Information       [facility]. Schedule an appointment as soon as possible for a visit in 1 week.    Contact information:  [address] [facility] [address] [phone]               [address]. Schedule an appointment as soon as possible for a visit in 1 week.    Specialty: Internal Medicine  Contact information:  [address] [facility] [address] [phone]  Additional information:  Please park on the [address] side and use the Clinic entrance. Check in on the 2nd floor, to the right.             [facility] - Emergency Dept..    Specialty: Emergency Medicine  Why: As needed, If symptoms worsen  Contact information:  [address]  [facility] [address] [phone]                               Scribe Attestation:   Scribe #1: I performed the above scribed service and the documentation accurately describes the services I performed. I attest to the accuracy of the note.     Attending:   Physician Attestation Statement for Scribe #1: I, Ronnie Burrell Jr., MD, personally performed the services described in this documentation, as scribed by Danna Muniz, in my presence, and it is both accurate and complete.           Clinical Impression       ICD-10-CM ICD-9-CM   1. Right arm pain  M79.601 729.5       Disposition:   Disposition: Discharged  Condition: Stable         Ronnie Burrell Jr., MD  10/18/23 0134

## 2023-10-18 ENCOUNTER — TELEPHONE (OUTPATIENT)
Dept: BARIATRICS | Facility: CLINIC | Age: 33
End: 2023-10-18
Payer: MEDICARE

## 2023-10-18 ENCOUNTER — TELEPHONE (OUTPATIENT)
Dept: TRANSPLANT | Facility: CLINIC | Age: 33
End: 2023-10-18
Payer: MEDICARE

## 2023-10-18 NOTE — TELEPHONE ENCOUNTER
Pt stated she would like to be scheduled for [facility] . Pt was referred by her transplant providers.

## 2023-10-18 NOTE — TELEPHONE ENCOUNTER
----- Message from Tanisha Turcios sent at 10/18/2023  2:36 PM CDT -----  Contact: Self [phone]  Patient is returning a phone call.    Who left a message for the patient:     Does patient know what this is regarding:  unknown    Would you like a call back, or a response through your [facility] portal?:   call back    Comments:

## 2023-10-18 NOTE — TELEPHONE ENCOUNTER
Discussed with nurse FK goals. Pt also to be taking ketoconazole. ----- Message from Felisha Donohue sent at 10/18/2023 10:08 AM CDT -----  Regarding: discuss pt meds  Contact: Tata Suh   Tata Nurse called from [facility] would like to discuss the goal for pt tacrolimus level and what are patients at home anti rejection medications      [phone] Tata BURNHAM

## 2023-10-26 LAB — IMMUNKNOW (STIMULATED): 310 NG/ML (ref 226–524)

## 2023-10-27 ENCOUNTER — TELEPHONE (OUTPATIENT)
Dept: TRANSPLANT | Facility: HOSPITAL | Age: 33
End: 2023-10-27
Payer: MEDICARE

## 2023-10-27 NOTE — TELEPHONE ENCOUNTER
Contacted by [facility] physician, Dr. Hernandez, inquiring on IS plan. He reported Lisseth was admitted with thrombosis of AVF associated with pain/swelling after lifting heavy objects. Access was excised and procedure complicated by PE and shock requiring ICU stay. Lisseth is now stable and ready for d/c. Her creat is at baseline in 1s.  She has been getting ketoconazole 200 mg daily + tacrolimus 3 mg bID with levels 6-13, but not true 12 hr troughs.  I verified with RADHA Bacon PharmD that Eliquis use in this pt with ketoconazole should be acceptable given low dose of keto as she does not have risk factors for complications.    RECS  -I recommended she d/c on home tacro 6 mg BID + keto 100 mg daily.  -Needs f/u lab + tacro level early next week.  -RTC November

## 2023-11-06 ENCOUNTER — TELEPHONE (OUTPATIENT)
Dept: TRANSPLANT | Facility: CLINIC | Age: 33
End: 2023-11-06
Payer: MEDICARE

## 2023-11-07 ENCOUNTER — LAB VISIT (OUTPATIENT)
Dept: LAB | Facility: HOSPITAL | Age: 33
End: 2023-11-07
Attending: INTERNAL MEDICINE
Payer: MEDICARE

## 2023-11-07 DIAGNOSIS — Z94.0 KIDNEY REPLACED BY TRANSPLANT: ICD-10-CM

## 2023-11-07 LAB
ALBUMIN SERPL BCP-MCNC: 3.8 G/DL (ref 3.5–5.2)
ANION GAP SERPL CALC-SCNC: 9 MMOL/L (ref 8–16)
BASOPHILS # BLD AUTO: 0.03 K/UL (ref 0–0.2)
BASOPHILS NFR BLD: 0.4 % (ref 0–1.9)
BUN SERPL-MCNC: 13 MG/DL (ref 6–20)
CALCIUM SERPL-MCNC: 9.5 MG/DL (ref 8.7–10.5)
CHLORIDE SERPL-SCNC: 109 MMOL/L (ref 95–110)
CO2 SERPL-SCNC: 24 MMOL/L (ref 23–29)
CREAT SERPL-MCNC: 1.1 MG/DL (ref 0.5–1.4)
DIFFERENTIAL METHOD: ABNORMAL
EOSINOPHIL # BLD AUTO: 0 K/UL (ref 0–0.5)
EOSINOPHIL NFR BLD: 0.6 % (ref 0–8)
ERYTHROCYTE [DISTWIDTH] IN BLOOD BY AUTOMATED COUNT: 16.2 % (ref 11.5–14.5)
EST. GFR  (NO RACE VARIABLE): >60 ML/MIN/1.73 M^2
GLUCOSE SERPL-MCNC: 130 MG/DL (ref 70–110)
HCT VFR BLD AUTO: 28 % (ref 37–48.5)
HGB BLD-MCNC: 8.4 G/DL (ref 12–16)
IMM GRANULOCYTES # BLD AUTO: 0.04 K/UL (ref 0–0.04)
IMM GRANULOCYTES NFR BLD AUTO: 0.6 % (ref 0–0.5)
LYMPHOCYTES # BLD AUTO: 0.6 K/UL (ref 1–4.8)
LYMPHOCYTES NFR BLD: 9 % (ref 18–48)
MAGNESIUM SERPL-MCNC: 1.5 MG/DL (ref 1.6–2.6)
MCH RBC QN AUTO: 30.4 PG (ref 27–31)
MCHC RBC AUTO-ENTMCNC: 30 G/DL (ref 32–36)
MCV RBC AUTO: 101 FL (ref 82–98)
MONOCYTES # BLD AUTO: 0.7 K/UL (ref 0.3–1)
MONOCYTES NFR BLD: 9.6 % (ref 4–15)
NEUTROPHILS # BLD AUTO: 5.4 K/UL (ref 1.8–7.7)
NEUTROPHILS NFR BLD: 79.8 % (ref 38–73)
NRBC BLD-RTO: 0 /100 WBC
PHOSPHATE SERPL-MCNC: 2.9 MG/DL (ref 2.7–4.5)
PLATELET # BLD AUTO: 293 K/UL (ref 150–450)
PMV BLD AUTO: 9.8 FL (ref 9.2–12.9)
POTASSIUM SERPL-SCNC: 4.1 MMOL/L (ref 3.5–5.1)
RBC # BLD AUTO: 2.76 M/UL (ref 4–5.4)
SODIUM SERPL-SCNC: 142 MMOL/L (ref 136–145)
WBC # BLD AUTO: 6.77 K/UL (ref 3.9–12.7)

## 2023-11-07 PROCEDURE — 85025 COMPLETE CBC W/AUTO DIFF WBC: CPT | Mod: HCNC | Performed by: INTERNAL MEDICINE

## 2023-11-07 PROCEDURE — 83735 ASSAY OF MAGNESIUM: CPT | Mod: HCNC | Performed by: INTERNAL MEDICINE

## 2023-11-07 PROCEDURE — 80069 RENAL FUNCTION PANEL: CPT | Mod: HCNC | Performed by: INTERNAL MEDICINE

## 2023-11-07 PROCEDURE — 80197 ASSAY OF TACROLIMUS: CPT | Mod: HCNC | Performed by: INTERNAL MEDICINE

## 2023-11-07 PROCEDURE — 36415 COLL VENOUS BLD VENIPUNCTURE: CPT | Mod: HCNC | Performed by: INTERNAL MEDICINE

## 2023-11-08 LAB — TACROLIMUS BLD-MCNC: 5.8 NG/ML (ref 5–15)

## 2023-11-10 NOTE — PROGRESS NOTES
Kidney Post-Transplant Assessment    Referring Physician: Andres Hoyt  Current Nephrologist: Eduard Vance    ORGAN: RIGHT KIDNEY  Donor Type: donation after brain death  PHS Increased Risk: no  Cold Ischemia: 1,259 mins  Induction Medications: thymoglobulin    Subjective:   The patient location is: [place]  The chief complaint leading to consultation is: Kidney Post-Transplant Assessment    Visit type: audiovisual    Face to Face time with patient:   30 minutes of total time spent on the encounter, which includes face to face time and non-face to face time preparing to see the patient (eg, review of tests), Obtaining and/or reviewing separately obtained history, Documenting clinical information in the electronic or other health record, Independently interpreting results (not separately reported) and communicating results to the patient/family/caregiver, or Care coordination (not separately reported).     Each patient to whom he or she provides medical services by telemedicine is:  (1) informed of the relationship between the physician and patient and the respective role of any other health care provider with respect to management of the patient; and (2) notified that he or she may decline to receive medical services by telemedicine and may withdraw from such care at any time.      CC:  Reassessment of renal allograft function and management of chronic immunosuppression.    HPI:  Ms. Schwartz is a 32 y.o. year old Black or  female with history of ESRD secondary to FSGS who was on HD 10/2016 until she received a donation after brain death kidney transplant on 1/2/23 (Thymo induction, CIT ~21 hours, CMV -/+). Surgery without complication. She has CKD stage 2 - GFR 60-89 and her baseline creatinine is between 1.0-1.2. She takes mycophenolate mofetil, prednisone, and tacrolimus for maintenance immunosuppression.     Admitted to [facility] last month for acute thrombus of fistula site,  underwent fistula excision on 10/19. Intraoperatively developed hypoxemia and hypotension so underwent CTA which showed massive PE. Returned to OR 10/21 for large hematoma evacuation around incision. Required several blood transfusions throughout hospitalization. Discharged on Eliquis for anticoagulation.     She is doing well since hospital discharge. Does report that her the hospital did not properly give her immunosuppression during her stay. She would like to decrease blood pressure medications as she has been having SBP 110s. No problems with urination. No diarrhea or vomiting.       Current Outpatient Medications   Medication Sig Dispense Refill    acetaminophen (TYLENOL) 325 MG tablet Take 1 tablet (325 mg total) by mouth every 6 (six) hours as needed for Pain.  0    albuterol (PROVENTIL/VENTOLIN HFA) 90 mcg/actuation inhaler Inhale 1-2 puffs into the lungs every 6 (six) hours as needed for Wheezing (cough). 8.5 g 1    bisacodyL (DULCOLAX) 5 mg EC tablet Take 2 tablets (10 mg total) by mouth daily as needed for Constipation.  0    calcitRIOL (ROCALTROL) 0.25 MCG Cap Take 1 capsule (0.25 mcg total) by mouth once daily. 30 capsule 5    clonazePAM (KLONOPIN) 0.5 MG tablet Take 0.5 mg by mouth daily as needed.      hydrALAZINE (APRESOLINE) 50 MG tablet Take 1 tablet (50 mg total) by mouth every 12 (twelve) hours. If BP <130 take 25 mg (cut in half) 90 tablet 11    ketoconazole (NIZORAL) 200 mg Tab Take 0.5 tablets (100 mg total) by mouth once daily. 15 tablet 11    magnesium oxide (MAG-OX) 400 mg (241.3 mg magnesium) tablet Take 2 tablets (800 mg total) by mouth 2 (two) times daily. 60 tablet 11    metoprolol tartrate (LOPRESSOR) 50 MG tablet Take 4 tablets (200 mg total) by mouth 2 (two) times daily. 720 tablet 3    mycophenolate (CELLCEPT) 250 mg Cap Take 4 capsules (1,000 mg total) by mouth 2 (two) times daily. 240 capsule 11    pantoprazole (PROTONIX) 40 MG tablet Take 1 tablet (40 mg total) by mouth once  "daily. 30 tablet 5    predniSONE (DELTASONE) 5 MG tablet Take by mouth daily: 20 mg 1/6-1/12; 15mg 1/13-1/19; 10 mg 1/20-1/26; 5 mg daily thereafter 1/27/23 70 tablet 11    sodium bicarbonate 650 MG tablet Take 3 tablets (1,950 mg total) by mouth 3 (three) times daily. 270 tablet 11    tacrolimus (PROGRAF) 1 MG Cap Take 6 capsules (6 mg total) by mouth every morning AND 6 capsules (6 mg total) every evening. 480 capsule 11     No current facility-administered medications for this visit.       Past Medical History:   Diagnosis Date    Allergy     Anemia     Anxiety     Breast feeding status of mother 1/17/2020    Brittle bones     ESRD (end stage renal disease)     M/W/F    General anesthetics causing adverse effect in therapeutic use     pt states "im a light weight"    Hypertension     Insomnia     PSG revealed no CHRYSTAL, but likely psychogenic etiology (anxiety)    RLS (restless legs syndrome)        Review of Systems   Constitutional:  Positive for fatigue. Negative for activity change, appetite change and fever.   HENT:  Negative for congestion, mouth sores and sore throat.    Eyes:  Negative for visual disturbance.   Respiratory:  Negative for cough, chest tightness and shortness of breath.    Cardiovascular:  Negative for chest pain, palpitations and leg swelling.   Gastrointestinal:  Negative for abdominal distention, abdominal pain, constipation, diarrhea and nausea.   Genitourinary:  Negative for difficulty urinating, dysuria, frequency and hematuria.   Musculoskeletal:  Negative for arthralgias, gait problem and myalgias.   Skin:  Negative for wound.   Allergic/Immunologic: Positive for immunocompromised state. Negative for environmental allergies and food allergies.   Neurological:  Negative for dizziness, weakness and numbness.   Hematological:  Bruises/bleeds easily.        Recently started on eliquis following PE   Psychiatric/Behavioral:  Negative for sleep disturbance. The patient is not " nervous/anxious.        Objective:   There were no vitals taken for this visit.body mass index is unknown because there is no height or weight on file.    Physical Exam    Labs:  Lab Results   Component Value Date    WBC 6.77 11/07/2023    HGB 8.4 (L) 11/07/2023    HCT 28.0 (L) 11/07/2023     11/07/2023    K 4.1 11/07/2023     11/07/2023    CO2 24 11/07/2023    BUN 13 11/07/2023    CREATININE 1.1 11/07/2023    EGFRNONAA 3 (A) 03/28/2022    CALCIUM 9.5 11/07/2023    PHOS 2.9 11/07/2023    MG 1.5 (L) 11/07/2023    ALBUMIN 3.8 11/07/2023    AST 14 10/17/2023    ALT 16 10/17/2023    UTPCR 0.06 11/07/2023    PTH 1,451.5 (H) 01/02/2023    TACROLIMUS 5.8 11/07/2023     Labs were reviewed with the patient    Assessment:     1. S/P kidney transplant    2. FSGS (focal segmental glomerulosclerosis)    3. CKD (chronic kidney disease), stage II    4. Immunosuppressive management encounter following kidney transplant    5. Essential hypertension    6. At risk for opportunistic infections      Plan:   Repeat prograf level pending  DC hydralazine, decreased lopressor 100 mg BID, to continue home monitoring  Check PTH with next labs as calcitriol DC by hospital at discharge       1. Immunosuppression: Prograf trough PENDING.  Continue Prograf 6/6, Ketoconazole 100 mg QD to augment prograf levels, MMF 1000 mg BID, and Prednisone 5 mg QD. Will continue to monitor for drug toxicities    2. Allograft Function: stable. Continue good po hydration.   - ESRD secondary to FSGS on HD 10/2016 until she received a donation after brain death kidney transplant on 1/2/23 (Thymo induction CIT ~21 hours, CMV -/+).  - baseline creatinine is between 1.0-1.2.  Lab Results   Component Value Date    CREATININE 1.1 11/07/2023       Latest Reference Range & Units 10mo 0wk,  Kidney-Post 1 Year  11/07/23 10:24   eGFR >60 mL/min/1.73 m^2 >60         3. Hypertension management: advise low salt diet and home BP monitoring    DC hydralazine, decrease  lopressor 100 mg BID    4. Metabolic Bone Disease/Secondary Hyperparathyroidism:stable  MagOx 800 mg BID  Lab Results   Component Value Date    PTH 1,451.5 (H) 01/02/2023    CALCIUM 9.5 11/07/2023    PHOS 2.9 11/07/2023        Latest Reference Range & Units 10mo 0wk,  Kidney-Post 1 Year  11/07/23 10:24   Magnesium  1.6 - 2.6 mg/dL 1.5 (L)       5. Electrolytes:  Will monitor /guidelines  Sodium bicarb 1950 mg TID  Lab Results   Component Value Date     11/07/2023    K 4.1 11/07/2023     11/07/2023    CO2 24 11/07/2023       6. Anemia: stable, trending up since hospital admission. no need for intervention  Lab Results   Component Value Date    WBC 6.77 11/07/2023    HGB 8.4 (L) 11/07/2023    HCT 28.0 (L) 11/07/2023     (H) 11/07/2023     11/07/2023       7.  Cytopenias: no significant cytopenias will monitor as per our guidelines. Medicine list reviewed including potential causes of drug-induced cytopenias    8. Proteinuria: continue p/c ratio as per FSGS guidelines   UPC 11/7/2023: 0.06    9. BK virus infection screening:  will continue to monitor per guidelines      10. Weight education: provided during the clinic visit   There is no height or weight on file to calculate BMI.     11. Patient safety education regarding immunosuppression including prophylaxis posttransplant for CMV, PCP : Education provided about vaccination and prevention of infections     PCP ppx: Bactrim until 7/1/23-completed  CMV ppx: Valcyte until 4/2/23-completed           Follow-up:   Clinic: return to transplant clinic weekly for the first month after transplant; every 2 weeks during months 2-3; then at 6-, 9-, 12-, 18-, 24-, and 36- months post-transplant to reassess for complications from immunosuppression toxicity and monitor for rejection.  Annually thereafter.    Labs: since patient remains at high risk for rejection and drug-related complications that warrant close monitoring, labs will be ordered as follows:  continue twice weekly CBC, renal panel, and drug level for first month; then same labs once weekly through 3rd month post-transplant.  Urine for UA and protein/creatinine ratio monthly.  Serum BK - PCR at 1-, 3-, 6-, 9-, 12-, 18-, 24-, 36-, 48-, and 60 months post-transplant.  Hepatic panel at 1-, 2-, 3-, 6-, 9-, 12-, 18-, 24-, and 36- months post-transplant.    Patricia Caceres NP       Education:   Material provided to the patient.  Patient reminded to call with any health changes since these can be early signs of significant complications.  Also, I advised the patient to be sure any new medications or changes of old medications are discussed with either a pharmacist or physician knowledgeable with transplant to avoid rejection/drug toxicity related to significant drug interactions.    Patient advised that it is recommended that all transplanted patients, and their close contacts and household members receive Covid vaccination.

## 2023-11-13 ENCOUNTER — PATIENT MESSAGE (OUTPATIENT)
Dept: TRANSPLANT | Facility: CLINIC | Age: 33
End: 2023-11-13

## 2023-11-13 ENCOUNTER — OFFICE VISIT (OUTPATIENT)
Dept: TRANSPLANT | Facility: CLINIC | Age: 33
End: 2023-11-13
Payer: MEDICARE

## 2023-11-13 ENCOUNTER — LAB VISIT (OUTPATIENT)
Dept: LAB | Facility: HOSPITAL | Age: 33
End: 2023-11-13
Attending: INTERNAL MEDICINE
Payer: MEDICARE

## 2023-11-13 DIAGNOSIS — I10 ESSENTIAL HYPERTENSION: ICD-10-CM

## 2023-11-13 DIAGNOSIS — N18.2 CKD (CHRONIC KIDNEY DISEASE), STAGE II: ICD-10-CM

## 2023-11-13 DIAGNOSIS — Z94.0 IMMUNOSUPPRESSIVE MANAGEMENT ENCOUNTER FOLLOWING KIDNEY TRANSPLANT: ICD-10-CM

## 2023-11-13 DIAGNOSIS — Z79.899 IMMUNOSUPPRESSIVE MANAGEMENT ENCOUNTER FOLLOWING KIDNEY TRANSPLANT: ICD-10-CM

## 2023-11-13 DIAGNOSIS — Z94.0 S/P KIDNEY TRANSPLANT: Primary | ICD-10-CM

## 2023-11-13 DIAGNOSIS — Z94.0 KIDNEY REPLACED BY TRANSPLANT: ICD-10-CM

## 2023-11-13 DIAGNOSIS — Z91.89 AT RISK FOR OPPORTUNISTIC INFECTIONS: ICD-10-CM

## 2023-11-13 DIAGNOSIS — N05.1 FSGS (FOCAL SEGMENTAL GLOMERULOSCLEROSIS): ICD-10-CM

## 2023-11-13 PROCEDURE — 99214 OFFICE O/P EST MOD 30 MIN: CPT | Mod: HCNC,95,, | Performed by: NURSE PRACTITIONER

## 2023-11-13 PROCEDURE — 80197 ASSAY OF TACROLIMUS: CPT | Mod: HCNC | Performed by: INTERNAL MEDICINE

## 2023-11-13 PROCEDURE — 3066F PR DOCUMENTATION OF TREATMENT FOR NEPHROPATHY: ICD-10-PCS | Mod: HCNC,CPTII,95, | Performed by: NURSE PRACTITIONER

## 2023-11-13 PROCEDURE — 99214 PR OFFICE/OUTPT VISIT, EST, LEVL IV, 30-39 MIN: ICD-10-PCS | Mod: HCNC,95,, | Performed by: NURSE PRACTITIONER

## 2023-11-13 PROCEDURE — 1159F PR MEDICATION LIST DOCUMENTED IN MEDICAL RECORD: ICD-10-PCS | Mod: HCNC,CPTII,95, | Performed by: NURSE PRACTITIONER

## 2023-11-13 PROCEDURE — 3066F NEPHROPATHY DOC TX: CPT | Mod: HCNC,CPTII,95, | Performed by: NURSE PRACTITIONER

## 2023-11-13 PROCEDURE — 1160F RVW MEDS BY RX/DR IN RCRD: CPT | Mod: HCNC,CPTII,95, | Performed by: NURSE PRACTITIONER

## 2023-11-13 PROCEDURE — 1159F MED LIST DOCD IN RCRD: CPT | Mod: HCNC,CPTII,95, | Performed by: NURSE PRACTITIONER

## 2023-11-13 PROCEDURE — 1160F PR REVIEW ALL MEDS BY PRESCRIBER/CLIN PHARMACIST DOCUMENTED: ICD-10-PCS | Mod: HCNC,CPTII,95, | Performed by: NURSE PRACTITIONER

## 2023-11-13 PROCEDURE — 36415 COLL VENOUS BLD VENIPUNCTURE: CPT | Mod: HCNC | Performed by: INTERNAL MEDICINE

## 2023-11-13 RX ORDER — METOPROLOL TARTRATE 50 MG/1
100 TABLET ORAL 2 TIMES DAILY
Qty: 360 TABLET | Refills: 3 | Status: SHIPPED | OUTPATIENT
Start: 2023-11-13 | End: 2024-11-12

## 2023-11-13 NOTE — LETTER
November 13, 2023        Eduard Vance  [address]  [address]  [facility] [place] [address]  Phone: [phone]  Fax: [phone]             [facility] Trans[facility] 1st Fl  [address]  [facility] [address]  Phone: [phone]   Patient: Lisseth Schwartz   MR Number: [id number]   YOB: 1990   Date of Visit: 11/13/2023       Dear Dr. Eduard Vance    Thank you for referring Lisseth Schwartz to me for evaluation. Attached you will find relevant portions of my assessment and plan of care.    If you have questions, please do not hesitate to call me. I look forward to following Lisseth Schwartz along with you.    Sincerely,    Patricia Caceres, NP    Enclosure    If you would like to receive this communication electronically, please contact [email or web address] or [phone] to request [employer] Link access.    [employer] is a tool which provides read-only access to select patient information with whom you have a relationship. Its easy to use and provides real time access to review your patients record including encounter summaries, notes, results, and demographic information.    If you feel you have received this communication in error or would no longer like to receive these types of communications, please e-mail [email or web address]

## 2023-11-13 NOTE — PATIENT INSTRUCTIONS
ICU PROGRESS NOTE        PATIENT NAME: Vinita Daily RECORD NO. [id number]  DATE: 3/4/2021    PRIMARY CARE PHYSICIAN: Oletta Frankel,     HD: # 4    ASSESSMENT    Patient Active Problem List   Diagnosis    History of colon polyps    Perforated bowel ([facility] [address]    Perforated rectum [facility])       MEDICAL DECISION MAKING AND PLAN  1. Neuro:  1. Hx of cocaine abuse, concern for drug withdrawal   2. Agitation and delirium   1. Intubated 3/ to protect airway   2. Continue with fentanyl gtt and precedex gtt  3. Continue with prn Haldo and valium 5mg q6  3. Continue with thiamine and folate supplementation   2. CV  1. HR   2. MAP 67-82  3. Levophed was taken off last night however had to be placed back on today - 3mcg  4. Hx of cocaine abuse  5. Lactic acid 3/3 - 0.72  3. Pulm  1. Intubated   2. PRVC: 30/12/18/480  3. PF ratio 220  4. AB.467/35/66.2/25.3  4. GI/Nutrition  1. Ex lap ovalles's procedure, creation of end colostomy ()   2. Tube feeds continue to advance to goal of 50cc/hr  5. Renal/lytes  1. Potassium 3.4 - repletion was given   2. Hypocalemia - was replaced yesterday   3. Mg - was replaced yesterday   4. D5() NS -total goal of 125cc/hr  5. Urine output 0.3cc/kg/hr  6. Heme  1. DVT prophylaxis- levonox 40mg daily  2. Hgb 8.7 up from 8.6   7. Endocrine        1. Glucose 115   7. Musculoskeletal  1. PT/OT when extubated   8. Skin  1. No abrasions or lacerations   9. Micro  1. WB 13.7 up from 10.7   2. procalcitonin 2.98  3. Febrile with Tmax 101.6   10. Family/dispo  1. Will continue to wean off ventilatory and sedation as patient tolerates, family updated   11. Lines  1. A-line L radial   2. CVC R IJ  3. PIV x1   4. OG -> NG   5.  ETT      CHECKLIST    CAM-ICU RASS: -3  RESTRAINTS: soft upper   IVF: total fluids 125c/hr   NUTRITION: tube feeds, advance to goal  ANTIBIOTICS: zosyn day , will reassess tomorrow   GI: Pepcid and reglan  DVT: lovenox   GLYCEMIC CONTROL: none   HOB >45: y STOP hydralazine.   Decrease metoprolol 100 mg twice a day.  Will check PTH level with next labs to see if we need to restart calcitriol.    It is my privilege to participate in your transplant care! Please be sure to let us know if you have any questions or concerns about your health care - we cannot help you if we do not know. Don't forget we are on call 24/7 for any emergencies.      Best Wishes,  Patricia LERMAC      MOBILITY: PT/OT when extubated       SARINA Haynes  Had some agitation in the night requiring haldo. He also had an episode of emesis this am that had 1100 suctioned from his NGT. He has an elevation in his WBC, elevated procalcitonin and is febrile. Will obtain CT chest due to concern for new infection. OBJECTIVE  VITALS: Temp: Temp: 102.6 °F (39.2 °C)Temp  Av °F (38.3 °C)  Min: 98.8 °F (37.1 °C)  Max: 102.6 °F (54.6 °C) BP Systolic (69ITB), NRJ:907 , Min:97 , EQA:346   Diastolic (93NUM), XKB:70, Min:52, Max:56   Pulse Pulse  Av.3  Min: 69  Max: 114 Resp Resp  Av  Min: 17  Max: 30 Pulse ox SpO2  Av.6 %  Min: 92 %  Max: 100 %    Physical Exam  Constitutional:      intubated and sedated   HENT:      Head: normocephalic atraumatic    Eyes:      Pupils: equal round and reactive to light   Cardiovascular:      Rate and Rhythm: regular rate and rhythm      Pulses: equal radial pulses bilaterally 2+   Pulmonary:         Breath sounds: clear to auscultation bilaterally   Abdominal:      General: normoactive bowel sounds      Palpations: soft, non distended, ostomy site has no surrounding erythema, warmth or fluctuance   Skin:     General: warm and dry     LAB:  CBC:   Recent Labs     214 21  19421  0400   WBC 10.9 10.7  --  13.7*   HGB 8.9* 7.7* 8.6* 8.7*   HCT 28.3* 24.6* 26.2* 26.1*   MCV 95.3 95.7  --  92.6    191  --  241     BMP:   Recent Labs     218 21  0434 21  0400    139 138   K 3.2* 3.8 3.4*    109* 107   CO2 23 22 24   BUN 9 14 18   CREATININE 0.64* 0.83 0.74   GLUCOSE 110* 130* 98         RADIOLOGY:  CXR:   Impression   Small bilateral pleural effusions with associated bibasilar airspace disease. No significant interval change.            Nicole Davis MD  3/4/21, 2:29 PM

## 2023-11-14 LAB — TACROLIMUS BLD-MCNC: 9.8 NG/ML (ref 5–15)

## 2023-12-13 ENCOUNTER — PATIENT MESSAGE (OUTPATIENT)
Dept: TRANSPLANT | Facility: CLINIC | Age: 33
End: 2023-12-13
Payer: MEDICARE

## 2023-12-13 ENCOUNTER — TELEPHONE (OUTPATIENT)
Dept: TRANSPLANT | Facility: HOSPITAL | Age: 33
End: 2023-12-13
Payer: MEDICARE

## 2023-12-13 NOTE — TELEPHONE ENCOUNTER
notified by RN Coordinator that patient would benefit from discussing community resources for utilities.  contacted patient by phone, no answer and unable to leave voicemail.     3:30 update:      reached patient by phone. Patient reports she is receiving utility and housing assistance through [facility]. Patient reports she also has SNAP. Patient reports she also plans on looking into [employer]. Patient reports she is able to pay her bills and is not behind. Patient reported she would like to know if she is eligible for assistance for her medications. Message sent to transplant pharmacy pool to determine whether patient is eligible for medication assistance. Patients RN Coordinator notified of the above. Patient reports she will reach out to [facility] transplant team with needs, questions,or concerns as they arise.       Ivana Estes LMSW

## 2023-12-14 ENCOUNTER — LAB VISIT (OUTPATIENT)
Dept: LAB | Facility: HOSPITAL | Age: 33
End: 2023-12-14
Attending: INTERNAL MEDICINE
Payer: MEDICARE

## 2023-12-14 DIAGNOSIS — Z94.0 KIDNEY REPLACED BY TRANSPLANT: ICD-10-CM

## 2023-12-14 LAB
ALBUMIN SERPL BCP-MCNC: 4.1 G/DL (ref 3.5–5.2)
ANION GAP SERPL CALC-SCNC: 9 MMOL/L (ref 8–16)
BASOPHILS # BLD AUTO: 0.03 K/UL (ref 0–0.2)
BASOPHILS NFR BLD: 0.5 % (ref 0–1.9)
BUN SERPL-MCNC: 15 MG/DL (ref 6–20)
CALCIUM SERPL-MCNC: 10.2 MG/DL (ref 8.7–10.5)
CHLORIDE SERPL-SCNC: 105 MMOL/L (ref 95–110)
CO2 SERPL-SCNC: 24 MMOL/L (ref 23–29)
CREAT SERPL-MCNC: 0.9 MG/DL (ref 0.5–1.4)
DIFFERENTIAL METHOD: ABNORMAL
EOSINOPHIL # BLD AUTO: 0 K/UL (ref 0–0.5)
EOSINOPHIL NFR BLD: 0.7 % (ref 0–8)
ERYTHROCYTE [DISTWIDTH] IN BLOOD BY AUTOMATED COUNT: 13.7 % (ref 11.5–14.5)
EST. GFR  (NO RACE VARIABLE): >60 ML/MIN/1.73 M^2
GLUCOSE SERPL-MCNC: 74 MG/DL (ref 70–110)
HCT VFR BLD AUTO: 39.9 % (ref 37–48.5)
HGB BLD-MCNC: 12.5 G/DL (ref 12–16)
IMM GRANULOCYTES # BLD AUTO: 0.01 K/UL (ref 0–0.04)
IMM GRANULOCYTES NFR BLD AUTO: 0.2 % (ref 0–0.5)
LYMPHOCYTES # BLD AUTO: 0.7 K/UL (ref 1–4.8)
LYMPHOCYTES NFR BLD: 12.5 % (ref 18–48)
MAGNESIUM SERPL-MCNC: 1.8 MG/DL (ref 1.6–2.6)
MCH RBC QN AUTO: 30.5 PG (ref 27–31)
MCHC RBC AUTO-ENTMCNC: 31.3 G/DL (ref 32–36)
MCV RBC AUTO: 97 FL (ref 82–98)
MONOCYTES # BLD AUTO: 0.5 K/UL (ref 0.3–1)
MONOCYTES NFR BLD: 7.9 % (ref 4–15)
NEUTROPHILS # BLD AUTO: 4.6 K/UL (ref 1.8–7.7)
NEUTROPHILS NFR BLD: 78.2 % (ref 38–73)
NRBC BLD-RTO: 0 /100 WBC
PHOSPHATE SERPL-MCNC: 2.7 MG/DL (ref 2.7–4.5)
PLATELET # BLD AUTO: 188 K/UL (ref 150–450)
PMV BLD AUTO: 12.9 FL (ref 9.2–12.9)
POTASSIUM SERPL-SCNC: 3.5 MMOL/L (ref 3.5–5.1)
RBC # BLD AUTO: 4.1 M/UL (ref 4–5.4)
SODIUM SERPL-SCNC: 138 MMOL/L (ref 136–145)
WBC # BLD AUTO: 5.85 K/UL (ref 3.9–12.7)

## 2023-12-14 PROCEDURE — 85025 COMPLETE CBC W/AUTO DIFF WBC: CPT | Mod: HCNC | Performed by: INTERNAL MEDICINE

## 2023-12-14 PROCEDURE — 80197 ASSAY OF TACROLIMUS: CPT | Mod: HCNC | Performed by: INTERNAL MEDICINE

## 2023-12-14 PROCEDURE — 36415 COLL VENOUS BLD VENIPUNCTURE: CPT | Mod: HCNC | Performed by: INTERNAL MEDICINE

## 2023-12-14 PROCEDURE — 83735 ASSAY OF MAGNESIUM: CPT | Mod: HCNC | Performed by: INTERNAL MEDICINE

## 2023-12-14 PROCEDURE — 80069 RENAL FUNCTION PANEL: CPT | Mod: HCNC | Performed by: INTERNAL MEDICINE

## 2023-12-15 DIAGNOSIS — Z94.0 KIDNEY REPLACED BY TRANSPLANT: ICD-10-CM

## 2023-12-15 DIAGNOSIS — Z94.0 S/P KIDNEY TRANSPLANT: ICD-10-CM

## 2023-12-15 LAB — TACROLIMUS BLD-MCNC: <2 NG/ML (ref 5–15)

## 2023-12-15 NOTE — TELEPHONE ENCOUNTER
Called and spoke with patient who reports difficulty with taking medication correctly d/t struggling mentally after hospital admit in October.  Pt also reports she ran out of ketoconazole in November and cannot afford anymore. SW reached out to pt to offer bill assistance. Message sent to Pharmacy to look into medicine costs. Dr. Monroy notified - ordered to increase prograf dose to 10mg twice a day over the weekend and repeat labs Monday 12/18. Pt verbalized understanding.   ----- Message from Sara Caballero MD sent at 12/15/2023  3:58 PM CST -----  Assess if taking tacro 6/6 + [id number] mg daily? I recall she was admitted 10/17/23 at [facility] with PE from AVF thrombosis. Starting apixaban x 3 mos.Emphasize to her this level is dangerously LOW. It should be repeated early next week.

## 2023-12-18 RX ORDER — TACROLIMUS 1 MG/1
10 CAPSULE ORAL EVERY 12 HOURS
Qty: 600 CAPSULE | Refills: 11 | Status: SHIPPED | OUTPATIENT
Start: 2023-12-18 | End: 2024-01-22

## 2023-12-19 ENCOUNTER — LAB VISIT (OUTPATIENT)
Dept: LAB | Facility: HOSPITAL | Age: 33
End: 2023-12-19
Attending: INTERNAL MEDICINE
Payer: MEDICARE

## 2023-12-19 DIAGNOSIS — Z94.0 KIDNEY REPLACED BY TRANSPLANT: ICD-10-CM

## 2023-12-19 PROCEDURE — 80197 ASSAY OF TACROLIMUS: CPT | Mod: HCNC | Performed by: INTERNAL MEDICINE

## 2023-12-19 PROCEDURE — 36415 COLL VENOUS BLD VENIPUNCTURE: CPT | Mod: HCNC | Performed by: INTERNAL MEDICINE

## 2023-12-20 ENCOUNTER — PATIENT MESSAGE (OUTPATIENT)
Dept: TRANSPLANT | Facility: CLINIC | Age: 33
End: 2023-12-20
Payer: MEDICARE

## 2023-12-20 ENCOUNTER — TELEPHONE (OUTPATIENT)
Dept: TRANSPLANT | Facility: CLINIC | Age: 33
End: 2023-12-20
Payer: MEDICARE

## 2023-12-20 LAB — TACROLIMUS BLD-MCNC: 7.2 NG/ML (ref 5–15)

## 2023-12-20 NOTE — TELEPHONE ENCOUNTER
SW communicated the below to pt and she was elated.     Pt reports ability to afford medications and will  medications later. Pt strongly denied any overwhelming feelings of depression/anxiety. Pt reports will contact SW if she has any additional concerns.     SW remains available to pt and family.           ----- Message from Tata Hdz RN sent at 12/19/2023  4:12 PM CST -----  Regarding: RE: financial help  Melquiades said her keto was $0 from [facility].      Tata  ----- Message -----  From: Allison Koch, MS, [facility]  Sent: 12/19/2023   3:53 PM CST  To: Emi Bacon, PharmD; #  Subject: RE: financial help                               Hi!   I contacted pt and she reports her primary concern is the ketoconazole $20. I don't see that noted below. She reports having to pay only $7 in total previously. She explained finances are causing her stress and she has no other assistance. Is pharmacy able to get the price down to what she previously paid or was that just a one time occurrence?    ----- Message -----  From: Allison Koch, MSW, [facility]  Sent: 12/19/2023   2:54 PM CST  To: Sara Caballero MD; #  Subject: RE: financial help                               Thank you. SW will follow up.  ----- Message -----  From: Tata Hdz RN  Sent: 12/19/2023   1:31 PM CST  To: Sara Caballero MD; #  Subject: financial help                                   Last Friday Dr. Cuevas was called about this lady notbeing able to afford meds. apparently getting help with utilities etc. Ivana wrote on 12/13 where she was aware of problem and planned to reach out to pharmD. Pharm D said that meds were covered EXCEPT soidum bicarb and mag but it cost $5.50.     Dr cuevas said it Sounds like more than finances -she also noted having what sounds like mental health issues.  Ivana, is covering hospital and asked if someone outpt could followup.    A

## 2023-12-20 NOTE — TELEPHONE ENCOUNTER
----- Message from Tata Hzd RN sent at 12/19/2023  4:12 PM CST -----  Regarding: RE: financial help  Melquiades said her keto was $0 from [facility].      Tata  ----- Message -----  From: Allison Koch, [facility], [facility]  Sent: 12/19/2023   3:53 PM CST  To: Emi Bacon, PharmD; #  Subject: RE: financial help                               Hi!   I contacted pt and she reports her primary concern is the ketoconazole $20. I don't see that noted below. She reports having to pay only $7 in total previously. She explained finances are causing her stress and she has no other assistance. Is pharmacy able to get the price down to what she previously paid or was that just a one time occurrence?    ----- Message -----  From: Allison Koch, [facility], [facility]  Sent: 12/19/2023   2:54 PM CST  To: Sraa Caballero MD; #  Subject: RE: financial help                               Thank you. SW will follow up.  ----- Message -----  From: Tata Hdz RN  Sent: 12/19/2023   1:31 PM CST  To: Sara Caballero MD; #  Subject: financial help                                   Last Friday Dr. Cuevas was called about this lady notbeing able to afford meds. apparently getting help with utilities etc. Ivana wrote on 12/13 where she was aware of problem and planned to reach out to pharmD. Pharm D said that meds were covered EXCEPT soidum bicarb and mag but it cost $5.50.     Dr cuevas said it Sounds like more than finances -she also noted having what sounds like mental health issues.  Ivana, is covering hospital and asked if someone outpt could followup.    A

## 2024-01-10 ENCOUNTER — LAB VISIT (OUTPATIENT)
Dept: LAB | Facility: HOSPITAL | Age: 34
End: 2024-01-10
Attending: INTERNAL MEDICINE
Payer: MEDICARE

## 2024-01-10 DIAGNOSIS — Z94.0 KIDNEY REPLACED BY TRANSPLANT: ICD-10-CM

## 2024-01-10 PROCEDURE — 80197 ASSAY OF TACROLIMUS: CPT | Mod: HCNC | Performed by: INTERNAL MEDICINE

## 2024-01-10 PROCEDURE — 36415 COLL VENOUS BLD VENIPUNCTURE: CPT | Mod: HCNC | Performed by: INTERNAL MEDICINE

## 2024-01-11 DIAGNOSIS — Z94.0 S/P KIDNEY TRANSPLANT: ICD-10-CM

## 2024-01-11 LAB — TACROLIMUS BLD-MCNC: 5.5 NG/ML (ref 5–15)

## 2024-01-11 RX ORDER — MYCOPHENOLATE MOFETIL 250 MG/1
1000 CAPSULE ORAL 2 TIMES DAILY
Qty: 240 CAPSULE | Refills: 11 | Status: CANCELLED | OUTPATIENT
Start: 2024-01-11

## 2024-01-11 RX ORDER — PREDNISONE 5 MG/1
TABLET ORAL
Qty: 70 TABLET | Refills: 11 | Status: CANCELLED | OUTPATIENT
Start: 2024-01-11

## 2024-01-12 DIAGNOSIS — Z94.0 S/P KIDNEY TRANSPLANT: ICD-10-CM

## 2024-01-16 RX ORDER — PREDNISONE 5 MG/1
5 TABLET ORAL DAILY
Qty: 90 TABLET | Refills: 3 | Status: SHIPPED | OUTPATIENT
Start: 2024-01-16

## 2024-01-16 RX ORDER — MYCOPHENOLATE MOFETIL 250 MG/1
1000 CAPSULE ORAL 2 TIMES DAILY
Qty: 240 CAPSULE | Refills: 11 | Status: SHIPPED | OUTPATIENT
Start: 2024-01-16

## 2024-01-18 NOTE — PROGRESS NOTES
Kidney Post-Transplant Assessment    Referring Physician: Andres Hoyt  Current Nephrologist: Eduard Vance    ORGAN: RIGHT KIDNEY  Donor Type: donation after brain death  PHS Increased Risk: no  Cold Ischemia: 1,259 mins  Induction Medications: thymoglobulin    Subjective:   The patient location is: [place]  The chief complaint leading to consultation is: Kidney Post-Transplant Assessment    Visit type: audiovisual    Face to Face time with patient:   30 minutes of total time spent on the encounter, which includes face to face time and non-face to face time preparing to see the patient (eg, review of tests), Obtaining and/or reviewing separately obtained history, Documenting clinical information in the electronic or other health record, Independently interpreting results (not separately reported) and communicating results to the patient/family/caregiver, or Care coordination (not separately reported).     Each patient to whom he or she provides medical services by telemedicine is:  (1) informed of the relationship between the physician and patient and the respective role of any other health care provider with respect to management of the patient; and (2) notified that he or she may decline to receive medical services by telemedicine and may withdraw from such care at any time.      CC:  Reassessment of renal allograft function and management of chronic immunosuppression.    HPI:  Ms. Schwartz is a 33 y.o. year old Black or  female with history of ESRD secondary to FSGS who was on HD 10/2016 until she received a donation after brain death kidney transplant on 1/2/23 (Thymo induction, CIT ~21 hours, CMV -/+). Surgery without complication. She has CKD stage 2 - GFR 60-89 and her baseline creatinine is between 1.0-1.2. She takes mycophenolate mofetil, prednisone, and tacrolimus for maintenance immunosuppression.     Has not re-established with general nephology post transplant. She is requesting  to see someone at [facility] in [place]. Main complaint is weight gain, she missed bariatrics appointment but is going to try and re-schedule it. She has been going the gym and watching her diet. Drinking 2-3 L water daily, no problems with urination. Home BP at goal, no peripheral edema. Tolerating IS without issues, no diarrhea or vomiting.    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 Hospitalized at [facility] October of last year and had massive PE, she was discharged on eliquis but she reports that she stopped taking this on her own.     Current Outpatient Medications   Medication Sig Dispense Refill    acetaminophen (TYLENOL) 325 MG tablet Take 1 tablet (325 mg total) by mouth every 6 (six) hours as needed for Pain.  0    albuterol (PROVENTIL/VENTOLIN HFA) 90 mcg/actuation inhaler Inhale 1-2 puffs into the lungs every 6 (six) hours as needed for Wheezing (cough). 8.5 g 1    apixaban (ELIQUIS) 5 mg Tab Take by mouth.      bisacodyL (DULCOLAX) 5 mg EC tablet Take 2 tablets (10 mg total) by mouth daily as needed for Constipation.  0    clonazePAM (KLONOPIN) 0.5 MG tablet Take 0.5 mg by mouth daily as needed.      ketoconazole (NIZORAL) 200 mg Tab Take 0.5 tablets (100 mg total) by mouth once daily. 15 tablet 11    magnesium oxide (MAG-OX) 400 mg (241.3 mg magnesium) tablet Take 2 tablets (800 mg total) by mouth 2 (two) times daily. 60 tablet 11    metoprolol tartrate (LOPRESSOR) 50 MG tablet Take 2 tablets (100 mg total) by mouth 2 (two) times daily. 360 tablet 3    mycophenolate (CELLCEPT) 250 mg Cap Take 4 capsules (1,000 mg total) by mouth 2 (two) times daily. 240 capsule 11    pantoprazole (PROTONIX) 40 MG tablet Take 1 tablet (40 mg total) by mouth once daily. 30 tablet 5    predniSONE (DELTASONE) 5 MG tablet Take 1 tablet (5 mg total) by mouth once daily. 90 tablet 3    sodium bicarbonate 650 MG tablet Take 3 tablets (1,950 mg total) by mouth 3 (three) times daily. 270 tablet 11     "tacrolimus (PROGRAF) 1 MG Cap Take 10 capsules (10 mg total) by mouth every 12 (twelve) hours. 600 capsule 11     No current facility-administered medications for this visit.       Past Medical History:   Diagnosis Date    Allergy     Anemia     Anxiety     Breast feeding status of mother 1/17/2020    Brittle bones     ESRD (end stage renal disease)     M/W/F    General anesthetics causing adverse effect in therapeutic use     pt states "im a light weight"    Hypertension     Insomnia     PSG revealed no CHRYSTAL, but likely psychogenic etiology (anxiety)    RLS (restless legs syndrome)        Review of Systems   Constitutional:  Positive for fatigue. Negative for activity change, appetite change and fever.   HENT:  Negative for congestion, mouth sores and sore throat.    Eyes:  Negative for visual disturbance.   Respiratory:  Negative for cough, chest tightness and shortness of breath.    Cardiovascular:  Negative for chest pain, palpitations and leg swelling.   Gastrointestinal:  Negative for abdominal distention, abdominal pain, constipation, diarrhea and nausea.   Genitourinary:  Negative for difficulty urinating, dysuria, frequency and hematuria.   Musculoskeletal:  Negative for arthralgias, gait problem and myalgias.   Skin:  Negative for wound.   Allergic/Immunologic: Positive for immunocompromised state. Negative for environmental allergies and food allergies.   Neurological:  Negative for dizziness, weakness and numbness.   Hematological:  Does not bruise/bleed easily.   Psychiatric/Behavioral:  Negative for sleep disturbance. The patient is not nervous/anxious.        Objective:   There were no vitals taken for this visit.body mass index is unknown because there is no height or weight on file.    Physical Exam    Labs:  Lab Results   Component Value Date    WBC 5.85 12/14/2023    HGB 12.5 12/14/2023    HCT 39.9 12/14/2023     12/14/2023    K 3.5 12/14/2023     12/14/2023    CO2 24 12/14/2023    BUN " 15 12/14/2023    CREATININE 0.9 12/14/2023    EGFRNONAA 3 (A) 03/28/2022    CALCIUM 10.2 12/14/2023    PHOS 2.7 12/14/2023    MG 1.8 12/14/2023    ALBUMIN 4.1 12/14/2023    AST 14 10/17/2023    ALT 16 10/17/2023    UTPCR Unable to calculate 12/14/2023    PTH 1,451.5 (H) 01/02/2023    TACROLIMUS 5.5 01/10/2024     Labs were reviewed with the patient    Assessment:     1. S/P kidney transplant    2. CKD (chronic kidney disease), stage II    3. FSGS (focal segmental glomerulosclerosis)    4. Immunosuppressive management encounter following kidney transplant    5. Essential hypertension    6. At risk for opportunistic infections      Plan:   Needs to re-establish care with general nephrology for CKD care, referral placed for [facility] per her request  Has questions regarding insurance, will have social workers reach out to her  Weight gain post transplant-she plans to re-schedule visit with bariatrics  Discussed risk associated with her not taking eliquis such as recurrent clots and even death, she is not interested in restarting at this time as she feels she does not need it       1. Immunosuppression: Prograf trough 5.5.  Continue Prograf 6/5 (actual dose she is taking, rx adjusted), Ketoconazole 100 mg QD to augment prograf levels, MMF 1000 mg BID, and Prednisone 5 mg QD. Will continue to monitor for drug toxicities    2. Allograft Function: stable. Continue good po hydration.   - ESRD secondary to FSGS on HD 10/2016 until she received a donation after brain death kidney transplant on 1/2/23 (Thymo induction CIT ~21 hours, CMV -/+).  - baseline creatinine is between 1.0-1.2.  Lab Results   Component Value Date    CREATININE 0.9 12/14/2023       Latest Reference Range & Units 11mo 1wk,  Kidney-Post 1 Year  12/14/23   eGFR >60 mL/min/1.73 m^2 >60.0         3. Hypertension management: advise low salt diet and home BP monitoring    lopressor 100 mg BID    4. Metabolic Bone Disease/Secondary  Hyperparathyroidism:stable  MagOx 800 mg BID  Lab Results   Component Value Date    PTH 1,451.5 (H) 01/02/2023    CALCIUM 10.2 12/14/2023    PHOS 2.7 12/14/2023        Latest Reference Range & Units 10mo 0wk,  Kidney-Post 1 Year  11/07/23 10:24   Magnesium  1.6 - 2.6 mg/dL 1.5 (L)       5. Electrolytes:  Will monitor /guidelines  Sodium bicarb 1950 mg TID  Lab Results   Component Value Date     12/14/2023    K 3.5 12/14/2023     12/14/2023    CO2 24 12/14/2023       6. Anemia: stable, no need for intervention  Lab Results   Component Value Date    WBC 5.85 12/14/2023    HGB 12.5 12/14/2023    HCT 39.9 12/14/2023    MCV 97 12/14/2023     12/14/2023       7.  Cytopenias: no significant cytopenias will monitor as per our guidelines. Medicine list reviewed including potential causes of drug-induced cytopenias    8. Proteinuria: continue p/c ratio as per FSGS guidelines   UPC 12/14/2023: unable to calculate     9. BK virus infection screening:  will continue to monitor per guidelines      10. Weight education: provided during the clinic visit   There is no height or weight on file to calculate BMI.     11. Patient safety education regarding immunosuppression including prophylaxis posttransplant for CMV, PCP : Education provided about vaccination and prevention of infections     PCP ppx: Bactrim until 7/1/23-completed  CMV ppx: Valcyte until 4/2/23-completed           Follow-up:   Clinic: return to transplant clinic weekly for the first month after transplant; every 2 weeks during months 2-3; then at 6-, 9-, 12-, 18-, 24-, and 36- months post-transplant to reassess for complications from immunosuppression toxicity and monitor for rejection.  Annually thereafter.    Labs: since patient remains at high risk for rejection and drug-related complications that warrant close monitoring, labs will be ordered as follows: continue twice weekly CBC, renal panel, and drug level for first month; then same labs once  weekly through 3rd month post-transplant.  Urine for UA and protein/creatinine ratio monthly.  Serum BK - PCR at 1-, 3-, 6-, 9-, 12-, 18-, 24-, 36-, 48-, and 60 months post-transplant.  Hepatic panel at 1-, 2-, 3-, 6-, 9-, 12-, 18-, 24-, and 36- months post-transplant.    Patricia Caceres NP       Education:   Material provided to the patient.  Patient reminded to call with any health changes since these can be early signs of significant complications.  Also, I advised the patient to be sure any new medications or changes of old medications are discussed with either a pharmacist or physician knowledgeable with transplant to avoid rejection/drug toxicity related to significant drug interactions.    Patient advised that it is recommended that all transplanted patients, and their close contacts and household members receive Covid vaccination.

## 2024-01-22 ENCOUNTER — OFFICE VISIT (OUTPATIENT)
Dept: TRANSPLANT | Facility: CLINIC | Age: 34
End: 2024-01-22
Payer: MEDICARE

## 2024-01-22 ENCOUNTER — PATIENT MESSAGE (OUTPATIENT)
Dept: TRANSPLANT | Facility: CLINIC | Age: 34
End: 2024-01-22

## 2024-01-22 DIAGNOSIS — Z91.89 AT RISK FOR OPPORTUNISTIC INFECTIONS: ICD-10-CM

## 2024-01-22 DIAGNOSIS — I10 ESSENTIAL HYPERTENSION: ICD-10-CM

## 2024-01-22 DIAGNOSIS — Z94.0 S/P KIDNEY TRANSPLANT: Primary | ICD-10-CM

## 2024-01-22 DIAGNOSIS — Z94.0 IMMUNOSUPPRESSIVE MANAGEMENT ENCOUNTER FOLLOWING KIDNEY TRANSPLANT: ICD-10-CM

## 2024-01-22 DIAGNOSIS — Z79.899 IMMUNOSUPPRESSIVE MANAGEMENT ENCOUNTER FOLLOWING KIDNEY TRANSPLANT: ICD-10-CM

## 2024-01-22 DIAGNOSIS — N05.1 FSGS (FOCAL SEGMENTAL GLOMERULOSCLEROSIS): ICD-10-CM

## 2024-01-22 DIAGNOSIS — N18.2 CKD (CHRONIC KIDNEY DISEASE), STAGE II: ICD-10-CM

## 2024-01-22 PROCEDURE — 1159F MED LIST DOCD IN RCRD: CPT | Mod: HCNC,CPTII,95, | Performed by: NURSE PRACTITIONER

## 2024-01-22 PROCEDURE — 99214 OFFICE O/P EST MOD 30 MIN: CPT | Mod: HCNC,95,, | Performed by: NURSE PRACTITIONER

## 2024-01-22 PROCEDURE — 1160F RVW MEDS BY RX/DR IN RCRD: CPT | Mod: HCNC,CPTII,95, | Performed by: NURSE PRACTITIONER

## 2024-01-22 RX ORDER — KETOCONAZOLE 200 MG/1
100 TABLET ORAL DAILY
Qty: 15 TABLET | Refills: 11 | Status: SHIPPED | OUTPATIENT
Start: 2024-01-22 | End: 2025-01-21

## 2024-01-22 RX ORDER — TACROLIMUS 1 MG/1
CAPSULE ORAL
Qty: 330 CAPSULE | Refills: 11
Start: 2024-01-22 | End: 2024-02-12 | Stop reason: SDUPTHER

## 2024-01-22 NOTE — LETTER
January 22, 2024        Eduard Vance  [address]  [address]  [facility]  [facility] [place] [address]  Phone: [phone]  Fax: [phone]             [facility] Trans[facility] 1st Fl  [address]  [facility] [address]  Phone: [phone]   Patient: Lisseth Schwartz   MR Number: [id number]   YOB: 1990   Date of Visit: 1/22/2024       Dear Dr. Eduard Vance    Thank you for referring Lisseth Schwartz to me for evaluation. Attached you will find relevant portions of my assessment and plan of care.    If you have questions, please do not hesitate to call me. I look forward to following Lisseth Schwartz along with you.    Sincerely,    Patricia Caceres, NP    Enclosure    If you would like to receive this communication electronically, please contact [email or web address] or [phone] to request [facility] access.    [facility] is a tool which provides read-only access to select patient information with whom you have a relationship. Its easy to use and provides real time access to review your patients record including encounter summaries, notes, results, and demographic information.    If you feel you have received this communication in error or would no longer like to receive these types of communications, please e-mail [email or web address]

## 2024-01-22 NOTE — Clinical Note
Can someone please call her when yall get a chance she just has some questions about her insurance.   Thanks Patricia

## 2024-02-08 DIAGNOSIS — N25.81 SECONDARY HYPERPARATHYROIDISM: Primary | ICD-10-CM

## 2024-02-12 DIAGNOSIS — Z94.0 S/P KIDNEY TRANSPLANT: ICD-10-CM

## 2024-02-12 DIAGNOSIS — Z79.899 IMMUNOSUPPRESSIVE MANAGEMENT ENCOUNTER FOLLOWING KIDNEY TRANSPLANT: ICD-10-CM

## 2024-02-12 DIAGNOSIS — Z94.0 IMMUNOSUPPRESSIVE MANAGEMENT ENCOUNTER FOLLOWING KIDNEY TRANSPLANT: ICD-10-CM

## 2024-02-12 RX ORDER — TACROLIMUS 1 MG/1
CAPSULE ORAL
Qty: 330 CAPSULE | Refills: 11 | Status: CANCELLED | OUTPATIENT
Start: 2024-02-12

## 2024-02-12 RX ORDER — PANTOPRAZOLE SODIUM 40 MG/1
40 TABLET, DELAYED RELEASE ORAL DAILY
Qty: 30 TABLET | Refills: 5 | Status: CANCELLED | OUTPATIENT
Start: 2024-02-12

## 2024-02-12 RX ORDER — PANTOPRAZOLE SODIUM 40 MG/1
40 TABLET, DELAYED RELEASE ORAL DAILY
Qty: 30 TABLET | Refills: 5 | Status: SHIPPED | OUTPATIENT
Start: 2024-02-12

## 2024-02-12 RX ORDER — LANOLIN ALCOHOL/MO/W.PET/CERES
800 CREAM (GRAM) TOPICAL 2 TIMES DAILY
Qty: 60 TABLET | Refills: 11 | Status: SHIPPED | OUTPATIENT
Start: 2024-02-12 | End: 2024-03-20

## 2024-02-12 RX ORDER — LANOLIN ALCOHOL/MO/W.PET/CERES
800 CREAM (GRAM) TOPICAL 2 TIMES DAILY
Qty: 60 TABLET | Refills: 11 | Status: CANCELLED | OUTPATIENT
Start: 2024-02-12

## 2024-02-14 RX ORDER — TACROLIMUS 1 MG/1
CAPSULE ORAL
Qty: 330 CAPSULE | Refills: 11 | Status: SHIPPED | OUTPATIENT
Start: 2024-02-14

## 2024-02-14 RX ORDER — LANOLIN ALCOHOL/MO/W.PET/CERES
800 CREAM (GRAM) TOPICAL 2 TIMES DAILY
Qty: 60 TABLET | Refills: 11 | Status: SHIPPED | OUTPATIENT
Start: 2024-02-14 | End: 2024-03-20

## 2024-02-14 RX ORDER — PANTOPRAZOLE SODIUM 40 MG/1
40 TABLET, DELAYED RELEASE ORAL DAILY
Qty: 30 TABLET | Refills: 5 | Status: SHIPPED | OUTPATIENT
Start: 2024-02-14 | End: 2024-03-20

## 2024-02-14 RX ORDER — LANOLIN ALCOHOL/MO/W.PET/CERES
800 CREAM (GRAM) TOPICAL 2 TIMES DAILY
Qty: 60 TABLET | Refills: 11 | Status: SHIPPED | OUTPATIENT
Start: 2024-02-14

## 2024-02-23 ENCOUNTER — PATIENT MESSAGE (OUTPATIENT)
Dept: NEPHROLOGY | Facility: CLINIC | Age: 34
End: 2024-02-23
Payer: MEDICARE

## 2024-02-23 DIAGNOSIS — Z94.0 DECEASED-DONOR KIDNEY TRANSPLANT: Primary | ICD-10-CM

## 2024-02-26 ENCOUNTER — LAB VISIT (OUTPATIENT)
Dept: LAB | Facility: HOSPITAL | Age: 34
End: 2024-02-26
Attending: INTERNAL MEDICINE
Payer: MEDICARE

## 2024-02-26 DIAGNOSIS — Z94.0 DECEASED-DONOR KIDNEY TRANSPLANT: ICD-10-CM

## 2024-02-26 LAB
ANION GAP SERPL CALC-SCNC: 9 MMOL/L (ref 8–16)
BASOPHILS # BLD AUTO: 0.03 K/UL (ref 0–0.2)
BASOPHILS NFR BLD: 0.4 % (ref 0–1.9)
BILIRUB UR QL STRIP: NEGATIVE
BUN SERPL-MCNC: 19 MG/DL (ref 6–20)
CALCIUM SERPL-MCNC: 10.1 MG/DL (ref 8.7–10.5)
CHLORIDE SERPL-SCNC: 107 MMOL/L (ref 95–110)
CLARITY UR REFRACT.AUTO: CLEAR
CO2 SERPL-SCNC: 24 MMOL/L (ref 23–29)
COLOR UR AUTO: YELLOW
CREAT SERPL-MCNC: 1.1 MG/DL (ref 0.5–1.4)
CREAT UR-MCNC: 150 MG/DL (ref 15–325)
DIFFERENTIAL METHOD BLD: ABNORMAL
EOSINOPHIL # BLD AUTO: 0 K/UL (ref 0–0.5)
EOSINOPHIL NFR BLD: 0.2 % (ref 0–8)
ERYTHROCYTE [DISTWIDTH] IN BLOOD BY AUTOMATED COUNT: 13.4 % (ref 11.5–14.5)
EST. GFR  (NO RACE VARIABLE): >60 ML/MIN/1.73 M^2
GLUCOSE SERPL-MCNC: 116 MG/DL (ref 70–110)
GLUCOSE UR QL STRIP: NEGATIVE
HCT VFR BLD AUTO: 38.1 % (ref 37–48.5)
HGB BLD-MCNC: 12.5 G/DL (ref 12–16)
HGB UR QL STRIP: NEGATIVE
IMM GRANULOCYTES # BLD AUTO: 0.02 K/UL (ref 0–0.04)
IMM GRANULOCYTES NFR BLD AUTO: 0.2 % (ref 0–0.5)
KETONES UR QL STRIP: NEGATIVE
LEUKOCYTE ESTERASE UR QL STRIP: NEGATIVE
LYMPHOCYTES # BLD AUTO: 0.7 K/UL (ref 1–4.8)
LYMPHOCYTES NFR BLD: 8.4 % (ref 18–48)
MCH RBC QN AUTO: 31.5 PG (ref 27–31)
MCHC RBC AUTO-ENTMCNC: 32.8 G/DL (ref 32–36)
MCV RBC AUTO: 96 FL (ref 82–98)
MONOCYTES # BLD AUTO: 0.6 K/UL (ref 0.3–1)
MONOCYTES NFR BLD: 6.6 % (ref 4–15)
NEUTROPHILS # BLD AUTO: 7 K/UL (ref 1.8–7.7)
NEUTROPHILS NFR BLD: 84.2 % (ref 38–73)
NITRITE UR QL STRIP: NEGATIVE
NRBC BLD-RTO: 0 /100 WBC
PH UR STRIP: 8 [PH] (ref 5–8)
PLATELET # BLD AUTO: 244 K/UL (ref 150–450)
PMV BLD AUTO: 12.5 FL (ref 9.2–12.9)
POTASSIUM SERPL-SCNC: 4.4 MMOL/L (ref 3.5–5.1)
PROT UR QL STRIP: ABNORMAL
PROT UR-MCNC: <7 MG/DL (ref 0–15)
PROT/CREAT UR: NORMAL MG/G{CREAT} (ref 0–0.2)
PTH-INTACT SERPL-MCNC: 192.2 PG/ML (ref 9–77)
RBC # BLD AUTO: 3.97 M/UL (ref 4–5.4)
SODIUM SERPL-SCNC: 140 MMOL/L (ref 136–145)
SP GR UR STRIP: >1.03 (ref 1–1.03)
URN SPEC COLLECT METH UR: ABNORMAL
WBC # BLD AUTO: 8.35 K/UL (ref 3.9–12.7)

## 2024-02-26 PROCEDURE — 36415 COLL VENOUS BLD VENIPUNCTURE: CPT | Mod: HCNC | Performed by: INTERNAL MEDICINE

## 2024-02-26 PROCEDURE — 83970 ASSAY OF PARATHORMONE: CPT | Mod: HCNC | Performed by: INTERNAL MEDICINE

## 2024-02-26 PROCEDURE — 81003 URINALYSIS AUTO W/O SCOPE: CPT | Mod: HCNC | Performed by: INTERNAL MEDICINE

## 2024-02-26 PROCEDURE — 80048 BASIC METABOLIC PNL TOTAL CA: CPT | Mod: HCNC | Performed by: INTERNAL MEDICINE

## 2024-02-26 PROCEDURE — 84156 ASSAY OF PROTEIN URINE: CPT | Mod: HCNC | Performed by: INTERNAL MEDICINE

## 2024-02-26 PROCEDURE — 85025 COMPLETE CBC W/AUTO DIFF WBC: CPT | Mod: HCNC | Performed by: INTERNAL MEDICINE

## 2024-02-29 ENCOUNTER — OFFICE VISIT (OUTPATIENT)
Dept: NEPHROLOGY | Facility: CLINIC | Age: 34
End: 2024-02-29
Payer: MEDICARE

## 2024-02-29 VITALS
HEIGHT: 65 IN | BODY MASS INDEX: 36.99 KG/M2 | RESPIRATION RATE: 18 BRPM | HEART RATE: 70 BPM | WEIGHT: 222 LBS | SYSTOLIC BLOOD PRESSURE: 130 MMHG | DIASTOLIC BLOOD PRESSURE: 88 MMHG

## 2024-02-29 DIAGNOSIS — N05.1 FSGS (FOCAL SEGMENTAL GLOMERULOSCLEROSIS): ICD-10-CM

## 2024-02-29 DIAGNOSIS — Z99.2 DEPENDENCE ON RENAL DIALYSIS: ICD-10-CM

## 2024-02-29 DIAGNOSIS — N18.6 END STAGE RENAL DISEASE: ICD-10-CM

## 2024-02-29 DIAGNOSIS — N18.2 CKD (CHRONIC KIDNEY DISEASE), STAGE II: ICD-10-CM

## 2024-02-29 DIAGNOSIS — Z94.0 KIDNEY REPLACED BY TRANSPLANT: ICD-10-CM

## 2024-02-29 DIAGNOSIS — E66.01 SEVERE OBESITY (BMI 35.0-39.9) WITH COMORBIDITY: ICD-10-CM

## 2024-02-29 DIAGNOSIS — Z94.0 S/P KIDNEY TRANSPLANT: ICD-10-CM

## 2024-02-29 DIAGNOSIS — R63.5 WEIGHT GAIN: Primary | ICD-10-CM

## 2024-02-29 PROCEDURE — 3008F BODY MASS INDEX DOCD: CPT | Mod: HCNC,CPTII,S$GLB, | Performed by: INTERNAL MEDICINE

## 2024-02-29 PROCEDURE — 3079F DIAST BP 80-89 MM HG: CPT | Mod: HCNC,CPTII,S$GLB, | Performed by: INTERNAL MEDICINE

## 2024-02-29 PROCEDURE — 3075F SYST BP GE 130 - 139MM HG: CPT | Mod: HCNC,CPTII,S$GLB, | Performed by: INTERNAL MEDICINE

## 2024-02-29 PROCEDURE — 99205 OFFICE O/P NEW HI 60 MIN: CPT | Mod: HCNC,S$GLB,, | Performed by: INTERNAL MEDICINE

## 2024-02-29 PROCEDURE — 1159F MED LIST DOCD IN RCRD: CPT | Mod: HCNC,CPTII,S$GLB, | Performed by: INTERNAL MEDICINE

## 2024-02-29 PROCEDURE — 99999 PR PBB SHADOW E&M-EST. PATIENT-LVL III: CPT | Mod: PBBFAC,HCNC,, | Performed by: INTERNAL MEDICINE

## 2024-02-29 PROCEDURE — 3066F NEPHROPATHY DOC TX: CPT | Mod: HCNC,CPTII,S$GLB, | Performed by: INTERNAL MEDICINE

## 2024-02-29 RX ORDER — MULTIVITAMIN
1 TABLET ORAL EVERY MORNING
COMMUNITY

## 2024-02-29 RX ORDER — SODIUM BICARBONATE 650 MG/1
650 TABLET ORAL 2 TIMES DAILY
Qty: 60 TABLET | Refills: 11 | Status: SHIPPED | OUTPATIENT
Start: 2024-02-29 | End: 2025-02-28

## 2024-02-29 NOTE — PROGRESS NOTES
Lisseth Schwartz is a 33 y.o. female for whom nephrology consult has been requested to evaluate and give opinion.   Renal clinic consult note:  Date of consult: 2/29/24  Reason for consult: kidney transplant  Referring service: kidney transplant in [place]    HPI: Thank you for referring the pt to us. H/o and chart were reviewed. Pt was seen and examined. Pt is a 32 y/o female with h/o of cadaveric kidney transplant at [facility] on 1/2/23 and ESRD due to kidney biopsy proven primary FSGS, and h/o of HTN, who presented to establish care with general nephrology in . Pt was on HD from 2016 to 2023. Pt has no new c/o's renally or transplant-wise. Pt is concerned about her wt gain since the transplant. Pt says she has a good appetite. No prior h/o of thyroid disorder. Immunosuppressive meds reviewed with pt. Has been compliant.     PAST MEDICAL HISTORY: Cadaveric kidney transplant at [facility] on 1/2/23, ESRD due to kidney biopsy proven primary FSGS, HTN, Anxiety, Brittle bones, Insomnia, and RLS (restless legs syndrome).    PAST SURGICAL HISTORY:  She  has a past surgical history that includes Arm surgery (Right); Renal biopsy; tumor removed; Laparoscopic salpingectomy (Bilateral, 12/04/2018); Hysteroscopy with hydrothermal ablation of endometrium with dilation and curettage (N/A, 12/04/2018); AV fistula placement (Right); Kidney transplant (N/A, 01/02/2023); and Cystoscopy.    SOCIAL HISTORY:  She  reports that she has never smoked. She has never used smokeless tobacco. She reports that she does not drink alcohol and does not use drugs.    FAMILY MEDICAL HISTORY:  Her family history includes Breast cancer in her paternal grandmother; Diabetes in her maternal grandmother; Heart attack in her maternal grandmother; Hypertension in her father; Lung cancer in her maternal grandfather; Prostate cancer in her maternal grandfather.    Review of patient's allergies indicates:   Allergen Reactions    Lisinopril Other (See  Comments)     Severe cough    Adhesive tape-silicones Itching     Burns    Furosemide Other (See Comments)     Almost gave her right sided heartfailure           Prior to Admission medications    Medication Sig Start Date End Date Taking? Authorizing Provider   acetaminophen (TYLENOL) 325 MG tablet Take 1 tablet (325 mg total) by mouth every 6 (six) hours as needed for Pain. 10/17/23  Yes Ronnie Burrell Jr., MD   albuterol (PROVENTIL/VENTOLIN HFA) 90 mcg/actuation inhaler Inhale 1-2 puffs into the lungs every 6 (six) hours as needed for Wheezing (cough). 10/10/23  Yes Miriam Guillory NP   bisacodyL (DULCOLAX) 5 mg EC tablet Take 2 tablets (10 mg total) by mouth daily as needed for Constipation. 1/3/23  Yes Sara Caballero MD   ketoconazole (NIZORAL) 200 mg Tab Take 0.5 tablets (100 mg total) by mouth once daily. 1/22/24 1/21/25 Yes Patricia Caceres NP   magnesium oxide (MAG-OX) 400 mg (241.3 mg magnesium) tablet Take 2 tablets (800 mg total) by mouth 2 (two) times daily. 2/12/24  Yes Toma Castillo MD   metoprolol tartrate (LOPRESSOR) 50 MG tablet Take 2 tablets (100 mg total) by mouth 2 (two) times daily. 11/13/23 11/12/24 Yes Patricia Caceres NP   mycophenolate (CELLCEPT) 250 mg Cap Take 4 capsules (1,000 mg total) by mouth 2 (two) times daily. 1/16/24  Yes Sara Caballero MD   pantoprazole (PROTONIX) 40 MG tablet Take 1 tablet (40 mg total) by mouth once daily. 2/12/24  Yes Toma Castillo MD   predniSONE (DELTASONE) 5 MG tablet Take 1 tablet (5 mg total) by mouth once daily. 1/16/24  Yes Sara Caballero MD   tacrolimus (PROGRAF) 1 MG Cap Take 6 capsules (6 mg total) by mouth every morning AND 5 capsules (5 mg total) every evening. 2/14/24  Yes Sara Caballero MD   sodium bicarbonate 650 MG tablet Take 3 tablets (1,950 mg total) by mouth 3 (three) times daily. 2/20/23 2/29/24 Yes Chrissy Irby MD   clonazePAM (KLONOPIN) 0.5 MG tablet Take 0.5 mg by mouth daily as  "needed. 12/7/22   Provider, Historical   magnesium oxide (MAG-OX) 400 mg (241.3 mg magnesium) tablet Take 2 tablets (800 mg total) by mouth 2 (two) times daily. 2/14/24   Patricia Caceres, NP   magnesium oxide (MAG-OX) 400 mg (241.3 mg magnesium) tablet Take 2 tablets (800 mg total) by mouth 2 (two) times daily. 2/14/24   Patricia Caceres NP   multivitamin (THERAGRAN) per tablet Take 1 tablet by mouth every morning.    Provider, Historical   pantoprazole (PROTONIX) 40 MG tablet Take 1 tablet (40 mg total) by mouth once daily. 2/14/24   Patricia Caceres NP   sodium bicarbonate 650 MG tablet Take 1 tablet (650 mg total) by mouth 2 (two) times daily. 2/29/24 2/28/25  Toma Castillo MD        REVIEW OF SYSTEMS:  Patient has no fever, fatigue, visual changes, chest pain, edema, cough, dyspnea, nausea, vomiting, constipation, diarrhea, arthralgias, pruritis, dizziness, weakness, depression, confusion.    PHYSICAL EXAM:   height is 5' 5" (1.651 m) and weight is 100.7 kg (222 lb 0.1 oz). Her blood pressure is 130/88 and her pulse is 70. Her respiration is 18.   Gen: WDWN female in no apparent distress  Psych: Normal mood and affect  Skin: No rashes or ulcers  Neck: No JVD  Chest: Clear with no rales, rhonchi, wheezing with normal effort  CV: Regular with no murmurs, gallops or rubs  Abd: Soft, nontender, no distension  Ext: No edema    Labs reviewed  BMP  Lab Results   Component Value Date     02/26/2024    K 4.4 02/26/2024     02/26/2024    CO2 24 02/26/2024    BUN 19 02/26/2024    CREATININE 1.1 02/26/2024    CALCIUM 10.1 02/26/2024    ANIONGAP 9 02/26/2024    EGFRNORACEVR >60.0 02/26/2024     Lab Results   Component Value Date    WBC 8.35 02/26/2024    HGB 12.5 02/26/2024    HCT 38.1 02/26/2024    MCV 96 02/26/2024     02/26/2024       Prograf level in Jan 2024 was 5.5      IMPRESSION AND RECOMMENDATIONS: 32 y/o female with h/o of kidney transplant presented to establish outpt care with general " nephrology:    Renal: s Cr normal and stable  Stable renal function  Had ESRD due to kidney biopsy proven primary focal segmental glomerulosclerosis (FSGS), kidney biopsy prior to 2016.  Was on HD from 2016 to 2023.  K normal  Na normal    2. Kidney transplant: doing well, stable. Is immunosuppressed.  Prograf level was within the therapeutic range about 7 weeks ago, none today.  Will repeat trough prograf level tomorrow am  Will chart check for level and inform the pt as such.  Continue mycophenolate 1000 mg po bid  Continue prednisone 5 mg po qd    3. HTN: BP controlled  Meds reviewed    4. Wt gain: has occurred since the transplant, not before  Pt feels it is related to prednisone  Doubt related to prednisone, as it low, physiologic dose  More likely due to the fact that pt is no lonegr on HD, renal function has been replaced, and pt no longer has ESRD induced appetite loss and anorexia.  DDX: hypothyroidism. Will check TSH and free T4.      Plans and recommendations:  As discussed above  Total time spent 60 minutes including time needed to review the records, the   patient evaluation, documentation, face-to-face discussion with the patient,   more than 50% of the time was spent on coordination of care and counseling.    Level V visit.  RTC 3 months    Toma Castillo MD

## 2024-03-20 ENCOUNTER — OFFICE VISIT (OUTPATIENT)
Dept: OBSTETRICS AND GYNECOLOGY | Facility: CLINIC | Age: 34
End: 2024-03-20
Payer: MEDICARE

## 2024-03-20 VITALS
SYSTOLIC BLOOD PRESSURE: 112 MMHG | WEIGHT: 214.06 LBS | BODY MASS INDEX: 35.67 KG/M2 | HEIGHT: 65 IN | DIASTOLIC BLOOD PRESSURE: 72 MMHG

## 2024-03-20 DIAGNOSIS — N93.0 PCB (POST COITAL BLEEDING): ICD-10-CM

## 2024-03-20 DIAGNOSIS — Z01.419 ENCOUNTER FOR GYNECOLOGICAL EXAMINATION (GENERAL) (ROUTINE) WITHOUT ABNORMAL FINDINGS: Primary | ICD-10-CM

## 2024-03-20 DIAGNOSIS — Z12.4 SCREENING FOR CERVICAL CANCER: ICD-10-CM

## 2024-03-20 PROCEDURE — 3078F DIAST BP <80 MM HG: CPT | Mod: HCNC,CPTII,S$GLB, | Performed by: OBSTETRICS & GYNECOLOGY

## 2024-03-20 PROCEDURE — G0101 CA SCREEN;PELVIC/BREAST EXAM: HCPCS | Mod: HCNC,GZ,S$GLB, | Performed by: OBSTETRICS & GYNECOLOGY

## 2024-03-20 PROCEDURE — 1159F MED LIST DOCD IN RCRD: CPT | Mod: HCNC,CPTII,S$GLB, | Performed by: OBSTETRICS & GYNECOLOGY

## 2024-03-20 PROCEDURE — 99999 PR PBB SHADOW E&M-EST. PATIENT-LVL III: CPT | Mod: PBBFAC,HCNC,, | Performed by: OBSTETRICS & GYNECOLOGY

## 2024-03-20 PROCEDURE — 3074F SYST BP LT 130 MM HG: CPT | Mod: HCNC,CPTII,S$GLB, | Performed by: OBSTETRICS & GYNECOLOGY

## 2024-03-20 PROCEDURE — 3066F NEPHROPATHY DOC TX: CPT | Mod: HCNC,CPTII,S$GLB, | Performed by: OBSTETRICS & GYNECOLOGY

## 2024-03-20 PROCEDURE — 87491 CHLMYD TRACH DNA AMP PROBE: CPT | Mod: HCNC | Performed by: OBSTETRICS & GYNECOLOGY

## 2024-03-20 PROCEDURE — 3008F BODY MASS INDEX DOCD: CPT | Mod: HCNC,CPTII,S$GLB, | Performed by: OBSTETRICS & GYNECOLOGY

## 2024-03-20 PROCEDURE — 88175 CYTOPATH C/V AUTO FLUID REDO: CPT | Mod: HCNC | Performed by: OBSTETRICS & GYNECOLOGY

## 2024-03-20 PROCEDURE — 87624 HPV HI-RISK TYP POOLED RSLT: CPT | Mod: HCNC | Performed by: OBSTETRICS & GYNECOLOGY

## 2024-03-20 NOTE — PROGRESS NOTES
Subjective:       Patient ID: Lisseth Schwartz is a 33 y.o. female.    Chief Complaint:  bleeding intercourse      History of Present Illness  HPI  Annual Exam-Premenopausal  Patient presents for annual exam. The patient has no complaints today. The patient is sexually active. --reports bleeding after intercourse--can bleed for 1-2 days;  ; +occasional condom use; ; GYN screening history: last pap: approximate date  and was normal. The patient wears seatbelts: yes. The patient participates in regular exercise: yes.--cardio/weights 4-5 times/wk;  Has the patient ever been transfused or tattooed?: yes. --+tattooes/transfusion; The patient reports that there is not domestic violence in her life.  Menses monthly, flow 5-7 days; tampon--ultra/super pad change q 4-5 hrs; --can soak onto pad, not through pad; denies dysmenorrhea;   Problems falling asleep    GYN & OB History  Patient's last menstrual period was 03/10/2024 (approximate).   Date of Last Pap: 2020    OB History    Para Term  AB Living   6 4 3 1 2 4   SAB IAB Ectopic Multiple Live Births     2   0 4      # Outcome Date GA Lbr Erik/2nd Weight Sex Delivery Anes PTL Lv   6  18 33w0d  1.84 kg (4 lb 0.9 oz) M Vag-Spont EPI Y SATNAM   5 IAB            4 Term 09   4.082 kg (9 lb) F Vag-Spont   SATNAM   3 Term 09/15/08   4.99 kg (11 lb) M Vag-Spont   SATNAM   2 IAB 2007           1 Term 07   3.175 kg (7 lb) M Vag-Spont   SATNAM       Review of Systems  Review of Systems   Genitourinary:  Positive for menorrhagia, menstrual problem and postcoital bleeding.   All other systems reviewed and are negative.          Objective:      Physical Exam:   Constitutional: She is oriented to person, place, and time. She appears well-developed and well-nourished.     Eyes: Pupils are equal, round, and reactive to light. Conjunctivae and EOM are normal.      Pulmonary/Chest: Effort normal. Right breast exhibits no mass, no nipple  discharge, no skin change and no tenderness. Left breast exhibits no mass, no nipple discharge, no skin change and no tenderness. Breasts are symmetrical.        Abdominal: Soft.     Genitourinary:    Inguinal canal, urethra, bladder, vagina, uterus, right adnexa, left adnexa and rectum normal.      Pelvic exam was performed with patient supine.   The external female genitalia was normal.     Labial bartholins normal.Cervix is normal. Right adnexum displays no mass and no tenderness. Left adnexum displays no mass and no tenderness. No erythema, vaginal discharge, bleeding, rectocele, cystocele or prolapse of vaginal walls in the vagina. Vagina was moist.Cervix exhibits no motion tenderness and no friability.    pap smear completedUerus contour normal  Normal urethral meatus.Urethra findings: no urethral massBladder findings: no bladder distention and no bladder tenderness   Genitourinary Comments: Difficult to assess due to location of kidney             Musculoskeletal: Normal range of motion and moves all extremeties.       Neurological: She is alert and oriented to person, place, and time.    Skin: Skin is warm.    Psychiatric: She has a normal mood and affect. Her behavior is normal.           Assessment:        1. Encounter for gynecological examination (general) (routine) without abnormal findings    2. Screening for cervical cancer    3. PCB (post coital bleeding)               Plan:      Continue annual well woman exam.  Pap today; Reviewed updated recommendations for pap smears (every 3 years) in low risk patients.   Recommend annual pelvic exams.  Reviewed recommendations for annual CBE.  Gc/ct today  Pelvic sono to eval ut for etiol of postcoital bleeding  Continue menstrual calendar  Continue diet, exercise, weight loss

## 2024-03-21 LAB
C TRACH DNA SPEC QL NAA+PROBE: NOT DETECTED
N GONORRHOEA DNA SPEC QL NAA+PROBE: NOT DETECTED

## 2024-03-28 NOTE — PROGRESS NOTES
Good News! Your pap smear came back and it was normal.  The HPV is also negative.   I still recommend doing a pelvic exam and annual visit every year, but you only need the pap test every 3 years. Please call me if you have any further questions.   Sincerely,   Dr. Higgins

## 2024-04-03 ENCOUNTER — HOSPITAL ENCOUNTER (OUTPATIENT)
Dept: RADIOLOGY | Facility: HOSPITAL | Age: 34
Discharge: HOME | End: 2024-04-03
Attending: OBSTETRICS & GYNECOLOGY
Payer: MEDICARE

## 2024-04-03 DIAGNOSIS — N93.0 PCB (POST COITAL BLEEDING): ICD-10-CM

## 2024-04-05 NOTE — PLAN OF CARE
"Patient had "episode" this morning after waking up. No change in vital signs and symptoms resolved on their own. Patient went for additional MRI for Lumbar and pelvic spine. Patient currently going for second day of HD for hyperkalemia. No other significant events during shift.  " DET. <30

## 2024-04-17 ENCOUNTER — HOSPITAL ENCOUNTER (OUTPATIENT)
Dept: RADIOLOGY | Facility: HOSPITAL | Age: 34
Discharge: HOME | End: 2024-04-17
Attending: OBSTETRICS & GYNECOLOGY
Payer: MEDICARE

## 2024-04-17 PROCEDURE — 76856 US EXAM PELVIC COMPLETE: CPT | Mod: 26,HCNC,, | Performed by: RADIOLOGY

## 2024-04-17 PROCEDURE — 76830 TRANSVAGINAL US NON-OB: CPT | Mod: 26,HCNC,, | Performed by: RADIOLOGY

## 2024-04-17 PROCEDURE — 76856 US EXAM PELVIC COMPLETE: CPT | Mod: TC,HCNC

## 2024-04-21 NOTE — PROGRESS NOTES
The pelvic sono results are available for review.  The uterus is slightly bigger than normal but does not contain fibroids.  .  Both ovaries are normal in size and contain normal small cysts.    Let me know your thoughts

## 2024-04-25 ENCOUNTER — PATIENT MESSAGE (OUTPATIENT)
Dept: ADMINISTRATIVE | Facility: CLINIC | Age: 34
End: 2024-04-25
Payer: MEDICARE

## 2024-04-29 ENCOUNTER — TELEPHONE (OUTPATIENT)
Dept: BARIATRICS | Facility: CLINIC | Age: 34
End: 2024-04-29
Payer: MEDICARE

## 2024-04-29 NOTE — TELEPHONE ENCOUNTER
Contacted the patient. Patient was unsure on which service she wanted . Patient was scheduled a consult with both medicine and surgery. Patient verbalized understanding of date and time. Call was disconnected.

## 2024-04-29 NOTE — TELEPHONE ENCOUNTER
----- Message from Richar Marques sent at 4/29/2024  9:14 AM CDT -----  Regarding: Appt requested  Contact: [phone]  Hi, pt called to request a  call to schedule an appt. Pt completed the initial appt on 04/12/24. pLs call the pt at  [phone].  Thank you.

## 2024-05-14 ENCOUNTER — OFFICE VISIT (OUTPATIENT)
Dept: BARIATRICS | Facility: CLINIC | Age: 34
End: 2024-05-14
Payer: MEDICARE

## 2024-05-14 ENCOUNTER — CLINICAL SUPPORT (OUTPATIENT)
Dept: BARIATRICS | Facility: CLINIC | Age: 34
End: 2024-05-14
Payer: MEDICARE

## 2024-05-14 VITALS
OXYGEN SATURATION: 98 % | WEIGHT: 224.63 LBS | HEART RATE: 62 BPM | BODY MASS INDEX: 36.1 KG/M2 | SYSTOLIC BLOOD PRESSURE: 153 MMHG | DIASTOLIC BLOOD PRESSURE: 95 MMHG | HEIGHT: 66 IN

## 2024-05-14 VITALS
SYSTOLIC BLOOD PRESSURE: 114 MMHG | DIASTOLIC BLOOD PRESSURE: 78 MMHG | WEIGHT: 224.63 LBS | HEIGHT: 66 IN | HEART RATE: 63 BPM | OXYGEN SATURATION: 99 % | BODY MASS INDEX: 36.1 KG/M2

## 2024-05-14 DIAGNOSIS — I10 ESSENTIAL HYPERTENSION: Primary | ICD-10-CM

## 2024-05-14 DIAGNOSIS — E66.01 CLASS 2 SEVERE OBESITY DUE TO EXCESS CALORIES WITH SERIOUS COMORBIDITY AND BODY MASS INDEX (BMI) OF 36.0 TO 36.9 IN ADULT: Primary | ICD-10-CM

## 2024-05-14 DIAGNOSIS — K21.9 GASTROESOPHAGEAL REFLUX DISEASE WITHOUT ESOPHAGITIS: ICD-10-CM

## 2024-05-14 DIAGNOSIS — Z71.3 DIETARY COUNSELING AND SURVEILLANCE: ICD-10-CM

## 2024-05-14 DIAGNOSIS — E66.01 SEVERE OBESITY WITH BODY MASS INDEX (BMI) OF 35.0 TO 35.9 AND COMORBIDITY: ICD-10-CM

## 2024-05-14 DIAGNOSIS — Z86.39 HISTORY OF OBESITY IN ADULTHOOD: ICD-10-CM

## 2024-05-14 DIAGNOSIS — Z71.3 ENCOUNTER FOR WEIGHT LOSS COUNSELING: ICD-10-CM

## 2024-05-14 DIAGNOSIS — I10 ESSENTIAL HYPERTENSION: ICD-10-CM

## 2024-05-14 DIAGNOSIS — E66.9 OBESITY (BMI 30-39.9): ICD-10-CM

## 2024-05-14 DIAGNOSIS — Z94.0 DECEASED-DONOR KIDNEY TRANSPLANT: ICD-10-CM

## 2024-05-14 PROCEDURE — 3078F DIAST BP <80 MM HG: CPT | Mod: HCNC,CPTII,S$GLB, | Performed by: STUDENT IN AN ORGANIZED HEALTH CARE EDUCATION/TRAINING PROGRAM

## 2024-05-14 PROCEDURE — 99999 PR PBB SHADOW E&M-EST. PATIENT-LVL V: CPT | Mod: PBBFAC,HCNC,, | Performed by: NURSE PRACTITIONER

## 2024-05-14 PROCEDURE — 3066F NEPHROPATHY DOC TX: CPT | Mod: HCNC,CPTII,S$GLB, | Performed by: NURSE PRACTITIONER

## 2024-05-14 PROCEDURE — 3074F SYST BP LT 130 MM HG: CPT | Mod: HCNC,CPTII,S$GLB, | Performed by: STUDENT IN AN ORGANIZED HEALTH CARE EDUCATION/TRAINING PROGRAM

## 2024-05-14 PROCEDURE — 3066F NEPHROPATHY DOC TX: CPT | Mod: HCNC,CPTII,S$GLB, | Performed by: STUDENT IN AN ORGANIZED HEALTH CARE EDUCATION/TRAINING PROGRAM

## 2024-05-14 PROCEDURE — 99999 PR PBB SHADOW E&M-EST. PATIENT-LVL I: CPT | Mod: PBBFAC,HCNC,, | Performed by: DIETITIAN, REGISTERED

## 2024-05-14 PROCEDURE — 99999 PR PBB SHADOW E&M-EST. PATIENT-LVL IV: CPT | Mod: PBBFAC,HCNC,, | Performed by: STUDENT IN AN ORGANIZED HEALTH CARE EDUCATION/TRAINING PROGRAM

## 2024-05-14 PROCEDURE — 3080F DIAST BP >= 90 MM HG: CPT | Mod: HCNC,CPTII,S$GLB, | Performed by: NURSE PRACTITIONER

## 2024-05-14 PROCEDURE — 3077F SYST BP >= 140 MM HG: CPT | Mod: HCNC,CPTII,S$GLB, | Performed by: NURSE PRACTITIONER

## 2024-05-14 PROCEDURE — 99204 OFFICE O/P NEW MOD 45 MIN: CPT | Mod: HCNC,S$GLB,, | Performed by: STUDENT IN AN ORGANIZED HEALTH CARE EDUCATION/TRAINING PROGRAM

## 2024-05-14 PROCEDURE — 99499 UNLISTED E&M SERVICE: CPT | Mod: HCNC,S$GLB,, | Performed by: DIETITIAN, REGISTERED

## 2024-05-14 PROCEDURE — 1159F MED LIST DOCD IN RCRD: CPT | Mod: HCNC,CPTII,S$GLB, | Performed by: STUDENT IN AN ORGANIZED HEALTH CARE EDUCATION/TRAINING PROGRAM

## 2024-05-14 PROCEDURE — 99215 OFFICE O/P EST HI 40 MIN: CPT | Mod: HCNC,S$GLB,, | Performed by: NURSE PRACTITIONER

## 2024-05-14 PROCEDURE — 1160F RVW MEDS BY RX/DR IN RCRD: CPT | Mod: HCNC,CPTII,S$GLB, | Performed by: STUDENT IN AN ORGANIZED HEALTH CARE EDUCATION/TRAINING PROGRAM

## 2024-05-14 PROCEDURE — 3008F BODY MASS INDEX DOCD: CPT | Mod: HCNC,CPTII,S$GLB, | Performed by: NURSE PRACTITIONER

## 2024-05-14 PROCEDURE — 3008F BODY MASS INDEX DOCD: CPT | Mod: HCNC,CPTII,S$GLB, | Performed by: STUDENT IN AN ORGANIZED HEALTH CARE EDUCATION/TRAINING PROGRAM

## 2024-05-14 NOTE — PATIENT INSTRUCTIONS
Copyright © 2011, [facility]. For more information about The Healthy Eating Plate, please see The Nutrition Source, Department of Nutrition, [employer], www.thenutritionsource.org, and [employer], www.health.[place].edu.      Meal Planning & Grocery Shopping    Meal planning builds the foundation for healthy eating. When you have structured ideas for healthy meals and foods available at home to prepare those meals, weight control becomes easier.  If only healthy foods are available at home, then you will be much more likely to eat healthy foods. And you will be less likely to go to a restaurant or  a fast food meal, which tend to be unhealthy and higher in calories than meals prepared at home.      Take 5-10 minutes each week to plan meals for the next 7 days.  Make a grocery list based on the meal plan.    Grocery Shopping Tips:  Shop on a full stomach.  Schedule your shopping for times when you are most motivated and able to be disciplined about your purchases. For example, after a stressful day at work it may be difficult to make the healthiest choices. Shopping at other times, such as early in the morning or after dinner, may be easier.  Focus your shopping on the outside aisles of the store, which tend to contain more fresh foods and lower calorie foods. The inside aisles tend to have more processed foods.  Stick to your list. Avoid buying unhealthy items just because they are on sale.   Compare nutrition labels to check the number of calories and percentage of fat.      What to buy:    Vegetables  Fresh vegetables  Frozen vegetables with no sauce or added salt  Canned vegetables with no sauce or added salt    Protein  Lean meats, such as chicken and turkey  Limit red meats, such as beef to no more than 1x/week  Limit processed meats, such as cold cuts, may, sausage, and hot dogs. Look for brands that have no nitrites and are minimally  processed. Consider turkey sausage or turkey may.  Fish and Shellfish  Eggs  Dried beans  Canned beans (reduced sodium)    Fat  Use healthy oils, such as olive oil or canola oil, for cooking, salad dressings, etc.  Unflavored nuts and seeds  Nut butters (no added sugar)    Dairy  Yogurt (no sugar added)  Cheese  Low-fat milk  Unsweetened nondairy milks (almond milk, soy milk, etc)    Fruit  Fresh Fruit  Frozen fruit with no added sugar  Canned fruit with no added sugar  Dried fruit with no added sugar  100% fruit juice    Whole Grains  Single ingredient grains, such as oats, quinoa, brown rice  Whole-wheat pasta  Sprouted whole-grain bread    What to avoid:  - Avoid fried foods  - Avoid foods with added sugar  - Avoid sugar-sweetened beverages  - Avoid ultra-processed foods      Food and Beverage Log:  Keep a log of all food and beverages that you consume.  Keeping track of calories will help you lose weight, because monitoring will help you become more aware of what you are eating and recognize your eating patterns.  Be mindful of your hunger level before eating and your satiety level after eating.  Just keeping records will help you make healthy changes in your diet and eat fewer calories.    Tips for keeping a calorie count:     Aim for the daily calorie goal of 1979-0450 calories per day.     Record your food intake immediately after eating. If possible, look up calories before or immediately after eating.     Download an irene like GripeO Fitness Pal, Lose It!, or Pintics (Code: 23947) To keep track of your calories.    Measuring foods (using measuring cups or spoons) will help you be more accurate with counting calories.     Keep a running calorie count throughout the day.     Count daily calories toward a weekly calorie total. If you eat too many calories one day, cut back slightly over the next few days to meet your weekly goal.       Lifestyle Activity    Lifestyle activity consists of all the activities  that burn calories during the course of a normal day. Using the stairs, washing the dishes, or even getting up to turn off the television are all examples of lifestyle activity. All activities, no matter how small, burn calories. Increasing lifestyle activity can help with weight control, so building physical activity into your everyday routine is important.     A pedometer is a tool that can help you track how much lifestyle activity you are getting. It can help you stay active. A pedometer counts each step you take and displays your total steps on the screen. Many smartphones, including iPhones and Androids, have applications that automatically count your steps. If you decide to use this method, make sure you are holding or wearing your smartphone so it can count all of your steps.     During the next 2 weeks, aim for 5,000 or more steps per day. Each week aim to increase your daily step goal by 250 so that you will reach an average of 10,000 steps per day (equal to walking 4 to 5 miles).     Start making more active choices in your routine in order to increase the amount of walking you do each day.     Strategies to Increase Lifestyle Activity:    Take several 5-10-minute walks during the day.   Set a reminder to take a 5 minute break from your desk every hour.   Choose the farthest entrance to a building that you are entering.   Host walking meetings at work.   Walk down the ruiz to contact co-workers face-to-face, instead of emailing or calling.  Walk to a restroom, water cooler, or copy machine on a different floor at work.   Walk during your lunch break.   Park farther away in parking lots.   Get off the bus or train earlier and walk farther to your destination.   Take the stairs rather than the elevator or the escalator.   Start a walking club with co-workers or neighbors.   Walk - don't drive - for trips less than one mile.   Take an after-dinner walk with family.   Go for a walk while talking on your  wireless phone.   Walk the dog more often.   If you prefer to stay indoors because of the weather, try walking in a shopping mall or doing laps around a large store.   Purchase a treadmill to use at home.   Schedule time for walking every week and stick to it like any other appointment.   Or think of your own strategy: _______________________________       Physical Activity    Physical activity improves your health and helps with weight control, especially weight loss maintenance.      When starting to incorporate an exercise routine into your day, it is helpful to set specific goals.  For example, I will walk at moderate intensity from 12:30-12:45pm on Monday, Wednesday, and Friday.  Schedule your exercise time into your calendar.  Think of any potential barriers that may come up and prevent you from exercising.  Develop solutions or back-up plans for when these barriers may arise.    Walking is an ideal activity to start with if you do not currently have an exercise routine. You can make the activity more fun by doing it with a friend or listening to music or a book on tape while you do it. If you don't enjoy walking, think of other activities you like, such as bike riding or swimming.  Other alternatives include group fitness classes or exercise at home using DVDs, Apps, etc.    If you are new to exercising, then start with 10 minutes 3-4 days per week.  After two weeks, increase to 15 minutes 3-4 days per week.  If you already exercise more than this, continue at your higher level, or increase by 10-15 minutes if able.         Aerobic Activity  Aerobic Activity gets you breathing harder and your heart beating faster.  Eventually, you should work up to 150 - 300 minutes of moderate-intensity aerobic activity every week or 75 - 150 minutes of vigorous-intensity aerobic activity every week.  An easy way to gauge the intensity of your activity is with the Talk Test.  During moderate activity, you should be able  to talk, but not sing without having to pause for a breath.  During vigorous activity, you shouldn't be able to say more than a few words without pausing for a breath.  You should not push yourself until you are completely out of breath, tired, or sore.    Examples of Aerobic Activity:  Walking  Running  Swimming  Biking  Playing sports like tennis or basketball  Dancing  Jumping Rope      Strength Training  It is also recommended that you perform muscle-strengthening activities at least 2 days per week.  Be sure to include activities that work all major muscle groups (legs, arms, abdomen, back, buttocks, chest, and shoulders)    Examples of Strength Training  Lifting weights  Working with resistance band  Using your body weight for resistance (squats, push-ups, sit-ups)  Pilates  Yoga      https://health.gov/moveyourway/activity-planner/      Remember to keep a record of your activity to track your progress.

## 2024-05-14 NOTE — PROGRESS NOTES
Subjective     Patient ID: Lisseth Schwartz is a 33 y.o. female.    Chief Complaint: Consult and Obesity    Patient presents for treatment of obesity.   h/o [place] [facility] [place] due to ESRD due to kidney biopsy proven primary FSGS (on dialysis for 9 years)  Post transplant was down to 180 lbs    Co-morbidities   HTN  GERD  Depression    Current Physical Activity  Gym about 3x/week - cardio, resistance, sauna    Current Eating Habits  Breakfast - avocado toast, eggs, turkey may; coffee  Lunch - fried okra, salmon, grilled shrimp  Dinner - baked chicken, pork chops, salad, rice, vegetables (fresh or frozen)  Snacks - not a usual snacker, occasional cookie  Beverages - water, lemonade, tea, coffee (with powdered cream and sugar)    Medical Weight Loss  5/14/2024: 224.6 lbs, BMI 36.3, BFP 45%, .2 lbs, SMM 69 lbs, BMR 1580 kcal        Review of Systems   Constitutional:  Negative for chills and fever.   Respiratory:  Negative for shortness of breath.    Cardiovascular:  Negative for chest pain and palpitations.   Gastrointestinal:  Negative for abdominal pain, nausea and vomiting.   Neurological:  Negative for dizziness and light-headedness.   Psychiatric/Behavioral:  The patient is not nervous/anxious.           Objective    Latest Reference Range & Units 02/26/24 16:08   WBC 3.90 - 12.70 K/uL 8.35   RBC 4.00 - 5.40 M/uL 3.97 (L)   Hemoglobin 12.0 - 16.0 g/dL 12.5   Hematocrit 37.0 - 48.5 % 38.1   MCV 82 - 98 fL 96   MCH 27.0 - 31.0 pg 31.5 (H)   MCHC 32.0 - 36.0 g/dL 32.8   RDW 11.5 - 14.5 % 13.4   Platelet Count 150 - 450 K/uL 244   MPV 9.2 - 12.9 fL 12.5   Gran % 38.0 - 73.0 % 84.2 (H)   Lymph % 18.0 - 48.0 % 8.4 (L)   Mono % 4.0 - 15.0 % 6.6   Eos % 0.0 - 8.0 % 0.2   Basophil % 0.0 - 1.9 % 0.4   Immature Granulocytes 0.0 - 0.5 % 0.2   Gran # (ANC) 1.8 - 7.7 K/uL 7.0   Lymph # 1.0 - 4.8 K/uL 0.7 (L)   Mono # 0.3 - 1.0 K/uL 0.6   Eos # 0.0 - 0.5 K/uL 0.0   Baso # 0.00 - 0.20  K/uL 0.03   Immature Grans (Abs) 0.00 - 0.04 K/uL 0.02   nRBC 0 /100 WBC 0   Differential Method  Automated   Sodium 136 - 145 mmol/L 140   Potassium 3.5 - 5.1 mmol/L 4.4   Chloride 95 - 110 mmol/L 107   CO2 23 - 29 mmol/L 24   Anion Gap 8 - 16 mmol/L 9   BUN 6 - 20 mg/dL 19   Creatinine 0.5 - 1.4 mg/dL 1.1   eGFR >60 mL/min/1.73 m^2 >60.0   Glucose 70 - 110 mg/dL 116 (H)   Calcium 8.7 - 10.5 mg/dL 10.1   (L): Data is abnormally low  (H): Data is abnormally high    Vitals:    05/14/24 1016   BP: 114/78   Pulse: 63       Physical Exam  Vitals reviewed.   Constitutional:       General: She is not in acute distress.     Appearance: Normal appearance. She is obese. She is not ill-appearing, toxic-appearing or diaphoretic.   HENT:      Head: Normocephalic and atraumatic.   Cardiovascular:      Rate and Rhythm: Normal rate.   Pulmonary:      Effort: Pulmonary effort is normal. No respiratory distress.   Skin:     General: Skin is warm and dry.   Neurological:      Mental Status: She is alert and oriented to person, place, and time.            Assessment and Plan     1. Class 2 severe obesity due to excess calories with serious comorbidity and body mass index (BMI) of 36.0 to 36.9 in adult    2. Essential hypertension  Overview:  On nifedipine, metoprolol, hydralazine       3. Encounter for weight loss counseling      - No medications prescribed.  GLP-1 Toby not covered.  Other options limited due to kidney transplant.    - Patient also has consult with bariatric surgery    - Log all food and beverage intake with a daily calorie goal of 2513-6870 calories per day, 30 grams of protein per meal ( grams of protein daily)    - Moderate intensity aerobic exercise for 150 minutes per week plus resistance/strength exercises 2-3x/week

## 2024-05-14 NOTE — PATIENT INSTRUCTIONS
Prior to surgery you will need to complete:  - Dietitian consult and follow up appointments as needed  - Kidney Transplant clearance  - Labs  - Chest X-ray  - EKG  - Psychological evaluation, Please call psychiatry [phone] to schedule      In preparation for bariatric surgery, please complete the following:   Discuss your current medications with your primary care provider, remember your medications will need to be crushed, chewable, or in liquid form for the first 2-4 weeks after a gastric bypass or sleeve.  For a gastric band, your medications will need to be crushed indefinitely.    If you take a blood thinner such as: Coumadin (warfarin), Pradaxa (dabigatran), or Plavix (clopidogrel), you will need to speak with your prescribing provider on how or if this medication can be stopped before surgery.   If you take a medication for depression or anxiety, remember to discuss this with the psychologist or psychiatrist that you see.   If you take medication for arthritis on a daily basis that is considered a non-steroidal anti-inflammatory (NSAID), please discuss with your prescribing physician an alternative medication.  After having gastric bypass or gastric sleeve, this group of medications is not appropriate to take due to increased risk of bleeding stomach ulcers.      DEFINITIONS  Appointments: Pre-scheduled meetings or consultations with any physician, advanced practice provider, dietitian, or psychologist, and labs, imaging studies, sleep studies, etc.   Late cancellation: Cancelling an appointment 24-48 hours prior to scheduled time.  No-Show appointment:  is when   You do NOT arrive to your appointment at the time its scheduled.  You call to cancel or cancel via [facility] less than 24 hours in advance of your scheduled appointment  You show up 15 minutes AFTER your scheduled appointment time without any notification of being late.     POLICY  You are allowed up to 3 cancellations for appointments.    After 3 cancellations your case will be placed on hold for 2 months and after that time you can resume the program.   You are allowed only 1 no-show for an appointment.   You will be re-scheduled one time and if there is a 2nd no-show at any point, your case will be placed on hold for 3 months.  After 3 months you can resume the program.     Upon resuming the program after being placed on hold for either above mentioned reasons, if you have a late cancel or no show for any appointment, the bariatric team will review if youre an appropriate candidate for surgery at the monthly meeting.

## 2024-05-14 NOTE — PROGRESS NOTES
"NUTRITIONAL CONSULT    Referring Physician: Orville Payne M.D.   Reason for MNT Referral: Initial assessment for sleeve gastrectomy work-up    PAST MEDICAL HISTORY:   33 y.o. female  Weight history includes struggling with 50 lbs weight gain s/p kidney transplant Jan 2023. Was weighing 180 lbs last year, BMI 29. States the biggest dietary changes since then is adding sweetened coffee 1-3 cups daily.  Dieting attempts include calorie counting, phentermine (Adipex-P), and exercise.    Past Medical History:   Diagnosis Date    Allergy     Anemia     Anxiety     Breast feeding status of mother 1/17/2020    Brittle bones     ESRD (end stage renal disease)     M/W/F    General anesthetics causing adverse effect in therapeutic use     pt states "im a light weight"    Hypertension     Insomnia     PSG revealed no CHRYSTAL, but likely psychogenic etiology (anxiety)    RLS (restless legs syndrome)      CLINICAL DATA:  33 y.o.-year-old Black or  female.  Height: 5 ft. 6 in.  Weight: 224 lbs  IBW: 144 lbs  BMI: 36.2  The patient's goal weight (50% EBW): 184 lbs  Personal goal weight: 180 lbs    Goal for Bariatric Surgery: to improve health, to improve quality of life, to lose weight, and to prevent future medical conditions    DAILY NUTRITIONAL NEEDS: pre-op nutritional bariatric guidelines to promote weight loss  0180-3750 Calories    Grams Protein    NUTRITION & HEALTH HISTORY:  Greatest challenge: starchy CHO, portion control, snacking at night, and irregular meal patterns    Current diet recall:     B: coffee with powder/liquid creamer and 2-3 spoons of sugar OR avocado ww toast and 2 eggs and 2 turkey sausage links  L: homemade salad with grilled shrimps OR Chick sarah A gabriel salad with fried chicken OR homemade bowl (chicken, cauli rice or reg rice, zucchini noodles, black beans, guac) OR dinner leftovers   D: smothered pork chops, mashed potatoes and gravy, green beans OR salad OR wrap OR homemade " noodle soups or fried rice or Mal[place]ian spiced foods meat, rice and veg  Sn: none or 3-4 baked elisa chip cookies    Current Diet:  Meal pattern: 1-3 meals  Protein supplements: likes Premier shakes  Snacking: not a big snacker. Likes popcorn with M&Ms and homemade cookies, but does not do this often  Vegetables: Likes a variety. Eats 2-3 times per week.  Fruits: Likes a variety. Eats 2-3 times per week.  Beverages: water, sugary beverages, sweet tea, and coffee with sugar  Dining out: Weekly. Monthly. Mostly fast food, restaurants, and take-out. [employer]. Chinese food (shrimp and broccoli with rice, egg roll)  Cooking at home: Daily. Weekly. Mostly baked, grilled, and smothered meat, fish, starchy CHO, and vegetables. Baked chicken/pork chop/fish with pasta. Occ fried okra.    Exercise:  Past exercise: off and on, gym or walking in the neighborhood    Current exercise: [employer] 1-2 times/week for 2-3 hours (30 min sauna, weights, full body work out, stairs 5-10 min, rowing 10-15 min, elliptical 10-15 min, 30 min sauna)    Restrictions to exercise: none    Vitamins / Minerals / Herbs:   none    Labs:   None available at time of visit    Food Allergies:   None known    Social:  Catering food/hosting parties, writer, lyons/wrapper, relationship therapist, radio talk show  Lives with 3 teenagers and a 5 yr old.  Grocery shopping and food prep done by the pt.  Patient believes the household will be supportive after surgery.  Alcohol: Socially. Shot of alcohol or glass of wine.  Smoking:     ASSESSMENT:  Patient reports attempts at weight loss, only to regain lost weight.  Patient demonstrated knowledge of healthy eating behaviors and exercise patterns; admits to not eating healthy and not exercising at this point.  Patient states willingness to change lifestyle and make behavior modifications.        Barriers to Education: none    Stage of change: determination    NUTRITION DIAGNOSIS:    Morbid Obesity related to  Excessive carbohydrate intake, Excessive calorie intake, and Physical inactivity as evidence by BMI.    BARIATRIC DIET DISCUSSION/PLAN:  Discussed diet after surgery and related to patient's food record.  Reviewed nutrition guidelines for before and after surgery.  Answered all questions.  Continue to review Bariatric Nutrition Guidebook at home and call with any questions.  Work on Bariatric Nutrition Checklist.  Work on expanding variety of vegetables.  Work on gradually cutting back on starchy CHO in the diet.  Begin trying various protein supplements to determine preference.  1200-calorie diet.  1500-calorie diet.  5-6 meals per day.  Start including protein supplements in the diet plan daily.  Eliminate sugary drinks.  Eliminate sugar added to coffee. Try prot shake instead.    RECOMMENDATIONS:  Pt is a potential candidate for bariatric surgery - Sleeve.    Follow up in one month.  Needs additional visits with RD for MSD.    Patient verbalized understanding.      Communicated nutrition plan with bariatric team.    SESSION TIME:  60 minutes

## 2024-05-14 NOTE — PROGRESS NOTES
"BARIATRIC NEW PATIENT EVALUATION    CHIEF COMPLAINT:   Morbid obesity, body mass index is 36.26 kg/m². and inability to lose weight.    HPI:  Lisseth Schwartz is a 33 y.o. morbidly obese female. Her current body mass index is 36.26 kg/m². She has multiple associated comorbidities including GERD on daily meds, essential hypertension s/p kidney transplant.  She has struggled with excess weight since 2023.  Her highest adult weight was 300 lbs at age 28s, and her lowest adult weight was 170 lbs at age 18s.  The patient has tried calorie counting, phentermine (Adipex-P), and exercise .  The patient was most successful with transplant with a weight loss of 20+lbs.  Her current exercise includes  gym  2 times a week. She denies any history of eating disorder such as anorexia, bulimia, or taking laxatives for weight loss, and denies any addiction including illicit substances, alcohol, or gambling.  Patient states she has a good  support system.  She lives with family.  She is currently disabled.  She  endorses a 10 year history of GERD controlled with one PPI.  The patient's goal is 180 lbs.    ESS: Score of 0, reviewed 05/14/2024.  Does not need Sleep Study.    Recent weight gain of 50 lbs since Kidney Transplant 1/2023    Pre op weight-224   IBW-144      PAST MEDICAL HISTORY:  Past Medical History:   Diagnosis Date    Allergy     Anemia     Anxiety     Breast feeding status of mother 1/17/2020    Brittle bones     ESRD (end stage renal disease)     M/W/F    General anesthetics causing adverse effect in therapeutic use     pt states "im a light weight"    Hypertension     Insomnia     PSG revealed no CHRYSTAL, but likely psychogenic etiology (anxiety)    RLS (restless legs syndrome)        PAST SURGICAL HISTORY:  Past Surgical History:   Procedure Laterality Date    Arm surgery Right     x 2    AV FISTULA PLACEMENT Right     CYSTOSCOPY      HYSTEROSCOPY WITH HYDROTHERMAL ABLATION OF ENDOMETRIUM WITH DILATION AND CURETTAGE " N/A 12/04/2018    Procedure: HYSTEROSCOPY, WITH DILATION AND CURETTAGE OF UTERUS AND HYDROTHERMAL ENDOMETRIAL ABLATION;  Surgeon: Tiara Red MD;  Location: [facility] [place] OR;  Service: OB/GYN;  Laterality: N/A;  ATTEMPTED     KIDNEY TRANSPLANT N/A 01/02/2023    Procedure: TRANSPLANT, KIDNEY;  Surgeon: Go Beard MD;  Location: [facility] OR [address] [place] [address];  Service: Transplant;  Laterality: N/A;    LAPAROSCOPIC SALPINGECTOMY Bilateral 12/04/2018    Procedure: SALPINGECTOMY, LAPAROSCOPIC;  Surgeon: Tiara Red MD;  Location: [facility] [place] OR;  Service: OB/GYN;  Laterality: Bilateral;    RENAL BIOPSY      tumor removed      from face per medical records       FAMILY HISTORY:  Family History   Problem Relation Name Age of Onset    Hypertension Father      Breast cancer Paternal Grandmother      Diabetes Maternal Grandmother      Heart attack Maternal Grandmother      Lung cancer Maternal Grandfather      Prostate cancer Maternal Grandfather          SOCIAL HISTORY:  Social History     Socioeconomic History    Marital status: Single    Number of children: 4   Tobacco Use    Smoking status: Never    Smokeless tobacco: Never    Tobacco comments:     Situational smoking, due to family crisis   Substance and Sexual Activity    Alcohol use: Never    Drug use: Never    Sexual activity: Yes     Partners: Male   Social History Narrative    Caregiver fiance'     Social Determinants of Health     Financial Resource Strain: Low Risk  (1/22/2024)    Overall Financial Resource Strain (CARDIA)     Difficulty of Paying Living Expenses: Not very hard   Recent Concern: Financial Resource Strain - Medium Risk (10/26/2023)    [facility] from [facility], [facility]    Overall Financial Resource Strain (CARDIA)     Difficulty of Paying Living Expenses: Somewhat hard   Food Insecurity: Patient Declined (1/22/2024)     Hunger Vital Sign     Worried About Running Out of Food in the Last Year: Patient declined     Ran Out of Food in the Last Year: Patient declined   Transportation Needs: Patient Declined (1/22/2024)    PRAPARE - Transportation     Lack of Transportation (Medical): Patient declined     Lack of Transportation (Non-Medical): Patient declined   Physical Activity: Sufficiently Active (1/22/2024)    Exercise Vital Sign     Days of Exercise per Week: 3 days     Minutes of Exercise per Session: 90 min   Stress: No Stress Concern Present (1/22/2024)    [employer] [place] [employer] - Occupational Stress Questionnaire     Feeling of Stress : Only a little   Housing Stability: Unknown (1/22/2024)    Housing Stability Vital Sign     Unable to Pay for Housing in the Last Year: Patient declined     Number of Places Lived in the Last Year: 1     Unstable Housing in the Last Year: No       MEDICATIONS:    Current Outpatient Medications:     acetaminophen (TYLENOL) 325 MG tablet, Take 1 tablet (325 mg total) by mouth every 6 (six) hours as needed for Pain., Disp: , Rfl: 0    albuterol (PROVENTIL/VENTOLIN HFA) 90 mcg/actuation inhaler, Inhale 1-2 puffs into the lungs every 6 (six) hours as needed for Wheezing (cough)., Disp: 8.5 g, Rfl: 1    ketoconazole (NIZORAL) 200 mg Tab, Take 0.5 tablets (100 mg total) by mouth once daily., Disp: 15 tablet, Rfl: 11    magnesium oxide (MAG-OX) 400 mg (241.3 mg magnesium) tablet, Take 2 tablets (800 mg total) by mouth 2 (two) times daily., Disp: 60 tablet, Rfl: 11    metoprolol tartrate (LOPRESSOR) 50 MG tablet, Take 2 tablets (100 mg total) by mouth 2 (two) times daily., Disp: 360 tablet, Rfl: 3    multivitamin (THERAGRAN) per tablet, Take 1 tablet by mouth every morning., Disp: , Rfl:     mycophenolate (CELLCEPT) 250 mg Cap, Take 4 capsules (1,000 mg total) by mouth 2 (two) times daily., Disp: 240 capsule, Rfl: 11    pantoprazole (PROTONIX) 40 MG tablet, Take 1 tablet (40 mg total)  "by mouth once daily., Disp: 30 tablet, Rfl: 5    predniSONE (DELTASONE) 5 MG tablet, Take 1 tablet (5 mg total) by mouth once daily., Disp: 90 tablet, Rfl: 3    sodium bicarbonate 650 MG tablet, Take 1 tablet (650 mg total) by mouth 2 (two) times daily., Disp: 60 tablet, Rfl: 11    tacrolimus (PROGRAF) 1 MG Cap, Take 6 capsules (6 mg total) by mouth every morning AND 5 capsules (5 mg total) every evening., Disp: 330 capsule, Rfl: 11    ALLERGIES:  Review of patient's allergies indicates:   Allergen Reactions    Lisinopril Other (See Comments)     Severe cough    Adhesive tape-silicones Itching     Burns    Furosemide Other (See Comments)     Almost gave her right sided heartfailure       Review of Systems   Constitutional:  Positive for malaise/fatigue. Negative for chills and fever.   HENT:  Negative for ear pain, nosebleeds and sore throat.    Eyes:  Negative for blurred vision and double vision.   Respiratory:  Negative for cough and shortness of breath.    Cardiovascular:  Negative for chest pain, palpitations, orthopnea, claudication and leg swelling.   Gastrointestinal:  Negative for abdominal pain, constipation, diarrhea, heartburn, nausea and vomiting.   Genitourinary:  Negative for dysuria and urgency.   Musculoskeletal:  Negative for back pain and joint pain.   Skin:  Negative for rash.   Neurological:  Negative for dizziness, tingling, focal weakness and headaches.   Endo/Heme/Allergies:  Does not bruise/bleed easily.   Psychiatric/Behavioral:  Negative for depression and suicidal ideas.        Vitals:    05/14/24 1226   BP: (!) 153/95   Pulse: 62   SpO2: 98%   Weight: 101.9 kg (224 lb 10.4 oz)   Height: 5' 6" (1.676 m)   PainSc:   5   PainLoc: Abdomen       Physical Exam  Vitals and nursing note reviewed.   Constitutional:       Appearance: She is well-developed. She is morbidly obese.   HENT:      Head: Normocephalic.      Nose: Nose normal.      Mouth/Throat:      Mouth: Mucous membranes are moist. "   Eyes:      Extraocular Movements: Extraocular movements intact.   Cardiovascular:      Rate and Rhythm: Normal rate and regular rhythm.      Heart sounds: Normal heart sounds.   Pulmonary:      Effort: Pulmonary effort is normal.      Breath sounds: Normal breath sounds.   Abdominal:      General: Bowel sounds are normal.      Palpations: Abdomen is soft.   Musculoskeletal:         General: Normal range of motion.      Cervical back: Normal range of motion.   Skin:     General: Skin is warm and dry.      Capillary Refill: Capillary refill takes less than 2 seconds.   Neurological:      Mental Status: She is alert and oriented to person, place, and time.   Psychiatric:         Mood and Affect: Mood normal.          DIAGNOSIS:  1. Morbid obesity, body mass index is 36.26 kg/m². and inability to lose weight.  2. Co-morbidities: GERD on daily meds, essential hypertension s/p kidney transplant     PLAN:  The patient is a good candidate for Bariatric Surgery. The patient is interested in laparoscopic sleeve gastrectomy with Dr. Payne. The surgery and post-op care was discussed in detail with the patient. All questions were answered.    The patient understands that bariatric surgery is a tool to aid in weight loss and that they need to be committed to the diet and exercise post-operatively for successful weight loss. Discussed with patient that bariatric surgery is not the easy way out and that it will take plenty of dedication on the patient's part to be successful. Also discussed the possibility of weight regain if the patient strays from the diet guidelines or exercise requirements. Patient verbalized understanding and wishes to proceed with the work-up.    Estimated Goal weight is 180-185 lbs.    Discussed hyper fertility and no NSAIDS post     ORDERS:  1. Chest X-Ray and EKG  2. Psychological Consult, Bariatric Dietician Consult, and Kidney Transplant clearance   3. Bariatric Labs: Per orders.    PCP: No,  Primary Doctor  RTC: As scheduled.      This includes 60 mins face to face time and non-face to face time preparing to see the patient (eg, review of tests), obtaining and/or reviewing separately obtained history, documenting clinical information in the electronic or other health record, independently interpreting results and communicating results to the patient/family/caregiver, or care coordinator.

## 2024-05-14 NOTE — PATIENT INSTRUCTIONS
NUTRITION    Before & After  BARIATRIC SURGERY        [facility]  Surgical Weight Loss Program          Table of Contents    Preparing for Bariatric Surgery: Nutrition3    Bariatric Nutrition Core Points and Recommendations for Optimal Results....5    Pre-op Liquid Protein Diet...6    Pre-op Protein Powder Suggestions.....7    Dietary Progression After Surgery..8    Bariatric High Protein Liquid Diet..9    Bariatric High Protein Puree Diet..10    Bariatric High Protein Soft Diet.12    Bariatric Regular Diet.15    Protein Content of Foods Recommended after Weight Loss Surgery16    Lifelong Nutrition Guidelines after Weight Loss Surgery...17    Common Nutritional Problems and Prevention Tips...18    Physical Activity...19    Required Vitamin/Mineral Supplements...20    Resources for Bariatric Patients.21    Ten Tip for Healthy And Conscious Eating..22                  Preparing for Bariatric Surgery: Nutrition    Bariatric surgery is a great tool in helping you lose weight. However, we need your help to make your surgery successful by following the nutrition plans before and after surgery. You will meet with a dietitian who will go over pre and post-surgery nutrition plans and will work with you to change your nutrition lifestyle. After your decision to have bariatric surgery, we ask that you start a high-protein/low- fat & low carbohydrate diet.     Below is a guideline to get you started.   All meals should include lean meats/proteins and non-starchy vegetables. Fruits can be used as snacks or desserts. Try to avoid high sugar canned fruit (most canned fruit in syrup).   Eliminate empty calorie snacks  (cake, candy, chips) and eat high-protein snacks instead (cheese sticks, rolled deli meat, cottage cheese and tomatoes)     Begin cutting back on starchy carbohydrates including: Breads, rice, pasta, oats and starchy vegetables (corn, potatoes, green peas). Exercise at least 3 times a week for 30 minutes, it does not have to be 30 minutes all at once!   Switch to smaller plates to help you with portion control.  Begin limiting carbonated beverages   You may begin substituting one meal for a protein shake. Limit Caffeine        All liquids should be sugar-free and low calorie.  No Juices Practice taking small bites and sips. Practice not drinking while eating.     Meal Base Options  Flavor your food with lemon, vinegar, herbs and spices for big flavor without added calories.     Lean Meats/Proteins: How to prepare: bake, broil, boil, roast, grill, air galvan, sauté in Bre or I can't believe it's not butter spray. Remove all visible fat before and after cooking.  NO BREADING or FRYING.    Poultry: skinless chicken or turkey (light/dark), ground (90% lean). Take off skin from poultry.  Fish/Shellfish: catfish, clams, crab, crawfish, lobster, salmon, shrimp, squid, tilapia, trout, tuna  Beef: tenderloin, roast (rib, ariadne, rump), steak (sirloin, round, cubed, T-bone, flank), ground (90% lean)  Pork: lean ham, [place] may, tenderloin, center loin chop  Game: venison, rabbit, duck  Deli meats: turkey, roast beef, ham, chicken  Dairy: low fat or fat free, sliced or shredded cheese, string cheese, cottage cheese, Greek or low-fat yogurt (aim for less than 10g sugar)  Soy: tofu, edamame  Eggs: However you like them!  Beans and Lentils: red, white, black, lima, black-eyed-peas, chickpeas, hummus  Nuts and Seeds: unsalted, ¼ cup    Non-Starchy Vegetables: How to prepare: boil, bake, steam, roast, microwave, grill, air galvan,  sauté in cooking spray. Do not add cream or cheese sauce.    Artichoke  Celery   Green  Onions  Peppers  Asparagus  Cucumber  Greens   Snow peas  Beets   Cauliflower  Kale   Spaghetti Squash  Broccoli  Cauliflower rice Lettuce   Spinach  Brussells Sprouts Cauliflower mash Mushrooms  Squash  Carrots   Eggplant  Okra   Tomatoes  Cabbage  Green Beans  Onions   Zucchini noodles    How to Choose Fluids: All fluids before and after surgery should be sugar free, non-carbonated and low calorie (less than 15 calories per serving).    Options:  Plain Water (or Infused with lemon/lime/orange, berries, mint leaves, cucumber slices)  Flavored ruiz - Propel Zero, Nestle Pure Life Splash, Aquafina Flavor Splash, Hint Water        Coffee or tea, unsweetened - (limit to one caffeinated drink per day) [place] Diet Green Tea, Diet Snapple Tea, José diet green tea  Sugar free (SF) flavorings/Water enhancers - Crystal Light, Brian, Wyler's Light, SF Alexys-aid, SF Hawaiian Punch, Dasani Drops, Great Value/Market Pantry SF drink mix  Diet lemonade  Powerade or Gatorade Zero. Also Gatorade Zero with 10g protein.  Diet [place] cranberry juices or Diet V-8 Splash  SF Jello and SF popsicles  Low sodium broth    Aim to drink a minimum of 64 oz fluid per day!            Bariatric Nutrition Core Points and Contract Agreement  AVOID/LIMIT THESE FOODS   High in Fat/Sugar:   High fat milk (whole, 2%)  Butter, margarine, oil (instead use Bre sprays or I Can't Believe It's Not Butter Spray)   Mayonnaise, sour cream, cream cheese, salad dressing (may use low fat versions of these items)  Ice Cream  Cakes, cookies, pies, desserts  Candy  Luncheon meats (bologna, salami, chopped ham)  Sausage, [place]  Gravy  Breaded and Fried Foods  Sugary drinks  Alcohol     Starchy Carbohydrates.    White and wheat Bread, muffins, bagels, English muffins, biscuits, buns, rolls, cornbread,  Rice, Pasta   Cereals (including grits, oatmeal)   Crackers, Pretzels, Chips, Granola  Corn, Popcorn, Peas, Quinoa  White Potatoes, Sweet potatoes  Flour and corn  lindas   Practice NOT DRINKING   Carbonated drinks  Using a straw     c     Eat protein-source for breakfast (i.e. eggs, low-fat cottage cheese, Greek yogurt, sliced deli turkey, low-fat sliced cheese, protein drinks or bars with 4 gms of sugar or less)  c     Limit starchy carbohydrates (bread, rice, pasta, potatoes, corn, grits, oatmeal, etc)  c     Plan to eat 4-6 small meals per day. Protein drinks should be used for 1-2 of the small meals.  c     Measure portion sizes. Small plates, bowls and cups make smaller portions look bigger.  c     Limit eating out; make better choices when eating out (low fat/low carb)  c     Include fruits and vegetables in the diet DAILY  c     Avoid/Limit Desserts/candy   c     Low-fat diet (Baked, broiled, grilled, and boiled instead of fried, sautéed, creamed)  c     Increase activity (walking, swimming, exercise videos)  c     Limit sugary, caffeinated and carbonated beverages  c     Aim for 64 oz. water per day  Limit alcohol  Practice chewing foods thoroughly.  Practice sipping beverages--no chugging or gulping.  No Straws.  NO LIQUIDS 30 MIN. BEFORE, DURING, AND 30 MIN. AFTER MEALS.  Keep food logs and bring to each visit for review    I have been educated on the above lifestyle and nutrition changes regarding weight loss surgery.  I understand and agree that following these guidelines will help me to lose weight and maintain my health long-term.    Patient Signature ______________________________________   Date ____________________    Dietitian Signature ______________________________________         CONTACT INFORMATION  India Van, RD, LDN  Ting James, MS, RD, LDN, [facility]  Africa Villaseñor, MS, RDN, LDN    [facility], 2nd Floor [address] [place] [address] PHONE: [phone] FAX: [phone]     Send us a message anytime using your [facility] account.      Pre-op Liquid Protein Diet        Two weeks prior to your  "bariatric surgery, your surgeon and dietitian will have you start a liquid diet. (One week liquid diet if your BMI is under 40). You will lose weight by making these changes before surgery, which will shrink your liver and decrease the size of your abdomen.  This will help to decrease your risk of complications during surgery.  Your dietitian will help you to decide which protein drinks are right for you.      There are a number of places where protein drinks are available. They can be found at the local grocery, health or drug store and even online. Aim for 600-800 calories and  grams of protein per day while on the liquid diet. Please read the label - no more than 4 grams of sugar per serving. Listed below are some of the protein supplements [facility] recommends, along with where to buy them locally or online.     Ready-to-Drink (RTD) Suggestions    BOTTLED MILK SHAKES Where to Purchase Calories Protein (g) Sugar (g)   Premier Protein Ception Therapeutics 160 30 1   Equate High Performance Spinback 160 30 1   Muscle Milk (lactose free) Heretic Films 160 20 0-3   [place] Nutrition Plan or Core Power (lactose free) ECKey 170 30 4   Total Lean 25 (lactose free)  25 2   Quest protein  Bioaxial 160 30 1   Ensure Max Protein  Equifax 150 30 1   SMOOTHIES       "The Gladiator" 20oz (without fruit) Smoothie Damon 180 45 0   Protein Velvet Ice   PJ's Coffee 230 21 8   BOTTLED CLEAR DRINKS       Premier Clear Pressgram 90 20 0   Protein. 2.O UnboundID 60 15 0   Isopure Zero Carb  40 0    Clear Kwasi's, [employer], Amazon 110 25 0              6  Protein Powder Suggestions    Don't forget to count the calories and protein from the liquids that you add to your protein powders in your food journal. Protein powders may be mixed with fat-free or 1% milk. Lactose-free options include: water, sugar-free flavored beverages such as Crystal Light, " Fairlife fat-free milk (13g protein per 8oz!), unsweetened soy milk, or unsweetened almond milk. Do not add fruit, yogurt, honey, peanut butter, or vegetables to protein shakes. You may add flavor extracts or sugar-free syrups (shop online or at World [employer]) for flavor variety.    Product Where to Purchase Calories Protein (g) Sugar (g)   Body Fortress MediaV 140 26 1   Pure Protein MediaV  20-23 0-2   Unjury  ison furniture  20 3-4    Raul Logan egg white (Stevia) (lactose free)  120 24 0-1   Nectar (lactose free)  (wide variety of flavors) Vitamin Shoppe, Amazon 100 24 0    powder  (fruity pepples/[place] willi) MattCartavi 120 25 1   Isopure Zero Carb  GNC   105 25 0     Lactose-free options: Fairlife shakes, Muscle Milk, GNC Total Lean 25 (RTD and powder), Isopure zero carb (RTD and powder), Unjury PLANTED (powder), Raul Logan egg white (powder), Nectar (powder), Protein 2 O (RTD)    Unflavored options:  Unjury, Raul Logan egg white, Isopure Zero Carb      Protein Soup Suggestions    Product Flavors Where to purchase calories protein sugar   Celebrate Cream of Vegetable, Cream of Broccoli & Cheese, Tomato soup   Amazon  15 < 1   Unjury Chicken soup, [facility]    21-22 0-1           Dietary Progression after Bariatric Surgery    DIETARY  PHASE Time frame  POST-SURGERY FOODS AND BEVERAGES   1  LIQUID       First 2 weeks         Sugar-free decaffeinated non-carbonated beverages  Protein shakes with 4 grams of sugar or less   Protein soups  NO FRUIT, FRUIT JUICES, VEGETABLES OR YOGURT ADDED TO PROTEIN SHAKES   2  PUREE       2-4 weeks after surgery All Phase 1 liquids  &  Pureed lean meats, seafood and beans  Soft scrambled egg  Low fat dairy    3  SOFT       1-2 months after surgery All Phase 2 food and beverages  &  Cooked fork tender lean meats, fish and seafood  Lean deli meats  Eggs-scrambled, boiled and poached  Fruits and cooked vegetables - no peel   4  SOLID     2-3  Months after surgery and continue lifelong All Phase 3 food and beverages.   &  Raw vegetables and lettuce   Fruit with peel  Nuts and seeds  Protein bars with 4 grams of sugar or less       Bariatric High Protein Liquid Diet: Weeks 1+2 after surgery    Begin Protein Liquid Diet when you get home from the hospital.  Day 1: Aim to finish at least 1 protein supplement (protein powder + water, Isopure clear in glass bottle from [facility], Premier Clear, Protein 2.0) and 1 bottle of water or Crystal Light. Drink more if you can.  Days 2-3: Increase protein shakes and fluids as tolerated. Sip on 1 oz every 15 minutes. May begin mixing protein powder with skim-1% milk or Lactaid/Soy milk as tolerated.   Days 4-6: If you haven't already, try to mix protein powder with skim-1% milk or Lactaid/Soy milk instead of water, to increase calorie and protein intake. May begin to sip on ready-to-drink protein shakes (such as Premier protein shakes), as tolerated.    Protein and Fluid Goals:  1 week after surgery: aim for at least 40 grams of protein per day and at least 24 ounces of water or Crystal Light.  2 weeks after surgery: aim for 80 grams of protein per day and 48 ounces of water or Crystal Light.    Tips:  Ready-to-drink protein shakes and smoothies may be too thick for the first week after surgery, making it difficult to reach your protein goals. Start with clear liquids and advance as tolerated.  Sip slowly and continuously on protein shakes and water throughout the day. Start with 1oz liquids every 15 minutes; advance as tolerated.    Avoid sugary drinks. Limit caffeinated beverages to 8oz per day for the first 2-3 months. Avoid carbonated beverages and drinking through straws to reduce gas and bloating for at least 3 months.     Vitamins/Minerals:  Do not take fish oils, herbal supplements including green tea for 2 weeks before your surgery as a general precaution.  Do not take any vitamin supplements for 1 week prior to  surgery  Start taking your vitamins when you get home from the hospital.   See vitamin section on page 20 in the Nutrition Guidebook.    Light Exercise -   If your doctor has cleared you for walking or bike riding before your surgery, you can continue this after surgery.  Try not to lift anything heavier than 10 lbs for the first 6 weeks after surgery.    When you come to the clinic for your 2 week follow up, the mid-level provider and dietitian will discuss advancing your diet. Please bring in a log of your daily protein and fluid intake. Please bring in your vitamins for review if you have not already. If you have any questions about your diet or vitamins call [phone] and ask for a Bariatric Dietitian. For other questions, please ask for the Bariatric Nurses.      Bariatric High Protein Pureed Diet    Two weeks after surgery, you may be ready to add smooth foods to your diet.  All food should be the consistency of baby food, or thinner.  Follow pureed diet for the next 2 weeks.    Protein - It is very important to pay attention to protein intake during this time.      Inadequate protein intake can cause:  Delayed Wound Healing  Hair Loss  Muscle Breakdown    Meal Plan - Eat 3-4 meals per day (2-4 tbsp each), with protein supplements in between to meet protein needs.  Meeting protein needs daily will help increase healing, decrease muscle loss, and increase weight loss.  Your goal is  grams of protein a day.    Protein First - Always eat the foods with the highest protein first.  Foods high in protein include milk, yogurt, cheese, egg whites, beans. Lean meats and seafood can be pureed with a small amount of liquid, such as broth, in a  or  to baby food consistency.    Fluids - Keep track in your journal of how much you are drinking; you should try to drink at least 48oz of fluids every day.      Foods allowed: Portion size Protein (g)   Sugar-free clear liquids As desired 0    Skim or 1% milk 8oz 8   Greek yogurt 5 oz 15   Lean meats or shrimp, pureed 1 oz 7   Beans (red, white, black, lima, vazquez, fat free refried, hummus) and lentils ¼ cup 4   Low-fat/fat free cheese.(cottage cheese, mozzarella string cheese, ricotta cheese, Laughing Cow, Baby Bell, cheddar, etc) ¼ cup 7-8   Scrambled eggs or Egg Beaters 1 or ¼ cup 6   Edamame or Tofu, mashed ¼ cup 5   Unflavored protein powder (add to 1 scoop to  98% fat free soups or SF pudding) 3 Tbsp 9   *PB2: peanut powder (45 calories) 2 Tbsp 5     *PB2 powdered peanut butter: 45 calories vs. 190 calories in 2 tbsp of regular peanut butter. Purchase online at [employer]anning [employer], or at [employer], [employer], Neighborhoods and [employer].        Bariatric Liquid/Pureed Sample Menu    3-4 small meals plus 2-3 protein drinks per day.    8-8:30am 1 egg or ¼ cup Egg Beaters   9-9:30am 1 cup water, or decaf coffee or tea   10-10:30am Protein drink, 30g protein   11-11:30am 2 tbsp low-fat cottage cheese, and 1 tbsp pureed peaches   12-12:30pm 1 cup water, or sugar-free lemonade    1-1:30pm 2 tbsp pureed chicken, and 1 tbsp pureed carrots    2-2:30pm 1 cup water, or sugar-free lemonade   3-3:30pm Protein drink, 30g protein   5-5:30pm 1 cup water    6-6:30pm 1 cup hi-protein creamy chicken soup 14g protein (see Recipe below)   7-7:30pm 1 cup water, or sugar-free fruit punch    8-8:30pm 1 cup water     This sample menu provides approx. 80g protein and 48oz fluids.  Liquid protein supplements should contain 20-30g protein and less than 4 grams of sugar each.    Sip fluids continuously in between meals.  Drink at least ¼ cup every 15 minutes.  For fluids: ¼ cup = 2 oz = 4 tbsp       RECIPE IDEAS for Bariatric Pureed Diet:    Hi-Protein Creamy Chicken Soup: (10g protein per 1 cup serving)  Empty 1 can of 98% fat free cream of chicken soup into saucepan. Then blend 1 scoop of unflavored protein powder with 1 can of skim milk until smooth.  Add protein milk to  saucepan and heat to warm. (Note: Do NOT boil. Protein powder may clump if heated too hot).     Hi-Protein Pudding: (14g protein per ½ cup serving)  Add 2 scoops protein powder to 2 cups cold skim milk and mix well.  Stir in dry Jell-O Sugar-Free Instant Pudding mix.  Chill and Enjoy!    Tuna Mousse (12g protein per ¼ cup serving) Page 135 in book Eating Well After Weight Loss Surgery.  In a  or , combine all ingredients and pulse until smooth.  2 6-ounce cans tuna packed in water, drained  2 tbsp low-fat mayonnaise  2 tbsp fat-free sour cream  2 tbsp fat-free cream cheese, softened  ½ cup shallots, finely chopped  1 tbsp lemon juice  ¼ tsp ground pepper  ½ tsp celery seed    Chocolate Peanut Butter Mousse  (28g protein total)  6oz plain Greek yogurt  4 tbsp chocolate PB2      Bariatric High Protein Soft Diet   Begins 4 weeks after surgery               Four weeks after surgery, your stomach may be healed enough to add soft foods to your diet.  Soft foods are those which can be easily mashed with a fork. This diet usually lasts for the next 1-2 months but can last longer depending on each individual.     Remember these principles:  No liquids with meals. Do no drink 30 minutes before meals and wait 30 minutes to 1 hour after meals to start drinking.  Sip on water, sugar-free beverages or non-fat milk throughout the day.  You will most likely need to continue drinking at least 1 protein drink daily to meet protein needs.  Chew foods slowly; at least 30 times. One meal should take 20-30 minutes.  Eat 3-5 meals per day, without any additional snacking.  Stop eating as soon as you feel full.  Avoid using table sugar and foods made with refined sugar, which can trigger dumping syndrome and slow down weight loss.  Marinating meats with a low sugar marinade, adding low-fat salad dressing, or adding low calorie gravy (made from powder and water) can help meats to digest easier.     Adding Vegetables and  Fruits:  As long as you are consuming >80g total protein daily from combination of foods and protein drinks, you may start adding small bites of fruits and vegetables to your meals. Cooked, tender vegetables and ripe fruits without the peel are tolerated best.    *SOFT Non-starchy vegetables include fork tender: green beans, beets, broccoli, brussels sprouts, cabbage, carrots, cauliflower, eggplant, greens, leeks, mushrooms, okra, onions, peppers, spinach, squash, tomatoes and salsa, turnips, low sodium V8, zucchini*          Bariatric High Protein Soft Diet  Begins 4 weeks after surgery    EAT THESE FOODS AVOID THESE FOODS   High in Protein: High in Fat/Sugar:   Canned tuna or chicken (packed in water)  Ground beef or turkey (at least 90% lean)  Turkey or chicken (no skin); cooked tender and cut in small pieces  Lean pork or beef (cook in crock pot until very tender; cut in small pieces  Scrambled, poached, or boiled eggs  Baked, broiled, grilled or boiled fish and seafood (not fried!)  Silken tofu, Edamame (soybeans)  Beans, hummus and lentils  Lean deli meats (turkey and chicken breast, ham, roast beef)  1% or Skim Milk, Lactaid, or Soymilk  Low-fat or fat-free cottage cheese, soft cheese, mozzarella string cheese, or ricotta  Light yogurt, Greek yogurt, SF pudding High fat milk (whole, 2%)  Butter, margarine, oil, mayonnaise  Sour cream, cream cheese, salad dressing  Ice Cream  Cakes, cookies, pies, desserts  Candy  Luncheon meats (bologna, salami, chopped ham)  Sausage, [place]  Gravy  Breaded and Fried Foods  ___________________________________  Tough/Crunchy--------------------------------  Tough or dry meats  Corn   Granola/cereal with nuts  Shredded Coconut    May add after 2 months, as tolerated:  Raw veggies  Lettuce  Plain, Unsalted Nuts and Seeds  Protein bars with 0-4 grams of sugar   As long as you are getting >80g PRO: Starchy Carbohydrates. At goal weight, some may include whole grains in small  amounts.   Cooked tender vegetables without peel  Ripe fruits without peel  Frozen fruits with no added sugar  Fruit canned in its own juice or in water  Fat free, sugar free, frozen yogurt White and wheat Bread, Rice, Pasta   Cereals (including grits, oatmeal)   Crackers, Pretzels, Chips, Granola  Corn, Popcorn, Peas, Quinoa  White Potatoes, Sweet potatoes  Flour and corn tortillas     Fluids: Always Avoid:   Skim/1% milk, Lactaid, Soymilk  Water and Sugar-free beverages  (decaf and non-carbonated)  Decaf coffee & decaf tea  Sugary drinks  Carbonated drinks  Alcohol  Drinking through straws       Sample Menu for Bariatric High Protein Soft Diet            3 meals + 2 protein drinks  Remember: No drinking with meals.    Time of Day Day 1 Day 2   7am:    1 egg (or ¼ cup Egg Beaters) ¼ cup low-fat cottage cheese, 1 tbsp berries   7:30-9:30am:  Water/SF beverage Water/SF beverage   10am:  Protein drink  Protein drink   10:30-11:30am:  Water/SF beverage Water/SF beverage   12pm:    1-2 oz grilled shrimp, ¼ cup green beans   1-2oz canned chicken, shredded cheese, 1 tbsp salsa   12:30-2:30pm:  Water/SF beverage Water/SF beverage   3pm:  Protein drink   Protein drink   3:30-5:30pm:  Water/SF beverage Water/SF beverage   6pm:  ½ cup low fat chili, 1oz low-fat cheese, ¼ cup broccoli 2 oz grilled fish,  ¼ cup lima beans   6:30-9pm:  Water/SF beverage Water/SF beverage     This sample menu provides approx. 80g protein total, including about 40g protein from foods and at least 40g protein from protein drinks.  Drinking protein drinks daily helps decrease muscle loss, increase weight loss, and prevent hair loss.    Sip fluids continuously in between meals.    For fluids: 1 cup = 8 oz   No drinking from 30 minutes before meals to 30 minutes after meals.    For food: ¼ cup = 4 tablespoons   3oz meat is approx. the size of a deck of cards.    A food scale will help you determine portion size.        Regular Bariatric Diet: 2-3  months and Beyond  Follow a high protein, low carb, low fat diet LIFELONG:    MAY ADD THE FOLLOWING: slowly, one by one, back in the diet. Please chew foods well and if you do not tolerate certain foods at first, wait a few more week and try again. Raw/crunchy vegetables (artichoke, asparagus, baby corn, bamboo shoots, bean sprouts, carrots, celery, cherry tomatoes, cucumber, green onions or scallions, lettuce, pea pods, radishes, water chestnuts), plain/unsalted nuts and seeds and Protein bars w/ 0-4 g of sugar per serving (such as: Think Thin, Atkins, Pure Protein, Quest, Cake Bites, Power Crunch, ONE).   CONTINUE TO GET IN BETWEEN  GRAMS OF PROTEIN PER DAY EVERY DAY using foods and low sugar protein shakes   FLUID INTAKE: AIM FOR 64 OUNCES TOTAL FOR THE DAY. This includes ice, sugar-free popsicles, sugar-free jello, low sodium broths and any sugar-free non-carbonated beverages. You may resume caffeinated beverages.  LIMIT FRUITS TO 2 SERVINGS PER DAY. One serving of fruit is 1 small piece of fruit, 1 - ½ cup container of canned fruit (in its own juices or water) or ½ cup cubed fruit  LIMIT UNSALTED PLAIN NUTS AND SEEDS TO ¼ CUP TOTAL PER DAY  Aim to stay between 800-1000 calories per day      SAMPLE MENU STARTING 2-3 MONTHS AFTER SURGERY  Time of Day Day 1 Day 2   7am:    Egg omelet made with 1 egg and 1 slice low-fat cheese Quest Cinnamon Roll Protein bar (warm in microwave for 10-15 seconds)   7:30am-9:30am:  Water/SF beverage water/SF beverage   10am:  1 ounce turkey with 1 light string cheese ¼ cup unsalted nuts + ½ banana   10:30-11:30am:  water/SF beverage water/SF beverage   12pm:    Grilled chicken (2 ounces) salad with 1 Tablespoon of low-fat Italian dressing and 1 apple  Taco Lettuce wraps: 1-2 oz of lean ground meat, sprinkle of low fat cheese, tomatoes, salsa wrapped in lettuce leaves   12:30pm-2:30pm:  water/SF beverage water/SF beverage   3pm:  Protein drink   Light yogurt   3:30-5:30pm:   water/SF beverage water/SF beverage   6pm:  Grilled shrimp kebobs (2 ounces shrimp, pineapple chunks, bell pepper and onions) with ¼ cup sautéed spinach and garlic (use Bre spray)   ½ cup Red Beans (no rice) served over ¼ cup cauliflower rice   6:30pm-9pm:  water/SF beverage water/SF beverage   9pm:  1 tbsp slivered almonds sprinkled over 6 oz Greek yogurt container Protein shake         Protein Content of Foods Recommended after  Weight Loss Surgery    Food Name Portion Calories Protein (gms)   Almonds (unsalted) 1/4 cup 160 6   Aline milk, unsweetened 1 cup 30  1   Beef, Roast 1 oz 46 8   Beef, Steak, sirloin, trimmed 1 oz 55 9   Catfish, broiled or baked 1 oz 30 5   Cheese, American FF 1 oz 40 6   Cheese, Cottage 1% fat ¼ cup 41 7   Cheese, Parmesan, grated 2 tsp 20 2   Cheese, Mozzarella, part skim 1 oz 78 8   Cheese, part skim Ricotta ¼ cup 90 8   Chicken, white breast w/o skin 1 oz 46 9   Chicken, leg w/o skin 1 oz 54 7   Crab, steamed ¼ cup  40 9   Crawfish tails, boiled ¼ cup 35 8   Edamame, shelled ¼ cup 50 4   Egg 1 78 6   Ham, lean 5% 1 oz 44 7   Hamburger, lean 1 oz 56 7   Hummus ¼ cup 100 5   Milk, skim or 1%  1 cup 90 8   Milk, Fairlife non-fat 1 cup 80 13   Pork Tenderloin 1 oz 46 7   Pudding, SF 1 serv 60 2   Red beans ¼ cup 56 4   Refried beans, fat free ¼ cup 65 4   [address], baked 1 oz 52 7   Shrimp, steamed 1 oz 28 6   Soymilk, plain ½ cup 40 3   Tilapia, white fish, cooked 1 oz 36 8   Tofu ¼ cup 47 5   Trout 1 oz 48 7   Tuna, canned in water 1 oz 37 8   Turkey, white meat 1 oz 35 7   Veal Loin 1 oz 50 7   Yogurt, SF, frozen vanilla 6 oz 140 7   Yogurt, light 5 oz 80 5   Yogurt, Greek 5 oz  12-15     *Abbreviations: SF=sugar free, LF=low fat, FF= fat free, gms=grams  *3oz of cooked meat/protein = size of deck of cards or ladies palm   *1oz cheese = 1 inch cube or 1 slice American cheese      Lifelong Nutrition Guidelines after   Weight Loss Surgery    Weight Loss Surgery is designed to help  people lose weight after previous attempts at weight loss have failed.  However, safe and successful weight loss with this procedure requires you to make a commitment.  A commitment to change current eating habits and behavior is essential to develop substantial weight loss.    As your stomach is greatly reduced following surgery (to that of a small egg), your nutritional intake is one of the most important aspects of your treatment.  Adequate nutrition helps in the healing of your incisions, preventing gastric discomfort, and in maintaining your nutritional health.    Why should you follow this diet?  You could develop nutrient deficiencies, which may consequently affect your health.  You may not achieve the maximal amount of weight loss, or the rate of weight loss may slow down.    The following guidelines have been developed to assist you in making these changes.      Eat Slowly.  Immediately after surgery the stomach is swollen and needs time to heal.  Eating too quickly may cause you to over fill your pouch and bring forth discomfort (i.e. nausea and vomiting).  Eat and Drink Small Amounts at a Time.  Learn to sip.  Try not to fill your entire mouth with food or fluid.  Use a baby spoon and a 2oz medicine cup to help determine a safe amount.  Stop Eating or Drinking When You Feel Full.  Learn to listen to your body.  If you are unable to recognize fullness, consume only the quantity of food recommended.  Eating or drinking too much may eventually stretch your pouch and prevent you from achieving maximal weight loss.  Also, overeating may cause you to have nausea and vomiting.  Chew Food Thoroughly Before Swallowing.  Try to chew each bite 30 times before swallowing.  A big chunk of food could get caught and make you very uncomfortable.  Drink Adequate Fluids in between meals to Prevent Dehydration.  Consume at least 6 cups of liquids per day (>48 oz).  No drinking with meals!  Eat Protein Rich Foods First.   This is necessary to meet your protein needs.  Protein is necessary to promote adequate healing and to help you maintain lean body mass as you lose weight.  Aim for >80 grams of protein per day.  Keep Your Food Choices Sugar Free and Low in Fat.  Foods high in sugar and fat may cause diarrhea and abdominal discomfort, or the Dumping Syndrome.  Avoid Starchy Carbohydrates (bread, rice, pasta, corn, peas, potatoes, crackers, pretzels, chips, grits, oatmeal, dry cereals/granola, tortillas).         Common Nutritional Problems and Prevention Tips   Nausea and vomiting   Cause: overeating or eating too quickly   Prevention tip: eat slowly, chew your food very well, and stop eating as soon as you feel full   Chronic malnutrition problems   Cause: nutrients are absorbed differently following surgery   Symptoms: fatigue and aching muscles; tingling feet, calves or hands  Prevention tip: eat a healthy diet and always take your vitamin and mineral supplements   Lactose intolerance   Symptoms: gas, bloating, cramping and diarrhea after drinking milk   Prevention tips: Switch to lactose-free milk such as: Fairlife non-fat milk, unsweetened soy milk, unsweetened almond milk. Refer to lactose-free protein supplement suggestions on pg. 4.  Gas and Bloating  Cause: digestive tract changes after surgery  Prevention tips: eat slowly, avoid overeating, avoid carbonated beverages and drinking through straws. Try switching to lactose-free protein drinks. Try using Gas-X chewables.  Temporary hair loss   Cause: rapid weight loss and/or lack of protein in the diet   Prevention tip: eat the amount of protein recommended by your Registered Dietitian   Dehydration   Cause: Not drinking enough fluids; or persistent vomiting  Symptoms: dark and strong smelling urine, dry mouth, headache and fatigue   Prevention tip: take frequent sips of liquid throughout the day   Dumping syndrome (Gastric Bypass)  Cause: food emptying too quickly from the  stomach   Symptoms: diarrhea, nausea, cold sweats and light-headedness   Prevention tips: avoid consuming sugary foods or beverages, drinking fluids too soon after a meal, or eating high fat foods   Constipation   Cause: food and fiber intake are reduced following surgery   Prevention tips:   Drink at least 48 ounces water daily in addition to protein drinks  Exercise daily   Try Miralax (stool softener). No laxatives if possible.  Try a fiber supplement (Metamucil or Benefiber)                Physical Activity    Physical activity and exercise are essential to achieve and maintain your weight loss goals. Activity and exercise beginning right after your surgery will help you feel better, recover faster and minimize the likelihood of post-surgical complications. Staying active promotes mental well-being, relieves stress and reduces feelings of depression and anxiety. You feel good about your body when you exercise regularly, and therefore have a healthier body image.       Choose a form of activity or exercise that you enjoy.    Ask a friend or family member to participate in an activity or exercise with you. Think of it as a manjinder system.    Join an exercise club or class.   Listen to your favorite music as you exercise.    Try mall walking, aerobics, swimming or dancing.    Park 15 minutes from your destination and walk.   Use stairs instead of the elevator.    Prioritize activity and exercise time into your schedule.    Keep a record or journal of your activity.          Required Vitamin/Mineral Supplements:    Sleeve Gastrectomy: No swallowing large whole pills for 2 weeks after surgery  Gastric Bypass: No swallowing large whole pills for 1 month after surgery    *NO gummy multivitamins due to poor quality and sugar content.  *Do NOT take calcium citrate and Iron within 2 hours of each other due to poor absorption.  *Pills may be swallowed if the size of a No. 2 pencil eraser (or smaller). They may be cut to this  size with a pill cutter and swallowed if tolerated. Compare to this size:          Bariatric surgery patients will need the following Vit/Min   FOR LIFE:    Multivitamin with or without Iron, 2 per day  At least 18mg Iron, if not included in multivitamin  50mg Thiamine (Vit B1) per day  Calcium Citrate + Vit D  500mg 3 times per day OR 600mg twice per day  Vit B12 sublingual (dissolve under the tongue for 30 sec)   500mcg per day, 2500mcg per wk, or 5000mcg every 2 wks        Suggested vitamin/mineral regimen for first 2-4 weeks after surgery when you cannot swallow large whole pills:    EZ Melts Multivitamin with Iron OR Flintstones Complete chewables (not the gummies)   EZ Melts B1 (Amazon)  Nature's Way liquid Calcium Citrate + Vit D OR GNC Calcium Citrate soft chews   B12 Sublingual         Bariatric Vitamins Online:  [email or web address]        Resources for Bariatric Patients:    Before & After: Living & Eating Well After Weight Loss Surgery by Dixie Dexter. [email or web address], 2004.    Eating Well After Weight Loss Surgery by Alisa Cobian & Sebastien Espinal. [employer]. Press, 2004.  Weight Loss Surgery for Dummies by Palak Cruz MD, FACS. [employer], 2005.  Exodus from Obesity: The Guide to Long-Term Success After Weight Loss Surgery by Cecelia Mccormick.  Press, 2003.  Weight Loss Surgery: Finding the Thin Person Hiding Inside You by Radha Hankins. [employer], 2003.  The Don't Diet Live It Workbook by Sabino Arroyo & Javier Concepcion available from    The Overeater's Journal by Riri Jones. Sab[employer]im also available at    Moving Away from Diets available from    [place] [employer]  Designater's Anonymous--online, in-person, and telephone meetings available; .  Online Food Database at  to find out the calories, protein, sugar, fat, fiber, etc of any certain food.  50 Ways to Soothe Yourself Without Food by Dixie Cummings PSY.D. [employer], 2009.     Helpful Apps for  "Bariatric Patients:    [place]. [facility] Code: 26975  [facility]  Diabetes logbook by George (track blood sugar, upload meal pics)        TEN TIPS FOR HEALTHY AND CONSCIOUS EATING  Keep track of everything you eat and drink. Write it down as soon as you swallow so you don't forget! Include the type of food or beverage, amount, time, physical feeling of hunger vs. fullness, etc.   Base meals around LEAN PROTEIN and VEGETABLES, incorporating them into main dishes and as snacks.   Buy plenty of fresh or frozen FRUITS and VEGETABLES to keep on hand, wash and chop them (if applicable) ASAP, and snack on them ANYTIME! Eat at least 2 servings of fresh fruits and 3 servings of vegetables each day.   Eat throughout the day rather than "saving" your appetite for a huge meal. Your body can only use so much fuel at a time, so extra will more likely be stored as fat! Smaller, more frequent meals (every 3-5 hours) will help keep your energy level more consistent. Start listening to your body's signals regarding hunger and fullness!   Keep "junk food" and "trigger" foods out of the house. Make a special trip to the store when you MUST have it and savor it.   Include good sources of protein with your meals: chicken, fish, shellfish, legumes, eggs, dairy products, soy products, and lean meats.   Use low fat, fat free and lean dairy and animal products. High fat animal products tend to have a lot of saturated fat, which promotes high blood cholesterol levels.   Choose calories you can chew - that means drinking more water instead of juice, sports drinks, regular soda, alcohol, and specialty coffees.   Shut off the TV, put down the book or newspaper, and turn off the computer whenever you eat - this includes meals and snacks. People tend to eat larger portions when snacking in front of the tube, and the foods chosen are often high in fat, sugar and calories. What's more, when you associate eating with particular " "activities, you may automatically look for something to eat when engaging in those activities, regardless of hunger.   Plan ahead for meals and snacks, have foods on hand to prepare them, and pack them "to go" if necessary. If you wait until you're really hungry, there may not be many healthy choices around to choose from.            (Revised 10.2022)    1200- 1500 calorie Sample meal plan  80-120g protein per day.   Protein drinks and bars: 0-4 grams sugar  Drink lots of water throughout the day and exercise!  MENU # 1  Breakfast: 2 eggs, 1 turkey sausage john, 1 apple  Snack: Atkins bar  Lunch: 2 roll-ups (sliced turkey, low-fat sliced cheese, mustard), 12 baby carrots dipped in 1 Tbsp natural peanut butter  Mid-Day Snack: ¼ cup unsalted almonds, ½ cup fruit  Dinner: 1 chicken thigh simmered in tomato sauce + 2 Tbsp mozzarella cheese, ½ cup black beans, 1/2 cup steamed carrots  Evening Snack: 1/2 cup grapes with 4 cubes low-fat cheddar cheese   ___________________________________________________  MENU # 2  Breakfast: 200 calorie protein drink  Mid-morning snack : ¼ cup unsalted nuts, medium banana  Lunch: 3oz tuna or chicken salad made with 2 tbsp light billy, over salad with tomatoes and cucumbers.   Snack: low-fat cheese stick  Dinner: 3oz hamburger john, slice of low-fat cheese, 1 cup boiled yellow squash and zucchini.   Snack: 6oz light yogurt  _______________________________________________________  Menu #3  Breakfast: 6oz plain Greek yogurt + fruit (½ banana, ½ cup fruit - pineapple, blueberries, strawberries, peach), may add Splenda to lambert.  Lunch: Grilled  chicken breast w/ slice low-fat pepper maria de jesus cheese, 1/2 cup grilled/baked asparagus and small salad with Salad Spritzer.    Mid-Day snack: 200 calorie protein drink   Dinner: 4oz Grilled fish, ½ cup white beans, ½ cup cooked spinach  Evening Snack: fudgsicle no-sugar-added    Menu # 4  Breakfast: 1 scoop vanilla protein powder + 4oz skim milk + 4oz " coffee   Snack: Pure protein bar  Lunch: 2 Lettuce tacos: 3oz seasoned ground turkey wrapped in a Vitaly lettuce leaves with 1 Tbsp shredded cheese and dollop low-fat sour cream  Snack: ½ cup cottage cheese, ½ cup pineapple chunks  Dinner: Shrimp omelet: 2 eggs, ½ cup shrimp, green onions, and shredded cheese  ______________________________________________________  Menu #5  Breakfast: 1 cup low-fat cottage cheese, ½ cup peaches (no sugar added)  Snack: 1 apple, 4 cubes cheese  Lunch: 3oz baked pork chop, 1 cup okra and tomato stew  Snack: 1/2 cup black beans + salsa + dollop light sour cream  Dinner: Caprese chicken salad: 3 oz chicken breast, 1oz fresh mozzarella, sliced tomato, 1 Tbsp olive oil, basil  Snack: sugar-free popsicle    Menu #6  Breakfast: ½ cup part-skim ricotta cheese, 2 Tbsp sugar-free strawberry preserves, sprinkle of slivered almonds  Snack: 1 orange  Lunch: 3 oz canned chicken, 1oz shredded cheddar cheese, ½  cup black beans, 2 Tbsp salsa  Snack: 200 calorie Protein drink  Dinner: 4 oz grilled salmon steak, over mixed green salad with 2 tbsp light dressing      Meal Ideas for Regular Bariatric Diet  *Recipes and products available at [email or web address]      Breakfast: (15-20g protein)    - Egg white omelet: 2 egg whites or ½ cup Egg Beaters. (Optional proteins: cheese, shrimp, black beans, chicken, sliced turkey) (Optional veggies: tomatoes, salsa, spinach, mushrooms, onions, green peppers, or small slice avocado)     - Egg and sausage: 1 egg or ¼ cup Egg Beaters (any variety), with 1 john or 2 links of Turkey sausage or Veggie breakfast sausage ([employer] or Boca)    - Crust-less breakfast quiche: To make a glass pie dish, mix 4oz part skim Ricotta, 1 cup skim milk, and 2 eggs as your base. Add protein: shredded cheese, sliced lean ham or turkey, turkey may/sausage. Add veggies: tomato, onion, green onion, mushroom, green pepper, spinach, etc.    - Yogurt parfait: Mix 1 - 6oz  container Dannon Light N Fit vanilla yogurt, with ¼ cup crushed unsalted nuts    - Cottage cheese and fruit: ½ cup part-skim cottage cheese or ricotta cheese topped with fresh fruit or sugar free preserves     - Dixie Soria's Vanilla Egg custard* (add 2 Tbsp instant coffee granules to make Cappuccino Custard*)    - Hi-Protein café latte (skim milk, decaf coffee, 1 scoop protein powder). Optional to add Sugar free syrup or extract flavoring.    - Breakfast Lox: spread fat free cream cheese on slices of smoked salmon. Serve over scrambled or egg over easy (sauteed with nonstick cookspray) OR on a cucumber slice    - Eggwhich: Scramble or cook 1 large egg over easy using nonstick cookspray. Place between 2 slices of Italian may and low fat cheese.     Lunch: (20-30g protein)    - ½ cup Black bean soup (Homemade or Progresso), with ¼ cup shredded low-fat cheese. Top with chopped tomato or fresh salsa.     - Lean deli turkey breast and low-fat sliced cheese, mustard or light billy to moisten, rolled up together, or wrapped in a Vitaly lettuce leaf    - Chicken salad made from dinner leftovers, moisten with low-fat salad dressing or light billy. Also try leftover salmon, shrimp, tuna or boiled eggs. Serve ½ cup over dark green salad    - Fat-free canned refried beans, topped with ¼ cup shredded low-fat cheese. Top with chopped tomato or fresh salsa.     - Greek salad: Top mixed greens with 1-2oz grilled chicken, tomatoes, red onions, 2-3 kalamata olives, and sprinkle lightly with feta cheese. Spritz with Balsamic vinegar to taste.     - Crust-less lunch quiche: To make a glass pie dish, mix 4oz part skim Ricotta, 1 cup skim milk, and 2 eggs as your base. Add protein: shredded cheese, sliced lean ham or turkey, shrimp, chicken. Add veggies: tomato, onion, green onion, mushroom, green pepper, spinach, artichoke, broccoli, etc.    - Pizza bake: spread a  gene rene mushroom with tomato sauce, low-fat shredded mozzarella  and turkey pepperoni or [place] may. Add any veggies. Roast for 10-15 minutes, until cheese melted.     - Cucumber crab bites: Spread ¼ cup crab dip (lump crabmeat + light cream cheese and green onions) over sliced cucumber.     - Chicken with light spinach and artichoke dip*: Puree in : 6oz cooked and drained spinach, 2 cloves garlic, 1 can cannelloni beans, ½ cup chopped green onions, 1 can drained artichoke hearts (not marinated in oil), lemon juice and basil. Mix in 2oz chopped up chicken.    Supper: (20-30g protein)    - Serve grilled fish over dark green salad tossed with low-fat dressing, served with grilled asparagus tejeda     - Rotisserie chicken salad: served with sliced strawberries, walnuts, fat-free feta cheese crumbles and 1 tbsp Janes Own Light Raspberry [place] Vinaigrette    - Shrimp cocktail: Dip cold boiled shrimp in homemade low-sugar cocktail sauce (1/2 cup Melecio One Carb ketchup, 2 tbsp horseradish, 1/4 tsp hot sauce, 1 tsp Worcestershire sauce, 1 tbsp freshly-squeezed lemon juice). Serve with dark green salad, walnuts, and crumbled blue cheese drizzled with olive oil and Balsamic vinegar    - Tuna Melt: Spread tuna salad onto 2 thick slices of tomato. Top with low-fat cheese and broil until cheese is melted. May also be made with chicken salad of shrimp salad. [place] with different types of cheeses.    - Chicken or beef fajitas (no tortilla, rice, beans, chips). Top meat and veggies w/ fresh salsa, fat free sour cream.     - Homemade low-fat Chili using extra lean ground beef or ground turkey. Top with shredded cheese and salsa as desired. May add dollop fat-free sour cream if desired    - Chicken parmesan: Top chicken breast w/ low sugar marinara sauce, mozzarella cheese and bake until chicken reaches 165*.  Serve w/ spaghetti SQUASH or [place] cut green beans    - Dinner Omelet with shrimp or chicken and onion, green peppers and chives.    - No noodthom leon: Use  sliced zucchini or eggplant in place of noodles.  Layer with part skim ricotta cheese and low sugar meat sauce (use very lean ground beef or ground turkey).    - Mexican chicken bake: Bake chunks of chicken breast or thigh with taco seasoning, Pace brand enchilada sauce, green onions and low-fat cheese. Serve with ¼ cup black beans or fat free refried beans topped with chopped tomatoes or salsa.    - Peña frozen meatballs, simmered in Classico Marinara sauce. Different flavors of salsa or spaghetti sauce create different dishes! Sprinkle with parmesan cheese. Serve with grilled or steamed veggies, or a dark green salad.    - Simmer boneless skinless chicken thigh chunks in Classico Marinara sauce or roasted salsa until tender with chopped onion, bell pepper, garlic, mushrooms, spinach, etc.     - Hamburger or veggie burger, without the bun, dressed the way you like. Served with grilled or steamed veggies.    - Eggplant parmesan: Bake slices of eggplant at 350 degrees for 15 minutes. Layer tomato sauce, sliced eggplant and low-fat mozzarella cheese in a baking dish and cover with foil. Bake 30-40 more minutes or until bubbly. Uncover and bake at 400 degrees for about 15 more minutes, or until top is slightly crisp.    - Fish tacos: grilled/baked white fish, wrapped in Vitaly lettuce leaf, topped with salsa, shredded low-fat cheese, and light coleslaw.    - Chicken betty: Sprinkle chicken w/ 1 tsp of hidden [place] ranch dip mix. Then grill chicken and top with black beans, salsa and 1 tsp fat free sour cream.     - Cauliflower pizza crust: Use cauliflower as crust (see recipe on casey, no flour!). Top w/ low fat cheese, turkey pepperoni and veggies and bake again    - chicken or turkey crust pizza: use ground chicken or turkey instead of cauliflower, spread in [place] and bake at 350 for about 20-30 minutes(may want to add garlic, black pepper, oregano and other herbs to ground meat mixture).  Remove and  top w/ low fat cheese, turkey pepperoni and veggies and bake again for another 10 minutes or until cheese is browned.     Snacks: (100-200 calories; >5g protein)    - 1 low-fat cheese stick with 8 cherry tomatoes or 1 serving fresh fruit  - 4 thin slices fat-free turkey breast and 1 slice low-fat cheese  - 4 thin slices fat-free honey ham with wedge of melon  - 6-8 edamame pods (equivalent to about 1/4 cup edamame without pods).   - 1/4 cup unsalted nuts with ½ cup fruit  - 6-oz container Dannon Light n Fit vanilla yogurt, topped with 1oz unsalted nuts         - apple, celery or baby carrots spread with 2 Tbsp PB2  - apple slices with 1 oz slice low-fat cheese  - Apple slices dipped in 2 Tbsp of PB2  - celery, cucumber, bell pepper or baby carrots dipped in ¼ cup hummus bean spread or light spinach and artichoke dip (*recipe in lunch section)  - celery, cucumber, baby carrots dipped in high protein greek yogurt (Mix 16 oz plain greek yogurt + 1 packet of hidden valley ranch dip mix)  - Dominic Links Beef Steak - 14g protein! (similar to beef jerky)  - 2 wedges [place] - Light Herb & Garlic Cheese with sliced cucumber or green bell pepper  - 1/2 cup low-fat cottage cheese with ¼ cup fruit or ¼ cup salsa  - RTD Protein drinks: Atkins, Low Carb Slim Fast, EAS light, Muscle Milk Light, etc.  - Homemade Protein drinks: [employer] Soy95, Isopure, Nectar, UNJURY, Whey Gourmet, etc. Mix 1 scoop powder with 8oz skim/1% milk or light soymilk.  - Protein bars: Atkins, EAS, Pure Protein, Think Thin, Detour, etc. Must have 0-4 grams sugar - Read the label.    Takeout Options: No more than twice/week  Deli - Salads (no pasta or rice), meats, cheeses. Roasted chicken. Lox (salmon)    Mexican - Platters which don't include tortillas, chips, or rice. Go easy on the beans. Example: Fajitas without the tortillas. Ask the  not to bring chips to the table if they are too tempting.    Greek - Meat or fish and vegetable, but no bread  or rice. Including hummus, baba ganoush, etc, is OK. Most sit-down Greek restaurants can provide you with cucumber slices for dipping instead of carole bread.    Fast Food (Avoid as much as possible) - Salads (no croutons and limit salad dressing to 2 tbsp), grilled chicken sandwich without the bun and ask for no billy. [employer]s low fat chili or [employer]aco [employer] pintos and cheese.    BBQ - The meats are fine if you ask for sauces on the side, but most of the traditional side dishes are loaded with carbs. [place] slaw, baked beans and BBQ sauce are typically made with sugar.    Chinese - Nothing deep-fried, no rice or noodles. Many Chinese sauces have starch and sugar in them, so you'll have to use your judgement. If you find that these sauces trigger cravings, or cause Dumping, you can ask for the sauce to be made without sugar or just use soy sauce.

## 2024-06-08 ENCOUNTER — PATIENT MESSAGE (OUTPATIENT)
Dept: ADMINISTRATIVE | Facility: CLINIC | Age: 34
End: 2024-06-08
Payer: MEDICARE

## 2024-06-10 ENCOUNTER — CLINICAL SUPPORT (OUTPATIENT)
Dept: BARIATRICS | Facility: CLINIC | Age: 34
End: 2024-06-10
Payer: MEDICARE

## 2024-06-10 DIAGNOSIS — E66.9 OBESITY (BMI 30-39.9): ICD-10-CM

## 2024-06-10 DIAGNOSIS — I10 ESSENTIAL HYPERTENSION: Primary | ICD-10-CM

## 2024-06-10 DIAGNOSIS — Z71.3 DIETARY COUNSELING AND SURVEILLANCE: ICD-10-CM

## 2024-06-10 PROCEDURE — 99499 UNLISTED E&M SERVICE: CPT | Mod: HCNC,95,, | Performed by: DIETITIAN, REGISTERED

## 2024-06-10 NOTE — PROGRESS NOTES
"The patient location is: home ([place])  The chief complaint leading to consultation is: obesity    Visit type: audiovisual    Face to Face time with patient: 30 min  30 minutes of total time spent on the encounter, which includes face to face time and non-face to face time preparing to see the patient (eg, review of tests), Obtaining and/or reviewing separately obtained history, Documenting clinical information in the electronic or other health record, Independently interpreting results (not separately reported) and communicating results to the patient/family/caregiver, or Care coordination (not separately reported).         Each patient to whom he or she provides medical services by telemedicine is:  (1) informed of the relationship between the physician and patient and the respective role of any other health care provider with respect to management of the patient; and (2) notified that he or she may decline to receive medical services by telemedicine and may withdraw from such care at any time.        NUTRITIONAL Note     Referring Physician: Orville Payne M.D.   Reason for MNT Referral: Follow up assessment for sleeve gastrectomy work-up     33 y.o. female presents with weight stable over the past month.           Past Medical History:   Diagnosis Date    Allergy      Anemia      Anxiety      Breast feeding status of mother 1/17/2020    Brittle bones      ESRD (end stage renal disease)       M/W/F    General anesthetics causing adverse effect in therapeutic use       pt states "im a light weight"    Hypertension      Insomnia       PSG revealed no CHRYSTAL, but likely psychogenic etiology (anxiety)    RLS (restless legs syndrome)        CLINICAL DATA:  33 y.o.-year-old Black or  female.  Height: 5 ft. 6 in.  Weight: 224 lbs  IBW: 144 lbs  BMI: 36.2     DAILY NUTRITIONAL NEEDS: pre-op nutritional bariatric guidelines to promote weight loss  8536-0280 Calories    Grams Protein     NUTRITION & " HEALTH HISTORY:  Greatest challenge: starchy CHO, portion control, snacking at night, and irregular meal patterns     Current diet recall:      B: coffee with 2-4 little creamers and 2 spoons of sugar OR none  L: dinner leftovers (des hair pasta with shrimps, mushrooms, roasted tomatoes, alana, marinara . Also fresh green beans in sesame oil)  D: Chinese take out (chicken, veg, fried rice and egg rolls) OR [employer] fried chicken and fries, sweet tea.  Sn: none or cookies/M&Ms     Current Diet:  Meal pattern: 1-3 meals  Protein supplements: likes Premier shakes  Snacking: not a big snacker. Likes popcorn with M&Ms and homemade cookies, but does not do this often  Vegetables: Likes a variety. Eats 2-3 times per week.  Fruits: Likes a variety. Eats 2-3 times per week.  Beverages: water, sugary beverages, sweet tea. Reduced coffee to 4-5 days/week and trying to put less sugar  Dining out: Weekly. Monthly. Mostly fast food, restaurants, and take-out. [employer] (hashbrowns or french fries). Chinese food (egg drop soup and wonton soup). Loves sushi rolls with rice.   Cooking at home: Daily. Weekly. Mostly baked, grilled, and smothered meat, fish, starchy CHO, and vegetables. Baked chicken/pork chop/fish with pasta. Occ fried okra.     Exercise:  Walking the neighborhood in a sauna suit  [employer] 1-2 times/week for 2-3 hours (30 min sauna, weights, full body work out, stairs 5-10 min, rowing 10-15 min, elliptical 10-15 min, 30 min sauna)     Restrictions to exercise: none     Vitamins / Minerals / Herbs:   none     Labs:   None available at time of visit     Food Allergies:   None known     Social:  Catering food/hosting parties, writer, singer/wrapper, relationship therapist, radio talk show  Lives with 3 teenagers and a 5 yr old.  Grocery shopping and food prep done by the pt.  Patient believes the household will be supportive after surgery.  Alcohol: Socially. Shot of alcohol or glass of wine.  Smoking:       ASSESSMENT:  Patient demonstrated knowledge of healthy eating behaviors and exercise patterns; admits to not eating healthy and not exercising at this point.  Patient states willingness to change lifestyle and make behavior modifications.           Barriers to Education: none     Stage of change: determination     NUTRITION DIAGNOSIS:    Morbid Obesity related to Excessive carbohydrate intake, Excessive calorie intake, and Physical inactivity as evidence by BMI.     BARIATRIC DIET DISCUSSION/PLAN:  Discussed diet after surgery and related to patient's food record.  Reviewed nutrition guidelines for before and after surgery.  Answered all questions.  Continue to review Bariatric Nutrition Guidebook at home and call with any questions.  Work on Bariatric Nutrition Checklist.  Work on expanding variety of vegetables.  Work on gradually cutting back on starchy CHO in the diet.  Begin trying various protein supplements to determine preference.  1200-calorie diet.  1500-calorie diet.  5-6 meals per day.  Start including protein supplements in the diet plan daily.  Eliminate sugary drinks.  Eliminate sugar added to coffee. Try prot shake instead.     RECOMMENDATIONS:  Pt is a potential candidate for bariatric surgery - Sleeve.     Follow up in one month.  Needs additional visits with RD for MSD.     Patient verbalized understanding.        Communicated nutrition plan with bariatric team.     SESSION TIME:  30 minutes

## 2024-06-11 ENCOUNTER — TELEPHONE (OUTPATIENT)
Dept: ADMINISTRATIVE | Facility: CLINIC | Age: 34
End: 2024-06-11
Payer: MEDICARE

## 2024-06-13 ENCOUNTER — OFFICE VISIT (OUTPATIENT)
Dept: FAMILY MEDICINE | Facility: CLINIC | Age: 34
End: 2024-06-13
Payer: MEDICARE

## 2024-06-13 VITALS
BODY MASS INDEX: 36 KG/M2 | SYSTOLIC BLOOD PRESSURE: 114 MMHG | DIASTOLIC BLOOD PRESSURE: 78 MMHG | WEIGHT: 224 LBS | HEIGHT: 66 IN

## 2024-06-13 DIAGNOSIS — F51.04 PSYCHOPHYSIOLOGICAL INSOMNIA: ICD-10-CM

## 2024-06-13 DIAGNOSIS — Z00.00 ENCOUNTER FOR PREVENTIVE HEALTH EXAMINATION: Primary | ICD-10-CM

## 2024-06-13 DIAGNOSIS — Z86.711 HISTORY OF PULMONARY EMBOLUS (PE): ICD-10-CM

## 2024-06-13 DIAGNOSIS — D84.9 IMMUNODEFICIENCY, UNSPECIFIED: ICD-10-CM

## 2024-06-13 DIAGNOSIS — D63.8 ANEMIA OF CHRONIC DISEASE: ICD-10-CM

## 2024-06-13 DIAGNOSIS — I10 ESSENTIAL HYPERTENSION: ICD-10-CM

## 2024-06-13 DIAGNOSIS — E66.01 SEVERE OBESITY (BMI 35.0-39.9) WITH COMORBIDITY: ICD-10-CM

## 2024-06-13 DIAGNOSIS — E83.42 HYPOMAGNESEMIA: ICD-10-CM

## 2024-06-13 DIAGNOSIS — K21.9 GASTROESOPHAGEAL REFLUX DISEASE WITHOUT ESOPHAGITIS: ICD-10-CM

## 2024-06-13 DIAGNOSIS — N05.1 FSGS (FOCAL SEGMENTAL GLOMERULOSCLEROSIS): ICD-10-CM

## 2024-06-13 DIAGNOSIS — Z94.0 DECEASED-DONOR KIDNEY TRANSPLANT: ICD-10-CM

## 2024-06-13 DIAGNOSIS — Z59.48: ICD-10-CM

## 2024-06-13 DIAGNOSIS — N25.81 SECONDARY HYPERPARATHYROIDISM: ICD-10-CM

## 2024-06-13 PROCEDURE — 3074F SYST BP LT 130 MM HG: CPT | Mod: HCNC,CPTII,95, | Performed by: NURSE PRACTITIONER

## 2024-06-13 PROCEDURE — G0439 PPPS, SUBSEQ VISIT: HCPCS | Mod: HCNC,95,, | Performed by: NURSE PRACTITIONER

## 2024-06-13 PROCEDURE — 1159F MED LIST DOCD IN RCRD: CPT | Mod: HCNC,CPTII,95, | Performed by: NURSE PRACTITIONER

## 2024-06-13 PROCEDURE — 3066F NEPHROPATHY DOC TX: CPT | Mod: HCNC,CPTII,95, | Performed by: NURSE PRACTITIONER

## 2024-06-13 PROCEDURE — G9919 SCRN ND POS ND PROV OF REC: HCPCS | Mod: HCNC,CPTII,95, | Performed by: NURSE PRACTITIONER

## 2024-06-13 PROCEDURE — 1160F RVW MEDS BY RX/DR IN RCRD: CPT | Mod: HCNC,CPTII,95, | Performed by: NURSE PRACTITIONER

## 2024-06-13 PROCEDURE — 3078F DIAST BP <80 MM HG: CPT | Mod: HCNC,CPTII,95, | Performed by: NURSE PRACTITIONER

## 2024-06-13 SDOH — SOCIAL DETERMINANTS OF HEALTH (SDOH): OTHER SPECIFIED LACK OF ADEQUATE FOOD: Z59.48

## 2024-06-13 NOTE — PATIENT INSTRUCTIONS
Counseling and Referral of Other Preventative  (Italic type indicates deductible and co-insurance are waived)    Patient Name: Lisseth Schwartz  Today's Date: 6/13/2024    Health Maintenance       Date Due Completion Date    COVID-19 Vaccine (1) Never done ---    Pneumococcal Vaccines (Age 0-64) (1 of 2 - PCV) Never done ---    TETANUS VACCINE Never done ---    Hemoglobin A1c (Diabetic Prevention Screening) 12/17/2023 1/18/2020    Influenza Vaccine (Season Ended) 09/01/2024 10/16/2019 (Done)    Override on 10/16/2019: Done    Cervical Cancer Screening 03/20/2029 3/20/2024        No orders of the defined types were placed in this encounter.    The following information is provided to all patients.  This information is to help you find resources for any of the problems found today that may be affecting your health:                  Living healthy guide: www.[facility].[place].gov      Understanding Diabetes: www.diabetes.org      Eating healthy: www.cdc.gov/healthyweight      [facility] home safety checklist: www.cdc.gov/steadi/patient.html      Agency on Aging: www.goea.[place].[facility]      Alcoholics anonymous (AA): www.aa.org      Physical Activity: www.arielle.nih.gov/qh5zkny      Tobacco use: www.quitwithus[place].org

## 2024-06-13 NOTE — PROGRESS NOTES
"The patient location is: [place]  The chief complaint leading to consultation is:  Medicare AWV    Visit type: audiovisual    Face to Face time with patient: 30 min  45  minutes of total time spent on the encounter, which includes face to face time and non-face to face time preparing to see the patient (eg, review of tests), Obtaining and/or reviewing separately obtained history, Documenting clinical information in the electronic or other health record, Independently interpreting results (not separately reported) and communicating results to the patient/family/caregiver, or Care coordination (not separately reported).         Each patient to whom he or she provides medical services by telemedicine is:  (1) informed of the relationship between the physician and patient and the respective role of any other health care provider with respect to management of the patient; and (2) notified that he or she may decline to receive medical services by telemedicine and may withdraw from such care at any time.    Notes:       Lisseth Schwartz presented for a  Medicare AWV and comprehensive Health Risk Assessment today. The following components were reviewed and updated:    Medical history  Family History  Social history  Allergies and Current Medications  Health Risk Assessment  Health Maintenance  Care Team         ** See Completed Assessments for Annual Wellness Visit within the encounter summary.**         The following assessments were completed:  Living Situation  CAGE  Depression Screening  Fall Risk Assessment (MACH 10)  Hearing Assessment(HHI)  Cognitive Function Screening  Nutrition Screening  ADL Screening  PAQ Screening    Opioid documentation:      Patient does not have a current opioid prescription.        Vitals:    06/13/24 1405   BP: 114/78   Weight: 101.6 kg (224 lb)   Height: 5' 6" (1.676 m)     Body mass index is 36.15 kg/m².  Physical Exam  Constitutional:       General: She is not in acute distress.     " Appearance: Normal appearance. She is obese. She is not ill-appearing, toxic-appearing or diaphoretic.   Pulmonary:      Effort: Pulmonary effort is normal.   Neurological:      Mental Status: She is alert and oriented to person, place, and time.   Psychiatric:         Mood and Affect: Mood normal.         Behavior: Behavior normal.               Diagnoses and health risks identified today and associated recommendations/orders:    1. Encounter for preventive health examination  Screenings performed, as noted above.  Personal preventative testing needs reviewed.      2. Immunodeficiency, unspecified  Treated with Cellcept, tacrolimis, prednisone, stable, cont tx, follow up with transplant team    3. Secondary hyperparathyroidism  Treated with renal transplant, stable follow up with transplant team, last .2    4. History of pulmonary embolus (PE)  Monitored, stable due to her fistula, was removed    5. Gastroesophageal reflux disease without esophagitis  Treated with protonix, stable, cont tx    6. Psychophysiological insomnia  Assessed, stable, states has a lot on her mind, discussed sleep hygiene     7. Anemia of chronic disease  Monitored by transplant team, last H/H 12.5/38.1    8. Hypomagnesemia  Treated with supplements, stable cont tx    9. FSGS (focal segmental glomerulosclerosis)  Treated with renal transplant, stable, taking her meds as scheduled    10. -donor kidney transplant  Treated with renal transplant, stable, taking her meds as scheduled    11. Essential hypertension  Treated with metoprolol, stable cont tx    12. Severe obesity (BMI 35.0-39.9) with comorbidity  Working with bariatrics, is on prednisone, heart healthy diet, exercise to tolerance    Discussed vaccines      Provided Lisseth with a 5-10 year written screening schedule and personal prevention plan. Recommendations were developed using the USPSTF age appropriate recommendations. Education, counseling, and referrals were  provided as needed. After Visit Summary printed and given to patient which includes a list of additional screenings\tests needed.    No follow-ups on file.    Adrian Reza, NP  I offered to discuss advanced care planning, including how to pick a person who would make decisions for you if you were unable to make them for yourself, called a health care power of , and what kind of decisions you might make such as use of life sustaining treatments such as ventilators and tube feeding when faced with a life limiting illness recorded on a living will that they will need to know. (How you want to be cared for as you near the end of your natural life)     X Patient is interested in learning more about how to make advanced directives.  I provided them paperwork and offered to discuss this with them.

## 2024-07-01 DIAGNOSIS — Z94.0 S/P KIDNEY TRANSPLANT: Primary | ICD-10-CM

## 2024-07-03 ENCOUNTER — PATIENT OUTREACH (OUTPATIENT)
Dept: ADMINISTRATIVE | Facility: OTHER | Age: 34
End: 2024-07-03
Payer: MEDICARE

## 2024-07-03 NOTE — PROGRESS NOTES
CHW - Case Closure    This LPN spoke to patient via telephone today.   Pt/Caregiver reported: Spoke to patient in regards to OPCM referral for food insecurity. She states she does not remember having this conversation with NP and has no food needs. She states she uses transp to appts and has no issues with housing/utility. She is thankful for call but states she has everything she needs at the moment. Asked to call in future if needs.   Pt/Caregiver denied any additional needs at this time and agrees with episode closure at this time.  Provided patient with Community Health Worker's contact information and encouraged him/her to contact this Community Health Worker if additional needs arise.

## 2024-07-10 ENCOUNTER — LAB VISIT (OUTPATIENT)
Dept: LAB | Facility: HOSPITAL | Age: 34
End: 2024-07-10
Attending: INTERNAL MEDICINE
Payer: MEDICARE

## 2024-07-10 DIAGNOSIS — Z94.0 S/P KIDNEY TRANSPLANT: ICD-10-CM

## 2024-07-10 LAB
ALBUMIN SERPL BCP-MCNC: 4 G/DL (ref 3.5–5.2)
ANION GAP SERPL CALC-SCNC: 9 MMOL/L (ref 8–16)
BUN SERPL-MCNC: 19 MG/DL (ref 6–20)
CALCIUM SERPL-MCNC: 10.2 MG/DL (ref 8.7–10.5)
CHLORIDE SERPL-SCNC: 107 MMOL/L (ref 95–110)
CO2 SERPL-SCNC: 23 MMOL/L (ref 23–29)
CREAT SERPL-MCNC: 0.9 MG/DL (ref 0.5–1.4)
EST. GFR  (NO RACE VARIABLE): >60 ML/MIN/1.73 M^2
GLUCOSE SERPL-MCNC: 72 MG/DL (ref 70–110)
PHOSPHATE SERPL-MCNC: 3 MG/DL (ref 2.7–4.5)
POTASSIUM SERPL-SCNC: 3.9 MMOL/L (ref 3.5–5.1)
SODIUM SERPL-SCNC: 139 MMOL/L (ref 136–145)

## 2024-07-10 PROCEDURE — 80197 ASSAY OF TACROLIMUS: CPT | Mod: HCNC | Performed by: INTERNAL MEDICINE

## 2024-07-10 PROCEDURE — 87799 DETECT AGENT NOS DNA QUANT: CPT | Mod: HCNC | Performed by: INTERNAL MEDICINE

## 2024-07-10 PROCEDURE — 85025 COMPLETE CBC W/AUTO DIFF WBC: CPT | Mod: HCNC | Performed by: INTERNAL MEDICINE

## 2024-07-10 PROCEDURE — 80069 RENAL FUNCTION PANEL: CPT | Mod: HCNC | Performed by: INTERNAL MEDICINE

## 2024-07-11 ENCOUNTER — PATIENT MESSAGE (OUTPATIENT)
Dept: TRANSPLANT | Facility: CLINIC | Age: 34
End: 2024-07-11
Payer: MEDICARE

## 2024-07-11 DIAGNOSIS — Z79.899 IMMUNOSUPPRESSIVE MANAGEMENT ENCOUNTER FOLLOWING KIDNEY TRANSPLANT: ICD-10-CM

## 2024-07-11 DIAGNOSIS — Z94.0 IMMUNOSUPPRESSIVE MANAGEMENT ENCOUNTER FOLLOWING KIDNEY TRANSPLANT: ICD-10-CM

## 2024-07-11 DIAGNOSIS — Z94.0 S/P KIDNEY TRANSPLANT: ICD-10-CM

## 2024-07-11 LAB
BASOPHILS # BLD AUTO: 0.04 K/UL (ref 0–0.2)
BASOPHILS NFR BLD: 0.5 % (ref 0–1.9)
DIFFERENTIAL METHOD BLD: ABNORMAL
EOSINOPHIL # BLD AUTO: 0.1 K/UL (ref 0–0.5)
EOSINOPHIL NFR BLD: 1.2 % (ref 0–8)
ERYTHROCYTE [DISTWIDTH] IN BLOOD BY AUTOMATED COUNT: 12.7 % (ref 11.5–14.5)
HCT VFR BLD AUTO: 42.3 % (ref 37–48.5)
HGB BLD-MCNC: 13.2 G/DL (ref 12–16)
IMM GRANULOCYTES # BLD AUTO: 0.02 K/UL (ref 0–0.04)
IMM GRANULOCYTES NFR BLD AUTO: 0.3 % (ref 0–0.5)
LYMPHOCYTES # BLD AUTO: 1 K/UL (ref 1–4.8)
LYMPHOCYTES NFR BLD: 13.5 % (ref 18–48)
MCH RBC QN AUTO: 32.1 PG (ref 27–31)
MCHC RBC AUTO-ENTMCNC: 31.2 G/DL (ref 32–36)
MCV RBC AUTO: 103 FL (ref 82–98)
MONOCYTES # BLD AUTO: 0.7 K/UL (ref 0.3–1)
MONOCYTES NFR BLD: 9.4 % (ref 4–15)
NEUTROPHILS # BLD AUTO: 5.8 K/UL (ref 1.8–7.7)
NEUTROPHILS NFR BLD: 75.1 % (ref 38–73)
NRBC BLD-RTO: 0 /100 WBC
PLATELET # BLD AUTO: 160 K/UL (ref 150–450)
PMV BLD AUTO: 12.8 FL (ref 9.2–12.9)
RBC # BLD AUTO: 4.11 M/UL (ref 4–5.4)
TACROLIMUS BLD-MCNC: 4.5 NG/ML (ref 5–15)
WBC # BLD AUTO: 7.73 K/UL (ref 3.9–12.7)

## 2024-07-11 RX ORDER — TACROLIMUS 1 MG/1
6 CAPSULE ORAL EVERY 12 HOURS
Qty: 360 CAPSULE | Refills: 11 | Status: SHIPPED | OUTPATIENT
Start: 2024-07-11

## 2024-07-11 NOTE — TELEPHONE ENCOUNTER
Received written order from Dr. Odell.  Messaged pt and instructed to increase Prograf dose to 6 mg twice a day and repeat a level next Thursday.  Asked pt to contact us with any questions.     ----- Message from Jesus Odell Jr., MD sent at 7/11/2024 12:30 PM CDT -----  Tac low. If not on ira, please increase dose from 6/5 to 6/6 and repeat in 7-10 days

## 2024-07-16 ENCOUNTER — TELEPHONE (OUTPATIENT)
Dept: BARIATRICS | Facility: CLINIC | Age: 34
End: 2024-07-16

## 2024-07-16 ENCOUNTER — CLINICAL SUPPORT (OUTPATIENT)
Dept: BARIATRICS | Facility: CLINIC | Age: 34
End: 2024-07-16
Payer: MEDICARE

## 2024-07-16 ENCOUNTER — OFFICE VISIT (OUTPATIENT)
Dept: BARIATRICS | Facility: CLINIC | Age: 34
End: 2024-07-16
Payer: MEDICARE

## 2024-07-16 VITALS
DIASTOLIC BLOOD PRESSURE: 84 MMHG | OXYGEN SATURATION: 99 % | SYSTOLIC BLOOD PRESSURE: 122 MMHG | BODY MASS INDEX: 37.14 KG/M2 | HEART RATE: 69 BPM | WEIGHT: 230.13 LBS

## 2024-07-16 DIAGNOSIS — Z71.3 ENCOUNTER FOR WEIGHT LOSS COUNSELING: ICD-10-CM

## 2024-07-16 DIAGNOSIS — E66.9 OBESITY (BMI 30-39.9): ICD-10-CM

## 2024-07-16 DIAGNOSIS — I10 ESSENTIAL HYPERTENSION: ICD-10-CM

## 2024-07-16 DIAGNOSIS — Z94.0 DECEASED-DONOR KIDNEY TRANSPLANT: ICD-10-CM

## 2024-07-16 DIAGNOSIS — E66.01 CLASS 2 SEVERE OBESITY DUE TO EXCESS CALORIES WITH SERIOUS COMORBIDITY AND BODY MASS INDEX (BMI) OF 37.0 TO 37.9 IN ADULT: Primary | ICD-10-CM

## 2024-07-16 DIAGNOSIS — Z71.3 DIETARY COUNSELING: Primary | ICD-10-CM

## 2024-07-16 PROCEDURE — 3074F SYST BP LT 130 MM HG: CPT | Mod: HCNC,CPTII,S$GLB, | Performed by: STUDENT IN AN ORGANIZED HEALTH CARE EDUCATION/TRAINING PROGRAM

## 2024-07-16 PROCEDURE — 3008F BODY MASS INDEX DOCD: CPT | Mod: HCNC,CPTII,S$GLB, | Performed by: STUDENT IN AN ORGANIZED HEALTH CARE EDUCATION/TRAINING PROGRAM

## 2024-07-16 PROCEDURE — 3079F DIAST BP 80-89 MM HG: CPT | Mod: HCNC,CPTII,S$GLB, | Performed by: STUDENT IN AN ORGANIZED HEALTH CARE EDUCATION/TRAINING PROGRAM

## 2024-07-16 PROCEDURE — 99213 OFFICE O/P EST LOW 20 MIN: CPT | Mod: HCNC,S$GLB,, | Performed by: STUDENT IN AN ORGANIZED HEALTH CARE EDUCATION/TRAINING PROGRAM

## 2024-07-16 PROCEDURE — 97803 MED NUTRITION INDIV SUBSEQ: CPT | Mod: HCNC,GZ,S$GLB, | Performed by: DIETITIAN, REGISTERED

## 2024-07-16 PROCEDURE — 3066F NEPHROPATHY DOC TX: CPT | Mod: HCNC,CPTII,S$GLB, | Performed by: STUDENT IN AN ORGANIZED HEALTH CARE EDUCATION/TRAINING PROGRAM

## 2024-07-16 PROCEDURE — 1159F MED LIST DOCD IN RCRD: CPT | Mod: HCNC,CPTII,S$GLB, | Performed by: STUDENT IN AN ORGANIZED HEALTH CARE EDUCATION/TRAINING PROGRAM

## 2024-07-16 PROCEDURE — 99999 PR PBB SHADOW E&M-EST. PATIENT-LVL III: CPT | Mod: PBBFAC,HCNC,, | Performed by: STUDENT IN AN ORGANIZED HEALTH CARE EDUCATION/TRAINING PROGRAM

## 2024-07-16 PROCEDURE — 1160F RVW MEDS BY RX/DR IN RCRD: CPT | Mod: HCNC,CPTII,S$GLB, | Performed by: STUDENT IN AN ORGANIZED HEALTH CARE EDUCATION/TRAINING PROGRAM

## 2024-07-16 NOTE — PROGRESS NOTES
Kidney Post-Transplant Assessment    Referring Physician: Andres Hoyt  Current Nephrologist: Eduard Vance    ORGAN: RIGHT KIDNEY  Donor Type: donation after brain death  PHS Increased Risk: no  Cold Ischemia: 1,259 mins  Induction Medications: thymoglobulin    Subjective:   The patient location is: [place]  The chief complaint leading to consultation is: Kidney Post-Transplant Assessment    Visit type: audiovisual    Face to Face time with patient:   30 minutes of total time spent on the encounter, which includes face to face time and non-face to face time preparing to see the patient (eg, review of tests), Obtaining and/or reviewing separately obtained history, Documenting clinical information in the electronic or other health record, Independently interpreting results (not separately reported) and communicating results to the patient/family/caregiver, or Care coordination (not separately reported).     Each patient to whom he or she provides medical services by telemedicine is:  (1) informed of the relationship between the physician and patient and the respective role of any other health care provider with respect to management of the patient; and (2) notified that he or she may decline to receive medical services by telemedicine and may withdraw from such care at any time.      CC:  Reassessment of renal allograft function and management of chronic immunosuppression.    HPI:  Ms. Schwartz is a 33 y.o. year old Black or  female with history of ESRD secondary to FSGS who was on HD 10/2016 until she received a donation after brain death kidney transplant on 1/2/23 (Thymo induction, CIT ~21 hours, CMV -/+). Surgery without complication. She has CKD stage 2 - GFR 60-89 and her baseline creatinine is between 1.0-1.2. She takes mycophenolate mofetil, prednisone, and tacrolimus for maintenance immunosuppression.     Established with general nephrology Dr. Castillo.    Following with bariatrics,  planning for gastric sleeve later this year. Would have to be on liquid diet for 2 weeks prior. Has been working hard on her diet and exercise.    Reports no problems with urination. BP at goal, no peripheral edema. Tolerating IS without issues, no diarrhea or vomiting. 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    Current Outpatient Medications   Medication Sig Dispense Refill    acetaminophen (TYLENOL) 325 MG tablet Take 1 tablet (325 mg total) by mouth every 6 (six) hours as needed for Pain.  0    albuterol (PROVENTIL/VENTOLIN HFA) 90 mcg/actuation inhaler Inhale 1-2 puffs into the lungs every 6 (six) hours as needed for Wheezing (cough). 8.5 g 1    ketoconazole (NIZORAL) 200 mg Tab Take 0.5 tablets (100 mg total) by mouth once daily. 15 tablet 11    magnesium oxide (MAG-OX) 400 mg (241.3 mg magnesium) tablet Take 2 tablets (800 mg total) by mouth 2 (two) times daily. 60 tablet 11    metoprolol tartrate (LOPRESSOR) 50 MG tablet Take 2 tablets (100 mg total) by mouth 2 (two) times daily. 360 tablet 3    multivitamin (THERAGRAN) per tablet Take 1 tablet by mouth every morning.      mycophenolate (CELLCEPT) 250 mg Cap Take 4 capsules (1,000 mg total) by mouth 2 (two) times daily. 240 capsule 11    pantoprazole (PROTONIX) 40 MG tablet Take 1 tablet (40 mg total) by mouth once daily. 30 tablet 5    predniSONE (DELTASONE) 5 MG tablet Take 1 tablet (5 mg total) by mouth once daily. 90 tablet 3    sodium bicarbonate 650 MG tablet Take 1 tablet (650 mg total) by mouth 2 (two) times daily. 60 tablet 11    tacrolimus (PROGRAF) 1 MG Cap Take 6 capsules (6 mg total) by mouth every 12 (twelve) hours. 360 capsule 11     No current facility-administered medications for this visit.       Past Medical History:   Diagnosis Date    Allergy     Anemia     Anxiety     Breast feeding status of mother 1/17/2020    Brittle bones     ESRD (end stage renal disease)     M/W/F    General anesthetics causing adverse effect in  "therapeutic use     pt states "im a light weight"    Hypertension     Insomnia     PSG revealed no CHRYSTAL, but likely psychogenic etiology (anxiety)    RLS (restless legs syndrome)        Review of Systems   Constitutional:  Positive for fatigue. Negative for activity change and fever.   Eyes:  Negative for visual disturbance.   Respiratory:  Negative for shortness of breath.    Cardiovascular:  Negative for chest pain and leg swelling.   Gastrointestinal:  Negative for constipation, diarrhea and nausea.   Genitourinary:  Negative for difficulty urinating, frequency and hematuria.   Musculoskeletal:  Negative for arthralgias and myalgias.   Skin:  Negative for wound.   Allergic/Immunologic: Positive for immunocompromised state.   Neurological:  Negative for weakness and numbness.   Psychiatric/Behavioral:  Negative for sleep disturbance. The patient is not nervous/anxious.        Objective:   There were no vitals taken for this visit.body mass index is unknown because there is no height or weight on file.    Physical Exam-VIRTUAL VISIT    Labs:  Lab Results   Component Value Date    WBC 7.73 07/10/2024    HGB 13.2 07/10/2024    HCT 42.3 07/10/2024     07/10/2024    K 3.9 07/10/2024     07/10/2024    CO2 23 07/10/2024    BUN 19 07/10/2024    CREATININE 0.9 07/10/2024    EGFRNONAA 3 (A) 03/28/2022    CALCIUM 10.2 07/10/2024    PHOS 3.0 07/10/2024    MG 1.8 12/14/2023    ALBUMIN 4.0 07/10/2024    AST 14 10/17/2023    ALT 16 10/17/2023    UTPCR Unable to calculate 07/10/2024    .2 (H) 02/26/2024    TACROLIMUS 4.5 (L) 07/10/2024     Labs were reviewed with the patient    Assessment:     1. S/P kidney transplant    2. FSGS (focal segmental glomerulosclerosis)    3. CKD (chronic kidney disease), stage II    4. Immunosuppressive management encounter following kidney transplant    5. Essential hypertension    6. At risk for opportunistic infections      Plan:   Continue follow up with general nephrology for " CKD care, no need for IS refills today  Repeating labs tomorrow  Planning for bariatric surgery later this year, stressed the importance of hydration post surgery. Will adjust medications when the time comes to switch to liquid diet        1. Immunosuppression: Prograf trough 4.5, which is SUBtherapeutic.  Continue Prograf 6/6 (dose increased 7/11/2024), Ketoconazole 100 mg QD to augment prograf levels, MMF 1000 mg BID, and Prednisone 5 mg QD. Will continue to monitor for drug toxicities    2. Allograft Function: CKD II. Continue good oral hydration.   - ESRD secondary to FSGS on HD 10/2016 until she received a donation after brain death kidney transplant on 1/2/23 (Thymo induction CIT ~21 hours, CMV -/+).  - baseline creatinine is between 1.0-1.2.  Lab Results   Component Value Date    CREATININE 0.9 07/10/2024       Latest Reference Range & Units 1yr 6mo,  Kidney-Post 2 Year  07/10/24   eGFR >60 mL/min/1.73 m^2 >60.0         3. Hypertension management: advise low salt diet and home BP monitoring    lopressor 100 mg BID    4. Metabolic Bone Disease/Secondary Hyperparathyroidism:stable  Lab Results   Component Value Date    .2 (H) 02/26/2024    CALCIUM 10.2 07/10/2024    PHOS 3.0 07/10/2024           5. Electrolytes:  Will monitor /guidelines  Sodium bicarb 650 mg BID  Lab Results   Component Value Date     07/10/2024    K 3.9 07/10/2024     07/10/2024    CO2 23 07/10/2024       6. Anemia: stable, no need for intervention  Lab Results   Component Value Date    WBC 7.73 07/10/2024    HGB 13.2 07/10/2024    HCT 42.3 07/10/2024     (H) 07/10/2024     07/10/2024       7. Proteinuria: continue p/c ratio as per FSGS guidelines   UPC 7/10/2024: unable to calculate     8. BK virus infection screening:  will continue to monitor per guidelines  BK PCR 7/10/2024: not detected     9. Patient safety education regarding immunosuppression including prophylaxis posttransplant for CMV, PCP :  Education provided about vaccination and prevention of infections     PCP ppx: Bactrim until 7/1/23-completed  CMV ppx: Valcyte until 4/2/23-completed           Follow-up:   Clinic: return to transplant clinic weekly for the first month after transplant; every 2 weeks during months 2-3; then at 6-, 9-, 12-, 18-, 24-, and 36- months post-transplant to reassess for complications from immunosuppression toxicity and monitor for rejection.  Annually thereafter.    Labs: since patient remains at high risk for rejection and drug-related complications that warrant close monitoring, labs will be ordered as follows: continue twice weekly CBC, renal panel, and drug level for first month; then same labs once weekly through 3rd month post-transplant.  Urine for UA and protein/creatinine ratio monthly.  Serum BK - PCR at 1-, 3-, 6-, 9-, 12-, 18-, 24-, 36-, 48-, and 60 months post-transplant.  Hepatic panel at 1-, 2-, 3-, 6-, 9-, 12-, 18-, 24-, and 36- months post-transplant.    Patricia Caceres, NP

## 2024-07-16 NOTE — PROGRESS NOTES
Subjective     Patient ID: Lisseth Schwartz is a 33 y.o. female.    Chief Complaint: Follow-up, Obesity, and Weight Check    Patient presents for treatment of obesity.   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)  Post transplant was down to 180 lbs    Co-morbidities   HTN  GERD  Depression  Papilledema    Current Physical Activity  Gym about 3x/week - cardio, resistance, sauna    Current Eating Habits  Breakfast - avocado toast, eggs, turkey may; coffee  Lunch - fried okra, salmon, grilled shrimp  Dinner - baked chicken, pork chops, salad, rice, vegetables (fresh or frozen)  Snacks - not a usual snacker, occasional cookie  Beverages - water, lemonade, tea, coffee (with powdered cream and sugar)    Not eating pasta, rice, or bread  Using protein shakes as coffee creamer    Not exercising as consistently as she was previously    Medical Weight Loss  5/14/2024: 224.6 lbs, BMI 36.3, BFP 45%, .2 lbs, SMM 69 lbs, BMR 1580 kcal  7/16/2024: 230.1 lbs, BMI 37.2, BFP 46%,  lbs, SMM 69.2 lbs, BMR 1586 kcal        Review of Systems   Constitutional:  Negative for chills and fever.   Respiratory:  Negative for shortness of breath.    Cardiovascular:  Negative for chest pain and palpitations.   Gastrointestinal:  Negative for abdominal pain, nausea and vomiting.   Neurological:  Negative for dizziness and light-headedness.   Psychiatric/Behavioral:  The patient is not nervous/anxious.           Objective    Latest Reference Range & Units 02/26/24 16:08   WBC 3.90 - 12.70 K/uL 8.35   RBC 4.00 - 5.40 M/uL 3.97 (L)   Hemoglobin 12.0 - 16.0 g/dL 12.5   Hematocrit 37.0 - 48.5 % 38.1   MCV 82 - 98 fL 96   MCH 27.0 - 31.0 pg 31.5 (H)   MCHC 32.0 - 36.0 g/dL 32.8   RDW 11.5 - 14.5 % 13.4   Platelet Count 150 - 450 K/uL 244   MPV 9.2 - 12.9 fL 12.5   Gran % 38.0 - 73.0 % 84.2 (H)   Lymph % 18.0 - 48.0 % 8.4 (L)   Mono % 4.0 -  15.0 % 6.6   Eos % 0.0 - 8.0 % 0.2   Basophil % 0.0 - 1.9 % 0.4   Immature Granulocytes 0.0 - 0.5 % 0.2   Gran # (ANC) 1.8 - 7.7 K/uL 7.0   Lymph # 1.0 - 4.8 K/uL 0.7 (L)   Mono # 0.3 - 1.0 K/uL 0.6   Eos # 0.0 - 0.5 K/uL 0.0   Baso # 0.00 - 0.20 K/uL 0.03   Immature Grans (Abs) 0.00 - 0.04 K/uL 0.02   nRBC 0 /100 WBC 0   Differential Method  Automated   Sodium 136 - 145 mmol/L 140   Potassium 3.5 - 5.1 mmol/L 4.4   Chloride 95 - 110 mmol/L 107   CO2 23 - 29 mmol/L 24   Anion Gap 8 - 16 mmol/L 9   BUN 6 - 20 mg/dL 19   Creatinine 0.5 - 1.4 mg/dL 1.1   eGFR >60 mL/min/1.73 m^2 >60.0   Glucose 70 - 110 mg/dL 116 (H)   Calcium 8.7 - 10.5 mg/dL 10.1   (L): Data is abnormally low  (H): Data is abnormally high    Vitals:    24 1126   BP: 122/84   Pulse: 69         Physical Exam  Vitals reviewed.   Constitutional:       General: She is not in acute distress.     Appearance: Normal appearance. She is obese. She is not ill-appearing, toxic-appearing or diaphoretic.   HENT:      Head: Normocephalic and atraumatic.   Cardiovascular:      Rate and Rhythm: Normal rate.   Pulmonary:      Effort: Pulmonary effort is normal. No respiratory distress.   Skin:     General: Skin is warm and dry.   Neurological:      Mental Status: She is alert and oriented to person, place, and time.            Assessment and Plan     1. Class 2 severe obesity due to excess calories with serious comorbidity and body mass index (BMI) of 37.0 to 37.9 in adult    2. Essential hypertension  Overview:  On nifedipine, metoprolol, hydralazine       3. -donor kidney transplant  Overview:  ESKD from FSGS  DBDKT 23 KDPI 27 CIT 21 cPRA 50-81 CW DSA induced with thymo. Post op course without issue except for tremors from tac that improved with adding keto and reducing dose  sCr stalled around 2 and repeated kidney US show persistence of collection and increased velocites      4. Encounter for weight loss counseling        - Continue bariatric  surgery work up    - No weight loss medication options.     - Log all food and beverage intake with a daily calorie goal of 3977-9851 calories per day, 30 grams of protein per meal ( grams of protein daily)    - Moderate intensity aerobic exercise for 150 minutes per week plus resistance/strength exercises 2-3x/week

## 2024-07-16 NOTE — TELEPHONE ENCOUNTER
----- Message from Geetha Benton sent at 7/16/2024  9:31 AM CDT -----  Regarding: running late  Contact: [phone]  Pt calling in requesting call back regarding apt pt is still  wanting to been seen pt had a blow out please call to discuss Further

## 2024-07-16 NOTE — PATIENT INSTRUCTIONS
NUTRITION    Before & After  BARIATRIC SURGERY         [facility]  Surgical Weight Loss Program            BARIATRIC CLINIC  [address] [facility], [address] [place] [address]  Telephone: [phone]; Fax: [phone]    Bariatric Dietitians    India Villaseñor    Support Group Meetings  2nd Tuesday of the month 5:30 pm - 6:30 pm via Zoom. Scan QR code below to join.                I. Preparing for Bariatric Surgery    Bariatric surgery is a great tool in helping you lose weight. Your surgeon, advance practice providers, dietitians, nurses, and medical assistants will assist you in preparing for surgery. You are the most important person in making the weight loss successful - by following the nutrition plans before and after surgery, following up with post-operative visits, and establishing lifelong healthy habits.      The Bariatric Diet    What is a Bariatric Diet and Why Should I Follow It?  The bariatric diet is high protein, low-fat, low carbohydrate diet, and reduced calories.  In other words, it prioritizes lean protein, followed by non-starchy vegetables, fruit, and whole grains. Since the stomach is smaller after bariatric surgery - about the size of an egg - it is important to eat nutrient-dense foods. Adequate nutrition helps in the healing of your incisions, preventing gastric discomfort, and in maintaining your nutritional health. Otherwise, you could develop nutrient deficiencies, which may consequently affect your health AND you may not reach the maximal amount of weight loss, or the rate of weight loss may slow down.    Why Protein?  Protein is a very important macronutrient. (The other two macronutrients are carbohydrates and fats). It is in our muscle, organs, blood, bone, skin, hair, and many other parts of the body. Sources of protein include meat, poultry, fish, seafood, beans, nuts, seeds, and dairy. Protein is especially important after bariatric  surgery. Why? Studies have shown that higher protein diets along with calorie restriction can lead to greater weight loss, fat loss, and preservation of lean mass than other diets. Protein can help you feel full and more satisfied than carbohydrates, so you eat fewer calories.     What About Carbohydrates?  Carbohydrates provide fuel for our bodies. There are three types of carbohydrates: sugars, starches (long chains of sugar molecules), and fiber. Your body breaks down sugars and starches into glucose (blood sugar) to provide energy for your cells, tissues, and organs. Sources of carbohydrates include fruits, vegetables, and grains. While preparing for bariatric surgery, we emphasize limiting starchy carbohydrates such as bread, cereals, crackers, pasta, rice, corn, peas, and potatoes and eating non-starchy vegetables, fruit, and low-fat dairy.     What About Fat?  Our bodies need a small amount of fat for absorbing certain vitamins, insulating our bodies, and cushioning our organs. Before and after bariatric surgery, you will want to avoid adding extra calories from fat. That means eating lean proteins, baking, grilling, steaming, boiling, and air frying foods, and using cooking sprays instead of oil. It also means avoiding fatty meats, butter, margarine, lard, shortening, animal fat (e.g. may grease), and foods made with hydrogenated oil. These foods include packaged/highly processed foods, baked goods, snack foods, creamers, and fried foods.    To prepare for bariatric surgery, you will need to start following a high protein, low-fat, and low carbohydrate diet NOW. Below are 10 recommendations to help you adapt to the bariatric lifestyle, be ready for life after surgery, and be successful in your health improvement journey. Aim to consume 80 g - 120 g protein and 1200 - 1500 calories per day. That breaks down to 20 g - 30 g protein per meal and 10 g - 15 g protein per snack. Note: This is not a keto  diet.    Include protein/lean meats and non-starchy vegetables in all meals. Fruit can be used as a snack or dessert. Canned fruit in its own juice and water are ok. Avoid fruit in syrup.  Cut out high fat foods like beef/pork may and sausages, gravies, fried foods, butter, margarine, whole milk, cream, half & half, and ice cream.   Begin cutting back on starchy vegetables (all potatoes, corn and peas) and grains (all breads pasta, rice, cereal, grits, oatmeal, crackers). Cut out sugary drinks (sodas, energy drinks, sports drinks, sweet tea, lemonade, and fruit juice).   Switch to smaller/salad plates (less than 8 inches across) to help with portion control. Begin limiting carbonated beverages (sodas, seltzer) and caffeine.   Begin replacing one meal with a high protein shake with at least 20 grams of protein and under 4 grams of sugar. Do some type of physical activity at least 3 times a week for 30 minutes. It does not have to be 30 minutes all at once!   Cut out junk foods (baked goods, candies, chips) and eat high protein snacks instead like nuts, cheese, Greek yogurt, and protein bars. Practice taking small bites of food and sips; Practice not drinking while eating - drink liquids 30 mins before and/or after eating.     High Protein Sources  Poultry: skinless chicken or turkey (light/dark), ground (90% lean)  Fish/Shellfish: catfish, clams, crab, crawfish, lobster, salmon, shrimp, squid, tilapia, trout, tuna  Beef: tenderloin, roast (rib, ariadne, rump), steak (sirloin, round, cubed, T-bone, flank), ground (90% lean)  Pork: lean ham, Greenlandic may, tenderloin, center loin chop, ground (90% lean)  Game: venison, rabbit, duck (skin removed)  Deli meats: turkey, roast beef, ham, chicken  Dairy: low fat, part skim, and fat free cheese (sliced or shredded) mozzarella string cheese, cottage cheese, Greek or low-fat yogurt (less than 10g sugar per serving)  Eggs: Any way you like them!  Beans and Lentils: red,  white, black, lima, black-eyed-peas, chickpeas, hummus  Soy: tofu, edamame  Nuts and Seeds: unsalted, ¼ cup or 2 tbsp nut/seed butter per day  Protein supplements: Protein shakes, drinks, powders, bars, protein chips, and protein soup    How to Prepare Proteins  Remove all visible fat before and after cooking  Bake, broil, boil, roast, grill, air galvan, slow cook, pressure cook (Instant pot)  Sauté in Bre, I Can't Believe It's Not Butter, or other cooking spray  NO BREADING or DEEP FRYING      Non-Starchy Vegetables  This is a short list of the variety of vegetables you can eat on a bariatric diet.    Asparagus  Beets  Beet greens  Broccoli  [place] Sprouts  Cabbage  Carrots  Cauliflower (florets, mashed, riced)  Celery Cucumber  Eggplant  Green Beans  Kale  Lettuce/lettuce mixes  Mushrooms  Mustard greens  Okra  Onions  Peppers Snow peas  Spaghetti Squash  Spinach  Squash  Swiss chard  Tomatoes  Zucchini  Zucchini noodles     How to Prepare Non-Starchy Vegetables  Bake, boil, broil, grill, air-galvan, microwave, raw, roast, steam, stew  Sauté in Bre, I Can't Believe It's Not Butter, or other cooking spray  NO BREADING or DEEP FRYING  Do not add cream or cheese sauce or butter  Use lemon, vinegar, herbs, and spices for flavor without added calories    Choosing Fluids  All fluids before and after surgery should be sugar-free, non-carbonated, and low calorie - no more than 15 calories per serving. Check the nutrition facts on the back of the bottle/package. Not all diet or low calorie drinks will meet the 15 calorie limit.    Plain water - infuse with lemon/lime/orange, berries, mint leaves, cucumber slices  Flavored ruiz - Propel Zero, Nestle Pure Life Splash, Aquafina Flavor Splash, Hint Water        Coffee or tea - limit to one 12 oz caffeinated drink per day  Diet iced teas - [place] Diet Green Tea, Diet Snapple Tea, José diet green tea  Sugar-free (SF) powders and drops - Crystal Light, Mary Torres's  Light, SF Alexys-Aid, SF Hawaiian Punch, Dasani Drops, Great Value, Market Pantry, etc.   Sugar-free sports drinks - Powerade Zero, Gatorade Zero, Gatorade G-Fit, Gatorade Zero with 10g protein  Diet juices - diet cranberry juices, Diet V-8 Splash, diet lemonade  Sugar-free Jell-O and sugar-free popsicles, under 15 calories per serving  Low sodium broth, low sodium bone broth    *Try to drink at least 64 oz of fluid per day. That's about 4 bottles*            Low Calorie/Sugar-Free Drinks      Plain water   Infuse with fruit as desired   Non-carbonated flavored water   Unsweetened tea  (no green tea)     Sugar-free drink powders   Sugar-free drink drops   Diet juices     Sugar-free Jell-o   Sugar-free popsicles   Low sodium broths*   * Try bone broth, which has 4 g - 9 g of protein per cup. Also, you can add dry seasonings to broths if you find them bland        High Protein Drinks, Powders, Snacks, and Soups    On the next few pages are examples of acceptable, commonly used protein drinks, powders, snacks, and soups to start including in your diet. There are many more available in stores and online. If you are not sure if a product is okay to use, ask your dietitian.    Ready-to-Drink (RTD) Protein Shakes  When choosing a protein shake, please read the label. Make sure the protein shake has at least 20 grams of protein, no more than 4 grams of sugar per serving, and 100% whey protein (not collagen) or pea or soy protein if plant-based.  Lactose free protein shakes are marked with an *      Boost Glucose Control Max  30 g protein   Ensure Max Protein  30 g protein    Equate High Performance  30 g protein     TripGems Core Power*  26 g protein   TripGems Nutrition Plan*  30 g protein   GNC Total Lean*  20 g protein     Muscle Milk*  30 g protein   Premier Protein  30 g protein   Quest  30 g protein       Plant-Based Protein Shakes (Lactose-free)      Evolve*  20 g protein   Owyn*  20 g protein   Purely Inspired*  20 g  protein     Smoothies      PJ's Coffee  Protein Velvet Ice  21 g protein    Smoothie King Kinjaldibridget (without fruit)  20 oz, 45 g protein      Ready-to-Drink Protein Rose      Isopure Zero Carb  32 g protein    Premier Clear  20 g protein   Protein 2.0  20 g protein       Protein Powders  Protein powders can be mixed with water, fat-free/skim or 1% milk, or plain, unsweetened plant-based milks like almond and soy milk. They can also be mixed with sugar-free flavored drinks. Lactose-free protein powders are marked with an *. Remember:    Choose powders that have fewer than 4 grams of sugar per serving  If you are tracking, count the calories and protein from the liquids that you add to your protein powders        Body Fortress  30 g protein       25 g protein   ISOPURE+  20-25 g protein     Raul Logan*+  24 g protein   Muscle Milk*  32 g protein Orgain*  21 g protein     Premier Protein+  30 g protein   Pure Protein  25 g protein   Unjury+  21 g protein   + Available flavored and unflavored    Protein Bars and Protein Chips  Protein bars and protein chips can be a great way to add a tasty snack to your day and meet your protein needs. Try to purchase protein bars with at least 15 g of protein and no more than 4g of sugar. Below are some protein bars that meet these guidelines. Always check the nutrition facts when buying bars. Calorie, protein, and sugar content can change with different flavors and sizes.    Atkins Protein Meal Bar    1.76 oz  190-250 calories  13-17 g protein  1-3 g sugar Tao Nu Fit Snack Protein Bar    170-190 calories  16 g protein   4 g sugar Barebells Protein Bar    1.9 oz  200 calories  20 g protein  1 g sugar Fit Crunch    1.62 oz  190-200 calories  16 g protein   3 g sugar         Fulfill  1.41 oz  160 calories  15 g protein  1 g sugar No Cow   2.12 oz  190-200 calories  20-22g protein  1 g sugar  One Bar    2.14 oz  220 calories  20 g protein  1 g sugar  Pure Protein Bar    1.76  oz  190-200 calories  20-21 g protein  2-3 g sugar          Quest Hero/Crispy    1.94 oz  15 g protein  1 g sugar Quest Protein Bar    2.12 oz  180-200 calories  15-21 g protein  1 g sugar Think!    2.1 oz  240 calories  20g protein  0 g sugar          Atkins Chips    1.1 oz  140 calories  13 g protein One Puffs    1.05 oz  150 calories  14 g protein Quest Chips    1.1 oz  150 calories  18 g protein        High Protein Soups  High protein soups are available online (see Resources section). If you have hypertension, look for soups with sodium under 20% per serving.      Bariatric Pal  15 g protein   Unjury  21 g protein   Celebrate  15 g protein       Other Tips in Preparing for Bariatric Surgery (see Appendix for detailed information)    Stock your pantry with bariatric-friendly foods   Purge your pantry of high fat, high sugar, and junk foods and drinks   Learn to measure your portion sizes   Read nutrition facts on food packaging   Track your food     Goals in Preparing for Bariatric Surgery     Eating between 80 g - 120 g protein and 5755-6965 calories every day. That breaks down to 20 g - 30 g of protein per meal and 10 g-15 g of protein per snack  Drinking 64 fluid ounces from water and/or sugar-free, non-carbonated drinks every day  Including protein at every meal and snack  Finding a high protein shake/drink/powder to use during the pre- and post-operative liquid diet  Getting into the habit of reading nutrition facts  Getting physical activity at least 90 minutes per week  Shifting how your plate looks with most of it as protein, followed by non-starchy vegetables and fruit     * YOU DO NOT NEED TO LOSE WEIGHT PRIOR TO SURGERY *  However, you must show progress towards these nutrition goals to be approved for surgery.        II. Bariatric Nutrition Core Points and Contract Agreement    Eat a protein at all meals and snacks   Limit starchy carbohydrates (bread, rice, pasta, potatoes, corn, grits, oatmeal,  etc.)  Eat 4-6 small meals per day. Protein drinks should be used for 1-2 of the small meals.  Measure portion sizes. Small plates, bowls and cups make smaller portions look bigger.  Limit eating out; make better choices when eating out (low fat/low carb)  Include fruits and vegetables in the diet DAILY  Avoid/Limit desserts/candy   Low-fat diet (Baked, broiled, grilled, and boiled instead of fried, sautéed, creamed)  Increase activity (walking, swimming, exercise videos)  Limit sugary, caffeinated, and carbonated beverages  Aim for 64 oz. water per day  Limit alcohol  Practice chewing foods thoroughly.  Practice sipping beverages--no chugging or gulping.  No Straws.  NO LIQUIDS 30 MIN. BEFORE, DURING, AND 30 MIN. AFTER MEALS.  Keep food logs and bring to each visit for review AVOID/LIMIT THESE FOODS   High in Fat/Sugar:   High fat milk (whole, 2%)  Butter, margarine, oil (instead use Bre sprays or I Can't Believe It's Not Butter Spray)   Mayonnaise, sour cream, cream cheese, salad dressing (may use low fat versions of these items)  Ice Cream  Cakes, cookies, pies, desserts  Candy  Luncheon meats (bologna, salami, chopped ham)  Sausage, [place]  Gravy  Breaded and Fried Foods  Sugary drinks  Alcohol   Starchy Carbohydrates.    White and wheat Bread, muffins, bagels, English muffins, biscuits, buns, rolls, cornbread,  Rice, Pasta   Cereals (including grits, oatmeal)   Crackers, Pretzels, Chips, Granola  Corn, Popcorn, Peas, Quinoa  White Potatoes, Sweet potatoes  Flour and corn tortillas   Practice NOT DRINKING   Carbonated drinks  Using a straw          I have been educated on the above lifestyle and nutrition changes regarding weight loss surgery.  I understand and agree that following these guidelines will help me to lose weight and maintain my health long-term.    Patient Signature ______________________________________   Date ____________________    Dietitian Signature  _____________________________________    CONTACT INFORMATION  India Van, RD, LDN  Ting James, MS, RD, LDN, [facility]  Africa Kasi, MS, RDN, LDN    [facility] 2nd [address] [place] [address] PHONE: [phone] FAX: [phone]    *Send us a message anytime using your [facility] account*        III. Pre-Operative Liquid Protein Diet    Your surgeon and dietitian will have you start a high protein, low calorie, full liquid diet.   For BMI of 40 or more: you will do 2 weeks of the pre-op liquid diet  For BMI of below 40: you will do 1 week of the pre-op liquid diet unless instructed otherwise by the bariatric team.    On the liquid diet, your caloric intake will be about 600 - 800 calories per day, with a protein intake of  grams per day. This will help you lose weight, decrease the size of your abdomen, and shrink your liver. These changes help to decrease your risk of complications during surgery.      While on the liquid diet, remember:     Clear Liquids            Besides protein drinks, all liquids should be sugar-free, decaffeinated **, non-carbonated fluids, and under 15 calories per 8 oz/1 cup serving  Unlimited sugar free beverages are allowed on pre-op liquid diet  You should drink at least 64 ounces of clear, sugar-free fluids during your pre-operative liquid diet (unless instructed otherwise by your medical providers and/or bariatric team)  Fruit, fruit purees, fruit juices, yogurt, and puddings ARE NOT part of the liquid diet, either alone or mixed into liquids or protein drinks.  Approved low calorie clear liquids include:  Water  Flavored water  Sugar-free, non-carbonated, non-caffeinated drinks  Sugar-free powders and drops  Unsweetened tea  Diet juices  Sugar-free Jell-o  Sugar-free popsicles  Broths/bone broths  Gatorade Zero  Powerade Zero     Soups and Broths        Unlimited amounts of broths are allowed during the pre-op liquid  diet.  If your provider has you on a low sodium diet, please choose low sodium broths.  The only high protein soups allowed on the liquid diet are powdered soups from companies like Collaaj, AlertMe, Bariatric Pal, Unjury. Include protein from high protein soups into your daily protein totals.  Bone broth can be a good source of protein - it can have up to 15 g of protein per cup.  Canned and fresh soups from the grocery stores and restaurants (egg drop, broccoli & cheddar, carlos manuel broth), gumbo or red bean juice ARE NOT Allowed on the bariatric liquid diet.       Tea/Coffee     **Limit caffeinated tea or coffee to one 12 oz serving per day.    Do not drink green tea of any kind.  To lambert, use a sugar substitute like Splenda, stevia, etc. To lighten, use fat free or 1% milk, unsweetened almond or soy milk, or a sugar-free creamer. Watch the serving size of the milks or creamer! Remember, no more than 15 calories per serving. Or, try using a little protein shake!     Protein     Aim to get  grams of protein per day.  Choose protein shakes with at least 20g of protein, with no more than 4 grams of sugar per 8 oz serving and is 100% whey protein (not collagen protein).  If you prefer or require a plant-based option, please choose soy or pea protein shakes.   If you use protein powder instead of premade protein shakes, mix protein powder with water, skim or 1% milk, unsweetened soy, cashew, or almond milk according to directions on protein product label.   Do not add protein powder to other high protein drinks. Your body can only absorb about 35-40 g protein at a time.  Add flavor to protein drinks using sugar-free drink powders and drops, sugar-free syrups, and extracts. Do not add yogurt, honey, peanut butter, fruit, or vegetables to protein drinks.     Also remember:  Stop taking any vitamins for 1 week prior to surgery.  Stop herbal supplements including green tea and fish oils for 2 weeks prior to  surgery.        Tips to Thriving on the Liquid Diet  Make popsicles out of protein ruiz and protein shakes by pouring the protein drink/shake into a popsicle mold  If you don't have popsicle molds, use a small paper or plastic cup or an empty yogurt container  Pour drink/shake into cup/container  Insert a popsicle stick, spoon, or fork in the cup  Keep the stick in place by punching a hole through plastic wrap or tin foil and covering the cup  Make slushies or frosties out of protein drinks/shakes  Semi freeze protein drinks  Pour into , add ice, blend to desired consistency  Vary your flavor of protein drinks  Include broths and bone broths in your diet; Season broths with herbs and spices if you find them bland  Mix high protein soup with low sodium bone broth for added flavor and protein.          IV. Bariatric Vitamin and Mineral Needs    Bariatric surgery can change the way your body absorbs certain vitamins and minerals. With a smaller stomach, it is also harder for you to get all the nutrients you need through food. After bariatric surgery, it is essential for you to take the following vitamins and minerals for the rest of your life to avoid nutrient deficiency. These can be purchased in stores or online (see below).     The dietitian will call you one week after your surgery and discuss starting your recommended vitamins and minerals.      DO NOT START VITAMINS AND MINERALS UNTIL INSTRUCTED    You can purchase your vitamins and minerals locally (Beryllium, pharmacy, grocery, etc.) or on the websites below:    https://www.bariatricVeeda.com/ (use verification code [facility] for discount)  https://store.bariatricpal.com/collections/[email or web address]-vitamins (use discount code GIWJE759 for 20% off your first order)  [email or web address]/  https://www.[email or web address]/  https://[email or web address]/    B-1* (also called Thiamine) or Super B Complex with Thiamine   The following dosing applies to sleeve,  bypass, or NAVYA-S surgeries:    How Much Do I Need?  50 mg of B-1 (thiamine) per day  The larger the dose, the less often you take it:  Take 25 mg 2 x day; or take 50 mg 1 x day; or take 100 mg every 2 days; or take 250 mg 1 x week; or take 500 mg every 2 weeks    Options:   Dissolve on Tongue   Pill      * We recommend purchasing 250 mg of B-1 and 2500 mcg of B-12 and taking each once per week.      Multivitamin with Iron   DO NOT GET A MEN'S, 50+ OR SILVER MULTIVITAMIN. They do not iron.    How Many Multivitamins Do I Take?   The following dosing applies to sleeve, bypass, or NAVYA-S surgeries:  If taking a non-bariatric vitamin, take 2 times per day (one in the am and one in the pm)  If you are taking a bariatric vitamin (Barimelt, Bariatric Advantage, Bariatric Pal, etc.), you may only need to take 1 tablet per day.    How Much Iron do I need? Ensure you get at least the iron requirements as listed below:  Men and post-menopausal women:  take at least 18 mg of iron per day  Pre-menopausal women and patients with anemia: take at least 36 mg per day    Options:   Chewable   Pills   Dissolve on Tongue       Calcium Citrate with Vitamin D   *Take Calcium supplement 2 hours apart from multivitamin with iron*    How Much Do I need?  Your body can only absorb 500 - 600 mg of calcium at a time; Space out your daily requirements listed below:  For sleeve and bypass patients: take 0598-9195 mg per day  For NAVYA-S patients: take 8708-4810 mg per day    Options:   Soft Chew   Liquid   Dissolve on Tongue   Pill       B-12*    Look for sublingual or dissolve on the pill bottles    How Much Do I Need?  500 mcg per day; The larger the dose, the less often you take it.  500 mcg = 1 x day; 1000 mcg = every 2 days; 2500 mcg = 1 x week; 5000 mcg = every 2 weeks    Options:   Dissolve Under Tongue   Drops Under Tongue     * We recommend purchasing 250 mg of B-1 and 2500 mcg of B-12 and taking each once per week.    FOR NAVYA-S  PATIENTS ONLY- INCLUDE VITAMIN A SUPPLEMENT  Vitamin A    FOR NAVYA-S PATIENTS ONLY    How Much Do I Need?  10,000 IU/3,000 mcg per day    Options:   Dissolve on Tongue   Drops   Softgels             Post-Op Medication Instructions    Taking Vitamins, Minerals and Prescribed Medications  Continue all prescribed medications upon arrival home from hospital discharge as instructed per your prescribing providers or per discharge instructions from the hospital.  Specific, written instructions about your medicines will be given to you by your nurse upon hospital discharge.    Speak with your primary care doctor or the physician who prescribed your medications if you have any concerns regarding changes in your medications that are not prescribed by the bariatric team.      You may swallow pills smaller than 5 mm or the tip of the pencil eraser right after surgery.     DO NOT swallow pills larger than 5 mm (tip of pencil eraser) for 2 weeks after surgery.  If your medications are larger than 5 mm you will either need crushable, chewable, liquid, or capsule form for 2 weeks after surgery. Review your current medications with the prescribing provider and/or pharmacy to determine if medications are appropriate to crush, open or alter    Extended-release medications should never be altered and are meant to be taken whole.     If you are having a hard time with swallowing pills, split or crush them and try mixing them with unsweetened applesauce    If a change in these medications is needed, please consult with the provider that prescribes these medications so that they can prescribe an alternative form, dose, or medication    DO NOT take gummy multivitamins due to poor quality and sugar content    DO NOT take calcium citrate and iron within 2 hours of each other due to poor absorption    The dietitian will call you one week after your surgery and discuss starting your recommended vitamins and minerals at that time    Take one  vitamin/mineral/medication at a time, spaced 10-15 mins apart        V. Diet Progression after Bariatric Surgery    DIETARY  PHASE TIME FRAME  POST-SURGERY FOODS AND BEVERAGES   1 - LIQUID       Weeks 1 & 2    Day 1-7: 48 oz fluids. Add protein shake when able to drink 48 oz fluids    Day 8-14: 64 oz fluids, 60 g - 80 g protein Water, sugar-free, decaffeinated, non-carbonated beverages  Sugar free Jell-o and sugar-free popsicles, under 15 calories per serving  Skim, 1% milk, unsweetened almond or soy milk   Protein drinks and powders < 4 grams of sugar per serving, high protein soups   2 - PUREE       Weeks 3 & 4    Daily goals:   g protein  64 oz fluids   All Phase 1 liquids plus  Pureed lean meats, seafood, beans, and fruits and vegetables (without peel)  Soft scrambled egg  Nonfat Greek yogurt, low fat/fat free cheese   98% fat free cream soups  Sugar-free pudding   3 - SOFT     1 month after surgery    Daily goals:   g protein  64 oz fluids   All Phase 2 foods and liquids plus  Cooked, fork tender lean meats, fish and seafood  Lean deli meats  Eggs - scrambled, boiled and poached  Raw fruits and cooked vegetables - no peel   4 - SOLID Without Starchy Carbohydrates     2 months after surgery    Daily goals:   g protein  64 oz fluids  800-1000 calories All Phase 3 foods and liquids plus  Raw vegetables  Fruit with peel  Nuts and seeds  Dried meats/jerky and sticks  Protein bars < 4 grams of sugar   NO STARCHY CARBOHYDRATES (breads, pastas, rice, corn, peas, or any potatoes)   5 - REGULAR BARIATRIC   At goal weight    Daily goals   g protein  64 oz fluids   All Phase foods 4 plus up to ½ a serving of starchy carbohydrate (¼ cup of pasta, rice, corn, peas, potatoes or ½ slice of bread, bun,  tortilla per meal)       Phase 1: Bariatric High Protein Liquid Diet  Weeks 1 & 2 Post-Op    Dehydration is the #1 reason patients return to the hospital after being discharged. After surgery, your job is  to drink fluids and walk frequently throughout the day. Use the medicine cups and the fluid tracker you were given at your pre-op visit.    After bariatric surgery, patients need to take very small sips of fluid throughout the day to stay hydrated.  Remember to sip slowly.    Bariatric patients should NOT have alcohol, sugar or sugary drinks, carbonated beverages, caffeine or use a straw for drinking.     In the hospital and upon discharge, measure and document protein and fluid intake on the log sheet (Post Surgery Fluid and Protein Tracker) provided.  Set a reminder or timer to alert you to drink fluids.  The [employer] Ashley is a valuable tool and can assist with alerts.    After you leave the hospital, follow the nutrition discharge instructions below and reference the Bariatric Nutrition Guidebook for additional information.    Start sipping water and clear protein drinks (Premier clear, [id number], Isopure Clear, or protein powder with water).  Drink 1-2 ounces or 1-2 medicine cups (30 ml/medicine cup) of clear liquids every 15 minutes while awake and increase as tolerated.   Fluid/clear liquid intake should be a minimum of 48 ounces with a goal of 64 ounces. Increase the amount of fluids as tolerated.      Once you reach 48 ounces of water/clear protein drinks, you will advance to a full protein liquid diet. For the full protein liquid diet, either mix protein powder with skim (1%) milk, unsweetened soy, cashew, or almond milk, according to directions on protein product label, or you can drink premade protein shakes (you may dilute or add water to thin pre-made shakes if needed).     Continue full protein liquid diet for 2 weeks after surgery.  Further diet progression will be discussed at your 2-week post-op appointment- Do not progress unless instructed to do so by the Bariatric team.    A dietitian will call you 1 week post op to see how you are doing. At your 1-week dietitian phone call appointment, your  dietitian will discuss your vitamin regimen and start date. When you are cleared and instructed, follow the instructions on pages 21 regarding pill size, guidelines swallowing medications, and vitamin regimen.       Post-op Liquid Diet Goals       4-8 oz fluids every hour, minimum of 48 oz per day with goal of 64 oz  Add clear protein drinks and advance to the pre-made protein shakes as instructed once you reach 48 oz fluids daily.  Goal of 30 g - 40 g protein per day  Walk as tolerated        8 oz fluids every hour, goal of 64 oz fluids per day  Goal of 60 g - 80 g protein per day  Walk as tolerated       Physical Activity    If your doctor has cleared you for walking or bike riding before your surgery, you can continue this after surgery.  Do not to lift, push, or pull anything heavier than 10 lbs for the first 6 weeks after surgery.      A dietitian will call you at the end of Week 1 to see how you are doing and discuss vitamin regimen and start date. When you come to the clinic for your 2-week follow-up, a dietitian will discuss advancing your diet. Please bring your fluid tracker and your vitamins for review. If you have any questions about your diet or vitamins call the Bariatric Clinic [phone]          Taste and Tolerance Changes After Surgery    You may find that after having surgery, things don't taste the same to you. Drinks may taste too sweet, or water may taste metallic. You may find that flavors you liked before, you no longer enjoy. Here are some common post-op complaints and tips for handling them.    Water has a metallic taste  Add flavoring to water, such as a squeeze of lemon or lime, some fresh fruit (to infuse, not eat!) with crushed mint, or sugar-free powders and drops.     Muscle spasm in stomach after drinking fluids  Your stomach may be sensitive to the temperature of the fluid. If cold fluids cause muscle spasms, try drinking fluids at room temperature or warm. You may need to try  different temperatures until you find what works best. Remember to take small sips!    Drinks taste too sweet   Dilute drinks with water. Or, use less flavoring when mixing sugar-free drinks.    Protein shake causes diarrhea  Some patients may develop lactose sensitivity or intolerance after surgery. Try lactose-free protein shakes and powders such as Fairlife, Muscle Milk, or plant-based protein shakes and powders like Owyn and Evolve.    Protein shake tastes chalky, has an after-taste  Try mixing protein powder with your fluid of choice instead of drinking pre-made shakes. Or, mix unflavored protein powder or fruit-flavored protein powder into sugar-free drinks.     Hunger and Fullness Cues    After surgery, you may not experience feelings of hunger or fullness. Or, your body may use different signs to tell you it is hungry or full.    Hunger  You may not feel hunger or feel like eating. That does not mean you should skip meals. Your body still needs nutrients. Eat a high protein meal, snack, or drink a protein shake every 2-3 hours. You may experience other signs of hunger, like tiredness, or getting hangry. Learn what your new hunger cues are and pay attention to them.    Fullness  You may not feel fullness. That does not mean you should keep eating. Learn to visualize how much food is an appropriate amount, i.e. a palm-sized portion of protein. You may have other signs of fullness, such as nausea, burping, a runny nose (this has happened!), or pressure in your stomach, shoulders, or chest. Learn your new fullness cues and pay attention to them.      Phase 2: Bariatric High Protein Pureed Diet  Weeks 3 & 4    Two weeks after surgery, you may be ready to add pureed foods to your diet.  All food should be the consistency of baby food, or thinner.      Protein First   It is very important to pay attention to protein intake during this time. Meeting protein needs daily will help increase healing, decrease muscle  loss, and increase weight loss. Inadequate protein intake can cause delayed wound healing, hair loss, and muscle break down.    Always eat the foods with the highest protein first. Foods high in protein include lean meat, poultry, fish, seafood, milk, yogurt, cheese, eggs, beans  Eat 3-4 small meals per day (2-4 tbsp each), with protein supplements in between to meet protein needs  Lean meats and seafood can be pureed with a small amount of liquid, such as broth, in a  or  to baby food consistency. No lumps, bumps or stringy bits.    Follow the pureed diet for the next two weeks.      Daily Goals   grams of protein a day through protein supplements and food  64 oz of water or other sugar-free, non-carbonated drink    Tip:  Use a food journal or the [employer] irene to track how much you are eating and drinking     Foods and Drinks Allowed Portion size Protein (g)   Sugar-free, non-carbonated, non-caffeinated clear liquids As desired 0   Skim or 1% milk 8 oz 8   Greek yogurt (under 10 g total sugar) 5 oz 12-15   Lean meats, poultry, fish, seafood, pureed 1 oz 6-9   Beans (red, white, black, lima, vazquez, fat-free refried, hummus) and lentils, soft-cooked ¼ cup 4   Low-fat/fat-free cheese (cottage cheese, mozzarella string cheese, ricotta cheese, Laughing Cow, Baby Bell, cheddar, etc.) ¼ cup 7-8   Scrambled eggs or Egg Beaters 1 or ¼ cup 6   Edamame or tofu, mashed ¼ cup 5   98% fat-free cream soups (add unflavored protein powder if desired) ½ cup 1-2   Sugar-free pudding (add protein or pb powder if desired) ½ cup 0-1   Peanut butter powder* 2 Tbsp 5   * Peanut butter powder contains about 60 calories per serving. Regular peanut butter is 190 calories per serving. Peanut butter powder is sold in the same section as regular peanut butter.       Protein drinks will make up most of your protein for the day  Protein drinks should contain 20-30 g protein and no more than 4 grams of sugar per  serving  Always have protein when you eat  Sip fluids in between meals  If food feels stuck, do not try to push it down with fluids; walk around  *NO MASHED POTATOES, GRITS OR OATMEAL*    Bariatric Pureed Sample Menu  Below is a sample schedule and menu. Note how every hour there is an eating or drinking occasion with a break in between. Adjust this schedule according to your day.     Timing Item Protein (g) Calories   8-8:30am 1 egg or ¼ cup Egg Beaters 6 78   9-9:30am 1 cup water, or decaf coffee or tea 0 0   10-10:30am Protein drink 30 160   11-11:30am 2 tbsp low-fat cottage cheese, and 1 tbsp pureed peaches 3.5 20   12-12:30pm 1 cup water, or sugar-free lemonade  0 0   1-1:30pm 2 tbsp pureed chicken, and 1 tbsp pureed carrots  9 46   2-2:30pm 1 cup water, or sugar-free lemonade 0 0   3-3:30pm Protein drink 30 160   5-5:30pm 1 cup water  0 0   6-6:30pm 1 cup hi-protein creamy chicken soup (see Recipe) 14 120   7-7:30pm 1 cup water, or sugar-free fruit punch  0 0   8-8:30pm 1 cup water 0 0     RECIPE IDEAS for Bariatric Pureed Diet    Hi-Protein Creamy Chicken Soup  (10g protein per 1 cup serving)  Empty 1 can of 98% fat free cream of chicken soup into saucepan. In a bowl, blend 1 scoop of unflavored protein powder with 1 can of skim milk until smooth.  Add protein milk to saucepan and heat to warm. (Note: Do NOT boil. Protein powder may clump if heated too hot).     Hi-Protein Pudding:  (14g protein per ½ cup serving)  Add 2 scoops protein powder to 2 cups cold skim milk and mix well.  Stir in dry Jell-O Sugar-Free Instant Pudding mix.  Chill and Enjoy!    Tuna Mousse   (12g protein per ¼ cup serving) From Eating Well After Weight Loss Surgery.  In a  or , combine all ingredients and pulse until smooth.  2 6-ounce cans tuna packed in water, drained  2 tbsp low-fat mayonnaise  2 tbsp fat-free sour cream  2 tbsp fat-free cream cheese, softened  ½ cup shallots, finely chopped  1 tbsp lemon  juice  ¼ tsp ground pepper  ½ tsp celery seed    Chocolate Peanut Butter Mousse  (28g protein)  Mix 6 oz plain Greek yogurt with 4 tbsp chocolate PB2      Phase 3: Bariatric High Protein Soft Diet   One Month After Surgery    One month after surgery, your stomach may be healed enough to add soft foods to your diet.  Soft foods are those which can be easily mashed with a fork. This diet usually lasts for one month.      Daily Goals   g of protein per day through food and protein supplements   64 ounces of fluids per day from water, sugar-free, non-carbonated drinks, and sugar-free popsicles, and sugar-free Jell-O      No liquids with meals. Do no drink 30 minutes before meals and wait 30 minutes to 1 hour after meals to start drinking  Sip on fluids throughout the day  Chew foods slowly, at least 30 times. A meal should take 20-30 mins to eat  Eat 3-5 small meals per day, without any additional snacking (no grazing)  Stop eating as soon as you feel full  Avoid using sugar of any kind and foods made with sugar, which can cause dumping syndrome and slow down weight loss  Marinating meats with a low sugar marinade, adding low-fat salad dressing, or adding low calorie gravy (made from powder and water) can help meats to digest easier    Adding Vegetables and Fruits  As long as you are consuming at least 80 g total protein daily from combination of foods and protein drinks, you may start adding small bites of fruits and non-starchy vegetables to your meals.     Cooked, tender vegetables and ripe fruits without the peel are tolerated best. Non-starchy vegetables include:    Asparagus  Beets  Beet greens  Broccoli  [place] Sprouts  Cabbage  Carrots  Cauliflower (florets, mashed, riced)  Celery Cucumber  Eggplant  Green Beans  Kale  Lettuce/lettuce mixes  Mushrooms  Mustard greens  Okra  Onions  Peppers Snow peas  Spaghetti Squash  Spinach  Squash  Swiss chard  Tomatoes  Zucchini  Zucchini noodles           Foods to  Eat and Avoid One Month After Surgery    EAT THESE FOODS AVOID THESE FOODS   High in Protein: High in Fat/Sugar:   Canned tuna or chicken packed in water  Ground beef/turkey/pork/chicken (at least 90% lean)  Lean deli meats (turkey, chicken, ham, roast beef)  Skinless turkey or chicken, cooked tender and cut in small pieces  Lean pork or beef, cooked tender and cut in small pieces  Baked, broiled, grilled or boiled fish and seafood (not fried!)  Eggs - scrambled, poached, or boiled  Beans, hummus and lentils  Low-fat or fat-free cottage cheese, soft cheese, mozzarella string cheese, ricotta cheese, Laughing Cow, and Baby Bel cheese  Non-fat/low sugar yogurt (no more than 10 g of sugar per serving)  Sugar-free pudding  Silken tofu, edamame (soybeans) High fat milk (whole, 2%)  Butter, margarine, oil, mayonnaise  Sour cream, cream cheese, salad dressing  Ice Cream and frozen desserts  Baked goods (muffins, doughnuts, sweet rolls, cakes, cookies, pies, desserts)  Candy and chocolates  Luncheon meats and smoked meats (bologna, salami, may, sausage)  Breaded and fried foods  Gravy made with a everton    Starchy Carbohydrates*:    All breads (white, wheat, rolls, flat, wraps)  Flour and corn tortillas   All rice, quinoa, and other grains  All pasta and noodles  All cereals (including grits, oatmeal, and granola)   All crackers, pretzels and pretzel crisps  All potatoes, sweet potatoes, and chips  Corn, popcorn, and corn chips, corn puffs  Green/sweet peas   As long as you are getting >80g PRO: Tough/Crunchy   Cooked tender vegetables without peel  Ripe fresh fruits without peel  Frozen fruits with no added sugar  Fruit canned in its own juice or in water Tough or dry meats  Nuts and seeds  Dried fruit and dried coconut  Raw vegetables  Protein bars   Fluids: Always Avoid:   Skim/1% milk, Lactaid, soy milk,   Water and sugar-free beverages (decaf and non-carbonated)  Decaf coffee & decaf tea  Sugary drinks  Carbonated  drinks  Alcohol  Drinking through straws   * At goal weight, you may include whole grains in small amounts.    Sample Menu for Bariatric High Protein Soft Diet    This sample menu provides about 80 g protein, including about 40 g protein from foods and at least 40 g protein from protein drinks.  Drinking protein drinks daily helps decrease muscle loss, increase weight loss, and prevent hair loss.    Time of Day Day 1 Day 2   7:00am-7:30pm    1 egg (or ¼ cup Egg Beaters) ¼ cup low-fat cottage cheese, 1 tbsp berries   8:00am-9:00am Water/SF beverage Water/SF beverage   9:30am-10:30am Protein drink  Protein drink   11:00am-12:00pm Water/SF beverage Water/SF beverage   12:30pm-1:00pm   1-2 oz grilled shrimp, ¼ cup green beans 1-2 oz canned chicken, shredded cheese, 1 tbsp salsa   1:30pm-2:30pm Water/SF beverage Water/SF beverage   3:00pm-4:00pm  Protein drink   Protein drink   4:30-6:30pm Water/SF beverage Water/SF beverage   7:00pm-7:30pm  ½ cup low fat chili, 1oz low-fat cheese, ¼ cup broccoli 2 oz grilled fish,  ¼ cup lima beans   8:00pm-9:00pm Water/SF beverage Water/SF beverage       Sip fluids continuously between meals  No drinking from 30 minutes before meals to 30 minutes after meals  For fluids: 1 cup = 8 oz; For food: ¼ cup = 4 tablespoons   3 oz meat is about the size of a deck of cards  A food scale will help you determine portion size    Tips for Fluids  Avoid sugary drinks  Limit caffeinated beverages to 12 oz per day for the first 2-3 months  Avoid carbonated beverages  Avoid drinking through straws to reduce gas and bloating for at least 3 months      Phase 4: Bariatric High Protein Solid Diet  Two Months After Surgery    Two months after surgery, you may add the following slowly, one by one, back into your diet:    Raw, crunchy non-starchy vegetables  Plain/unsalted nuts and seeds  Dried meats/jerky and meat sticks  Protein bars with no more than 4 g of sugar per serving (see section II for  examples)         g of protein per day, using foods and high protein shakes   800-1000 calories per day  64 ounces of fluids per day from water, sugar-free, non-carbonated drinks, and sugar-free popsicles, and sugar-free Jell-O      Chew foods well. If you have a hard time with certain foods at first, wait a few days and try again  Limit fruit to 2 servings per day. One serving of fruit is 1 small piece of fruit, ½ cup of canned fruit (in juice or water) or ½ cup cubed fruit  Limit nuts and seeds to one serving per day - ¼ cup nuts/seeds or 2 tbsp nut/seed butter    SAMPLE MENU STARTING TWO MONTHS AFTER SURGERY    Time of Day Day 1 Day 2   7am-7:30am:    Egg omelet made with 1 egg and 1 slice low-fat cheese Protein bar   8am-9am:  8 oz water/SF beverage water/SF beverage   9:30am:  1 oz turkey with 1 string cheese ¼ cup unsalted nuts + ½ banana   10:30-11:30am:  8 oz water/SF beverage water/SF beverage   12pm:    Grilled chicken (2 oz) salad with 1 tbsp of low-fat Italian dressing and 1 apple  Taco Lettuce wraps: 1-2 oz of lean ground meat, low fat cheese, salsa wrapped in lettuce leaves   12:30pm-2:30pm:  16 oz water/SF beverage water/SF beverage   3pm:  Protein drink   Light yogurt   3:30-5:30pm:  16 oz water/SF beverage water/SF beverage   6pm:  Grilled shrimp kebobs (2 oz shrimp, pineapple chunks, bell pepper and onions) with ¼ cup sautéed spinach  ½ cup Red Beans (no rice) served over ¼ cup cauliflower rice   6:30pm-8:30pm:  16 oz water/SF beverage water/SF beverage   9pm:  1 tbsp slivered almonds sprinkled over 6 oz Greek yogurt Protein shake     Food to Eat and Avoid Two Months After Surgery    EAT THESE FOODS AVOID THESE FOODS   High in Protein: High in Fat/Sugar:   Poultry: skinless chicken or turkey (light/dark), ground (90% lean)  Fish/Shellfish: catfish, salmon, tilapia, trout, tuna, crab, crawfish, shrimp, etc.  Beef: tenderloin, roast (rib, ariadne, rump), steak (sirloin, round, cubed, T-bone,  flank), ground (90% lean)  Pork: lean ham, [place] may, tenderloin, center loin chop, ground (90% lean)  Game: venison, rabbit, duck (skin removed)  Deli meats: turkey, roast beef, ham, chicken  Dairy: low fat, part skim, and fat free cheese, Greek or low-fat yogurt (less than 10g sugar per serving)  Eggs: Boiled, omelet, poached, scrambled  Beans and Lentils: any kind, plus hummus and fat free refried beans  Soy: tofu, edamame  Nuts and Seeds: unsalted, ¼ cup or 2 tbsp nut/seed butter per day  Fruits - fresh, frozen, canned in its own juices or water  Vegetables - fresh, frozen, canned without added salt  Protein bars (no more than 4 g sugar)  Sugar-free pudding, popsicles, and Jell-o High fat milk (whole, 2%)  Butter, margarine, oil, mayonnaise  Sour cream, cream cheese, salad dressing  Ice Cream and frozen desserts  Baked goods (muffins, doughnuts, sweet rolls, cakes, cookies, pies, desserts)  Candy and chocolates  Luncheon meats and smoked meats (bologna, salami, may, sausage)  Breaded and fried foods  Gravy made with a everton    Starchy Carbohydrates*:    All breads (white, wheat, rolls, flat, wraps)  Flour and corn tortillas   All rice, quinoa, and other grains  All pasta and noodles  All cereals (including grits, oatmeal, and granola)   All crackers, pretzels and pretzel crisps  All potatoes, sweet potatoes, and chips  Corn, popcorn, and corn chips, corn puffs  Green/sweet peas   Fluids: Always Avoid:   Skim/1% cow milk, unsweetened almond and soy milk  Water, sugar-free, and non-carbonated drinks, coffee, tea  Sugary drinks and juice  Carbonated drinks  Alcohol  Drinking through straws   * At goal weight, you may include starchy carbohydrates in small amounts      Phase 5. Bariatric Diet at Goal Weight and Beyond  Follow a high protein, low carb, low fat diet FOR LIFE    Congratulations on reaching your goal weight!  Reaching your goal weight is a great accomplishment. You worked hard to get the weight  off. Keep it off by staying on track with the bariatric lifestyle. Remember the following:    Daily Goals   g of protein per day, through food and high protein shakes.  64 ounces of fluids per day from water, sugar-free, non-carbonated drinks, and sugar-free popsicles and Jell-o.  Calories per day to maintain your weight are based on your age, height, weight, gender, and activity level. Speak with your dietitian for assistance in determining your daily caloric intake.    Adding Grains and Starchy Vegetables  At your goal weight, ask your dietitian about adding grains and starchy vegetables to your diet.  Choose whole grains such as brown rice, whole wheat breads, pastas, tortillas, crackers, old fashioned oatmeal (not instant), and whole grain cereals (Cheerios, Bran Flakes). They provide more fiber, protein, and vitamins than refined (white) grains.  When preparing potatoes (roasted, boiled, mashed, air-fried, etc.), keep the skin on for added fiber.  Keep serving size to ¼ cup.        Protein is the priority. Eat protein foods first before eating vegetables, fruit, or grains.  Aim to eat 3 - 4 oz of protein foods per meal and eat 3 meals per day.  Drink fluids 30 minutes before or 30 minutes after eating.  Snack on high protein foods like Greek yogurt, ¼ cup of unsalted nuts, protein bars, and low-fat cheese  Use protein shakes as a meal replacement or snack.  Limit fruit to 2 per day - small apple, orange, or banana, a handful of strawberries, blueberries, or grapes, or ½ cup of cut fruit.  Limit nuts/seeds to 1 serving per day - ¼ cup of nuts/seeds or 2 tbsp nut/seed butter    Keep Doing the Following  Taking your vitamins - multivitamin with iron, calcium citrate with Vit D, B-1 or Super B-Complex, and B-12  Exercising - cardio and weight resistance or lifting  Checking nutrition labels for protein, fat, sugar, and calories (see Appendix for more info on reading nutrition labels)  Tracking your food,  drink, and exercise with the [employer] irene or on paper  Visiting the bariatric clinic - at least once a year       Sample Menus    Time of Day Food/Drink Protein (g) Calories   7am-7:30am  2 eggs scrambled  12 156   8:00am-9:00am  Water     9:30am-10:00am 3 oz plain nonfat Greek yogurt,   ¼ c blueberries 14  0 83  21   10:30-11:30am Water     12:00pm-12:30pm   3 oz tuna salad with 1 tbsp lite billy and 6 whole wheat crackers 24  3 146  120   1:00pm-2:00pm Water     2:30pm-3:00pm Protein bar 21 190   3:30-5:30pm Water     6:00pm-6:30pm 3 oz grilled chicken  ¼ cup brown rice  ½ cup steamed broccoli 27  1  0 138  54  10   7:00pm-8:00pm Water     8:30pm-9:00pm Banana nice cream Frozen ½ banana pureed with 1 tbsp PB powder 0  3 55  30   Total  105 1003       Time of Day Food/Drink Protein (g) Calories   7:00am-8:00am  Protein shake 30 160   8:30am-9:30am Water     10:00am-10:30am 1 oz sliced turkey  1 oz mozzarella string cheese 7  7 35  80   11:00am-12:00pm  Water     12:30pm-1:30pm   1 oz whole wheat spaghetti   3 oz ground turkey (90% lean)  ¼ cup marinara sauce no added sugar 4  20  2 90  143  60   2:00pm-3:00pm Water     3:30pm-4:00pm ¼ c unsalted peanuts  ½ banana 7  0 160  55   4:30-5:30pm:  Water     6:00pm-6:30pm  3 oz grilled shrimp skewers  ¼ cup grilled pineapple  ½ cup cauliflower rice 18  0  0 84  20  10   7:00pm-8:00pm  Water     8:30pm-9:00pm 3 oz plain nonfat Greek yogurt,   ¼ c blueberries 14  0 83  21   Total  109 1001         VI. Physical Activity        Physical activity and exercise are essential to achieve and maintain weight loss and to build and maintain lean muscle. Activity and exercise beginning right after your surgery will help you feel better, recover faster and reduce the risk of complications. Staying active promotes mental well-being, relieves stress and reduces feelings of depression and anxiety. You feel good about your body when you exercise regularly, and therefore have a healthier  body image.     Choose a form of activity or exercise that you enjoy   Try mall walking, aerobics, swimming, aqua aerobics, or dancing    Ask a friend or family member to join in an activity or exercise with you. Use the [employer] system!   Lift some weights or use resistance machines and bands to tone muscle and help retain muscles    Join an exercise club or fitness class   When shopping, park farther from the entrance and walk    Stay hydrated by drinking water before and during exercise   Prioritize activity and exercise time into your schedule    Listen to your favorite music or podcase as you exercise   Keep a record or journal of your activity    Use stairs instead of the elevator or escalator   Aim for 30 minutes of physical activity, 3 times per week. It does not have to be 30 mins all at once         VII. Common Nutritional Problems After Surgery and Prevention Tips     Contact the Bariatric Clinic if you need help with any of these problems after surgery.      Gas and Bloating  Constipation    Cause: Digestive tract changes after surgery  Prevention tips: Eat slowly, avoid overeating, avoid carbonated beverages and drinking through straws.  Try switching to lactose-free protein drinks. Try using Gas-X chewables  Causes: Food and fiber intake are reduced following surgery  Prevention tips: Drink at least 48 ounces water daily in addition to protein drinks; Exercise daily; Try a laxative such as MiraLAX or a stool softener like Dulcolax. Do not use a fiber supplement like Metamucil          Nausea and Vomiting  Temporary Hair Loss    Causes: Overeating or eating too quickly  Prevention tips: Eat slowly, chew your food very well, and stop eating as soon as you feel full  Causes: Rapid weight loss and/or lack of protein in the diet  Prevention tips: Eat the amount of protein recommended by your dietitian          Dehydration  Dumping Syndrome    Causes: Not drinking enough fluids; or persistent vomiting and/or  diarrhea  Symptoms: Dark and strong-smelling urine, dry mouth, headache and fatigue  Prevention tips: Take frequent sips of liquid throughout the day, eat sugar free Jell-o and popsicles  Causes: Food emptying too quickly from the stomach, due to excess sugar or fat  Symptoms: Diarrhea, nausea, cold sweats and light-headedness  Prevention tips: Avoid consuming sugary foods or beverages, eating high fat foods, drinking fluids too soon after a meal          Lactose Intolerance  Chronic Malnutrition    Causes: Change in ability to digest lactose  Symptoms: Gas, bloating, cramping and diarrhea after drinking milk or whey-based protein shakes  Prevention tips: Switch to lactose-free milk, unsweetened almond or soy milk. Refer to lactose-free protein supplement suggestions in this booklet  Causes: Changes in nutrient absorption after surgery, insufficient supplementation   Symptoms: Fatigue, aching muscles, tingling feet, calves or hands  Prevention tips: Eat a healthy diet; always take your supplements, check with a Dietitian that you are taking the correct supplements   Page created by 2024 [facility] Dietetic Intern Carol Hunter VIII. Frequently Asked Questions    Q: When can I have caffeinated coffee or tea after surgery?  A: When you can comfortably drink 48 oz of clear fluids, you can have one cup of caffeinated coffee or tea    Q: When can I have alcohol after surgery?  A: You may try alcohol one year after surgery. Be aware that alcohol will affect you very quickly after bariatric surgery. If you do, we suggest sticking to single ingredient drinks like beer, wine, and not mixing spirits with juices and carbonated drinks.    Q: Why can't I use a straw after surgery?  A: Three months after surgery, you may attempt to use a straw. You may find that it fills your stomach with air and cause nausea. In that case, stop using the straw.    Q: When can I start drinking carbonated beverages after surgery?  A: 3 months  after surgery, you may attempt to drink a carbonated beverage. You may find that it fills your stomach with air and cause nausea, bloating, or other discomfort. In that case, stop.    Q: Why do I have to take vitamins for the rest of my life?  A: Bariatric surgery can change the way your body absorbs vitamins and minerals. Plus, with a smaller stomach, you will not be able to get enough nutrients through food.     Q: When can I go back to work?  A: We recommend not returning to work for at least 1 week after surgery, preferably 2 weeks. In the first few days after surgery, you may experience symptoms like nausea, vomiting, diarrhea, or constipation. You also want to give your body time to adjust to your new stomach.    Q: Can I have mashed potatoes, grits, or oatmeal during the pureed phase?  A: No. These are starchy carbohydrates. Starchy carbs expand in your stomach and may cause pain. They do not help with weight loss and they are easy to overeat.    Q: How many carbs can I have?  A: There is not a set amount of carbs in the bariatric lifestyle. Rather, it is more important to focus on the type of carbs than the number of grams of carbs per meal or per day.    Q: When can I have surgery to remove the extra skin?  A: 2 years after bariatric surgery. Note that insurance does not pay for skin removal for cosmetic reasons. If excess skin removal is medically necessary (for example, you develop a rash under the extra skin around your belly), insurance may cover it.            Log food and drink and feelings of hunger and fullness before and after consuming    Base meals around lean protein and vegetables    Eat two servings of fruits and 3 servings of vegetables every day    Eat consistently throughout the day, every 3-4 hours    Go out when you want a treat instead of having it in the house    Include protein in all meals and snacks    Consume low fat/fat free dairy products    Drink water and low calorie drinks     Turn off electronics while eating    Plan meals and snacks   Page created by 2024 [facility] Dietetic Intern Radha Araya IX. Lifelong Nutrition Guidelines After Bariatric Surgery     Safe and successful weight loss after bariatric surgery requires you to make a commitment to living the bariatric lifestyle. Remember to do the following:    Prioritize Protein   Eat Protein Foods First.  Protein is necessary to help you maintain lean body mass as you lose weight.    80 g - 120 g of protein per day, through food and protein supplements  Make half your plate protein, followed by non-starchy vegetables, then fruit or a small serving of starchy carbohydrate when you reach your goal weight     Avoid high fat, high sugar foods and drinks  These can cause diarrhea and stomach discomfort, or the dreaded Dumping Syndrome!  Keep protein shakes, powders, drinks, and bars to no more than 4 g of sugar per serving  Diets high in fat and sugar can lead to weight regain    Be Mindful About Eating    Eat slowly, chew foods thoroughly,   eat and drink small amounts at a time    Listen to Your Body for hunger and fullness cues. You may not feel hunger, but you may feel hangry or tiredness.  Stop eating when you feel 80% full. If you are unable to recognize fullness, learn how much you can eat before you start to feel nauseated or vomit.    Consume only the quantity of food recommended. Eating or drinking too much may eventually stretch your pouch and prevent you from achieving optimal weight loss.      Be Active  Engage in some type of physical activity at least 30 minutes, three times per week. The 30 minutes do not all need to be at the same time   Include weightlifting/resistance to help build and preserve lean muscle    Stay Hydrated  Drink 64 oz or more of fluids daily of sugar-free, non-carbonated beverages under 15 calories per 8 oz serving  Drink fluids 30 minutes before or after eating, not at the same time    Take Your  Vitamins and Minerals  Multivitamin with iron, calcium citrate with vitamin D, B-1, and B-12 (include Vit A if you had NAVYA-S surgery)  Take multivitamin with iron and calcium at least two hours apart from each other        Go to Your Bariatric Follow-up Visits  2 weeks, 8 weeks, 6 months, and every year after surgery  See a dietitian at every time!   Attend monthly bariatric support group meetings. First Tuesday of every month, 5:30 pm - 6:30 pm. Scan this QR code to join!            Contact Your Dietitian Anytime   Questions about the bariatric lifestyle  When weight loss stalls  Questions about foods, drinks, vitamins & minerals, recipes  Things you see on social media that you have questions about  Message your dietitian through the patient portal or call the Bariatric Clinic at [phone] x. Resources for Bariatric Patients        Smart Phone Apps  [employer]. Available for iPhone and Android. After signing in, enter [facility] Code [id number]  The PoshpackerPal  Diabetes logbook by George (track blood sugar, upload meal pics)      Shopping Websites  https://www.[email or web address].evidanza/ (use verification code KATTYNER for discount)  https://store.eSoft.evidanza/collections/[email or web address]-vitamins (use discount code NMLII065 for 20% off your first order)  https://celebratevitamins.[email or web address]  https://www.bariatricfusion.com/  https://unjury.[email or web address]    Websites   www.oa.org Overeater's Anonymous is a community of people who support each other in order to recover from compulsive eating and food behaviors. Online, in-person, and telephone meetings available  www.calorieking.com Online food database to find out the calories, protein, sugar, fat, fiber, etc. of any certain food. Helpful when tracking protein and calories on paper.  www.bariatriceating.com A website started by a bariatric surgery patient. Includes recipes, advice, and an online store      Books  Before & After: Living & Eating Well After Weight  Loss Surgery by Dixie Dexter, 2012  Eating Well After Weight Loss Surgery: Over 150 Delicious Low-Fat, High-Protein Recipes to Enjoy in the Weeks, Months, and Years after Surgery by Alisa Cobian & Sebastien Espinal, 2018  Weight Loss Surgery for Dummies by Palak Cruz MD, FACS, 2012  Exodus from Obesity: The Guide to Long-Term Success After Weight Loss Surgery by Cecelia Mccormick, 2003.  Weight Loss Surgery: Finding the Thin Person Hiding Inside You by Radha Hankins, 2008.  The Don't Diet Live It Workbook by Andrea Wachter and Javier Concepcion, 1999  The Overeater's Journal: Exercised for the Heart, Mind, and Soul by Riri Jones, 2004  Moving Away from Diets: Healing Eating Problems and Exercise Resistance, 2nd Edition by Mary Jose and Adela Magaña, 2003  50 Ways to Soothe Yourself Without Food by Dixie Cummings PSY.D., 2009        XI. Appendix    What to Stock    An empty kitchen can be an obstacle to your weight loss journey. When it's mealtime and you don't have healthy options on hand, it's tempting to skip meals or head to the drive-through. Keeping your kitchen stocked with easy-to-prepare meals and snacks will help you make good choices and stay on track with your weight loss goals.     Pantry Dl   Canned/Ty Goods Cooking Dl Snacks/Dry Goods   Non-starchy vegetables (with no salt added, if possible)  Beans and fat free refried beans  Chicken/tuna/salmon packed in water  Soups (98% fat free)  Fruits (in 100% juice or no sugar added)  Olives and pickles Herbs and spices  Seasoning mixes (low sodium taco, Italian, Ranch)  Cinnamon/Pumpkin pie spice  Olive oil and sesame oil  Vinegars and cooking mathew   Low sodium broth and stock  Light soy sauce  Sugar substitutes and sugar free syrups Nuts, nut butters, PB powder  Pumpkin/sunflower seeds  Protein powders and bars with 0-4 g sugar  Sugar free pudding and Jell-o mixes  Dry pasta made from chickpeas or lentils  Dry beans and  lentils  Low sugar tomato sauce   Protein chips (such as Quest)     Freezer Staples   Proteins Vegetables Fruits   Fish fillets (not breaded or battered)  Shrimp  Chicken  Turkey or veggie patties  Turkey sausage or chicken sausage  Edamame (soybeans) Broccoli, cauliflower (including cauli-rice and cauli-mash), green beans, squash/zucchini (including zoodles)  Onion/bell pepper/celery seasoning  Spinach/greens  Stir galvan blends Berries  [place]  Banana chunks  Pineapple  Cherries       Refrigerator Dl   Proteins Fruit, Vegetables, Drinks Condiments   Protein shakes/ruiz  Chicken/turkey/lean meats  Fish/seafood  90% lean ground beef/turkey   Lean deli meats  Eggs/Egg Beaters  Cottage cheese (low fat/fat free)  Greek yogurt (under 10 g sugar)  Low fat cheeses Fresh fruits  Fresh vegetables  Raw veggies, lettuces - washed and chopped  Avocado  Hummus   Milk (skim or 1%)  Unsweetened almond/soy milk  Pitcher of sugar-free flavored beverage  Low fat billy  Mustard  Salsa  Sugar free flavored coffee creamer   Sugar free BBQ sauce, ketchup, and [place] sauce   Lite/fat-free salad dressing         Pantry Purge    It is important to rid your surroundings of foods that can derail your weight loss journey. They can keep you from losing weight and worsen any medical conditions you may have. A pantry purge includes the cupboards, refrigerator, freezer, garage, deep freezer, shed, and car - anywhere you keep food. Remember: When in doubt, throw it out.    What to Purge    High Fat Foods  Anything that has more than 20% of the daily value for fat should be tossed. These may include:   Meats in a can like meat spreads, SPAM, Juana sausages, and little smokies  Luncheon/cured/smoked meats like bologna, salami, hot dogs, and sausages  Frozen meals, including pizza  Ground meat less than 90% lean, ribs, may, and fatty meats  Bakery items like cakes, brownies, muffins, sweet rolls and buns, doughnuts, pies, and cookies  Whole fat  dairy like milk, cheese, yogurt, pudding, and ice cream.    High Sugar Foods  Anything that has more than 10% of the daily value for added sugar should be tossed. These may include.  Breakfast pastries like muffins, doughnuts, and Danishes  Bakery and desserts items like cakes, brownies, sweet rolls and buns, pies, and cookies, and ice cream  Yoli and chocolates  Fruit juices, sweetened tea, lemonade, cold drinks, flavored coffee creamers, flavored yogurt, jams, jellies, preserves, and dried fruits    High sodium foods  Anything that has more than 20% of the daily value for sodium should be limited, especially if you have hypertension. These may include:   Meals in a box such as jambalaya mix, Rice-A-Farhat, macaroni & cheese  Meals in a can like Chef Sandoval, canned soups (even the ones that claim to be healthy), canned stews, meat spreads, SPAM, David sausages, little smokies, and canned chili  Ramen noodles and Cup O' Soup  Luncheon/cured/smoked meats like bologna, salami, hot dogs, sausages, and pickled pork  Frozen meals, including pizza      Your Handy Portion Size Guide    You can use measure cups, spoons, and a kitchen scale to determine the portion size of your foods. Or, you can use this handy guide to estimate your portion sizes.     Palm of hand ? 1 serving of protein        A closed fist ? 1 serving of soup, salad greens          A cupped hand ? 1 serving of fruit, vegetables, grains          Tip of Thumb ? tsp of oil or sweetener     A thumb ?1 tbsp of cheese, dressings, spreads             Reading Nutrition Facts    When choosing foods, it's always a good idea to look at the nutrition facts label on the back of the food and drink packages. The label will help you choose foods that contain more of the nutrients you want to get more of and less of the nutrients you may want to limit.      Nutrients to get less of: Saturated Fat, Sodium, and Added Sugars.  Eating too much saturated fat and sodium is  associated with an increased risk of developing and worsening some health conditions, like cardiovascular disease and high blood pressure. Consuming too much added sugars can make it hard to meet important nutrient needs while staying within calorie limits.    Total Sugars includes sugars naturally present in many nutritious foods and beverages, such as sugar in milk, fruit, and vegetables as well as any sugars that were added in making in the product.     Added Sugars include any caloric sweeteners that are added during the processing of foods. Added sugars include: sugar, honey, molasses, agave nectar, syrups, malts, and concentrated fruit and vegetable juices (see next page for a longer list).      Nutrients to get more of: Dietary Fiber, Vitamin D, Calcium, Iron, and Potassium.  Eating a diet high in dietary fiber can increase the frequency of bowel movements, lower blood glucose and cholesterol levels, and reduce calorie intake. Diets higher in vitamin D, calcium, iron, and potassium can reduce the risk of developing osteoporosis, anemia, and high blood pressure.      A Word About Sugar and Sweeteners    We know to look at the nutrition facts for sugar. We can also look at the ingredients list to see if there is sugar in a food or drink. Did you know there are dozens of names for sugar?         Anything that is a juice, syrup, or malt is sugar. Anything that ends in -ose is sugar. Anything that ends in -yessenia is sugar alcohol. Sugar alcohols are fewer in calories than sugar itself. But can also cause diarrhea if too much is eaten at one time.      Added Sugar  In addition to looking at the nutrition facts for added sugar, you can look at the ingredients list to find added sugars. Compare regular Yoplait (red container) and Yoplait Light (blue container).          Sample Nutrition Facts of Protein Bars  Which protein bar is a good choice for a bariatric diet?     Be a food ! Not all protein bars are a  good choice on the bariatric diet. The [place] bar does have protein (10 g). It also has more fat and sugar than the Quest protein bar.         Eat This, Not That    Protein-rich, lower fat, and lower sugar substitutes for commonly eaten foods.            Pre-Operative Sample Meal Plan     Below is a sample 7-day meal plan to give you ideas on the kinds of meals and snacks to include in your diet before surgery. Each day's menu provides  g protein per day and 6440-6698 calories. Remember to drink sugar-free fluids throughout the day.    MENU # 1   Breakfast: 2 eggs, 1 turkey sausage john, 1 apple   Snack: P3 protein pack (Avoid P3 with dried fruits and chocolate)   Lunch: 2 meat & cheese roll-ups (sliced turkey, low-fat sliced cheese, mustard), 12 baby carrots dipped in 1 Tbsp natural peanut butter   Mid-Day Snack: ¼ cup unsalted almonds, ½ cup fruit   Dinner: 1 chicken thigh simmered in tomato sauce + 2 Tbsp mozzarella cheese, ½ cup black beans, ½ cup steamed carrots   Evening Snack: ½ cup grapes with 4 cubes low-fat cheddar cheese           MENU # 2   Breakfast: 200 calorie protein drink (20-30 g protein, no more than 4 g sugar)  Mid-morning snack: ¼ cup unsalted nuts, medium banana   Lunch: 3 oz tuna or chicken salad made with 2 tbsp light billy, over salad with tomatoes and cucumbers.   Snack: Low-fat cheese stick   Dinner: 3 oz hamburger john, slice of low-fat cheese, 1 cup yellow squash and zucchini sauteed in nonstick cook spray  Snack: 6 oz light yogurt           Menu #3   Breakfast: 5-6 oz plain Greek yogurt + fruit (½ banana OR ½ cup fruit - pineapple, blueberries, strawberries, peach), may add Splenda to lambert   Lunch: Grilled chicken breast w/slice low-fat pepper maria de jesus cheese, ½ cup grilled/baked asparagus and small salad with Salad Spritzer.   Mid-Day snack: 200 calorie protein drink (20-30 g protein, no more than 4 g sugar)  Dinner: 4 oz grilled fish, ½ cup white beans, ½ cup cooked  spinach   Evening Snack: Fudgsicle no sugar added           Menu # 4   Breakfast: 1 scoop vanilla protein powder + 4 oz skim milk + 4 oz coffee   Snack: Protein bar under 4 g sugar and under 200 calories   Lunch: 2 lettuce tacos: 3 oz seasoned ground turkey wrapped in melly lettuce leaves with 1 tbsp shredded cheese and dollop low-fat sour cream   Snack: ½ cup cottage cheese, ½ cup pineapple chunks   Dinner: Shrimp omelet: 2 eggs, ½ cup shrimp, green onions, and shredded cheese           Menu #5   Breakfast: 1 cup low-fat cottage cheese, ½ cup peaches (no sugar added)   Snack: 1 apple, 4 cubes cheese   Lunch: 3 oz baked pork chop, 1 cup okra and tomato stew   Snack: ½ cup black beans + salsa + dollop light sour cream   Dinner: Caprese chicken salad: 3 oz chicken breast, 1 oz fresh mozzarella, sliced tomato, 1 tbsp olive oil, torn fresh basil leaves or dried basil   Snack: Sugar-free popsicle           Menu #6   Breakfast: ½ cup part-skim ricotta cheese, 2 tbsp sugar-free strawberry preserves, sprinkle of slivered almonds   Snack: 1 orange + string cheese  Lunch: 3 oz canned chicken, 1 oz shredded cheddar cheese, ½ cup black beans, 2 Tbsp salsa   Snack: 200 calorie protein drink   Dinner: 4 oz grilled salmon steak, over mixed green salad with 2 tbsp light dressing           Menu #7  Breakfast: Crust-less quiche (bake 1 egg mixed w/ low fat cheese, broccoli and low sodium ham in a muffin cup until cooked inside)  Snack: 1 light yogurt  Lunch: Protein shake   Snack: Small apple w/2 tbsp PB2 (powdered peanut butter)  Dinner: 2 ground turkey meatballs w/low sugar marinara sauce, ½ cup spiralized zucchini (sauteed on stove top w/nonstick cook spray)              Tracking Your Nutrition Intake    Studies show that people who track their food intake and physical activity are more successful in their weight loss than those who do not. Tracking takes just a few minutes a day and is very helpful in keeping you on  course.    There's an irene for that  We use [employer], a free irene available for both Apple and Android. After downloading the irene and signing up, use program code [id number] to access the irene tailored for [facility] bariatric patients. In addition to the tracking features, on the irene you can set reminders, view the bariatric video, and access all of the nutrition information in this booklet.     Paper and Pen  You can also choose to track with paper and pen. You can buy a food journal online, print food log sheets off the internet, or make your own in a notebook. On the last page of this booklet is a sample food log. In filling in the food log, refer to the nutrition label on the food you eat for the calorie and protein info. You may need to look up food items online if a nutrition label is not available.          Protein Foods List  Use these lists as a guide to your protein and calorie intake. You can also look up foods online at websites such as  https://www.[employer]/us/en/    Abbreviations: SF=sugar free, RF = Reduced fat, LF=low fat, FF= fat free, g=grams    Meat and Fish*   Food Name Portion Calories Protein (g)   Beef, ground 90% lean 1 oz 50 6   Beef, roast 1 oz 46 8   Beef, steak, sirloin, fat trimmed 1 oz 55 9   Catfish, broiled or baked 1 oz 30 5   Chicken, white breast w/o skin 1 oz 46 9   Chicken, leg w/o skin 1 oz 54 7   Crab, steamed ¼ cup 40 9   Crawfish tails, boiled ¼ cup 35 8   Egg 1 each 78 6   Ham, lean 95% 1 oz 44 7   Pork, tenderloin 1 oz 46 7   [place], baked 1 oz 52 7   Shrimp, steamed 1 oz 28 6   [place], white fish, cooked 1 oz 36 8   Trout 1 oz 48 7   Tuna, canned in water 1 oz 37 8   Turkey, white meat 1 oz 35 7   Turkey, ground 93% lean 1 oz 38 6   Turkey, breakfast sausage link 1 oz 55 5   Tur[place], smoked rope 1 oz 50 4   Veal Loin 1 oz 50 7   *A serving of cooked meat/fish is 3 oz = size of deck of cards    Beans   Food Name Portion Calories Protein (g)   Black beans ¼ cup 60 4   Great  northern (white) beans  ¼ cup 50 3   Red beans ¼ cup 56 4   Refried beans, fat free ¼ cup 65 4   Tofu ¼ cup 47 5     Dairy   Food Name Portion Calories Protein (g)   Baby Bel cheese 1 piece 70 5   Cheese, American FF 1 oz 40 6   Cheese, cottage 1% fat ¼ cup 41 7   Cheese, Fiesta Blend/Mexican, RF ¼ cup 80 7   Cheese, mozzarella, string 1 stick 80 7   Cheese, parmesan, grated 2 tsp 20 2   Cheese, ricotta, part skim ¼ cup 90 8   Cheese, [place] Ultra Thin Cheddar 1 slice 45 3   Laughing Cow cheese 1 wedge 30 2   Yogurt, light 5 oz 80 5   Yogurt, Greek nonfat/light 5 oz  12-15     Snacks   Food Name Portion Calories Protein (g)   Almonds, unsalted ¼ cup 160 6   Cashews, unsalted ¼ cup 160 5   Chickpeas, roasted ½ cup 70 4   Beef jerky, original 1 oz 80 10   [place]ame, shelled ¼ cup 50 4   Hummus ¼ cup 100 5   Peanuts, unsalted ¼ cup 160 7   Peanut butter, unsalted, creamy 2 tbsp 180 8   Pudding, sugar free 1 serving 60-70 1-2   Quest Chips 32 g 140 19-20   Sunflower seeds, unsalted ¼ cup 170 6   Turkey jerky, original 1 oz 70 12     Drinks   Food Name Portion Calories Protein (g)   [place] milk, unsweetened 1 cup 30 1   Cow's milk, skim or 1% 1 cup 90 8   Cow's milk, Fairlife non-fat 1 cup 80 13   Soymilk, unsweetened 1 cup 80 7         Plant Based Meats   Food Name Portion Calories Protein (g)   Beyond Burger ½ john 115 10   Beyond Beef (ground) 2 oz 115 10   Beyond Sausage 1 link 190 16   Beyond Meatballs 3 each 114 11   Beyond Breakfast Sausage 2 each 180 11   Beyond Beef Crumble ½ cup 90 14   Beyond Beef Jerky ½ cup 90 10   Impossible Beef (ground, burger) 2 oz 115 9.5   Impossible Sausage 2 oz 130 7   Gardein Be'f Burger 1 john 130 14   Gardein Chick'n Strips 3 each 65 7.5   Gardein Meatballs 3 each 160 15   Gardein Saus'ge Links ½ link 95 8   Gardein Be'f Tips 6 each 105 10.5   Gardein Ground Be'f 1/2 cup 80 12   Morning Star Grillers Original Burger 1 john 130 16   Morning Star Breakfast Sausage Link 2  each 70 9   Morning Star Breakfast Dulce 1 dulce 80 9   Morning Star Griller Crumbles ½ cup 70 10   Morning Star Chick'n Stips 8 each 100 15       Sample Food Log    DAY/DATE FOOD ITEM QTY/SIZE/WT PROTEIN CALORIES   Mon, 11/27 Scrambled eggs 2 12 g 160    [employer] Core Power 1 - 14 oz 26 g 170    Mozzarella string cheese 1 - 1 oz 8 g 80    Tuna  4 oz 32 g 160    Hummus ¼ cup 5 g 100    Sliced carrots ½ cup .5 g 25    Baked salmon 4 oz 28 g 200    TOTAL  111.5 g 895

## 2024-07-16 NOTE — PROGRESS NOTES
"NUTRITIONAL Note     Referring Physician: Orville Payne M.D.   Reason for MNT Referral: Follow up assessment for sleeve gastrectomy work-up     33 y.o. female presents with weight gain 6 lbs over the past month despite stating she has been making dietary and lifestyle changes in preparation for bariatric surgery. Admits to eating fried chicken often while on the go, but not the starchy sides.             Past Medical History:   Diagnosis Date    Allergy      Anemia      Anxiety      Breast feeding status of mother 1/17/2020    Brittle bones      ESRD (end stage renal disease)       M/W/F    General anesthetics causing adverse effect in therapeutic use       pt states "im a light weight"    Hypertension      Insomnia       PSG revealed no CHRYSTAL, but likely psychogenic etiology (anxiety)    RLS (restless legs syndrome)        CLINICAL DATA:  33 y.o.-year-old Black or  female.  Height: 5 ft. 6 in.  Weight: 230 lbs  IBW: 144 lbs  BMI: 37.1     DAILY NUTRITIONAL NEEDS: pre-op nutritional bariatric guidelines to promote weight loss  [id number] Calories    Grams Protein     NUTRITION & HEALTH HISTORY:  Greatest challenge: starchy CHO, portion control, snacking at night, and irregular meal patterns     Current diet recall:      B: none  - coffee with 2 caps of Premier shake  L: dinner leftovers or dining out (grilled/baked meat and veggies mostly) OR fried chicken  D: Chinese take out (chicken, veg - no rice) OR grocery store sushi rolls OR fish john air fried with lettuce wrap OR baked turkey wings in gravy, greens, sweet potato     Current Diet:  Meal pattern: 2-3 meals  Protein supplements: Premier shakes  Snacking: not a big snacker.   Vegetables: Likes a variety. Eats daily.  Fruits: Likes a variety. Eats 2-3 times per week.  Beverages: water, and sweet tea.   Dining out: Weekly. Monthly. Mostly fast food, restaurants, and take-out. Chinese food. Loves sushi rolls with rice.   Cooking at home: " Daily. Weekly. Mostly baked, grilled, and smothered meat, fish, (limited starchy CHO), and vegetables.      Exercise:  Walking the neighborhood in a sauna suit  No gym lately.  Has membership to [employer]. Usual routine - 1-2 times/week for 2-3 hours (30 min sauna, weights, full body work out, stairs 5-10 min, rowing 10-15 min, elliptical 10-15 min, 30 min sauna)     Restrictions to exercise: none     Vitamins / Minerals / Herbs:   none     Labs:   None available at time of visit     Food Allergies:   None known     Social:  Catering food/hosting parties, writer, lyons/wrapper, relationship therapist, radio talk show  Lives with 3 teenagers and a 5 yr old.  Grocery shopping and food prep done by the pt.  Patient believes the household will be supportive after surgery.  Alcohol: Socially. Shot of alcohol or glass of wine.  Smoking: none     ASSESSMENT:  Patient demonstrated knowledge of healthy eating behaviors and exercise patterns.  Patient demonstrates willingness to change lifestyle and make behavior modifications AEB self reported dietary changes and exercise.           Barriers to Education: none     Stage of change: determination/action     NUTRITION DIAGNOSIS:    Obesity related to Excessive carbohydrate intake, Excessive calorie intake, and Physical inactivity as evidence by BMI.     BARIATRIC DIET DISCUSSION/PLAN:  Discussed diet after surgery and related to patient's food record.  Reviewed nutrition guidelines for before and after surgery.  Answered all questions.  Continue to review Bariatric Nutrition Guidebook at home and call with any questions.  Work on Bariatric Nutrition Checklist.  Work on expanding variety of vegetables.  Work on gradually cutting back on starchy CHO in the diet.  1200-calorie diet.  1500-calorie diet.  5-6 meals per day.  Start including protein supplements in the diet plan daily.  Eliminate sugary drinks.     RECOMMENDATIONS:  Pt is a potential candidate for bariatric surgery  - Sleeve.     Follow up in one month.  Needs additional visits with RD for MSD.     Patient verbalized understanding.        Communicated nutrition plan with bariatric team.     SESSION TIME:  30 minutes

## 2024-07-17 ENCOUNTER — OFFICE VISIT (OUTPATIENT)
Dept: TRANSPLANT | Facility: CLINIC | Age: 34
End: 2024-07-17
Payer: MEDICARE

## 2024-07-17 ENCOUNTER — PATIENT MESSAGE (OUTPATIENT)
Dept: TRANSPLANT | Facility: CLINIC | Age: 34
End: 2024-07-17

## 2024-07-17 DIAGNOSIS — Z79.899 IMMUNOSUPPRESSIVE MANAGEMENT ENCOUNTER FOLLOWING KIDNEY TRANSPLANT: ICD-10-CM

## 2024-07-17 DIAGNOSIS — Z94.0 IMMUNOSUPPRESSIVE MANAGEMENT ENCOUNTER FOLLOWING KIDNEY TRANSPLANT: ICD-10-CM

## 2024-07-17 DIAGNOSIS — Z91.89 AT RISK FOR OPPORTUNISTIC INFECTIONS: ICD-10-CM

## 2024-07-17 DIAGNOSIS — I10 ESSENTIAL HYPERTENSION: ICD-10-CM

## 2024-07-17 DIAGNOSIS — N18.2 CKD (CHRONIC KIDNEY DISEASE), STAGE II: ICD-10-CM

## 2024-07-17 DIAGNOSIS — N05.1 FSGS (FOCAL SEGMENTAL GLOMERULOSCLEROSIS): ICD-10-CM

## 2024-07-17 DIAGNOSIS — Z94.0 S/P KIDNEY TRANSPLANT: Primary | ICD-10-CM

## 2024-07-17 PROCEDURE — 3066F NEPHROPATHY DOC TX: CPT | Mod: HCNC,CPTII,95, | Performed by: NURSE PRACTITIONER

## 2024-07-17 PROCEDURE — 1160F RVW MEDS BY RX/DR IN RCRD: CPT | Mod: HCNC,CPTII,95, | Performed by: NURSE PRACTITIONER

## 2024-07-17 PROCEDURE — 1159F MED LIST DOCD IN RCRD: CPT | Mod: HCNC,CPTII,95, | Performed by: NURSE PRACTITIONER

## 2024-07-17 PROCEDURE — 99214 OFFICE O/P EST MOD 30 MIN: CPT | Mod: HCNC,95,, | Performed by: NURSE PRACTITIONER

## 2024-07-17 NOTE — LETTER
July 17, 2024        Eduard Vance  [address]  [address]  [facility]  [facility] [place] [address]  Phone: [phone]  Fax: [phone]             [facility] Trans[facility] 1st Fl  [address]  [facility] [address]  Phone: [phone]   Patient: Lisseth Schwartz   MR Number: [id number]   YOB: 1990   Date of Visit: 7/17/2024       Dear Dr. Eduard Vance    Thank you for referring Lisseth Schwartz to me for evaluation. Attached you will find relevant portions of my assessment and plan of care.    If you have questions, please do not hesitate to call me. I look forward to following Lisseth Schwartz along with you.    Sincerely,    Patricia Caceres, NP    Enclosure    If you would like to receive this communication electronically, please contact [email or web address] or [phone] to request [employer] access.    [employer] is a tool which provides read-only access to select patient information with whom you have a relationship. Its easy to use and provides real time access to review your patients record including encounter summaries, notes, results, and demographic information.    If you feel you have received this communication in error or would no longer like to receive these types of communications, please e-mail [email or web address]

## 2024-07-18 ENCOUNTER — TELEPHONE (OUTPATIENT)
Dept: CARDIOLOGY | Facility: CLINIC | Age: 34
End: 2024-07-18
Payer: MEDICARE

## 2024-07-18 ENCOUNTER — TELEPHONE (OUTPATIENT)
Dept: BARIATRICS | Facility: CLINIC | Age: 34
End: 2024-07-18
Payer: MEDICARE

## 2024-07-18 NOTE — TELEPHONE ENCOUNTER
EKG scheduled - pt aware    ----- Message from Diamone Speed sent at 7/18/2024 10:43 AM CDT -----  Regarding: main campus  Type: Patient Call Back       Who called: [facility]        What is the request in detail:    Is calling to get an EKG schdule     Can the clinic reply by [facility]? Yes       Would the patient rather a call back or a response via [facility]? Call back       Best call back number:[phone]

## 2024-07-18 NOTE — TELEPHONE ENCOUNTER
Called pt and assisted w/scheduling CXR, EKG, and labs. Time and date approved. All questions and concerns addressed. Notified Cole that I am having trouble securing appt times.

## 2024-07-18 NOTE — TELEPHONE ENCOUNTER
----- Message from India Talley sent at 7/17/2024  9:30 AM CDT -----  Regarding: needs to schedule labs, cxr and ekg for work up  needs to schedule labs, cxr and ekg for work up, [facility] location preferred, thanks!

## 2024-07-19 ENCOUNTER — HOSPITAL ENCOUNTER (OUTPATIENT)
Dept: CARDIOLOGY | Facility: HOSPITAL | Age: 34
Discharge: HOME | End: 2024-07-19
Payer: MEDICARE

## 2024-07-19 ENCOUNTER — PATIENT MESSAGE (OUTPATIENT)
Dept: PSYCHIATRY | Facility: CLINIC | Age: 34
End: 2024-07-19
Payer: MEDICARE

## 2024-07-19 DIAGNOSIS — K21.9 GASTROESOPHAGEAL REFLUX DISEASE WITHOUT ESOPHAGITIS: ICD-10-CM

## 2024-07-19 DIAGNOSIS — I10 ESSENTIAL HYPERTENSION: ICD-10-CM

## 2024-07-19 DIAGNOSIS — Z94.0 DECEASED-DONOR KIDNEY TRANSPLANT: ICD-10-CM

## 2024-07-19 PROCEDURE — 93010 ELECTROCARDIOGRAM REPORT: CPT | Mod: HCNC,,, | Performed by: STUDENT IN AN ORGANIZED HEALTH CARE EDUCATION/TRAINING PROGRAM

## 2024-07-19 PROCEDURE — 93005 ELECTROCARDIOGRAM TRACING: CPT | Mod: HCNC

## 2024-07-20 ENCOUNTER — HOSPITAL ENCOUNTER (EMERGENCY)
Facility: HOSPITAL | Age: 34
Discharge: HOME | End: 2024-07-20
Attending: EMERGENCY MEDICINE
Payer: MEDICARE

## 2024-07-20 VITALS
HEART RATE: 68 BPM | DIASTOLIC BLOOD PRESSURE: 90 MMHG | OXYGEN SATURATION: 98 % | BODY MASS INDEX: 36.79 KG/M2 | TEMPERATURE: 99 F | WEIGHT: 227.94 LBS | RESPIRATION RATE: 18 BRPM | SYSTOLIC BLOOD PRESSURE: 153 MMHG

## 2024-07-20 DIAGNOSIS — S99.921A RIGHT FOOT INJURY: ICD-10-CM

## 2024-07-20 DIAGNOSIS — T14.8XXA PUNCTURE WOUND: Primary | ICD-10-CM

## 2024-07-20 LAB
OHS QRS DURATION: 90 MS
OHS QTC CALCULATION: 384 MS

## 2024-07-20 PROCEDURE — 99284 EMERGENCY DEPT VISIT MOD MDM: CPT | Mod: 25,HCNC

## 2024-07-20 PROCEDURE — 63600175 PHARM REV CODE 636 W HCPCS: Mod: HCNC | Performed by: NURSE PRACTITIONER

## 2024-07-20 PROCEDURE — 90471 IMMUNIZATION ADMIN: CPT | Mod: HCNC | Performed by: NURSE PRACTITIONER

## 2024-07-20 PROCEDURE — 90715 TDAP VACCINE 7 YRS/> IM: CPT | Mod: HCNC | Performed by: NURSE PRACTITIONER

## 2024-07-20 RX ORDER — CEPHALEXIN 500 MG/1
500 CAPSULE ORAL 4 TIMES DAILY
Qty: 20 CAPSULE | Refills: 0 | Status: SHIPPED | OUTPATIENT
Start: 2024-07-20 | End: 2024-07-25

## 2024-07-20 RX ADMIN — TETANUS TOXOID, REDUCED DIPHTHERIA TOXOID AND ACELLULAR PERTUSSIS VACCINE, ADSORBED 0.5 ML: 5; 2.5; 8; 8; 2.5 SUSPENSION INTRAMUSCULAR at 04:07

## 2024-07-21 NOTE — ED PROVIDER NOTES
"Encounter Date: 7/20/2024       History     Chief Complaint   Patient presents with    Puncture Wound     Stepped on gautam nail with puncture wound to right foot one hour pta. Patient has a hx. of kidney transplant 1/2023     33-year-old female presents the emergency department for right foot pain after stepping on a nail prior to arrival.  Patient's last Tdap is unknown.  Patient denies any fever, chills, chest pain, shortness of breath, back pain, abdominal pain, nausea, vomiting, and all other concerns at this time.    The history is provided by the patient. No  was used.     Review of patient's allergies indicates:   Allergen Reactions    Lisinopril Other (See Comments)     Severe cough    Adhesive tape-silicones Itching     Burns    Furosemide Other (See Comments)     Almost gave her right sided heartfailure     Past Medical History:   Diagnosis Date    Allergy     Anemia     Anxiety     Breast feeding status of mother 1/17/2020    Brittle bones     ESRD (end stage renal disease)     M/W/F    General anesthetics causing adverse effect in therapeutic use     pt states "im a light weight"    Hypertension     Insomnia     PSG revealed no CHRYSTAL, but likely psychogenic etiology (anxiety)    RLS (restless legs syndrome)      Past Surgical History:   Procedure Laterality Date    Arm surgery Right     x 2    AV FISTULA PLACEMENT Right     CYSTOSCOPY      HYSTEROSCOPY WITH HYDROTHERMAL ABLATION OF ENDOMETRIUM WITH DILATION AND CURETTAGE N/A 12/04/2018    Procedure: HYSTEROSCOPY, WITH DILATION AND CURETTAGE OF UTERUS AND HYDROTHERMAL ENDOMETRIAL ABLATION;  Surgeon: Tiara Red MD;  Location: [facility];  Service: OB/GYN;  Laterality: N/A;  ATTEMPTED     KIDNEY TRANSPLANT N/A 01/02/2023    Procedure: TRANSPLANT, KIDNEY;  Surgeon: Go Beard MD;  Location: [address];  Service: Transplant;  Laterality: N/A;    LAPAROSCOPIC SALPINGECTOMY Bilateral 12/04/2018    Procedure: SALPINGECTOMY, " LAPAROSCOPIC;  Surgeon: Tiara Red MD;  Location: [facility];  Service: OB/GYN;  Laterality: Bilateral;    RENAL BIOPSY      tumor removed      from face per medical records     Family History   Problem Relation Name Age of Onset    Hypertension Father      Breast cancer Paternal Grandmother      Diabetes Maternal Grandmother      Heart attack Maternal Grandmother      Lung cancer Maternal Grandfather      Prostate cancer Maternal Grandfather       Social History     Tobacco Use    Smoking status: Never    Smokeless tobacco: Never    Tobacco comments:     Situational smoking, due to family crisis   Substance Use Topics    Alcohol use: Never    Drug use: Never     Review of Systems   Constitutional:  Negative for fever.   HENT:  Negative for sore throat.    Respiratory:  Negative for shortness of breath.    Cardiovascular:  Negative for chest pain.   Gastrointestinal:  Negative for abdominal pain, nausea and vomiting.   Genitourinary:  Negative for dysuria.   Musculoskeletal:  Negative for back pain.   Skin:  Positive for wound. Negative for rash.   Neurological:  Negative for weakness.   Hematological:  Does not bruise/bleed easily.       Physical Exam     Initial Vitals [07/20/24 1558]   BP Pulse Resp Temp SpO2   (!) 153/90 68 18 98.8 °F (37.1 °C) 98 %      MAP       --         Physical Exam    Nursing note and vitals reviewed.  Constitutional: She appears well-developed and well-nourished. She is not diaphoretic. No distress.   HENT:   Head: Normocephalic and atraumatic.   Eyes: Right eye exhibits no discharge. Left eye exhibits no discharge.   Neck: Neck supple.   Normal range of motion.  Cardiovascular:  Normal rate.           Pulmonary/Chest: No respiratory distress.   Abdominal: She exhibits no distension.   Musculoskeletal:         General: Normal range of motion.      Cervical back: Normal range of motion and neck supple.     Neurological: She is alert and oriented to person, place, and time. She has  normal strength.   Skin: Skin is warm and dry.   Pinpoint lesion noted to the volar aspect of the right medial midfoot.   Psychiatric: She has a normal mood and affect. Her behavior is normal. Thought content normal.         ED Course   Procedures  Labs Reviewed - No data to display       Imaging Results              X-Ray Foot Complete Right (Final result)  Result time 07/20/24 16:40:42      Final result by Jose Chapa MD (07/20/24 16:40:42)                   Impression:      As above.  Correlation and further evaluation as warranted.      Electronically signed by: Jose Chapa  Date:    07/20/2024  Time:    16:40               Narrative:    EXAMINATION:  XR FOOT COMPLETE 3 VIEW RIGHT    CLINICAL HISTORY:  . Unspecified injury of right foot, initial encounter    TECHNIQUE:  AP, lateral, and oblique views of the right foot were performed.    COMPARISON:  None    FINDINGS:  No acute displaced fracture.  No traumatic malalignment.  No osseous destructive process.  Soft tissue swelling.                                       Medications   Tdap (BOOSTRIX) vaccine injection 0.5 mL (0.5 mLs Intramuscular Given 7/20/24 1630)     Medical Decision Making  Amount and/or Complexity of Data Reviewed  Radiology: ordered.    Risk  Prescription drug management.                                      Clinical Impression:  Final diagnoses:  [S99.921A] Right foot injury  [T14.8XXA] Puncture wound (Primary)          ED Disposition Condition    Discharge Stable          ED Prescriptions       Medication Sig Dispense Start Date End Date Auth. Provider    cephALEXin (KEFLEX) 500 MG capsule Take 1 capsule (500 mg total) by mouth 4 (four) times daily. for 5 days 20 capsule 7/20/2024 7/25/2024 Jorge Marsh, NP          Follow-up Information       Follow up With Specialties Details Why Contact Info    pcp of choice   As needed     [facility] - Emergency Dept. Emergency Medicine  If symptoms worsen [address] [facility]  [facility]  [facility] [address]  [phone]             Jorge Marsh, NP  07/20/24 5680

## 2024-07-22 ENCOUNTER — HOSPITAL ENCOUNTER (OUTPATIENT)
Dept: RADIOLOGY | Facility: HOSPITAL | Age: 34
Discharge: HOME | End: 2024-07-22
Attending: NURSE PRACTITIONER
Payer: MEDICARE

## 2024-07-22 ENCOUNTER — PATIENT OUTREACH (OUTPATIENT)
Dept: EMERGENCY MEDICINE | Facility: HOSPITAL | Age: 34
End: 2024-07-22
Payer: MEDICARE

## 2024-07-22 DIAGNOSIS — K21.9 GASTROESOPHAGEAL REFLUX DISEASE WITHOUT ESOPHAGITIS: ICD-10-CM

## 2024-07-22 DIAGNOSIS — R76.8 HELICOBACTER PYLORI AB+: Primary | ICD-10-CM

## 2024-07-22 DIAGNOSIS — I10 ESSENTIAL HYPERTENSION: ICD-10-CM

## 2024-07-22 DIAGNOSIS — Z94.0 DECEASED-DONOR KIDNEY TRANSPLANT: ICD-10-CM

## 2024-07-22 PROCEDURE — 71046 X-RAY EXAM CHEST 2 VIEWS: CPT | Mod: 26,HCNC,, | Performed by: RADIOLOGY

## 2024-07-22 PROCEDURE — 71046 X-RAY EXAM CHEST 2 VIEWS: CPT | Mod: TC,HCNC

## 2024-07-22 RX ORDER — OMEPRAZOLE 20 MG/1
20 CAPSULE, DELAYED RELEASE ORAL 2 TIMES DAILY
Qty: 28 CAPSULE | Refills: 0 | Status: SHIPPED | OUTPATIENT
Start: 2024-07-22 | End: 2024-07-23 | Stop reason: ALTCHOICE

## 2024-07-22 RX ORDER — ERGOCALCIFEROL 1.25 MG/1
50000 CAPSULE ORAL
Qty: 12 CAPSULE | Refills: 0 | Status: SHIPPED | OUTPATIENT
Start: 2024-07-22 | End: 2024-10-14

## 2024-07-22 RX ORDER — AMOXICILLIN 500 MG/1
1000 TABLET, FILM COATED ORAL EVERY 12 HOURS
Qty: 56 TABLET | Refills: 0 | Status: SHIPPED | OUTPATIENT
Start: 2024-07-22 | End: 2024-08-05

## 2024-07-22 RX ORDER — CLARITHROMYCIN 500 MG/1
500 TABLET, FILM COATED ORAL EVERY 12 HOURS
Qty: 28 TABLET | Refills: 0 | Status: SHIPPED | OUTPATIENT
Start: 2024-07-22 | End: 2024-08-05

## 2024-07-22 NOTE — PROGRESS NOTES
Emi Garcia  ED Navigator  Emergency Department    Project: [facility] ED Navigator  Role: Community Health Worker    Date: 07/22/2024  Patient Name: Lisseth Schwartz  MRN: [id number]  PCP: No, Primary Doctor    Assessment:     Lisseth Schwartz is a 33 y.o. female who has presented to ED for Right foot injury; Puncture wound. Patient has visited the ED 1 times in the past 3 months. Patient did not contact PCP.     ED Navigator Initial Assessment    ED Navigator Enrollment Documentation  Consent to Services  Does patient consent to completing the assessment?: Yes  Contact  Method of Initial Contact: Phone  Transportation  Does the patient have issues with Transportation?: No  Does the patient have transportation to and from healthcare appointments?: Yes  Insurance Coverage  Do you have coverage/adequate coverage?: Yes  Type/kind of coverage: [employer]  Is patient able to afford co-pays/deductibles?: Yes  Is patient able to afford HME or supplies?: Yes  Does patient have an established [facility] PCP?: Yes  Able to access?: Yes  Does the patient have a lack of adequate coverage?: No  Specialist Appointment  Did the patient come to the ED to see a specialist?: No  Does the patient have a pending specialist referral?: No  Does the patient have a specialist appointment made?: No  PCP Follow Up Appointment  Has the patient had an appointment with a primary care provider in the past year?: No  Does the patient have a follow up appontment with a PCP?: No  When was the last time you saw your PCP?: 6/13/24  Why does the patient not have a follow up scheduled?: Other (see comments) (Comment: Pt did not contact pcp)  Medications  Is patient able to afford medication?: Yes  Is patient unable to get medication due to lack of transportation?: No  Psychological  Does the patient have psycho-social concerns?: Yes  What concerns does the patient have?: Other (see comments) (Comment: Hx of psychotic episode charted)  Food  Does the patient  have concerns about food?: No  Communication/Education  Does the patient have limited English proficiency/English not primary language?: No  Does patient have low literacy and/or low health literacy?: Yes  Does patient have concerns with care?: No  Does patient have dissatisfaction with care?: No  Other Financial Concerns  Does the patient have immediate financial distress?: No  Does the patient have general financial concerns?: Yes (Comment: Bills, utilities)  Other Social Barriers/Concerns  Does the patient have any additional barriers or concerns?: Other (see comments) (Comment: Financial concerns, chronic conditions)  Primary Barrier  Barriers identified: Cognitive barrier (health literacy, language and communication, etc.), Psychological barrier (mistrust, anxiety, etc.)  Root Cause of ED Utilization: Chronic Conditions  Plan to address Chronic Conditions: Schedule appointment for patient with their PCP/specialist per ED discharge instructions  Next steps: Provided Education  Was education/educational materials provided surrounding PCP services/creating a medical home?: Yes Was education verbal or written?: Written     Was education/educational materials provided surrounding low cost, healthy foods?: Yes Was education verbal or written?: Written     Was education/educational materials provided surrounding other items? If so, use comment to explain.: Yes Was education verbal or written?: Written   Additional Documentation: Pt was seen in the ED on 7/19/24 for Right foot injury; Puncture wound. I spoke with pt for Post ED visit follow up navigation to assist with scheduling a 7-day Post ED visit follow up appt. Pt states that she had not yet contacted pcp to schedule and would like to establish care with a pcp. Pt accepted scheduling assistance. I was unable to schedule a new pt appt and sent a staff request for assistance to Dr. Cher Johnson. Pt will be contacted directly for scheduling. Pt does not have  transportation issues and has no additional needs at this time.  Pt states that she does have difficulty with utilities and bills and has 4 children. Pt confirmed email address and was provided resources for housing, utilities, food, childcare resources.     Emi Garcia            Social History     Socioeconomic History    Marital status: Single    Number of children: 4   Tobacco Use    Smoking status: Never    Smokeless tobacco: Never    Tobacco comments:     Situational smoking, due to family crisis   Substance and Sexual Activity    Alcohol use: Never    Drug use: Never    Sexual activity: Yes     Partners: Male   Social History Narrative    Caregiver patricia'     Social Determinants of Health     Financial Resource Strain: Medium Risk (7/22/2024)    Overall Financial Resource Strain (CARDIA)     Difficulty of Paying Living Expenses: Somewhat hard   Food Insecurity: No Food Insecurity (7/22/2024)    Hunger Vital Sign     Worried About Running Out of Food in the Last Year: Never true     Ran Out of Food in the Last Year: Never true   Transportation Needs: No Transportation Needs (7/22/2024)    PRAPARE - Transportation     Lack of Transportation (Medical): No     Lack of Transportation (Non-Medical): No   Physical Activity: Insufficiently Active (7/3/2024)    Exercise Vital Sign     Days of Exercise per Week: 2 days     Minutes of Exercise per Session: 20 min   Stress: Stress Concern Present (7/22/2024)    [employer] - Occupational Stress Questionnaire     Feeling of Stress : To some extent   Housing Stability: High Risk (7/22/2024)    Housing Stability Vital Sign     Unable to Pay for Housing in the Last Year: Yes     Homeless in the Last Year: No       Plan:   Pt was seen in the ED on 7/19/24 for Right foot injury; Puncture wound. I spoke with pt for Post ED visit follow up navigation to assist with scheduling a 7-day Post ED visit follow up appt. Pt states that she had not yet  contacted pcp to schedule and would like to establish care with a pcp. Pt accepted scheduling assistance. I was unable to schedule a new pt appt and contacted Central scheduling to assist with establishing a primary care physician. Pt does not have transportation issues and has no additional needs at this time.  Pt states that she does have difficulty with utilities and bills and has 4 children. Pt confirmed email address and was provided resources for housing, utilities, food, childcare resources.     Emi Garcia         Appointment made with: No, Primary Doctor

## 2024-07-23 ENCOUNTER — TELEPHONE (OUTPATIENT)
Dept: BARIATRICS | Facility: CLINIC | Age: 34
End: 2024-07-23
Payer: MEDICARE

## 2024-07-23 ENCOUNTER — PATIENT MESSAGE (OUTPATIENT)
Dept: BARIATRICS | Facility: CLINIC | Age: 34
End: 2024-07-23
Payer: MEDICARE

## 2024-07-23 DIAGNOSIS — Z94.0 S/P KIDNEY TRANSPLANT: ICD-10-CM

## 2024-07-23 RX ORDER — PANTOPRAZOLE SODIUM 40 MG/1
40 TABLET, DELAYED RELEASE ORAL 2 TIMES DAILY
Qty: 28 TABLET | Refills: 0 | Status: SHIPPED | OUTPATIENT
Start: 2024-07-23 | End: 2024-08-06

## 2024-07-26 ENCOUNTER — PATIENT OUTREACH (OUTPATIENT)
Dept: EMERGENCY MEDICINE | Facility: HOSPITAL | Age: 34
End: 2024-07-26
Payer: MEDICARE

## 2024-07-26 NOTE — PROGRESS NOTES
ED Navigator contacted pt by email to provide details regarding pt's upcoming new patient appointment to establish care. The appointment is scheduled on Tuesday, 7/30/24 at 9:00 a.m.     Emi Garcia

## 2024-08-05 ENCOUNTER — CLINICAL SUPPORT (OUTPATIENT)
Dept: PSYCHIATRY | Facility: CLINIC | Age: 34
End: 2024-08-05
Payer: MEDICARE

## 2024-08-05 DIAGNOSIS — Z00.8 ENCOUNTER FOR PRE-SURGICAL PSYCHOLOGICAL ASSESSMENT: Primary | ICD-10-CM

## 2024-08-06 ENCOUNTER — OFFICE VISIT (OUTPATIENT)
Dept: PSYCHIATRY | Facility: CLINIC | Age: 34
End: 2024-08-06
Payer: MEDICARE

## 2024-08-06 DIAGNOSIS — I10 ESSENTIAL HYPERTENSION: ICD-10-CM

## 2024-08-06 DIAGNOSIS — E66.01 SEVERE OBESITY (BMI 35.0-39.9) WITH COMORBIDITY: ICD-10-CM

## 2024-08-06 DIAGNOSIS — Z71.89 ENCOUNTER FOR PSYCHOLOGICAL ASSESSMENT PRIOR TO BARIATRIC SURGERY: Primary | ICD-10-CM

## 2024-08-06 DIAGNOSIS — Z94.0 DECEASED-DONOR KIDNEY TRANSPLANT: ICD-10-CM

## 2024-08-06 DIAGNOSIS — K21.9 GASTROESOPHAGEAL REFLUX DISEASE WITHOUT ESOPHAGITIS: ICD-10-CM

## 2024-08-06 PROCEDURE — 90791 PSYCH DIAGNOSTIC EVALUATION: CPT | Mod: HCNC,95,, | Performed by: PSYCHOLOGIST

## 2024-08-06 PROCEDURE — 3066F NEPHROPATHY DOC TX: CPT | Mod: HCNC,CPTII,95, | Performed by: PSYCHOLOGIST

## 2024-08-06 PROCEDURE — 96130 PSYCL TST EVAL PHYS/QHP 1ST: CPT | Mod: HCNC,95,, | Performed by: PSYCHOLOGIST

## 2024-08-06 PROCEDURE — 3044F HG A1C LEVEL LT 7.0%: CPT | Mod: HCNC,CPTII,95, | Performed by: PSYCHOLOGIST

## 2024-08-06 PROCEDURE — 96146 PSYCL/NRPSYC TST AUTO RESULT: CPT | Mod: HCNC,95,59, | Performed by: PSYCHOLOGIST

## 2024-08-09 ENCOUNTER — TELEPHONE (OUTPATIENT)
Dept: BARIATRICS | Facility: CLINIC | Age: 34
End: 2024-08-09
Payer: MEDICARE

## 2024-08-21 ENCOUNTER — TELEPHONE (OUTPATIENT)
Dept: BARIATRICS | Facility: CLINIC | Age: 34
End: 2024-08-21
Payer: MEDICARE

## 2024-08-21 NOTE — TELEPHONE ENCOUNTER
Missing wkup notified via portal. RN f/u updated.   Needs diet cl, possible transplant clearance update

## 2024-08-21 NOTE — TELEPHONE ENCOUNTER
Returned portal message. Assisted pt w/scheduling diet appt. Informed pt that once diet clearance her, she will be ready to be scheduled for sx. Understanding stated. All questions and concerns addressed.

## 2024-08-22 ENCOUNTER — CLINICAL SUPPORT (OUTPATIENT)
Dept: BARIATRICS | Facility: CLINIC | Age: 34
End: 2024-08-22
Payer: MEDICARE

## 2024-08-22 DIAGNOSIS — Z71.3 DIETARY COUNSELING: Primary | ICD-10-CM

## 2024-08-22 DIAGNOSIS — E66.9 OBESITY (BMI 30-39.9): ICD-10-CM

## 2024-08-22 DIAGNOSIS — I10 ESSENTIAL HYPERTENSION: ICD-10-CM

## 2024-08-22 NOTE — PROGRESS NOTES
"The patient location is: home ([place])  The chief complaint leading to consultation is: obesity    Visit type: audiovisual    Face to Face time with patient: 30 min  30 minutes of total time spent on the encounter, which includes face to face time and non-face to face time preparing to see the patient (eg, review of tests), Obtaining and/or reviewing separately obtained history, Documenting clinical information in the electronic or other health record, Independently interpreting results (not separately reported) and communicating results to the patient/family/caregiver, or Care coordination (not separately reported).         Each patient to whom he or she provides medical services by telemedicine is:  (1) informed of the relationship between the physician and patient and the respective role of any other health care provider with respect to management of the patient; and (2) notified that he or she may decline to receive medical services by telemedicine and may withdraw from such care at any time.        NUTRITIONAL Note     Referring Physician: Orville Payne M.D.   Reason for MNT Referral: Follow up assessment for sleeve gastrectomy work-up     33 y.o. female presents with weight gain 4 lbs over the past month; weight up 10 lbs over the past 2 months. C/o steroid treatments increasing her appetite for carbs/fried foods/sugar. States she has continued making some dietary and lifestyle changes in preparation for bariatric surgery. Still no protein supplement for meal replacement d/t stomach hurting. Plans to try other flavors or different brand maybe lactose free.             Past Medical History:   Diagnosis Date    Allergy      Anemia      Anxiety      Breast feeding status of mother 1/17/2020    Brittle bones      ESRD (end stage renal disease)       M/W/F    General anesthetics causing adverse effect in therapeutic use       pt states "im a light weight"    Hypertension      Insomnia       PSG revealed no CHRYSTAL, but " likely psychogenic etiology (anxiety)    RLS (restless legs syndrome)        CLINICAL DATA:  33 y.o.-year-old Black or  female.  Height: 5 ft. 6 in.  Weight: 234 lbs  IBW: 144 lbs  BMI: 37.1     DAILY NUTRITIONAL NEEDS: pre-op nutritional bariatric guidelines to promote weight loss  8548-2611 Calories    Grams Protein     NUTRITION & HEALTH HISTORY:  Greatest challenge: starchy CHO, portion control, snacking at night, and irregular meal patterns     Current diet recall:      B: none  - coffee with 2 caps of Premier shake  L: fried shrimp sandwich on lettuce instead of bread, small order of fries, sweet tea  - fajita chicken   D: Carraba's seafood stuffed grilled chicken breast     Current Diet:  Meal pattern: 2-3 meals  Protein supplements: Premier shakes (making her stomach cramp and have diarrhea). Other protein drinks with only 10g protein (likely too high in sugar)  Snacking: not a big snacker.   Vegetables: Likes a variety. Eats daily.  Fruits: Likes a variety. Eats 2-3 times per week.  Beverages: water, and sweet tea.   Dining out: Weekly. Monthly. Mostly fast food, restaurants, and take-out. Chinese protein and veg, no rice. Tried sushi rolls with cucumber, no rice  Cooking at home: Daily. Weekly. Mostly baked, grilled, and smothered meat, fish, (limited starchy CHO), and vegetables.      Exercise:  Walking the neighborhood, exercises at home  No gym lately.  Has membership to [employer] gym. Usual routine - 1-2 times/week for 2-3 hours (30 min sauna, weights, full body work out, stairs 5-10 min, rowing 10-15 min, elliptical 10-15 min, 30 min sauna)     Restrictions to exercise: none     Vitamins / Minerals / Herbs:   Vit D Rx 50,000IU weekly     Labs:   Reviewed     Food Allergies:   None known     Social:  Catering food/hosting parties, writer, lyons/wrapper, relationship therapist, radio talk show  Lives with 3 teenagers and a 5 yr old.  Grocery shopping and food prep done by the  pt.  Patient believes the household will be supportive after surgery.  Alcohol: Socially. Shot of alcohol or glass of wine.  Smoking: none     ASSESSMENT:  Patient demonstrated knowledge of healthy eating behaviors and exercise patterns.  Patient demonstrates willingness to change lifestyle and make behavior modifications AEB self reported dietary changes and exercise.           Barriers to Education: none     Stage of change: determination/action     NUTRITION DIAGNOSIS:    Obesity related to Excessive carbohydrate intake, Excessive calorie intake, and Physical inactivity as evidence by BMI.     BARIATRIC DIET DISCUSSION/PLAN:  Discussed diet after surgery and related to patient's food record.  Reviewed nutrition guidelines for before and after surgery.  Answered all questions.  Continue to review Bariatric Nutrition Guidebook at home and call with any questions.  Work on Bariatric Nutrition Checklist.  Work on expanding variety of vegetables.  Work on gradually cutting back on starchy CHO in the diet.  1200-calorie diet.  1500-calorie diet.  5-6 meals per day.  Try switching protein supplements to lactose free options.  Eliminate sugary drinks. Tea mix has sugar in it.  No fried foods.     RECOMMENDATIONS:  Pt is a potential candidate for bariatric surgery - Sleeve.     Follow up in one month.  Needs additional visits with RD to work on dietary and lifestyle changes in preparation for bariatric surgery.     Patient verbalized understanding.        Communicated nutrition plan with bariatric team.     SESSION TIME:  30 minutes

## 2024-09-03 ENCOUNTER — PATIENT MESSAGE (OUTPATIENT)
Dept: BARIATRICS | Facility: CLINIC | Age: 34
End: 2024-09-03
Payer: MEDICARE

## 2024-09-05 RX ORDER — ERGOCALCIFEROL 1.25 MG/1
50000 CAPSULE ORAL
Qty: 12 CAPSULE | Refills: 0 | OUTPATIENT
Start: 2024-09-05 | End: 2024-11-22

## 2024-09-05 RX ORDER — ALBUTEROL SULFATE 90 UG/1
1-2 INHALANT RESPIRATORY (INHALATION) EVERY 6 HOURS PRN
Qty: 8.5 G | Refills: 1 | Status: SHIPPED | OUTPATIENT
Start: 2024-09-05

## 2024-09-06 ENCOUNTER — TELEPHONE (OUTPATIENT)
Dept: BARIATRICS | Facility: CLINIC | Age: 34
End: 2024-09-06
Payer: MEDICARE

## 2024-09-10 ENCOUNTER — PATIENT MESSAGE (OUTPATIENT)
Dept: BARIATRICS | Facility: CLINIC | Age: 34
End: 2024-09-10
Payer: MEDICARE

## 2024-09-20 ENCOUNTER — CLINICAL SUPPORT (OUTPATIENT)
Dept: BARIATRICS | Facility: CLINIC | Age: 34
End: 2024-09-20
Payer: MEDICARE

## 2024-09-20 DIAGNOSIS — E66.9 OBESITY (BMI 30-39.9): ICD-10-CM

## 2024-09-20 DIAGNOSIS — I10 ESSENTIAL HYPERTENSION: ICD-10-CM

## 2024-09-20 DIAGNOSIS — Z71.3 DIETARY COUNSELING: Primary | ICD-10-CM

## 2024-09-20 NOTE — PROGRESS NOTES
"The patient location is: home ([place])  The chief complaint leading to consultation is: obesity    Visit type: audiovisual    Face to Face time with patient: 25 min  30 minutes of total time spent on the encounter, which includes face to face time and non-face to face time preparing to see the patient (eg, review of tests), Obtaining and/or reviewing separately obtained history, Documenting clinical information in the electronic or other health record, Independently interpreting results (not separately reported) and communicating results to the patient/family/caregiver, or Care coordination (not separately reported).         Each patient to whom he or she provides medical services by telemedicine is:  (1) informed of the relationship between the physician and patient and the respective role of any other health care provider with respect to management of the patient; and (2) notified that he or she may decline to receive medical services by telemedicine and may withdraw from such care at any time.           NUTRITIONAL Note     Referring Physician: Orville Payne M.D.   Reason for MNT Referral: Follow up assessment for sleeve gastrectomy work-up     33 y.o. female presents with weight loss 6 lbs over the past month by making dietary and lifestyle changes in preparation for bariatric surgery.               Past Medical History:   Diagnosis Date    Allergy      Anemia      Anxiety      Breast feeding status of mother 1/17/2020    Brittle bones      ESRD (end stage renal disease)       M/W/F    General anesthetics causing adverse effect in therapeutic use       pt states "im a light weight"    Hypertension      Insomnia       PSG revealed no CHRYSTAL, but likely psychogenic etiology (anxiety)    RLS (restless legs syndrome)        CLINICAL DATA:  33 y.o.-year-old Black or  female.  Height: 5 ft. 6 in.  Weight: 228 lbs  IBW: 144 lbs  BMI: 36.7     DAILY NUTRITIONAL NEEDS: pre-op nutritional bariatric guidelines " to promote weight loss  2411-7192 Calories    Grams Protein     NUTRITION & HEALTH HISTORY:  Greatest challenge: starchy CHO, portion control, snacking at night, and irregular meal patterns     Current diet recall:      B: none  - coffee with lemon juice  L: none or air popped popcorn  D: Carraba's salad with carrots, parmesan shredded, banana peppers, cherry jenae, black olives, nuts, sunflower seeds, boiled eggs, grilled chicken/shrimps and Italian dressing     Current Diet:  Meal pattern: 1 meal  Protein supplements: Premier shakes - none lately d/t budget  Snacking: not a big snacker.   Vegetables: Likes a variety. Eats daily.  Fruits: Likes a variety. Eats 2-3 times per week.  Beverages: water, and sweet tea.   Dining out: Weekly. Monthly. Mostly fast food, restaurants, and take-out. Chinese protein and veg, no rice. Tried sushi rolls with cucumber, no rice  Cooking at home: Daily. Weekly. Mostly baked, grilled, and smothered meat, fish, (limited starchy CHO), and vegetables.      Exercise:  Walking the neighborhood with her boyfriend     Restrictions to exercise: none     Vitamins / Minerals / Herbs:   Vit D Rx 50,000IU weekly     Labs:   Reviewed     Food Allergies:   None known     Social:  Catering food/hosting parties, writer, lyons/wrapper, relationship therapist, radio talk show  Lives with 3 teenagers and a 6 yr old.  Grocery shopping and food prep done by the pt.  Patient believes the household will be supportive after surgery.  Alcohol: Socially. Shot of alcohol or glass of wine.  Smoking: none     ASSESSMENT:  Patient demonstrated knowledge of healthy eating behaviors and exercise patterns.  Patient demonstrates willingness to change lifestyle and make behavior modifications AEB self reported weight loss, dietary changes and exercise.           Barriers to Education: none     Stage of change: determination/action     NUTRITION DIAGNOSIS:    Obesity related to Excessive carbohydrate intake,  Excessive calorie intake, and Physical inactivity as evidence by BMI.     BARIATRIC DIET DISCUSSION/PLAN:  Discussed diet after surgery and related to patient's food record.  Reviewed nutrition guidelines for before and after surgery.  Answered all questions.  Continue to review Bariatric Nutrition Guidebook at home and call with any questions.  Work on Bariatric Nutrition Checklist.  Work on expanding variety of vegetables.  Continue cutting back on starchy CHO in the diet.  1200-calorie diet.  1500-calorie diet.  5-6 meals per day.     RECOMMENDATIONS:  Pt is a good candidate for bariatric surgery - Sleeve.     Patient verbalized understanding.     Will f/u before/after surgery as needed.    Communicated nutrition plan with bariatric team.     SESSION TIME:  30 minutes

## 2024-09-21 DIAGNOSIS — Z94.0 S/P KIDNEY TRANSPLANT: ICD-10-CM

## 2024-09-23 RX ORDER — PANTOPRAZOLE SODIUM 40 MG/1
40 TABLET, DELAYED RELEASE ORAL 2 TIMES DAILY
Qty: 28 TABLET | Refills: 0 | Status: SHIPPED | OUTPATIENT
Start: 2024-09-23 | End: 2024-10-11

## 2024-09-25 ENCOUNTER — TELEPHONE (OUTPATIENT)
Dept: BARIATRICS | Facility: CLINIC | Age: 34
End: 2024-09-25
Payer: MEDICARE

## 2024-09-25 DIAGNOSIS — E66.01 CLASS 2 SEVERE OBESITY DUE TO EXCESS CALORIES WITH SERIOUS COMORBIDITY AND BODY MASS INDEX (BMI) OF 36.0 TO 36.9 IN ADULT: Primary | ICD-10-CM

## 2024-09-25 DIAGNOSIS — Z79.899 LONG-TERM USE OF HIGH-RISK MEDICATION: ICD-10-CM

## 2024-09-25 DIAGNOSIS — N18.6 END STAGE RENAL DISEASE: ICD-10-CM

## 2024-09-25 DIAGNOSIS — Z79.899 POLYPHARMACY: ICD-10-CM

## 2024-09-25 DIAGNOSIS — I10 ESSENTIAL HYPERTENSION: ICD-10-CM

## 2024-09-25 DIAGNOSIS — Z94.0 S/P KIDNEY TRANSPLANT: ICD-10-CM

## 2024-09-25 DIAGNOSIS — K21.9 GASTROESOPHAGEAL REFLUX DISEASE WITHOUT ESOPHAGITIS: ICD-10-CM

## 2024-09-25 NOTE — TELEPHONE ENCOUNTER
Called pt to discuss Garry sx date. No answer, LVM w/cb name and number. Will send portal message.

## 2024-09-25 NOTE — LETTER
"  [facility] 2nd Fl  [address]  [facility] [address]  Phone: [phone]  Fax: [phone] 2024       Attn: Pre-determination Dept.    RE:  Lisseth Schwartz          OC #: [id number]          : 1990    To Whom It May Concern:  I am sending this letter on behalf of Lisseth Schwartz (a 33 y.o. female) for pre-approval for Bariatric Surgery; specifically the laparoscopic sleeve gastrectomy. Lisseth  has a past medical history of morbid obesity, BMI 36-36.9, Brittle bones, ESRD (end stage renal disease), Hypertension s/p kidney transplant, and 10 year history of GERD on daily PPI.  Lisseth Schwartz  has made numerous attempts at dieting and exercise programs, and has failed to maintain any sustained weight loss.  Weight/Height/BMI  Estimated body mass index is 36.79 kg/m² as calculated from the following:  Height as of 24: 5' 6" (1.676 m).  Weight as of 24: 103.4 kg (227 lb 15.3 oz).  Lisseth Schwartz has been evaluated in our bariatric program by myself, the , and the program dietician and is felt to be an excellent candidate for surgery.  In addition, she has undergone a psychological evaluation, from which a letter is enclosed.  Lisseth Schwartz has undergone extensive pre-operative education and understands all the risks, benefits, and possible complications of surgery.  She has also undergone dietary education and thorough nutritional evaluation via a registered dietician.  Our program provides long term nutritional counseling with unlimited consults with the dietician.    Our team is sending this letter to receive pre-approval for the indicated procedure.  Please let us know if you have any questions or require any further information.  Sincerely,    Orville Payne MD  General, Laparoscopic, and Bariatric Surgery  [facility] [place], [place]'         "

## 2024-09-25 NOTE — TELEPHONE ENCOUNTER
"Spoke with patient and confirmed the surgical procedure of laparoscopic sleeve gastrectomy with Dr Payne on 10-.  Scheduled preop appts/surgery date/2 week and 8 week post op appts. All dates and times agreed upon. Pt aware that if they are required to have PCP clearance, it must be within 6 months of surgery, unless their medical history has changed, it should be dated, signed and in chart for preop appointment. All medications have been reviewed regarding the necessity to be crushed or broken into pieces smaller that the tip of a pencil eraser for 2 weeks following gastric sleeve surgery and 4 weeks following gastric bypass surgery. Pt instructed to stop taking all NSAIDS 1 week before surgery and for life after surgery. Pt aware that protein liquid diet start date is 10-. Patient is doing well with their diet. Patient was instructed about the progression of the diet phases. The patient's current weight is 228 lbs, height is 5'6", and BMI is 36.7. Refer to medical letter of necessity from Surgeon. Discussed the importance of increased physical activity and dieting lifestyle changes to improve weight loss and meet goals. Screened patient for history of UTI per protocol. Discussed with patient to avoid antibiotics and elective procedures involving sedation/anesthesia within 30 days of surgery unless cleared by the bariatric department. Patient instructed to call the bariatric clinic post op for any s/s of UTI. Patient's mailing address confirmed and informed to expect a manilla envelop containing bariatric surgery pre op booklet, appointment reminders, protein and fluid log sheets, and liquid diet and vitamin information sheets. Pt aware that all appts can be seen in my [facility] patient portal at this time. Confirmed email address and informed patient that they will be enrolled in the Patient Reported Outcomes program to track their progress and successes. The first email will be sent 2-3 weeks " before surgery and then every year on your surgery anniversary date. Office phone and fax number given to patient for any future questions/concerns. Discussed the pre-surgery complex carbohydrate beverage to purchase in [facility] pharmacy to drink 30 minutes before the surgery arrival time. Reviewed the policy of scheduling a covid test 72 hours prior to surgery if necessary.

## 2024-09-25 NOTE — TELEPHONE ENCOUNTER
----- Message from Valerie Brar MA sent at 9/25/2024  1:18 PM CDT -----  Regarding: FW: Appt Access  Contact: pt [phone]  Please return pt call  ----- Message -----  From: Tana Daly  Sent: 9/25/2024  12:41 PM CDT  To: Panda Lambert Staff  Subject: Appt Access                                      Type:  Patient Returning Call    Who Called:pt   Who Left Message for Patient:Ginette   Does the patient know what this is regarding?:Yes, regarding surgery  Would the patient rather a call back or a response via [facility]? Callback   Best Call Back Number:[phone]

## 2024-09-26 ENCOUNTER — TELEPHONE (OUTPATIENT)
Dept: TRANSPLANT | Facility: HOSPITAL | Age: 34
End: 2024-09-26
Payer: MEDICARE

## 2024-09-26 NOTE — TELEPHONE ENCOUNTER
Medically stable from kidney transplant perspective to undergo laparoscopic sleeve gastrectomy.    May benefit from getting liquid formulation of transplant medication or at least medication instruction from transplant pharmacist-await their feedback, but this should not affect her plan for surgery.

## 2024-09-26 NOTE — TELEPHONE ENCOUNTER
----- Message from Patricia Caceres NP sent at 9/26/2024 12:21 PM CDT -----  Regarding: RE: kidney transplant clearance  Dr. Monroy-any objections from proceeding with surgery? I have also included transplant pharmacy to review her immunosuppression and if any need to be changed.    Thanks  Patricia  ----- Message -----  From: Ginette Corrales RN  Sent: 9/25/2024   4:45 PM CDT  To: Patricia Caceres NP  Subject: kidney transplant clearance                      Patricia,    I hope all is well. APOORVA SOOT [[id number]] is scheduled for a laparoscopic sleeve gastrectomy on 10-28-24 with Dr. Payne. She will need a clearance from you all. She will need this by the preop date on 10-16-24. She was last seen on 7-17-24. Not sure if you want to addend the note or schedule a visit. She will be on a 1 week liquid diet starting 10-21-24. Also, she stated that her meds can be crushed, broken , and pulled apart. She will need to do this for 2 weeks postop. If you have any questions , please reach out.    Thanks,   Ginette AVENDANO RN  Bariatric Program Coordinator  [phone] (direct line)  [phone] (fax)

## 2024-09-27 ENCOUNTER — TELEPHONE (OUTPATIENT)
Dept: BARIATRICS | Facility: CLINIC | Age: 34
End: 2024-09-27
Payer: MEDICARE

## 2024-09-27 DIAGNOSIS — I10 ESSENTIAL HYPERTENSION: ICD-10-CM

## 2024-09-27 DIAGNOSIS — Z79.899 POLYPHARMACY: ICD-10-CM

## 2024-09-27 DIAGNOSIS — Z94.0 S/P KIDNEY TRANSPLANT: ICD-10-CM

## 2024-09-27 DIAGNOSIS — Z79.899 LONG-TERM USE OF HIGH-RISK MEDICATION: ICD-10-CM

## 2024-09-27 DIAGNOSIS — Z98.84 S/P BARIATRIC SURGERY: Primary | ICD-10-CM

## 2024-09-27 DIAGNOSIS — K21.9 GASTROESOPHAGEAL REFLUX DISEASE WITHOUT ESOPHAGITIS: ICD-10-CM

## 2024-09-30 ENCOUNTER — DOCUMENTATION ONLY (OUTPATIENT)
Dept: BARIATRICS | Facility: CLINIC | Age: 34
End: 2024-09-30
Payer: MEDICARE

## 2024-10-01 ENCOUNTER — PATIENT MESSAGE (OUTPATIENT)
Dept: BARIATRICS | Facility: CLINIC | Age: 34
End: 2024-10-01
Payer: MEDICARE

## 2024-10-02 ENCOUNTER — TELEPHONE (OUTPATIENT)
Dept: BARIATRICS | Facility: CLINIC | Age: 34
End: 2024-10-02
Payer: MEDICARE

## 2024-10-02 NOTE — TELEPHONE ENCOUNTER
Spoke to HNUG Mccrary who stated she had to end her call with the pt d/t computer freezing. Offered to call pt.   Pt called. Pt notified of situation w/HUNG Mccrary. Pt asked of sx had been approved. Informed pt that it takes a couple of wks to receive approval. Understanding stated. Pt asked all labs can be done at [facility] location which is near her house. Appt changes, time and date approved per pt. All questions and concerns addressed.

## 2024-10-08 ENCOUNTER — PATIENT MESSAGE (OUTPATIENT)
Dept: BARIATRICS | Facility: CLINIC | Age: 34
End: 2024-10-08
Payer: MEDICARE

## 2024-10-14 ENCOUNTER — LAB VISIT (OUTPATIENT)
Dept: LAB | Facility: HOSPITAL | Age: 34
End: 2024-10-14
Attending: SURGERY
Payer: MEDICARE

## 2024-10-14 DIAGNOSIS — K21.9 GASTROESOPHAGEAL REFLUX DISEASE WITHOUT ESOPHAGITIS: ICD-10-CM

## 2024-10-14 DIAGNOSIS — I10 ESSENTIAL HYPERTENSION: ICD-10-CM

## 2024-10-14 DIAGNOSIS — Z94.0 S/P KIDNEY TRANSPLANT: ICD-10-CM

## 2024-10-14 DIAGNOSIS — Z79.899 POLYPHARMACY: ICD-10-CM

## 2024-10-14 DIAGNOSIS — E66.01 CLASS 2 SEVERE OBESITY DUE TO EXCESS CALORIES WITH SERIOUS COMORBIDITY AND BODY MASS INDEX (BMI) OF 36.0 TO 36.9 IN ADULT: ICD-10-CM

## 2024-10-14 DIAGNOSIS — Z79.899 LONG-TERM USE OF HIGH-RISK MEDICATION: ICD-10-CM

## 2024-10-14 DIAGNOSIS — N18.6 END STAGE RENAL DISEASE: ICD-10-CM

## 2024-10-14 DIAGNOSIS — E66.812 CLASS 2 SEVERE OBESITY DUE TO EXCESS CALORIES WITH SERIOUS COMORBIDITY AND BODY MASS INDEX (BMI) OF 36.0 TO 36.9 IN ADULT: ICD-10-CM

## 2024-10-14 LAB
25(OH)D3+25(OH)D2 SERPL-MCNC: 22 NG/ML (ref 30–96)
ALBUMIN SERPL BCP-MCNC: 4.1 G/DL (ref 3.5–5.2)
ALP SERPL-CCNC: 77 U/L (ref 55–135)
ALT SERPL W/O P-5'-P-CCNC: 29 U/L (ref 10–44)
ANION GAP SERPL CALC-SCNC: 9 MMOL/L (ref 8–16)
AST SERPL-CCNC: 16 U/L (ref 10–40)
BASOPHILS # BLD AUTO: 0.04 K/UL (ref 0–0.2)
BASOPHILS NFR BLD: 0.6 % (ref 0–1.9)
BILIRUB SERPL-MCNC: 0.4 MG/DL (ref 0.1–1)
BUN SERPL-MCNC: 25 MG/DL (ref 6–20)
CALCIUM SERPL-MCNC: 10.2 MG/DL (ref 8.7–10.5)
CHLORIDE SERPL-SCNC: 105 MMOL/L (ref 95–110)
CO2 SERPL-SCNC: 26 MMOL/L (ref 23–29)
CREAT SERPL-MCNC: 1.2 MG/DL (ref 0.5–1.4)
DIFFERENTIAL METHOD BLD: ABNORMAL
EOSINOPHIL # BLD AUTO: 0.1 K/UL (ref 0–0.5)
EOSINOPHIL NFR BLD: 1.9 % (ref 0–8)
ERYTHROCYTE [DISTWIDTH] IN BLOOD BY AUTOMATED COUNT: 12.3 % (ref 11.5–14.5)
EST. GFR  (NO RACE VARIABLE): >60 ML/MIN/1.73 M^2
GLUCOSE SERPL-MCNC: 113 MG/DL (ref 70–110)
HCT VFR BLD AUTO: 40.4 % (ref 37–48.5)
HGB BLD-MCNC: 12.8 G/DL (ref 12–16)
IMM GRANULOCYTES # BLD AUTO: 0.02 K/UL (ref 0–0.04)
IMM GRANULOCYTES NFR BLD AUTO: 0.3 % (ref 0–0.5)
LYMPHOCYTES # BLD AUTO: 1 K/UL (ref 1–4.8)
LYMPHOCYTES NFR BLD: 16.2 % (ref 18–48)
MCH RBC QN AUTO: 31 PG (ref 27–31)
MCHC RBC AUTO-ENTMCNC: 31.7 G/DL (ref 32–36)
MCV RBC AUTO: 98 FL (ref 82–98)
MONOCYTES # BLD AUTO: 0.6 K/UL (ref 0.3–1)
MONOCYTES NFR BLD: 9.1 % (ref 4–15)
NEUTROPHILS # BLD AUTO: 4.4 K/UL (ref 1.8–7.7)
NEUTROPHILS NFR BLD: 71.9 % (ref 38–73)
NRBC BLD-RTO: 0 /100 WBC
PLATELET # BLD AUTO: 196 K/UL (ref 150–450)
PMV BLD AUTO: 11.9 FL (ref 9.2–12.9)
POTASSIUM SERPL-SCNC: 4 MMOL/L (ref 3.5–5.1)
PROT SERPL-MCNC: 7.7 G/DL (ref 6–8.4)
RBC # BLD AUTO: 4.13 M/UL (ref 4–5.4)
SODIUM SERPL-SCNC: 140 MMOL/L (ref 136–145)
WBC # BLD AUTO: 6.17 K/UL (ref 3.9–12.7)

## 2024-10-14 PROCEDURE — 82306 VITAMIN D 25 HYDROXY: CPT | Mod: HCNC | Performed by: SURGERY

## 2024-10-14 PROCEDURE — 36415 COLL VENOUS BLD VENIPUNCTURE: CPT | Mod: HCNC | Performed by: SURGERY

## 2024-10-14 PROCEDURE — 85025 COMPLETE CBC W/AUTO DIFF WBC: CPT | Mod: HCNC | Performed by: SURGERY

## 2024-10-14 PROCEDURE — 80053 COMPREHEN METABOLIC PANEL: CPT | Mod: HCNC | Performed by: SURGERY

## 2024-10-16 ENCOUNTER — OFFICE VISIT (OUTPATIENT)
Dept: BARIATRICS | Facility: CLINIC | Age: 34
End: 2024-10-16
Payer: MEDICARE

## 2024-10-16 VITALS
BODY MASS INDEX: 37.72 KG/M2 | HEART RATE: 80 BPM | DIASTOLIC BLOOD PRESSURE: 83 MMHG | SYSTOLIC BLOOD PRESSURE: 127 MMHG | TEMPERATURE: 98 F | OXYGEN SATURATION: 97 % | WEIGHT: 233.69 LBS

## 2024-10-16 DIAGNOSIS — K21.9 GASTROESOPHAGEAL REFLUX DISEASE WITHOUT ESOPHAGITIS: ICD-10-CM

## 2024-10-16 DIAGNOSIS — E66.9 OBESITY (BMI 30-39.9): Primary | ICD-10-CM

## 2024-10-16 DIAGNOSIS — Z94.0 S/P KIDNEY TRANSPLANT: ICD-10-CM

## 2024-10-16 DIAGNOSIS — I10 ESSENTIAL HYPERTENSION: ICD-10-CM

## 2024-10-16 PROCEDURE — 3066F NEPHROPATHY DOC TX: CPT | Mod: HCNC,CPTII,S$GLB, | Performed by: SURGERY

## 2024-10-16 PROCEDURE — 1159F MED LIST DOCD IN RCRD: CPT | Mod: HCNC,CPTII,S$GLB, | Performed by: SURGERY

## 2024-10-16 PROCEDURE — 3044F HG A1C LEVEL LT 7.0%: CPT | Mod: HCNC,CPTII,S$GLB, | Performed by: SURGERY

## 2024-10-16 PROCEDURE — 3079F DIAST BP 80-89 MM HG: CPT | Mod: HCNC,CPTII,S$GLB, | Performed by: SURGERY

## 2024-10-16 PROCEDURE — 1160F RVW MEDS BY RX/DR IN RCRD: CPT | Mod: HCNC,CPTII,S$GLB, | Performed by: SURGERY

## 2024-10-16 PROCEDURE — 3008F BODY MASS INDEX DOCD: CPT | Mod: HCNC,CPTII,S$GLB, | Performed by: SURGERY

## 2024-10-16 PROCEDURE — 99213 OFFICE O/P EST LOW 20 MIN: CPT | Mod: HCNC,S$GLB,, | Performed by: SURGERY

## 2024-10-16 PROCEDURE — 99999 PR PBB SHADOW E&M-EST. PATIENT-LVL III: CPT | Mod: PBBFAC,HCNC,, | Performed by: SURGERY

## 2024-10-16 PROCEDURE — 3074F SYST BP LT 130 MM HG: CPT | Mod: HCNC,CPTII,S$GLB, | Performed by: SURGERY

## 2024-10-16 RX ORDER — PROMETHAZINE HYDROCHLORIDE 12.5 MG/1
12.5 SUPPOSITORY RECTAL EVERY 6 HOURS PRN
Qty: 5 SUPPOSITORY | Refills: 0 | Status: SHIPPED | OUTPATIENT
Start: 2024-10-16

## 2024-10-16 RX ORDER — SODIUM CHLORIDE, SODIUM LACTATE, POTASSIUM CHLORIDE, CALCIUM CHLORIDE 600; 310; 30; 20 MG/100ML; MG/100ML; MG/100ML; MG/100ML
INJECTION, SOLUTION INTRAVENOUS CONTINUOUS
OUTPATIENT
Start: 2024-10-16

## 2024-10-16 RX ORDER — HEPARIN SODIUM 5000 [USP'U]/ML
5000 INJECTION, SOLUTION INTRAVENOUS; SUBCUTANEOUS ONCE
OUTPATIENT
Start: 2024-10-16 | End: 2024-10-16

## 2024-10-16 RX ORDER — MUPIROCIN 20 MG/G
OINTMENT TOPICAL
OUTPATIENT
Start: 2024-10-16

## 2024-10-16 RX ORDER — FAMOTIDINE 10 MG/ML
20 INJECTION INTRAVENOUS ONCE
OUTPATIENT
Start: 2024-10-16 | End: 2024-10-16

## 2024-10-16 RX ORDER — ACETAMINOPHEN 650 MG/20.3ML
500 LIQUID ORAL
OUTPATIENT
Start: 2024-10-16

## 2024-10-16 RX ORDER — URSODIOL 500 MG/1
500 TABLET, FILM COATED ORAL DAILY
Qty: 180 TABLET | Refills: 0 | Status: SHIPPED | OUTPATIENT
Start: 2024-10-16 | End: 2025-04-14

## 2024-10-16 RX ORDER — DEXTROMETHORPHAN/PSEUDOEPHED 2.5-7.5/.8
40 DROPS ORAL 4 TIMES DAILY PRN
OUTPATIENT
Start: 2024-10-16

## 2024-10-16 RX ORDER — OXYCODONE HCL 5 MG/5 ML
5 SOLUTION, ORAL ORAL EVERY 6 HOURS PRN
OUTPATIENT
Start: 2024-10-16

## 2024-10-16 RX ORDER — GABAPENTIN 300 MG/1
CAPSULE ORAL
Qty: 11 CAPSULE | Refills: 0 | Status: SHIPPED | OUTPATIENT
Start: 2024-10-16

## 2024-10-16 RX ORDER — OMEPRAZOLE 40 MG/1
40 CAPSULE, DELAYED RELEASE ORAL EVERY MORNING
Qty: 30 CAPSULE | Refills: 2 | Status: SHIPPED | OUTPATIENT
Start: 2024-10-16

## 2024-10-16 RX ORDER — LIDOCAINE HYDROCHLORIDE 10 MG/ML
1 INJECTION, SOLUTION EPIDURAL; INFILTRATION; INTRACAUDAL; PERINEURAL ONCE
OUTPATIENT
Start: 2024-10-16 | End: 2024-10-16

## 2024-10-16 RX ORDER — SODIUM CHLORIDE 9 MG/ML
INJECTION, SOLUTION INTRAVENOUS CONTINUOUS
OUTPATIENT
Start: 2024-10-16

## 2024-10-16 RX ORDER — ENOXAPARIN SODIUM 100 MG/ML
40 INJECTION SUBCUTANEOUS EVERY 24 HOURS
OUTPATIENT
Start: 2024-10-16

## 2024-10-16 RX ORDER — ONDANSETRON 8 MG/1
8 TABLET, ORALLY DISINTEGRATING ORAL EVERY 6 HOURS PRN
Qty: 30 TABLET | Refills: 0 | Status: SHIPPED | OUTPATIENT
Start: 2024-10-16

## 2024-10-16 RX ORDER — PROCHLORPERAZINE EDISYLATE 5 MG/ML
5 INJECTION INTRAMUSCULAR; INTRAVENOUS EVERY 6 HOURS PRN
OUTPATIENT
Start: 2024-10-16

## 2024-10-16 RX ORDER — SCOLOPAMINE TRANSDERMAL SYSTEM 1 MG/1
1 PATCH, EXTENDED RELEASE TRANSDERMAL ONCE
OUTPATIENT
Start: 2024-10-16 | End: 2024-10-16

## 2024-10-16 RX ORDER — PANTOPRAZOLE SODIUM 40 MG/10ML
40 INJECTION, POWDER, LYOPHILIZED, FOR SOLUTION INTRAVENOUS 2 TIMES DAILY
OUTPATIENT
Start: 2024-10-16

## 2024-10-16 RX ORDER — ONDANSETRON HYDROCHLORIDE 2 MG/ML
8 INJECTION, SOLUTION INTRAVENOUS EVERY 6 HOURS
OUTPATIENT
Start: 2024-10-16

## 2024-10-16 RX ORDER — ACETAMINOPHEN 650 MG/20.3ML
1000 LIQUID ORAL EVERY 8 HOURS
OUTPATIENT
Start: 2024-10-16

## 2024-10-16 RX ORDER — POLYETHYLENE GLYCOL 3350 17 G/17G
17 POWDER, FOR SOLUTION ORAL DAILY
Qty: 510 G | Refills: 0 | Status: SHIPPED | OUTPATIENT
Start: 2024-10-16 | End: 2024-11-15

## 2024-10-16 RX ORDER — ACETAMINOPHEN 10 MG/ML
1000 INJECTION, SOLUTION INTRAVENOUS ONCE
OUTPATIENT
Start: 2024-10-16 | End: 2024-10-16

## 2024-10-16 RX ORDER — GABAPENTIN 250 MG/5ML
300 SOLUTION ORAL 2 TIMES DAILY
OUTPATIENT
Start: 2024-10-16

## 2024-10-16 NOTE — PROGRESS NOTES
Subjective:  The patient is a 33 y.o. obese female who presents for pre op for gastric sleeve surgery.   She has multiple associated comorbidities including GERD on daily meds, essential hypertension s/p kidney transplant.  She has struggled with excess weight since .  Her highest adult weight was 300 lbs at age 30s, and her lowest adult weight was 170 lbs at age 20s.  The patient has tried calorie counting, phentermine (Adipex-P), and exercise .  The patient was most successful with transplant with a weight loss of 20+lbs.  Her current exercise includes  gym  2 times a week. She denies any history of eating disorder such as anorexia, bulimia, or taking laxatives for weight loss, and denies any addiction including illicit substances, alcohol, or gambling.  Patient states she has a good  support system.  She lives with family.  She is currently disabled.  She  endorses a 10 year history of GERD controlled with one PPI.  All workup has been reviewed in clinic today and there is nothing on the review that would prevent us from proceeding with surgery.  All questions were answered in clinic today prior to leaving.  Body mass index is 37.72 kg/m².       Patient Active Problem List    Diagnosis Date Noted    Immunodeficiency, unspecified 2024    History of pulmonary embolus (PE) 2024    End stage renal disease 2024    Dependence on renal dialysis 2024    Right arm pain 10/17/2023    Enteritis 2023    Hypomagnesemia 2023    -donor kidney transplant 2023    Prophylactic immunotherapy 2023    At risk for opportunistic infections 2023    Secondary hyperparathyroidism 2022    Anemia of chronic disease 2022    Restless legs syndrome (RLS)     Psychophysiological insomnia     Primary snoring     Inadequate sleep hygiene     Low back pain 2019    Uterine perforation by uterine sound 2018    Papilledema 2018    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    Severe obesity (BMI 35.0-39.9) with comorbidity 12/10/2015    Gastroesophageal reflux disease without esophagitis 12/10/2015    Essential hypertension 12/10/2015     Past Medical History:   Diagnosis Date    Allergy     Anemia     Anxiety     Breast feeding status of mother 1/17/2020    Brittle bones     ESRD (end stage renal disease)     M/W/F    General anesthetics causing adverse effect in therapeutic use     pt states "im a light weight"    Hypertension     Insomnia     PSG revealed no CHRYSTAL, but likely psychogenic etiology (anxiety)    RLS (restless legs syndrome)       Past Surgical History:   Procedure Laterality Date    Arm surgery Right     x 2    AV FISTULA PLACEMENT Right     CYSTOSCOPY      HYSTEROSCOPY WITH HYDROTHERMAL ABLATION OF ENDOMETRIUM WITH DILATION AND CURETTAGE N/A 12/04/2018    Procedure: HYSTEROSCOPY, WITH DILATION AND CURETTAGE OF UTERUS AND HYDROTHERMAL ENDOMETRIAL ABLATION;  Surgeon: Tiara Red MD;  Location: [facility] OR;  Service: OB/GYN;  Laterality: N/A;  ATTEMPTED     KIDNEY TRANSPLANT N/A 01/02/2023    Procedure: TRANSPLANT, KIDNEY;  Surgeon: Go Beard MD;  Location: [address];  Service: Transplant;  Laterality: N/A;    LAPAROSCOPIC SALPINGECTOMY Bilateral 12/04/2018    Procedure: SALPINGECTOMY, LAPAROSCOPIC;  Surgeon: Tiara Red MD;  Location: [facility] OR;  Service: OB/GYN;  Laterality: Bilateral;    RENAL BIOPSY      tumor removed      from face per medical records      (Not in a hospital admission)    Review of patient's allergies indicates:   Allergen Reactions    Lisinopril Other (See Comments)     Severe cough    Adhesive tape-silicones Itching     Burns    Furosemide Other (See Comments)     Almost gave her right sided heartfailure      Social History     Tobacco Use    Smoking status: Never    Smokeless tobacco: Never    Tobacco comments:     Situational smoking, due to family crisis   Substance Use Topics    Alcohol use: Never    "   Family History   Problem Relation Name Age of Onset    Hypertension Father      Breast cancer Paternal Grandmother      Diabetes Maternal Grandmother      Heart attack Maternal Grandmother      Lung cancer Maternal Grandfather      Prostate cancer Maternal Grandfather          Review of Systems  Constitutional: negative for anorexia, chills and fatigue  Eyes: negative for icterus, irritation and redness  Respiratory: negative for cough and dyspnea on exertion  Cardiovascular: negative for chest pain and chest pressure/discomfort  Gastrointestinal: negative for abdominal pain, change in bowel habits, constipation and diarrhea  Musculoskeletal:negative for arthralgias and back pain  Neurological: negative for coordination problems and dizziness  Behavioral/Psych: negative for anxiety and bad mood    Objective:  Vital signs in last 24 hours:  Vitals:    10/16/24 1414   BP: 127/83   Pulse: 80   Temp: 98.1 °F (36.7 °C)   TempSrc: Oral   SpO2: 97%   Weight: 106 kg (233 lb 11 oz)          Weight History Current Weight Total Weight Loss   5/14/2024  10:24 .6 lbs -224.6 lbs   5/14/2024  12:28  lbs -224 lbs   7/16/2024  11:26 .1 lbs -230.1 lbs   10/16/2024   2:14  lbs -233 lbs       General appearance: alert, appears stated age and cooperative  Head: Normocephalic, without obvious abnormality, atraumatic  Eyes: negative findings: lids and lashes normal and conjunctivae and sclerae normal  Lungs: non labored breathing  Heart: regular rate and rhythm  Abdomen: soft, non-tender  Extremities: extremities normal, atraumatic, no cyanosis or edema  Skin: Skin color, texture, turgor normal. No rashes or lesions  Neurologic: Grossly normal    Data Review:  Reviewed    Assessment/Plan:  Morbid obesity with failure of conservative therapy.    The patient was informed that risks include, but are not limited to: death, leak, obstruction, bleeding, and sepsis. Any of these could require further surgery. Other  risks include DVT, PE, pneumonia, wound dehiscence, hernia, wound infection, the need for dilatations and the inability to lose appropriate weight and keep it off.     We discussed that our goal is to ameliorate her medical problems and not to obtain a specific body mass index. She understands the risks and benefits and wishes to proceed with the procedure. She has signed a consent form.       Orville Payne MD

## 2024-10-20 DIAGNOSIS — Z94.0 S/P KIDNEY TRANSPLANT: ICD-10-CM

## 2024-10-20 RX ORDER — PANTOPRAZOLE SODIUM 40 MG/1
40 TABLET, DELAYED RELEASE ORAL 2 TIMES DAILY
Qty: 28 TABLET | Refills: 0 | Status: SHIPPED | OUTPATIENT
Start: 2024-10-20 | End: 2024-11-04

## 2024-10-21 ENCOUNTER — CLINICAL SUPPORT (OUTPATIENT)
Dept: BARIATRICS | Facility: CLINIC | Age: 34
End: 2024-10-21
Payer: MEDICARE

## 2024-10-21 DIAGNOSIS — Z71.3 DIETARY COUNSELING AND SURVEILLANCE: Primary | ICD-10-CM

## 2024-10-21 PROCEDURE — 99499 UNLISTED E&M SERVICE: CPT | Mod: HCNC,95,, | Performed by: DIETITIAN, REGISTERED

## 2024-10-21 NOTE — PROGRESS NOTES
Established Patient - Audio Only Telehealth Visit     The patient location is: home ([place])  The chief complaint leading to consultation is: pre op  Visit type: Virtual visit with audio only (telephone)  Total time spent with patient: 15 min       The reason for the audio only service rather than synchronous audio and video virtual visit was related to technical difficulties or patient preference/necessity.     Each patient to whom I provide medical services by telemedicine is:  (1) informed of the relationship between the physician and patient and the respective role of any other health care provider with respect to management of the patient; and (2) notified that they may decline to receive medical services by telemedicine and may withdraw from such care at any time. Patient verbally consented to receive this service via voice-only telephone call.       NUTRITION NOTE    Bariatric Surgeon: Orville Payne M.D.  Reason for MNT Referral: Pre-op liquid diet and nutrition instructions  Sleeve   Date of Surgery 10/28/24    Pre-op liquid diet  Pt using Premier shakes 2-3/day for preop liquid diet (pt states this is due to her Kidney Doctor's recommendations, and also due to her budget). She may also use protein powder + water/milk.    Discussion:  -  gms of protein per day  - 600-800 calories per day   - Less than 4gm sugar per shake  - SF, Decaf, non-carbonated Fluids  - No Fruits, juices, yogurt or pudding on liquid diet  - No vitamins for 1 week prior to surgery  - No herbal supplements including green tea and fish oils for 2 weeks prior to surgery    Remind pt per nursing and medical team to inform our department if taking antibiotics within the 30 days post bariatric surgery or it any other surgeries/procedures are scheduled within 30 days after bariatric surgery.    2 week post-op instructions  Reviewed nutritional guidelines for protein and fluid requirements for week 1 and week 2 post-surgery.  Handout  provided to log protein and fluid daily.  1 ounce medicine cups provided for patient to measure fluid intake after surgery.    Pt has the following vitamins and minerals to start taking 1 week after surgery  Multivitamin with 18 mg iron take one tablet or chewable twice a day  B-complex with 50 mg thiamin taken once daily  Calcium citrate 500 mg with vitamin D three times per day  Vitamin B-12 500 mcg  Sublingually daily  Reviewed dosage and timing of vitamin/mineral guidelines.    Reviewed common nutritional concerns and prevention tips after bariatric surgery.  Reminded not to lift anything greater than 10 lbs for 6 week post-surgery  Pt verbalized understanding of information provided with appropriate questions and comments.         SESSION TIME: 15 minutes                          This service was not originating from a related E/M service provided within the previous 7 days nor will  to an E/M service or procedure within the next 24 hours or my soonest available appointment.  Prevailing standard of care was able to be met in this audio-only visit.

## 2024-10-23 ENCOUNTER — PATIENT MESSAGE (OUTPATIENT)
Dept: BARIATRICS | Facility: CLINIC | Age: 34
End: 2024-10-23
Payer: MEDICARE

## 2024-10-23 ENCOUNTER — PATIENT MESSAGE (OUTPATIENT)
Dept: TRANSPLANT | Facility: CLINIC | Age: 34
End: 2024-10-23
Payer: MEDICARE

## 2024-10-23 ENCOUNTER — TELEPHONE (OUTPATIENT)
Dept: BARIATRICS | Facility: CLINIC | Age: 34
End: 2024-10-23
Payer: MEDICARE

## 2024-10-23 ENCOUNTER — TELEPHONE (OUTPATIENT)
Dept: TRANSPLANT | Facility: CLINIC | Age: 34
End: 2024-10-23
Payer: MEDICARE

## 2024-10-23 DIAGNOSIS — Z94.0 S/P KIDNEY TRANSPLANT: Primary | ICD-10-CM

## 2024-10-23 RX ORDER — MYCOPHENOLATE MOFETIL 200 MG/ML
1000 POWDER, FOR SUSPENSION ORAL EVERY 12 HOURS
Qty: 320 ML | Refills: 11 | Status: SHIPPED | OUTPATIENT
Start: 2024-10-23 | End: 2025-10-23

## 2024-10-23 NOTE — TELEPHONE ENCOUNTER
Bariatric Nutrition  Rtn call  Pt started pre op liquid diet Monday. States she feels nausea, constipation, and lower back pain.  Currently drinking Premier shakes 2/day and lots of water. Discussed switching to lactose free protein shakes, try Miralax, try Gas X. Pt has also reached out to her transplant team to see if her meds could be causing this.  Will f/u as needed.

## 2024-10-23 NOTE — TELEPHONE ENCOUNTER
Pt aware her meds have been sent to pharmacy. Know to take medication NOT sublingually and in something until the liquid form is available.     Pt stated will let RN know when she can go to lab.

## 2024-10-24 ENCOUNTER — PATIENT MESSAGE (OUTPATIENT)
Dept: RESEARCH | Facility: HOSPITAL | Age: 34
End: 2024-10-24
Payer: MEDICARE

## 2024-10-25 ENCOUNTER — TELEPHONE (OUTPATIENT)
Dept: BARIATRICS | Facility: CLINIC | Age: 34
End: 2024-10-25
Payer: MEDICARE

## 2024-10-25 NOTE — TELEPHONE ENCOUNTER
Notified patient of arrival time to the [facility] 2nd floor [facility] at 0930 with expected surgery start time 1130 on 12/28/2024. Instructed patient regarding pre-op instructions including no protein shakes or sugar free clear liquids after midnight but can have a rare sip of water for comfort, showering and preop medications to hold/take per anesthesia/preop. Reminded pt to drink pre-surgery beverage or 8 oz water and take one dose of Gabapentin at 0900. Instructed patient on the s/s of dehydration and for patient to call at the first sign of dehydration. Informed patient that someone from bariatrics will call them 1 week post op to review diet/fluid intake and to ensure adequate hydration. Reminded patient not to take antibiotics for 30 days following surgery or schedule elective procedures involving anesthesia/sedation for 30 days following surgery unless checking with the bariatric clinic first. Pt verbalized understanding. Pt given office phone number for any additional questions/concerns.     Pt requested I call her bf today with the same info:  Cachorro Serra [phone]    Informed her that I will be reaching out to him later today.

## 2024-10-25 NOTE — TELEPHONE ENCOUNTER
Per pt's request, called Cachorro Serra [phone] and provided surgery times and reminders.  He is her support system and will assist the pt.

## 2024-10-28 ENCOUNTER — ANESTHESIA (OUTPATIENT)
Dept: SURGERY | Facility: HOSPITAL | Age: 34
DRG: 620 | End: 2024-10-28
Payer: MEDICARE

## 2024-10-28 ENCOUNTER — ANESTHESIA EVENT (OUTPATIENT)
Dept: SURGERY | Facility: HOSPITAL | Age: 34
DRG: 620 | End: 2024-10-28
Payer: MEDICARE

## 2024-10-28 ENCOUNTER — HOSPITAL ENCOUNTER (INPATIENT)
Facility: HOSPITAL | Age: 34
LOS: 2 days | Discharge: HOME | DRG: 620 | End: 2024-10-30
Attending: SURGERY | Admitting: SURGERY
Payer: MEDICARE

## 2024-10-28 DIAGNOSIS — Z29.89 PROPHYLACTIC IMMUNOTHERAPY: ICD-10-CM

## 2024-10-28 DIAGNOSIS — E66.9 OBESITY (BMI 30-39.9): Primary | ICD-10-CM

## 2024-10-28 LAB
B-HCG UR QL: NEGATIVE
CTP QC/QA: YES

## 2024-10-28 PROCEDURE — 25000003 PHARM REV CODE 250: Mod: HCNC | Performed by: STUDENT IN AN ORGANIZED HEALTH CARE EDUCATION/TRAINING PROGRAM

## 2024-10-28 PROCEDURE — 25000003 PHARM REV CODE 250: Mod: HCNC | Performed by: NURSE ANESTHETIST, CERTIFIED REGISTERED

## 2024-10-28 PROCEDURE — 11000001 HC ACUTE MED/SURG PRIVATE ROOM: Mod: HCNC

## 2024-10-28 PROCEDURE — 71000016 HC POSTOP RECOV ADDL HR: Mod: HCNC | Performed by: SURGERY

## 2024-10-28 PROCEDURE — 37000009 HC ANESTHESIA EA ADD 15 MINS: Mod: HCNC | Performed by: SURGERY

## 2024-10-28 PROCEDURE — 0DB64Z3 EXCISION OF STOMACH, PERCUTANEOUS ENDOSCOPIC APPROACH, VERTICAL: ICD-10-PCS | Performed by: SURGERY

## 2024-10-28 PROCEDURE — 63600175 PHARM REV CODE 636 W HCPCS: Mod: HCNC | Performed by: NURSE ANESTHETIST, CERTIFIED REGISTERED

## 2024-10-28 PROCEDURE — 63600175 PHARM REV CODE 636 W HCPCS: Mod: JZ,JG,HCNC | Performed by: SURGERY

## 2024-10-28 PROCEDURE — 36000711: Mod: HCNC | Performed by: SURGERY

## 2024-10-28 PROCEDURE — 88307 TISSUE EXAM BY PATHOLOGIST: CPT | Mod: HCNC | Performed by: PATHOLOGY

## 2024-10-28 PROCEDURE — 63600175 PHARM REV CODE 636 W HCPCS: Mod: HCNC | Performed by: SURGERY

## 2024-10-28 PROCEDURE — 25000003 PHARM REV CODE 250: Mod: HCNC | Performed by: INTERNAL MEDICINE

## 2024-10-28 PROCEDURE — 71000033 HC RECOVERY, INTIAL HOUR: Mod: HCNC | Performed by: SURGERY

## 2024-10-28 PROCEDURE — 99900035 HC TECH TIME PER 15 MIN (STAT): Mod: HCNC

## 2024-10-28 PROCEDURE — 63600175 PHARM REV CODE 636 W HCPCS: Mod: HCNC | Performed by: STUDENT IN AN ORGANIZED HEALTH CARE EDUCATION/TRAINING PROGRAM

## 2024-10-28 PROCEDURE — 63600175 PHARM REV CODE 636 W HCPCS: Mod: HCNC

## 2024-10-28 PROCEDURE — 25000242 PHARM REV CODE 250 ALT 637 W/ HCPCS: Mod: HCNC | Performed by: SURGERY

## 2024-10-28 PROCEDURE — 81025 URINE PREGNANCY TEST: CPT | Mod: HCNC | Performed by: SURGERY

## 2024-10-28 PROCEDURE — 27201423 OPTIME MED/SURG SUP & DEVICES STERILE SUPPLY: Mod: HCNC | Performed by: SURGERY

## 2024-10-28 PROCEDURE — 25000003 PHARM REV CODE 250: Mod: HCNC | Performed by: SURGERY

## 2024-10-28 PROCEDURE — 63600175 PHARM REV CODE 636 W HCPCS: Mod: HCNC | Performed by: INTERNAL MEDICINE

## 2024-10-28 PROCEDURE — 94761 N-INVAS EAR/PLS OXIMETRY MLT: CPT | Mod: HCNC

## 2024-10-28 PROCEDURE — 0DJ08ZZ INSPECTION OF UPPER INTESTINAL TRACT, VIA NATURAL OR ARTIFICIAL OPENING ENDOSCOPIC: ICD-10-PCS | Performed by: SURGERY

## 2024-10-28 PROCEDURE — 27000221 HC OXYGEN, UP TO 24 HOURS: Mod: HCNC

## 2024-10-28 PROCEDURE — 88342 IMHCHEM/IMCYTCHM 1ST ANTB: CPT | Mod: HCNC | Performed by: PATHOLOGY

## 2024-10-28 PROCEDURE — 71000015 HC POSTOP RECOV 1ST HR: Mod: HCNC | Performed by: SURGERY

## 2024-10-28 PROCEDURE — C1781 MESH (IMPLANTABLE): HCPCS | Mod: HCNC | Performed by: SURGERY

## 2024-10-28 PROCEDURE — 36000710: Mod: HCNC | Performed by: SURGERY

## 2024-10-28 PROCEDURE — 37000008 HC ANESTHESIA 1ST 15 MINUTES: Mod: HCNC | Performed by: SURGERY

## 2024-10-28 DEVICE — SEAMGUARD ESCHELON 60 MM ART: Type: IMPLANTABLE DEVICE | Site: ABDOMEN | Status: FUNCTIONAL

## 2024-10-28 DEVICE — SEAMGUARD ESCHELON 60 MM.: Type: IMPLANTABLE DEVICE | Site: ABDOMEN | Status: FUNCTIONAL

## 2024-10-28 RX ORDER — PROCHLORPERAZINE EDISYLATE 5 MG/ML
5 INJECTION INTRAMUSCULAR; INTRAVENOUS EVERY 6 HOURS PRN
Status: DISCONTINUED | OUTPATIENT
Start: 2024-10-28 | End: 2024-10-30 | Stop reason: HOSPADM

## 2024-10-28 RX ORDER — ACETAMINOPHEN 10 MG/ML
1000 INJECTION, SOLUTION INTRAVENOUS ONCE
Status: COMPLETED | OUTPATIENT
Start: 2024-10-28 | End: 2024-10-28

## 2024-10-28 RX ORDER — LIDOCAINE HYDROCHLORIDE 10 MG/ML
1 INJECTION, SOLUTION EPIDURAL; INFILTRATION; INTRACAUDAL; PERINEURAL ONCE
Status: DISCONTINUED | OUTPATIENT
Start: 2024-10-28 | End: 2024-10-28 | Stop reason: HOSPADM

## 2024-10-28 RX ORDER — MIDAZOLAM HYDROCHLORIDE 1 MG/ML
.5-4 INJECTION, SOLUTION INTRAMUSCULAR; INTRAVENOUS
Status: DISCONTINUED | OUTPATIENT
Start: 2024-10-28 | End: 2024-10-28 | Stop reason: HOSPADM

## 2024-10-28 RX ORDER — SODIUM CHLORIDE 0.9 % (FLUSH) 0.9 %
10 SYRINGE (ML) INJECTION
Status: DISCONTINUED | OUTPATIENT
Start: 2024-10-28 | End: 2024-10-28 | Stop reason: HOSPADM

## 2024-10-28 RX ORDER — HYDROMORPHONE HYDROCHLORIDE 1 MG/ML
0.2 INJECTION, SOLUTION INTRAMUSCULAR; INTRAVENOUS; SUBCUTANEOUS EVERY 5 MIN PRN
Status: DISCONTINUED | OUTPATIENT
Start: 2024-10-28 | End: 2024-10-28 | Stop reason: HOSPADM

## 2024-10-28 RX ORDER — MYCOPHENOLATE MOFETIL 200 MG/ML
1000 POWDER, FOR SUSPENSION ORAL 2 TIMES DAILY
Status: DISCONTINUED | OUTPATIENT
Start: 2024-10-28 | End: 2024-10-30 | Stop reason: HOSPADM

## 2024-10-28 RX ORDER — PROPOFOL 10 MG/ML
VIAL (ML) INTRAVENOUS
Status: DISCONTINUED | OUTPATIENT
Start: 2024-10-28 | End: 2024-10-28

## 2024-10-28 RX ORDER — GABAPENTIN 250 MG/5ML
300 SOLUTION ORAL 2 TIMES DAILY
Status: DISCONTINUED | OUTPATIENT
Start: 2024-10-28 | End: 2024-10-30 | Stop reason: HOSPADM

## 2024-10-28 RX ORDER — DIPHENHYDRAMINE HYDROCHLORIDE 50 MG/ML
INJECTION INTRAMUSCULAR; INTRAVENOUS
Status: COMPLETED
Start: 2024-10-28 | End: 2024-10-28

## 2024-10-28 RX ORDER — FENTANYL CITRATE 50 UG/ML
INJECTION, SOLUTION INTRAMUSCULAR; INTRAVENOUS
Status: DISPENSED
Start: 2024-10-28 | End: 2024-10-29

## 2024-10-28 RX ORDER — GLUCAGON 1 MG
1 KIT INJECTION
Status: DISCONTINUED | OUTPATIENT
Start: 2024-10-28 | End: 2024-10-28 | Stop reason: HOSPADM

## 2024-10-28 RX ORDER — ONDANSETRON HYDROCHLORIDE 2 MG/ML
4 INJECTION, SOLUTION INTRAVENOUS ONCE AS NEEDED
Status: COMPLETED | OUTPATIENT
Start: 2024-10-28 | End: 2024-10-28

## 2024-10-28 RX ORDER — SCOLOPAMINE TRANSDERMAL SYSTEM 1 MG/1
1 PATCH, EXTENDED RELEASE TRANSDERMAL ONCE
Status: DISCONTINUED | OUTPATIENT
Start: 2024-10-28 | End: 2024-10-30 | Stop reason: HOSPADM

## 2024-10-28 RX ORDER — DEXAMETHASONE SODIUM PHOSPHATE 4 MG/ML
INJECTION, SOLUTION INTRA-ARTICULAR; INTRALESIONAL; INTRAMUSCULAR; INTRAVENOUS; SOFT TISSUE
Status: DISCONTINUED | OUTPATIENT
Start: 2024-10-28 | End: 2024-10-28

## 2024-10-28 RX ORDER — ONDANSETRON HYDROCHLORIDE 2 MG/ML
8 INJECTION, SOLUTION INTRAVENOUS EVERY 6 HOURS
Status: DISCONTINUED | OUTPATIENT
Start: 2024-10-28 | End: 2024-10-30 | Stop reason: HOSPADM

## 2024-10-28 RX ORDER — DROPERIDOL 2.5 MG/ML
0.62 INJECTION, SOLUTION INTRAMUSCULAR; INTRAVENOUS ONCE AS NEEDED
Status: COMPLETED | OUTPATIENT
Start: 2024-10-28 | End: 2024-10-28

## 2024-10-28 RX ORDER — MUPIROCIN 20 MG/G
OINTMENT TOPICAL
Status: DISCONTINUED | OUTPATIENT
Start: 2024-10-28 | End: 2024-10-28 | Stop reason: HOSPADM

## 2024-10-28 RX ORDER — SODIUM CHLORIDE, SODIUM LACTATE, POTASSIUM CHLORIDE, CALCIUM CHLORIDE 600; 310; 30; 20 MG/100ML; MG/100ML; MG/100ML; MG/100ML
INJECTION, SOLUTION INTRAVENOUS CONTINUOUS
Status: DISCONTINUED | OUTPATIENT
Start: 2024-10-28 | End: 2024-10-30 | Stop reason: HOSPADM

## 2024-10-28 RX ORDER — HEPARIN SODIUM 5000 [USP'U]/ML
5000 INJECTION, SOLUTION INTRAVENOUS; SUBCUTANEOUS ONCE
Status: COMPLETED | OUTPATIENT
Start: 2024-10-28 | End: 2024-10-28

## 2024-10-28 RX ORDER — DIPHENHYDRAMINE HYDROCHLORIDE 50 MG/ML
12.5 INJECTION INTRAMUSCULAR; INTRAVENOUS ONCE AS NEEDED
Status: COMPLETED | OUTPATIENT
Start: 2024-10-28 | End: 2024-10-28

## 2024-10-28 RX ORDER — FAMOTIDINE 10 MG/ML
20 INJECTION INTRAVENOUS ONCE
Status: COMPLETED | OUTPATIENT
Start: 2024-10-28 | End: 2024-10-28

## 2024-10-28 RX ORDER — KETAMINE HCL IN 0.9 % NACL 50 MG/5 ML
SYRINGE (ML) INTRAVENOUS
Status: DISCONTINUED | OUTPATIENT
Start: 2024-10-28 | End: 2024-10-28

## 2024-10-28 RX ORDER — PROPOFOL 10 MG/ML
INJECTION, EMULSION INTRAVENOUS CONTINUOUS PRN
Status: DISCONTINUED | OUTPATIENT
Start: 2024-10-28 | End: 2024-10-28

## 2024-10-28 RX ORDER — HYDROMORPHONE HYDROCHLORIDE 1 MG/ML
INJECTION, SOLUTION INTRAMUSCULAR; INTRAVENOUS; SUBCUTANEOUS
Status: DISCONTINUED | OUTPATIENT
Start: 2024-10-28 | End: 2024-10-28

## 2024-10-28 RX ORDER — ENOXAPARIN SODIUM 100 MG/ML
40 INJECTION SUBCUTANEOUS EVERY 24 HOURS
Status: DISCONTINUED | OUTPATIENT
Start: 2024-10-28 | End: 2024-10-30 | Stop reason: HOSPADM

## 2024-10-28 RX ORDER — BUPIVACAINE HYDROCHLORIDE 2.5 MG/ML
INJECTION, SOLUTION EPIDURAL; INFILTRATION; INTRACAUDAL
Status: DISCONTINUED | OUTPATIENT
Start: 2024-10-28 | End: 2024-10-28 | Stop reason: HOSPADM

## 2024-10-28 RX ORDER — LIDOCAINE HYDROCHLORIDE 20 MG/ML
INJECTION INTRAVENOUS
Status: DISCONTINUED | OUTPATIENT
Start: 2024-10-28 | End: 2024-10-28

## 2024-10-28 RX ORDER — ONDANSETRON HYDROCHLORIDE 2 MG/ML
INJECTION, SOLUTION INTRAVENOUS
Status: COMPLETED
Start: 2024-10-28 | End: 2024-10-28

## 2024-10-28 RX ORDER — DEXMEDETOMIDINE HYDROCHLORIDE 100 UG/ML
INJECTION, SOLUTION INTRAVENOUS
Status: DISCONTINUED | OUTPATIENT
Start: 2024-10-28 | End: 2024-10-28

## 2024-10-28 RX ORDER — ONDANSETRON HYDROCHLORIDE 2 MG/ML
INJECTION, SOLUTION INTRAVENOUS
Status: DISCONTINUED | OUTPATIENT
Start: 2024-10-28 | End: 2024-10-28

## 2024-10-28 RX ORDER — SODIUM CHLORIDE 9 MG/ML
INJECTION, SOLUTION INTRAVENOUS CONTINUOUS
Status: DISCONTINUED | OUTPATIENT
Start: 2024-10-28 | End: 2024-10-30 | Stop reason: HOSPADM

## 2024-10-28 RX ORDER — OXYCODONE HCL 5 MG/5 ML
5 SOLUTION, ORAL ORAL EVERY 6 HOURS PRN
Status: DISCONTINUED | OUTPATIENT
Start: 2024-10-28 | End: 2024-10-30 | Stop reason: HOSPADM

## 2024-10-28 RX ORDER — FENTANYL CITRATE 50 UG/ML
25-200 INJECTION, SOLUTION INTRAMUSCULAR; INTRAVENOUS
Status: DISCONTINUED | OUTPATIENT
Start: 2024-10-28 | End: 2024-10-28 | Stop reason: HOSPADM

## 2024-10-28 RX ORDER — METOPROLOL TARTRATE 50 MG/1
100 TABLET ORAL 2 TIMES DAILY
Status: DISCONTINUED | OUTPATIENT
Start: 2024-10-28 | End: 2024-10-30 | Stop reason: HOSPADM

## 2024-10-28 RX ORDER — PANTOPRAZOLE SODIUM 40 MG/10ML
40 INJECTION, POWDER, LYOPHILIZED, FOR SOLUTION INTRAVENOUS 2 TIMES DAILY
Status: DISCONTINUED | OUTPATIENT
Start: 2024-10-28 | End: 2024-10-30 | Stop reason: HOSPADM

## 2024-10-28 RX ORDER — LIDOCAINE HYDROCHLORIDE AND EPINEPHRINE 10; 10 MG/ML; UG/ML
INJECTION, SOLUTION INFILTRATION; PERINEURAL
Status: DISCONTINUED | OUTPATIENT
Start: 2024-10-28 | End: 2024-10-28 | Stop reason: HOSPADM

## 2024-10-28 RX ORDER — ACETAMINOPHEN 650 MG/20.3ML
500 LIQUID ORAL
Status: DISCONTINUED | OUTPATIENT
Start: 2024-10-28 | End: 2024-10-30 | Stop reason: HOSPADM

## 2024-10-28 RX ORDER — ROCURONIUM BROMIDE 10 MG/ML
INJECTION, SOLUTION INTRAVENOUS
Status: DISCONTINUED | OUTPATIENT
Start: 2024-10-28 | End: 2024-10-28

## 2024-10-28 RX ORDER — ACETAMINOPHEN 650 MG/20.3ML
1000 LIQUID ORAL EVERY 8 HOURS
Status: DISCONTINUED | OUTPATIENT
Start: 2024-10-28 | End: 2024-10-30 | Stop reason: HOSPADM

## 2024-10-28 RX ADMIN — PROPOFOL 55 MCG/KG/MIN: 10 INJECTION, EMULSION INTRAVENOUS at 12:10

## 2024-10-28 RX ADMIN — DROPERIDOL 0.62 MG: 2.5 INJECTION, SOLUTION INTRAMUSCULAR; INTRAVENOUS at 02:10

## 2024-10-28 RX ADMIN — PROMETHAZINE HYDROCHLORIDE 12.5 MG: 25 INJECTION INTRAMUSCULAR; INTRAVENOUS at 09:10

## 2024-10-28 RX ADMIN — MUPIROCIN: 20 OINTMENT TOPICAL at 11:10

## 2024-10-28 RX ADMIN — HYDROMORPHONE HYDROCHLORIDE 0.2 MG: 1 INJECTION, SOLUTION INTRAMUSCULAR; INTRAVENOUS; SUBCUTANEOUS at 12:10

## 2024-10-28 RX ADMIN — SCOPALAMINE 1 PATCH: 1 PATCH, EXTENDED RELEASE TRANSDERMAL at 10:10

## 2024-10-28 RX ADMIN — SODIUM CHLORIDE, SODIUM LACTATE, POTASSIUM CHLORIDE, AND CALCIUM CHLORIDE: 600; 310; 30; 20 INJECTION, SOLUTION INTRAVENOUS at 11:10

## 2024-10-28 RX ADMIN — ONDANSETRON 4 MG: 2 INJECTION INTRAMUSCULAR; INTRAVENOUS at 02:10

## 2024-10-28 RX ADMIN — ONDANSETRON HYDROCHLORIDE 4 MG: 2 INJECTION, SOLUTION INTRAVENOUS at 02:10

## 2024-10-28 RX ADMIN — HYDROMORPHONE HYDROCHLORIDE 0.2 MG: 1 INJECTION, SOLUTION INTRAMUSCULAR; INTRAVENOUS; SUBCUTANEOUS at 01:10

## 2024-10-28 RX ADMIN — OXYCODONE HYDROCHLORIDE 5 MG: 5 SOLUTION ORAL at 09:10

## 2024-10-28 RX ADMIN — DEXMEDETOMIDINE 8 MCG: 100 INJECTION, SOLUTION, CONCENTRATE INTRAVENOUS at 12:10

## 2024-10-28 RX ADMIN — GABAPENTIN 300 MG: 250 SOLUTION ORAL at 09:10

## 2024-10-28 RX ADMIN — SUGAMMADEX 100 MG: 100 INJECTION, SOLUTION INTRAVENOUS at 01:10

## 2024-10-28 RX ADMIN — HEPARIN SODIUM 5000 UNITS: 5000 INJECTION INTRAVENOUS; SUBCUTANEOUS at 10:10

## 2024-10-28 RX ADMIN — METOPROLOL TARTRATE 100 MG: 50 TABLET, FILM COATED ORAL at 09:10

## 2024-10-28 RX ADMIN — DEXAMETHASONE SODIUM PHOSPHATE 8 MG: 4 INJECTION, SOLUTION INTRAMUSCULAR; INTRAVENOUS at 12:10

## 2024-10-28 RX ADMIN — DIPHENHYDRAMINE HYDROCHLORIDE 12.5 MG: 50 INJECTION INTRAMUSCULAR; INTRAVENOUS at 03:10

## 2024-10-28 RX ADMIN — ACETAMINOPHEN 999.01 MG: 650 SOLUTION ORAL at 08:10

## 2024-10-28 RX ADMIN — DEXMEDETOMIDINE 8 MCG: 100 INJECTION, SOLUTION, CONCENTRATE INTRAVENOUS at 01:10

## 2024-10-28 RX ADMIN — MYCOPHENOLATE MOFETIL 1000 MG: 200 POWDER, FOR SUSPENSION ORAL at 10:10

## 2024-10-28 RX ADMIN — ONDANSETRON 4 MG: 2 INJECTION INTRAMUSCULAR; INTRAVENOUS at 01:10

## 2024-10-28 RX ADMIN — SODIUM CHLORIDE, SODIUM GLUCONATE, SODIUM ACETATE, POTASSIUM CHLORIDE, MAGNESIUM CHLORIDE, SODIUM PHOSPHATE, DIBASIC, AND POTASSIUM PHOSPHATE: .53; .5; .37; .037; .03; .012; .00082 INJECTION, SOLUTION INTRAVENOUS at 12:10

## 2024-10-28 RX ADMIN — ACETAMINOPHEN 1000 MG: 10 INJECTION, SOLUTION INTRAVENOUS at 11:10

## 2024-10-28 RX ADMIN — DROPERIDOL 0.62 MG: 2.5 INJECTION, SOLUTION INTRAMUSCULAR; INTRAVENOUS at 01:10

## 2024-10-28 RX ADMIN — Medication 20 MG: at 12:10

## 2024-10-28 RX ADMIN — PROPOFOL 150 MG: 10 INJECTION, EMULSION INTRAVENOUS at 12:10

## 2024-10-28 RX ADMIN — FAMOTIDINE 20 MG: 10 INJECTION, SOLUTION INTRAVENOUS at 11:10

## 2024-10-28 RX ADMIN — PROCHLORPERAZINE EDISYLATE 5 MG: 5 INJECTION INTRAMUSCULAR; INTRAVENOUS at 02:10

## 2024-10-28 RX ADMIN — PANTOPRAZOLE SODIUM 40 MG: 40 INJECTION, POWDER, LYOPHILIZED, FOR SOLUTION INTRAVENOUS at 09:10

## 2024-10-28 RX ADMIN — ONDANSETRON 8 MG: 2 INJECTION INTRAMUSCULAR; INTRAVENOUS at 07:10

## 2024-10-28 RX ADMIN — SUGAMMADEX 200 MG: 100 INJECTION, SOLUTION INTRAVENOUS at 01:10

## 2024-10-28 RX ADMIN — DIPHENHYDRAMINE HYDROCHLORIDE 12.5 MG: 50 INJECTION, SOLUTION INTRAMUSCULAR; INTRAVENOUS at 03:10

## 2024-10-28 RX ADMIN — DEXTROSE 3 G: 50 INJECTION, SOLUTION INTRAVENOUS at 12:10

## 2024-10-28 RX ADMIN — ROCURONIUM BROMIDE 50 MG: 10 INJECTION, SOLUTION INTRAVENOUS at 12:10

## 2024-10-28 RX ADMIN — ROCURONIUM BROMIDE 30 MG: 10 INJECTION, SOLUTION INTRAVENOUS at 12:10

## 2024-10-28 RX ADMIN — SODIUM CHLORIDE, SODIUM LACTATE, POTASSIUM CHLORIDE, AND CALCIUM CHLORIDE: 600; 310; 30; 20 INJECTION, SOLUTION INTRAVENOUS at 01:10

## 2024-10-28 RX ADMIN — LIDOCAINE HYDROCHLORIDE 100 MG: 20 INJECTION INTRAVENOUS at 12:10

## 2024-10-28 RX ADMIN — MIDAZOLAM HYDROCHLORIDE 2 MG: 2 INJECTION, SOLUTION INTRAMUSCULAR; INTRAVENOUS at 12:10

## 2024-10-28 RX ADMIN — Medication 10 MG: at 01:10

## 2024-10-29 LAB
ANION GAP SERPL CALC-SCNC: 12 MMOL/L (ref 8–16)
BASOPHILS # BLD AUTO: 0.01 K/UL (ref 0–0.2)
BASOPHILS NFR BLD: 0.1 % (ref 0–1.9)
BUN SERPL-MCNC: 17 MG/DL (ref 6–20)
CALCIUM SERPL-MCNC: 10 MG/DL (ref 8.7–10.5)
CHLORIDE SERPL-SCNC: 105 MMOL/L (ref 95–110)
CO2 SERPL-SCNC: 21 MMOL/L (ref 23–29)
CREAT SERPL-MCNC: 1.3 MG/DL (ref 0.5–1.4)
DIFFERENTIAL METHOD BLD: ABNORMAL
EOSINOPHIL # BLD AUTO: 0 K/UL (ref 0–0.5)
EOSINOPHIL NFR BLD: 0 % (ref 0–8)
ERYTHROCYTE [DISTWIDTH] IN BLOOD BY AUTOMATED COUNT: 12.2 % (ref 11.5–14.5)
EST. GFR  (NO RACE VARIABLE): 55 ML/MIN/1.73 M^2
GLUCOSE SERPL-MCNC: 108 MG/DL (ref 70–110)
HCT VFR BLD AUTO: 37.5 % (ref 37–48.5)
HGB BLD-MCNC: 13.1 G/DL (ref 12–16)
IMM GRANULOCYTES # BLD AUTO: 0.06 K/UL (ref 0–0.04)
IMM GRANULOCYTES NFR BLD AUTO: 0.5 % (ref 0–0.5)
LYMPHOCYTES # BLD AUTO: 0.8 K/UL (ref 1–4.8)
LYMPHOCYTES NFR BLD: 6.4 % (ref 18–48)
MAGNESIUM SERPL-MCNC: 1.6 MG/DL (ref 1.6–2.6)
MCH RBC QN AUTO: 31.8 PG (ref 27–31)
MCHC RBC AUTO-ENTMCNC: 34.9 G/DL (ref 32–36)
MCV RBC AUTO: 91 FL (ref 82–98)
MONOCYTES # BLD AUTO: 0.7 K/UL (ref 0.3–1)
MONOCYTES NFR BLD: 6.2 % (ref 4–15)
NEUTROPHILS # BLD AUTO: 10.3 K/UL (ref 1.8–7.7)
NEUTROPHILS NFR BLD: 86.8 % (ref 38–73)
NRBC BLD-RTO: 0 /100 WBC
PHOSPHATE SERPL-MCNC: 2.7 MG/DL (ref 2.7–4.5)
PLATELET # BLD AUTO: 162 K/UL (ref 150–450)
PMV BLD AUTO: 12.1 FL (ref 9.2–12.9)
POTASSIUM SERPL-SCNC: 4.7 MMOL/L (ref 3.5–5.1)
RBC # BLD AUTO: 4.12 M/UL (ref 4–5.4)
SODIUM SERPL-SCNC: 138 MMOL/L (ref 136–145)
TACROLIMUS BLD-MCNC: 11.4 NG/ML (ref 5–15)
WBC # BLD AUTO: 11.81 K/UL (ref 3.9–12.7)

## 2024-10-29 PROCEDURE — 11000001 HC ACUTE MED/SURG PRIVATE ROOM: Mod: HCNC

## 2024-10-29 PROCEDURE — 25000003 PHARM REV CODE 250: Mod: HCNC | Performed by: SURGERY

## 2024-10-29 PROCEDURE — 80197 ASSAY OF TACROLIMUS: CPT | Mod: HCNC | Performed by: INTERNAL MEDICINE

## 2024-10-29 PROCEDURE — 36415 COLL VENOUS BLD VENIPUNCTURE: CPT | Mod: HCNC | Performed by: SURGERY

## 2024-10-29 PROCEDURE — 84100 ASSAY OF PHOSPHORUS: CPT | Mod: HCNC | Performed by: SURGERY

## 2024-10-29 PROCEDURE — 80048 BASIC METABOLIC PNL TOTAL CA: CPT | Mod: HCNC | Performed by: SURGERY

## 2024-10-29 PROCEDURE — 83735 ASSAY OF MAGNESIUM: CPT | Mod: HCNC | Performed by: SURGERY

## 2024-10-29 PROCEDURE — 63600175 PHARM REV CODE 636 W HCPCS: Mod: HCNC | Performed by: SURGERY

## 2024-10-29 PROCEDURE — 25000003 PHARM REV CODE 250: Mod: HCNC | Performed by: INTERNAL MEDICINE

## 2024-10-29 PROCEDURE — 63600175 PHARM REV CODE 636 W HCPCS: Mod: HCNC | Performed by: INTERNAL MEDICINE

## 2024-10-29 PROCEDURE — 85025 COMPLETE CBC W/AUTO DIFF WBC: CPT | Mod: HCNC | Performed by: SURGERY

## 2024-10-29 RX ADMIN — GABAPENTIN 300 MG: 250 SOLUTION ORAL at 09:10

## 2024-10-29 RX ADMIN — TACROLIMUS 3 MG: 1 CAPSULE ORAL at 06:10

## 2024-10-29 RX ADMIN — ACETAMINOPHEN 999.01 MG: 650 SOLUTION ORAL at 06:10

## 2024-10-29 RX ADMIN — SIMETHICONE 40 MG: 20 SUSPENSION/ DROPS ORAL at 03:10

## 2024-10-29 RX ADMIN — ACETAMINOPHEN 999.01 MG: 650 SOLUTION ORAL at 09:10

## 2024-10-29 RX ADMIN — MYCOPHENOLATE MOFETIL 1000 MG: 200 POWDER, FOR SUSPENSION ORAL at 09:10

## 2024-10-29 RX ADMIN — PANTOPRAZOLE SODIUM 40 MG: 40 INJECTION, POWDER, LYOPHILIZED, FOR SOLUTION INTRAVENOUS at 08:10

## 2024-10-29 RX ADMIN — SODIUM CHLORIDE, SODIUM LACTATE, POTASSIUM CHLORIDE, AND CALCIUM CHLORIDE: 600; 310; 30; 20 INJECTION, SOLUTION INTRAVENOUS at 05:10

## 2024-10-29 RX ADMIN — PROCHLORPERAZINE EDISYLATE 5 MG: 5 INJECTION INTRAMUSCULAR; INTRAVENOUS at 08:10

## 2024-10-29 RX ADMIN — ONDANSETRON 8 MG: 2 INJECTION INTRAMUSCULAR; INTRAVENOUS at 05:10

## 2024-10-29 RX ADMIN — SODIUM CHLORIDE, SODIUM LACTATE, POTASSIUM CHLORIDE, AND CALCIUM CHLORIDE: 600; 310; 30; 20 INJECTION, SOLUTION INTRAVENOUS at 09:10

## 2024-10-29 RX ADMIN — PROCHLORPERAZINE EDISYLATE 5 MG: 5 INJECTION INTRAMUSCULAR; INTRAVENOUS at 03:10

## 2024-10-29 RX ADMIN — ONDANSETRON 8 MG: 2 INJECTION INTRAMUSCULAR; INTRAVENOUS at 12:10

## 2024-10-29 RX ADMIN — PANTOPRAZOLE SODIUM 40 MG: 40 INJECTION, POWDER, LYOPHILIZED, FOR SOLUTION INTRAVENOUS at 09:10

## 2024-10-29 RX ADMIN — ENOXAPARIN SODIUM 40 MG: 40 INJECTION SUBCUTANEOUS at 08:10

## 2024-10-29 RX ADMIN — ONDANSETRON 8 MG: 2 INJECTION INTRAMUSCULAR; INTRAVENOUS at 06:10

## 2024-10-29 RX ADMIN — METOPROLOL TARTRATE 100 MG: 50 TABLET, FILM COATED ORAL at 09:10

## 2024-10-30 VITALS
TEMPERATURE: 99 F | DIASTOLIC BLOOD PRESSURE: 75 MMHG | OXYGEN SATURATION: 96 % | WEIGHT: 231.5 LBS | RESPIRATION RATE: 18 BRPM | SYSTOLIC BLOOD PRESSURE: 137 MMHG | HEIGHT: 66 IN | BODY MASS INDEX: 37.21 KG/M2 | HEART RATE: 66 BPM

## 2024-10-30 LAB
ANION GAP SERPL CALC-SCNC: 10 MMOL/L (ref 8–16)
BASOPHILS # BLD AUTO: 0.03 K/UL (ref 0–0.2)
BASOPHILS NFR BLD: 0.3 % (ref 0–1.9)
BUN SERPL-MCNC: 11 MG/DL (ref 6–20)
CALCIUM SERPL-MCNC: 9.8 MG/DL (ref 8.7–10.5)
CHLORIDE SERPL-SCNC: 108 MMOL/L (ref 95–110)
CO2 SERPL-SCNC: 22 MMOL/L (ref 23–29)
CREAT SERPL-MCNC: 1.1 MG/DL (ref 0.5–1.4)
DIFFERENTIAL METHOD BLD: ABNORMAL
EOSINOPHIL # BLD AUTO: 0 K/UL (ref 0–0.5)
EOSINOPHIL NFR BLD: 0 % (ref 0–8)
ERYTHROCYTE [DISTWIDTH] IN BLOOD BY AUTOMATED COUNT: 12 % (ref 11.5–14.5)
EST. GFR  (NO RACE VARIABLE): >60 ML/MIN/1.73 M^2
GLUCOSE SERPL-MCNC: 93 MG/DL (ref 70–110)
HCT VFR BLD AUTO: 36.6 % (ref 37–48.5)
HGB BLD-MCNC: 12.2 G/DL (ref 12–16)
IMM GRANULOCYTES # BLD AUTO: 0.04 K/UL (ref 0–0.04)
IMM GRANULOCYTES NFR BLD AUTO: 0.4 % (ref 0–0.5)
LYMPHOCYTES # BLD AUTO: 1.2 K/UL (ref 1–4.8)
LYMPHOCYTES NFR BLD: 11.6 % (ref 18–48)
MAGNESIUM SERPL-MCNC: 1.5 MG/DL (ref 1.6–2.6)
MCH RBC QN AUTO: 31.1 PG (ref 27–31)
MCHC RBC AUTO-ENTMCNC: 33.3 G/DL (ref 32–36)
MCV RBC AUTO: 93 FL (ref 82–98)
MONOCYTES # BLD AUTO: 1 K/UL (ref 0.3–1)
MONOCYTES NFR BLD: 9.5 % (ref 4–15)
NEUTROPHILS # BLD AUTO: 7.8 K/UL (ref 1.8–7.7)
NEUTROPHILS NFR BLD: 78.2 % (ref 38–73)
NRBC BLD-RTO: 0 /100 WBC
PHOSPHATE SERPL-MCNC: 1.8 MG/DL (ref 2.7–4.5)
PLATELET # BLD AUTO: 149 K/UL (ref 150–450)
PMV BLD AUTO: 12.2 FL (ref 9.2–12.9)
POTASSIUM SERPL-SCNC: 3.9 MMOL/L (ref 3.5–5.1)
RBC # BLD AUTO: 3.92 M/UL (ref 4–5.4)
SODIUM SERPL-SCNC: 140 MMOL/L (ref 136–145)
TACROLIMUS BLD-MCNC: 6.2 NG/ML (ref 5–15)
WBC # BLD AUTO: 9.99 K/UL (ref 3.9–12.7)

## 2024-10-30 PROCEDURE — 36415 COLL VENOUS BLD VENIPUNCTURE: CPT | Mod: HCNC | Performed by: SURGERY

## 2024-10-30 PROCEDURE — 83735 ASSAY OF MAGNESIUM: CPT | Mod: HCNC | Performed by: SURGERY

## 2024-10-30 PROCEDURE — 80197 ASSAY OF TACROLIMUS: CPT | Mod: HCNC | Performed by: INTERNAL MEDICINE

## 2024-10-30 PROCEDURE — 63600175 PHARM REV CODE 636 W HCPCS: Mod: HCNC | Performed by: SURGERY

## 2024-10-30 PROCEDURE — 99233 SBSQ HOSP IP/OBS HIGH 50: CPT | Mod: HCNC,,, | Performed by: INTERNAL MEDICINE

## 2024-10-30 PROCEDURE — 84100 ASSAY OF PHOSPHORUS: CPT | Mod: HCNC | Performed by: SURGERY

## 2024-10-30 PROCEDURE — 63600175 PHARM REV CODE 636 W HCPCS: Mod: HCNC | Performed by: INTERNAL MEDICINE

## 2024-10-30 PROCEDURE — 80048 BASIC METABOLIC PNL TOTAL CA: CPT | Mod: HCNC | Performed by: SURGERY

## 2024-10-30 PROCEDURE — 25000003 PHARM REV CODE 250: Mod: HCNC | Performed by: SURGERY

## 2024-10-30 PROCEDURE — 85025 COMPLETE CBC W/AUTO DIFF WBC: CPT | Mod: HCNC | Performed by: SURGERY

## 2024-10-30 PROCEDURE — 25000003 PHARM REV CODE 250: Mod: HCNC | Performed by: INTERNAL MEDICINE

## 2024-10-30 RX ADMIN — METOPROLOL TARTRATE 100 MG: 50 TABLET, FILM COATED ORAL at 09:10

## 2024-10-30 RX ADMIN — ACETAMINOPHEN 999.01 MG: 650 SOLUTION ORAL at 05:10

## 2024-10-30 RX ADMIN — TACROLIMUS 3 MG: 1 CAPSULE ORAL at 08:10

## 2024-10-30 RX ADMIN — GABAPENTIN 300 MG: 250 SOLUTION ORAL at 09:10

## 2024-10-30 RX ADMIN — ONDANSETRON 8 MG: 2 INJECTION INTRAMUSCULAR; INTRAVENOUS at 11:10

## 2024-10-30 RX ADMIN — MYCOPHENOLATE MOFETIL 1000 MG: 200 POWDER, FOR SUSPENSION ORAL at 10:10

## 2024-10-30 RX ADMIN — ONDANSETRON 8 MG: 2 INJECTION INTRAMUSCULAR; INTRAVENOUS at 05:10

## 2024-10-30 RX ADMIN — PANTOPRAZOLE SODIUM 40 MG: 40 INJECTION, POWDER, LYOPHILIZED, FOR SOLUTION INTRAVENOUS at 09:10

## 2024-10-30 RX ADMIN — ONDANSETRON 8 MG: 2 INJECTION INTRAMUSCULAR; INTRAVENOUS at 12:10

## 2024-10-30 RX ADMIN — SODIUM CHLORIDE, SODIUM LACTATE, POTASSIUM CHLORIDE, AND CALCIUM CHLORIDE: 600; 310; 30; 20 INJECTION, SOLUTION INTRAVENOUS at 05:10

## 2024-11-01 ENCOUNTER — PATIENT OUTREACH (OUTPATIENT)
Dept: ADMINISTRATIVE | Facility: CLINIC | Age: 34
End: 2024-11-01
Payer: MEDICARE

## 2024-11-01 LAB
COMMENT: NORMAL
FINAL PATHOLOGIC DIAGNOSIS: NORMAL
GROSS: NORMAL
Lab: NORMAL

## 2024-11-02 ENCOUNTER — PATIENT MESSAGE (OUTPATIENT)
Dept: BARIATRICS | Facility: CLINIC | Age: 34
End: 2024-11-02
Payer: MEDICARE

## 2024-11-04 ENCOUNTER — CLINICAL SUPPORT (OUTPATIENT)
Dept: BARIATRICS | Facility: CLINIC | Age: 34
End: 2024-11-04
Payer: MEDICARE

## 2024-11-04 DIAGNOSIS — Z71.3 DIETARY COUNSELING AND SURVEILLANCE: Primary | ICD-10-CM

## 2024-11-04 PROCEDURE — 99499 UNLISTED E&M SERVICE: CPT | Mod: HCNC,95,, | Performed by: DIETITIAN, REGISTERED

## 2024-11-04 NOTE — PROGRESS NOTES
Established Patient - Audio Only Telehealth Visit     The patient location is: [place] ([place])  The chief complaint leading to consultation is: s/p bariatric sx  Visit type: Virtual visit with audio only (telephone)  Total time spent with patient: 15 min       The reason for the audio only service rather than synchronous audio and video virtual visit was related to technical difficulties or patient preference/necessity.     Each patient to whom I provide medical services by telemedicine is:  (1) informed of the relationship between the physician and patient and the respective role of any other health care provider with respect to management of the patient; and (2) notified that they may decline to receive medical services by telemedicine and may withdraw from such care at any time. Patient verbally consented to receive this service via voice-only telephone call.       NUTRITION NOTE    Referring Physician: Orville Payne M.D.   Reason for MNT Referral: Follow-up 1 Week s/p laproscopic sleeve gastrectomy    CURRENT DIET:  Bariatric Liquid Diet    Dehydration assessment:  Urine output/color: normal  Chest pain:n  Persistent increased heart rate:n  Fatigue:n  Dizzy/weak: n  N/V: n  BM: y, with Miralax    Protein and fluid intake assessment: (food diary)    Fluids include: water/CL, decaf coffee, broth  Fluid intake yesterday: not keeping track  Protein supplements: Premier shakes 2-3/day  Protein intake yesterday: 60-90g    Bariatric Vitamins - plans to start  MV - FC  B12  B1  Ca +D - not taking    Medications  Omeprazole: no. Taking Protonix  How are you tolerating pain at this time? (rate on a scale from 1 to 10; >7 notify PA/MD): 2  Did you take your acetaminophen and gabapentin for 3 days? y  Did you have to take additional acetaminophen for break through pain (pain scale 4-6)? no  Did you have any severe pain that required oxycodone? no    How is the support system at home? good  Exercise reminder (light exercise at  this time, no lifting above 10 lbs)     Questions for nurse/MA/PA: Omeprazole: no. Taking Protonix instead    BARIATRIC DIET DISCUSSION:  Reinforced post-op nutrition guidelines.  Continue to work on fluid and protein intake.    PLAN/RECOMMONDATIONS:  Continue bariatric high protein liquid diet.  Maintain protein intake or increase gradually to goal of 60 g prot/day.  Maintain fluid intake or increase gradually to goal of 64 floz/day.  Begin appropriate vitamins & minerals.  Begin or continue light exercise.     Confirmed date and time for 2 week po labs and clinic visit     SESSION TIME: 15 minutes                          This service was not originating from a related E/M service provided within the previous 7 days nor will  to an E/M service or procedure within the next 24 hours or my soonest available appointment.  Prevailing standard of care was able to be met in this audio-only visit.

## 2024-11-06 ENCOUNTER — PATIENT MESSAGE (OUTPATIENT)
Dept: BARIATRICS | Facility: CLINIC | Age: 34
End: 2024-11-06
Payer: MEDICARE

## 2024-11-07 ENCOUNTER — TELEPHONE (OUTPATIENT)
Dept: BARIATRICS | Facility: CLINIC | Age: 34
End: 2024-11-07
Payer: MEDICARE

## 2024-11-07 NOTE — TELEPHONE ENCOUNTER
----- Message from Dietitiadalberto Osborne sent at 11/4/2024 11:18 AM CST -----  Regarding: Questions about Omeprazole  1 week post op. Questions for nurse/MA/PA: Omeprazole: not taking. Taking Protonix instead. States this was not discussed at her pre op appt. Please call.

## 2024-11-07 NOTE — TELEPHONE ENCOUNTER
Called and spoke with the pt.  Discussed Protonix is interchangeable with Omeprazole. Pt takes protonix daily.  Pt stated she is not having a full feeling.  Reiterated the goal of 48oz water (clear liquids) per day and then starting her protein shakes.  She stated she is having some gas and bloating.  Recommended Gas-x chewable per package instructions.

## 2024-11-08 ENCOUNTER — LAB VISIT (OUTPATIENT)
Dept: LAB | Facility: HOSPITAL | Age: 34
End: 2024-11-08
Attending: NURSE PRACTITIONER
Payer: MEDICARE

## 2024-11-08 DIAGNOSIS — Z94.0 S/P KIDNEY TRANSPLANT: ICD-10-CM

## 2024-11-08 DIAGNOSIS — I10 ESSENTIAL HYPERTENSION: ICD-10-CM

## 2024-11-08 DIAGNOSIS — Z79.899 POLYPHARMACY: ICD-10-CM

## 2024-11-08 DIAGNOSIS — Z79.899 LONG-TERM USE OF HIGH-RISK MEDICATION: ICD-10-CM

## 2024-11-08 DIAGNOSIS — Z98.84 S/P BARIATRIC SURGERY: ICD-10-CM

## 2024-11-08 DIAGNOSIS — K21.9 GASTROESOPHAGEAL REFLUX DISEASE WITHOUT ESOPHAGITIS: ICD-10-CM

## 2024-11-08 LAB
25(OH)D3+25(OH)D2 SERPL-MCNC: 21 NG/ML (ref 30–96)
ALBUMIN SERPL BCP-MCNC: 4.2 G/DL (ref 3.5–5.2)
ALP SERPL-CCNC: 56 U/L (ref 40–150)
ALT SERPL W/O P-5'-P-CCNC: 18 U/L (ref 10–44)
ANION GAP SERPL CALC-SCNC: 10 MMOL/L (ref 8–16)
AST SERPL-CCNC: 17 U/L (ref 10–40)
BASOPHILS # BLD AUTO: 0.05 K/UL (ref 0–0.2)
BASOPHILS NFR BLD: 0.8 % (ref 0–1.9)
BILIRUB SERPL-MCNC: 0.6 MG/DL (ref 0.1–1)
BUN SERPL-MCNC: 12 MG/DL (ref 6–20)
CALCIUM SERPL-MCNC: 10.1 MG/DL (ref 8.7–10.5)
CHLORIDE SERPL-SCNC: 109 MMOL/L (ref 95–110)
CHOLEST SERPL-MCNC: 115 MG/DL (ref 120–199)
CHOLEST/HDLC SERPL: 4.4 {RATIO} (ref 2–5)
CO2 SERPL-SCNC: 20 MMOL/L (ref 23–29)
CREAT SERPL-MCNC: 1.1 MG/DL (ref 0.5–1.4)
DIFFERENTIAL METHOD BLD: ABNORMAL
EOSINOPHIL # BLD AUTO: 0.2 K/UL (ref 0–0.5)
EOSINOPHIL NFR BLD: 2.8 % (ref 0–8)
ERYTHROCYTE [DISTWIDTH] IN BLOOD BY AUTOMATED COUNT: 12.1 % (ref 11.5–14.5)
EST. GFR  (NO RACE VARIABLE): >60 ML/MIN/1.73 M^2
GLUCOSE SERPL-MCNC: 97 MG/DL (ref 70–110)
HCT VFR BLD AUTO: 39 % (ref 37–48.5)
HDLC SERPL-MCNC: 26 MG/DL (ref 40–75)
HDLC SERPL: 22.6 % (ref 20–50)
HGB BLD-MCNC: 12.6 G/DL (ref 12–16)
IMM GRANULOCYTES # BLD AUTO: 0.02 K/UL (ref 0–0.04)
IMM GRANULOCYTES NFR BLD AUTO: 0.3 % (ref 0–0.5)
IRON SERPL-MCNC: 75 UG/DL (ref 30–160)
LDLC SERPL CALC-MCNC: 65.6 MG/DL (ref 63–159)
LYMPHOCYTES # BLD AUTO: 0.9 K/UL (ref 1–4.8)
LYMPHOCYTES NFR BLD: 14.1 % (ref 18–48)
MCH RBC QN AUTO: 31 PG (ref 27–31)
MCHC RBC AUTO-ENTMCNC: 32.3 G/DL (ref 32–36)
MCV RBC AUTO: 96 FL (ref 82–98)
MONOCYTES # BLD AUTO: 0.7 K/UL (ref 0.3–1)
MONOCYTES NFR BLD: 10.7 % (ref 4–15)
NEUTROPHILS # BLD AUTO: 4.3 K/UL (ref 1.8–7.7)
NEUTROPHILS NFR BLD: 71.3 % (ref 38–73)
NONHDLC SERPL-MCNC: 89 MG/DL
NRBC BLD-RTO: 0 /100 WBC
PLATELET # BLD AUTO: 208 K/UL (ref 150–450)
PMV BLD AUTO: 12.7 FL (ref 9.2–12.9)
POTASSIUM SERPL-SCNC: 3.9 MMOL/L (ref 3.5–5.1)
PROT SERPL-MCNC: 7.5 G/DL (ref 6–8.4)
RBC # BLD AUTO: 4.06 M/UL (ref 4–5.4)
SATURATED IRON: 34 % (ref 20–50)
SODIUM SERPL-SCNC: 139 MMOL/L (ref 136–145)
TOTAL IRON BINDING CAPACITY: 221 UG/DL (ref 250–450)
TRANSFERRIN SERPL-MCNC: 149 MG/DL (ref 200–375)
TRIGL SERPL-MCNC: 117 MG/DL (ref 30–150)
VIT B12 SERPL-MCNC: 1246 PG/ML (ref 210–950)
WBC # BLD AUTO: 6.09 K/UL (ref 3.9–12.7)

## 2024-11-08 PROCEDURE — 82607 VITAMIN B-12: CPT | Mod: HCNC | Performed by: NURSE PRACTITIONER

## 2024-11-08 PROCEDURE — 80061 LIPID PANEL: CPT | Mod: HCNC | Performed by: NURSE PRACTITIONER

## 2024-11-08 PROCEDURE — 80053 COMPREHEN METABOLIC PANEL: CPT | Mod: HCNC | Performed by: NURSE PRACTITIONER

## 2024-11-08 PROCEDURE — 82306 VITAMIN D 25 HYDROXY: CPT | Mod: HCNC | Performed by: NURSE PRACTITIONER

## 2024-11-08 PROCEDURE — 85025 COMPLETE CBC W/AUTO DIFF WBC: CPT | Mod: HCNC | Performed by: NURSE PRACTITIONER

## 2024-11-08 PROCEDURE — 84425 ASSAY OF VITAMIN B-1: CPT | Mod: HCNC | Performed by: NURSE PRACTITIONER

## 2024-11-08 PROCEDURE — 84466 ASSAY OF TRANSFERRIN: CPT | Mod: HCNC | Performed by: NURSE PRACTITIONER

## 2024-11-08 PROCEDURE — 36415 COLL VENOUS BLD VENIPUNCTURE: CPT | Mod: HCNC | Performed by: NURSE PRACTITIONER

## 2024-11-11 ENCOUNTER — CLINICAL SUPPORT (OUTPATIENT)
Dept: BARIATRICS | Facility: CLINIC | Age: 34
End: 2024-11-11
Payer: MEDICARE

## 2024-11-11 ENCOUNTER — OFFICE VISIT (OUTPATIENT)
Dept: BARIATRICS | Facility: CLINIC | Age: 34
End: 2024-11-11
Payer: MEDICARE

## 2024-11-11 VITALS
OXYGEN SATURATION: 99 % | HEART RATE: 69 BPM | SYSTOLIC BLOOD PRESSURE: 125 MMHG | DIASTOLIC BLOOD PRESSURE: 76 MMHG | TEMPERATURE: 98 F | BODY MASS INDEX: 35.94 KG/M2 | WEIGHT: 222.69 LBS

## 2024-11-11 DIAGNOSIS — Z71.3 DIETARY COUNSELING: Primary | ICD-10-CM

## 2024-11-11 DIAGNOSIS — Z94.0 S/P KIDNEY TRANSPLANT: ICD-10-CM

## 2024-11-11 DIAGNOSIS — I10 ESSENTIAL HYPERTENSION: ICD-10-CM

## 2024-11-11 DIAGNOSIS — Z98.84 S/P BARIATRIC SURGERY: Primary | ICD-10-CM

## 2024-11-11 DIAGNOSIS — Z98.84 S/P BARIATRIC SURGERY: ICD-10-CM

## 2024-11-11 DIAGNOSIS — E66.9 OBESITY (BMI 30-39.9): ICD-10-CM

## 2024-11-11 PROCEDURE — 3066F NEPHROPATHY DOC TX: CPT | Mod: HCNC,CPTII,S$GLB, | Performed by: NURSE PRACTITIONER

## 2024-11-11 PROCEDURE — 3078F DIAST BP <80 MM HG: CPT | Mod: HCNC,CPTII,S$GLB, | Performed by: NURSE PRACTITIONER

## 2024-11-11 PROCEDURE — 3044F HG A1C LEVEL LT 7.0%: CPT | Mod: HCNC,CPTII,S$GLB, | Performed by: NURSE PRACTITIONER

## 2024-11-11 PROCEDURE — 99999 PR PBB SHADOW E&M-EST. PATIENT-LVL IV: CPT | Mod: PBBFAC,HCNC,, | Performed by: NURSE PRACTITIONER

## 2024-11-11 PROCEDURE — 1159F MED LIST DOCD IN RCRD: CPT | Mod: HCNC,CPTII,S$GLB, | Performed by: NURSE PRACTITIONER

## 2024-11-11 PROCEDURE — 99024 POSTOP FOLLOW-UP VISIT: CPT | Mod: HCNC,S$GLB,, | Performed by: NURSE PRACTITIONER

## 2024-11-11 PROCEDURE — 3074F SYST BP LT 130 MM HG: CPT | Mod: HCNC,CPTII,S$GLB, | Performed by: NURSE PRACTITIONER

## 2024-11-11 PROCEDURE — 99499 UNLISTED E&M SERVICE: CPT | Mod: HCNC,S$GLB,, | Performed by: DIETITIAN, REGISTERED

## 2024-11-11 PROCEDURE — 1160F RVW MEDS BY RX/DR IN RCRD: CPT | Mod: HCNC,CPTII,S$GLB, | Performed by: NURSE PRACTITIONER

## 2024-11-11 RX ORDER — ERGOCALCIFEROL 1.25 MG/1
50000 CAPSULE ORAL
Qty: 12 CAPSULE | Refills: 0 | Status: SHIPPED | OUTPATIENT
Start: 2024-11-11 | End: 2025-02-03

## 2024-11-11 NOTE — PROGRESS NOTES
"NUTRITION NOTE    Referring Physician: Orville Payne M.D.   Reason for MNT Referral: Follow-up 2 Weeks s/p laparoscopic sleeve gastrectomy    PAST MEDICAL HISTORY:  Denies diarrhea.  Reports problems, including nausea in morning, and some constipation  .    Past Medical History:   Diagnosis Date    Allergy     Anemia     Anxiety     Breast feeding status of mother 1/17/2020    Brittle bones     ESRD (end stage renal disease)     M/W/F    General anesthetics causing adverse effect in therapeutic use     pt states "im a light weight"    Hypertension     Insomnia     PSG revealed no CHRYSTAL, but likely psychogenic etiology (anxiety)    RLS (restless legs syndrome)          CLINICAL DATA:  33 y.o.-year-old Black or  female.    Current Weight: 222 lbs  BMI: 35.94  Total Weight Loss: Total Weight Loss: (P) 11 lbs   Excess Weight Loss: EWL: (P) 0.12 lbs     CURRENT DIET:  Bariatric Liquid Diet    Diet Recall: 60-70 grams of protein/day; 48 oz of fluids/day    Diet Includes:tried oatmeal, med over easy or scrambled egg, [employer] broccoli and cheddar soup  Protein Supplements: Premier 2 per day     EXERCISE:  Exercise History  Exercises Regularly: Yes (comment)  Type: Walking  Duration of Exercise: active  Frequency of Exercise: 3 / week  Frequency of Exercise: 3 / week    LABS:  None available at time of visit    VITAMINS/MINERALS:  Taking per patient      ASSESSMENT:  Doing fairly well overall.  Inadequate protein intake.  Inadequate fluid intake.    BARIATRIC DIET DISCUSSION:  Instructed and provided written materials on bariatric puree diet plan   Bariatric soft diet plan to start in 2 weeks as yessenia  Pt may swallow tablets and pills at 2 week post op   Reinforced post-op nutrition guidelines.    PLAN/RECOMMONDATIONS:  Advance to bariatric puree diet  Increase protein intake.  Increase fluid intake.  Continue light exercise.  Continue appropriate vitamins & minerals.  Reminded pt to follow diet " progression    Return to clinic in 6 weeks.    SESSION TIME: 15 minutes

## 2024-11-11 NOTE — PROGRESS NOTES
BARIATRIC POST-OPERATIVE VISIT:    HPI:  Lisseth Schwartz is a 33 y.o. year old female presents for 2 week post op visit following sleeve.  she is doing well and tolerating the diet without difficulty.  she has no complaints.    Denies: nausea, vomiting, abdominal pain, changes in bowel movement pattern, fever, chills, dysphagia, chest pain, and shortness of breath.    Review of Systems   Constitutional:  Negative for activity change and fatigue.   Respiratory:  Negative for cough and shortness of breath.    Cardiovascular:  Negative for chest pain, palpitations and leg swelling.   Gastrointestinal:  Negative for abdominal pain, nausea and vomiting.   Endocrine: Negative for polydipsia, polyphagia and polyuria.   Genitourinary:  Negative for dysuria.   Musculoskeletal:  Negative for gait problem.   Skin:  Negative for rash.   Allergic/Immunologic: Negative for immunocompromised state.   Neurological:  Negative for dizziness, syncope and weakness.   Hematological:  Does not bruise/bleed easily.   Psychiatric/Behavioral:  Negative for behavioral problems.        EXERCISE & VITAMINS:  See Bariatric Assessment    MEDICATIONS/ALLERGIES:  Have been reviewed.    DIET: Liquid Bariatric Diet.  2 protein shakes daily, ~60 grams protein.  32 fl oz SF clear beverage.      See Dietician note from today for a more detailed assessment.      Physical Exam  Vitals and nursing note reviewed.   Constitutional:       Appearance: She is well-developed. She is morbidly obese.   HENT:      Head: Normocephalic.      Nose: Nose normal.      Mouth/Throat:      Mouth: Mucous membranes are moist.   Eyes:      Extraocular Movements: Extraocular movements intact.   Cardiovascular:      Rate and Rhythm: Normal rate and regular rhythm.      Heart sounds: Normal heart sounds.   Pulmonary:      Effort: Pulmonary effort is normal.      Breath sounds: Normal breath sounds.   Abdominal:      General: Bowel sounds are normal.      Palpations:  Abdomen is soft.   Musculoskeletal:         General: Normal range of motion.      Cervical back: Normal range of motion.   Skin:     General: Skin is warm and dry.      Capillary Refill: Capillary refill takes less than 2 seconds.   Neurological:      Mental Status: She is alert and oriented to person, place, and time.   Psychiatric:         Mood and Affect: Mood normal.         ASSESSMENT:  - Morbid obesity s/p sleeve gastrectomy on 10/28/24.  - Co-morbidities: hypertension, GERD, ESRD, hx kidney transplant and PE  -  Weight loss, 11#'s and 12% EWL  -  Exercise routine moving around  - Fair Diet   - Good Vitamin regimen     PLAN:  - Ursodiol 500 mg daily for 6 months  - Anti-Acid medication,  daily for 3 months  - No lifting more than 10 lbs for 6 weeks  - Miralax daily for constipation  - Emphasized the importance of regular exercise and adherence to bariatric diet to achieve maximum weight loss.  - Encouraged patient to start regular exercise.  - Follow-up with dietician to advance diet.  - Continue daily vitamins and medications.  - RTC in 4 weeks or sooner if needed.  - Call the office for any issues.  - Check labs today.    Post Discharge     2 Weeks     Total Opioids Used (Outpatient): 0 tabsml: mLs  Unused Opioids Returned: No Educated on safe opioid disposal location at [facility].   Additional Opioids Provided: no

## 2024-11-11 NOTE — PATIENT INSTRUCTIONS
High Protein Pureed Diet    2 weeks after gastric bypass and sleeve you may be ready to add pureed food to your diet.  All food should be the consistency of baby food, or thinner.  Follow pureed diet for the next 2 weeks.    Protein - It is very important to pay attention to protein intake during this time.      Inadequate protein intake can cause:  Delayed Wound Healing  Hair Loss  Muscle Breakdown    Meal Plan - Eat 3-4 meals per day (2-4 tbsp each), with protein supplements in between to meet protein needs.  Meeting protein needs daily will help increase healing, decrease muscle loss, and increase weight loss.  Your goal is  grams of protein a day.    Protein First - Always eat the foods with the highest protein first.  Foods high in protein include milk, yogurt, cheese, egg whites, and blenderized meat, seafood, and beans.    Fluids - Keep track in your journal of how much you are drinking; you should try to drink at least 64oz of fluids every day.      Foods allowed: Portion size Protein (g)   Sugar-free clear liquids As desired 0   Skim or 1% milk ½ cup 4   Sugar free pudding, light yogurt, custard (use skim or 1% milk in preparation) 3 oz 2.5   Strained baby food meats, or home-made pureed lean meats and shrimp 1 oz 7   Beans (red, white, black, lima, vazquez, fat free refried, hummus) and lentils ¼ cup 4   Low-fat/fat free cheese.(cottage cheese, mozzarella string cheese, ricotta cheese, Laughing Cow, Baby Bell, cheddar, etc) ¼ cup 7-8   Scrambled eggs or Egg Beaters 1 or ¼ cup 6   Edamame or Tofu, mashed ¼ cup 5   Unflavored protein powder (add to 1 scoop to  98% fat free soups or SF pudding) 3 Tbsp 9   *PB2: peanut powder (45 calories) 2 Tbsp 5     *PB2 powdered peanut butter: 45 calories vs. 190 calories in 2 tbsp of regular peanut butter. Purchase online at [employer], or  at various [employer], [employer], [employer], [employer] and [employer].        Bariatric Liquid/Pureed Sample Menu    3-4 small meals  plus 2-3 protein drinks per day.    8am 1 egg or ¼ cup Egg Beaters   9am 1 cup water, or decaf coffee or tea   10am Protein drink, 30g protein   11am 2 tbsp low-fat cottage cheese, and 1 tbsp pureed peaches   12pm 1 cup water, or sugar-free lemonade    1pm 2 tbsp pureed chicken, and 1 tbsp pureed carrots    2pm 1 cup water, or sugar-free lemonade   3pm Protein drink, 30g protein   5pm 1 cup water    6pm 1 cup hi-protein creamy chicken soup 14g protein (see Recipe below)   7pm 1 cup water, or sugar-free fruit punch    8pm 1 cup water     This sample menu provides approx. 80g protein and 64oz fluids.  Liquid protein supplements should contain 20-30g protein and less than 4 grams of sugar each.    Sip fluids continuously in between meals.  Drink at least ¼ cup every 15 minutes.  For fluids: ¼ cup = 2 oz = 4 tbsp       RECIPE IDEAS for Bariatric Pureed Diet:    Hi-Protein Creamy Chicken Soup: (10g protein per 1 cup serving)  Empty 1 can of 98% fat free cream of chicken soup into saucepan. Then  blend 1 scoop of unflavored protein powder with 1 can of skim milk until smooth.  Add protein milk to saucepan and heat to warm. (Note: Do NOT boil. Protein powder may clump if heated too hot).     Hi-Protein Pudding: (14g protein per ½ cup serving)  Add 2 scoops protein powder to 2 cups cold skim milk and mix well.  Stir in dry Jell-O Sugar-Free Instant Pudding mix.  Chill and Enjoy!    Tuna Mousse (12g protein per ¼ cup serving) Page 135 in book Eating Well After Weight Loss Surgery.  In a  or , combine all ingredients and pulse until smooth.  2 6-ounce cans tuna packed in water, drained  2 tbsp low-fat mayonnaise  2 tbsp fat-free sour cream  2 tbsp fat-free cream cheese, softened  ½ cup shallots, finely chopped  1 tbsp lemon juice  ¼ tsp ground pepper  ½ tsp celery seed    Chocolate Peanut Butter Mousse  (28g protein total)  6oz plain Greek yogurt  4 tbsp chocolate PB2

## 2024-11-12 ENCOUNTER — PATIENT MESSAGE (OUTPATIENT)
Dept: BARIATRICS | Facility: CLINIC | Age: 34
End: 2024-11-12
Payer: MEDICARE

## 2024-11-12 DIAGNOSIS — Z94.0 S/P KIDNEY TRANSPLANT: ICD-10-CM

## 2024-11-12 RX ORDER — PANTOPRAZOLE SODIUM 40 MG/1
40 TABLET, DELAYED RELEASE ORAL DAILY
Qty: 30 TABLET | Refills: 3 | Status: SHIPPED | OUTPATIENT
Start: 2024-11-12 | End: 2024-12-12

## 2024-11-12 RX ORDER — PANTOPRAZOLE SODIUM 40 MG/1
40 TABLET, DELAYED RELEASE ORAL 2 TIMES DAILY
Qty: 28 TABLET | Refills: 0 | OUTPATIENT
Start: 2024-11-12 | End: 2024-11-26

## 2024-11-13 LAB — VIT B1 BLD-MCNC: 70 UG/L (ref 38–122)

## 2024-11-19 ENCOUNTER — PATIENT MESSAGE (OUTPATIENT)
Dept: TRANSPLANT | Facility: CLINIC | Age: 34
End: 2024-11-19
Payer: MEDICARE

## 2024-11-20 DIAGNOSIS — Z94.0 DECEASED-DONOR KIDNEY TRANSPLANT: Primary | ICD-10-CM

## 2024-12-03 ENCOUNTER — PATIENT MESSAGE (OUTPATIENT)
Dept: BARIATRICS | Facility: CLINIC | Age: 34
End: 2024-12-03
Payer: MEDICARE

## 2024-12-10 ENCOUNTER — LAB VISIT (OUTPATIENT)
Dept: LAB | Facility: HOSPITAL | Age: 34
End: 2024-12-10
Attending: INTERNAL MEDICINE
Payer: MEDICARE

## 2024-12-10 ENCOUNTER — PATIENT MESSAGE (OUTPATIENT)
Dept: BARIATRICS | Facility: CLINIC | Age: 34
End: 2024-12-10
Payer: MEDICARE

## 2024-12-10 DIAGNOSIS — Z94.0 S/P KIDNEY TRANSPLANT: ICD-10-CM

## 2024-12-10 LAB
ALBUMIN SERPL BCP-MCNC: 4 G/DL (ref 3.5–5.2)
ANION GAP SERPL CALC-SCNC: 8 MMOL/L (ref 8–16)
BASOPHILS # BLD AUTO: 0.06 K/UL (ref 0–0.2)
BASOPHILS NFR BLD: 1.1 % (ref 0–1.9)
BUN SERPL-MCNC: 15 MG/DL (ref 6–20)
CALCIUM SERPL-MCNC: 9.6 MG/DL (ref 8.7–10.5)
CHLORIDE SERPL-SCNC: 112 MMOL/L (ref 95–110)
CO2 SERPL-SCNC: 22 MMOL/L (ref 23–29)
CREAT SERPL-MCNC: 1 MG/DL (ref 0.5–1.4)
DIFFERENTIAL METHOD BLD: ABNORMAL
EOSINOPHIL # BLD AUTO: 0.3 K/UL (ref 0–0.5)
EOSINOPHIL NFR BLD: 4.5 % (ref 0–8)
ERYTHROCYTE [DISTWIDTH] IN BLOOD BY AUTOMATED COUNT: 12.6 % (ref 11.5–14.5)
EST. GFR  (NO RACE VARIABLE): >60 ML/MIN/1.73 M^2
GLUCOSE SERPL-MCNC: 79 MG/DL (ref 70–110)
HCT VFR BLD AUTO: 39.8 % (ref 37–48.5)
HGB BLD-MCNC: 12.9 G/DL (ref 12–16)
IMM GRANULOCYTES # BLD AUTO: 0.01 K/UL (ref 0–0.04)
IMM GRANULOCYTES NFR BLD AUTO: 0.2 % (ref 0–0.5)
LYMPHOCYTES # BLD AUTO: 0.9 K/UL (ref 1–4.8)
LYMPHOCYTES NFR BLD: 15.8 % (ref 18–48)
MCH RBC QN AUTO: 31.6 PG (ref 27–31)
MCHC RBC AUTO-ENTMCNC: 32.4 G/DL (ref 32–36)
MCV RBC AUTO: 98 FL (ref 82–98)
MONOCYTES # BLD AUTO: 0.7 K/UL (ref 0.3–1)
MONOCYTES NFR BLD: 13.1 % (ref 4–15)
NEUTROPHILS # BLD AUTO: 3.6 K/UL (ref 1.8–7.7)
NEUTROPHILS NFR BLD: 65.3 % (ref 38–73)
NRBC BLD-RTO: 0 /100 WBC
PHOSPHATE SERPL-MCNC: 2.7 MG/DL (ref 2.7–4.5)
PLATELET # BLD AUTO: 161 K/UL (ref 150–450)
PMV BLD AUTO: 13.3 FL (ref 9.2–12.9)
POTASSIUM SERPL-SCNC: 3.9 MMOL/L (ref 3.5–5.1)
RBC # BLD AUTO: 4.08 M/UL (ref 4–5.4)
SODIUM SERPL-SCNC: 142 MMOL/L (ref 136–145)
WBC # BLD AUTO: 5.5 K/UL (ref 3.9–12.7)

## 2024-12-10 PROCEDURE — 80197 ASSAY OF TACROLIMUS: CPT | Mod: HCNC | Performed by: INTERNAL MEDICINE

## 2024-12-10 PROCEDURE — 87799 DETECT AGENT NOS DNA QUANT: CPT | Mod: HCNC | Performed by: INTERNAL MEDICINE

## 2024-12-10 PROCEDURE — 85025 COMPLETE CBC W/AUTO DIFF WBC: CPT | Mod: HCNC | Performed by: INTERNAL MEDICINE

## 2024-12-10 PROCEDURE — 36415 COLL VENOUS BLD VENIPUNCTURE: CPT | Mod: HCNC | Performed by: INTERNAL MEDICINE

## 2024-12-10 PROCEDURE — 80069 RENAL FUNCTION PANEL: CPT | Mod: HCNC | Performed by: INTERNAL MEDICINE

## 2024-12-11 ENCOUNTER — TELEPHONE (OUTPATIENT)
Dept: TRANSPLANT | Facility: CLINIC | Age: 34
End: 2024-12-11
Payer: MEDICARE

## 2024-12-11 DIAGNOSIS — Z94.0 DECEASED-DONOR KIDNEY TRANSPLANT: ICD-10-CM

## 2024-12-11 DIAGNOSIS — Z94.0 DECEASED-DONOR KIDNEY TRANSPLANT: Primary | ICD-10-CM

## 2024-12-11 LAB — TACROLIMUS BLD-MCNC: 5.4 NG/ML (ref 5–15)

## 2024-12-11 RX ORDER — TACROLIMUS 1 MG/1
CAPSULE ORAL
Qty: 270 CAPSULE | Refills: 11 | Status: SHIPPED | OUTPATIENT
Start: 2024-12-11

## 2024-12-11 NOTE — TELEPHONE ENCOUNTER
----- Message from Sara Caballero MD sent at 12/11/2024  1:06 PM CST -----  Increase tacro to 5 mg QAM and 4 mg QPM

## 2024-12-18 NOTE — PROGRESS NOTES
Kidney Post-Transplant Assessment    Referring Physician: Andres Hoyt  Current Nephrologist: Eduard Vance    ORGAN: RIGHT KIDNEY  Donor Type: donation after brain death  PHS Increased Risk: no  Cold Ischemia: 1,259 mins  Induction Medications: thymoglobulin    Subjective:   The patient location is: [place]  The chief complaint leading to consultation is: Kidney Post-Transplant Assessment    Visit type: audiovisual    Face to Face time with patient: 25 minutes of total time spent on the encounter, which includes face to face time and non-face to face time preparing to see the patient (eg, review of tests), Obtaining and/or reviewing separately obtained history, Documenting clinical information in the electronic or other health record, Independently interpreting results (not separately reported) and communicating results to the patient/family/caregiver, or Care coordination (not separately reported).     Each patient to whom he or she provides medical services by telemedicine is:  (1) informed of the relationship between the physician and patient and the respective role of any other health care provider with respect to management of the patient; and (2) notified that he or she may decline to receive medical services by telemedicine and may withdraw from such care at any time.      CC:  Reassessment of renal allograft function and management of chronic immunosuppression.    HPI:  Ms. Schwartz is a 34 y.o. year old Black or  female with history of ESRD secondary to FSGS who was on HD 10/2016 until she received a donation after brain death kidney transplant on 1/2/23 (Thymo induction, CIT ~21 hours, CMV -/+). Surgery without complication. She has CKD stage 2 - GFR 60-89 and her baseline creatinine is between 1.0-1.2. She takes mycophenolate mofetil, prednisone, and tacrolimus for maintenance immunosuppression.     Established with general nephrology Dr. Castillo.  Not following with PCP    Following  with bariatrics, had gastric sleeve 10/28/24. Doing well. Back at work    Reports no problems with urination. BP at goal, no peripheral edema. Tolerating IS without issues, no diarrhea or vomiting. She does complain of lower extremity spasms typically during penetrative sex that interferes. She also endorses shoot pains down her right leg at times that last for hours. Denies bilateral going tinkle/numbness. No problems walking or sitting.     Current Outpatient Medications   Medication Sig Dispense Refill    albuterol (PROVENTIL/VENTOLIN HFA) 90 mcg/actuation inhaler Inhale 1-2 puffs into the lungs every 6 (six) hours as needed for Wheezing (cough). 8.5 g 1    ergocalciferol (ERGOCALCIFEROL) 50,000 unit Cap Take 1 capsule (50,000 Units total) by mouth every 7 days. for 12 doses 12 capsule 0    ketoconazole (NIZORAL) 200 mg Tab Take HALF tablet (100 mg total) by mouth once daily. 15 tablet 11    magnesium oxide (MAG-OX) 400 mg (241.3 mg magnesium) tablet Take 2 tablets (800 mg total) by mouth 2 (two) times daily. 60 tablet 11    metoprolol tartrate (LOPRESSOR) 50 MG tablet Take 2 tablets (100 mg total) by mouth 2 (two) times daily. 360 tablet 3    multivitamin (THERAGRAN) per tablet Take 1 tablet by mouth every morning.      mycophenolate mofetil (CELLCEPT) 200 mg/mL SusR Take 5 mLs (1,000 mg total) by mouth every 12 (twelve) hours. 320 mL 11    ondansetron (ZOFRAN-ODT) 8 MG TbDL Dissolve 1 tablet (8 mg total) by mouth every 6 (six) hours as needed (Nausea). 30 tablet 0    pantoprazole (PROTONIX) 40 MG tablet Take 1 tablet (40 mg total) by mouth once daily. 30 tablet 3    predniSONE (DELTASONE) 5 MG tablet Take 1 tablet (5 mg total) by mouth once daily. 90 tablet 3    promethazine (PHENERGAN) 12.5 MG Supp Place 1 suppository (12.5 mg total) rectally every 6 (six) hours as needed (nausea, 2nd line). 5 suppository 0    sodium bicarbonate 650 MG tablet Take 1 tablet (650 mg total) by mouth 2 (two) times daily. 60 tablet  "11    tacrolimus (PROGRAF) 1 MG Cap Take 5 capsules (5 mg total) by mouth every morning AND 4 capsules (4 mg total) every evening. 270 capsule 11    ursodioL (ACTIGALL) 500 MG tablet Take 1 tablet (500 mg total) by mouth once daily. For gallstone prevention. 180 tablet 0     No current facility-administered medications for this visit.       Past Medical History:   Diagnosis Date    Allergy     Anemia     Anxiety     Breast feeding status of mother 1/17/2020    Brittle bones     ESRD (end stage renal disease)     M/W/F    General anesthetics causing adverse effect in therapeutic use     pt states "im a light weight"    Hypertension     Insomnia     PSG revealed no CHRYSTAL, but likely psychogenic etiology (anxiety)    RLS (restless legs syndrome)        Review of Systems   Constitutional:  Positive for fatigue. Negative for activity change and fever.   Eyes:  Negative for visual disturbance.   Respiratory:  Negative for shortness of breath.    Cardiovascular:  Negative for chest pain and leg swelling.   Gastrointestinal:  Negative for constipation, diarrhea and nausea.   Genitourinary:  Negative for difficulty urinating, frequency and hematuria.   Musculoskeletal:  Negative for arthralgias and myalgias.   Skin:  Negative for wound.   Allergic/Immunologic: Positive for immunocompromised state.   Neurological:  Negative for weakness and numbness.   Psychiatric/Behavioral:  Negative for sleep disturbance. The patient is not nervous/anxious.        Objective:   There were no vitals taken for this visit.body mass index is unknown because there is no height or weight on file.    Physical Exam-VIRTUAL VISIT    Labs:  Lab Results   Component Value Date    WBC 5.50 12/10/2024    HGB 12.9 12/10/2024    HCT 39.8 12/10/2024     12/10/2024    K 3.9 12/10/2024     (H) 12/10/2024    CO2 22 (L) 12/10/2024    BUN 15 12/10/2024    CREATININE 1.0 12/10/2024    EGFRNONAA 3 (A) 03/28/2022    CALCIUM 9.6 12/10/2024    PHOS 2.7 " 12/10/2024    MG 1.5 (L) 10/30/2024    ALBUMIN 4.0 12/10/2024    AST 17 2024    ALT 18 2024    UTPCR 0.05 12/10/2024    .2 (H) 2024    TACROLIMUS 5.4 12/10/2024     Labs were reviewed with the patient    Assessment:     1. -donor kidney transplant    2. Essential hypertension    3. Immunodeficiency, unspecified      Plan:   Continue follow up with general nephrology for CKD care, no need for IS refills today  Routine follow-up  Check Mag level with next labs  Patient to make an appointment with PCP to establish care and evaluated legs spasms.       1. Immunosuppression: Prograf trough 5.4, which is therapeutic.  Continue Prograf 5/4, Ketoconazole 100 mg QD to augment prograf levels, MMF 1000 mg BID, and Prednisone 5 mg QD. Will continue to monitor for drug toxicities    2. Allograft Function: CKD II. Continue good oral hydration.   - ESRD secondary to FSGS on HD 10/2016 until she received a donation after brain death kidney transplant on 23 (Thymo induction CIT ~21 hours, CMV -/+).  - baseline creatinine is between 1.0-1.2.  Lab Results   Component Value Date    CREATININE 1.0 12/10/2024      Latest Reference Range & Units 1yr 11mo,  Kidney-Post 2 Year  24 09:43   Creatinine 0.5 - 1.4 mg/dL 1.0   eGFR >60 mL/min/1.73 m^2 >60.0   Glucose 70 - 110 mg/dL 83       3. Hypertension management: advise low salt diet and home BP monitoring    lopressor 100 mg BID    4. Metabolic Bone Disease/Secondary Hyperparathyroidism:stable  Lab Results   Component Value Date    .2 (H) 2024    CALCIUM 9.6 12/10/2024    PHOS 2.7 12/10/2024         5. Electrolytes:  Will monitor /guidelines  Sodium bicarb 650 mg BID  Lab Results   Component Value Date     12/10/2024    K 3.9 12/10/2024     (H) 12/10/2024    CO2 22 (L) 12/10/2024       6. Anemia: stable, no need for intervention  Lab Results   Component Value Date    WBC 5.50 12/10/2024    HGB 12.9 12/10/2024    HCT 39.8  12/10/2024    MCV 98 12/10/2024     12/10/2024       7. Proteinuria: continue p/c ratio as per FSGS guidelines   UP 12/10/2024: unable to calculate     8. BK virus infection screening:  will continue to monitor per guidelines  BK PCR 12/10/2024: not detected      Follow-up:   Clinic: return to transplant clinic weekly for the first month after transplant; every 2 weeks during months 2-3; then at 6-, 9-, 12-, 18-, 24-, and 36- months post-transplant to reassess for complications from immunosuppression toxicity and monitor for rejection.  Annually thereafter.    Labs: since patient remains at high risk for rejection and drug-related complications that warrant close monitoring, labs will be ordered as follows: continue twice weekly CBC, renal panel, and drug level for first month; then same labs once weekly through 3rd month post-transplant.  Urine for UA and protein/creatinine ratio monthly.  Serum BK - PCR at 1-, 3-, 6-, 9-, 12-, 18-, 24-, 36-, 48-, and 60 months post-transplant.  Hepatic panel at 1-, 2-, 3-, 6-, 9-, 12-, 18-, 24-, and 36- months post-transplant.    Allie Rivera NP

## 2024-12-20 ENCOUNTER — LAB VISIT (OUTPATIENT)
Dept: LAB | Facility: HOSPITAL | Age: 34
End: 2024-12-20
Attending: NURSE PRACTITIONER
Payer: MEDICARE

## 2024-12-20 DIAGNOSIS — I10 ESSENTIAL HYPERTENSION: ICD-10-CM

## 2024-12-20 DIAGNOSIS — Z98.84 S/P BARIATRIC SURGERY: ICD-10-CM

## 2024-12-20 DIAGNOSIS — Z79.899 LONG-TERM USE OF HIGH-RISK MEDICATION: ICD-10-CM

## 2024-12-20 DIAGNOSIS — Z94.0 S/P KIDNEY TRANSPLANT: ICD-10-CM

## 2024-12-20 DIAGNOSIS — K21.9 GASTROESOPHAGEAL REFLUX DISEASE WITHOUT ESOPHAGITIS: ICD-10-CM

## 2024-12-20 DIAGNOSIS — Z79.899 POLYPHARMACY: ICD-10-CM

## 2024-12-20 LAB
25(OH)D3+25(OH)D2 SERPL-MCNC: 19 NG/ML (ref 30–96)
ALBUMIN SERPL BCP-MCNC: 3.8 G/DL (ref 3.5–5.2)
ALP SERPL-CCNC: 58 U/L (ref 40–150)
ALT SERPL W/O P-5'-P-CCNC: 13 U/L (ref 10–44)
ANION GAP SERPL CALC-SCNC: 8 MMOL/L (ref 8–16)
AST SERPL-CCNC: 13 U/L (ref 10–40)
BASOPHILS # BLD AUTO: 0.04 K/UL (ref 0–0.2)
BASOPHILS NFR BLD: 0.6 % (ref 0–1.9)
BILIRUB SERPL-MCNC: 0.6 MG/DL (ref 0.1–1)
BUN SERPL-MCNC: 17 MG/DL (ref 6–20)
CALCIUM SERPL-MCNC: 9.9 MG/DL (ref 8.7–10.5)
CHLORIDE SERPL-SCNC: 109 MMOL/L (ref 95–110)
CHOLEST SERPL-MCNC: 121 MG/DL (ref 120–199)
CHOLEST/HDLC SERPL: 3.9 {RATIO} (ref 2–5)
CO2 SERPL-SCNC: 23 MMOL/L (ref 23–29)
CREAT SERPL-MCNC: 1 MG/DL (ref 0.5–1.4)
DIFFERENTIAL METHOD BLD: ABNORMAL
EOSINOPHIL # BLD AUTO: 0.1 K/UL (ref 0–0.5)
EOSINOPHIL NFR BLD: 1.8 % (ref 0–8)
ERYTHROCYTE [DISTWIDTH] IN BLOOD BY AUTOMATED COUNT: 12.5 % (ref 11.5–14.5)
EST. GFR  (NO RACE VARIABLE): >60 ML/MIN/1.73 M^2
GLUCOSE SERPL-MCNC: 83 MG/DL (ref 70–110)
HCT VFR BLD AUTO: 39.1 % (ref 37–48.5)
HDLC SERPL-MCNC: 31 MG/DL (ref 40–75)
HDLC SERPL: 25.6 % (ref 20–50)
HGB BLD-MCNC: 12.1 G/DL (ref 12–16)
IMM GRANULOCYTES # BLD AUTO: 0.02 K/UL (ref 0–0.04)
IMM GRANULOCYTES NFR BLD AUTO: 0.3 % (ref 0–0.5)
IRON SERPL-MCNC: 125 UG/DL (ref 30–160)
LDLC SERPL CALC-MCNC: 68 MG/DL (ref 63–159)
LYMPHOCYTES # BLD AUTO: 1.3 K/UL (ref 1–4.8)
LYMPHOCYTES NFR BLD: 17.4 % (ref 18–48)
MCH RBC QN AUTO: 30.6 PG (ref 27–31)
MCHC RBC AUTO-ENTMCNC: 30.9 G/DL (ref 32–36)
MCV RBC AUTO: 99 FL (ref 82–98)
MONOCYTES # BLD AUTO: 0.6 K/UL (ref 0.3–1)
MONOCYTES NFR BLD: 8.3 % (ref 4–15)
NEUTROPHILS # BLD AUTO: 5.2 K/UL (ref 1.8–7.7)
NEUTROPHILS NFR BLD: 71.6 % (ref 38–73)
NONHDLC SERPL-MCNC: 90 MG/DL
NRBC BLD-RTO: 0 /100 WBC
PLATELET # BLD AUTO: 175 K/UL (ref 150–450)
PMV BLD AUTO: 12.8 FL (ref 9.2–12.9)
POTASSIUM SERPL-SCNC: 3.6 MMOL/L (ref 3.5–5.1)
PROT SERPL-MCNC: 7.2 G/DL (ref 6–8.4)
RBC # BLD AUTO: 3.96 M/UL (ref 4–5.4)
SATURATED IRON: 57 % (ref 20–50)
SODIUM SERPL-SCNC: 140 MMOL/L (ref 136–145)
TOTAL IRON BINDING CAPACITY: 218 UG/DL (ref 250–450)
TRANSFERRIN SERPL-MCNC: 147 MG/DL (ref 200–375)
TRIGL SERPL-MCNC: 110 MG/DL (ref 30–150)
VIT B12 SERPL-MCNC: 1035 PG/ML (ref 210–950)
WBC # BLD AUTO: 7.26 K/UL (ref 3.9–12.7)

## 2024-12-20 PROCEDURE — 85025 COMPLETE CBC W/AUTO DIFF WBC: CPT | Mod: HCNC | Performed by: NURSE PRACTITIONER

## 2024-12-20 PROCEDURE — 80061 LIPID PANEL: CPT | Mod: HCNC | Performed by: NURSE PRACTITIONER

## 2024-12-20 PROCEDURE — 84425 ASSAY OF VITAMIN B-1: CPT | Mod: HCNC | Performed by: NURSE PRACTITIONER

## 2024-12-20 PROCEDURE — 82306 VITAMIN D 25 HYDROXY: CPT | Mod: HCNC | Performed by: NURSE PRACTITIONER

## 2024-12-20 PROCEDURE — 83540 ASSAY OF IRON: CPT | Mod: HCNC | Performed by: NURSE PRACTITIONER

## 2024-12-20 PROCEDURE — 82607 VITAMIN B-12: CPT | Mod: HCNC | Performed by: NURSE PRACTITIONER

## 2024-12-20 PROCEDURE — 80053 COMPREHEN METABOLIC PANEL: CPT | Mod: HCNC | Performed by: NURSE PRACTITIONER

## 2024-12-23 ENCOUNTER — PATIENT MESSAGE (OUTPATIENT)
Dept: TRANSPLANT | Facility: CLINIC | Age: 34
End: 2024-12-23
Payer: MEDICARE

## 2024-12-23 ENCOUNTER — OFFICE VISIT (OUTPATIENT)
Dept: TRANSPLANT | Facility: CLINIC | Age: 34
End: 2024-12-23
Payer: MEDICARE

## 2024-12-23 DIAGNOSIS — I10 ESSENTIAL HYPERTENSION: ICD-10-CM

## 2024-12-23 DIAGNOSIS — Z94.0 DECEASED-DONOR KIDNEY TRANSPLANT: Primary | ICD-10-CM

## 2024-12-23 DIAGNOSIS — D84.9 IMMUNODEFICIENCY, UNSPECIFIED: ICD-10-CM

## 2024-12-23 RX ORDER — ERGOCALCIFEROL 1.25 MG/1
50000 CAPSULE ORAL
Qty: 12 CAPSULE | Refills: 0 | Status: SHIPPED | OUTPATIENT
Start: 2024-12-23 | End: 2025-03-18

## 2024-12-23 NOTE — LETTER
December 23, 2024        Eduard Vance  [address]  [address]  [facility]  [facility] [place] [place] [address]  Phone: [phone]  Fax: [phone]             [facility] 1st Fl  [address]  [facility] [address]  Phone: [phone]   Patient: Lisseth Schwartz   MR Number: [id number]   YOB: 1990   Date of Visit: 12/23/2024       Dear Dr. Eduard Vance    Thank you for referring Lisseth Schwartz to me for evaluation. Attached you will find relevant portions of my assessment and plan of care.    If you have questions, please do not hesitate to call me. I look forward to following Lisseth Schwartz along with you.    Sincerely,    Allie Rivera, NP    Enclosure    If you would like to receive this communication electronically, please contact [email or web address] or [phone] to request [employer] access.    [employer] is a tool which provides read-only access to select patient information with whom you have a relationship. Its easy to use and provides real time access to review your patients record including encounter summaries, notes, results, and demographic information.    If you feel you have received this communication in error or would no longer like to receive these types of communications, please e-mail [email or web address]

## 2024-12-26 LAB — VIT B1 BLD-MCNC: 116 UG/L (ref 38–122)

## 2024-12-30 ENCOUNTER — OFFICE VISIT (OUTPATIENT)
Dept: BARIATRICS | Facility: CLINIC | Age: 34
End: 2024-12-30
Payer: MEDICARE

## 2024-12-30 VITALS — WEIGHT: 209 LBS | BODY MASS INDEX: 33.59 KG/M2 | HEIGHT: 66 IN

## 2024-12-30 DIAGNOSIS — K21.9 GASTROESOPHAGEAL REFLUX DISEASE WITHOUT ESOPHAGITIS: ICD-10-CM

## 2024-12-30 DIAGNOSIS — Z94.0 S/P KIDNEY TRANSPLANT: ICD-10-CM

## 2024-12-30 DIAGNOSIS — Z98.890 POST-OPERATIVE STATE: Primary | ICD-10-CM

## 2024-12-30 DIAGNOSIS — R63.4 WEIGHT LOSS: ICD-10-CM

## 2024-12-30 DIAGNOSIS — Z98.84 S/P BARIATRIC SURGERY: ICD-10-CM

## 2024-12-30 DIAGNOSIS — Z98.84 S/P BARIATRIC SURGERY: Primary | ICD-10-CM

## 2024-12-30 DIAGNOSIS — I10 ESSENTIAL HYPERTENSION: ICD-10-CM

## 2024-12-30 RX ORDER — PANTOPRAZOLE SODIUM 40 MG/1
40 TABLET, DELAYED RELEASE ORAL 2 TIMES DAILY
Qty: 120 TABLET | Refills: 3 | Status: SHIPPED | OUTPATIENT
Start: 2024-12-30 | End: 2025-08-27

## 2024-12-30 NOTE — PROGRESS NOTES
BARIATRIC POST-OPERATIVE VISIT:    HPI:  Lisseth Schwartz is a 34 y.o. year old female presents for 8 week post op visit following sleeve.  she is doing well and tolerating the diet without difficulty.  she has no complaints.    Denies: nausea, vomiting, abdominal pain, changes in bowel movement pattern, fever, chills, dysphagia, chest pain, and shortness of breath.    Review of Systems   Constitutional:  Negative for activity change and fatigue.   Respiratory:  Negative for cough and shortness of breath.    Cardiovascular:  Negative for chest pain, palpitations and leg swelling.   Gastrointestinal:  Negative for abdominal pain, nausea and vomiting.   Endocrine: Negative for polydipsia, polyphagia and polyuria.   Genitourinary:  Negative for dysuria.   Musculoskeletal:  Negative for gait problem.   Skin:  Negative for rash.   Allergic/Immunologic: Negative for immunocompromised state.   Neurological:  Negative for dizziness, syncope and weakness.   Hematological:  Does not bruise/bleed easily.   Psychiatric/Behavioral:  Negative for behavioral problems.        EXERCISE & VITAMINS:  See Bariatric Assessment  Adherent to vitamin regimen   MEDICATIONS/ALLERGIES:  Have been reviewed.    DIET: Regular Bariatric Diet.  2 protein shakes daily, ~60 grams protein.  32 fl oz SF clear beverage.      See Dietician note from today for a more detailed assessment.      Physical Exam  Vitals and nursing note reviewed.   Constitutional:       Appearance: She is well-developed. She is morbidly obese.   HENT:      Head: Normocephalic.      Nose: Nose normal.      Mouth/Throat:      Mouth: Mucous membranes are moist.   Eyes:      Extraocular Movements: Extraocular movements intact.   Cardiovascular:      Rate and Rhythm: Normal rate and regular rhythm.      Heart sounds: Normal heart sounds.   Pulmonary:      Effort: Pulmonary effort is normal.      Breath sounds: Normal breath sounds.   Abdominal:      General: Bowel sounds are  normal.      Palpations: Abdomen is soft.   Musculoskeletal:         General: Normal range of motion.      Cervical back: Normal range of motion.   Skin:     General: Skin is warm and dry.      Capillary Refill: Capillary refill takes less than 2 seconds.   Neurological:      Mental Status: She is alert and oriented to person, place, and time.   Psychiatric:         Mood and Affect: Mood normal.         ASSESSMENT:  - Morbid obesity s/p sleeve gastrectomy on 10/28/24.  - Co-morbidities: hypertension, GERD, ESRD, hx kidney transplant and PE  -  Weight loss, 24#'s and 27% EWL  -  Exercise routine moving around  - Fair Diet   - Good Vitamin regimen     PLAN:  - Ursodiol 500 mg daily for 6 months  - Anti-Acid medication,  daily for 3 months  - No lifting more than 10 lbs for 6 weeks  - Miralax daily for constipation  - Emphasized the importance of regular exercise and adherence to bariatric diet to achieve maximum weight loss.  - Encouraged patient to start regular exercise.  - Follow-up with dietician to advance diet.  - Continue daily vitamins and medications.  - RTC in 4 weeks or sooner if needed.  - Call the office for any issues.  - Check labs today.    Post Discharge     8 Weeks     Total Opioids Used (Outpatient): 0 tabsml: mLs  Unused Opioids Returned: No Educated on safe opioid disposal location at [facility].   Additional Opioids Provided: no

## 2025-01-06 ENCOUNTER — PATIENT MESSAGE (OUTPATIENT)
Dept: BARIATRICS | Facility: CLINIC | Age: 35
End: 2025-01-06
Payer: MEDICARE

## 2025-01-11 ENCOUNTER — HOSPITAL ENCOUNTER (EMERGENCY)
Facility: HOSPITAL | Age: 35
Discharge: HOME | End: 2025-01-11
Attending: EMERGENCY MEDICINE
Payer: MEDICARE

## 2025-01-11 ENCOUNTER — PATIENT MESSAGE (OUTPATIENT)
Dept: URGENT CARE | Facility: CLINIC | Age: 35
End: 2025-01-11
Payer: MEDICARE

## 2025-01-11 VITALS
TEMPERATURE: 99 F | DIASTOLIC BLOOD PRESSURE: 79 MMHG | BODY MASS INDEX: 33.02 KG/M2 | OXYGEN SATURATION: 98 % | RESPIRATION RATE: 16 BRPM | HEART RATE: 74 BPM | SYSTOLIC BLOOD PRESSURE: 128 MMHG | WEIGHT: 204.63 LBS

## 2025-01-11 DIAGNOSIS — R11.2 NAUSEA AND VOMITING, UNSPECIFIED VOMITING TYPE: ICD-10-CM

## 2025-01-11 DIAGNOSIS — B34.9 VIRAL SYNDROME: Primary | ICD-10-CM

## 2025-01-11 LAB
ALBUMIN SERPL BCP-MCNC: 4.1 G/DL (ref 3.5–5.2)
ALP SERPL-CCNC: 67 U/L (ref 40–150)
ALT SERPL W/O P-5'-P-CCNC: 13 U/L (ref 10–44)
ANION GAP SERPL CALC-SCNC: 11 MMOL/L (ref 8–16)
AST SERPL-CCNC: 14 U/L (ref 10–40)
BASOPHILS # BLD AUTO: 0.05 K/UL (ref 0–0.2)
BASOPHILS NFR BLD: 0.4 % (ref 0–1.9)
BILIRUB SERPL-MCNC: 0.7 MG/DL (ref 0.1–1)
BILIRUB UR QL STRIP: NEGATIVE
BUN SERPL-MCNC: 12 MG/DL (ref 6–20)
CALCIUM SERPL-MCNC: 10.2 MG/DL (ref 8.7–10.5)
CHLORIDE SERPL-SCNC: 109 MMOL/L (ref 95–110)
CLARITY UR: ABNORMAL
CO2 SERPL-SCNC: 18 MMOL/L (ref 23–29)
COLOR UR: YELLOW
CREAT SERPL-MCNC: 1 MG/DL (ref 0.5–1.4)
DIFFERENTIAL METHOD BLD: ABNORMAL
EOSINOPHIL # BLD AUTO: 0 K/UL (ref 0–0.5)
EOSINOPHIL NFR BLD: 0 % (ref 0–8)
ERYTHROCYTE [DISTWIDTH] IN BLOOD BY AUTOMATED COUNT: 12.4 % (ref 11.5–14.5)
EST. GFR  (NO RACE VARIABLE): >60 ML/MIN/1.73 M^2
GLUCOSE SERPL-MCNC: 94 MG/DL (ref 70–110)
GLUCOSE UR QL STRIP: NEGATIVE
HCT VFR BLD AUTO: 40.6 % (ref 37–48.5)
HGB BLD-MCNC: 13.2 G/DL (ref 12–16)
HGB UR QL STRIP: NEGATIVE
IMM GRANULOCYTES # BLD AUTO: 0.05 K/UL (ref 0–0.04)
IMM GRANULOCYTES NFR BLD AUTO: 0.4 % (ref 0–0.5)
INFLUENZA A, MOLECULAR: NEGATIVE
INFLUENZA B, MOLECULAR: NEGATIVE
KETONES UR QL STRIP: NEGATIVE
LEUKOCYTE ESTERASE UR QL STRIP: NEGATIVE
LYMPHOCYTES # BLD AUTO: 0.7 K/UL (ref 1–4.8)
LYMPHOCYTES NFR BLD: 4.9 % (ref 18–48)
MCH RBC QN AUTO: 31.1 PG (ref 27–31)
MCHC RBC AUTO-ENTMCNC: 32.5 G/DL (ref 32–36)
MCV RBC AUTO: 96 FL (ref 82–98)
MONOCYTES # BLD AUTO: 0.9 K/UL (ref 0.3–1)
MONOCYTES NFR BLD: 6.5 % (ref 4–15)
NEUTROPHILS # BLD AUTO: 12.3 K/UL (ref 1.8–7.7)
NEUTROPHILS NFR BLD: 87.8 % (ref 38–73)
NITRITE UR QL STRIP: NEGATIVE
NRBC BLD-RTO: 0 /100 WBC
PH UR STRIP: 6 [PH] (ref 5–8)
PLATELET # BLD AUTO: 165 K/UL (ref 150–450)
PMV BLD AUTO: 12.2 FL (ref 9.2–12.9)
POTASSIUM SERPL-SCNC: 3.9 MMOL/L (ref 3.5–5.1)
PROT SERPL-MCNC: 7.5 G/DL (ref 6–8.4)
PROT UR QL STRIP: ABNORMAL
RBC # BLD AUTO: 4.24 M/UL (ref 4–5.4)
SARS-COV-2 RDRP RESP QL NAA+PROBE: NEGATIVE
SODIUM SERPL-SCNC: 138 MMOL/L (ref 136–145)
SP GR UR STRIP: 1.02 (ref 1–1.03)
SPECIMEN SOURCE: NORMAL
URN SPEC COLLECT METH UR: ABNORMAL
UROBILINOGEN UR STRIP-ACNC: NEGATIVE EU/DL
WBC # BLD AUTO: 13.95 K/UL (ref 3.9–12.7)

## 2025-01-11 PROCEDURE — 99284 EMERGENCY DEPT VISIT MOD MDM: CPT | Mod: HCNC

## 2025-01-11 PROCEDURE — 87502 INFLUENZA DNA AMP PROBE: CPT | Mod: HCNC | Performed by: EMERGENCY MEDICINE

## 2025-01-11 PROCEDURE — 25000003 PHARM REV CODE 250: Mod: HCNC | Performed by: EMERGENCY MEDICINE

## 2025-01-11 PROCEDURE — 80053 COMPREHEN METABOLIC PANEL: CPT | Mod: HCNC | Performed by: NURSE PRACTITIONER

## 2025-01-11 PROCEDURE — 85025 COMPLETE CBC W/AUTO DIFF WBC: CPT | Mod: HCNC | Performed by: NURSE PRACTITIONER

## 2025-01-11 PROCEDURE — 81003 URINALYSIS AUTO W/O SCOPE: CPT | Mod: HCNC | Performed by: NURSE PRACTITIONER

## 2025-01-11 PROCEDURE — 87635 SARS-COV-2 COVID-19 AMP PRB: CPT | Mod: HCNC | Performed by: NURSE PRACTITIONER

## 2025-01-11 RX ORDER — ONDANSETRON 4 MG/1
4 TABLET, FILM COATED ORAL EVERY 6 HOURS PRN
Qty: 12 TABLET | Refills: 0 | Status: SHIPPED | OUTPATIENT
Start: 2025-01-11

## 2025-01-11 RX ORDER — ONDANSETRON 4 MG/1
4 TABLET, ORALLY DISINTEGRATING ORAL
Status: COMPLETED | OUTPATIENT
Start: 2025-01-11 | End: 2025-01-11

## 2025-01-11 RX ORDER — MORPHINE SULFATE 4 MG/ML
4 INJECTION, SOLUTION INTRAMUSCULAR; INTRAVENOUS
Status: DISCONTINUED | OUTPATIENT
Start: 2025-01-11 | End: 2025-01-11 | Stop reason: HOSPADM

## 2025-01-11 RX ORDER — TRAMADOL HYDROCHLORIDE 50 MG/1
50 TABLET ORAL EVERY 6 HOURS PRN
Qty: 12 TABLET | Refills: 0 | Status: SHIPPED | OUTPATIENT
Start: 2025-01-11 | End: 2025-01-21

## 2025-01-11 RX ORDER — HYDROCODONE BITARTRATE AND ACETAMINOPHEN 10; 325 MG/1; MG/1
1 TABLET ORAL
Status: COMPLETED | OUTPATIENT
Start: 2025-01-11 | End: 2025-01-11

## 2025-01-11 RX ADMIN — HYDROCODONE BITARTRATE AND ACETAMINOPHEN 1 TABLET: 10; 325 TABLET ORAL at 07:01

## 2025-01-11 RX ADMIN — ONDANSETRON 4 MG: 4 TABLET, ORALLY DISINTEGRATING ORAL at 07:01

## 2025-01-11 NOTE — Clinical Note
"Lisseth Conway" Lana was seen and treated in our emergency department on 1/11/2025.  She may return to work on 01/15/2025.       If you have any questions or concerns, please don't hesitate to call.      A Tae RN    "

## 2025-01-11 NOTE — FIRST PROVIDER EVALUATION
Emergency Department TeleTriage Encounter Note      CHIEF COMPLAINT    Chief Complaint   Patient presents with    Nausea     Pt with a history of kidney transplant c/o generalized body pain, nausea, fatigue.  Symptoms began last night.       VITAL SIGNS   Initial Vitals [01/11/25 1640]   BP Pulse Resp Temp SpO2   128/79 73 16 98.9 °F (37.2 °C) 98 %      MAP       --            ALLERGIES    Review of patient's allergies indicates:   Allergen Reactions    Lisinopril Other (See Comments)     Severe cough    Adhesive tape-silicones Itching     Burns    Furosemide Other (See Comments)     Almost gave her right sided heartfailure       PROVIDER TRIAGE NOTE  This is a teletriage evaluation of a 34 y.o. female presenting to the ED complaining of body aches, fatigue, and nausea since yesterday. No fever or vomiting. Pmhx of renal transplant.    Alert, no distress.     Initial orders will be placed and care will be transferred to an alternate provider when patient is roomed for a full evaluation. Any additional orders and the final disposition will be determined by that provider.         ORDERS  Labs Reviewed - No data to display    ED Orders (720h ago, onward)      Start Ordered     Status Ordering Provider    01/11/25 1650 01/11/25 1649  Urinalysis, Reflex to Urine Culture Urine, Clean Catch  STAT         Ordered SVEN NORIEGA N.    01/11/25 1650 01/11/25 1649  POCT Influenza A/B Molecular  Once         Ordered SVEN NORIEGA N.    01/11/25 1650 01/11/25 1649  COVID-19 Rapid Screening  STAT         Ordered SVEN NORIEGA.    01/11/25 1649 01/11/25 1649  CBC auto differential  STAT         Ordered SVEN NORIEGA.    01/11/25 1649 01/11/25 1649  Comprehensive metabolic panel  STAT         Ordered SVEN NORIEGA    01/11/25 1649 01/11/25 1649  Insert Saline lock IV  Once         Ordered SVEN NORIEGA              Virtual Visit Note: The provider triage portion of  this emergency department evaluation and documentation was performed via [employer], a HIPAA-compliant telemedicine application, in concert with a tele-presenter in the room. A face to face patient evaluation with one of my colleagues will occur once the patient is placed in an emergency department room.      DISCLAIMER: This note was prepared with [employer] voice recognition transcription software. Garbled syntax, mangled pronouns, and other bizarre constructions may be attributed to that software system.

## 2025-01-12 NOTE — DISCHARGE INSTRUCTIONS
Drink plenty of fluids.  Zofran for nausea.  Use Tylenol for pain and Ultram for breakthrough pain.  Follow up with her doctor in to 2 days for re-evaluation return as needed for any worsening symptoms, problems, questions or concerns

## 2025-01-12 NOTE — ED PROVIDER NOTES
"SCRIBE #1 NOTE: I, Chichi Brandon, am scribing for, and in the presence of, Sergio Morgan Jr., MD. I have scribed the entire note.       History     Chief Complaint   Patient presents with    Nausea     Pt with a history of kidney transplant c/o generalized body pain, nausea, fatigue.  Symptoms began last night.     Review of patient's allergies indicates:   Allergen Reactions    Lisinopril Other (See Comments)     Severe cough    Adhesive tape-silicones Itching     Burns    Furosemide Other (See Comments)     Almost gave her right sided heartfailure         History of Present Illness     HPI    1/11/2025, 6:52 PM  History obtained from the patient      History of Present Illness: Lisseth Schwartz is a 34 y.o. female patient with a PMHx of hypertension, anxiety, anemia, ESRD, and insomnia who presents to the Emergency Department for evaluation of nausea which onset gradually last night. Pt received a kidney transplant 1 year ago. Symptoms are constant and moderate in severity. Associated sxs include body aches, constipation, back pain, and fatigue. Patient denies any diarrhea, dysuria, fever, chills, runny nose, cough, and all other sxs at this time. No prior tx reported. No further complaints or concerns at this time.       Arrival mode: Personal vehicle    PCP: No, Primary Doctor        Past Medical History:  Past Medical History:   Diagnosis Date    Allergy     Anemia     Anxiety     Breast feeding status of mother 1/17/2020    Brittle bones     ESRD (end stage renal disease)     M/W/F    General anesthetics causing adverse effect in therapeutic use     pt states "im a light weight"    Hypertension     Insomnia     PSG revealed no CHRYSTAL, but likely psychogenic etiology (anxiety)    RLS (restless legs syndrome)        Past Surgical History:  Past Surgical History:   Procedure Laterality Date    Arm surgery Right     x 2    AV FISTULA PLACEMENT Right     CYSTOSCOPY      HYSTEROSCOPY WITH HYDROTHERMAL ABLATION " OF ENDOMETRIUM WITH DILATION AND CURETTAGE N/A 12/04/2018    Procedure: HYSTEROSCOPY, WITH DILATION AND CURETTAGE OF UTERUS AND HYDROTHERMAL ENDOMETRIAL ABLATION;  Surgeon: Tiara Red MD;  Location: [facility] OR;  Service: OB/GYN;  Laterality: N/A;  ATTEMPTED     KIDNEY TRANSPLANT N/A 01/02/2023    Procedure: TRANSPLANT, KIDNEY;  Surgeon: Go Beard MD;  [facility];  Service: Transplant;  Laterality: N/A;    LAPAROSCOPIC SALPINGECTOMY Bilateral 12/04/2018    Procedure: SALPINGECTOMY, LAPAROSCOPIC;  Surgeon: Tiara Red MD;  Location: [facility] OR;  Service: OB/GYN;  Laterality: Bilateral;    LAPAROSCOPIC SLEEVE GASTRECTOMY N/A 10/28/2024    Procedure: GASTRECTOMY, SLEEVE, LAPAROSCOPIC w/intraop EGD;  Surgeon: Orville Payne Jr., MD;  Location: [facility] OR [address] [place] [address];  Service: General;  Laterality: N/A;    RENAL BIOPSY      tumor removed      from face per medical records         Family History:  Family History   Problem Relation Name Age of Onset    Hypertension Father      Breast cancer Paternal Grandmother      Diabetes Maternal Grandmother      Heart attack Maternal Grandmother      Lung cancer Maternal Grandfather      Prostate cancer Maternal Grandfather         Social History:  Social History     Tobacco Use    Smoking status: Never    Smokeless tobacco: Never    Tobacco comments:     Situational smoking, due to family crisis   Substance and Sexual Activity    Alcohol use: Never    Drug use: Never    Sexual activity: Yes     Partners: Male        Review of Systems     Review of Systems   Constitutional:  Positive for fatigue. Negative for chills and fever.   HENT:  Negative for rhinorrhea and sore throat.    Respiratory:  Negative for cough and shortness of breath.    Cardiovascular:  Negative for chest pain.   Gastrointestinal:  Positive for constipation and nausea. Negative for diarrhea.   Genitourinary:  Negative for dysuria.   Musculoskeletal:  Positive for back pain and myalgias  (generalized).   Skin:  Negative for rash.   Neurological:  Negative for weakness.   Hematological:  Does not bruise/bleed easily.   All other systems reviewed and are negative.       Physical Exam     Initial Vitals [01/11/25 1640]   BP Pulse Resp Temp SpO2   128/79 73 16 98.9 °F (37.2 °C) 98 %      MAP       --          Physical Exam  Nursing Notes and Vital Signs Reviewed.  Constitutional: Patient is in no acute distress. Well-developed and well-nourished.  Head: Atraumatic. Normocephalic.  Eyes:  EOM intact.  No scleral icterus.  ENT: Mucous membranes are moist.  Nares clear   Neck:  Full ROM. No JVD.  Cardiovascular: Regular rate. Regular rhythm No murmurs, rubs, or gallops. Distal pulses are 2+ and symmetric  Pulmonary/Chest: No respiratory distress. Clear to auscultation bilaterally. No wheezing or rales.  Equal chest wall rise bilaterally  Abdominal: Soft and non-distended.  There is no tenderness.  No rebound, guarding, or rigidity. Good bowel sounds.  There is no tenderness noted.  Has no right lower quadrant tenderness.  Negative Phelps's  Genitourinary: No CVA tenderness.  No suprapubic tenderness  Musculoskeletal: Moves all extremities. No obvious deformities.  5 x 5 strength in all extremities   Skin: Warm and dry.  Neurological:  Alert, awake, and appropriate.  Normal speech.  No acute focal neurological deficits are appreciated.  Two through 12 intact bilaterally.  Psychiatric: Normal affect. Good eye contact. Appropriate in content.       ED Course   Procedures  ED Vital Signs:  Vitals:    01/11/25 1640 01/11/25 1641 01/11/25 1858 01/11/25 1938   BP: 128/79      Pulse: 73  74    Resp: 16   16   Temp: 98.9 °F (37.2 °C)      TempSrc: Oral      SpO2: 98%      Weight:  92.8 kg (204 lb 9.6 oz)         Abnormal Lab Results:  Labs Reviewed   CBC W/ AUTO DIFFERENTIAL - Abnormal       Result Value    WBC 13.95 (*)     RBC 4.24      Hemoglobin 13.2      Hematocrit 40.6      MCV 96      MCH 31.1 (*)     MCHC  32.5      RDW 12.4      Platelets 165      MPV 12.2      Immature Granulocytes 0.4      Gran # (ANC) 12.3 (*)     Immature Grans (Abs) 0.05 (*)     Lymph # 0.7 (*)     Mono # 0.9      Eos # 0.0      Baso # 0.05      nRBC 0      Gran % 87.8 (*)     Lymph % 4.9 (*)     Mono % 6.5      Eosinophil % 0.0      Basophil % 0.4      Differential Method Automated     COMPREHENSIVE METABOLIC PANEL - Abnormal    Sodium 138      Potassium 3.9      Chloride 109      CO2 18 (*)     Glucose 94      BUN 12      Creatinine 1.0      Calcium 10.2      Total Protein 7.5      Albumin 4.1      Total Bilirubin 0.7      Alkaline Phosphatase 67      AST 14      ALT 13      eGFR >60      Anion Gap 11     URINALYSIS, REFLEX TO URINE CULTURE - Abnormal    Specimen UA Urine, Clean Catch      Color, UA Yellow      Appearance, UA Hazy (*)     pH, UA 6.0      Specific Gravity, UA 1.025      Protein, UA Trace (*)     Glucose, UA Negative      Ketones, UA Negative      Bilirubin (UA) Negative      Occult Blood UA Negative      Nitrite, UA Negative      Urobilinogen, UA Negative      Leukocytes, UA Negative      Narrative:     Specimen Source->Urine   INFLUENZA A & B BY MOLECULAR    Influenza A, Molecular Negative      Influenza B, Molecular Negative      Flu A & B Source Nasal swab     SARS-COV-2 RNA AMPLIFICATION, QUAL    SARS-CoV-2 RNA, Amplification, Qual Negative          All Lab Results:  Results for orders placed or performed during the hospital encounter of 01/11/25   CBC auto differential    Collection Time: 01/11/25  5:09 PM   Result Value Ref Range    WBC 13.95 (H) 3.90 - 12.70 K/uL    RBC 4.24 4.00 - 5.40 M/uL    Hemoglobin 13.2 12.0 - 16.0 g/dL    Hematocrit 40.6 37.0 - 48.5 %    MCV 96 82 - 98 fL    MCH 31.1 (H) 27.0 - 31.0 pg    MCHC 32.5 32.0 - 36.0 g/dL    RDW 12.4 11.5 - 14.5 %    Platelets 165 150 - 450 K/uL    MPV 12.2 9.2 - 12.9 fL    Immature Granulocytes 0.4 0.0 - 0.5 %    Gran # (ANC) 12.3 (H) 1.8 - 7.7 K/uL    Immature Grans  (Abs) 0.05 (H) 0.00 - 0.04 K/uL    Lymph # 0.7 (L) 1.0 - 4.8 K/uL    Mono # 0.9 0.3 - 1.0 K/uL    Eos # 0.0 0.0 - 0.5 K/uL    Baso # 0.05 0.00 - 0.20 K/uL    nRBC 0 0 /100 WBC    Gran % 87.8 (H) 38.0 - 73.0 %    Lymph % 4.9 (L) 18.0 - 48.0 %    Mono % 6.5 4.0 - 15.0 %    Eosinophil % 0.0 0.0 - 8.0 %    Basophil % 0.4 0.0 - 1.9 %    Differential Method Automated    Comprehensive metabolic panel    Collection Time: 01/11/25  5:09 PM   Result Value Ref Range    Sodium 138 136 - 145 mmol/L    Potassium 3.9 3.5 - 5.1 mmol/L    Chloride 109 95 - 110 mmol/L    CO2 18 (L) 23 - 29 mmol/L    Glucose 94 70 - 110 mg/dL    BUN 12 6 - 20 mg/dL    Creatinine 1.0 0.5 - 1.4 mg/dL    Calcium 10.2 8.7 - 10.5 mg/dL    Total Protein 7.5 6.0 - 8.4 g/dL    Albumin 4.1 3.5 - 5.2 g/dL    Total Bilirubin 0.7 0.1 - 1.0 mg/dL    Alkaline Phosphatase 67 40 - 150 U/L    AST 14 10 - 40 U/L    ALT 13 10 - 44 U/L    eGFR >60 >60 mL/min/1.73 m^2    Anion Gap 11 8 - 16 mmol/L   COVID-19 Rapid Screening    Collection Time: 01/11/25  5:09 PM   Result Value Ref Range    SARS-CoV-2 RNA, Amplification, Qual Negative Negative   Influenza A & B by Molecular    Collection Time: 01/11/25  5:14 PM    Specimen: Nasopharyngeal Swab   Result Value Ref Range    Influenza A, Molecular Negative Negative    Influenza B, Molecular Negative Negative    Flu A & B Source Nasal swab    Urinalysis, Reflex to Urine Culture Urine, Clean Catch    Collection Time: 01/11/25  6:48 PM    Specimen: Urine, Clean Catch   Result Value Ref Range    Specimen UA Urine, Clean Catch     Color, UA Yellow Yellow, Straw, Carmen    Appearance, UA Hazy (A) Clear    pH, UA 6.0 5.0 - 8.0    Specific Gravity, UA 1.025 1.005 - 1.030    Protein, UA Trace (A) Negative    Glucose, UA Negative Negative    Ketones, UA Negative Negative    Bilirubin (UA) Negative Negative    Occult Blood UA Negative Negative    Nitrite, UA Negative Negative    Urobilinogen, UA Negative <2.0 EU/dL    Leukocytes, UA  Negative Negative     *Note: Due to a large number of results and/or encounters for the requested time period, some results have not been displayed. A complete set of results can be found in Results Review.         Imaging Results:  Imaging Results    None          No EKG was ordered.           The Emergency Provider reviewed the vital signs and test results, which are outlined above.     ED Discussion       8:10 PM: Reassessed pt at this time. Discussed with pt all pertinent ED information and results. Discussed pt dx and plan of tx. Gave pt all f/u and return to the ED instructions. All questions and concerns were addressed at this time. Pt expresses understanding of information and instructions, and is comfortable with plan to discharge. Pt is stable for discharge.    I discussed with patient and/or family/caretaker that evaluation in the ED does not suggest any emergent or life threatening medical conditions requiring immediate intervention beyond what was provided in the ED, and I believe patient is safe for discharge.  Regardless, an unremarkable evaluation in the ED does not preclude the development or presence of a serious of life threatening condition. As such, patient was instructed to return immediately for any worsening or change in current symptoms.         Medical Decision Making  Differential diagnosis: Kidney rejection, viral syndrome, flu, COVID, body ache, UTI    Patient was evaluated history physical examination.  Patient has generalized body aches and malaise.  He is concerned that her kidney baby rejecting.  Workup shows normal renal function as well as normal CMP normal CBC save for a mild elevation in white count negative flu negative COVID and clean urine.  Patient is stable safe for discharge.  Will treat with Tylenol and Ultram along with Zofran for nausea.  Discussed all findings with the patient's well plan of care.  She verbalized agreement understanding with all instructions seems  reliable stable safe for discharge in my opinion    Amount and/or Complexity of Data Reviewed  Labs: ordered. Decision-making details documented in ED Course.     Details: Negative flu, negative COVID, UA is negative CMP shows a CO2 of 18 but is otherwise benign.  Has a 13 0.95 white count with normal hemoglobin hematocrit.    Risk  OTC drugs.  Prescription drug management.  Parenteral controlled substances.  Decision regarding hospitalization.  Diagnosis or treatment significantly limited by social determinants of health.  Risk Details: Social determinants:  Patient is a kidney transplant.  Patient was offered IM morphine however she declined                ED Medication(s):  Medications   morphine injection 4 mg (4 mg Intramuscular Not Given 1/11/25 1930)   ondansetron disintegrating tablet 4 mg (4 mg Oral Given 1/11/25 1921)   HYDROcodone-acetaminophen  mg per tablet 1 tablet (1 tablet Oral Given 1/11/25 1938)       New Prescriptions    ONDANSETRON (ZOFRAN) 4 MG TABLET    Take 1 tablet (4 mg total) by mouth every 6 (six) hours as needed for Nausea.    TRAMADOL (ULTRAM) 50 MG TABLET    Take 1 tablet (50 mg total) by mouth every 6 (six) hours as needed for Pain.               Scribe Attestation:   Scribe #1: I performed the above scribed service and the documentation accurately describes the services I performed. I attest to the accuracy of the note.     Attending:   Physician Attestation Statement for Scribe #1: I, Sergio Morgan Jr., MD, personally performed the services described in this documentation, as scribed by Chichi Taylor, in my presence, and it is both accurate and complete.           Clinical Impression       ICD-10-CM ICD-9-CM   1. Viral syndrome  B34.9 079.99   2. Nausea and vomiting, unspecified vomiting type  R11.2 787.01       Disposition:   Disposition: Discharged  Condition: Stable       Sergio Morgan Jr., MD  01/11/25 2043

## 2025-01-14 ENCOUNTER — PATIENT MESSAGE (OUTPATIENT)
Dept: BARIATRICS | Facility: CLINIC | Age: 35
End: 2025-01-14
Payer: MEDICARE

## 2025-01-15 ENCOUNTER — PATIENT MESSAGE (OUTPATIENT)
Dept: TRANSPLANT | Facility: CLINIC | Age: 35
End: 2025-01-15
Payer: MEDICARE

## 2025-01-15 ENCOUNTER — TELEPHONE (OUTPATIENT)
Dept: TRANSPLANT | Facility: CLINIC | Age: 35
End: 2025-01-15
Payer: MEDICARE

## 2025-01-15 NOTE — TELEPHONE ENCOUNTER
Spoke to patient. Stated she was started on ergo. Pt to call PCP or gen nephrologist to discuss further treatment of vitamin D

## 2025-01-28 ENCOUNTER — TELEPHONE (OUTPATIENT)
Dept: NEPHROLOGY | Facility: CLINIC | Age: 35
End: 2025-01-28
Payer: MEDICARE

## 2025-01-28 NOTE — TELEPHONE ENCOUNTER
----- Message from Mishel sent at 1/28/2025 10:07 AM CST -----  Contact: Lisseth   Lisseth would like a call back. She has an appt tomorrow @ 2:30 that she would isaías to see if she can get a morning appt instead

## 2025-01-29 ENCOUNTER — OFFICE VISIT (OUTPATIENT)
Dept: NEPHROLOGY | Facility: CLINIC | Age: 35
End: 2025-01-29
Payer: MEDICARE

## 2025-01-29 VITALS
BODY MASS INDEX: 32.78 KG/M2 | HEART RATE: 67 BPM | RESPIRATION RATE: 18 BRPM | SYSTOLIC BLOOD PRESSURE: 116 MMHG | DIASTOLIC BLOOD PRESSURE: 84 MMHG | HEIGHT: 66 IN | WEIGHT: 203.94 LBS

## 2025-01-29 DIAGNOSIS — N18.6 END STAGE RENAL DISEASE: ICD-10-CM

## 2025-01-29 DIAGNOSIS — Z94.0 KIDNEY TRANSPLANTED: Primary | ICD-10-CM

## 2025-01-29 PROCEDURE — 3008F BODY MASS INDEX DOCD: CPT | Mod: HCNC,CPTII,S$GLB, | Performed by: INTERNAL MEDICINE

## 2025-01-29 PROCEDURE — 1159F MED LIST DOCD IN RCRD: CPT | Mod: HCNC,CPTII,S$GLB, | Performed by: INTERNAL MEDICINE

## 2025-01-29 PROCEDURE — 99999 PR PBB SHADOW E&M-EST. PATIENT-LVL IV: CPT | Mod: PBBFAC,HCNC,, | Performed by: INTERNAL MEDICINE

## 2025-01-29 PROCEDURE — 3074F SYST BP LT 130 MM HG: CPT | Mod: HCNC,CPTII,S$GLB, | Performed by: INTERNAL MEDICINE

## 2025-01-29 PROCEDURE — 99215 OFFICE O/P EST HI 40 MIN: CPT | Mod: HCNC,S$GLB,, | Performed by: INTERNAL MEDICINE

## 2025-01-29 PROCEDURE — 3079F DIAST BP 80-89 MM HG: CPT | Mod: HCNC,CPTII,S$GLB, | Performed by: INTERNAL MEDICINE

## 2025-01-29 PROCEDURE — 3066F NEPHROPATHY DOC TX: CPT | Mod: HCNC,CPTII,S$GLB, | Performed by: INTERNAL MEDICINE

## 2025-01-29 NOTE — PROGRESS NOTES
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 Pt was last seen in this renal clinic in Feb 2024. Chart was reviewed. Pt previously complained of wt gain since the transplant. Reviewed the chart. Pt is s/p bariatric surgery service on 10/28/2024 and underwent sleeve gastrectomy due to inability to lose and maintain weight loss      On f/u today, pt has no new c/o's renally or transplant-gray. Pt had several other concerns that were addressed. Prograf level was not included in the labs for today. Was ordered for 11 am today. Pt has not taken her prograf dose this am yet (takes it 11 am and 11 pm). Pt is concerned about low level of vit D. Noted Ca is borderline elevated. Pt c/o's of hard stools and constipation. Pt also is requiring a note saying she can return to work. Pt has a desk job. Immunosuppressive meds reviewed with pt. Has been compliant.      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     PAST SURGICAL HISTORY:  She  has a past surgical history that includes Arm surgery (Right); Renal biopsy; tumor removed; Laparoscopic salpingectomy (Bilateral, 12/04/2018); Hysteroscopy with hydrothermal ablation of endometrium with dilation and curettage (N/A, 12/04/2018); AV fistula placement (Right); Kidney transplant (N/A, 01/02/2023); and Cystoscopy. s/p bariatric surgery service on 10/28/2024 and underwent sleeve gastrectomy      SOCIAL HISTORY:  She  reports that she has never smoked. She has never used smokeless tobacco. She reports that she does not drink alcohol and does not use drugs.      FAMILY MEDICAL HISTORY:  Her family history includes Breast cancer in her paternal grandmother; Diabetes in her maternal grandmother; Heart attack in her maternal grandmother; Hypertension in her father; Lung cancer in her maternal grandfather; Prostate cancer in her maternal grandfather.           Review of patient's allergies indicates:   Allergen Reactions    Lisinopril Other (See Comments)       Severe cough    Adhesive tape-silicones Itching       Burns    Furosemide Other (See Comments)       Almost gave her right sided heartfailure      Meds reviewed    Current Outpatient Medications:     albuterol (PROVENTIL/VENTOLIN HFA) 90 mcg/actuation inhaler, Inhale 1-2 puffs into the lungs every 6 (six) hours as needed for Wheezing (cough)., Disp: 8.5 g, Rfl: 1    ergocalciferol (ERGOCALCIFEROL) 50,000 unit Cap, Take 1 capsule (50,000 Units total) by mouth every 7 days. for 12 doses, Disp: 12 capsule, Rfl: 0    ketoconazole (NIZORAL) 200 mg Tab, Take HALF tablet (100 mg total) by mouth once daily., Disp: 15 tablet, Rfl: 11    magnesium oxide (MAG-OX) 400 mg (241.3 mg magnesium) tablet, Take 2 tablets (800 mg total) by mouth 2 (two) times daily., Disp: 60 tablet, Rfl: 11    metoprolol tartrate (LOPRESSOR) 50 MG tablet, Take 2 tablets (100 mg total) by mouth 2 (two) times daily., Disp: 360 tablet, Rfl: 3    multivitamin (THERAGRAN) per tablet, Take 1 tablet by mouth every morning., Disp: , Rfl:     mycophenolate mofetil (CELLCEPT) 200 mg/mL SusR, Take 5 mLs (1,000 mg total) by mouth every 12 (twelve) hours., Disp: 320 mL, Rfl: 11    pantoprazole (PROTONIX) 40 MG tablet, Take 1 tablet (40 mg total) by mouth 2 (two) times daily., Disp: 120 tablet, Rfl: 3    predniSONE (DELTASONE) 5 MG tablet, Take 1 tablet (5 mg total) by mouth once daily., Disp: 90 tablet, Rfl: 3    tacrolimus (PROGRAF) 1 MG Cap, Take 5 capsules (5 mg total) by mouth every morning AND 4 capsules (4 mg total) every evening., Disp: 270 capsule, Rfl: 11     "ursodioL (ACTIGALL) 500 MG tablet, Take 1 tablet (500 mg total) by mouth once daily. For gallstone prevention., Disp: 180 tablet, Rfl: 0    ondansetron (ZOFRAN) 4 MG tablet, Take 1 tablet (4 mg total) by mouth every 6 (six) hours as needed for Nausea. (Patient not taking: Reported on 1/29/2025), Disp: 12 tablet, Rfl: 0    ondansetron (ZOFRAN-ODT) 8 MG TbDL, Dissolve 1 tablet (8 mg total) by mouth every 6 (six) hours as needed (Nausea). (Patient not taking: Reported on 1/29/2025), Disp: 30 tablet, Rfl: 0    promethazine (PHENERGAN) 12.5 MG Supp, Place 1 suppository (12.5 mg total) rectally every 6 (six) hours as needed (nausea, 2nd line). (Patient not taking: Reported on 1/29/2025), Disp: 5 suppository, Rfl: 0    sodium bicarbonate 650 MG tablet, Take 1 tablet (650 mg total) by mouth 2 (two) times daily. (Patient not taking: Reported on 1/29/2025), Disp: 60 tablet, Rfl: 11          REVIEW OF SYSTEMS:  Patient has no fever, fatigue, visual changes, chest pain, edema, cough, dyspnea, nausea, vomiting, constipation, diarrhea, arthralgias, pruritis, dizziness, weakness, depression, confusion.     PHYSICAL EXAM:   height is 5' 5" (1.651 m) and weight is 100.7 kg (222 lb 0.1 oz). Her blood pressure is 130/88 and her pulse is 70. Her respiration is 18.   Gen:WDWN female in no apparent distress  Psych:Normal mood and affect  Skin:No rashes or ulcers  Neck:No JVD  Chest:Clear with no rales, rhonchi, wheezing with normal effort  CV:Regular with no murmurs, gallops or rubs  Abd:Soft, nontender, no distension  Ext:No edema     Labs reviewed  BMP  Lab Results   Component Value Date     01/11/2025    K 3.9 01/11/2025     01/11/2025    CO2 18 (L) 01/11/2025    BUN 12 01/11/2025    CREATININE 1.0 01/11/2025    CALCIUM 10.2 01/11/2025    ANIONGAP 11 01/11/2025    EGFRNORACEVR >60 01/11/2025     Lab Results   Component Value Date    WBC 13.95 (H) 01/11/2025    HGB 13.2 01/11/2025    HCT 40.6 01/11/2025    MCV 96 01/11/2025 "     01/11/2025        Lab Results   Component Value Date    .2 (H) 02/26/2024    CALCIUM 10.2 01/11/2025    PHOS 2.7 12/10/2024     Vit D level 19      Prograf level in Dec 2024 was 5.4, pending today        IMPRESSION AND RECOMMENDATIONS: 33 y/o female with h/o of kidney transplant presented for f/u:     Renal: s Cr normal and stable  Stable renal function  Had ESRD due to kidney biopsy proven primary focal segmental glomerulosclerosis (FSGS), kidney biopsy prior to 2016.  Was on HD from 2016 to 2023.  K normal  Na normal    Ca normal to borderline high  Likely due to taking vit D (ergocalciferol), 50,000 u q week.   Pt reports hard stools and constipation.  Recommend take vit D q 10 to 14 weeks instead      2. Kidney transplant: doing well, stable. Is immunosuppressed.  Prograf level was within the therapeutic range on 12/10/24 about 7-8 weeks ago), pending today.  Will repeat trough prograf level today, will chart check and inform the pt   BK virus was undetectable   Continue mycophenolate 1000 mg po bid  Continue prednisone 5 mg po qd     3. HTN: BP controlled  Meds reviewed     4. Wt gain: s/p bariatric surgery service on 10/28/2024 and underwent sleeve gastrectomy   Doing well, stable.    5. Work: pt is able to return to work.   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    Level V visit.  RTC 3 months     Toma Castillo MD

## 2025-02-10 ENCOUNTER — PATIENT MESSAGE (OUTPATIENT)
Dept: BARIATRICS | Facility: CLINIC | Age: 35
End: 2025-02-10
Payer: MEDICARE

## 2025-02-12 DIAGNOSIS — Z79.899 IMMUNOSUPPRESSIVE MANAGEMENT ENCOUNTER FOLLOWING KIDNEY TRANSPLANT: ICD-10-CM

## 2025-02-12 DIAGNOSIS — Z94.0 IMMUNOSUPPRESSIVE MANAGEMENT ENCOUNTER FOLLOWING KIDNEY TRANSPLANT: ICD-10-CM

## 2025-02-12 DIAGNOSIS — Z94.0 S/P KIDNEY TRANSPLANT: ICD-10-CM

## 2025-02-12 RX ORDER — KETOCONAZOLE 200 MG/1
100 TABLET ORAL DAILY
Qty: 15 TABLET | Refills: 11 | Status: SHIPPED | OUTPATIENT
Start: 2025-02-12 | End: 2026-02-12

## 2025-02-17 ENCOUNTER — TELEPHONE (OUTPATIENT)
Dept: ADMINISTRATIVE | Facility: CLINIC | Age: 35
End: 2025-02-17
Payer: MEDICARE

## 2025-02-17 ENCOUNTER — PATIENT MESSAGE (OUTPATIENT)
Dept: ADMINISTRATIVE | Facility: CLINIC | Age: 35
End: 2025-02-17
Payer: MEDICARE

## 2025-02-17 DIAGNOSIS — Z94.0 S/P KIDNEY TRANSPLANT: Primary | ICD-10-CM

## 2025-02-17 RX ORDER — MYCOPHENOLATE MOFETIL 250 MG/1
1000 CAPSULE ORAL 2 TIMES DAILY
Qty: 240 CAPSULE | Refills: 11 | Status: SHIPPED | OUTPATIENT
Start: 2025-02-17

## 2025-02-18 DIAGNOSIS — Z94.0 S/P KIDNEY TRANSPLANT: ICD-10-CM

## 2025-02-18 RX ORDER — PREDNISONE 5 MG/1
5 TABLET ORAL DAILY
Qty: 90 TABLET | Refills: 3 | Status: SHIPPED | OUTPATIENT
Start: 2025-02-18

## 2025-02-19 ENCOUNTER — OFFICE VISIT (OUTPATIENT)
Dept: INTERNAL MEDICINE | Facility: CLINIC | Age: 35
End: 2025-02-19
Payer: MEDICARE

## 2025-02-19 VITALS
WEIGHT: 199 LBS | SYSTOLIC BLOOD PRESSURE: 115 MMHG | HEIGHT: 66 IN | DIASTOLIC BLOOD PRESSURE: 70 MMHG | BODY MASS INDEX: 31.98 KG/M2

## 2025-02-19 DIAGNOSIS — E66.9 OBESITY (BMI 30-39.9): ICD-10-CM

## 2025-02-19 DIAGNOSIS — Z94.0 DECEASED-DONOR KIDNEY TRANSPLANT: ICD-10-CM

## 2025-02-19 DIAGNOSIS — Z00.00 ENCOUNTER FOR PREVENTIVE HEALTH EXAMINATION: Primary | ICD-10-CM

## 2025-02-19 DIAGNOSIS — I10 ESSENTIAL HYPERTENSION: ICD-10-CM

## 2025-02-19 DIAGNOSIS — Z79.899 DRUG-INDUCED IMMUNODEFICIENCY: ICD-10-CM

## 2025-02-19 DIAGNOSIS — N25.81 SECONDARY HYPERPARATHYROIDISM: ICD-10-CM

## 2025-02-19 DIAGNOSIS — D63.8 ANEMIA OF CHRONIC DISEASE: ICD-10-CM

## 2025-02-19 DIAGNOSIS — D84.821 DRUG-INDUCED IMMUNODEFICIENCY: ICD-10-CM

## 2025-02-19 PROBLEM — Z99.2 DEPENDENCE ON RENAL DIALYSIS: Status: RESOLVED | Noted: 2024-02-29 | Resolved: 2025-02-19

## 2025-02-19 RX ORDER — DEXBROMPHENIRAMINE MALEATE 2 MG/1
TABLET ORAL
COMMUNITY

## 2025-02-19 RX ORDER — FLUTICASONE PROPIONATE 50 MCG
SPRAY, SUSPENSION (ML) NASAL
COMMUNITY
Start: 2024-12-12

## 2025-02-19 NOTE — PROGRESS NOTES
The patient location is: [place]  The chief complaint leading to consultation is:  Medicare AWV    Visit type: audiovisual    Face to Face time with patient:  30 min   45 minutes of total time spent on the encounter, which includes face to face time and non-face to face time preparing to see the patient (eg, review of tests), Obtaining and/or reviewing separately obtained history, Documenting clinical information in the electronic or other health record, Independently interpreting results (not separately reported) and communicating results to the patient/family/caregiver, or Care coordination (not separately reported).         Each patient to whom he or she provides medical services by telemedicine is:  (1) informed of the relationship between the physician and patient and the respective role of any other health care provider with respect to management of the patient; and (2) notified that he or she may decline to receive medical services by telemedicine and may withdraw from such care at any time.    Notes:                         Lisseth Schwartz presented for a  Medicare AWV and comprehensive Health Risk Assessment today. The following components were reviewed and updated:    Medical history  Family History  Social history  Allergies and Current Medications  Health Risk Assessment  Health Maintenance  Care Team     Patient screened moderate and/or high risk for one or more social determinants of health (SDOH). Patient connected to community resources through the ED Navigator.      ** See Completed Assessments for Annual Wellness Visit within the encounter summary.**         The following assessments were completed:  Living Situation  CAGE  Depression Screening  Fall Risk Assessment (MACH 10)  Hearing Assessment(HHI)  Cognitive Function Screening  Nutrition Screening  ADL Screening  PAQ Screening  Has urine leakage ever interrupted your daily activites or sleep? No  Do you think you could use some help to better  "manage urine leakage?No     Opioid documentation:      Patient does not have a current opioid prescription.        Vitals:    25 1203   BP: 115/70   Weight: 90.3 kg (199 lb)   Height: 5' 6" (1.676 m)     Body mass index is 32.12 kg/m².  Physical Exam  Constitutional:       General: She is not in acute distress.     Appearance: Normal appearance. She is not ill-appearing, toxic-appearing or diaphoretic.   Pulmonary:      Effort: Pulmonary effort is normal.   Neurological:      Mental Status: She is alert and oriented to person, place, and time.   Psychiatric:         Mood and Affect: Mood normal.               Diagnoses and health risks identified today and associated recommendations/orders:    1. Encounter for preventive health examination  Screenings performed, as noted above.  Personal preventative testing needs reviewed.      2. -donor kidney transplant  Monitored, stable, had shunt removed, no dialysis, followed by transplant team    3. Drug-induced immunodeficiency  Treated with Prograf, Cellcept, stable, followed by transplant team    4. Secondary hyperparathyroidism  Monitored, stable, last .2, follow up with transplant team    5. Anemia of chronic disease  Monitored by nephrology, follow up as indicated    6. Essential hypertension  Treated with metoprolol, stable, cont tx    7. Obesity  Treated with gastric sleeve, taking protonix for reflux, lost weight, is exercising    Discussed vaccines, declines at this time       Provided Lisseth with a 5-10 year written screening schedule and personal prevention plan. Recommendations were developed using the USPSTF age appropriate recommendations. Education, counseling, and referrals were provided as needed. After Visit Summary printed and given to patient which includes a list of additional screenings\tests needed.    No follow-ups on file.    Adrian Reza NP  I offered to discuss advanced care planning, including how to pick a person who would " make decisions for you if you were unable to make them for yourself, called a health care power of , and what kind of decisions you might make such as use of life sustaining treatments such as ventilators and tube feeding when faced with a life limiting illness recorded on a living will that they will need to know. (How you want to be cared for as you near the end of your natural life)     X Patient is interested in learning more about how to make advanced directives.  I provided them paperwork and offered to discuss this with them.

## 2025-02-19 NOTE — PATIENT INSTRUCTIONS
Counseling and Referral of Other Preventative  (Italic type indicates deductible and co-insurance are waived)    Patient Name: Lisseth Schwartz  Today's Date: 2/19/2025    Health Maintenance       Date Due Completion Date    COVID-19 Vaccine (1) Never done ---    Pneumococcal Vaccines (Age 0-49) (1 of 2 - PCV) Never done ---    Influenza Vaccine (1) 09/01/2024 10/16/2019 (Done)    Override on 10/16/2019: Done    Hemoglobin A1c (Diabetic Prevention Screening) 07/19/2027 7/19/2024    Cervical Cancer Screening 03/20/2029 3/20/2024    TETANUS VACCINE 07/20/2034 7/20/2024    RSV Vaccine (Age 60+ and Pregnant patients) (1 - 1-dose 75+ series) 12/17/2063 ---        No orders of the defined types were placed in this encounter.    The following information is provided to all patients.  This information is to help you find resources for any of the problems found today that may be affecting your health:                  Living healthy guide: www.[facility].[place].gov      Understanding Diabetes: www.diabetes.org      Eating healthy: www.cdc.gov/healthyweight      CDC home safety checklist: www.cdc.gov/steadi/patient.html      Agency on Aging: www.goea.[place].gov      Alcoholics anonymous (AA): www.aa.org      Physical Activity: www.arielle.nih.gov/tg2qnal      Tobacco use: www.quitwithusla.org

## 2025-03-10 ENCOUNTER — PATIENT MESSAGE (OUTPATIENT)
Dept: BARIATRICS | Facility: CLINIC | Age: 35
End: 2025-03-10
Payer: MEDICARE

## 2025-03-29 ENCOUNTER — PATIENT MESSAGE (OUTPATIENT)
Dept: TRANSPLANT | Facility: CLINIC | Age: 35
End: 2025-03-29
Payer: MEDICARE

## 2025-04-08 ENCOUNTER — PATIENT MESSAGE (OUTPATIENT)
Dept: BARIATRICS | Facility: CLINIC | Age: 35
End: 2025-04-08
Payer: MEDICARE

## 2025-04-15 DIAGNOSIS — Z94.0 S/P KIDNEY TRANSPLANT: ICD-10-CM

## 2025-04-15 RX ORDER — METOPROLOL TARTRATE 50 MG/1
100 TABLET ORAL 2 TIMES DAILY
Qty: 360 TABLET | Refills: 3 | Status: CANCELLED | OUTPATIENT
Start: 2025-04-15 | End: 2026-04-15

## 2025-04-15 RX ORDER — ERGOCALCIFEROL 1.25 MG/1
50000 CAPSULE ORAL
Qty: 12 CAPSULE | Refills: 0 | OUTPATIENT
Start: 2025-04-15 | End: 2025-07-02

## 2025-04-15 RX ORDER — LANOLIN ALCOHOL/MO/W.PET/CERES
800 CREAM (GRAM) TOPICAL 2 TIMES DAILY
Qty: 60 TABLET | Refills: 11 | Status: CANCELLED | OUTPATIENT
Start: 2025-04-15

## 2025-04-25 ENCOUNTER — TELEPHONE (OUTPATIENT)
Dept: BARIATRICS | Facility: CLINIC | Age: 35
End: 2025-04-25
Payer: MEDICARE

## 2025-04-25 NOTE — TELEPHONE ENCOUNTER
----- Message from Christine sent at 4/25/2025 11:50 AM CDT -----  Regarding: appt  Contact: [phone]  .Type:  Needs Medical AdviceWho Called: pt Symptoms (please be specific): asking to r/s appt on Mon. Next off day is the following Mon,  May 5Would the patient rather a call back or a response via coconener? Call Best Call Back Number: [phone] Information: n/a

## 2025-04-25 NOTE — TELEPHONE ENCOUNTER
Spoke with pt in regard to rescheduling 6 mth post op appointment. Pt has been successfully rescheduled and verbalized understanding of appt time/date.

## 2025-04-28 ENCOUNTER — LAB VISIT (OUTPATIENT)
Dept: LAB | Facility: HOSPITAL | Age: 35
End: 2025-04-28
Attending: INTERNAL MEDICINE
Payer: MEDICARE

## 2025-04-28 DIAGNOSIS — Z79.899 POLYPHARMACY: ICD-10-CM

## 2025-04-28 DIAGNOSIS — Z94.0 S/P KIDNEY TRANSPLANT: ICD-10-CM

## 2025-04-28 DIAGNOSIS — K21.9 GASTROESOPHAGEAL REFLUX DISEASE WITHOUT ESOPHAGITIS: ICD-10-CM

## 2025-04-28 DIAGNOSIS — Z98.84 S/P BARIATRIC SURGERY: ICD-10-CM

## 2025-04-28 DIAGNOSIS — I10 ESSENTIAL HYPERTENSION: ICD-10-CM

## 2025-04-28 DIAGNOSIS — Z79.899 LONG-TERM USE OF HIGH-RISK MEDICATION: ICD-10-CM

## 2025-04-28 LAB
25(OH)D3+25(OH)D2 SERPL-MCNC: 29 NG/ML (ref 30–96)
ABSOLUTE EOSINOPHIL (OHS): 0.06 K/UL
ABSOLUTE MONOCYTE (OHS): 0.43 K/UL (ref 0.3–1)
ABSOLUTE NEUTROPHIL COUNT (OHS): 2.98 K/UL (ref 1.8–7.7)
ALBUMIN SERPL BCP-MCNC: 4 G/DL (ref 3.5–5.2)
ALP SERPL-CCNC: 60 UNIT/L (ref 40–150)
ALT SERPL W/O P-5'-P-CCNC: 14 UNIT/L (ref 10–44)
ANION GAP (OHS): 7 MMOL/L (ref 8–16)
AST SERPL-CCNC: 20 UNIT/L (ref 11–45)
BASOPHILS # BLD AUTO: 0.04 K/UL
BASOPHILS NFR BLD AUTO: 0.9 %
BILIRUB SERPL-MCNC: 0.5 MG/DL (ref 0.1–1)
BUN SERPL-MCNC: 16 MG/DL (ref 6–20)
CALCIUM SERPL-MCNC: 10.1 MG/DL (ref 8.7–10.5)
CHLORIDE SERPL-SCNC: 110 MMOL/L (ref 95–110)
CHOLEST SERPL-MCNC: 134 MG/DL (ref 120–199)
CHOLEST/HDLC SERPL: 3.6 {RATIO} (ref 2–5)
CO2 SERPL-SCNC: 24 MMOL/L (ref 23–29)
CREAT SERPL-MCNC: 1 MG/DL (ref 0.5–1.4)
ERYTHROCYTE [DISTWIDTH] IN BLOOD BY AUTOMATED COUNT: 12.1 % (ref 11.5–14.5)
GFR SERPLBLD CREATININE-BSD FMLA CKD-EPI: >60 ML/MIN/1.73/M2
GLUCOSE SERPL-MCNC: 81 MG/DL (ref 70–110)
HCT VFR BLD AUTO: 39.4 % (ref 37–48.5)
HDLC SERPL-MCNC: 37 MG/DL (ref 40–75)
HDLC SERPL: 27.6 % (ref 20–50)
HGB BLD-MCNC: 12.5 GM/DL (ref 12–16)
IMM GRANULOCYTES # BLD AUTO: 0.01 K/UL (ref 0–0.04)
IMM GRANULOCYTES NFR BLD AUTO: 0.2 % (ref 0–0.5)
IRON SATN MFR SERPL: 40 % (ref 20–50)
IRON SERPL-MCNC: 96 UG/DL (ref 30–160)
LDLC SERPL CALC-MCNC: 83.8 MG/DL (ref 63–159)
LYMPHOCYTES # BLD AUTO: 0.94 K/UL (ref 1–4.8)
MCH RBC QN AUTO: 31.2 PG (ref 27–31)
MCHC RBC AUTO-ENTMCNC: 31.7 G/DL (ref 32–36)
MCV RBC AUTO: 98 FL (ref 82–98)
NONHDLC SERPL-MCNC: 97 MG/DL
NUCLEATED RBC (/100WBC) (OHS): 0 /100 WBC
PLATELET # BLD AUTO: 180 K/UL (ref 150–450)
PMV BLD AUTO: 12.2 FL (ref 9.2–12.9)
POTASSIUM SERPL-SCNC: 4.4 MMOL/L (ref 3.5–5.1)
PROT SERPL-MCNC: 7.4 GM/DL (ref 6–8.4)
RBC # BLD AUTO: 4.01 M/UL (ref 4–5.4)
RELATIVE EOSINOPHIL (OHS): 1.3 %
RELATIVE LYMPHOCYTE (OHS): 21.1 % (ref 18–48)
RELATIVE MONOCYTE (OHS): 9.6 % (ref 4–15)
RELATIVE NEUTROPHIL (OHS): 66.9 % (ref 38–73)
SODIUM SERPL-SCNC: 141 MMOL/L (ref 136–145)
TIBC SERPL-MCNC: 240 UG/DL (ref 250–450)
TRANSFERRIN SERPL-MCNC: 162 MG/DL (ref 200–375)
TRIGL SERPL-MCNC: 66 MG/DL (ref 30–150)
VIT B12 SERPL-MCNC: 1153 PG/ML (ref 210–950)
WBC # BLD AUTO: 4.46 K/UL (ref 3.9–12.7)

## 2025-04-28 PROCEDURE — 80061 LIPID PANEL: CPT

## 2025-04-28 PROCEDURE — 84425 ASSAY OF VITAMIN B-1: CPT

## 2025-04-28 PROCEDURE — 36415 COLL VENOUS BLD VENIPUNCTURE: CPT

## 2025-04-28 PROCEDURE — 85025 COMPLETE CBC W/AUTO DIFF WBC: CPT

## 2025-04-28 PROCEDURE — 82607 VITAMIN B-12: CPT

## 2025-04-28 PROCEDURE — 80053 COMPREHEN METABOLIC PANEL: CPT

## 2025-04-28 PROCEDURE — 83540 ASSAY OF IRON: CPT

## 2025-04-28 PROCEDURE — 82306 VITAMIN D 25 HYDROXY: CPT

## 2025-05-01 ENCOUNTER — TELEPHONE (OUTPATIENT)
Dept: NEPHROLOGY | Facility: CLINIC | Age: 35
End: 2025-05-01
Payer: MEDICARE

## 2025-05-01 DIAGNOSIS — Z94.0 DECEASED-DONOR KIDNEY TRANSPLANT: Primary | ICD-10-CM

## 2025-05-01 NOTE — TELEPHONE ENCOUNTER
----- Message from Yaritza sent at 5/1/2025  3:13 PM CDT -----  Contact: Lisseth  Type:  Patient Requesting a call back Who Called:Lisseth What is the call back request regarding?:caller missed appt on today 5/1/25 and would to be rescheduled for Monday 5/1/25 Would the patient rather a call back or a response via [employer]?[facility] Call Back Number:[phone] Additional Information:

## 2025-05-05 ENCOUNTER — OFFICE VISIT (OUTPATIENT)
Dept: BARIATRICS | Facility: CLINIC | Age: 35
End: 2025-05-05
Payer: MEDICARE

## 2025-05-05 VITALS — BODY MASS INDEX: 31.64 KG/M2 | WEIGHT: 196 LBS

## 2025-05-05 DIAGNOSIS — Z94.0 S/P KIDNEY TRANSPLANT: ICD-10-CM

## 2025-05-05 DIAGNOSIS — Z98.84 S/P BARIATRIC SURGERY: Primary | ICD-10-CM

## 2025-05-05 RX ORDER — NYSTATIN 100000 U/G
CREAM TOPICAL 2 TIMES DAILY
Qty: 30 G | Refills: 3 | Status: SHIPPED | OUTPATIENT
Start: 2025-05-05

## 2025-05-05 NOTE — PATIENT INSTRUCTIONS
Meal Ideas for Regular Bariatric Diet  *Recipes and products available at www.bariatriceating.com      Breakfast: (15-20g protein)    - Egg white omelet: 2 egg whites or ½ cup Egg Beaters. (Optional proteins: cheese, shrimp, black beans, chicken, sliced turkey) (Optional veggies: tomatoes, salsa, spinach, mushrooms, onions, green peppers, or small slice avocado)     - Egg and sausage: 1 egg or ¼ cup Egg Beaters (any variety), with 1 john or 2 links of Turkey sausage or Veggie breakfast sausage ([employer] or Sole[employer])    - Crust-less breakfast quiche: To make a glass pie dish, mix 4oz part skim Ricotta, 1 cup skim milk, and 2 eggs as your base. Add protein: shredded cheese, sliced lean ham or turkey, turkey may/sausage. Add veggies: tomato, onion, green onion, mushroom, green pepper, spinach, etc.    - Yogurt parfait: Mix 1 - 6oz container Dannon Light N Fit vanilla yogurt, with ¼ cup crushed unsalted nuts    - Cottage cheese and fruit: ½ cup part-skim cottage cheese or ricotta cheese topped with fresh fruit or sugar free preserves     - Dixie Soria's Vanilla Egg custard* (add 2 Tbsp instant coffee granules to make Cappuccino Custard*)    - Hi-Protein café latte (skim milk, decaf coffee, 1 scoop protein powder). Optional to add Sugar free syrup or extract flavoring.    - Breakfast Lox: spread fat free cream cheese on slices of smoked salmon. Serve over scrambled or egg over easy (sauteed with nonstick cookspray) OR on a cucumber slice    - Eggwhich: Scramble or cook 1 large egg over easy using nonstick cookspray. Place between 2 slices of [place] Islander may and low fat cheese.     Lunch: (20-30g protein)    - ½ cup Black bean soup (Homemade or Progresso), with ¼ cup shredded low-fat cheese. Top with chopped tomato or fresh salsa.     - Lean deli turkey breast and low-fat sliced cheese, mustard or light billy to moisten, rolled up together, or wrapped in a Vitaly lettuce leaf    - Chicken salad made from dinner  leftovers, moisten with low-fat salad dressing or light billy. Also try leftover salmon, shrimp, tuna or boiled eggs. Serve ½ cup over dark green salad    - Fat-free canned refried beans, topped with ¼ cup shredded low-fat cheese. Top with chopped tomato or fresh salsa.     - Greek salad: Top mixed greens with 1-2oz grilled chicken, tomatoes, red onions, 2-3 kalamata olives, and sprinkle lightly with feta cheese. Spritz with Balsamic vinegar to taste.     - Crust-less lunch quiche: To make a glass pie dish, mix 4oz part skim Ricotta, 1 cup skim milk, and 2 eggs as your base. Add protein: shredded cheese, sliced lean ham or turkey, shrimp, chicken. Add veggies: tomato, onion, green onion, mushroom, green pepper, spinach, artichoke, broccoli, etc.    - Pizza bake: spread a  gene rene mushroom with tomato sauce, low-fat shredded mozzarella and turkey pepperoni or [place] may. Add any veggies. Roast for 10-15 minutes, until cheese melted.     - Cucumber crab bites: Spread ¼ cup crab dip (lump crabmeat + light cream cheese and green onions) over sliced cucumber.     - Chicken with light spinach and artichoke dip*: Puree in : 6oz cooked and drained spinach, 2 cloves garlic, 1 can cannelloni beans, ½ cup chopped green onions, 1 can drained artichoke hearts (not marinated in oil), lemon juice and basil. Mix in 2oz chopped up chicken.    Supper: (20-30g protein)    - Serve grilled fish over dark green salad tossed with low-fat dressing, served with grilled asparagus tejeda     - Rotisserie chicken salad: served with sliced strawberries, walnuts, fat-free feta cheese crumbles and 1 tbsp Janes Own Light Raspberry [place] Vinaigrette    - Shrimp cocktail: Dip cold boiled shrimp in homemade low-sugar cocktail sauce (1/2 cup Melecio One Carb ketchup, 2 tbsp horseradish, 1/4 tsp hot sauce, 1 tsp Worcestershire sauce, 1 tbsp freshly-squeezed lemon juice). Serve with dark green salad, walnuts, and crumbled blue  cheese drizzled with olive oil and Balsamic vinegar    - Tuna Melt: Spread tuna salad onto 2 thick slices of tomato. Top with low-fat cheese and broil until cheese is melted. May also be made with chicken salad of shrimp salad. Purvis with different types of cheeses.    - Chicken or beef fajitas (no tortilla, rice, beans, chips). Top meat and veggies w/ fresh salsa, fat free sour cream.     - Homemade low-fat Chili using extra lean ground beef or ground turkey. Top with shredded cheese and salsa as desired. May add dollop fat-free sour cream if desired    - Chicken parmesan: Top chicken breast w/ low sugar marinara sauce, mozzarella cheese and bake until chicken reaches 165*.  Serve w/ spaghetti SQUASH or Bangladeshi cut green beans    - Dinner Omelet with shrimp or chicken and onion, green peppers and chives.    - No noodle lasagna: Use sliced zucchini or eggplant in place of noodles.  Layer with part skim ricotta cheese and low sugar meat sauce (use very lean ground beef or ground turkey).    - Mexican chicken bake: Bake chunks of chicken breast or thigh with taco seasoning, Pace brand enchilada sauce, green onions and low-fat cheese. Serve with ¼ cup black beans or fat free refried beans topped with chopped tomatoes or salsa.    - Peña frozen meatballs, simmered in Classico Marinara sauce. Different flavors of salsa or spaghetti sauce create different dishes! Sprinkle with parmesan cheese. Serve with grilled or steamed veggies, or a dark green salad.    - Simmer boneless skinless chicken thigh chunks in Classico Marinara sauce or roasted salsa until tender with chopped onion, bell pepper, garlic, mushrooms, spinach, etc.     - Hamburger or veggie burger, without the bun, dressed the way you like. Served with grilled or steamed veggies.    - Eggplant parmesan: Bake slices of eggplant at 350 degrees for 15 minutes. Layer tomato sauce, sliced eggplant and low-fat mozzarella cheese in a baking dish and cover with  foil. Bake 30-40 more minutes or until bubbly. Uncover and bake at 400 degrees for about 15 more minutes, or until top is slightly crisp.    - Fish tacos: grilled/baked white fish, wrapped in Vitaly lettuce leaf, topped with salsa, shredded low-fat cheese, and light coleslaw.    - Chicken betty: Sprinkle chicken w/ 1 tsp of hidden valley ranch dip mix. Then grill chicken and top with black beans, salsa and 1 tsp fat free sour cream.     - Cauliflower pizza crust: Use cauliflower as crust (see recipe on pinterest, no flour!). Top w/ low fat cheese, turkey pepperoni and veggies and bake again    - chicken or turkey crust pizza: use ground chicken or turkey instead of cauliflower, spread in [place] and bake at 350 for about 20-30 minutes(may want to add garlic, black pepper, oregano and other herbs to ground meat mixture).  Remove and top w/ low fat cheese, turkey pepperoni and veggies and bake again for another 10 minutes or until cheese is browned.     Snacks: (100-200 calories; >5g protein)    - 1 low-fat cheese stick with 8 cherry tomatoes or 1 serving fresh fruit  - 4 thin slices fat-free turkey breast and 1 slice low-fat cheese  - 4 thin slices fat-free honey ham with wedge of melon  - 6-8 edamame pods (equivalent to about 1/4 cup edamame without pods).   - 1/4 cup unsalted nuts with ½ cup fruit  - 6-oz container Dannon Light n Fit vanilla yogurt, topped with 1oz unsalted nuts         - apple, celery or baby carrots spread with 2 Tbsp PB2  - apple slices with 1 oz slice low-fat cheese  - Apple slices dipped in 2 Tbsp of PB2  - celery, cucumber, bell pepper or baby carrots dipped in ¼ cup hummus bean spread or light spinach and artichoke dip (*recipe in lunch section)  - celery, cucumber, baby carrots dipped in high protein greek yogurt (Mix 16 oz plain greek yogurt + 1 packet of hidden valley ranch dip mix)  - Dominic Links Beef Steak - 14g protein! (similar to beef jerky)  - 2 wedges Laughing Cow - Light Herb  & Garlic Cheese with sliced cucumber or green bell pepper  - 1/2 cup low-fat cottage cheese with ¼ cup fruit or ¼ cup salsa  - RTD Protein drinks: Atkins, Low Carb Slim Fast, EAS light, Muscle Milk Light, etc.  - Homemade Protein drinks: GNC Soy95, Isopure, Nectar, UNJURY, Whey Gourmet, etc. Mix 1 scoop powder with 8oz skim/1% milk or light soymilk.  - Protein bars: Atkins, EAS, Pure Protein, Think Thin, Detour, etc. Must have 0-4 grams sugar - Read the label.    Takeout Options: No more than twice/week  Deli - Salads (no pasta or rice), meats, cheeses. Roasted chicken. Lox (salmon)    Mexican - Platters which don't include tortillas, chips, or rice. Go easy on the beans. Example: Fajitas without the tortillas. Ask the  not to bring chips to the table if they are too tempting.    Greek - Meat or fish and vegetable, but no bread or rice. Including hummus, baba ganoush, etc, is OK. Most sit-down Greek restaurants can provide you with cucumber slices for dipping instead of carole bread.    Fast Food (Avoid as much as possible) - Salads (no croutons and limit salad dressing to 2 tbsp), grilled chicken sandwich without the bun and ask for no billy. Mary Kays low fat chili or Taco Bell pintos and cheese.    BBQ - The meats are fine if you ask for sauces on the side, but most of the traditional side dishes are loaded with carbs. [place] slaw, baked beans and BBQ sauce are typically made with sugar.    Chinese - Nothing deep-fried, no rice or noodles. Many Chinese sauces have starch and sugar in them, so you'll have to use your judgement. If you find that these sauces trigger cravings, or cause Dumping, you can ask for the sauce to be made without sugar or just use soy sauce.

## 2025-05-05 NOTE — PROGRESS NOTES
BARIATRIC POST-OPERATIVE VISIT:    HPI:  Lisseth Schwartz is a 34 y.o. year old female presents for 6 months post op visit following sleeve.  she is doing well and tolerating the diet without difficulty.  she has no complaints.    Denies: nausea, vomiting, abdominal pain, changes in bowel movement pattern, fever, chills, dysphagia, chest pain, and shortness of breath.    Pt has c/o excess abdominal skin that is has rashes and smell.     Review of Systems   Constitutional:  Negative for activity change and fatigue.   Respiratory:  Negative for cough and shortness of breath.    Cardiovascular:  Negative for chest pain, palpitations and leg swelling.   Gastrointestinal:  Negative for abdominal pain, nausea and vomiting.   Endocrine: Negative for polydipsia, polyphagia and polyuria.   Genitourinary:  Negative for dysuria.   Musculoskeletal:  Negative for gait problem.   Skin:  Negative for rash.   Allergic/Immunologic: Negative for immunocompromised state.   Neurological:  Negative for dizziness, syncope and weakness.   Hematological:  Does not bruise/bleed easily.   Psychiatric/Behavioral:  Negative for behavioral problems.        EXERCISE & VITAMINS:  See Bariatric Assessment  Adherent to vitamin regimen     MEDICATIONS/ALLERGIES:  Have been reviewed.    DIET: Regular Bariatric Diet. ~80+ grams protein.  32 fl oz SF clear beverage.     ASSESSMENT:  - Morbid obesity s/p sleeve gastrectomy on 10/28/24.  - Co-morbidities: hypertension, GERD, ESRD, hx kidney transplant and PE  -  Weight loss, 37#'s and 42% EWL  -  Exercise routine restarting walking   - Good Diet   - Good Vitamin regimen     PLAN:  - Ursodiol d/c   - Miralax daily for constipation  - Emphasized the importance of regular exercise and adherence to bariatric diet to achieve maximum weight loss.  - Encouraged patient to start regular exercise.  - Follow-up with dietician to advance diet.  - Continue daily vitamins and medications.  - RTC in 6 months or  sooner if needed.  - Call the office for any issues.  - Check labs today.  - Plastic referral at 18 months   - Bariatric medicine referral   - Sent in [facility]

## 2025-05-13 ENCOUNTER — PATIENT MESSAGE (OUTPATIENT)
Dept: BARIATRICS | Facility: CLINIC | Age: 35
End: 2025-05-13
Payer: MEDICARE

## 2025-06-03 ENCOUNTER — PATIENT MESSAGE (OUTPATIENT)
Dept: BARIATRICS | Facility: CLINIC | Age: 35
End: 2025-06-03
Payer: MEDICARE

## 2025-06-04 ENCOUNTER — LAB VISIT (OUTPATIENT)
Dept: LAB | Facility: HOSPITAL | Age: 35
End: 2025-06-04
Attending: INTERNAL MEDICINE
Payer: MEDICARE

## 2025-06-04 DIAGNOSIS — Z94.0 DECEASED-DONOR KIDNEY TRANSPLANT: ICD-10-CM

## 2025-06-04 DIAGNOSIS — Z79.899 POLYPHARMACY: ICD-10-CM

## 2025-06-04 DIAGNOSIS — I10 ESSENTIAL HYPERTENSION: ICD-10-CM

## 2025-06-04 DIAGNOSIS — Z79.899 LONG-TERM USE OF HIGH-RISK MEDICATION: ICD-10-CM

## 2025-06-04 DIAGNOSIS — Z94.0 S/P KIDNEY TRANSPLANT: ICD-10-CM

## 2025-06-04 DIAGNOSIS — Z98.84 S/P BARIATRIC SURGERY: ICD-10-CM

## 2025-06-04 DIAGNOSIS — K21.9 GASTROESOPHAGEAL REFLUX DISEASE WITHOUT ESOPHAGITIS: ICD-10-CM

## 2025-06-04 LAB
25(OH)D3+25(OH)D2 SERPL-MCNC: 25 NG/ML (ref 30–96)
ABSOLUTE EOSINOPHIL (OHS): 0.06 K/UL
ABSOLUTE MONOCYTE (OHS): 0.37 K/UL (ref 0.3–1)
ABSOLUTE NEUTROPHIL COUNT (OHS): 3.9 K/UL (ref 1.8–7.7)
ALBUMIN SERPL BCP-MCNC: 4 G/DL (ref 3.5–5.2)
ALP SERPL-CCNC: 58 UNIT/L (ref 40–150)
ALT SERPL W/O P-5'-P-CCNC: 17 UNIT/L (ref 10–44)
ANION GAP (OHS): 11 MMOL/L (ref 8–16)
AST SERPL-CCNC: 13 UNIT/L (ref 11–45)
BASOPHILS # BLD AUTO: 0.03 K/UL
BASOPHILS NFR BLD AUTO: 0.5 %
BILIRUB SERPL-MCNC: 0.6 MG/DL (ref 0.1–1)
BUN SERPL-MCNC: 20 MG/DL (ref 6–20)
CALCIUM SERPL-MCNC: 9.8 MG/DL (ref 8.7–10.5)
CHLORIDE SERPL-SCNC: 109 MMOL/L (ref 95–110)
CHOLEST SERPL-MCNC: 112 MG/DL (ref 120–199)
CHOLEST/HDLC SERPL: 3 {RATIO} (ref 2–5)
CO2 SERPL-SCNC: 21 MMOL/L (ref 23–29)
CREAT SERPL-MCNC: 1.1 MG/DL (ref 0.5–1.4)
ERYTHROCYTE [DISTWIDTH] IN BLOOD BY AUTOMATED COUNT: 12.4 % (ref 11.5–14.5)
GFR SERPLBLD CREATININE-BSD FMLA CKD-EPI: >60 ML/MIN/1.73/M2
GLUCOSE SERPL-MCNC: 68 MG/DL (ref 70–110)
HCT VFR BLD AUTO: 39.2 % (ref 37–48.5)
HDLC SERPL-MCNC: 37 MG/DL (ref 40–75)
HDLC SERPL: 33 % (ref 20–50)
HGB BLD-MCNC: 12.5 GM/DL (ref 12–16)
IMM GRANULOCYTES # BLD AUTO: 0.02 K/UL (ref 0–0.04)
IMM GRANULOCYTES NFR BLD AUTO: 0.4 % (ref 0–0.5)
IRON SATN MFR SERPL: 38 % (ref 20–50)
IRON SERPL-MCNC: 91 UG/DL (ref 30–160)
LDLC SERPL CALC-MCNC: 57.6 MG/DL (ref 63–159)
LYMPHOCYTES # BLD AUTO: 1.26 K/UL (ref 1–4.8)
MCH RBC QN AUTO: 30.5 PG (ref 27–31)
MCHC RBC AUTO-ENTMCNC: 31.9 G/DL (ref 32–36)
MCV RBC AUTO: 96 FL (ref 82–98)
NONHDLC SERPL-MCNC: 75 MG/DL
NUCLEATED RBC (/100WBC) (OHS): 0 /100 WBC
PHOSPHATE SERPL-MCNC: 3 MG/DL (ref 2.7–4.5)
PLATELET # BLD AUTO: 183 K/UL (ref 150–450)
PMV BLD AUTO: 12.3 FL (ref 9.2–12.9)
POTASSIUM SERPL-SCNC: 3.7 MMOL/L (ref 3.5–5.1)
PROT SERPL-MCNC: 7.1 GM/DL (ref 6–8.4)
RBC # BLD AUTO: 4.1 M/UL (ref 4–5.4)
RELATIVE EOSINOPHIL (OHS): 1.1 %
RELATIVE LYMPHOCYTE (OHS): 22.3 % (ref 18–48)
RELATIVE MONOCYTE (OHS): 6.6 % (ref 4–15)
RELATIVE NEUTROPHIL (OHS): 69.1 % (ref 38–73)
SODIUM SERPL-SCNC: 141 MMOL/L (ref 136–145)
TIBC SERPL-MCNC: 238 UG/DL (ref 250–450)
TRANSFERRIN SERPL-MCNC: 161 MG/DL (ref 200–375)
TRIGL SERPL-MCNC: 87 MG/DL (ref 30–150)
VIT B12 SERPL-MCNC: 949 PG/ML (ref 210–950)
WBC # BLD AUTO: 5.64 K/UL (ref 3.9–12.7)

## 2025-06-04 PROCEDURE — 84100 ASSAY OF PHOSPHORUS: CPT | Mod: HCNC

## 2025-06-04 PROCEDURE — 82607 VITAMIN B-12: CPT | Mod: HCNC

## 2025-06-04 PROCEDURE — 80053 COMPREHEN METABOLIC PANEL: CPT | Mod: HCNC

## 2025-06-04 PROCEDURE — 80061 LIPID PANEL: CPT | Mod: HCNC

## 2025-06-04 PROCEDURE — 80197 ASSAY OF TACROLIMUS: CPT | Mod: HCNC

## 2025-06-04 PROCEDURE — 84425 ASSAY OF VITAMIN B-1: CPT | Mod: HCNC

## 2025-06-04 PROCEDURE — 36415 COLL VENOUS BLD VENIPUNCTURE: CPT | Mod: HCNC

## 2025-06-04 PROCEDURE — 82306 VITAMIN D 25 HYDROXY: CPT | Mod: HCNC

## 2025-06-04 PROCEDURE — 84466 ASSAY OF TRANSFERRIN: CPT | Mod: HCNC

## 2025-06-04 PROCEDURE — 85025 COMPLETE CBC W/AUTO DIFF WBC: CPT | Mod: HCNC

## 2025-06-05 ENCOUNTER — RESULTS FOLLOW-UP (OUTPATIENT)
Dept: BARIATRICS | Facility: CLINIC | Age: 35
End: 2025-06-05

## 2025-06-05 LAB — TACROLIMUS BLD-MCNC: 7.7 NG/ML (ref 5–15)

## 2025-06-05 RX ORDER — ERGOCALCIFEROL 1.25 MG/1
50000 CAPSULE ORAL
Qty: 12 CAPSULE | Refills: 0 | Status: SHIPPED | OUTPATIENT
Start: 2025-06-05 | End: 2025-08-28

## 2025-06-10 ENCOUNTER — OFFICE VISIT (OUTPATIENT)
Dept: NEPHROLOGY | Facility: CLINIC | Age: 35
End: 2025-06-10
Payer: MEDICARE

## 2025-06-10 VITALS
HEIGHT: 66 IN | DIASTOLIC BLOOD PRESSURE: 66 MMHG | SYSTOLIC BLOOD PRESSURE: 100 MMHG | HEART RATE: 82 BPM | BODY MASS INDEX: 30.82 KG/M2 | WEIGHT: 191.81 LBS | RESPIRATION RATE: 18 BRPM

## 2025-06-10 DIAGNOSIS — Z79.899 IMMUNOSUPPRESSIVE MANAGEMENT ENCOUNTER FOLLOWING KIDNEY TRANSPLANT: ICD-10-CM

## 2025-06-10 DIAGNOSIS — Z94.0 IMMUNOSUPPRESSIVE MANAGEMENT ENCOUNTER FOLLOWING KIDNEY TRANSPLANT: ICD-10-CM

## 2025-06-10 DIAGNOSIS — Z94.0 DECEASED-DONOR KIDNEY TRANSPLANT: ICD-10-CM

## 2025-06-10 DIAGNOSIS — Z94.0 S/P KIDNEY TRANSPLANT: ICD-10-CM

## 2025-06-10 DIAGNOSIS — R42 DIZZINESS: Primary | ICD-10-CM

## 2025-06-10 LAB — W VITAMIN B1: 71 UG/L

## 2025-06-10 PROCEDURE — 3008F BODY MASS INDEX DOCD: CPT | Mod: CPTII,HCNC,S$GLB, | Performed by: INTERNAL MEDICINE

## 2025-06-10 PROCEDURE — 1159F MED LIST DOCD IN RCRD: CPT | Mod: CPTII,HCNC,S$GLB, | Performed by: INTERNAL MEDICINE

## 2025-06-10 PROCEDURE — 3074F SYST BP LT 130 MM HG: CPT | Mod: CPTII,HCNC,S$GLB, | Performed by: INTERNAL MEDICINE

## 2025-06-10 PROCEDURE — 99215 OFFICE O/P EST HI 40 MIN: CPT | Mod: HCNC,S$GLB,, | Performed by: INTERNAL MEDICINE

## 2025-06-10 PROCEDURE — 99999 PR PBB SHADOW E&M-EST. PATIENT-LVL IV: CPT | Mod: PBBFAC,HCNC,, | Performed by: INTERNAL MEDICINE

## 2025-06-10 PROCEDURE — 3066F NEPHROPATHY DOC TX: CPT | Mod: CPTII,HCNC,S$GLB, | Performed by: INTERNAL MEDICINE

## 2025-06-10 PROCEDURE — 3078F DIAST BP <80 MM HG: CPT | Mod: CPTII,HCNC,S$GLB, | Performed by: INTERNAL MEDICINE

## 2025-06-10 RX ORDER — MECLIZINE HYDROCHLORIDE 25 MG/1
25 TABLET ORAL EVERY 12 HOURS PRN
COMMUNITY

## 2025-06-10 RX ORDER — TACROLIMUS 1 MG/1
CAPSULE ORAL
Qty: 270 CAPSULE | Refills: 11 | Status: SHIPPED | OUTPATIENT
Start: 2025-06-10

## 2025-06-10 RX ORDER — PREDNISONE 5 MG/1
5 TABLET ORAL DAILY
Qty: 90 TABLET | Refills: 3 | Status: SHIPPED | OUTPATIENT
Start: 2025-06-10

## 2025-06-10 RX ORDER — KETOCONAZOLE 200 MG/1
100 TABLET ORAL DAILY
Qty: 15 TABLET | Refills: 11 | Status: SHIPPED | OUTPATIENT
Start: 2025-06-10 | End: 2026-06-10

## 2025-06-10 RX ORDER — MYCOPHENOLATE MOFETIL 250 MG/1
1000 CAPSULE ORAL 2 TIMES DAILY
Qty: 240 CAPSULE | Refills: 11 | Status: SHIPPED | OUTPATIENT
Start: 2025-06-10

## 2025-06-10 NOTE — PROGRESS NOTES
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 Pt was last seen in this renal clinic in Jan 2025. Chart was reviewed. No new events.     On f/u today, pt has no new c/o's renally or transplant-gray. Pt had 2 questions that were answered.  Patient had recent bariatric surgery done for weight loss.  He wanted to know if further skin procedure to tighten the skin would be okay as far as the kidney transplant was concerned.  The answer was there is no contraindication regarding the kidney transplant.  For 2nd question concerned the feeling of dizziness and lightheadedness she gets which can happen whether standing or sitting, he has an when driving.  Discussed with the patient.  Noted BP is slightly low.  Patient will be referred to ENT for possible vertigo.  Immunosuppressive meds reviewed with pt. Has been compliant.      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 Obesity, s/p bariatric surgery service on 10/28/2024 and underwent sleeve gastrectomy     PAST SURGICAL HISTORY:  She  has a past surgical history that includes Arm surgery (Right); Renal biopsy; tumor removed; Laparoscopic salpingectomy (Bilateral, 12/04/2018); Hysteroscopy with hydrothermal ablation of endometrium with dilation and curettage (N/A, 12/04/2018); AV fistula placement (Right); Kidney transplant (N/A, 01/02/2023); and Cystoscopy. s/p bariatric surgery service on 10/28/2024 and underwent sleeve gastrectomy      SOCIAL HISTORY:  She  reports that she has never smoked. She has never used smokeless tobacco. She reports that she  does not drink alcohol and does not use drugs.     FAMILY MEDICAL HISTORY:  Her family history includes Breast cancer in her paternal grandmother; Diabetes in her maternal grandmother; Heart attack in her maternal grandmother; Hypertension in her father; Lung cancer in her maternal grandfather; Prostate cancer in her maternal grandfather.               Review of patient's allergies indicates:   Allergen Reactions    Lisinopril Other (See Comments)       Severe cough    Adhesive tape-silicones Itching       Burns    Furosemide Other (See Comments)       Almost gave her right sided heartfailure      Meds reviewed  Current Outpatient Medications   Medication Instructions    albuterol (PROVENTIL/VENTOLIN HFA) 90 mcg/actuation inhaler 1-2 puffs, Inhalation, Every 6 hours PRN    dexbrompheniramine maleate (ALA-HIST IR) 2 mg Tab 1 tablet as needed Orally every 6 hrs for 5 days    ergocalciferol (ERGOCALCIFEROL) 50,000 Units, Oral, Every 7 days    fluticasone propionate (FLONASE) 50 mcg/actuation nasal spray 1 spray in each nostril Nasally Twice a day for 30 days    ketoconazole (NIZORAL) 200 mg Tab Take HALF tablet (100 mg total) by mouth once daily.    magnesium oxide (MAG-OX) 800 mg, Oral, 2 times daily    meclizine (ANTIVERT) 25 mg, Every 12 hours PRN    metoprolol tartrate (LOPRESSOR) 100 mg, Oral, 2 times daily    multivitamin (THERAGRAN) per tablet 1 tablet, Every morning    mycophenolate (CELLCEPT) 1,000 mg, Oral, 2 times daily    nystatin (MYCOSTATIN) cream Topical (Top), 2 times daily    pantoprazole (PROTONIX) 40 mg, Oral, 2 times daily    predniSONE (DELTASONE) 5 mg, Oral, Daily    tacrolimus (PROGRAF) 1 MG Cap Take 5 capsules (5 mg total) by mouth every morning AND 4 capsules (4 mg total) every evening.                  REVIEW OF SYSTEMS:  Patient has no fever, fatigue, visual changes, chest pain, edema, cough, dyspnea, nausea, vomiting, constipation, diarrhea, arthralgias, pruritis, dizziness, weakness,  "depression, confusion.     PHYSICAL EXAM:  Blood pressure 100/66, pulse 82, resp. rate 18, height 5' 6" (1.676 m), weight 87 kg (191 lb 12.8 oz).  Gen:WDWN female in no apparent distress  Psych:Normal mood and affect  Skin:No rashes or ulcers  Neck:No JVD  Chest:Clear with no rales, rhonchi, wheezing with normal effort  CV:Regular with no murmurs, gallops or rubs  Abd:Soft, nontender, no distension  Ext:No edema     Labs reviewed  BMP  Lab Results   Component Value Date     06/04/2025    K 3.7 06/04/2025     06/04/2025    CO2 21 (L) 06/04/2025    BUN 20 06/04/2025    CREATININE 1.1 06/04/2025    CALCIUM 9.8 06/04/2025    ANIONGAP 11 06/04/2025    EGFRNORACEVR >60 06/04/2025     Lab Results   Component Value Date    WBC 5.64 06/04/2025    HGB 12.5 06/04/2025    HCT 39.2 06/04/2025    MCV 96 06/04/2025     06/04/2025     Lab Results   Component Value Date    .2 (H) 02/26/2024    CALCIUM 9.8 06/04/2025    PHOS 3.0 06/04/2025     Vit D level 25, from 19        Prograf level 7.7        IMPRESSION AND RECOMMENDATIONS: 33 y/o female with h/o of kidney transplant presented for f/u:     Renal: s Cr normal and stable  Stable renal function  Had ESRD due to kidney biopsy proven primary focal segmental glomerulosclerosis (FSGS), kidney biopsy prior to 2016.  Was on HD from 2016 to 2023.  K normal  Na normal     Ca normal, low longer high after reducing vitamin-D intake from once a week to once every 10 days to 2 weeks  Continue vit D (ergocalciferol), 50,000 u q 10 days to q 2 weeks  Elevated PTH he is related to post transplant status, and the patient having had secondary hyperparathyroidism prior to the transplant  PO4 is normal to low, do not recommend Sensipar because it would cause further hypophosphatemia     2. Kidney transplant: doing well, stable. Is immunosuppressed.  Prograf level is within the therapeutic range  No change in Prograf dose, takes 5 mg p.o. q.a.m. and 4 mg p.o. 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 HTN: BP controlled to low.  Meds reviewed  Is on only on very low-dose metoprolol     4. Wt gain: s/p bariatric surgery service on 10/28/2024 and underwent sleeve gastrectomy   Doing well, stable.  Cosmetic surgery to tighten the skin wheal p.o. K as far as the kidney transplant is concern     5. Dizziness:  Patient is slightly low BP can explain this.  However dizziness occurs not only when standing up but also sitting down and even when driving  Needs to rule out inner ear and neurologic causes of dizziness.  To begin with, we will referred to ENT    6. Work: pt is able to return to work.   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    Level V visit.  RTC 3-4 months     Toma Castillo MD

## 2025-06-12 ENCOUNTER — OFFICE VISIT (OUTPATIENT)
Dept: OTOLARYNGOLOGY | Facility: CLINIC | Age: 35
End: 2025-06-12
Payer: MEDICARE

## 2025-06-12 VITALS — BODY MASS INDEX: 31.39 KG/M2 | WEIGHT: 195.31 LBS | HEIGHT: 66 IN

## 2025-06-12 DIAGNOSIS — Z94.0 HX OF KIDNEY TRANSPLANT: ICD-10-CM

## 2025-06-12 DIAGNOSIS — R42 DIZZINESS: Primary | ICD-10-CM

## 2025-06-12 DIAGNOSIS — R42 LIGHTHEADEDNESS: ICD-10-CM

## 2025-06-12 DIAGNOSIS — R51.9 NEW ONSET OF HEADACHES: ICD-10-CM

## 2025-06-12 DIAGNOSIS — Z75.8 DOES NOT HAVE PRIMARY CARE PROVIDER: ICD-10-CM

## 2025-06-12 PROCEDURE — 99999 PR PBB SHADOW E&M-EST. PATIENT-LVL IV: CPT | Mod: PBBFAC,HCNC,,

## 2025-06-12 NOTE — PROGRESS NOTES
Subjective:   Patient ID: Lisseth Schwartz is a 34 y.o. female.    Chief Complaint: Dizziness (First episode in 2015. C/o feeling off balance,slow, feels off like will pass out. . Strep 2 weeks ago and it got worse. )    History of Present Illness    Patient presents today for evaluation of dizziness and balance issues. Initially diagnosed with vertigo in 2015 with symptoms of dizziness and feeling disconnected from reality. Vision feels slow and dragging, with sensation that eyes do not synchronize with head movement. No treatment was received at initial diagnosis. Over the past 1-2 months, symptoms have worsened, particularly while working in cooler areas. Episodes include feeling shaky, experiencing slow movement, and near-syncope. She experiences balance difficulties, frequently bumping into objects while walking. She reports becoming overstimulated quickly with multiple concurrent activities and endorses anxiety while driving due to perceived decreased reflexes and reaction time. Meclizine was prescribed but discontinued due to excessive sedation. She had strep throat infection approximately one month ago. She reports recent onset of headaches. She has a history of renal failure and is a transplant recipient. She wears glasses and has astigmatism. Denies changes in hearing or true spinning dizziness.       Review of patient's allergies indicates:   Allergen Reactions    Lisinopril Other (See Comments)     Severe cough    Adhesive tape-silicones Itching     Burns    Furosemide Other (See Comments)     Almost gave her right sided heartfailure           Review of Systems   Constitutional:  Negative for chills, fatigue, fever and unexpected weight change.   HENT:  Negative for congestion, dental problem, ear discharge, ear pain, facial swelling, hearing loss, nosebleeds, postnasal drip, rhinorrhea, sinus pressure, sneezing, sore throat, tinnitus, trouble swallowing and voice change.    Eyes:  Negative for  "redness, itching and visual disturbance.   Respiratory:  Negative for cough, choking, shortness of breath and wheezing.    Cardiovascular:  Negative for chest pain and palpitations.   Gastrointestinal:  Negative for abdominal pain.        No reflux.   Musculoskeletal:  Negative for gait problem.   Skin:  Negative for rash.   Neurological:  Positive for dizziness, light-headedness and headaches.         Objective:   Ht 5' 6" (1.676 m)   Wt 88.6 kg (195 lb 5.2 oz)   BMI 31.53 kg/m²     Physical Exam  Constitutional:       General: She is not in acute distress.     Appearance: Normal appearance. She is not ill-appearing.   HENT:      Head: Normocephalic and atraumatic.      Right Ear: Tympanic membrane, ear canal and external ear normal. There is no impacted cerumen.      Left Ear: Tympanic membrane, ear canal and external ear normal. There is no impacted cerumen.      Ears:      Comments: Addis-Hallpike negative AU       Nose: Nose normal. No congestion or rhinorrhea.      Mouth/Throat:      Mouth: Mucous membranes are moist.      Pharynx: Oropharynx is clear. Uvula midline. No oropharyngeal exudate or posterior oropharyngeal erythema.      Tonsils: No tonsillar exudate or tonsillar abscesses. 2+ on the right. 2+ on the left.   Eyes:      Extraocular Movements: Extraocular movements intact.      Conjunctiva/sclera: Conjunctivae normal.      Pupils: Pupils are equal, round, and reactive to light.   Neck:      Thyroid: No thyroid mass.   Pulmonary:      Effort: Pulmonary effort is normal. No respiratory distress.   Musculoskeletal:         General: Normal range of motion.      Cervical back: Full passive range of motion without pain.   Lymphadenopathy:      Cervical: No cervical adenopathy.   Neurological:      General: No focal deficit present.      Mental Status: She is alert and oriented to person, place, and time.      Cranial Nerves: Cranial nerves 2-12 are intact.   Psychiatric:         Mood and Affect: Mood " normal.         Behavior: Behavior normal. Behavior is cooperative.         Thought Content: Thought content normal.         Judgment: Judgment normal.          Imaging : CT HEAD 2022  CT Head Without Contrast  Order: [id number]   Status: Final result       Next appt: 09/02/2025 at 08:00 AM in Audiology (Geovanni Angel, CCC-A)    Test Result Released: Yes (seen)    0 Result Notes  Details    Reading Physician Reading Date Result Priority   Pola Malloy MD  [phone]  3/28/2022 STAT     Narrative & Impression  EXAMINATION:  CT HEAD WITHOUT CONTRAST     CLINICAL HISTORY:  Headache, sudden, severe;     TECHNIQUE:  Low dose axial CT images obtained throughout the head without intravenous contrast. Sagittal and coronal reconstructions were performed.     COMPARISON:  Prior MRI     FINDINGS:  Intracranial compartment:     Ventricles and sulci are normal in size for age without evidence of hydrocephalus. No extra-axial blood or fluid collections.     No parenchymal mass, hemorrhage, edema or major vascular distribution infarct.     Skull/extracranial contents (limited evaluation): No fracture. Mastoid air cells and paranasal sinuses are essentially clear.     Impression:     No acute abnormality.     All CT scans   are performed using dose optimization techniques including the following: automated exposure control; adjustment of the mA and/or kV; use of iterative reconstruction technique.  Dose modulation was employed for ALARA by means of: Automated exposure control; adjustment of the mA and/or kV according to patient size (this includes techniques or standardized protocols for targeted exams where dose is matched to indication/reason for exam; i.e. extremities or head); and/or use of iterative reconstructive technique.        Electronically signed by:Pola Malloy  Date:                                            03/28/2022  Time:                                           20:38       Assessment/Plan:     1.  Dizziness    2. New onset of headaches    3. Does not have primary care provider    4. Lightheadedness    5. Hx of kidney transplant          Dizziness  -     Ambulatory referral/consult to ENT  -     Ambulatory referral/consult to Neurology; Future; Expected date: 06/19/2025  -     Ambulatory Referral/Consult to Physical Therapy; Future; Expected date: 06/19/2025    New onset of headaches  -     Ambulatory referral/consult to Neurology; Future; Expected date: 06/19/2025    Does not have primary care provider  -     Ambulatory referral/consult to Family Practice; Future; Expected date: 06/19/2025    Lightheadedness    Hx of kidney transplant        Assessment & Plan    DIZZINESS:  - Assessed reported dizziness symptoms, which appear more neurological than vestibular in nature.  - Ruled out Benign Paroxysmal Positional Vertigo (BPPV) through exam.  - Performed basic neurological exam to assess cranial nerve function.  - Determined need for further diagnostic testing and specialist evaluation to identify underlying cause of symptoms.  - Explained that vertigo is a symptom rather than a specific diagnosis, with multiple potential causes.  - Discussed the connection between vestibular system, brain, and eyes in maintaining balance.  - Explained that long-term use of meclizine can suppress vestibular compensation and potentially worsen symptoms over time.  - Emphasized importance of seeking immediate medical attention for any sudden changes or stroke-like symptoms.  - Discontinued meclizine for vertigo symptoms due to sedating effects and potential long-term negative impact on vestibular system compensation.  - Ordered Video Nystagmography (VNG) test to evaluate inner ear function.  - Referred to Vestibular Rehabilitation Therapy (VRT) for balance and vestibular system exercises.  - Referred to neurology for evaluation of dizziness symptoms.  - Patient to continue yearly eye doctor appointments to monitor vision  changes.  - Follow up after VNG test (currently available in September, patient placed on waitlist for earlier appointment).  - Report to the ER for any sudden changes or stroke-like symptoms.  - Message the provider if needing anything sooner than scheduled appointments.    NEW ONSET OF HEADACHES:  - Considered possibility of migraine-related symptoms given report of new headaches.  - Referred to neurology for evaluation of new onset headaches.    DOES NOT HAVE PCP:  - Patient to seek primary care physician to coordinate overall care.  - Referred to primary care physician for overall care coordination.    PLAN SUMMARY:  - Ordered Video Nystagmography (VNG) test for inner ear function evaluation  - Discontinued meclizine for vertigo symptoms  - Referred to neurology for dizziness and new onset headaches evaluation  - Referred to Vestibular Rehabilitation Therapy (VRT) for balance exercises  - Referred to primary care physician for overall care coordination  - Follow up after VNG test (patient on waitlist for earlier appointment than September)  - Report to the ER for any sudden changes or stroke-like symptoms          This note was generated with the assistance of ambient listening technology. Verbal consent was obtained by the patient and accompanying visitor(s) for the recording of patient appointment to facilitate this note. I attest to having reviewed and edited the generated note for accuracy, though some syntax or spelling errors may persist. Please contact the author of this note for any clarification.     I spent a total of 30 minutes on the day of the visit.  This includes face to face time and non-face to face time preparing to see the patient (eg, review of tests), obtaining and/or reviewing separately obtained history, documenting clinical information in the electronic or other health record, independently interpreting results and communicating results to the patient/family/caregiver, or care  coordinator.

## 2025-06-13 NOTE — TELEPHONE ENCOUNTER
----- Message from Howard Durand PharmD sent at 1/19/2023  1:32 PM CST -----  Yes, Atovaquone 1500 mg PO daily will be better since G6PD is low.     Howard  ----- Message -----  From: Tata Hdz RN  Sent: 1/19/2023   1:28 PM CST  To: [facility] Pharmacists      ----- Message -----  From: Sara Caballero MD  Sent: 1/19/2023   1:19 PM CST  To: [facility] Clinical    G6PD low - will check with pharmD if atovaquone would be safer than dapsone.[Believe bactrim causes itching]       Name band;

## 2025-06-26 PROBLEM — R68.82 DECREASED LIBIDO: Status: ACTIVE | Noted: 2025-06-26

## 2025-07-02 ENCOUNTER — CLINICAL SUPPORT (OUTPATIENT)
Dept: REHABILITATION | Facility: HOSPITAL | Age: 35
End: 2025-07-02
Payer: MEDICARE

## 2025-07-02 DIAGNOSIS — R42 DIZZINESS: ICD-10-CM

## 2025-07-02 DIAGNOSIS — R26.89 IMBALANCE: Primary | ICD-10-CM

## 2025-07-02 PROCEDURE — 97161 PT EVAL LOW COMPLEX 20 MIN: CPT | Mod: HCNC,PN

## 2025-07-02 PROCEDURE — 97530 THERAPEUTIC ACTIVITIES: CPT | Mod: HCNC,PN

## 2025-07-02 PROCEDURE — 97112 NEUROMUSCULAR REEDUCATION: CPT | Mod: HCNC,PN

## 2025-07-03 PROBLEM — R26.89 IMBALANCE: Status: ACTIVE | Noted: 2025-07-03

## 2025-07-03 NOTE — PROGRESS NOTES
Outpatient Rehab    Physical Therapy Evaluation    Patient Name: Lisseth Schwartz  MRN: [id number]  YOB: 1990  Encounter Date: 7/2/2025    Therapy Diagnosis:   Encounter Diagnoses   Name Primary?    Dizziness     Imbalance Yes     Physician: Magali Vazquez P*    Physician Orders: Eval and Treat  Medical Diagnosis: Dizziness  Surgical Diagnosis: Not applicable for this Episode   Surgical Date: Not applicable for this Episode  Days Since Last Surgery: Not applicable for this Episode    Visit # / Visits Authorized:  1 / 1  Insurance Authorization Period: 6/12/2025 to 6/12/2026  Date of Evaluation: 7/2/2025  Plan of Care Certification: 7/2/2025 to 9/10/2025     Time In:   2:45p  Time Out:  3:30p  Total Time (in minutes):   45  Total Billable Time (in minutes):  45    Intake Outcome Measure for FOTO Survey    Therapist reviewed FOTO scores for Lisseth Schwartz on 7/2/2025.   FOTO report - see Media section or FOTO account episode details.     Intake Score: 50%    Precautions:       Subjective    Patient presents today for evaluation of dizziness and balance issues. Initially diagnosed with vertigo in 2015 with symptoms of dizziness. Vision feels slow and dragging, with sensation that eyes do not synchronize with head movement.    Over the past 1-2 months, symptoms have worsened, particularly while working and exacerbated after exiting large freezer at work (pt is a ). Episodes include feeling shaky, experiencing slow movement, and near-syncope. She reports dizziness after bending turning to look behind self, and squatting. She experiences balance difficulties, frequently bumping into objects while walking. She reports becoming overstimulated quickly with multiple concurrent activities and endorses anxiety while driving due.     Imaging:  Impression:     No acute abnormality.       Past Medical History/Physical Systems Review:   Lisseth Schwartz  has a past medical history of  Allergy, Anemia, Anxiety, Breast feeding status of mother, Brittle bones, Dependence on renal dialysis, ESRD (end stage renal disease), General anesthetics causing adverse effect in therapeutic use, Hypertension, Insomnia, RLS (restless legs syndrome), and Vertigo.    Lisseth Schwartz  has a past surgical history that includes Arm surgery (Right); Renal biopsy; tumor removed; Laparoscopic salpingectomy (Bilateral, 12/04/2018); Hysteroscopy with hydrothermal ablation of endometrium with dilation and curettage (N/A, 12/04/2018); AV fistula placement (Right); Kidney transplant (N/A, 01/02/2023); Cystoscopy; and Laparoscopic sleeve gastrectomy (N/A, 10/28/2024).    Lisseth has a current medication list which includes the following prescription(s): albuterol, clindamycin, ala-hist ir, ergocalciferol, estradiol, fluticasone propionate, ketoconazole, magnesium oxide, meclizine, metoprolol tartrate, multivitamin, mycophenolate, nystatin, pantoprazole, prednisone, revaree, and tacrolimus.    Review of patient's allergies indicates:   Allergen Reactions    Lisinopril Other (See Comments)     Severe cough    Adhesive tape-silicones Itching     Burns    Furosemide Other (See Comments)     Almost gave her right sided heartfailure        Objective      Vestibular Testing  Visual Fields: normal  Horizontal tracking: slow and apprehensive with end range nystagmus   Vertical Tracking: normal  Diagonal tracking:  normal, pt reports increased dizziness post   Lateral Visual acuity: line 6  Dynamic Visual Acuity: 2 line loss  Vestibular Occular Reflex   Vertical: nt   Horizontal: normal  Convergence: 15cm fingertip to nose  Olga Lidia Halpike:  neg  Saccades: increased s/s with poor tolerance     Vestibular:     Head impulse:  no Nystagmus   Head shake:  no nystagmus   [place] Hallpike  R neg, L neg   Roll test R neg, L neg    Balance testing:    mCTSIB  LOB description   Condition 1:   flat surface, eyes open  20 sec Min sway   Condition 2:    flat surface, eyes closed 12.88 sec Left lean and step   Condition 3:   foam surface, eyes open 20 sec Mod sway to left   Condition 4:   foam surface, eyes closed 1.93 Fwd and left     Dynamic Gait Index   1. Gait level surface -  3  2. Change in gait speed - 3  3. Gait with horizontal head turns - 2  4. Gait with vertical head turns - 2  5. Gait and pivot turn - 2  6. Step over obstacle - 3  7. Step around obstacle - 3  8. Steps - 2  Total 20/24    Fakuda: left lateral shift; no true deviation       Symptoms appear to be more central related. Increased fall risk noted with dynamic balance     Treatment:   Lisseth participated in neuromuscular re-education activities to improve: Balance, Coordination, Kinesthetic, Sense, Proprioception, and Posture for 12 minutes. The following activities were included:  Vor x1 and smooth pursuit in staggered stance or standing on towel  Trunk rot to seek target behind self  STS with gaze stab, EC, or head rotation     Pt participated in dynamic functional therapeutic activities to improve functional performance for 8  minutes, including:  Patient was couneled in detail about vestibulopathy, fall prevention and use of proprioception (walk aid, slower deliberate movements) and visual cues (caution when walking in the dark).        Time Entry(in minutes):       Assessment & Plan   Assessment  Lisseth presents with a condition of Low complexity.   Presentation of Symptoms: Stable       Functional Limitations: Activity tolerance, Ambulating on uneven surfaces, Completing self-care activities, Completing work/school activities, Decreased ambulation distance/endurance, Maintaining balance, Increased risk of fall, Gait limitations, Functional mobility, Driving, Participating in leisure activities, Performing household chores, Squatting  Impairments: Abnormal gait, Impaired balance, Activity intolerance, Lack of appropriate home exercise program  Personal Factors Affecting Prognosis:  Fear/anxiety, Schedule    Patient Goal for Therapy (PT): improve balance and decrease dizziness  Prognosis: Good    Plan  From a physical therapy perspective, the patient would benefit from: Skilled Rehab Services    Planned therapy interventions include: Therapeutic exercise, Therapeutic activities, Neuromuscular re-education, Manual therapy, ADLs/IADLs, Canalith repositioning, and Cognitive functional training.    Planned modalities to include: Mechanical traction, Electrical stimulation - passive/unattended, Electrical stimulation - attended, and Cryotherapy (cold pack).        Visit Frequency: 1 times Per Week for 10 Weeks.       This plan was discussed with Patient.   Discussion participants: Agreed Upon Plan of Care             The patient's spiritual, cultural, and educational needs were considered, and the patient is agreeable to the plan of care and goals.     Education  Education was done with Patient. The patient's learning style includes Demonstration, Listening, and Pictures/video. The patient Demonstrates understanding.                 Goals:   Active       Balance        Patient's subjective rating of dizziness will decreased to 0-2 on 0-10 scale        Start:  07/03/25    Expected End:  09/10/25            Patient able to perform bending and turning without LOB or c/o dizziness        Start:  07/03/25    Expected End:  09/10/25            Patient able to ambulate in community safely on varied terrain with head movement, without LOB or c/o dizziness.         Start:  07/03/25    Expected End:  09/10/25            Pt to be I with self management of condition and progression vestibular program for maintenance.        Start:  07/03/25    Expected End:  09/10/25            Pt to report less episodes of unsteadiness while working (walking into walls and furniture less than 3x in a week).        Start:  07/03/25    Expected End:  09/10/25                Marguerite Kothari, PT

## 2025-07-06 ENCOUNTER — HOSPITAL ENCOUNTER (EMERGENCY)
Facility: HOSPITAL | Age: 35
Discharge: HOME | End: 2025-07-06
Attending: EMERGENCY MEDICINE

## 2025-07-06 VITALS
SYSTOLIC BLOOD PRESSURE: 131 MMHG | OXYGEN SATURATION: 100 % | HEIGHT: 66 IN | WEIGHT: 197.06 LBS | BODY MASS INDEX: 31.67 KG/M2 | RESPIRATION RATE: 19 BRPM | TEMPERATURE: 98 F | HEART RATE: 67 BPM | DIASTOLIC BLOOD PRESSURE: 88 MMHG

## 2025-07-06 DIAGNOSIS — S39.92XA BACK INJURY: ICD-10-CM

## 2025-07-06 DIAGNOSIS — S39.012A LUMBAR STRAIN, INITIAL ENCOUNTER: Primary | ICD-10-CM

## 2025-07-06 PROCEDURE — 99284 EMERGENCY DEPT VISIT MOD MDM: CPT | Mod: 25

## 2025-07-06 PROCEDURE — 25000003 PHARM REV CODE 250: Performed by: PHYSICIAN ASSISTANT

## 2025-07-06 RX ORDER — ONDANSETRON 4 MG/1
4 TABLET, ORALLY DISINTEGRATING ORAL
Status: COMPLETED | OUTPATIENT
Start: 2025-07-06 | End: 2025-07-06

## 2025-07-06 RX ORDER — METHOCARBAMOL 750 MG/1
1500 TABLET, FILM COATED ORAL 3 TIMES DAILY
Qty: 30 TABLET | Refills: 0 | Status: SHIPPED | OUTPATIENT
Start: 2025-07-06 | End: 2025-07-11

## 2025-07-06 RX ORDER — TRAMADOL HYDROCHLORIDE 50 MG/1
50 TABLET, FILM COATED ORAL EVERY 6 HOURS PRN
Qty: 15 TABLET | Refills: 0 | Status: SHIPPED | OUTPATIENT
Start: 2025-07-06

## 2025-07-06 RX ORDER — HYDROCODONE BITARTRATE AND ACETAMINOPHEN 10; 325 MG/1; MG/1
1 TABLET ORAL
Refills: 0 | Status: COMPLETED | OUTPATIENT
Start: 2025-07-06 | End: 2025-07-06

## 2025-07-06 RX ADMIN — ONDANSETRON 4 MG: 4 TABLET, ORALLY DISINTEGRATING ORAL at 09:07

## 2025-07-06 RX ADMIN — HYDROCODONE BITARTRATE AND ACETAMINOPHEN 1 TABLET: 10; 325 TABLET ORAL at 09:07

## 2025-07-06 NOTE — ED PROVIDER NOTES
"   History      Chief Complaint   Patient presents with    Back Pain     Pt with history of kidney transplant c/o back pain that began as she lifted a pot of grits while at work this morning.         Review of patient's allergies indicates:   Allergen Reactions    Lisinopril Other (See Comments)     Severe cough    Adhesive tape-silicones Itching     Burns    Furosemide Other (See Comments)     Almost gave her right sided heartfailure        HPI   HPI    7/6/2025, 10:01 AM   History obtained from the patient      History of Present Illness: Lisseth Schwartz is a 34 y.o. 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 Denies bladder/bowel dysfunction, fever, saddle anesthesia, or focal weakness.         PCP: No, Primary Doctor       Past Medical History:  Past Medical History:   Diagnosis Date    Allergy     Anemia     Anxiety     Breast feeding status of mother 01/17/2020    Brittle bones     Dependence on renal dialysis 02/29/2024    ESRD (end stage renal disease)     M/W/F    General anesthetics causing adverse effect in therapeutic use     pt states "im a light weight"    Hypertension     Insomnia     PSG revealed no CHRYSTAL, but likely psychogenic etiology (anxiety)    RLS (restless legs syndrome)     Vertigo          Past Surgical History:  Past Surgical History:   Procedure Laterality Date    Arm surgery Right     x 2    AV FISTULA PLACEMENT Right     CYSTOSCOPY      HYSTEROSCOPY WITH HYDROTHERMAL ABLATION OF ENDOMETRIUM WITH DILATION AND CURETTAGE N/A 12/04/2018    Procedure: HYSTEROSCOPY, WITH DILATION AND CURETTAGE OF UTERUS AND HYDROTHERMAL ENDOMETRIAL ABLATION;  Surgeon: Tiara Red MD;  Location: [facility] OR;  Service: OB/GYN;  Laterality: N/A;  ATTEMPTED     KIDNEY TRANSPLANT N/A 01/02/2023    Procedure: TRANSPLANT, KIDNEY;  Surgeon: Go Beard MD;  Location: [facility] OR [address] [place] [address];  Service: Transplant;  Laterality: N/A;    " LAPAROSCOPIC SALPINGECTOMY Bilateral 12/04/2018    Procedure: SALPINGECTOMY, LAPAROSCOPIC;  Surgeon: Tiara Red MD;  Location: [facility] OR;  Service: OB/GYN;  Laterality: Bilateral;    LAPAROSCOPIC SLEEVE GASTRECTOMY N/A 10/28/2024    Procedure: GASTRECTOMY, SLEEVE, LAPAROSCOPIC w/intraop EGD;  Surgeon: Orville Payne Jr., MD;  Location: [facility] OR [address] [place] [address];  Service: General;  Laterality: N/A;    RENAL BIOPSY      tumor removed      from face per medical records           Family History:  Family History   Problem Relation Name Age of Onset    Hypertension Father      Breast cancer Paternal Grandmother      Diabetes Maternal Grandmother      Heart attack Maternal Grandmother      Lung cancer Maternal Grandfather      Prostate cancer Maternal Grandfather             Social History:  Social History     Tobacco Use    Smoking status: Never    Smokeless tobacco: Never    Tobacco comments:     Situational smoking, due to family crisis   Vaping Use    Vaping status: Never Used   Substance and Sexual Activity    Alcohol use: Never    Drug use: Never    Sexual activity: Yes     Partners: Male       ROS   Review of Systems   Constitutional: Negative for fever.   Musculoskeletal: Positive for back pain.   Neurological: Negative for weakness.   Review of Systems    Physical Exam      Initial Vitals [07/06/25 0842]   BP Pulse Resp Temp SpO2   132/85 69 16 98.4 °F (36.9 °C) 95 %      MAP       --         Physical Exam  Vital signs and nursing notes reviewed.  Constitutional: Patient is in NAD. Awake and alert. Well-developed and well-nourished.  Head: Atraumatic. Normocephalic.  Eyes: PERRL. EOM intact. Conjunctivae nl.  ENT: Mucous membranes are moist.   Neck: Supple.   Cardiovascular: Regular rate and rhythm. No murmurs, rubs, or gallops.   Pulmonary/Chest: No respiratory distress. Clear to auscultation bilaterally.   Abdominal: Soft. Non-distended. No TTP. No rebound, guarding, or rigidity.   Genitourinary: No CVA  "tenderness  Musculoskeletal: Moves all extremities. No edema.   Non tender c/t spine.  Lumbar spine with mild bilateral paraspinous ttp, no midline ttp or step.  Skin: Warm and dry.  Neurological: Awake and alert. No acute focal neurological deficits are appreciated.  5/5 x 4 strength.  Strong and equal foot plantar and dorsiflexion.  Psychiatric: Normal affect. Good eye contact. Appropriate in content.      ED Course      Procedures  ED Vital Signs:  Vitals:    07/06/25 0842 07/06/25 0910 07/06/25 0915 07/06/25 0933   BP: 132/85 (!) 133/90     Pulse: 69 66 69    Resp: 16 17  20   Temp: 98.4 °F (36.9 °C)      TempSrc: Oral      SpO2: 95% 100%     Weight: 89.4 kg (197 lb 3.2 oz)      Height:        07/06/25 0937   BP: 122/84   Pulse: 63   Resp: 20   Temp: 99.9 °F (37.7 °C)   TempSrc: Oral   SpO2: 100%   Weight: 89.4 kg (197 lb 1.5 oz)   Height: 5' 6" (1.676 m)               Imaging Results:  Imaging Results              X-Ray Thoracic Spine AP And Lateral (Final result)  Result time 07/06/25 09:43:55      Final result by Jose Chapa MD (07/06/25 09:43:55)                   Impression:      No acute abnormality identified.    Finalized on: 7/6/2025 9:43 AM By:  Jose Chapa MD  [facility]# [id number]      2025-07-06 09:46:04.866     [facility]               Narrative:    EXAM: XR THORACIC SPINE AP LATERAL    CLINICAL HISTORY: Back pain.    COMPARISON STUDIES: None.    FINDINGS:  There is normal alignment, with vertebral body heights maintained.  There are no compression deformities or fractures evident.  There are no significant arthritic changes evident.                                         X-Ray Lumbar Spine Ap And Lateral (Final result)  Result time 07/06/25 09:44:11      Final result by Jose Chapa MD (07/06/25 09:44:11)                   Impression:     As above.    Finalized on: 7/6/2025 9:44 AM By:  Jose Chapa MD  [facility]# [id number]      2025-07-06 09:46:14.877     [facility]               Narrative:    EXAM:  " XR LUMBAR SPINE AP AND LATERAL    CLINICAL HISTORY: Back pain.    FINDINGS:    Lumbar vertebral body height and alignment are normal.  No evidence of acute fracture.  Mild degenerative change L5-S1.                                           The Emergency Provider reviewed the vital signs and test results, which are outlined above.    ED Discussion         Medication(s) given in the ER:  Medications   HYDROcodone-acetaminophen  mg per tablet 1 tablet (1 tablet Oral Given 7/6/25 8227)   ondansetron disintegrating tablet 4 mg (4 mg Oral Given 7/6/25 7948)            Follow-up Information       Your Primary Care Doctor In 2 days.                                 New Prescriptions    METHOCARBAMOL (ROBAXIN) 750 MG TAB    Take 2 tablets (1,500 mg total) by mouth 3 (three) times daily. for 5 days    TRAMADOL (ULTRAM) 50 MG TABLET    Take 1 tablet (50 mg total) by mouth every 6 (six) hours as needed for Pain.          Medical Decision Making        All findings were reviewed with the patient/family in detail.   All remaining questions and concerns were addressed at that time.  Patient/family has been counseled regarding the need for follow-up as well as the indication to return to the emergency room should new or worrisome developments occur.          MDM     Amount and/or Complexity of Data Reviewed  Tests in the radiology section of CPT®: ordered and reviewed                   Clinical Impression:        ICD-10-CM ICD-9-CM   1. Lumbar strain, initial encounter  S39.012A 847.2   2. Back injury  S39.92XA 959.19               Mariah Moreno, DIONNA  07/06/25 1002

## 2025-07-06 NOTE — Clinical Note
"Lisseth"Lisseth" Lana was seen and treated in our emergency department on 7/6/2025.  She may return to work on 07/08/2025.       If you have any questions or concerns, please don't hesitate to call.      Mariah Moreno PA-C"

## 2025-07-07 ENCOUNTER — PATIENT MESSAGE (OUTPATIENT)
Dept: BARIATRICS | Facility: CLINIC | Age: 35
End: 2025-07-07
Payer: MEDICARE

## 2025-07-08 ENCOUNTER — PATIENT MESSAGE (OUTPATIENT)
Dept: BARIATRICS | Facility: CLINIC | Age: 35
End: 2025-07-08

## 2025-07-08 ENCOUNTER — TELEPHONE (OUTPATIENT)
Dept: BARIATRICS | Facility: CLINIC | Age: 35
End: 2025-07-08
Payer: MEDICARE

## 2025-07-08 NOTE — TELEPHONE ENCOUNTER
Copied from CRM #[id number]. Topic: Appointments - Appointment Access  >> Jul 8, 2025  9:26 AM Christine wrote:  .Type:  Needs Medical Advice    Who Called: pt   Symptoms (please be specific): needs to speak to someone to schedule with a provider as she already had the financial phone call    Would the patient rather a call back or a response via [facility]? Call   Best Call Back Number: [phone]

## 2025-07-08 NOTE — TELEPHONE ENCOUNTER
"S/p Lap gastric sleeve with Dr. Payne on 10/28/24.    Is non-surgical morbid obesity medical weight loss covered? Yes  ·        Medical Weight Loss Copay: $ 80/20     Ht: 5'6"  Wt: 197lbs  BMI: 31.81  Co-morbid: HTN, GERD, ESRD    Called and spoke with the pt. Scheduled MWL consult with the pt.   "

## 2025-07-15 ENCOUNTER — TELEPHONE (OUTPATIENT)
Dept: BARIATRICS | Facility: CLINIC | Age: 35
End: 2025-07-15
Payer: MEDICARE

## 2025-07-15 NOTE — PROGRESS NOTES
Called pt to advise her the provider doesn't have any safe options for her that her insurance company will cover due to her kidney transplant, told pt Dr. Lee suggested she follow up with her dietitian. Pt said okay.

## 2025-07-23 ENCOUNTER — PATIENT MESSAGE (OUTPATIENT)
Dept: NEPHROLOGY | Facility: CLINIC | Age: 35
End: 2025-07-23
Payer: MEDICARE

## 2025-07-23 ENCOUNTER — PATIENT MESSAGE (OUTPATIENT)
Dept: TRANSPLANT | Facility: CLINIC | Age: 35
End: 2025-07-23
Payer: MEDICARE

## 2025-07-24 NOTE — PATIENT INSTRUCTIONS
"Mssg sent to patient.  Hi,   Sorry to hear you are feeling so poorly. I think you need to be evaluated - can you see your primary care providers asap? If you feel really bad and cannot get into your PCP, please consider going to ED. I hope you feel better soon.    Also, our pharmacist reviewed your med questions: "Oxcarbazepine can be used but can decrease tacrolimus levels, therefore we should probably monitor levels and dose adjust after she starts therapy. Atomoxetine and Hydroxyzine are okay to use." So, if you start oxcarbazepine, let us know so we can schedule some  drug levels. Also, advise your team anytime the dose of this med is adjusted so we can ensure your level remains stable.    Best wishes for a speedy recovery,    Dr. Monroy  "

## 2025-07-25 ENCOUNTER — LAB VISIT (OUTPATIENT)
Dept: LAB | Facility: HOSPITAL | Age: 35
End: 2025-07-25
Attending: INTERNAL MEDICINE
Payer: MEDICARE

## 2025-07-25 ENCOUNTER — OFFICE VISIT (OUTPATIENT)
Dept: INTERNAL MEDICINE | Facility: CLINIC | Age: 35
End: 2025-07-25
Payer: MEDICARE

## 2025-07-25 VITALS
OXYGEN SATURATION: 100 % | TEMPERATURE: 96 F | SYSTOLIC BLOOD PRESSURE: 116 MMHG | WEIGHT: 194.44 LBS | RESPIRATION RATE: 20 BRPM | BODY MASS INDEX: 31.25 KG/M2 | HEART RATE: 85 BPM | DIASTOLIC BLOOD PRESSURE: 80 MMHG | HEIGHT: 66 IN

## 2025-07-25 DIAGNOSIS — D63.8 ANEMIA OF CHRONIC DISEASE: ICD-10-CM

## 2025-07-25 DIAGNOSIS — R52 GENERALIZED BODY ACHES: ICD-10-CM

## 2025-07-25 DIAGNOSIS — E55.9 VITAMIN D DEFICIENCY: ICD-10-CM

## 2025-07-25 DIAGNOSIS — Z94.0 S/P KIDNEY TRANSPLANT: ICD-10-CM

## 2025-07-25 DIAGNOSIS — M62.838 MUSCLE SPASM: ICD-10-CM

## 2025-07-25 DIAGNOSIS — F98.8 ADD (ATTENTION DEFICIT DISORDER) WITHOUT HYPERACTIVITY: ICD-10-CM

## 2025-07-25 DIAGNOSIS — R52 GENERALIZED BODY ACHES: Primary | ICD-10-CM

## 2025-07-25 DIAGNOSIS — Z75.8 DOES NOT HAVE PRIMARY CARE PROVIDER: ICD-10-CM

## 2025-07-25 DIAGNOSIS — F41.9 ANXIETY: ICD-10-CM

## 2025-07-25 PROBLEM — F41.1 GAD (GENERALIZED ANXIETY DISORDER): Status: ACTIVE | Noted: 2025-07-23

## 2025-07-25 LAB
25(OH)D3+25(OH)D2 SERPL-MCNC: 21 NG/ML (ref 30–96)
ABSOLUTE EOSINOPHIL (OHS): 0 K/UL
ABSOLUTE MONOCYTE (OHS): 0.53 K/UL (ref 0.3–1)
ABSOLUTE NEUTROPHIL COUNT (OHS): 6.83 K/UL (ref 1.8–7.7)
ALBUMIN SERPL BCP-MCNC: 4.4 G/DL (ref 3.5–5.2)
ALP SERPL-CCNC: 60 UNIT/L (ref 40–150)
ALT SERPL W/O P-5'-P-CCNC: 9 UNIT/L (ref 10–44)
ANION GAP (OHS): 8 MMOL/L (ref 8–16)
AST SERPL-CCNC: 13 UNIT/L (ref 11–45)
BASOPHILS # BLD AUTO: 0.03 K/UL
BASOPHILS NFR BLD AUTO: 0.4 %
BILIRUB SERPL-MCNC: 0.4 MG/DL (ref 0.1–1)
BUN SERPL-MCNC: 23 MG/DL (ref 6–20)
CALCIUM SERPL-MCNC: 9.8 MG/DL (ref 8.7–10.5)
CHLORIDE SERPL-SCNC: 106 MMOL/L (ref 95–110)
CO2 SERPL-SCNC: 24 MMOL/L (ref 23–29)
CREAT SERPL-MCNC: 1.1 MG/DL (ref 0.5–1.4)
ERYTHROCYTE [DISTWIDTH] IN BLOOD BY AUTOMATED COUNT: 12.8 % (ref 11.5–14.5)
FERRITIN SERPL-MCNC: 1478 NG/ML (ref 20–300)
GFR SERPLBLD CREATININE-BSD FMLA CKD-EPI: >60 ML/MIN/1.73/M2
GLUCOSE SERPL-MCNC: 78 MG/DL (ref 70–110)
HCT VFR BLD AUTO: 37.4 % (ref 37–48.5)
HGB BLD-MCNC: 12.4 GM/DL (ref 12–16)
IMM GRANULOCYTES # BLD AUTO: 0.02 K/UL (ref 0–0.04)
IMM GRANULOCYTES NFR BLD AUTO: 0.2 % (ref 0–0.5)
IRON SATN MFR SERPL: 33 % (ref 20–50)
IRON SERPL-MCNC: 79 UG/DL (ref 30–160)
LYMPHOCYTES # BLD AUTO: 1.04 K/UL (ref 1–4.8)
MAGNESIUM SERPL-MCNC: 1.5 MG/DL (ref 1.6–2.6)
MCH RBC QN AUTO: 31.6 PG (ref 27–31)
MCHC RBC AUTO-ENTMCNC: 33.2 G/DL (ref 32–36)
MCV RBC AUTO: 95 FL (ref 82–98)
NUCLEATED RBC (/100WBC) (OHS): 0 /100 WBC
PLATELET # BLD AUTO: 176 K/UL (ref 150–450)
PMV BLD AUTO: 11.6 FL (ref 9.2–12.9)
POTASSIUM SERPL-SCNC: 4.5 MMOL/L (ref 3.5–5.1)
PROT SERPL-MCNC: 7.7 GM/DL (ref 6–8.4)
RBC # BLD AUTO: 3.93 M/UL (ref 4–5.4)
RELATIVE EOSINOPHIL (OHS): 0 %
RELATIVE LYMPHOCYTE (OHS): 12.3 % (ref 18–48)
RELATIVE MONOCYTE (OHS): 6.3 % (ref 4–15)
RELATIVE NEUTROPHIL (OHS): 80.8 % (ref 38–73)
SODIUM SERPL-SCNC: 138 MMOL/L (ref 136–145)
TIBC SERPL-MCNC: 240 UG/DL (ref 250–450)
TRANSFERRIN SERPL-MCNC: 162 MG/DL (ref 200–375)
WBC # BLD AUTO: 8.45 K/UL (ref 3.9–12.7)

## 2025-07-25 PROCEDURE — 86225 DNA ANTIBODY NATIVE: CPT | Mod: HCNC

## 2025-07-25 PROCEDURE — 86235 NUCLEAR ANTIGEN ANTIBODY: CPT | Mod: HCNC

## 2025-07-25 PROCEDURE — 36415 COLL VENOUS BLD VENIPUNCTURE: CPT | Mod: HCNC

## 2025-07-25 PROCEDURE — 86039 ANTINUCLEAR ANTIBODIES (ANA): CPT | Mod: HCNC

## 2025-07-25 PROCEDURE — 85025 COMPLETE CBC W/AUTO DIFF WBC: CPT | Mod: GA,HCNC

## 2025-07-25 PROCEDURE — 83735 ASSAY OF MAGNESIUM: CPT | Mod: HCNC

## 2025-07-25 PROCEDURE — 99999 PR PBB SHADOW E&M-EST. PATIENT-LVL V: CPT | Mod: PBBFAC,HCNC,, | Performed by: PHYSICIAN ASSISTANT

## 2025-07-25 PROCEDURE — 86235 NUCLEAR ANTIGEN ANTIBODY: CPT | Mod: 59,HCNC

## 2025-07-25 PROCEDURE — 82040 ASSAY OF SERUM ALBUMIN: CPT | Mod: HCNC

## 2025-07-25 PROCEDURE — 83540 ASSAY OF IRON: CPT | Mod: GA,HCNC

## 2025-07-25 PROCEDURE — 82728 ASSAY OF FERRITIN: CPT | Mod: GA,HCNC

## 2025-07-25 PROCEDURE — 82306 VITAMIN D 25 HYDROXY: CPT | Mod: HCNC

## 2025-07-25 RX ORDER — ATOMOXETINE 40 MG/1
40 CAPSULE ORAL EVERY MORNING
COMMUNITY
Start: 2025-07-23

## 2025-07-25 RX ORDER — OXCARBAZEPINE 150 MG/1
150 TABLET, FILM COATED ORAL 2 TIMES DAILY
COMMUNITY
Start: 2025-07-23

## 2025-07-25 RX ORDER — ERGOCALCIFEROL 1.25 MG/1
50000 CAPSULE ORAL
Qty: 12 CAPSULE | Refills: 3 | Status: SHIPPED | OUTPATIENT
Start: 2025-07-25

## 2025-07-25 RX ORDER — HYDROXYZINE HYDROCHLORIDE 10 MG/1
10 TABLET, FILM COATED ORAL 3 TIMES DAILY PRN
COMMUNITY
Start: 2025-07-23 | End: 2026-07-18

## 2025-07-25 NOTE — PROGRESS NOTES
"Subjective:      Patient ID: Lisseth Schwartz is a 34 y.o. female.    Chief Complaint: Establish Care (She is here due to needing a new PCP. States a couple nights ago she was having pain all over her body that started in her feet and went through her entire body. Has involuntary spasms all over her body. Has general achy pain all over her body that started approximately last few weeks.)    Patient is new to clinic, being seen today to establish care at this location.     Followed by [facility] and transplant s/p renal transplant (Jan 2023)    Recently saw Dr. Godfrey with [facility] (Psychiatry)  Initated on strattera, oxcarbazepine, hydroxyzine prn     H/o HTN, not on medication currently, previously on toprol     Back injury at work recently, evaluated in ER  Lumbar x-ray "Mild degenerative change L5-S1"  Thoracic x-ray "No acute abnormality identified."  Rx: robaxin, tramadol    Has dealt with involuntary muscle spasms for years  New onset of full body aching pains for several weeks   Occurs at random, 3 episodes in total, lasts for several hours   She was prescribed magnesium in the past and has been out, so she questions whether this could be the cause   Denies changes to diet, supplements or medications prior to onset of symptoms   Generally hydrates throughout the day     H/o gastric sleeve Oct 2024   Followed by Bariatric team, labs in June show Vit D def otherwise stable   Generally eats health     Has been several years since she has seen PCP       Review of Systems   Constitutional:  Negative for chills, diaphoresis and fever.   HENT:  Negative for congestion, rhinorrhea and sore throat.    Respiratory:  Negative for cough, shortness of breath and wheezing.    Gastrointestinal:  Negative for abdominal pain, constipation, diarrhea, nausea and vomiting.   Genitourinary:         Low libido   Musculoskeletal:  Positive for myalgias.   Skin:  Negative for rash.   Neurological:  Positive for dizziness (h/o vertigo, " "intermittent). Negative for light-headedness and headaches.       Objective:   /80 (BP Location: Left arm, Patient Position: Sitting)   Pulse 85   Temp (!) 95.9 °F (35.5 °C) (Tympanic)   Resp 20   Ht 5' 6" (1.676 m)   Wt 88.2 kg (194 lb 7.1 oz)   LMP 07/18/2025 (Approximate)   SpO2 100%   BMI 31.38 kg/m²   Physical Exam  Constitutional:       General: She is not in acute distress.     Appearance: Normal appearance. She is well-developed. She is not ill-appearing.   HENT:      Head: Normocephalic and atraumatic.   Cardiovascular:      Rate and Rhythm: Normal rate and regular rhythm.      Heart sounds: Normal heart sounds. No murmur heard.  Pulmonary:      Effort: Pulmonary effort is normal. No respiratory distress.      Breath sounds: Normal breath sounds. No decreased breath sounds.   Musculoskeletal:      Right lower leg: No edema.      Left lower leg: No edema.   Skin:     General: Skin is warm and dry.      Findings: No rash.   Psychiatric:         Speech: Speech normal.         Behavior: Behavior normal.         Thought Content: Thought content normal.       Assessment:      1. Generalized body aches    2. Anemia of chronic disease    3. Muscle spasm    4. Does not have primary care provider    5. ADD (attention deficit disorder) without hyperactivity    6. Anxiety    7. Vitamin D deficiency       Plan:   Generalized body aches  -     CBC Auto Differential; Future; Expected date: 07/25/2025  -     Comprehensive Metabolic Panel; Future; Expected date: 07/25/2025  -     Iron and TIBC; Future; Expected date: 07/25/2025  -     Ferritin; Future; Expected date: 07/25/2025  -     MARK; Future; Expected date: 07/25/2025    Anemia of chronic disease    Muscle spasm  -     Magnesium; Future; Expected date: 07/25/2025    Does not have primary care provider  -     Ambulatory referral/consult to Family Practice    ADD (attention deficit disorder) without hyperactivity    Anxiety    Vitamin D deficiency  -     " ergocalciferol (ERGOCALCIFEROL) 50,000 unit Cap; Take 1 capsule (50,000 Units total) by mouth every 7 days.  Dispense: 12 capsule; Refill: 3  -     Vitamin D; Future; Expected date: 07/25/2025      F/u GYN regarding libido     Labs today     Close f/u PCP to establish care

## 2025-07-26 RX ORDER — LANOLIN ALCOHOL/MO/W.PET/CERES
800 CREAM (GRAM) TOPICAL 2 TIMES DAILY
Qty: 60 TABLET | Refills: 11 | Status: SHIPPED | OUTPATIENT
Start: 2025-07-26

## 2025-07-28 LAB
ANA (OHS): POSITIVE
ANA PATTERN 1 (OHS): ABNORMAL
ANA TITER 1 (OHS): ABNORMAL

## 2025-07-29 LAB
SM  ANTIBODY (OHS): 0.11 RATIO
SM INTERPRETATION (OHS): NEGATIVE
SM/RNP ANTIBODY (OHS): 0.1 RATIO
SM/RNP INTERPRETATION (OHS): NEGATIVE
SSA  ANTIBODY (OHS): 0.05 RATIO (ref 0–0.99)
SSA INTERPRETATION (OHS): NEGATIVE
SSB  ANTIBODY (OHS): 0.06 RATIO
SSB INTERPRETATION (OHS): NEGATIVE

## 2025-07-30 LAB
DSDNA ANTIBODY (OHS): NORMAL
DSDNA ANTIBODY TITER (OHS): NORMAL

## 2025-08-12 ENCOUNTER — PATIENT MESSAGE (OUTPATIENT)
Dept: BARIATRICS | Facility: CLINIC | Age: 35
End: 2025-08-12
Payer: MEDICARE

## 2025-08-13 ENCOUNTER — HOSPITAL ENCOUNTER (EMERGENCY)
Facility: HOSPITAL | Age: 35
Discharge: HOME | End: 2025-08-13
Attending: EMERGENCY MEDICINE
Payer: MEDICARE

## 2025-08-13 ENCOUNTER — ON-DEMAND VIRTUAL (OUTPATIENT)
Dept: URGENT CARE | Facility: CLINIC | Age: 35
End: 2025-08-13
Payer: MEDICARE

## 2025-08-13 VITALS
WEIGHT: 189.63 LBS | BODY MASS INDEX: 30.47 KG/M2 | TEMPERATURE: 98 F | SYSTOLIC BLOOD PRESSURE: 132 MMHG | HEART RATE: 59 BPM | DIASTOLIC BLOOD PRESSURE: 85 MMHG | RESPIRATION RATE: 18 BRPM | OXYGEN SATURATION: 100 % | HEIGHT: 66 IN

## 2025-08-13 DIAGNOSIS — Z94.0 DECEASED-DONOR KIDNEY TRANSPLANT: ICD-10-CM

## 2025-08-13 DIAGNOSIS — R07.9 CHEST PAIN, UNSPECIFIED TYPE: Primary | ICD-10-CM

## 2025-08-13 DIAGNOSIS — F41.9 ANXIETY: ICD-10-CM

## 2025-08-13 DIAGNOSIS — T67.8XXA HEAT STRESS SYNDROME, INITIAL ENCOUNTER: Primary | ICD-10-CM

## 2025-08-13 DIAGNOSIS — R07.9 CHEST PAIN: ICD-10-CM

## 2025-08-13 DIAGNOSIS — E86.0 DEHYDRATION: ICD-10-CM

## 2025-08-13 LAB
ABSOLUTE EOSINOPHIL (OHS): 0.18 K/UL
ABSOLUTE MONOCYTE (OHS): 0.52 K/UL (ref 0.3–1)
ABSOLUTE NEUTROPHIL COUNT (OHS): 3.62 K/UL (ref 1.8–7.7)
ALBUMIN SERPL BCP-MCNC: 4.4 G/DL (ref 3.5–5.2)
ALP SERPL-CCNC: 65 UNIT/L (ref 40–150)
ALT SERPL W/O P-5'-P-CCNC: 12 UNIT/L (ref 10–44)
ANION GAP (OHS): 9 MMOL/L (ref 8–16)
AST SERPL-CCNC: 16 UNIT/L (ref 11–45)
B-HCG UR QL: NEGATIVE
BASOPHILS # BLD AUTO: 0.03 K/UL
BASOPHILS NFR BLD AUTO: 0.5 %
BILIRUB SERPL-MCNC: 0.4 MG/DL (ref 0.1–1)
BUN SERPL-MCNC: 23 MG/DL (ref 6–20)
CALCIUM SERPL-MCNC: 9.9 MG/DL (ref 8.7–10.5)
CHLORIDE SERPL-SCNC: 108 MMOL/L (ref 95–110)
CK SERPL-CCNC: 63 U/L (ref 20–180)
CO2 SERPL-SCNC: 23 MMOL/L (ref 23–29)
CREAT SERPL-MCNC: 1 MG/DL (ref 0.5–1.4)
CRP SERPL-MCNC: 1.8 MG/L
ERYTHROCYTE [DISTWIDTH] IN BLOOD BY AUTOMATED COUNT: 12.2 % (ref 11.5–14.5)
ERYTHROCYTE [SEDIMENTATION RATE] IN BLOOD: <2 MM/HR
GFR SERPLBLD CREATININE-BSD FMLA CKD-EPI: >60 ML/MIN/1.73/M2
GLUCOSE SERPL-MCNC: 85 MG/DL (ref 70–110)
HCT VFR BLD AUTO: 38.8 % (ref 37–48.5)
HCV AB SERPL QL IA: NORMAL
HGB BLD-MCNC: 12.9 GM/DL (ref 12–16)
HIV 1+2 AB+HIV1 P24 AG SERPL QL IA: NORMAL
IMM GRANULOCYTES # BLD AUTO: 0.05 K/UL (ref 0–0.04)
IMM GRANULOCYTES NFR BLD AUTO: 0.9 % (ref 0–0.5)
LYMPHOCYTES # BLD AUTO: 1.11 K/UL (ref 1–4.8)
MCH RBC QN AUTO: 31.9 PG (ref 27–31)
MCHC RBC AUTO-ENTMCNC: 33.2 G/DL (ref 32–36)
MCV RBC AUTO: 96 FL (ref 82–98)
NT-PROBNP SERPL-MCNC: 35 PG/ML
NUCLEATED RBC (/100WBC) (OHS): 0 /100 WBC
OHS QRS DURATION: 90 MS
OHS QTC CALCULATION: 383 MS
PLATELET # BLD AUTO: 156 K/UL (ref 150–450)
PMV BLD AUTO: 11.9 FL (ref 9.2–12.9)
POCT GLUCOSE: 89 MG/DL (ref 70–110)
POTASSIUM SERPL-SCNC: 4.2 MMOL/L (ref 3.5–5.1)
PROT SERPL-MCNC: 7.7 GM/DL (ref 6–8.4)
RBC # BLD AUTO: 4.04 M/UL (ref 4–5.4)
RELATIVE EOSINOPHIL (OHS): 3.3 %
RELATIVE LYMPHOCYTE (OHS): 20.1 % (ref 18–48)
RELATIVE MONOCYTE (OHS): 9.4 % (ref 4–15)
RELATIVE NEUTROPHIL (OHS): 65.8 % (ref 38–73)
SODIUM SERPL-SCNC: 140 MMOL/L (ref 136–145)
TROPONIN I SERPL HS-MCNC: <3 NG/L
WBC # BLD AUTO: 5.51 K/UL (ref 3.9–12.7)

## 2025-08-13 PROCEDURE — 99285 EMERGENCY DEPT VISIT HI MDM: CPT | Mod: 25,HCNC

## 2025-08-13 PROCEDURE — 82550 ASSAY OF CK (CPK): CPT | Mod: HCNC | Performed by: EMERGENCY MEDICINE

## 2025-08-13 PROCEDURE — 83880 ASSAY OF NATRIURETIC PEPTIDE: CPT | Mod: HCNC | Performed by: NURSE PRACTITIONER

## 2025-08-13 PROCEDURE — 96360 HYDRATION IV INFUSION INIT: CPT | Mod: HCNC

## 2025-08-13 PROCEDURE — 86140 C-REACTIVE PROTEIN: CPT | Mod: HCNC | Performed by: EMERGENCY MEDICINE

## 2025-08-13 PROCEDURE — 84484 ASSAY OF TROPONIN QUANT: CPT | Mod: HCNC | Performed by: NURSE PRACTITIONER

## 2025-08-13 PROCEDURE — 85652 RBC SED RATE AUTOMATED: CPT | Mod: HCNC | Performed by: EMERGENCY MEDICINE

## 2025-08-13 PROCEDURE — 93005 ELECTROCARDIOGRAM TRACING: CPT | Mod: HCNC

## 2025-08-13 PROCEDURE — 81025 URINE PREGNANCY TEST: CPT | Mod: HCNC | Performed by: NURSE PRACTITIONER

## 2025-08-13 PROCEDURE — 87389 HIV-1 AG W/HIV-1&-2 AB AG IA: CPT | Mod: HCNC | Performed by: EMERGENCY MEDICINE

## 2025-08-13 PROCEDURE — 82962 GLUCOSE BLOOD TEST: CPT | Mod: HCNC

## 2025-08-13 PROCEDURE — 85025 COMPLETE CBC W/AUTO DIFF WBC: CPT | Mod: HCNC | Performed by: NURSE PRACTITIONER

## 2025-08-13 PROCEDURE — 93010 ELECTROCARDIOGRAM REPORT: CPT | Mod: HCNC,,, | Performed by: INTERNAL MEDICINE

## 2025-08-13 PROCEDURE — 84460 ALANINE AMINO (ALT) (SGPT): CPT | Mod: HCNC | Performed by: NURSE PRACTITIONER

## 2025-08-13 PROCEDURE — 96361 HYDRATE IV INFUSION ADD-ON: CPT | Mod: HCNC

## 2025-08-13 PROCEDURE — 86803 HEPATITIS C AB TEST: CPT | Mod: HCNC | Performed by: EMERGENCY MEDICINE

## 2025-08-13 PROCEDURE — 25000003 PHARM REV CODE 250: Mod: HCNC | Performed by: EMERGENCY MEDICINE

## 2025-08-13 RX ORDER — SODIUM CHLORIDE 9 MG/ML
1000 INJECTION, SOLUTION INTRAVENOUS
Status: COMPLETED | OUTPATIENT
Start: 2025-08-13 | End: 2025-08-13

## 2025-08-13 RX ORDER — HYDROCODONE BITARTRATE AND ACETAMINOPHEN 10; 325 MG/1; MG/1
1 TABLET ORAL
Refills: 0 | Status: COMPLETED | OUTPATIENT
Start: 2025-08-13 | End: 2025-08-13

## 2025-08-13 RX ORDER — ONDANSETRON HYDROCHLORIDE 2 MG/ML
4 INJECTION, SOLUTION INTRAVENOUS
Status: DISCONTINUED | OUTPATIENT
Start: 2025-08-13 | End: 2025-08-13

## 2025-08-13 RX ADMIN — HYDROCODONE BITARTRATE AND ACETAMINOPHEN 1 TABLET: 10; 325 TABLET ORAL at 09:08

## 2025-08-13 RX ADMIN — SODIUM CHLORIDE 1000 ML: 9 INJECTION, SOLUTION INTRAVENOUS at 09:08

## 2025-08-16 LAB — HOLD SPECIMEN: NORMAL

## 2025-08-20 ENCOUNTER — OFFICE VISIT (OUTPATIENT)
Dept: INTERNAL MEDICINE | Facility: CLINIC | Age: 35
End: 2025-08-20
Payer: MEDICARE

## 2025-08-20 VITALS
OXYGEN SATURATION: 98 % | HEART RATE: 78 BPM | TEMPERATURE: 96 F | WEIGHT: 192.25 LBS | DIASTOLIC BLOOD PRESSURE: 80 MMHG | BODY MASS INDEX: 30.9 KG/M2 | HEIGHT: 66 IN | SYSTOLIC BLOOD PRESSURE: 124 MMHG

## 2025-08-20 DIAGNOSIS — Z98.84 BARIATRIC SURGERY STATUS: ICD-10-CM

## 2025-08-20 DIAGNOSIS — F98.8 ATTENTION DEFICIT DISORDER, UNSPECIFIED TYPE: ICD-10-CM

## 2025-08-20 DIAGNOSIS — F41.9 ANXIETY: ICD-10-CM

## 2025-08-20 DIAGNOSIS — Z76.89 ENCOUNTER TO ESTABLISH CARE: Primary | ICD-10-CM

## 2025-08-20 DIAGNOSIS — Z94.0 KIDNEY TRANSPLANTED: ICD-10-CM

## 2025-08-20 PROCEDURE — 1159F MED LIST DOCD IN RCRD: CPT | Mod: CPTII,HCNC,S$GLB, | Performed by: INTERNAL MEDICINE

## 2025-08-20 PROCEDURE — 3079F DIAST BP 80-89 MM HG: CPT | Mod: CPTII,HCNC,S$GLB, | Performed by: INTERNAL MEDICINE

## 2025-08-20 PROCEDURE — 99214 OFFICE O/P EST MOD 30 MIN: CPT | Mod: HCNC,S$GLB,, | Performed by: INTERNAL MEDICINE

## 2025-08-20 PROCEDURE — 3074F SYST BP LT 130 MM HG: CPT | Mod: CPTII,HCNC,S$GLB, | Performed by: INTERNAL MEDICINE

## 2025-08-20 PROCEDURE — G2211 COMPLEX E/M VISIT ADD ON: HCPCS | Mod: HCNC,S$GLB,, | Performed by: INTERNAL MEDICINE

## 2025-08-20 PROCEDURE — 1160F RVW MEDS BY RX/DR IN RCRD: CPT | Mod: CPTII,HCNC,S$GLB, | Performed by: INTERNAL MEDICINE

## 2025-08-20 PROCEDURE — 3008F BODY MASS INDEX DOCD: CPT | Mod: CPTII,HCNC,S$GLB, | Performed by: INTERNAL MEDICINE

## 2025-08-20 PROCEDURE — 3066F NEPHROPATHY DOC TX: CPT | Mod: CPTII,HCNC,S$GLB, | Performed by: INTERNAL MEDICINE

## 2025-08-20 PROCEDURE — 99999 PR PBB SHADOW E&M-EST. PATIENT-LVL IV: CPT | Mod: PBBFAC,HCNC,, | Performed by: INTERNAL MEDICINE

## 2025-09-04 ENCOUNTER — PATIENT OUTREACH (OUTPATIENT)
Dept: ADMINISTRATIVE | Facility: HOSPITAL | Age: 35
End: 2025-09-04
Payer: MEDICARE

## (undated) DEVICE — RELOAD ECHELON FLEX BLU 60MM

## (undated) DEVICE — NDL PNEUMO INSUFFLATI 120MM

## (undated) DEVICE — CATH URETHRAL 18FR

## (undated) DEVICE — PLUG CATHETER STERILE FOLEY

## (undated) DEVICE — STAPLER ECHELON LONG 60X440MM

## (undated) DEVICE — SEE MEDLINE ITEM 152622

## (undated) DEVICE — SUT SILK 3-0 STRANDS 30IN

## (undated) DEVICE — SYR 10CC LUER LOCK

## (undated) DEVICE — TOWEL OR XRAY WHITE 17X26IN

## (undated) DEVICE — CONTAINER SPECIMEN STRL 4OZ

## (undated) DEVICE — SYR ONLY LUER LOCK 20CC

## (undated) DEVICE — NDL HYPO REG 25G X 1 1/2

## (undated) DEVICE — SHEARS HARMONIC 5CM 36CM

## (undated) DEVICE — SUT MCRYL PLUS 4-0 PS2 27IN

## (undated) DEVICE — TROCAR ENDOPATH XCEL 12MM 10CM

## (undated) DEVICE — STOCKINETTE 2INX36

## (undated) DEVICE — MANIFOLD 4 PORT

## (undated) DEVICE — TROCAR ENDOPATH XCEL 5X100MM

## (undated) DEVICE — HEMOSTAT SURGICEL 4X8IN

## (undated) DEVICE — PACK DRAPE PERI/GYN TIBURON

## (undated) DEVICE — CATH 16FR URETHRL RED RUB

## (undated) DEVICE — CANNULA ENDOPATH XCEL 5X100MM

## (undated) DEVICE — SUT PROLENE 5-0 36IN C-1

## (undated) DEVICE — INJECTOR UTERINE ZUMI 4.0

## (undated) DEVICE — TUBING HF INSUFFLATION W/ FLTR

## (undated) DEVICE — PACK KIDNEY TRANSPLANT CUSTOM

## (undated) DEVICE — GLOVE PROTEXIS HYDROGEL SZ6.5

## (undated) DEVICE — PUNCH AORTIC 4.8MM

## (undated) DEVICE — SOL NS 1000CC

## (undated) DEVICE — ADHESIVE DERMABOND ADVANCED

## (undated) DEVICE — ELECTRODE REM PLYHSV RETURN 9

## (undated) DEVICE — GLOVE PROTEXIS HYDROGEL SZ7

## (undated) DEVICE — KIT ANTIFOG

## (undated) DEVICE — SEE MEDLINE ITEM 152739

## (undated) DEVICE — DRAPE SLUSH WARMER WITH DISC

## (undated) DEVICE — Device

## (undated) DEVICE — SEE L#152161

## (undated) DEVICE — CLIPPER BLADE MOD 4406 (CAREF)

## (undated) DEVICE — SEE MEDLINE ITEM 157181

## (undated) DEVICE — DRAPE ABDOMINAL TIBURON 14X11

## (undated) DEVICE — HEMOSTAT SURGICEL NU-KNIT 6X9

## (undated) DEVICE — SEE MEDLINE ITEM 146420

## (undated) DEVICE — SUT 1 36IN PDS II VIO MONO

## (undated) DEVICE — SUT CTD VICRYL PLUS 4/0

## (undated) DEVICE — SCISSOR 5MMX35CM DIRECT DRIVE

## (undated) DEVICE — TUBING HEATED INSUFFLATOR

## (undated) DEVICE — BLADE SURG CARBON STEEL SZ11

## (undated) DEVICE — IRRIGATOR ENDOSCOPY DISP.

## (undated) DEVICE — DRESSING TELFA N ADH 3X8

## (undated) DEVICE — SYR 3CC LUER LOC

## (undated) DEVICE — CART STAPLE RELD 45MM WHT

## (undated) DEVICE — SET DECANTER MEDICHOICE

## (undated) DEVICE — SUT 0 VICRYL / UR6 (J603)

## (undated) DEVICE — TRAY MINOR GEN SURG OMC

## (undated) DEVICE — CORD LAP 10 DISP

## (undated) DEVICE — DEVICE CLOSURE DISP 14G

## (undated) DEVICE — SET IRR URLGY 2LINE UNIV SPIKE

## (undated) DEVICE — DEVICE ABLATION NOVASURE DISP

## (undated) DEVICE — SYR 50CC LL

## (undated) DEVICE — SUT CTD VICRYL 0 UND BR SUT

## (undated) DEVICE — SEE MEDLINE ITEM 154981

## (undated) DEVICE — DRAPE CORETEMP FLD WRM 56X62IN

## (undated) DEVICE — SEAL SCOPE WARMER 20/BX

## (undated) DEVICE — SEE MEDLINE ITEM 146292

## (undated) DEVICE — SUT 3-0 12-18IN SILK

## (undated) DEVICE — SUT 2-0 12-18IN SILK

## (undated) DEVICE — DRESSING ABSRBNT ISLAND 3.6X8

## (undated) DEVICE — SUT PDS BV 6-0

## (undated) DEVICE — SEE L#153236

## (undated) DEVICE — STAPLER SKIN PROXIMATE WIDE

## (undated) DEVICE — TIP YANKAUERS BULB NO VENT

## (undated) DEVICE — ELECTRODE CUT LOOP 30 DEG

## (undated) DEVICE — STAPLER INT LINEAR ARTC 3.5-45

## (undated) DEVICE — BOWL STERILE LARGE 32OZ

## (undated) DEVICE — RELOAD ECHELON FLEX GRN 60MM

## (undated) DEVICE — SUT SILK 2-0 STRANDS 30IN

## (undated) DEVICE — TROCAR ENDOPATH XCEL 12X100MM

## (undated) DEVICE — DRAPE LAVH LAPAROSCOPY W/FLUID

## (undated) DEVICE — COVER OVERHEAD SURG LT BLUE

## (undated) DEVICE — SUT 4-0 12-18IN SILK BLACK

## (undated) DEVICE — GOWN POLY REINF BRTH SLV XL

## (undated) DEVICE — SUT ETHILON 3-0 PS2 18 BLK

## (undated) DEVICE — SUT PROLENE 6-0 BV-1 30IN

## (undated) DEVICE — SEE MEDLINE ITEM 157117

## (undated) DEVICE — TRAY CATH FOL SIL URIMTR 16FR

## (undated) DEVICE — SET CYSTO IRRIGATION UNIV SPIK

## (undated) DEVICE — BAG TISS RETRV MONARCH 10MM

## (undated) DEVICE — PUNCH AORTIC 4.0MM 6/CASE

## (undated) DEVICE — SOL WATER STRL IRR 1000ML

## (undated) DEVICE — APPLICATOR CHLORAPREP ORN 26ML

## (undated) DEVICE — FOLEY BLLN 20FR 3WAY 5CC

## (undated) DEVICE — SEE MEDLINE ITEM 157027